# Patient Record
Sex: FEMALE | Race: WHITE | NOT HISPANIC OR LATINO | Employment: OTHER | URBAN - METROPOLITAN AREA
[De-identification: names, ages, dates, MRNs, and addresses within clinical notes are randomized per-mention and may not be internally consistent; named-entity substitution may affect disease eponyms.]

---

## 2017-01-05 ENCOUNTER — OFFICE VISIT (OUTPATIENT)
Dept: VASCULAR SURGERY | Facility: CLINIC | Age: 67
End: 2017-01-05
Payer: MEDICARE

## 2017-01-05 VITALS — BODY MASS INDEX: 30.47 KG/M2 | HEIGHT: 62 IN | WEIGHT: 165.56 LBS

## 2017-01-05 DIAGNOSIS — Z95.1 S/P CABG (CORONARY ARTERY BYPASS GRAFT): Primary | ICD-10-CM

## 2017-01-05 DIAGNOSIS — Z95.2 S/P AVR: ICD-10-CM

## 2017-01-05 PROCEDURE — 99024 POSTOP FOLLOW-UP VISIT: CPT | Mod: ,,, | Performed by: THORACIC SURGERY (CARDIOTHORACIC VASCULAR SURGERY)

## 2017-01-05 PROCEDURE — 99999 PR PBB SHADOW E&M-EST. PATIENT-LVL II: CPT | Mod: PBBFAC,,, | Performed by: THORACIC SURGERY (CARDIOTHORACIC VASCULAR SURGERY)

## 2017-01-05 PROCEDURE — 99212 OFFICE O/P EST SF 10 MIN: CPT | Mod: PBBFAC,PO | Performed by: THORACIC SURGERY (CARDIOTHORACIC VASCULAR SURGERY)

## 2017-01-05 RX ORDER — WARFARIN SODIUM 5 MG/1
TABLET ORAL
Refills: 0 | COMMUNITY
Start: 2016-12-15 | End: 2017-03-23

## 2017-01-05 RX ORDER — HYDROCODONE BITARTRATE AND ACETAMINOPHEN 5; 325 MG/1; MG/1
TABLET ORAL
Refills: 0 | COMMUNITY
Start: 2016-12-15 | End: 2017-02-22 | Stop reason: ALTCHOICE

## 2017-01-06 NOTE — PROGRESS NOTES
OFFICE NOTE    HISTORY OF PRESENT ILLNESS:  This is a 66-year-old lady status post aortic valve   replacement and coronary artery bypass grafting.  She reports to the office   today in followup.  She has done reasonably well, should make good progress.    She is ambulatory without assistance.  She does use a cane, but this has been a   chronic issue.  Her medicines are noted, they are part of the recent EPIC   record.  Her problem list is reviewed.    PHYSICAL EXAMINATION:  SKIN:  Her surgical wounds are clean and dry.  There is no evidence of   infection.  Her chest tube stitches were removed as well.  LUNGS:  Clear.  HEART:  Regular rate and rhythm.  VITAL SIGNS:  Satisfactory.    At this point, she should continue cardiac rehabilitation.  She has home health   currently and they are helping her with her anticoagulation, and I would suggest   that she would need Coumadin for another few weeks and then this can be   discontinued, and in its replacement daily aspirin.  She can see me on an   as-needed basis, but hopefully she will continue to do well.      ISHAAN/WICHO  dd: 01/05/2017 10:14:37 (CST)  td: 01/06/2017 00:23:25 (CST)  Doc ID   #[id number]  Job ID #[id number]    CC: Keven Fair MD

## 2017-02-21 DIAGNOSIS — M17.10 ARTHRITIS OF KNEE: ICD-10-CM

## 2017-02-22 DIAGNOSIS — D22.9 ATYPICAL MOLE: Primary | ICD-10-CM

## 2017-02-22 RX ORDER — HYDROCHLOROTHIAZIDE 25 MG/1
TABLET ORAL
Refills: 1 | COMMUNITY
Start: 2017-01-26 | End: 2017-02-22

## 2017-02-22 RX ORDER — TRAMADOL HYDROCHLORIDE 50 MG/1
TABLET ORAL
Qty: 120 TABLET | Refills: 0 | Status: SHIPPED | OUTPATIENT
Start: 2017-02-22 | End: 2017-03-23 | Stop reason: SDUPTHER

## 2017-02-22 RX ORDER — ZOLPIDEM TARTRATE 5 MG/1
TABLET ORAL
Qty: 20 TABLET | Refills: 0 | Status: SHIPPED | OUTPATIENT
Start: 2017-02-22 | End: 2017-05-04 | Stop reason: ALTCHOICE

## 2017-02-22 RX ORDER — NIFEDIPINE 60 MG/1
TABLET, EXTENDED RELEASE ORAL
Refills: 2 | COMMUNITY
Start: 2017-01-26 | End: 2017-08-01

## 2017-02-22 NOTE — TELEPHONE ENCOUNTER
Patient returned call/ returning call got answer machine. Please call patient back at [phone].

## 2017-02-22 NOTE — TELEPHONE ENCOUNTER
Patient had heart surgery. She can not sleep would like to have the ambian refilled.     Needs ref to derm for mole changes in vaginal area, irregular edges and dark.   ______________________________________________  (FYI)    Patient does not feel her generic prozac is not working for her any longer. Booked apt for 3-23-17 with Dr Princess Batres had also put her on hctz = felt weak now on  nifedipine er 60 per Dr Batres. Did not take welchol due to weakness. Allergy and med list updated.

## 2017-02-27 ENCOUNTER — DOCUMENTATION ONLY (OUTPATIENT)
Dept: FAMILY MEDICINE | Facility: CLINIC | Age: 67
End: 2017-02-27

## 2017-02-27 NOTE — PROGRESS NOTES
Pre-Visit Chart Review  For Appointment Scheduled on 3-23-17    Health Maintenance Due   Topic Date Due    Fecal Occult Blood Test (FOBT)  1950    Eye Exam  05/07/2015    Influenza Vaccine  08/01/2016    Pneumococcal (65+) (2 of 2 - PCV13) 09/30/2016    Mammogram  04/22/2017

## 2017-03-23 ENCOUNTER — OFFICE VISIT (OUTPATIENT)
Dept: FAMILY MEDICINE | Facility: CLINIC | Age: 67
End: 2017-03-23
Payer: MEDICARE

## 2017-03-23 VITALS
TEMPERATURE: 98 F | WEIGHT: 164.25 LBS | SYSTOLIC BLOOD PRESSURE: 134 MMHG | HEIGHT: 62 IN | DIASTOLIC BLOOD PRESSURE: 82 MMHG | OXYGEN SATURATION: 95 % | BODY MASS INDEX: 30.22 KG/M2 | HEART RATE: 75 BPM | RESPIRATION RATE: 20 BRPM

## 2017-03-23 DIAGNOSIS — Z12.31 OTHER SCREENING MAMMOGRAM: ICD-10-CM

## 2017-03-23 DIAGNOSIS — G47.00 INSOMNIA, UNSPECIFIED TYPE: ICD-10-CM

## 2017-03-23 DIAGNOSIS — Z12.11 COLON CANCER SCREENING: ICD-10-CM

## 2017-03-23 DIAGNOSIS — C43.72 MALIGNANT MELANOMA OF LEFT LOWER EXTREMITY INCLUDING HIP: Primary | ICD-10-CM

## 2017-03-23 DIAGNOSIS — M17.10 ARTHRITIS OF KNEE: ICD-10-CM

## 2017-03-23 DIAGNOSIS — F32.A DEPRESSION, UNSPECIFIED DEPRESSION TYPE: ICD-10-CM

## 2017-03-23 DIAGNOSIS — E11.9 TYPE 2 DIABETES MELLITUS WITHOUT COMPLICATION, UNSPECIFIED LONG TERM INSULIN USE STATUS: ICD-10-CM

## 2017-03-23 PROCEDURE — 99214 OFFICE O/P EST MOD 30 MIN: CPT | Mod: S$PBB,,, | Performed by: FAMILY MEDICINE

## 2017-03-23 PROCEDURE — 99999 PR PBB SHADOW E&M-EST. PATIENT-LVL IV: CPT | Mod: PBBFAC,,, | Performed by: FAMILY MEDICINE

## 2017-03-23 PROCEDURE — 99214 OFFICE O/P EST MOD 30 MIN: CPT | Mod: PBBFAC,PO | Performed by: FAMILY MEDICINE

## 2017-03-23 RX ORDER — ENOXAPARIN SODIUM 100 MG/ML
INJECTION SUBCUTANEOUS
Refills: 0 | COMMUNITY
Start: 2016-12-20 | End: 2017-03-23 | Stop reason: ALTCHOICE

## 2017-03-23 RX ORDER — TRAZODONE HYDROCHLORIDE 50 MG/1
50 TABLET ORAL NIGHTLY PRN
Qty: 30 TABLET | Refills: 5 | Status: SHIPPED | OUTPATIENT
Start: 2017-03-23 | End: 2017-05-04

## 2017-03-23 RX ORDER — VIT C/E/ZN/COPPR/LUTEIN/ZEAXAN 250MG-90MG
1000 CAPSULE ORAL DAILY
Status: ON HOLD | COMMUNITY

## 2017-03-23 RX ORDER — THEOPHYLLINE 400 MG/1
TABLET, EXTENDED RELEASE ORAL
Refills: 1 | COMMUNITY
Start: 2017-03-06 | End: 2019-03-08 | Stop reason: SDUPTHER

## 2017-03-23 RX ORDER — TRAMADOL HYDROCHLORIDE 50 MG/1
50 TABLET ORAL EVERY 6 HOURS PRN
Qty: 120 TABLET | Refills: 0 | Status: SHIPPED | OUTPATIENT
Start: 2017-03-23 | End: 2017-05-10 | Stop reason: SDUPTHER

## 2017-03-23 RX ORDER — CETIRIZINE HYDROCHLORIDE 10 MG/1
10 TABLET ORAL DAILY
COMMUNITY
End: 2017-06-16 | Stop reason: ALTCHOICE

## 2017-03-23 RX ORDER — FLUOXETINE HYDROCHLORIDE 20 MG/1
20 CAPSULE ORAL DAILY
Qty: 30 CAPSULE | Refills: 11 | Status: SHIPPED | OUTPATIENT
Start: 2017-03-23 | End: 2018-08-06 | Stop reason: SDUPTHER

## 2017-03-23 NOTE — PATIENT INSTRUCTIONS
Controlling High Blood Pressure  High blood pressure (hypertension) is often called the silent killer. This is because many people who have it dont know it. High blood pressure is defined as 140/90 mm Hg or higher. Know your blood pressure and remember to check it regularly. Doing so can save your life. Here are some things you can do to help control your blood pressure.    Choose heart-healthy foods  · Select low-salt, low-fat foods. Limit sodium intake to 2,400 mg per day or the amount suggested by your healthcare provider.  · Limit canned, dried, cured, packaged, and fast foods. These can contain a lot of salt.  · Eat 8 to 10 servings of fruits and vegetables every day.  · Choose lean meats, fish, or chicken.  · Eat whole-grain pasta, brown rice, and beans.  · Eat 2 to 3 servings of low-fat or fat-free dairy products.  · Ask your doctor about the DASH eating plan. This plan helps reduce blood pressure.  · When you go to a restaurant, ask that your meal be prepared with no added salt.  Maintain a healthy weight  · Ask your healthcare provider how many calories to eat a day. Then stick to that number.  · Ask your healthcare provider what weight range is healthiest for you. If you are overweight, a weight loss of only 3% to 5% of your body weight can help lower blood pressure. Generally, a good weight loss goal is to lose 10% of your body weight in a year.  · Limit snacks and sweets.  · Get regular exercise.  Get up and get active  · Choose activities you enjoy. Find ones you can do with friends or family. This includes bicycling, dancing, walking, and jogging.  · Park farther away from building entrances.  · Use stairs instead of the elevator.  · When you can, walk or bike instead of driving.  · [place] leaves, garden, or do household repairs.  · Be active at a moderate to vigorous level of physical activity for at least 40 minutes for a minimum of 3 to 4 days a week.   Manage stress  · Make time to relax and enjoy  life. Find time to laugh.  · Communicate your concerns with your loved ones and your healthcare provider.  · Visit with family and friends, and keep up with hobbies.  Limit alcohol and quit smoking  · Men should have no more than 2 drinks per day.  · Women should have no more than 1 drink per day.  · Talk with your healthcare provider about quitting smoking. Smoking significantly increases your risk for heart disease and stroke. Ask your healthcare provider about community smoking cessation programs and other options.  Medicines  If lifestyle changes arent enough, your healthcare provider may prescribe high blood pressure medicine. Take all medicines as prescribed. If you have any questions about your medicines, ask your healthcare provider before stopping or changing them.   Date Last Reviewed: 4/27/2016 © 2000-2016 [employer] [address] [place] [address], [place] [address]. All rights reserved. This information is not intended as a substitute for professional medical care. Always follow your healthcare professional's instructions.        Walking for Fitness  Fitness walking has something for everyone, even people who are already fit. Walking is one of the safest ways to condition your body aerobically. It can boost energy, help you lose weight, and reduce stress.    Physical benefits  · Walking strengthens your heart and lungs, and tones your muscles.  · When walking, your feet land with less impact than in other sports. This reduces chances of muscle, bone, and joint injury.  · Regular walking improves your cholesterol levels and lowers your risk of heart disease. And it helps you control your blood sugar if you have diabetes.  · Walking is a weight-bearing activity, which helps maintain bone density. This can help prevent osteoporosis.  Personal rewards  · Taking walks can help you relax and manage stress. And fitness walking may make you feel better about yourself.  · Walking can help you sleep  better at night and make you less likely to be depressed.  · Regular walking may help maintain your memory as you get older.  · Walking is a great way to spend extra time with friends and family members. Be sure to invite your dog along!  Q&A about fitness walking  Q: Will walking keep me fit?  A: Yes. Regular walking at the right pace gives you all the benefits of other aerobic activities, such as jogging and swimming.  Q: Will walking help me lose weight and keep it off?  A: Yes. Per mile, walking can burn as many calories as jogging. Your health care provider can help work walking into your weight-loss plan.  Q: Is walking safe for my health?  A: Yes. Walking is safe if you have high blood pressure, diabetes, heart disease, or other conditions. Talk to your health care provider before you start.  Date Last Reviewed: 5/9/2015  © 7902-5976 [employer] [address] [place] [address]. All rights reserved. This information is not intended as a substitute for professional medical care. Always follow your healthcare professional's instructions.        Weight Management: Getting Started  Healthy bodies come in all shapes and sizes. Not all bodies are made to be thin. For some people, a healthy weight is higher than the average weight listed on weight charts. Your healthcare provider can help you decide on a healthy weight for you.    Reasons to lose weight  Losing weight can help with some health problems, such as high blood pressure, heart disease, diabetes, sleep apnea, and arthritis. You may also feel more energy.  Set your long-term goal  Your goal doesn't even have to be a specific weight. You may decide on a fitness goal (such as being able to walk 10 miles a week), or a health goal (such as lowering your blood pressure). Choose a goal that is measurable and reasonable, so you know when you've reached it. A goal of reaching a BMI of less than 25 is not always reasonable (or  possible).   Make an action plan  Habits dont change overnight. Setting your goals too high can leave you feeling discouraged if you cant reach them. Be realistic. Choose one or two small changes you can make now. Set an action plan for how you are going to make these changes. When you can stick to this plan, keep making a few more small changes. Taking small steps will help you stay on the path to success.  Track your progress  Write down your goals. Then, keep a daily record of your progress. Write down what you eat and how active you are. This record lets you look back on how much youve done. It may also help when youre feeling frustrated. Reward yourself for success. Even if you dont reach every goal, give yourself credit for what you do get done.  Get support  Encouragement from others can help make losing weight easier. Ask your family members and friends for support. They may even want to join you. Also look to your healthcare provider, registered dietitian, and  for help. Your local hospital can give you more information about nutrition, exercise, and weight loss.  Date Last Reviewed: 1/31/2016  © 7206-0135 [employer] [address] [place] [address], [place] [address]. All rights reserved. This information is not intended as a substitute for professional medical care. Always follow your healthcare professional's instructions.

## 2017-03-23 NOTE — MR AVS SNAPSHOT
[facility]  [address]  [place] [address]  Phone: [phone]  Fax: [phone]                  Tereza Larose   3/23/2017 4:20 PM   Office Visit    Description:  Female : 1950   Provider:  Evan Sánchez MD   Department:  [facility]           Reason for Visit     Medication Refill           Diagnoses this Visit        Comments    Malignant melanoma of left lower extremity including hip    -  Primary     Depression, unspecified depression type         Insomnia, unspecified type         Arthritis of knee         Colon cancer screening         Type 2 diabetes mellitus without complication, unspecified long term insulin use status         Other screening mammogram         Immunization due                To Do List           Future Appointments        Provider Department Dept Phone    3/28/2017 9:45 AM Farnaz Vaz MD [facility] [phone]    3/28/2017 11:30 AM Joaquin Coates MD [place] [facility]Di[facility] [phone]    2017 9:00 AM [facility] [phone]    2017 9:15 AM LAB, [facility] [phone]      Goals (5 Years of Data)     None       These Medications        Disp Refills Start End    fluoxetine (PROZAC) 20 MG capsule 30 capsule 11 3/23/2017 3/23/2018    Take 1 capsule (20 mg total) by mouth once daily. - Oral    Pharmacy: [email or web address] [address]  AT [place] [address] & [facility] [address] Ph #: [phone]       trazodone (DESYREL) 50 MG tablet 30 tablet 5 3/23/2017 3/23/2018    Take 1 tablet (50 mg total) by mouth nightly as needed for Insomnia. - Oral    Pharmacy: [email or web address] [address] AT [place] [address] & [facility] [address] Ph #: [phone]       tramadol (ULTRAM) 50 mg tablet 120 tablet 0 3/23/2017     Take 1 tablet (50 mg total) by mouth every 6 (six) hours as needed. - Oral    Pharmacy: [email or web address]  [id number]  [place], [place] - [address] AT [facility] [address]  #: [phone]         [facility]     [facility] - 24/7 Assistance  Registered nurses in the [facility] provide clinical advisement, health education, appointment booking, and other advisory services.  Call for this free service at [phone].             Medications           Message regarding Medications     Verify the changes and/or additions to your medication regime listed below are the same as discussed with your clinician today.  If any of these changes or additions are incorrect, please notify your healthcare provider.        START taking these NEW medications        Refills    fluoxetine (PROZAC) 20 MG capsule 11    Sig: Take 1 capsule (20 mg total) by mouth once daily.    Class: Print    Route: Oral    trazodone (DESYREL) 50 MG tablet 5    Sig: Take 1 tablet (50 mg total) by mouth nightly as needed for Insomnia.    Class: Normal    Route: Oral      CHANGE how you are taking these medications     Start Taking Instead of    tramadol (ULTRAM) 50 mg tablet tramadol (ULTRAM) 50 mg tablet    Dosage:  Take 1 tablet (50 mg total) by mouth every 6 (six) hours as needed. Dosage:  TAKE 1 TABLET BY MOUTH EVERY 6 HOURS AS NEEDED FOR PAIN    Reason for Change:  Reorder       STOP taking these medications     cholecalciferol, vitamin D3, 50,000 unit Tab Take 1 tablet by mouth every 7 days.    enoxaparin (LOVENOX) 80 mg/0.8 mL Syrg ADM 1 SYRINGE SC BID    ibuprofen (ADVIL,MOTRIN) 200 MG tablet Take 400 mg by mouth every 8 (eight) hours as needed for Pain.    theophylline (THEODUR) 300 mg 24 hr capsule Take 300 mg by mouth once daily.    warfarin (COUMADIN) 5 MG tablet TK 1 T PO EVERY NIGHT.           Verify that the below list of medications is an accurate representation of the medications you are currently taking.  If none reported, the list may be blank. If incorrect, please contact your healthcare provider.  Carry this list with you in case of emergency.           Current Medications     albuterol (VENTOLIN HFA) 90 mcg/actuation inhaler Inhale 2 puffs into the lungs every 4 (four) hours as needed.    albuterol-ipratropium 2.5mg-0.5mg/3mL (DUO-NEB) 0.5 mg-3 mg(2.5 mg base)/3 mL nebulizer solution U 1 VIAL IN NEBULIZER Q 6 H PRF SOB    amlodipine (NORVASC) 10 MG tablet Take 1 tablet (10 mg total) by mouth once daily.    aspirin (ECOTRIN) 81 MG EC tablet Take 81 mg by mouth once daily.    blood sugar diagnostic Strp True track check blood glucose once daily    cetirizine (ZYRTEC) 10 MG tablet Take 10 mg by mouth once daily.    cholecalciferol, vitamin D3, (VITAMIN D3) 1,000 unit capsule Take 1,000 Units by mouth once daily.    coQ10, ubiquinol, 100 mg Cap Take 1 capsule by mouth 2 (two) times daily.     cyanocobalamin (VITAMIN B-12) 250 MCG tablet Take 250 mcg by mouth once daily.    GUAIFENESIN/PSEUDOEPHEDRNE HCL (MUCINEX D ORAL) Take 1 tablet by mouth 2 (two) times daily as needed.     krill-omega-3-dha-epa-lipids (KRILL OIL) 457-31-23-50 mg Cap Take 1 capsule by mouth 2 (two) times daily.    L. ACIDOPHILUS/BIFIDO LONGUM (PROBIOTIC PEARLS ORAL) Take 1 each by mouth 2 (two) times daily.     lancets [facility] True track Check glucose once daily    levothyroxine (SYNTHROID) 125 MCG tablet Take 1 tablet (125 mcg total) by mouth every morning.    lisinopril (PRINIVIL,ZESTRIL) 40 MG tablet Take 1 tablet (40 mg total) by mouth once daily.    loratadine (CLARITIN) 10 mg tablet Take 10 mg by mouth 2 (two) times daily as needed for Allergies.     magnesium 250 mg Tab Take 1 tablet by mouth once daily.     metformin (GLUCOPHAGE) 500 MG tablet Take 2 tablets (1,000 mg total) by mouth 2 (two) times daily with meals.    metoprolol tartrate (LOPRESSOR) 100 MG tablet Take 1 tablet (100 mg total) by mouth 3 (three) times daily.    nifedipine (ADALAT CC) 60 MG TbSR TK 1 T PO qhs    potassium chloride (MICRO-K) 8 mEq CpSR Take 1 capsule (8 mEq  "total) by mouth once daily.    theophylline 400 mg Tb24 TK 1 T PO BID    torsemide (DEMADEX) 20 MG Tab Take 1 tablet (20 mg total) by mouth once daily.    tramadol (ULTRAM) 50 mg tablet Take 1 tablet (50 mg total) by mouth every 6 (six) hours as needed.    trolamine salicylate 10 % SprA Apply topically 3 (three) times daily as needed.    TRUEPLUS LANCETS 33 gauge Misc USE TO TEST BLOOD SUGAR ONCE D    vitamin A 8000 UNIT capsule Take 8,000 Units by mouth once daily.    zolpidem (AMBIEN) 5 MG Tab 1/2 tablet twice a week as needed for sleep    fluoxetine (PROZAC) 20 MG capsule Take 1 capsule (20 mg total) by mouth once daily.    trazodone (DESYREL) 50 MG tablet Take 1 tablet (50 mg total) by mouth nightly as needed for Insomnia.           Clinical Reference Information           Your Vitals Were     BP Pulse Temp Resp Height Weight    180/84 (BP Location: Left arm, Patient Position: Sitting, BP Method: Manual) 75 98.2 °F (36.8 °C) (Oral) 20 5' 2" (1.575 m) 74.5 kg (164 lb 3.9 oz)    SpO2 BMI             95% 30.04 kg/m2         Blood Pressure          Most Recent Value    BP  (!)  180/84      Allergies as of 3/23/2017     Corn    Hydrochlorothiazide    Potato Starch    Pravastatin    Statins-hmg-coa Reductase Inhibitors    Welchol [Colesevelam]      Immunizations Administered on Date of Encounter - 3/23/2017     Name Date Dose VIS Date Route    Pneumococcal Conjugate - 13 Valent  Incomplete 0.5 mL 11/5/2015 Intramuscular      Orders Placed During Today's Visit      Normal Orders This Visit    Pneumococcal Conjugate Vaccine (13 Valent) (IM)     POCT Hemocult Stool X3     Future Labs/Procedures Expected by Expires    Basic metabolic panel  3/23/2017 3/24/2018    Hemoglobin A1c  3/23/2017 3/24/2018    Mammo Digital Screening Bilat with CAD  3/23/2017 5/22/2018      Instructions      Controlling High Blood Pressure  High blood pressure (hypertension) is often called the silent killer. This is because many people who have " it dont know it. High blood pressure is defined as 140/90 mm Hg or higher. Know your blood pressure and remember to check it regularly. Doing so can save your life. Here are some things you can do to help control your blood pressure.    Choose heart-healthy foods  · Select low-salt, low-fat foods. Limit sodium intake to 2,400 mg per day or the amount suggested by your healthcare provider.  · Limit canned, dried, cured, packaged, and fast foods. These can contain a lot of salt.  · Eat 8 to 10 servings of fruits and vegetables every day.  · Choose lean meats, fish, or chicken.  · Eat whole-grain pasta, brown rice, and beans.  · Eat 2 to 3 servings of low-fat or fat-free dairy products.  · Ask your doctor about the DASH eating plan. This plan helps reduce blood pressure.  · When you go to a restaurant, ask that your meal be prepared with no added salt.  Maintain a healthy weight  · Ask your healthcare provider how many calories to eat a day. Then stick to that number.  · Ask your healthcare provider what weight range is healthiest for you. If you are overweight, a weight loss of only 3% to 5% of your body weight can help lower blood pressure. Generally, a good weight loss goal is to lose 10% of your body weight in a year.  · Limit snacks and sweets.  · Get regular exercise.  Get up and get active  · Choose activities you enjoy. Find ones you can do with friends or family. This includes bicycling, dancing, walking, and jogging.  · Park farther away from building entrances.  · Use stairs instead of the elevator.  · When you can, walk or bike instead of driving.  · [place] leaves, garden, or do household repairs.  · Be active at a moderate to vigorous level of physical activity for at least 40 minutes for a minimum of 3 to 4 days a week.   Manage stress  · Make time to relax and enjoy life. Find time to laugh.  · Communicate your concerns with your loved ones and your healthcare provider.  · Visit with family and friends,  and keep up with hobbies.  Limit alcohol and quit smoking  · Men should have no more than 2 drinks per day.  · Women should have no more than 1 drink per day.  · Talk with your healthcare provider about quitting smoking. Smoking significantly increases your risk for heart disease and stroke. Ask your healthcare provider about community smoking cessation programs and other options.  Medicines  If lifestyle changes arent enough, your healthcare provider may prescribe high blood pressure medicine. Take all medicines as prescribed. If you have any questions about your medicines, ask your healthcare provider before stopping or changing them.   Date Last Reviewed: 4/27/2016  © 8200-7586 [employer]. [address] [place] [address], [place] [address]. All rights reserved. This information is not intended as a substitute for professional medical care. Always follow your healthcare professional's instructions.        Walking for Fitness  Fitness walking has something for everyone, even people who are already fit. Walking is one of the safest ways to condition your body aerobically. It can boost energy, help you lose weight, and reduce stress.    Physical benefits  · Walking strengthens your heart and lungs, and tones your muscles.  · When walking, your feet land with less impact than in other sports. This reduces chances of muscle, bone, and joint injury.  · Regular walking improves your cholesterol levels and lowers your risk of heart disease. And it helps you control your blood sugar if you have diabetes.  · Walking is a weight-bearing activity, which helps maintain bone density. This can help prevent osteoporosis.  Personal rewards  · Taking walks can help you relax and manage stress. And fitness walking may make you feel better about yourself.  · Walking can help you sleep better at night and make you less likely to be depressed.  · Regular walking may help maintain your memory as you get older.  · Walking is a  great way to spend extra time with friends and family members. Be sure to invite your dog along!  Q&A about fitness walking  Q: Will walking keep me fit?  A: Yes. Regular walking at the right pace gives you all the benefits of other aerobic activities, such as jogging and swimming.  Q: Will walking help me lose weight and keep it off?  A: Yes. Per mile, walking can burn as many calories as jogging. Your health care provider can help work walking into your weight-loss plan.  Q: Is walking safe for my health?  A: Yes. Walking is safe if you have high blood pressure, diabetes, heart disease, or other conditions. Talk to your health care provider before you start.  Date Last Reviewed: 5/9/2015 © 2000-2016 [employer]. [address] [place] [address], [place] [address]. All rights reserved. This information is not intended as a substitute for professional medical care. Always follow your healthcare professional's instructions.        Weight Management: Getting Started  Healthy bodies come in all shapes and sizes. Not all bodies are made to be thin. For some people, a healthy weight is higher than the average weight listed on weight charts. Your healthcare provider can help you decide on a healthy weight for you.    Reasons to lose weight  Losing weight can help with some health problems, such as high blood pressure, heart disease, diabetes, sleep apnea, and arthritis. You may also feel more energy.  Set your long-term goal  Your goal doesn't even have to be a specific weight. You may decide on a fitness goal (such as being able to walk 10 miles a week), or a health goal (such as lowering your blood pressure). Choose a goal that is measurable and reasonable, so you know when you've reached it. A goal of reaching a BMI of less than 25 is not always reasonable (or possible).   Make an action plan  Habits dont change overnight. Setting your goals too high can leave you feeling discouraged if you cant reach them. Be  realistic. Choose one or two small changes you can make now. Set an action plan for how you are going to make these changes. When you can stick to this plan, keep making a few more small changes. Taking small steps will help you stay on the path to success.  Track your progress  Write down your goals. Then, keep a daily record of your progress. Write down what you eat and how active you are. This record lets you look back on how much youve done. It may also help when youre feeling frustrated. Reward yourself for success. Even if you dont reach every goal, give yourself credit for what you do get done.  Get support  Encouragement from others can help make losing weight easier. Ask your family members and friends for support. They may even want to join you. Also look to your healthcare provider, registered dietitian, and  for help. Your local hospital can give you more information about nutrition, exercise, and weight loss.  Date Last Reviewed: 1/31/2016  © 5948-6452 [employer]. [address] [place] [address]. All rights reserved. This information is not intended as a substitute for professional medical care. Always follow your healthcare professional's instructions.             Language Assistance Services     ATTENTION: Language assistance services are available, free of charge. Please call [phone].      ATENCIÓN: Si devinla christina, tiene a guevara disposición servicios gratuitos de asistencia lingüística. Llame al [phone].     [facility] Ý: N?u b?n nói Ti?ng Vi?t, có các d?ch v? h? tr? ngôn ng? mi?n phí dành cho b?n. G?i s? [phone].         [facility] complies with applicable Federal civil rights laws and does not discriminate on the basis of race, color, national origin, age, disability, or sex.

## 2017-03-23 NOTE — PROGRESS NOTES
Subjective:       Patient ID: Tereza Larose is a 66 y.o. female.    Chief Complaint: Medication Refill    HPI Comments: 66 year old female comes in with multiple complaints and concerns.  She underwent aortic valve replacement and three vessel CABG by Dr. Benavides at [facility] on December 9, 2016.  During prep for the procedure a large dark mole was noted in the left groin and she was instructed to get it checked out.  She actually already has an appt with Dr. Vaz on March 28 for this but wanted to confirm it needs to be seen.  Post operatively she developed depression and was put on prozac 20mg daily.  She is doing well with that after about six weeks and wants to refill it going forward.  She has a history of arthritis of the knee and the rehab exercise is stressing it somewhat.  She has used up her supply of tramadol we had given her and she needs a refill.  She is interested in the prevnar vaccine but does not want to take it now due to the exercise-she does a lot of upper body workouts and does not want a sore arm so she will get the vaccine when she completes rehab.  She is having trouble sleeping and wants to renew zolpidem.  We discussed potential problems with zolpidem including sleep walking behavior and potential for falls and fractures and she is willing to try trazodone instead.  She is due for a mammogram and coming due for her diabetic lab and foot exam.    Past Medical History:  No date: Allergy  No date: Arthritis  No date: Asthma  No date: Brain bleed  No date: COPD (chronic obstructive pulmonary disease)  No date: Depression  No date: Diabetes mellitus type II  No date: Diverticulitis  No date: Fatty liver  No date: GERD (gastroesophageal reflux disease)  No date: Heart murmur  No date: Hyperlipidemia  No date: Hypertension  6/14/2012: Metabolic syndrome  No date: Myocardial infarction  No date: Stroke  No date: Thyroid disease    Past Surgical History:  12/09/2016: AORTIC VALVE REPLACEMENT  No  date: arthroscopy lt knee  No date: BLADDER REPAIR  2004: COLONOSCOPY      Comment: 10 year recheck  12/09/2016: CORONARY ARTERY BYPASS GRAFT      Comment: X3  No date: HYSTERECTOMY  No date: THYROIDECTOMY      Current Outpatient Prescriptions:     albuterol (VENTOLIN HFA) 90 mcg/actuation inhaler, Inhale 2 puffs into the lungs every 4 (four) hours as needed., Disp: 18 g, Rfl: 12    albuterol-ipratropium 2.5mg-0.5mg/3mL (DUO-NEB) 0.5 mg-3 mg(2.5 mg base)/3 mL nebulizer solution, U 1 VIAL IN NEBULIZER Q 6 H PRF SOB, Disp: 360 vial, Rfl: 3    amlodipine (NORVASC) 10 MG tablet, Take 1 tablet (10 mg total) by mouth once daily., Disp: 90 tablet, Rfl: 3    aspirin (ECOTRIN) 81 MG EC tablet, Take 81 mg by mouth once daily., Disp: , Rfl:     blood sugar diagnostic Strp, True track check blood glucose once daily, Disp: 100 each, Rfl: 3    cetirizine (ZYRTEC) 10 MG tablet, Take 10 mg by mouth once daily., Disp: , Rfl:     cholecalciferol, vitamin D3, (VITAMIN D3) 1,000 unit capsule, Take 1,000 Units by mouth once daily., Disp: , Rfl:     coQ10, ubiquinol, 100 mg Cap, Take 1 capsule by mouth 2 (two) times daily. , Disp: , Rfl:     cyanocobalamin (VITAMIN B-12) 250 MCG tablet, Take 250 mcg by mouth once daily., Disp: , Rfl:     GUAIFENESIN/PSEUDOEPHEDRNE HCL (MUCINEX D ORAL), Take 1 tablet by mouth 2 (two) times daily as needed. , Disp: , Rfl:     krill-omega-3-dha-epa-lipids (KRILL OIL) 776-29-20-50 mg Cap, Take 1 capsule by mouth 2 (two) times daily., Disp: , Rfl:     L. ACIDOPHILUS/BIFIDO LONGUM (PROBIOTIC PEARLS ORAL), Take 1 each by mouth 2 (two) times daily. , Disp: , Rfl:     lancets Misc, True track Check glucose once daily, Disp: 100 each, Rfl: 3    levothyroxine (SYNTHROID) 125 MCG tablet, Take 1 tablet (125 mcg total) by mouth every morning., Disp: 90 tablet, Rfl: 2    lisinopril (PRINIVIL,ZESTRIL) 40 MG tablet, Take 1 tablet (40 mg total) by mouth once daily., Disp: 90 tablet, Rfl: 2    loratadine  (CLARITIN) 10 mg tablet, Take 10 mg by mouth 2 (two) times daily as needed for Allergies. , Disp: , Rfl:     magnesium 250 mg Tab, Take 1 tablet by mouth once daily. , Disp: , Rfl:     metformin (GLUCOPHAGE) 500 MG tablet, Take 2 tablets (1,000 mg total) by mouth 2 (two) times daily with meals. (Patient taking differently: Take 500 mg by mouth 2 (two) times daily with meals. ), Disp: 360 tablet, Rfl: 0    metoprolol tartrate (LOPRESSOR) 100 MG tablet, Take 1 tablet (100 mg total) by mouth 3 (three) times daily., Disp: 270 tablet, Rfl: 3    nifedipine (ADALAT CC) 60 MG TbSR, TK 1 T PO qhs, Disp: , Rfl: 2    potassium chloride (MICRO-K) 8 mEq CpSR, Take 1 capsule (8 mEq total) by mouth once daily., Disp: 90 capsule, Rfl: 1    theophylline 400 mg Tb24, TK 1 T PO BID, Disp: , Rfl: 1    torsemide (DEMADEX) 20 MG Tab, Take 1 tablet (20 mg total) by mouth once daily., Disp: 90 tablet, Rfl: 1    tramadol (ULTRAM) 50 mg tablet, Take 1 tablet (50 mg total) by mouth every 6 (six) hours as needed., Disp: 120 tablet, Rfl: 0    trolamine salicylate 10 % SprA, Apply topically 3 (three) times daily as needed., Disp: , Rfl:     TRUEPLUS LANCETS 33 gauge Misc, USE TO TEST BLOOD SUGAR ONCE D, Disp: , Rfl: 3    vitamin A 8000 UNIT capsule, Take 8,000 Units by mouth once daily., Disp: , Rfl:     zolpidem (AMBIEN) 5 MG Tab, 1/2 tablet twice a week as needed for sleep, Disp: 20 tablet, Rfl: 0    fluoxetine (PROZAC) 20 MG capsule, Take 1 capsule (20 mg total) by mouth once daily., Disp: 30 capsule, Rfl: 11    Fecal Occult Blood Test (FOBT) due on 1950  Eye Exam due on 05/07/2015  Pneumococcal (65+)(2 of 2 - PCV13) due on 09/30/2016-TEMPORARILY DECLINES UNTIL COMPLETES REHAB  Mammogram due on 04/22/2017  Foot Exam due on 05/09/2017          Medication Refill   Associated symptoms include arthralgias. Pertinent negatives include no chest pain, chills, fatigue, fever, headaches or rash.     Review of Systems    Constitutional: Negative for chills, fatigue and fever.   Respiratory: Negative for chest tightness and shortness of breath.    Cardiovascular: Negative for chest pain, palpitations and leg swelling.   Musculoskeletal: Positive for arthralgias.   Skin: Positive for color change. Negative for pallor and rash.   Neurological: Negative for dizziness, light-headedness and headaches.   Psychiatric/Behavioral: Positive for dysphoric mood and sleep disturbance. The patient is not nervous/anxious.        Objective:      Physical Exam   Constitutional: She is oriented to person, place, and time. She appears well-developed and well-nourished. No distress.   Good blood pressure control     HENT:   Head: Normocephalic and atraumatic.   Eyes: EOM are normal. Pupils are equal, round, and reactive to light. No scleral icterus.   Cardiovascular: Normal rate, regular rhythm and normal heart sounds.  Exam reveals no gallop and no friction rub.    No murmur heard.  Pulses:       Dorsalis pedis pulses are 2+ on the right side, and 2+ on the left side.        Posterior tibial pulses are 2+ on the right side, and 2+ on the left side.   Pulmonary/Chest: Effort normal and breath sounds normal. No respiratory distress. She has no wheezes. She has no rales. She exhibits no tenderness.   Musculoskeletal:        Right foot: There is normal range of motion and no deformity.        Left foot: There is normal range of motion and no deformity.   Feet:   Right Foot:   Protective Sensation: 10 sites tested. 10 sites sensed.   Skin Integrity: Negative for ulcer, blister, skin breakdown, erythema, warmth, callus or dry skin.   Left Foot:   Protective Sensation: 10 sites tested. 10 sites sensed.   Skin Integrity: Negative for ulcer, blister, skin breakdown, erythema, warmth, callus or dry skin.   Neurological: She is alert and oriented to person, place, and time.   Skin: She is not diaphoretic.        Psychiatric: Her speech is normal and behavior is  normal. Judgment and thought content normal. Her mood appears not anxious. Her affect is not inappropriate. She is not actively hallucinating. Cognition and memory are normal. She does not exhibit a depressed mood. She is attentive.   Nursing note and vitals reviewed.      Assessment:       1. Malignant melanoma of left lower extremity including hip    2. Depression, unspecified depression type    3. Insomnia, unspecified type    4. Arthritis of knee    5. Colon cancer screening    6. Type 2 diabetes mellitus without complication, unspecified long term insulin use status    7. Other screening mammogram        Plan:       1. Malignant melanoma of left lower extremity including hip  Already has appt with Dr. Vaz March 28.    2. Depression, unspecified depression type  Continue prozac total of six months, if doing well may try to wean at that time  - fluoxetine (PROZAC) 20 MG capsule; Take 1 capsule (20 mg total) by mouth once daily.  Dispense: 30 capsule; Refill: 11    3. Insomnia, unspecified type  Trial trazodone  - trazodone (DESYREL) 50 MG tablet; Take 1 tablet (50 mg total) by mouth nightly as needed for Insomnia.  Dispense: 30 tablet; Refill: 5    4. Arthritis of knee  Refill tramadol  - tramadol (ULTRAM) 50 mg tablet; Take 1 tablet (50 mg total) by mouth every 6 (six) hours as needed.  Dispense: 120 tablet; Refill: 0    5. Colon cancer screening  Declines colonoscopy but agrees to FOBT  - POCT Hemocult Stool X3    6. Type 2 diabetes mellitus without complication, unspecified long term insulin use status  Schedule lab  - Basic metabolic panel; Future  - Hemoglobin A1c; Future    7. Other screening mammogram  Schedule mammogram  - Mammo Digital Screening Bilat with CAD; Future         Patient readiness: acceptance and barriers:none    During the course of the visit the patient was educated and counseled about the following:     Diabetes:  Discussed general issues about diabetes pathophysiology and  management.  Discussed foot care.  Reminded to get yearly retinal exam.  Hypertension:   Medication: no change.  Dietary sodium restriction.  Regular aerobic exercise.    Goals: Diabetes: Maintain Hemoglobin A1C below 7 and Hypertension: Reduce Blood Pressure    Did patient meet goals/outcomes: Yes    The following self management tools provided: blood pressure log  blood glucose log  excercise log    Patient Instructions (the written plan) was given to the patient/family.     Time spent with patient: 30 minutes

## 2017-03-28 ENCOUNTER — OFFICE VISIT (OUTPATIENT)
Dept: ENDOCRINOLOGY | Facility: CLINIC | Age: 67
End: 2017-03-28
Payer: MEDICARE

## 2017-03-28 ENCOUNTER — INITIAL CONSULT (OUTPATIENT)
Dept: DERMATOLOGY | Facility: CLINIC | Age: 67
End: 2017-03-28
Payer: MEDICARE

## 2017-03-28 ENCOUNTER — LAB VISIT (OUTPATIENT)
Dept: LAB | Facility: HOSPITAL | Age: 67
End: 2017-03-28
Attending: INTERNAL MEDICINE
Payer: MEDICARE

## 2017-03-28 VITALS
DIASTOLIC BLOOD PRESSURE: 59 MMHG | RESPIRATION RATE: 18 BRPM | WEIGHT: 161.63 LBS | TEMPERATURE: 98 F | HEART RATE: 65 BPM | SYSTOLIC BLOOD PRESSURE: 126 MMHG | HEIGHT: 62 IN | BODY MASS INDEX: 29.74 KG/M2

## 2017-03-28 VITALS — HEIGHT: 62 IN | BODY MASS INDEX: 30.18 KG/M2 | WEIGHT: 164 LBS

## 2017-03-28 DIAGNOSIS — E78.00 HYPERCHOLESTEROLEMIA: ICD-10-CM

## 2017-03-28 DIAGNOSIS — G47.33 OSA (OBSTRUCTIVE SLEEP APNEA): Chronic | ICD-10-CM

## 2017-03-28 DIAGNOSIS — E78.5 HYPERLIPIDEMIA, UNSPECIFIED HYPERLIPIDEMIA TYPE: ICD-10-CM

## 2017-03-28 DIAGNOSIS — E27.0 HYPERCORTISOLEMIA: ICD-10-CM

## 2017-03-28 DIAGNOSIS — E27.8 ADRENAL MASS, LEFT: Primary | ICD-10-CM

## 2017-03-28 DIAGNOSIS — E78.1 HYPERTRIGLYCERIDEMIA: ICD-10-CM

## 2017-03-28 DIAGNOSIS — K21.9 GASTROESOPHAGEAL REFLUX DISEASE WITHOUT ESOPHAGITIS: Chronic | ICD-10-CM

## 2017-03-28 DIAGNOSIS — I21.4 NSTEMI (NON-ST ELEVATED MYOCARDIAL INFARCTION): ICD-10-CM

## 2017-03-28 DIAGNOSIS — E11.65 TYPE 2 DIABETES MELLITUS WITH HYPERGLYCEMIA, UNSPECIFIED LONG TERM INSULIN USE STATUS: ICD-10-CM

## 2017-03-28 DIAGNOSIS — I35.0 AORTIC VALVE STENOSIS, UNSPECIFIED ETIOLOGY: Chronic | ICD-10-CM

## 2017-03-28 DIAGNOSIS — E79.0 HYPERURICEMIA: Chronic | ICD-10-CM

## 2017-03-28 DIAGNOSIS — E24.9 ADRENAL CUSHING'S SYNDROME: ICD-10-CM

## 2017-03-28 DIAGNOSIS — I10 ESSENTIAL HYPERTENSION: ICD-10-CM

## 2017-03-28 DIAGNOSIS — L82.1 SEBORRHEIC KERATOSES: Primary | ICD-10-CM

## 2017-03-28 DIAGNOSIS — E88.810 METABOLIC SYNDROME: Chronic | ICD-10-CM

## 2017-03-28 DIAGNOSIS — I67.9 CEREBROVASCULAR DISEASE: ICD-10-CM

## 2017-03-28 DIAGNOSIS — E07.9 THYROID DISORDER: Chronic | ICD-10-CM

## 2017-03-28 DIAGNOSIS — I25.10 CORONARY ARTERY DISEASE DUE TO LIPID RICH PLAQUE: ICD-10-CM

## 2017-03-28 DIAGNOSIS — K76.0 FATTY LIVER: Chronic | ICD-10-CM

## 2017-03-28 DIAGNOSIS — I25.83 CORONARY ARTERY DISEASE DUE TO LIPID RICH PLAQUE: ICD-10-CM

## 2017-03-28 DIAGNOSIS — E27.8 ADRENAL MASS, LEFT: ICD-10-CM

## 2017-03-28 DIAGNOSIS — L81.4 SOLAR LENTIGO: ICD-10-CM

## 2017-03-28 LAB
ALBUMIN SERPL BCP-MCNC: 3.6 G/DL
ALP SERPL-CCNC: 109 U/L
ALT SERPL W/O P-5'-P-CCNC: 17 U/L
ANION GAP SERPL CALC-SCNC: 10 MMOL/L
AST SERPL-CCNC: 23 U/L
BILIRUB SERPL-MCNC: 0.3 MG/DL
BUN SERPL-MCNC: 20 MG/DL
CALCIUM SERPL-MCNC: 10.2 MG/DL
CHLORIDE SERPL-SCNC: 101 MMOL/L
CHOLEST/HDLC SERPL: 7.6 {RATIO}
CO2 SERPL-SCNC: 31 MMOL/L
CORTIS SERPL-MCNC: 7.4 UG/DL
CREAT SERPL-MCNC: 1.1 MG/DL
DHEA-S SERPL-MCNC: 89.1 UG/DL
EST. GFR  (AFRICAN AMERICAN): >60 ML/MIN/1.73 M^2
EST. GFR  (NON AFRICAN AMERICAN): 52.4 ML/MIN/1.73 M^2
GLUCOSE SERPL-MCNC: 112 MG/DL
HDL/CHOLESTEROL RATIO: 13.1 %
HDLC SERPL-MCNC: 336 MG/DL
HDLC SERPL-MCNC: 44 MG/DL
LDLC SERPL CALC-MCNC: 218.8 MG/DL
NONHDLC SERPL-MCNC: 292 MG/DL
POTASSIUM SERPL-SCNC: 4.2 MMOL/L
PROT SERPL-MCNC: 8.2 G/DL
SODIUM SERPL-SCNC: 142 MMOL/L
T3 SERPL-MCNC: 140 NG/DL
T4 FREE SERPL-MCNC: 1.62 NG/DL
TRIGL SERPL-MCNC: 366 MG/DL
TSH SERPL DL<=0.005 MIU/L-ACNC: 0.25 UIU/ML
URATE SERPL-MCNC: 7.1 MG/DL

## 2017-03-28 PROCEDURE — 30000890 MISCELLANEOUS SENDOUT TEST

## 2017-03-28 PROCEDURE — 99214 OFFICE O/P EST MOD 30 MIN: CPT | Mod: S$PBB,,, | Performed by: INTERNAL MEDICINE

## 2017-03-28 PROCEDURE — 84550 ASSAY OF BLOOD/URIC ACID: CPT

## 2017-03-28 PROCEDURE — 82985 ASSAY OF GLYCATED PROTEIN: CPT

## 2017-03-28 PROCEDURE — 83835 ASSAY OF METANEPHRINES: CPT

## 2017-03-28 PROCEDURE — 99999 PR PBB SHADOW E&M-EST. PATIENT-LVL II: CPT | Mod: PBBFAC,,, | Performed by: DERMATOLOGY

## 2017-03-28 PROCEDURE — 30000890 MAYO MISCELLANEOUS TEST (REFLEX)

## 2017-03-28 PROCEDURE — 83036 HEMOGLOBIN GLYCOSYLATED A1C: CPT

## 2017-03-28 PROCEDURE — 84443 ASSAY THYROID STIM HORMONE: CPT

## 2017-03-28 PROCEDURE — 99202 OFFICE O/P NEW SF 15 MIN: CPT | Mod: S$PBB,,, | Performed by: DERMATOLOGY

## 2017-03-28 PROCEDURE — 84439 ASSAY OF FREE THYROXINE: CPT

## 2017-03-28 PROCEDURE — 82530 CORTISOL FREE: CPT

## 2017-03-28 PROCEDURE — 82627 DEHYDROEPIANDROSTERONE: CPT

## 2017-03-28 PROCEDURE — 86316 IMMUNOASSAY TUMOR OTHER: CPT

## 2017-03-28 PROCEDURE — 82384 ASSAY THREE CATECHOLAMINES: CPT

## 2017-03-28 PROCEDURE — 82533 TOTAL CORTISOL: CPT

## 2017-03-28 PROCEDURE — 82024 ASSAY OF ACTH: CPT

## 2017-03-28 PROCEDURE — 84378 SUGARS SINGLE QUANT: CPT

## 2017-03-28 PROCEDURE — 99999 PR PBB SHADOW E&M-EST. PATIENT-LVL III: CPT | Mod: PBBFAC,,, | Performed by: INTERNAL MEDICINE

## 2017-03-28 PROCEDURE — 36415 COLL VENOUS BLD VENIPUNCTURE: CPT | Mod: PO

## 2017-03-28 PROCEDURE — 80053 COMPREHEN METABOLIC PANEL: CPT

## 2017-03-28 PROCEDURE — 82088 ASSAY OF ALDOSTERONE: CPT

## 2017-03-28 PROCEDURE — 86800 THYROGLOBULIN ANTIBODY: CPT

## 2017-03-28 PROCEDURE — 84480 ASSAY TRIIODOTHYRONINE (T3): CPT

## 2017-03-28 PROCEDURE — 80061 LIPID PANEL: CPT

## 2017-03-28 PROCEDURE — 82626 DEHYDROEPIANDROSTERONE: CPT

## 2017-03-28 NOTE — PROGRESS NOTES
Subjective:       Patient ID:  Tereza Larose is a 66 y.o. female who presents for   Chief Complaint   Patient presents with    Mole     L vaginal area, R upper arm     HPI Comments: Initial visit  No h/o skin cancer or lesion biopsy  Here for evaluation of lesions as below      Mole  - Initial  Affected locations: groin and right arm (L vaginal area)  Duration: 7 weeks  Signs / symptoms: growing (dark colored, only noticed a few weeks ago)  Severity: mild to moderate  Timing: constant  Aggravated by: nothing  Relieving factors/Treatments tried: nothing        Review of Systems     Objective:    Physical Exam   Constitutional: She appears well-developed and well-nourished. No distress.   Neurological: She is alert and oriented to person, place, and time. She is not disoriented.   Psychiatric: She has a normal mood and affect.   Skin:   Areas Examined (abnormalities noted in diagram):   Head / Face Inspection Performed  Genitals / Buttocks / Groin Inspection Performed  RUE Inspected  LUE Inspection Performed                   Diagram Legend     Erythematous scaling macule/papule c/w actinic keratosis       Vascular papule c/w angioma      Pigmented verrucoid papule/plaque c/w seborrheic keratosis      Yellow umbilicated papule c/w sebaceous hyperplasia      Irregularly shaped tan macule c/w lentigo     1-2 mm smooth white papules consistent with Milia      Movable subcutaneous cyst with punctum c/w epidermal inclusion cyst      Subcutaneous movable cyst c/w pilar cyst      Firm pink to brown papule c/w dermatofibroma      Pedunculated fleshy papule(s) c/w skin tag(s)      Evenly pigmented macule c/w junctional nevus     Mildly variegated pigmented, slightly irregular-bordered macule c/w mildly atypical nevus      Flesh colored to evenly pigmented papule c/w intradermal nevus       Pink pearly papule/plaque c/w basal cell carcinoma      Erythematous hyperkeratotic cursted plaque c/w SCC      Surgical scar with no  sign of skin cancer recurrence      Open and closed comedones      Inflammatory papules and pustules      Verrucoid papule consistent consistent with wart     Erythematous eczematous patches and plaques     Dystrophic onycholytic nail with subungual debris c/w onychomycosis     Umbilicated papule    Erythematous-base heme-crusted tan verrucoid plaque consistent with inflamed seborrheic keratosis     Erythematous Silvery Scaling Plaque c/w Psoriasis     See annotation      Assessment / Plan:        Seborrheic / melanoacanthoma left inguinal fold  These are benign inherited growths without a malignant potential. Reassurance given to patient. No treatment is necessary.     Solar lentigo  This is a benign hyperpigmented sun induced lesion. Daily sun protection will reduce the number of new lesions. Treatment of these benign lesions are considered cosmetic.    Patient instructed in importance in daily sun protection of at least spf 30. Sun avoidance and topical protection discussed.   Recommend Elta MD (physician office) COTZ sensitive (available online) for daily use on face and neck.  Patient encouraged to wear hat for all outdoor exposure.   Also discussed sun protective clothing.             Return if symptoms worsen or fail to improve.

## 2017-03-28 NOTE — PROGRESS NOTES
Subjective:      Patient ID: Tereza Larose is a 66 y.o. female.    Chief Complaint:  adrenal mass    66 yr old lady with left adrenal mass and suggestion of associated hypercortisolemia seen in [facility] today.  History of Present Illness    Patient is a 66 yr old lady seen for in [facility]  today on account of a left adrenal mass.  She has an extensive background history if multiple comorbidities including essential hypertension, grade 1 obesity and dysmetabolic syndrome, post surgical hypothyroidism ( s/p thyroidectomy for goiter > 10 yrs ago), GERD, hyperuricemia and NAFLD with established CAD and CVD. Patient also has fairly well controlled type 2 diabetes (First identified 3yrs ago)managed exclusively with metformin 1 g TWICE DAILY and most recent HBA1c of 6.4.  Her most recent DEXA from 05/16 showed osteopenia. Her next DEXA thus should be for ~ 05/18.  Patients baseline [place] score is 1.   Her most recent abdomen CT was from [facility] in the media tab section from 04/16 and showed a 2 cm mass described as being suggestive of an adenoma though no HU count no contrast wash out information was provided.  Since her initial visit patient has had hormonal work up done that  Showed suppressed ACTH, abnormal over night dex suppression test, normal salivary cortisols and  Normal 24 hr urinary cortisol. In addition she had elevations in cathecols that was fairly significant.  She does not smoke and only take ETOH on social gatherings.  No history of adrenal masses in the family.       Patients paternal aunt had colon cancer but other than that there is no other familial history of cancer.  Patient had a recent traumatic right foot fracture and has not had any other fractures in the past. She also denies any history of noticeable height loss in the last few years.    Review of Systems   Constitutional: Negative for diaphoresis, fatigue and fever.   HENT: Negative for facial swelling and trouble swallowing.    Eyes: Negative for  "visual disturbance.   Respiratory: Negative for cough and shortness of breath.    Cardiovascular: Negative for chest pain, palpitations and leg swelling.   Gastrointestinal: Negative for diarrhea, nausea and vomiting.   Genitourinary: Negative for menstrual problem (Post menopausal).   Musculoskeletal: Negative for arthralgias, gait problem and myalgias.   Skin: Negative for color change, pallor and rash.   Neurological: Negative for dizziness and headaches.   Hematological: Does not bruise/bleed easily.   Psychiatric/Behavioral: Negative for dysphoric mood. The patient is not nervous/anxious.        Objective:  BP (!) 126/59 (BP Location: Left arm, Patient Position: Sitting, BP Method: Automatic)  Pulse 65  Temp 98.1 °F (36.7 °C) (Oral)   Resp 18  Ht 5' 2" (1.575 m)  Wt 73.3 kg (161 lb 9.6 oz)  BMI 29.56 kg/m2     Physical Exam   Constitutional: She is oriented to person, place, and time. She appears well-developed and well-nourished. No distress.   Pleasant elderly lady. Not pale, anicteric and afebrile. Well hydrated. Not in any acute distress.   HENT:   Head: Normocephalic and atraumatic.   Nose: Nose normal.   Eyes: Conjunctivae and EOM are normal. Pupils are equal, round, and reactive to light.   Neck: Normal range of motion. Neck supple. No JVD present.   Cardiovascular: Normal rate, regular rhythm and normal heart sounds.    Midline CABG scar noted.   Pulmonary/Chest: Effort normal and breath sounds normal. No respiratory distress. She has no wheezes.   Abdominal: Soft. She exhibits distension (Chronic). There is no tenderness.   Mild anterior abdominal wall obesity   Musculoskeletal: Normal range of motion. She exhibits no tenderness.   Neurological: She is alert and oriented to person, place, and time. No cranial nerve deficit.   Skin: Skin is warm and dry. No rash noted. She is not diaphoretic. No erythema. No pallor.   Psychiatric: She has a normal mood and affect. Her behavior is normal. Judgment " and thought content normal.   Vitals reviewed.      Lab Review:     Results for SHAYY VELA (MRN [id number]) as of 3/28/2017 11:54   Ref. Range 10/26/2016 08:29 10/26/2016 08:29 10/26/2016 08:40 10/26/2016 09:52 10/26/2016 11:00   Creatinine Latest Ref Range: 0.5 - 1.4 mg/dL 0.8       eGFR if non African American Latest Ref Range: >60 mL/min/1.73 m^2 >60       eGFR if African American Latest Ref Range: >60 mL/min/1.73 m^2 >60       Triglycerides Latest Ref Range: 30 - 150 mg/dL 214 (H)       Cholesterol Latest Ref Range: 120 - 199 mg/dL 336 (H)       HDL Latest Ref Range: 40 - 75 mg/dL 48       LDL Cholesterol Latest Ref Range: 63.0 - 159.0 mg/dL 245.2 (H)       Total Cholesterol/HDL Ratio Latest Ref Range: 2.0 - 5.0  7.0 (H)       Apolipoprotein B/A1 ratio Unknown 1.5 (H)       Lipoprotein (a) Latest Ref Range: 0 - 30 mg/dL 132 (H)       Apolipoprotein A1 Latest Ref Range: >=140 mg/dL 144       Apolipoprotein B Latest Units: mg/dL 218 (H)       CRP, High Sensitivity Latest Ref Range: 0.00 - 3.19 mg/L 10.41 (H)       Homocysteine Latest Ref Range: 4.0 - 15.5 umol/L   4.8     Aldosterone Latest Units: ng/dL 11.7       Cortisol -8 AM Latest Ref Range: 4.30 - 22.40 ug/dL 1.9 (L) 1.9 (L)      ACTH Latest Ref Range: 0 - 46 pg/mL   <10     Specimen Type Unknown Blood       Cortisol, 24H Ur Latest Ref Range: 3.5 - 45 mcg/24 h     5.9   Creatinine, Ur (mg/spec) Latest Units: mg/Spec     637.0   Miscellaneous Test Name Unknown Dexamethasone       CT ABDOMEN W WO CONTRAST Unknown    Rpt    Creatinine, Timed Urine Latest Ref Range: 40.0 - 75.0 mg/Hr     26.5 (L)   Cortisol, Free, Serum Latest Units: mcg/dL 0.04       CREATININE, URINE (SEND OUT) Latest Ref Range: 15.0 - 325.0 mg/dL     26.0   HDL/Chol Ratio Latest Ref Range: 20.0 - 50.0 % 14.3 (L)       LDL Cholesterol (Beta Quantification), Serum Latest Units: mg/dL 261 (H)       Non-HDL Cholesterol Latest Units: mg/dL 288       Reference Lab Unknown SEE COMMENT        Reference Ranges Unknown SEE COMMENT       Test Result Unknown See report under ...       Urine Collection Duration Latest Units: Hr     24       Assessment:     1. Adrenal mass, left  ACTH    Cortisol, 8AM    DHEA    DHEA-sulfate    Cortisol, free, serum    Cortisol, urine, free    Creatinine, urine, timed    Cortisol, Saliva    Cortisol, Saliva    Lab Miscellaneous Test, Non-Blood    Miscellaneous Sendout Test Blood    CT Abdomen Pelvis W Wo Contrast   2. Hyperlipidemia, unspecified hyperlipidemia type  Comprehensive metabolic panel    Lipid panel   3. Hypercholesterolemia  Comprehensive metabolic panel    Lipid panel   4. Hypertriglyceridemia  Comprehensive metabolic panel    Lipid panel   5. Hyperuricemia, 3/19/2011  Uric acid   6. Metabolic syndrome  Comprehensive metabolic panel    Uric acid    Microalbumin/creatinine urine ratio    Urinalysis   7. Gastroesophageal reflux disease without esophagitis     8. Fatty liver     9. Adrenal Cushing's syndrome  ACTH    Cortisol, 8AM    DHEA    DHEA-sulfate    Cortisol, free, serum    Cortisol, urine, free    Creatinine, urine, timed    Cortisol, Saliva    Cortisol, Saliva    Lab Miscellaneous Test, Non-Blood    Miscellaneous Sendout Test Blood    CT Abdomen Pelvis W Wo Contrast   10. Hypercortisolemia     11. Thyroid disorder, thyroidectomy 2003  T4, free    T3    TSH    Thyroglobulin   12. Type 2 diabetes mellitus with hyperglycemia, unspecified long term insulin use status  Hemoglobin A1c    Fructosamine    GlycoMark (TM)    Microalbumin/creatinine urine ratio    Urinalysis   13. Coronary artery disease due to lipid rich plaque     14. Essential hypertension     15. Aortic valve stenosis, unspecified etiology     16. NSTEMI (non-ST elevated myocardial infarction)     17. BRIJESH (obstructive sleep apnea)     18. Cerebrovascular disease          Regarding adrenal mass; Will repeat elements of hormonal work up except for overnight Dex suppression.  To obtain ffup Adrenal  CT scan and MIBG scan in view of elevated cathecoleamine levels.  If anomalies are confirmed will arrange for elective adrenalectomy.  Regarding hypothyroidism; to obtain TFTs but for now to continue LT4 125mcg DAILY.  Regarding type 2 diabetes; seems well controlled on low dose metformin. To continue this for now and will recheck HBA1c.  Regarding hypertension; BP presently well controlled. To continue present antihypertensive regimen and to continue serial BP trend tracking on ambulatory basis.  Regarding CAD and CVD'; currently stable; ongoing management with cardiologist.  Regarding OSAS; to continue CPAP therapy.  Regarding hyperuricemia; clinically stable with no gouty episodes recently. Will recheck urate levels at next visit.  Regarding NAFLD; quiscent; will continue to monitor trends of transaminase levels serially.  Regarding osteopenia; to continue vitamin d and calcium daily supplementation and for ffup DEXA for ~ 05/18.    Plan:     FFup in ~ 3mths

## 2017-03-28 NOTE — LETTER
March 28, 2017      Marquis Miranda MD  [address]  [address]  [place] [address]           [place] [facility]  [address]  [place] [address]  Phone: [phone]          Patient: Tereza Larose   MR Number: [id number]   YOB: 1950   Date of Visit: 3/28/2017       Dear Dr. Marquis Miranda:    Thank you for referring Tereza Larose to me for evaluation. Attached you will find relevant portions of my assessment and plan of care.    If you have questions, please do not hesitate to call me. I look forward to following Tereza Larose along with you.    Sincerely,    Farnaz Vaz MD    Enclosure  CC:  No Recipients    If you would like to receive this communication electronically, please contact [email or web address] or [phone] to request more information on [employer] access.    For providers and/or their staff who would like to refer a patient to [facility], please contact us through our one-stop-shop provider referral line, [facility] [place], at [phone].    If you feel you have received this communication in error or would no longer like to receive these types of communications, please e-mail [email or web address]

## 2017-03-29 DIAGNOSIS — E78.00 HYPERCHOLESTEROLEMIA: Primary | ICD-10-CM

## 2017-03-29 DIAGNOSIS — Z78.9 STATIN INTOLERANCE: ICD-10-CM

## 2017-03-29 DIAGNOSIS — E03.4 HYPOTHYROIDISM DUE TO ACQUIRED ATROPHY OF THYROID: ICD-10-CM

## 2017-03-29 LAB
ESTIMATED AVG GLUCOSE: 137 MG/DL
HBA1C MFR BLD HPLC: 6.4 %

## 2017-03-29 RX ORDER — POTASSIUM PERCHLORATE
CRYSTALS MISCELLANEOUS
Qty: 1 BOTTLE | Refills: 0 | Status: SHIPPED | OUTPATIENT
Start: 2017-03-29 | End: 2017-03-29 | Stop reason: SDUPTHER

## 2017-03-29 RX ORDER — LEVOTHYROXINE SODIUM 112 UG/1
112 TABLET ORAL EVERY MORNING
Qty: 90 TABLET | Refills: 3 | Status: SHIPPED | OUTPATIENT
Start: 2017-03-29 | End: 2017-07-27

## 2017-03-29 RX ORDER — EZETIMIBE 10 MG/1
10 TABLET ORAL DAILY
Qty: 90 TABLET | Refills: 3 | Status: SHIPPED | OUTPATIENT
Start: 2017-03-29 | End: 2018-08-28

## 2017-03-29 NOTE — TELEPHONE ENCOUNTER
Spoke with pharmacy they advised they could not get medication for procedure.. Can you send to [facility]

## 2017-03-29 NOTE — TELEPHONE ENCOUNTER
[place] in [place] advised:Patient is to take 400 mg of Potassium Perchlorate tablets by mouth 1 hr before scan.

## 2017-03-29 NOTE — TELEPHONE ENCOUNTER
----- Message from Tanisha Foster sent at 3/29/2017 11:09 AM CDT -----  Contact: Helen Cervantes calling in regards to a prescription that was sent over for Potassium Perclorate. She can't get that and wants to let the Doctor know he has to send that some where else. Please advise.  Call back  [facility] [id number]  [place], [place] - [address] [facility] [address] [facility] [address] [facility]  [address] [facility] [address]  [place] [address]  Phone: [phone] Fax: [phone]

## 2017-03-30 LAB
THRYOGLOBULIN INTERPRETATION: ABNORMAL
THYROGLOB AB SERPL-ACNC: <1.8 IU/ML
THYROGLOB SERPL-MCNC: 0.2 NG/ML

## 2017-03-30 RX ORDER — POTASSIUM PERCHLORATE
CRYSTALS MISCELLANEOUS
Qty: 1 BOTTLE | Refills: 0 | Status: SHIPPED | OUTPATIENT
Start: 2017-03-30 | End: 2017-05-04

## 2017-03-30 NOTE — TELEPHONE ENCOUNTER
Spoke with Salomon in [place] and advised Helen and [facility] were unable to get Potassium Perchlorate tablets. The next recommendation was Potassium Iodide 130 mg. Please send RX

## 2017-03-31 LAB
ACTH PLAS-MCNC: 22 PG/ML
DHEA SERPL-MCNC: 0.4 NG/ML (ref 0.63–4.7)
FRUCTOSAMINE SERPL-SCNC: 239 UMOL /L (ref 0–285)

## 2017-03-31 NOTE — TELEPHONE ENCOUNTER
I have contacted local pharmacy for the medication and non of the medication that have been recommended are available to dispense or order

## 2017-04-01 LAB
GLYCOMARK (TM): 4.5 UG/ML
MAYO MISCELLANEOUS RESULT (REF): NORMAL

## 2017-04-03 ENCOUNTER — TELEPHONE (OUTPATIENT)
Dept: ENDOCRINOLOGY | Facility: CLINIC | Age: 67
End: 2017-04-03

## 2017-04-03 LAB
ALDOST SERPL-MCNC: 39.7 NG/DL
CORTIS F SERPL-MCNC: 0.16 MCG/DL
RENIN PLAS-CCNC: 2.5 NG/ML/H

## 2017-04-03 NOTE — TELEPHONE ENCOUNTER
----- Message from Xu Weiss sent at 4/3/2017  9:02 AM CDT -----  Contact: Helen - [phone]  Pharmacy called regarding PA for rx praluent 75 mg. Thanks!

## 2017-04-04 ENCOUNTER — LAB VISIT (OUTPATIENT)
Dept: LAB | Facility: HOSPITAL | Age: 67
End: 2017-04-04
Attending: INTERNAL MEDICINE
Payer: MEDICARE

## 2017-04-04 ENCOUNTER — TELEPHONE (OUTPATIENT)
Dept: ENDOCRINOLOGY | Facility: CLINIC | Age: 67
End: 2017-04-04

## 2017-04-04 DIAGNOSIS — E24.9 ADRENAL CUSHING'S SYNDROME: ICD-10-CM

## 2017-04-04 DIAGNOSIS — E27.8 ADRENAL MASS, LEFT: ICD-10-CM

## 2017-04-04 PROCEDURE — 82570 ASSAY OF URINE CREATININE: CPT

## 2017-04-04 NOTE — TELEPHONE ENCOUNTER
----- Message from Kami Ochoa sent at 4/3/2017  4:50 PM CDT -----  Patient states that she has a question to ask in reference to her test.  Please call [phone].

## 2017-04-04 NOTE — TELEPHONE ENCOUNTER
----- Message from Cristiano Gray sent at 4/4/2017 10:20 AM CDT -----  Contact: [facility]   Asked if prior auth received for med alirocumab (PRALUENT PEN) 75 mg/mL PnIj - Please call pharmacy to advise -   [facility] [id number] - [place], [place] - [address] [facility] [address]  [address]  [place] [address]  Phone: [phone] Fax: [phone]

## 2017-04-04 NOTE — TELEPHONE ENCOUNTER
Advised to chema again.. Dr Coates is out of clinic will return Friday to sign PA. Verbalized understanding

## 2017-04-05 ENCOUNTER — TELEPHONE (OUTPATIENT)
Dept: ENDOCRINOLOGY | Facility: CLINIC | Age: 67
End: 2017-04-05

## 2017-04-05 LAB
CREAT 24H UR-MRATE: 26.9 MG/HR
CREAT UR-MCNC: 30 MG/DL
CREATININE, URINE (MG/SPEC): 645 MG/SPEC
URINE COLLECTION DURATION: 24 HR
URINE VOLUME: 2150 ML

## 2017-04-05 NOTE — TELEPHONE ENCOUNTER
----- Message from Jocelyne Coulter sent at 4/5/2017 11:37 AM CDT -----  Contact: [facility] with [facility]  Pharmacy called regarding verification of a fax they submitted for prior authorization. For patient's medication. Please contact Citl[facility] BATISTAENT PEN 75 mg/mL [facility] [id number] - [place], [place] - [address] [facility] [address] [facility] [address] [facility] [address]  [address] [facility] [address]  [place] [address]  Phone: [phone] Fax: [phone]

## 2017-04-07 LAB
COLLECT DURATION TIME UR: 24 H
CORTIS 24H UR-MRATE: 5.4 MCG/24 H (ref 3.5–45)
SPECIMEN VOL ?TM UR: 2150 ML

## 2017-04-08 LAB
CATECHOLS PLAS-MCNC: 3465 PG/ML
CGA SERPL-MCNC: 30 NG/ML
DOPAMINE SERPL-MCNC: 48 PG/ML
EPINEPH PLAS-MCNC: 39 PG/ML
METANEPH FREE SERPL-MCNC: ABNORMAL PG/ML
METANEPHS SERPL-MCNC: 820 PG/ML
NOREPINEPH PLAS-MCNC: 3378 PG/ML
NORMETANEPH FREE SERPL-MCNC: 820 PG/ML

## 2017-04-13 ENCOUNTER — TELEPHONE (OUTPATIENT)
Dept: ENDOCRINOLOGY | Facility: CLINIC | Age: 67
End: 2017-04-13

## 2017-04-13 NOTE — TELEPHONE ENCOUNTER
----- Message from Jocelyne Coulter sent at 4/13/2017  1:02 PM CDT -----  Contact: self  Patient called regarding a test scheduled at the hospital. Was advised if no contact from the hospital to let the nurse know so Dr. Coates would contact. Please contact [phone] (home)

## 2017-04-18 ENCOUNTER — HOSPITAL ENCOUNTER (OUTPATIENT)
Dept: RADIOLOGY | Facility: HOSPITAL | Age: 67
Discharge: HOME | End: 2017-04-18
Attending: INTERNAL MEDICINE
Payer: MEDICARE

## 2017-04-18 ENCOUNTER — TELEPHONE (OUTPATIENT)
Dept: ENDOCRINOLOGY | Facility: CLINIC | Age: 67
End: 2017-04-18

## 2017-04-18 DIAGNOSIS — D35.00 PHEOCHROMOCYTOMA, UNSPECIFIED LATERALITY: ICD-10-CM

## 2017-04-18 DIAGNOSIS — E27.8 ADRENAL MASS: ICD-10-CM

## 2017-04-18 PROCEDURE — 78804 RP LOCLZJ TUM WHBDY 2+D IMG: CPT | Mod: 26,,, | Performed by: NUCLEAR MEDICINE

## 2017-04-18 NOTE — TELEPHONE ENCOUNTER
----- Message from Sheron Silver sent at 4/18/2017  9:39 AM CDT -----  Contact: self  Patient - [phone] is calling to speak with Nurse/has been waiting for a phone call about arrangements for patient to go in the hospital/please advise

## 2017-04-18 NOTE — TELEPHONE ENCOUNTER
Spoke with patient advised her procedure is today advised that [place] at [facility] on [address] needs her to come in today as the medication is there and will  by tomorrow.. Verbalized understanding.. Advised she will leave now to head to [facility] to have test done..

## 2017-04-19 ENCOUNTER — HOSPITAL ENCOUNTER (OUTPATIENT)
Dept: RADIOLOGY | Facility: HOSPITAL | Age: 67
Discharge: HOME | End: 2017-04-19
Attending: INTERNAL MEDICINE
Payer: MEDICARE

## 2017-04-19 PROCEDURE — A4641 RADIOPHARM DX AGENT NOC: HCPCS

## 2017-04-19 PROCEDURE — 78804 RP LOCLZJ TUM WHBDY 2+D IMG: CPT | Mod: TC

## 2017-04-27 ENCOUNTER — TELEPHONE (OUTPATIENT)
Dept: ENDOCRINOLOGY | Facility: CLINIC | Age: 67
End: 2017-04-27

## 2017-04-27 DIAGNOSIS — E27.8 ADRENAL MASS, LEFT: Primary | ICD-10-CM

## 2017-04-27 NOTE — TELEPHONE ENCOUNTER
Raven with Dr Ambriz office advised they do not do adrenal gland removal will need to see Urology. Advised patient verbalized understanding

## 2017-05-02 ENCOUNTER — TELEPHONE (OUTPATIENT)
Dept: SURGERY | Facility: CLINIC | Age: 67
End: 2017-05-02

## 2017-05-02 ENCOUNTER — OFFICE VISIT (OUTPATIENT)
Dept: ENDOCRINOLOGY | Facility: CLINIC | Age: 67
End: 2017-05-02
Payer: MEDICARE

## 2017-05-02 ENCOUNTER — LAB VISIT (OUTPATIENT)
Dept: LAB | Facility: HOSPITAL | Age: 67
End: 2017-05-02
Attending: INTERNAL MEDICINE
Payer: MEDICARE

## 2017-05-02 VITALS
BODY MASS INDEX: 28.8 KG/M2 | HEIGHT: 62 IN | HEART RATE: 60 BPM | SYSTOLIC BLOOD PRESSURE: 108 MMHG | WEIGHT: 156.5 LBS | DIASTOLIC BLOOD PRESSURE: 57 MMHG

## 2017-05-02 DIAGNOSIS — E78.00 HYPERCHOLESTEROLEMIA: ICD-10-CM

## 2017-05-02 DIAGNOSIS — E27.8 ADRENAL MASS, LEFT: ICD-10-CM

## 2017-05-02 DIAGNOSIS — K21.9 GASTROESOPHAGEAL REFLUX DISEASE WITHOUT ESOPHAGITIS: Chronic | ICD-10-CM

## 2017-05-02 DIAGNOSIS — D35.02 PHEOCHROMOCYTOMA, LEFT: ICD-10-CM

## 2017-05-02 DIAGNOSIS — K76.0 FATTY LIVER: Chronic | ICD-10-CM

## 2017-05-02 DIAGNOSIS — D35.02 PHEOCHROMOCYTOMA, LEFT: Primary | ICD-10-CM

## 2017-05-02 DIAGNOSIS — E24.9 ADRENAL CUSHING'S SYNDROME: ICD-10-CM

## 2017-05-02 DIAGNOSIS — E07.9 THYROID DISORDER: Chronic | ICD-10-CM

## 2017-05-02 DIAGNOSIS — I25.83 CORONARY ARTERY DISEASE DUE TO LIPID RICH PLAQUE: ICD-10-CM

## 2017-05-02 DIAGNOSIS — E78.5 HYPERLIPIDEMIA, UNSPECIFIED HYPERLIPIDEMIA TYPE: ICD-10-CM

## 2017-05-02 DIAGNOSIS — E27.0 HYPERCORTISOLEMIA: ICD-10-CM

## 2017-05-02 DIAGNOSIS — I25.10 CORONARY ARTERY DISEASE DUE TO LIPID RICH PLAQUE: ICD-10-CM

## 2017-05-02 DIAGNOSIS — E11.65 TYPE 2 DIABETES MELLITUS WITH HYPERGLYCEMIA, UNSPECIFIED LONG TERM INSULIN USE STATUS: ICD-10-CM

## 2017-05-02 DIAGNOSIS — E79.0 HYPERURICEMIA: Chronic | ICD-10-CM

## 2017-05-02 DIAGNOSIS — I10 ESSENTIAL HYPERTENSION: ICD-10-CM

## 2017-05-02 DIAGNOSIS — E78.1 HYPERTRIGLYCERIDEMIA: ICD-10-CM

## 2017-05-02 DIAGNOSIS — Z78.9 STATIN INTOLERANCE: ICD-10-CM

## 2017-05-02 DIAGNOSIS — E88.810 METABOLIC SYNDROME: Chronic | ICD-10-CM

## 2017-05-02 PROCEDURE — 81403 MOPATH PROCEDURE LEVEL 4: CPT

## 2017-05-02 PROCEDURE — 30000890 MAYO MISCELLANEOUS TEST (REFLEX)

## 2017-05-02 PROCEDURE — 81406 MOPATH PROCEDURE LEVEL 7: CPT

## 2017-05-02 PROCEDURE — 99999 PR PBB SHADOW E&M-EST. PATIENT-LVL III: CPT | Mod: PBBFAC,,, | Performed by: INTERNAL MEDICINE

## 2017-05-02 PROCEDURE — 99215 OFFICE O/P EST HI 40 MIN: CPT | Mod: S$PBB,,, | Performed by: INTERNAL MEDICINE

## 2017-05-02 RX ORDER — PHENOXYBENZAMINE HYDROCHLORIDE 10 MG/1
10 CAPSULE ORAL 2 TIMES DAILY
Qty: 180 CAPSULE | Refills: 3 | Status: ON HOLD | OUTPATIENT
Start: 2017-05-02 | End: 2017-07-25

## 2017-05-02 NOTE — PROGRESS NOTES
Subjective:      Patient ID: Tereza Larose is a 66 y.o. female.    Chief Complaint:      66 yr old lady with left adrenal mass and suggestion of associated hypercortisolemia seen in [facility] today    History of Present Illness    Patient is a 66 yr old lady seen for in [facility]  today on account of a left adrenal mass which has recently been confirmed to be a pheochromocytoma.  She has an extensive background history if multiple comorbidities including essential hypertension, grade 1 obesity and dysmetabolic syndrome, post surgical hypothyroidism ( s/p thyroidectomy for goiter > 10 yrs ago), GERD, hyperuricemia and NAFLD with established CAD and CVD. Patient also has fairly well controlled type 2 diabetes (First identified 3yrs ago)managed exclusively with metformin 1 g TWICE DAILY and most recent HBA1c of 6.4.  Her most recent DEXA from 05/16 showed osteopenia. Her next DEXA thus should be for ~ 05/18.  Patients baseline [place] score is 1.   Her most recent abdomen CT was from [facility] in the media tab section from 04/16 and showed a 2 cm mass described as being suggestive of an adenoma though no HU count no contrast wash out information was provided.  No history of adrenal masses in the family. No history of brain tumors nor thyroid tumors either. There is no family history of kidney masses.         Patients paternal aunt had colon cancer but other than that there is no other familial history of cancer.  Patient had a recent traumatic right foot fracture and has not had any other fractures in the past. She also denies any history of noticeable height loss in the last few years.    Review of Systems   Constitutional: Negative for diaphoresis, fatigue and fever.   HENT: Negative for facial swelling and trouble swallowing.    Eyes: Negative for visual disturbance.   Respiratory: Negative for cough and shortness of breath.    Cardiovascular: Negative for chest pain, palpitations and leg swelling.   Gastrointestinal: Negative  "for diarrhea, nausea and vomiting.   Genitourinary: Negative for menstrual problem (Post menopausal).   Musculoskeletal: Negative for arthralgias, gait problem and myalgias.   Skin: Negative for color change, pallor and rash.   Neurological: Negative for dizziness, weakness, light-headedness and headaches.   Hematological: Does not bruise/bleed easily.   Psychiatric/Behavioral: Negative for dysphoric mood. The patient is not nervous/anxious.        Objective:  BP (!) 108/57  Pulse 60  Ht 5' 2" (1.575 m)  Wt 71 kg (156 lb 8.4 oz)  BMI 28.63 kg/m2     Physical Exam   Constitutional: She is oriented to person, place, and time. She appears well-developed and well-nourished. No distress.   Pleasant elderly lady. Not pale, anicteric and afebrile. Well hydrated. Not in any acute distress.   HENT:   Head: Normocephalic and atraumatic.   Nose: Nose normal.   Eyes: Conjunctivae and EOM are normal. Pupils are equal, round, and reactive to light.   Neck: Normal range of motion. Neck supple. No JVD present.   Cardiovascular: Normal rate, regular rhythm and normal heart sounds.    Midline CABG scar noted.   Pulmonary/Chest: Effort normal and breath sounds normal. No respiratory distress. She has no wheezes.   Abdominal: Soft. She exhibits distension (Chronic). There is no tenderness.   Mild anterior abdominal wall obesity   Musculoskeletal: Normal range of motion. She exhibits no tenderness.   Neurological: She is alert and oriented to person, place, and time. No cranial nerve deficit.   Skin: Skin is warm and dry. No rash noted. She is not diaphoretic. No erythema. No pallor.   Psychiatric: She has a normal mood and affect. Her behavior is normal. Judgment and thought content normal.   Vitals reviewed.      Lab Review:     Results for SHAYY VELA (MRN [id number]) as of 5/2/2017 08:55   Ref. Range 3/28/2017 13:04 4/3/2017 23:59 4/4/2017 08:00 4/4/2017 13:44 4/19/2017 12:15   Sodium Latest Ref Range: 136 - 145 mmol/L 142     "   Potassium Latest Ref Range: 3.5 - 5.1 mmol/L 4.2       Chloride Latest Ref Range: 95 - 110 mmol/L 101       CO2 Latest Ref Range: 23 - 29 mmol/L 31 (H)       Anion Gap Latest Ref Range: 8 - 16 mmol/L 10       BUN, Bld Latest Ref Range: 8 - 23 mg/dL 20       Creatinine Latest Ref Range: 0.5 - 1.4 mg/dL 1.1       eGFR if non African American Latest Ref Range: >60 mL/min/1.73 m^2 52.4 (A)       eGFR if African American Latest Ref Range: >60 mL/min/1.73 m^2 >60.0       Glucose Latest Ref Range: 70 - 110 mg/dL 112 (H)       Calcium Latest Ref Range: 8.7 - 10.5 mg/dL 10.2       Alkaline Phosphatase Latest Ref Range: 55 - 135 U/L 109       Total Protein Latest Ref Range: 6.0 - 8.4 g/dL 8.2       Albumin Latest Ref Range: 3.5 - 5.2 g/dL 3.6       Uric Acid Latest Ref Range: 2.4 - 5.7 mg/dL 7.1 (H)       Total Bilirubin Latest Ref Range: 0.1 - 1.0 mg/dL 0.3       AST Latest Ref Range: 10 - 40 U/L 23       ALT Latest Ref Range: 10 - 44 U/L 17       Triglycerides Latest Ref Range: 30 - 150 mg/dL 366 (H)       Cholesterol Latest Ref Range: 120 - 199 mg/dL 336 (H)       HDL Latest Ref Range: 40 - 75 mg/dL 44       LDL Cholesterol Latest Ref Range: 63.0 - 159.0 mg/dL 218.8 (H)       Total Cholesterol/HDL Ratio Latest Ref Range: 2.0 - 5.0  7.6 (H)       Aldosterone Latest Units: ng/dL 39.7       Catecholamine, Plasma Latest Units: pg/mL 3465 (H)       Norepinephrine Latest Units: pg/mL 3378 (H)       Epinephrine Latest Units: pg/mL 39       Dopamine Latest Units: pg/mL 48 (H)       Cortisol -8 AM Latest Ref Range: 4.30 - 22.40 ug/dL 7.4       Hemoglobin A1C Latest Ref Range: 4.5 - 6.2 % 6.4 (H)       Estimated Avg Glucose Latest Ref Range: 68 - 131 mg/dL 137 (H)       GlycoMark (TM) Latest Units: ug/mL 4.5 (L)       ACTH Latest Ref Range: 0 - 46 pg/mL 22       TSH Latest Ref Range: 0.400 - 4.000 uIU/mL 0.252 (L)       T3, Total Latest Ref Range: 60 - 180 ng/dL 140       Free T4 Latest Ref Range: 0.71 - 1.51 ng/dL 1.62 (H)        Thyroglobulin Interpretation Unknown SEE BELOW       Thyroglobulin Antibody Screen Latest Ref Range: <4.0 IU/mL <1.8       Thyroglobulin, Tumor Marker Latest Units: ng/mL 0.2 (H)       Chromogranin A Latest Ref Range: < OR = 15 ng/mL 30 (H)       Metanephrine, Free Latest Ref Range: < OR = 57 pg/mL SEE NOTE       Normetanephrine, Free Latest Ref Range: < OR = 148 pg/mL 820 (H)       Metanephrine, Total, Plasma Latest Ref Range: < OR = 205 pg/mL 820 (H)       DHEA Latest Ref Range: 0.630 - 4.700 ng/mL 0.395 (L)       DHEA-SO4 Latest Ref Range: 33.6 - 78.9 ug/dL 89.1 (H)       Cortisol, 24H Ur Latest Ref Range: 3.5 - 45 mcg/24 h    5.4    Creatinine, Ur (mg/spec) Latest Units: mg/Spec    645.0    Cortisol, Saliva Unknown  Test Not Performed Test Not Performed     AM Cortisol Latest Ref Range: 100 - 750 ng/dL  Test Not Performed 199     PM Cortisol Unknown  Test Not Performed Test Not Performed     Midnight Cortisol Unknown  <50 Test Not Performed     Fructosamine Latest Ref Range: 0 - 285 umol /L 239       NM TUMOR LOCALIZATION MULTI MIBG Unknown     Rpt   Creatinine, Timed Urine Latest Ref Range: 40.0 - 75.0 mg/Hr    26.9 (L)    Cortisol, Free, Serum Latest Units: mcg/dL 0.16       CREATININE, URINE (SEND OUT) Latest Ref Range: 15.0 - 325.0 mg/dL    30.0    HDL/Chol Ratio Latest Ref Range: 20.0 - 50.0 % 13.1 (L)       Non-HDL Cholesterol Latest Units: mg/dL 292       Renin Activity Latest Units: ng/mL/h 2.5       Urine Collection Duration Latest Units: Hr    24        Assessment:     1. Pheochromocytoma, left  phenoxybenzamine (DIBENZYLINE) 10 mg capsule    Miscellaneous Sendout Test Blood    Miscellaneous Sendout Test Blood    Miscellaneous Sendout Test Blood   2. Adrenal mass, left  Miscellaneous Sendout Test Blood    Miscellaneous Sendout Test Blood    Miscellaneous Sendout Test Blood   3. Adrenal Cushing's syndrome     4. Essential hypertension  phenoxybenzamine (DIBENZYLINE) 10 mg capsule   5. Coronary artery  disease due to lipid rich plaque     6. Hypercortisolemia     7. Thyroid disorder, thyroidectomy 2003     8. Gastroesophageal reflux disease without esophagitis     9. Fatty liver     10. Hyperuricemia, 3/19/2011     11. Type 2 diabetes mellitus with hyperglycemia, unspecified long term insulin use status     12. Hyperlipidemia, unspecified hyperlipidemia type     13. Hypertriglyceridemia     14. Hypercholesterolemia     15. Statin intolerance     16. Metabolic syndrome          Regarding adrenal mass; Biochemistry and MIBG scans are consistent with pheochromocytoma. To arrange for urgent adrenalectomy @ [facility]. Will need to coordinate this with her cardiologist Dr Batres who will need to obtain cardiac clearance first.  To add on low dose phenoxybenzamine 10mg BID to present antihypertesnive regimen.  Genetic screening labs for SDH, VHL and RET mutations ordered.  Regarding hypothyroidism; to obtain TFTs but for now to continue LT4 125mcg DAILY.  Regarding type 2 diabetes; seems well controlled on low dose metformin. To continue this for now .  Regarding hypertension; BP presently well controlled. To continue present antihypertensive regimen with addition of phenoxybenzamine to beta blockade and to continue serial BP trend tracking on ambulatory basis.  Regarding CAD and CVD'; currently stable; ongoing management with cardiologist.  Regarding OSAS; to continue CPAP therapy.  Regarding hyperuricemia; clinically stable with no gouty episodes recently. Will recheck urate levels at next visit.  Regarding NAFLD; quiscent; will continue to monitor trends of transaminase levels serially.  Regarding osteopenia; to continue vitamin d and calcium daily supplementation and for ffup DEXA for ~ 05/18.  I spent over 45 minutes discussing with the patient and her  regarding the suggested and planned management plan, the urgent need for adrenalectomy and the rationale for genetic testing as well as need for  excellent blood pressure control as well as copious oral fluid hydration.  To refer to main campus with Dr Griggs for adrenalectomy.    Plan:     FFup in ~ 3mths

## 2017-05-02 NOTE — MR AVS SNAPSHOT
[facility]  [address]  [place] [address]  Phone: [phone]                  Tereza Larose   2017 8:00 AM   Office Visit    Description:  Female : 1950   Provider:  Joaquin Coates MD   Department:  [facility]           Diagnoses this Visit        Comments    Pheochromocytoma, left    -  Primary     Adrenal mass, left         Adrenal Cushing's syndrome         Essential hypertension         Coronary artery disease due to lipid rich plaque         Hypercortisolemia         Thyroid disorder         Gastroesophageal reflux disease without esophagitis         Fatty liver         Hyperuricemia         Type 2 diabetes mellitus with hyperglycemia, unspecified long term insulin use status         Hyperlipidemia, unspecified hyperlipidemia type         Hypertriglyceridemia         Hypercholesterolemia         Statin intolerance         Metabolic syndrome                To Do List           Future Appointments        Provider Department Dept Phone    2017 11:40 AM [facility] [phone]    2017 10:00 AM [facility]rology [phone]      Goals (5 Years of Data)     None       These Medications        Disp Refills Start End    phenoxybenzamine (DIBENZYLINE) 10 mg capsule 180 capsule 3 2017    Take 1 capsule (10 mg total) by mouth 2 (two) times daily. - Oral    Pharmacy: [facility] [address] [place] [address] [place] - [address] [facility] [address] [facility] [address] Ph #: [phone]         [facility]     [facility] Care [facility] -  Assistance  Unless otherwise directed by your provider, please contact [facility], our nurse care line that is available for  assistance.     Registered nurses in the [facility] provide: appointment scheduling, clinical advisement, health education, and other advisory services.  Call: [phone] (toll free)                Medications           Message regarding Medications     Verify the changes and/or additions to your medication regime listed below are the same as discussed with your clinician today.  If any of these changes or additions are incorrect, please notify your healthcare provider.        START taking these NEW medications        Refills    phenoxybenzamine (DIBENZYLINE) 10 mg capsule 3    Sig: Take 1 capsule (10 mg total) by mouth 2 (two) times daily.    Class: Normal    Route: Oral           Verify that the below list of medications is an accurate representation of the medications you are currently taking.  If none reported, the list may be blank. If incorrect, please contact your healthcare provider. Carry this list with you in case of emergency.           Current Medications     albuterol (VENTOLIN HFA) 90 mcg/actuation inhaler Inhale 2 puffs into the lungs every 4 (four) hours as needed.    albuterol-ipratropium 2.5mg-0.5mg/3mL (DUO-NEB) 0.5 mg-3 mg(2.5 mg base)/3 mL nebulizer solution U 1 VIAL IN NEBULIZER Q 6 H PRF SOB    alirocumab (PRALUENT PEN) 75 mg/mL PnIj Inject 75 mg into the skin every 14 (fourteen) days.    amlodipine (NORVASC) 10 MG tablet Take 1 tablet (10 mg total) by mouth once daily.    aspirin (ECOTRIN) 81 MG EC tablet Take 81 mg by mouth once daily.    blood sugar diagnostic Strp True track check blood glucose once daily    cetirizine (ZYRTEC) 10 MG tablet Take 10 mg by mouth once daily.    cholecalciferol, vitamin D3, (VITAMIN D3) 1,000 unit capsule Take 1,000 Units by mouth once daily.    coQ10, ubiquinol, 100 mg Cap Take 1 capsule by mouth 2 (two) times daily.     cyanocobalamin (VITAMIN B-12) 250 MCG tablet Take 250 mcg by mouth once daily.    ezetimibe (ZETIA) 10 mg tablet Take 1 tablet (10 mg total) by mouth once daily.    fluoxetine (PROZAC) 20 MG capsule Take 1 capsule (20 mg total) by mouth once daily.    GUAIFENESIN/PSEUDOEPHEDRNE HCL (MUCINEX D ORAL) Take 1 tablet by mouth 2 (two) times  daily as needed.     krill-omega-3-dha-epa-lipids (KRILL OIL) 607-99-63-50 mg Cap Take 1 capsule by mouth 2 (two) times daily.    L. ACIDOPHILUS/BIFIDO LONGUM (PROBIOTIC PEARLS ORAL) Take 1 each by mouth 2 (two) times daily.     lancets [facility] True track Check glucose once daily    levothyroxine (SYNTHROID) 112 MCG tablet Take 1 tablet (112 mcg total) by mouth every morning.    lisinopril (PRINIVIL,ZESTRIL) 40 MG tablet Take 1 tablet (40 mg total) by mouth once daily.    loratadine (CLARITIN) 10 mg tablet Take 10 mg by mouth 2 (two) times daily as needed for Allergies.     magnesium 250 mg Tab Take 1 tablet by mouth once daily.     metformin (GLUCOPHAGE) 500 MG tablet Take 2 tablets (1,000 mg total) by mouth 2 (two) times daily with meals.    metoprolol tartrate (LOPRESSOR) 100 MG tablet Take 1 tablet (100 mg total) by mouth 3 (three) times daily.    nifedipine (ADALAT CC) 60 MG TbSR TK 1 T PO qhs    phenoxybenzamine (DIBENZYLINE) 10 mg capsule Take 1 capsule (10 mg total) by mouth 2 (two) times daily.    potassium chloride (MICRO-K) 8 mEq CpSR Take 1 capsule (8 mEq total) by mouth once daily.    potassium iodide (IOSAT) 130 mg Tab Patient is to take 130 mg of Potassium iodide tablet by mouth 1 hr before injection for scan    potassium perchlorate, bulk, Sandy Patient is to take 400 mg of Potassium Perchlorate tablets by mouth 1 hr before injection for scan.    theophylline 400 mg Tb24 TK 1 T PO BID    torsemide (DEMADEX) 20 MG Tab Take 1 tablet (20 mg total) by mouth once daily.    tramadol (ULTRAM) 50 mg tablet Take 1 tablet (50 mg total) by mouth every 6 (six) hours as needed.    trazodone (DESYREL) 50 MG tablet Take 1 tablet (50 mg total) by mouth nightly as needed for Insomnia.    trolamine salicylate 10 % SprA Apply topically 3 (three) times daily as needed.    TRUEPLUS LANCETS 33 gauge Misc USE TO TEST BLOOD SUGAR ONCE D    vitamin A 8000 UNIT capsule Take 8,000 Units by mouth once daily.    zolpidem (AMBIEN) 5  "MG Tab 1/2 tablet twice a week as needed for sleep           Clinical Reference Information           Your Vitals Were     BP Pulse Height Weight BMI    108/57 60 5' 2" (1.575 m) 71 kg (156 lb 8.4 oz) 28.63 kg/m2      Blood Pressure          Most Recent Value    BP  (!)  108/57      Allergies as of 5/2/2017     Corn    Hydrochlorothiazide    Potato Starch    Pravastatin    Statins-hmg-coa Reductase Inhibitors    Welchol [Colesevelam]      Immunizations Administered on Date of Encounter - 5/2/2017     None      Orders Placed During Today's Visit     Future Labs/Procedures Expected by Expires    Miscellaneous Sendout Test Blood  5/2/2017 7/1/2018    Miscellaneous Sendout Test Blood  5/2/2017 7/1/2018    Miscellaneous Sendout Test Blood  5/2/2017 7/1/2018      Language Assistance Services     ATTENTION: Language assistance services are available, free of charge. Please call [phone].      ATENCIÓN: Si habla christina, tiene a guevara disposición servicios gratuitos de asistencia lingüística. Llame al [phone].     CHÚ Ý: N?u b?n nói Ti?ng Vi?t, có các d?ch v? h? tr? ngôn ng? mi?n phí dành cho b?n. G?i s? [phone].         [facility] complies with applicable Federal civil rights laws and does not discriminate on the basis of race, color, national origin, age, disability, or sex.        "

## 2017-05-02 NOTE — TELEPHONE ENCOUNTER
Spoke to Dr. Coates's nurse, Ira who advised that md wanted to provide Dr. Tyson with the pt's cardiologist name, which is Dr. Fernández but does not know his first name.     Per o/s notes scanned into Media tab, pt sees Marko Batres MD (cards).  MD info has already been added to pts care team    Will notify Dr. ORONA of this.      Called pt to sched. First appt for the pt at [facility] but no answer. Left detailed v/m to call me back regarding referral. Will f/u.

## 2017-05-03 ENCOUNTER — TELEPHONE (OUTPATIENT)
Dept: FAMILY MEDICINE | Facility: CLINIC | Age: 67
End: 2017-05-03

## 2017-05-03 NOTE — TELEPHONE ENCOUNTER
----- Message from Xu Weiss sent at 5/3/2017  9:19 AM CDT -----  Contact: [facility]- [phone]  The nurse need to sent an oxygen recert, the nurse need patient's recent medical notes. Thanks!

## 2017-05-04 ENCOUNTER — INITIAL CONSULT (OUTPATIENT)
Dept: SURGERY | Facility: CLINIC | Age: 67
End: 2017-05-04
Attending: SURGERY
Payer: MEDICARE

## 2017-05-04 ENCOUNTER — TELEPHONE (OUTPATIENT)
Dept: SURGERY | Facility: CLINIC | Age: 67
End: 2017-05-04

## 2017-05-04 VITALS
HEART RATE: 66 BPM | SYSTOLIC BLOOD PRESSURE: 110 MMHG | HEIGHT: 62 IN | BODY MASS INDEX: 29.11 KG/M2 | DIASTOLIC BLOOD PRESSURE: 59 MMHG | TEMPERATURE: 99 F | WEIGHT: 158.19 LBS

## 2017-05-04 DIAGNOSIS — E27.8 ADRENAL MASS, LEFT: Primary | ICD-10-CM

## 2017-05-04 PROCEDURE — 99213 OFFICE O/P EST LOW 20 MIN: CPT | Mod: PBBFAC | Performed by: SURGERY

## 2017-05-04 PROCEDURE — 99999 PR PBB SHADOW E&M-EST. PATIENT-LVL III: CPT | Mod: PBBFAC,,, | Performed by: SURGERY

## 2017-05-04 PROCEDURE — 99204 OFFICE O/P NEW MOD 45 MIN: CPT | Mod: S$PBB,,, | Performed by: SURGERY

## 2017-05-04 NOTE — MR AVS SNAPSHOT
[facility]  [address], [address]  [facility] [address]  Phone: [phone]  Fax: [phone]                  Tereza Larose   2017 2:20 PM   Initial consult    Description:  Female : 1950   Provider:  Georgie Tyson MD   Department:  [facility]           Reason for Visit     Consult           Diagnoses this Visit        Comments    Adrenal mass, left    -  Primary            To Do List           Future Appointments        Provider Department Dept Phone    2017 10:00 AM Nae Carbajal MD [facility] - Urology [phone]      Goals (5 Years of Data)     None      [facility]     [facility] On Call Nurse Care Line -  Assistance  Unless otherwise directed by your provider, please contact [facility], our nurse care line that is available for  assistance.     Registered nurses in the [facility] On Call Center provide: appointment scheduling, clinical advisement, health education, and other advisory services.  Call: [phone] (toll free)               Medications           Message regarding Medications     Verify the changes and/or additions to your medication regime listed below are the same as discussed with your clinician today.  If any of these changes or additions are incorrect, please notify your healthcare provider.        STOP taking these medications     alirocumab (PRALUENT PEN) 75 mg/mL PnIj Inject 75 mg into the skin every 14 (fourteen) days.    krill-omega-3-dha-epa-lipids (KRILL OIL) 407-33-32-50 mg Cap Take 1 capsule by mouth 2 (two) times daily.    potassium iodide (IOSAT) 130 mg Tab Patient is to take 130 mg of Potassium iodide tablet by mouth 1 hr before injection for scan    potassium perchlorate, bulk, Sandy Patient is to take 400 mg of Potassium Perchlorate tablets by mouth 1 hr before injection for scan.    torsemide (DEMADEX) 20 MG Tab Take 1 tablet (20 mg total) by mouth once daily.    trazodone (DESYREL) 50 MG  tablet Take 1 tablet (50 mg total) by mouth nightly as needed for Insomnia.    trolamine salicylate 10 % SprA Apply topically 3 (three) times daily as needed.    zolpidem (AMBIEN) 5 MG Tab 1/2 tablet twice a week as needed for sleep           Verify that the below list of medications is an accurate representation of the medications you are currently taking.  If none reported, the list may be blank. If incorrect, please contact your healthcare provider. Carry this list with you in case of emergency.           Current Medications     albuterol (VENTOLIN HFA) 90 mcg/actuation inhaler Inhale 2 puffs into the lungs every 4 (four) hours as needed.    albuterol-ipratropium 2.5mg-0.5mg/3mL (DUO-NEB) 0.5 mg-3 mg(2.5 mg base)/3 mL nebulizer solution U 1 VIAL IN NEBULIZER Q 6 H PRF SOB    amlodipine (NORVASC) 10 MG tablet Take 1 tablet (10 mg total) by mouth once daily.    aspirin (ECOTRIN) 81 MG EC tablet Take 81 mg by mouth once daily.    blood sugar diagnostic Strp True track check blood glucose once daily    cetirizine (ZYRTEC) 10 MG tablet Take 10 mg by mouth once daily.    cholecalciferol, vitamin D3, (VITAMIN D3) 1,000 unit capsule Take 1,000 Units by mouth once daily.    coQ10, ubiquinol, 100 mg Cap Take 1 capsule by mouth 2 (two) times daily.     cyanocobalamin (VITAMIN B-12) 250 MCG tablet Take 250 mcg by mouth once daily.    ezetimibe (ZETIA) 10 mg tablet Take 1 tablet (10 mg total) by mouth once daily.    fluoxetine (PROZAC) 20 MG capsule Take 1 capsule (20 mg total) by mouth once daily.    GUAIFENESIN/PSEUDOEPHEDRNE HCL (MUCINEX D ORAL) Take 1 tablet by mouth 2 (two) times daily as needed.     L. ACIDOPHILUS/BIFIDO LONGUM (PROBIOTIC PEARLS ORAL) Take 1 each by mouth 2 (two) times daily.     lancets [facility] True track Check glucose once daily    levothyroxine (SYNTHROID) 112 MCG tablet Take 1 tablet (112 mcg total) by mouth every morning.    lisinopril (PRINIVIL,ZESTRIL) 40 MG tablet Take 1 tablet (40 mg total) by  "mouth once daily.    loratadine (CLARITIN) 10 mg tablet Take 10 mg by mouth 2 (two) times daily as needed for Allergies.     magnesium 250 mg Tab Take 1 tablet by mouth once daily.     metformin (GLUCOPHAGE) 500 MG tablet Take 2 tablets (1,000 mg total) by mouth 2 (two) times daily with meals.    metoprolol tartrate (LOPRESSOR) 100 MG tablet Take 1 tablet (100 mg total) by mouth 3 (three) times daily.    nifedipine (ADALAT CC) 60 MG TbSR TK 1 T PO qhs    potassium chloride (MICRO-K) 8 mEq CpSR Take 1 capsule (8 mEq total) by mouth once daily.    theophylline 400 mg Tb24 TK 1 T PO BID    tramadol (ULTRAM) 50 mg tablet Take 1 tablet (50 mg total) by mouth every 6 (six) hours as needed.    TRUEPLUS LANCETS 33 gauge Misc USE TO TEST BLOOD SUGAR ONCE D    vitamin A 8000 UNIT capsule Take 8,000 Units by mouth once daily.    phenoxybenzamine (DIBENZYLINE) 10 mg capsule Take 1 capsule (10 mg total) by mouth 2 (two) times daily.           Clinical Reference Information           Your Vitals Were     BP Pulse Temp Height Weight BMI    110/59 66 98.7 °F (37.1 °C) 5' 2" (1.575 m) 71.7 kg (158 lb 2.9 oz) 28.93 kg/m2      Blood Pressure          Most Recent Value    BP  (!)  110/59      Allergies as of 5/4/2017     Corn    Hydrochlorothiazide    Potato Starch    Pravastatin    Statins-hmg-coa Reductase Inhibitors    Welchol [Colesevelam]      Immunizations Administered on Date of Encounter - 5/4/2017     None      Instructions    The patient was directed to the [employer] patient education portal "www.endocrinediseases.org" as a resource.        Before and After Adrenal Surgery    Before Surgery    Before Your Clinic Visit:  Prior to your clinic visit, you may need to undergo a series of tests to complete the workup for your adrenal disease. This will include a CT scan as well as blood work. Additional studies may be ordered depending on your type of adrenal disease.    Before Surgery:  Prior to " having surgery, a series of general screening tests are done to make sure you are healthy for surgery. These are often conducted 1-2 weeks before the operation and may include blood work as well as a thorough history and physical exam. Depending on your history, you may also need an EKG, chest x-ray, and/or stress test. These tests evaluate your heart and lung function. They do not require a hospital stay. Depending on your age and health, you may see a high-risk anesthesiologist, your primary care doctor or the surgeon for these tests.    Prior to surgery, you may also meet with an endocrine nurse practitioner who will educate you about the operation and make sure all the appropriate testing has been completed.    MEDICATION GUIDELINES    PLEASE ASK your doctor or surgeon BEFORE stopping or changing any of your medications.    Medication Guidelines Prior to Surgery:    Certain medications may need to be stopped before surgery. Please consult your surgeon or primary care physician before stopping any of your prescribed medications.    Cardiac medications (blood pressure medications):   DO NOT take on the day of surgery:  · Benazepril (Lotensin)  · Bumetanide (Bumex)  · Candesartan (Atacand)  · Captopril (Capoten)  · Enalapril (Vasotec)  · Eprosartan (Teveten)  · Fosinopril (Monopril)  · Furosemide (Lasix)  · Hydrochlorothiazide (HCTZ)  · Iosartan (Cozaar, Hyzaar)  · Irbesartan (Avapro)  · Lisinopril (Prinivil, Zestril)  · Metolazone (Zaroxolyn)  · Olmesartan (Benicar)  · Perindopril (Aceon)  · Quinapril (Accupril)  · Ramipril (Altace)  · Spironolactone (Aldactone)  · Telmisartan (Micardis)  · Valsartan (Diovan)    Blood thinners:   Please make sure you have discussed this with your physician or surgeon before stopping these medications.   Aspirin - Stop taking 7 days before surgery   Coumadin - Stop taking 5 days before surgery   Heparin - Must be held for 12 hours prior to surgery   Antiplatelet agents (Plavix,  clopidogrel, ticlopidine) - Please follow your surgeon or cardiologists recommendations.    Diabetes medications:  Insulin - Patient with insulin pumps:   Continue your basal rate ONLY    Patients without insulin pumps:  On the morning of surgery:   Hold short acting insulin   Pre-mixed insulin (eg. 70/30): give 1/3 of usual dose   Lantus: give usual dose    Oral diabetic medications: do not take the morning of surgery   Metformin   Glyburide   Glipizide    Other prescription medications:   MAO inhibitors - do not take the day of surgery   Premarin - do not take on the day of surgery   Viagra or similar drugs - do not take 24 hours before surgery    Over-the-counter medications:   Aspirin - stop taking 7 days before surgery   NSAIDS (ibuprofen, Aleve, naproxen, Celebrex) - stop taking 7 days before surgery   Iron - do not take on the day of surgery   Herbal supplements and Vitamin E-containing multivitamins - stop taking 7 days prior to surgery    Day Before Surgery    The Day Surgery department will call you the night before your surgery to let you know what time to arrive. You will be asked to come to the hospital at least two hours before your scheduled surgery time. As a general rule, you will be asked not to eat or drink anything after midnight the night before surgery. Most often you can take your routine medications with a sip of water. Medication guidelines will be discussed at your pre-operative visit.    You may also be instructed to complete a gentle bowel prep to clean out your bowels prior to surgery. This should be done starting the day before surgery.  You should plan to stay overnight in the hospital at least one night. This surgery may require a multiple night stay. Please leave all valuables at home and wear comfortable clothing.    Medications on Discharge    Common medications you may be prescribed on discharge:  1. Pain medication  2. Steroids (prednisone, hydrocortisone): The  adrenal gland is responsible for secreting a steroid called cortisol. Cortisol is released by the body in response to stress. Steroids are sometimes needed after an adrenal gland is removed.  3. Blood Pressure Medications: Your blood pressure medications may be changed or stopped after your surgery. The removal of an adrenal gland may alter the hormones secreted by your adrenal gland that regulate blood pressure. You may not require the same amount of medication that you took before surgery.    After Surgery    Approximately 1-2 weeks following surgery, you will return to clinic to meet with your surgeon. At this time you will also meet with a nurse or nurse practitioner who will review instructions regarding caring for your wound and other post-operative issues. Your medications will be slowly adjusted; your surgeon, endocrinologist and primary care physician will manage this.    Questions you may have regarding your surgery:    What is the recovery like?  You should expect to return to work within 1-2 weeks. Typically recovery from surgery is short and pain is minimal. The most common complaint following surgery is fatigue, which usually resolves within a week or two. Some also experience generalized body aches and pains which resolve after a day or two.    When can I go back to work?  You may return to work when you feel you are able. Some people will go back after a few days and some take up to two weeks.    What activities can I do?  You may resume your normal activities after surgery. You should avoid heavy lifting over 10 pounds for 6 weeks after surgery. You may feel more tired than usual which should resolve in a reasonable time after surgery. Light activity, like walking, is encouraged to help speed up recovery.    How do I take care of the dressing over my incision?  Typically, Steri-Strips are placed over the wound. These are small strips of white tape which help to protect the incision. The Steri-Strips  should remain intact for about 7-10 days. The internal stitches are dissolvable.    When can I shower?  You may shower 48 hours after surgery. We recommend that you leave the Steri-Strips intact while you shower and lightly dry them off with a towel when you are finished. You should avoid swimming, baths and hot tubs for at least 2 weeks following surgery.    What can I take for pain?  When you are discharged home from the hospital, you will receive a prescription for a pain medication. Do not take pain medication and Tylenol together. The pain medication contains Tylenol. If you wish, you may take ibuprofen, but no sooner than 5 days after surgery.    What will the scar look like?  Most scars will become soft, flat white lines over time. Depending on the type of surgery you have (laparoscopic or open), will determine which kind of scar you will have. This will be discussed in great detail with your surgeon at your pre-operative consultation.         Language Assistance Services     ATTENTION: Language assistance services are available, free of charge. Please call [phone].      ATENCIÓN: Si devinla christina, tiene a guevara disposición servicios gratuitos de asistencia lingüística. Llame al [phone].     JENNIFER Ý: N?u b?n nói Ti?ng Vi?t, có các d?ch v? h? tr? ngôn ng? mi?n phí dành cho b?n. G?i s? [phone].         [facility] complies with applicable Federal civil rights laws and does not discriminate on the basis of race, color, national origin, age, disability, or sex.

## 2017-05-04 NOTE — LETTER
Endocrine/General Surgery  [address] [place] [address]  Phone: [phone]  Fax: [phone] May 8, 2017         Joaquin Coates MD  [address] [address]    Patient: Tereza Larose   YOB: 1950   Date of Visit: 5/4/2017     Dear Dr. Coates:    I saw Ms. Larose today in clinic. Attached you will find a copy of my note.    As you know, she is a 66 y.o. female who presented with a enlarging adrenal mass which was ultimately noted to be hyperfunctioning. I was able to discuss the indications for surgery and the expected marlena-operative course. The current plan includes laparoscopic adrenalectomy once the patient is medically optimized.    If you have any questions or concerns, please don't hesitate to contact my office. Again, thank you kindly for this referral.    Regards,    Georgie Tyson MD      CC  Evan Sánchez MD  [address] [address]  VIA In Basket

## 2017-05-04 NOTE — Clinical Note
May 8, 2017      Joaquin Coates MD  [address]  [facility] [address]           [facility]  [address], [address]  [facility] [address]  Phone: [phone]  Fax: [phone]          Patient: Tereza Larose   MR Number: [id number]   YOB: 1950   Date of Visit: 5/4/2017       Dear Dr. Joaquin Coates:    Thank you for referring Tereza Larose to me for evaluation. Attached you will find relevant portions of my assessment and plan of care.    If you have questions, please do not hesitate to call me. I look forward to following Tereza Larose along with you.    Sincerely,    Georgie Tyson MD    Enclosure  CC:  No Recipients    If you would like to receive this communication electronically, please contact [email or web address] or [phone] to request more information on [employer] access.    For providers and/or their staff who would like to refer a patient to [facility], please contact us through our one-stop-shop provider referral line, [facility], at [phone].    If you feel you have received this communication in error or would no longer like to receive these types of communications, please e-mail [email or web address]

## 2017-05-04 NOTE — PROGRESS NOTES
Consult Note  Endocrine Surgery    Visit Diagnosis:  Adrenal mass, left [E27.9]     SUBJECTIVE:     Patient is a 66 y.o. female who was referred by Dr. Joaquin Coates and is here with spouse  and presents with a left adrenal nodule/mass.  This was first noted in 2016. Per patient, there is not a known history of prior abdominal imaging. Patient has prior history or knowledge of this nodule. Patient denies episodes which include headaches, chest pain, vision changes and palpitations associated with an increase in blood pressure.  She is not on Lasix and admits any history of potassium problems (on potassium supplements) or worsening hypertension. Tereza Larose denies history of long-term steroid use. She further denies weight fluctuation, easy bruising, bleeding, skin thinning and increase in hair. She does not have a personal history of malignancy.  There is not a personal history of pancreatic abnormalities.   The patient had thyroidectomy for hyperthyroidism.  There is not a family history of endocrinopathies.      The patient is on ASA 81mg.    No prior abdominal surgery.    Review of patient's allergies indicates:   Allergen Reactions    Corn      Other reaction(s): Sneezing  Other reaction(s): Rhinorrhea    Hydrochlorothiazide Other (See Comments)     weakness    Potato starch      Other reaction(s): Sneezing  Other reaction(s): Rhinorrhea    Pravastatin Other (See Comments)     Muscle pain    Statins-hmg-coa reductase inhibitors      Muscle pain, ptiavastin     Welchol [colesevelam] Other (See Comments)     Weakness        Current Outpatient Prescriptions   Medication Sig Dispense Refill    albuterol (VENTOLIN HFA) 90 mcg/actuation inhaler Inhale 2 puffs into the lungs every 4 (four) hours as needed. 18 g 12    albuterol-ipratropium 2.5mg-0.5mg/3mL (DUO-NEB) 0.5 mg-3 mg(2.5 mg base)/3 mL nebulizer solution U 1 VIAL IN NEBULIZER Q 6 H PRF  vial 3    amlodipine (NORVASC) 10 MG tablet Take 1  tablet (10 mg total) by mouth once daily. 90 tablet 3    aspirin (ECOTRIN) 81 MG EC tablet Take 81 mg by mouth once daily.      blood sugar diagnostic Strp True track check blood glucose once daily 100 each 3    cetirizine (ZYRTEC) 10 MG tablet Take 10 mg by mouth once daily.      cholecalciferol, vitamin D3, (VITAMIN D3) 1,000 unit capsule Take 1,000 Units by mouth once daily.      coQ10, ubiquinol, 100 mg Cap Take 1 capsule by mouth 2 (two) times daily.       cyanocobalamin (VITAMIN B-12) 250 MCG tablet Take 250 mcg by mouth once daily.      ezetimibe (ZETIA) 10 mg tablet Take 1 tablet (10 mg total) by mouth once daily. 90 tablet 3    fluoxetine (PROZAC) 20 MG capsule Take 1 capsule (20 mg total) by mouth once daily. 30 capsule 11    GUAIFENESIN/PSEUDOEPHEDRNE HCL (MUCINEX D ORAL) Take 1 tablet by mouth 2 (two) times daily as needed.       L. ACIDOPHILUS/BIFIDO LONGUM (PROBIOTIC PEARLS ORAL) Take 1 each by mouth 2 (two) times daily.       lancets Misc True track Check glucose once daily 100 each 3    levothyroxine (SYNTHROID) 112 MCG tablet Take 1 tablet (112 mcg total) by mouth every morning. 90 tablet 3    lisinopril (PRINIVIL,ZESTRIL) 40 MG tablet Take 1 tablet (40 mg total) by mouth once daily. 90 tablet 2    loratadine (CLARITIN) 10 mg tablet Take 10 mg by mouth 2 (two) times daily as needed for Allergies.       magnesium 250 mg Tab Take 1 tablet by mouth once daily.       metformin (GLUCOPHAGE) 500 MG tablet Take 2 tablets (1,000 mg total) by mouth 2 (two) times daily with meals. (Patient taking differently: Take 500 mg by mouth 2 (two) times daily with meals. ) 360 tablet 0    metoprolol tartrate (LOPRESSOR) 100 MG tablet Take 1 tablet (100 mg total) by mouth 3 (three) times daily. 270 tablet 3    nifedipine (ADALAT CC) 60 MG TbSR TK 1 T PO qhs  2    potassium chloride (MICRO-K) 8 mEq CpSR Take 1 capsule (8 mEq total) by mouth once daily. 90 capsule 1    theophylline 400 mg Tb24 TK 1 T  PO BID  1    tramadol (ULTRAM) 50 mg tablet Take 1 tablet (50 mg total) by mouth every 6 (six) hours as needed. 120 tablet 0    TRUEPLUS LANCETS 33 gauge Misc USE TO TEST BLOOD SUGAR ONCE D  3    vitamin A 8000 UNIT capsule Take 8,000 Units by mouth once daily.      phenoxybenzamine (DIBENZYLINE) 10 mg capsule Take 1 capsule (10 mg total) by mouth 2 (two) times daily. 180 capsule 3    prazosin (MINIPRESS) 1 MG Cap Take 1 capsule (1 mg total) by mouth 2 (two) times daily. 180 capsule 3     No current facility-administered medications for this visit.        Past Medical History:   Diagnosis Date    Allergy     Arthritis     Asthma     Brain bleed     COPD (chronic obstructive pulmonary disease)     Depression     Diabetes mellitus type II     Diverticulitis     Fatty liver     GERD (gastroesophageal reflux disease)     Heart murmur     Hyperlipidemia     Hypertension     Metabolic syndrome 6/14/2012    Myocardial infarction     Stroke     Thyroid disease      Past Surgical History:   Procedure Laterality Date    AORTIC VALVE REPLACEMENT  12/09/2016    arthroscopy lt knee      BLADDER REPAIR      COLONOSCOPY  2004    10 year recheck    CORONARY ARTERY BYPASS GRAFT  12/09/2016    X3    HYSTERECTOMY      THYROIDECTOMY       Social History   Substance Use Topics    Smoking status: Never Smoker    Smokeless tobacco: Never Used    Alcohol use Yes      Comment: seldom          Review of Systems:    Review of Systems  Constitutional: negative for chills, fatigue, fevers, malaise and night sweats  Eyes: negative for icterus and irritation  Respiratory: negative for cough and dyspnea on exertion  Cardiovascular: negative for chest pain and palpitations  Gastrointestinal: negative for constipation, diarrhea and dysphagia  Genitourinary:negative for dysuria, hematuria and nocturia  Integument/breast: negative for rash and skin color change  Hematologic/lymphatic: negative for bleeding and easy  "bruising  Neurological: negative for gait problems, headaches and seizures  Behavioral/Psych: negative for anxiety and depression  ENT ROS: negative  Endocrine ROS:   negative for - hair pattern changes, malaise/lethargy, mood swings, palpitations or polydipsia/polyuria    OBJECTIVE:     Vital Signs:  BP (!) 110/59  Pulse 66  Temp 98.7 °F (37.1 °C)  Ht 5' 2" (1.575 m)  Wt 71.7 kg (158 lb 2.9 oz)  BMI 28.93 kg/m2   Body mass index is 28.93 kg/(m^2).      Physical Exam:    General:  no distress, see vitals for BMI    Eyes:  conjunctivae/corneas clear   Neck: trachea midline and symmetric, no adenopathy    Thyroid:  thyroid not enlarged   Lung: clear to auscultation bilaterally   Heart:  regular rate and rhythm   Abdomen: soft, non-tender; bowel sounds normal; no masses,  no organomegaly   Skin/Extremities: warm and well-perfused   Pulses: 2+ and symmetric   Neuro: normal without focal findings and mental status, speech normal, alert and oriented x3          Adrenal Labs:        Component Value Date   ACTH 22 03/28/2017   Aldosterone 39.7 03/28/2017   Renin Activity 2.5 03/28/2017   Cortisol -8 AM 7.4 03/28/2017   DHEA-SO4 89.1 (H) 03/28/2017   Cortisol, 24H Ur 5.4 04/04/2017   Sodium 142 03/28/2017   Potassium 4.2 03/28/2017   Chloride 101 03/28/2017   CO2 31 (H) 03/28/2017   Glucose 112 (H) 03/28/2017   BUN, Bld 20 03/28/2017   Creatinine 1.1 03/28/2017   Creatinine 0.6 10/03/2012   Calcium 10.2 03/28/2017   Calcium 8.9 10/03/2012   Anion Gap 10 03/28/2017   Anion Gap 10 10/03/2012   eGFR if African American >60.0 03/28/2017   eGFR if non  52.4 (A) 03/28/2017   Hours Collected 24 05/12/2016   Urine Total Volume 2125 05/12/2016   Creatinine, urine - per volume 39 05/12/2016   Creatinine, urine - per 24 hours 829 05/12/2016   Epinephrine, 24H Ur 4 05/12/2016   Epinephrine - ratio to CRT 5 05/12/2016   Epinephrine,  Ur 2 05/12/2016   Norepinephrine, 24H Ur 136 (H) 05/12/2016   Norepinephrine, urine " - ratio to  (H) 05/12/2016   Norepinephrine,  Ur 64 05/12/2016   Dopamine , 24H Ur 287 05/12/2016   Dopamine, urine - ratio to  (H) 05/12/2016   Dopamine,  Ur 135 05/12/2016     CT abdomen/pelvis (10/26/2016):    A 2.6 cm heterogeneously enhancing left adrenal mass is present.  The lesion exhibits nonenhanced density of 39 Hounsfield units.  Absolute washout of 65% and relative washout is 40%.  The lesion has significantly increased in size since 5/27/11, when a subtle 5 mm enhancing nodule was present.    The right adrenal gland is normal.  The liver, pancreas, gallbladder, and spleen are unremarkable.  There is multifocal cortical scarring of the upper pole of the left kidney.  Visualized bowel is unremarkable.  There is moderate calcification of the aorta without aneurysm.  Advanced degenerative change of the spine is noted.    ASSESSMENT/PLAN:      Tereza Larose is an 66 y.o. female with a left adrenal nodule.  I was able to review the imaging with the patient.  We had a lengthy discussion about the natural history of pheochromocytoma. 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  I discussed the implications of this with the patient. I have recommended that the patient undergo a  laparascopic adrenalectomy. The risks, benefits, and treatment alternatives were discussed with the patient. These include and are not limited to bleeding, infection, and injury to the surrounding organs including the spleen, the tail of the pancreas, the kidney, liver and duodenum.  Other risks of general anesthetic include MI, CVA, sudden death or even reaction to anesthetic medications in addition to the risk of pneumonia, atelectasis, or pneumothorax associated with diaphragmatic injury, and ileus. The patient understands the risks, benefits and treatment alternatives.  Any and all questions were answered to the patient's satisfaction.  Written consent was not obtained. Prior to surgery we will need to initiate alpha-blockade. The patient's baseline SBP goal is less than 120.  We will add beta-blockade as needed following alpha-blockade.  The patient recently was not able to obtain Phenoxybenzamine related to the cost.  I will discuss the options with Dr. Coates as he will be taking the lead on this.    We will also obtain cardiac clearance prior to surgery.  Lastly, we will obtain an updated CT scan of the abdomen prior to surgery as the patient has already experienced significant growth.     The patient and her spouse's questions were answered. She was directed to an endocrine patient education website (www.[email or web address]) for further information.

## 2017-05-04 NOTE — TELEPHONE ENCOUNTER
----- Message from Georgie Tyson MD sent at 5/4/2017  4:39 PM CDT -----  Evening,    This is the patient we just spoke about.  Her phenoxybenzamine is not covered and costs $2400.    Thanks,    aob

## 2017-05-05 DIAGNOSIS — D35.00 PHEOCHROMOCYTOMA, UNSPECIFIED LATERALITY: Primary | ICD-10-CM

## 2017-05-05 DIAGNOSIS — I15.9 SECONDARY HYPERTENSION: ICD-10-CM

## 2017-05-05 RX ORDER — PRAZOSIN HYDROCHLORIDE 1 MG/1
1 CAPSULE ORAL 2 TIMES DAILY
Qty: 180 CAPSULE | Refills: 3 | Status: ON HOLD | OUTPATIENT
Start: 2017-05-05 | End: 2017-07-25

## 2017-05-08 LAB
MISCELLANEOUS TEST NAME: NORMAL
REFERENCE LAB: NORMAL
SPECIMEN TYPE: NORMAL
TEST RESULT: NORMAL

## 2017-05-09 ENCOUNTER — TELEPHONE (OUTPATIENT)
Dept: ENDOCRINOLOGY | Facility: CLINIC | Age: 67
End: 2017-05-09

## 2017-05-10 DIAGNOSIS — M17.10 ARTHRITIS OF KNEE: ICD-10-CM

## 2017-05-10 RX ORDER — TRAMADOL HYDROCHLORIDE 50 MG/1
TABLET ORAL
Qty: 120 TABLET | Refills: 0 | Status: SHIPPED | OUTPATIENT
Start: 2017-05-10 | End: 2018-08-28 | Stop reason: SDUPTHER

## 2017-05-18 LAB — MAYO MISCELLANEOUS RESULT (REF): NORMAL

## 2017-05-19 ENCOUNTER — DOCUMENTATION ONLY (OUTPATIENT)
Dept: ENDOCRINOLOGY | Facility: CLINIC | Age: 67
End: 2017-05-19

## 2017-05-19 LAB — MAYO MISCELLANEOUS RESULT (REF): NORMAL

## 2017-05-19 NOTE — PROGRESS NOTES
The genetic testing results thus far back from [facility] gene profile mutations and RET have been -ve.   Only the [facility] gene mutation profile is pending at this point.

## 2017-05-30 ENCOUNTER — TELEPHONE (OUTPATIENT)
Dept: ENDOCRINOLOGY | Facility: CLINIC | Age: 67
End: 2017-05-30

## 2017-05-30 DIAGNOSIS — E11.9 DIABETES MELLITUS WITHOUT COMPLICATION: ICD-10-CM

## 2017-05-30 RX ORDER — METFORMIN HYDROCHLORIDE 500 MG/1
500 TABLET ORAL 2 TIMES DAILY WITH MEALS
Qty: 180 TABLET | Refills: 0 | Status: SHIPPED | OUTPATIENT
Start: 2017-05-30 | End: 2017-09-11 | Stop reason: SDUPTHER

## 2017-05-30 NOTE — TELEPHONE ENCOUNTER
----- Message from George Rush sent at 5/30/2017  4:09 PM CDT -----  Pt called asking for a call back from the nurse/pls call back at [phone]

## 2017-05-30 NOTE — TELEPHONE ENCOUNTER
----- Message from George Rush sent at 5/30/2017  4:08 PM CDT -----  Patient needs a refill on Metformin called into [facility] pharmacy [place] [address]  Please call patient at [phone] if you have any questions. Thanks!

## 2017-06-01 ENCOUNTER — TELEPHONE (OUTPATIENT)
Dept: ENDOCRINOLOGY | Facility: CLINIC | Age: 67
End: 2017-06-01

## 2017-06-01 NOTE — TELEPHONE ENCOUNTER
Patient is calling wanting to know when her Adrenal surgery can be scheduled... Advised I would forward message

## 2017-06-01 NOTE — TELEPHONE ENCOUNTER
----- Message from Patricia Brooks sent at 6/1/2017  2:19 PM CDT -----  Contact: self   Placed call to pod, patient missed call from your office please call back at [phone] (home)

## 2017-06-01 NOTE — TELEPHONE ENCOUNTER
----- Message from Summer Owens sent at 6/1/2017  1:13 PM CDT -----  Contact: pt [phone]  Patient called and asked for a call back about her medication call back for more details.

## 2017-06-13 ENCOUNTER — DOCUMENTATION ONLY (OUTPATIENT)
Dept: FAMILY MEDICINE | Facility: CLINIC | Age: 67
End: 2017-06-13

## 2017-06-13 NOTE — PROGRESS NOTES
Pre-Visit Chart Review  For Appointment Scheduled on 6-16-17    Health Maintenance Due   Topic Date Due    Fecal Occult Blood Test (FOBT)/[employer]  09/17/2000    Eye Exam  05/07/2015    Pneumococcal (65+) (2 of 2 - PCV13) 09/30/2016    Mammogram  04/23/2017

## 2017-06-14 ENCOUNTER — TELEPHONE (OUTPATIENT)
Dept: SURGERY | Facility: CLINIC | Age: 67
End: 2017-06-14

## 2017-06-14 NOTE — TELEPHONE ENCOUNTER
----- Message from Georgie Tyson MD sent at 6/13/2017  6:36 PM CDT -----  We are waiting on cards and PCP clearance.  Where do we stand on this?      ----- Message -----  From: Ana Cristina Harrison RN  Sent: 6/7/2017   4:32 PM  To: Georgie Tyson MD        ----- Message -----  From: Joaquin Coates MD  Sent: 6/7/2017   4:16 PM  To: Aminata Nuñez Staff    I had started Ms Larose on Prazocin instead of phenoxybenzamine (apparently exhorbitantly expensive with poor coverage by her insurance plan) for alpha blockade and from  my stand point she is medically optimized for the adrenalectomy to proceed. I am not certain if you had agreed upon a tentative surgical date with her but once her cardiologist and PCP give their go aheads form my stand point i have no objections/concerns regarding her proceeding with the surgery.  Thanks    Joaquin Coates MD  ----- Message -----  From: Georgie Tyson MD  Sent: 6/7/2017   3:58 PM  To: Joaquin Coates MD    Good Afternoon,    I'm checking on the status of this patient.  I know that you were working to her blockade in preparation for surgery.  Is there anything that you need from me?    aob

## 2017-06-14 NOTE — TELEPHONE ENCOUNTER
Pt reports that her Cards clearance appt is tomorrow and pcp clearance appt is this Friday. Provided pt w/ our fax # for Cardilogist office to fax her clearance over to us.      Pt says she will call me on Friday to let me know if pcp clears her and if so she will call me to schedule her sx.

## 2017-06-16 ENCOUNTER — OFFICE VISIT (OUTPATIENT)
Dept: FAMILY MEDICINE | Facility: CLINIC | Age: 67
End: 2017-06-16
Payer: MEDICARE

## 2017-06-16 ENCOUNTER — TELEPHONE (OUTPATIENT)
Dept: FAMILY MEDICINE | Facility: CLINIC | Age: 67
End: 2017-06-16

## 2017-06-16 VITALS
DIASTOLIC BLOOD PRESSURE: 76 MMHG | WEIGHT: 158.5 LBS | SYSTOLIC BLOOD PRESSURE: 142 MMHG | HEART RATE: 69 BPM | TEMPERATURE: 98 F | HEIGHT: 62 IN | BODY MASS INDEX: 29.17 KG/M2

## 2017-06-16 DIAGNOSIS — Z86.73 HISTORY OF CARDIOEMBOLIC CEREBROVASCULAR ACCIDENT (CVA): ICD-10-CM

## 2017-06-16 DIAGNOSIS — D35.02 PHEOCHROMOCYTOMA, LEFT: Primary | ICD-10-CM

## 2017-06-16 DIAGNOSIS — E11.8 TYPE 2 DIABETES MELLITUS WITH COMPLICATION, UNSPECIFIED LONG TERM INSULIN USE STATUS: ICD-10-CM

## 2017-06-16 DIAGNOSIS — I25.10 CORONARY ARTERY DISEASE INVOLVING NATIVE CORONARY ARTERY OF NATIVE HEART WITHOUT ANGINA PECTORIS: ICD-10-CM

## 2017-06-16 DIAGNOSIS — Z01.818 PREOP GENERAL PHYSICAL EXAM: ICD-10-CM

## 2017-06-16 DIAGNOSIS — E24.9 ADRENAL CUSHING'S SYNDROME: ICD-10-CM

## 2017-06-16 DIAGNOSIS — I25.2 HISTORY OF NON-ST ELEVATION MYOCARDIAL INFARCTION (NSTEMI): ICD-10-CM

## 2017-06-16 DIAGNOSIS — E07.9 THYROID DISORDER: Chronic | ICD-10-CM

## 2017-06-16 DIAGNOSIS — E78.5 DYSLIPIDEMIA: Chronic | ICD-10-CM

## 2017-06-16 DIAGNOSIS — I10 HYPERTENSION, POOR CONTROL: ICD-10-CM

## 2017-06-16 PROCEDURE — 3044F HG A1C LEVEL LT 7.0%: CPT | Mod: ,,, | Performed by: FAMILY MEDICINE

## 2017-06-16 PROCEDURE — 99214 OFFICE O/P EST MOD 30 MIN: CPT | Mod: S$PBB,,, | Performed by: FAMILY MEDICINE

## 2017-06-16 PROCEDURE — 99213 OFFICE O/P EST LOW 20 MIN: CPT | Mod: PBBFAC,PO | Performed by: FAMILY MEDICINE

## 2017-06-16 PROCEDURE — 1159F MED LIST DOCD IN RCRD: CPT | Mod: ,,, | Performed by: FAMILY MEDICINE

## 2017-06-16 PROCEDURE — 1126F AMNT PAIN NOTED NONE PRSNT: CPT | Mod: ,,, | Performed by: FAMILY MEDICINE

## 2017-06-16 PROCEDURE — 4010F ACE/ARB THERAPY RXD/TAKEN: CPT | Mod: ,,, | Performed by: FAMILY MEDICINE

## 2017-06-16 PROCEDURE — 99999 PR PBB SHADOW E&M-EST. PATIENT-LVL III: CPT | Mod: PBBFAC,,, | Performed by: FAMILY MEDICINE

## 2017-06-16 RX ORDER — LEVOCETIRIZINE DIHYDROCHLORIDE 5 MG/1
5 TABLET, FILM COATED ORAL NIGHTLY
COMMUNITY
End: 2019-03-08

## 2017-06-16 RX ORDER — TORSEMIDE 20 MG/1
20 TABLET ORAL DAILY
COMMUNITY
End: 2019-03-08 | Stop reason: SDUPTHER

## 2017-06-16 NOTE — TELEPHONE ENCOUNTER
Patient says Dr. Fair the cardiologist called the rehab today and patient is to take only Metoprolol twice daily and the Minipress for bp.

## 2017-06-16 NOTE — TELEPHONE ENCOUNTER
Looks like I have her on amlodipine and Dr. Batres has her on nifedipine.  She can't take both.  Confirm what she is taking please.

## 2017-06-16 NOTE — PROGRESS NOTES
Subjective:       Patient ID: Tereza Larose is a 66 y.o. female.    Chief Complaint: Pre-op Exam    66-year-old female with previous history of diabetes, hypertension, hyperlipidemia intolerant of statins, hypothyroidism, coronary artery disease with previous NSTEMI, previous stroke, COPD.  She is followed by  for hypothyroidism and a left adrenal adenoma which has been found to be a hormonally active pheochromocytoma.  She is here for preoperative clearance for adrenalectomy.  She has already seen her cardiologist yesterday and has been cleared by them for surgery.  She has no active complaints at this moment but does have labile blood pressure and intermittent dizziness.  Surgery has not yet been scheduled.    Past Medical History:  No date: Allergy  No date: Arthritis  No date: Asthma  No date: Brain bleed  No date: COPD (chronic obstructive pulmonary disease)  No date: Depression  No date: Diabetes mellitus type II  No date: Diverticulitis  No date: Fatty liver  No date: GERD (gastroesophageal reflux disease)  No date: Heart murmur  No date: Hyperlipidemia  No date: Hypertension  6/14/2012: Metabolic syndrome  No date: Myocardial infarction  No date: Stroke  No date: Thyroid disease    Past Surgical History:  12/09/2016: AORTIC VALVE REPLACEMENT  No date: arthroscopy lt knee  No date: BLADDER REPAIR  2004: COLONOSCOPY      Comment: 10 year recheck  12/09/2016: CORONARY ARTERY BYPASS GRAFT      Comment: X3  No date: HYSTERECTOMY  No date: THYROIDECTOMY    Review of patient's family history indicates:    Arthritis                      Mother                    Diabetes                       Mother                    Heart disease                  Mother                    Hypertension                   Mother                    Hypertension                   Father                    Heart disease                  Father                    Mental illness                 Father                    Asthma                          Sister                    Diabetes                       Sister                    Hypertension                   Sister                    Asthma                         Son                       COPD                           Son                       Depression                     Son                       Diabetes                       Son                       Hypertension                   Son                       Stroke                         Son                       Diabetes                       Maternal Uncle            Heart disease                  Maternal Uncle            Mental illness                 Paternal Aunt             Cancer                         Paternal Aunt             Heart disease                  Paternal Aunt             Hypertension                   Paternal Aunt             Heart disease                  Paternal Uncle            Hypertension                   Maternal Grandmother      Mental illness                 Maternal Grandmother      Aneurysm                       Maternal Grandfather      Mental illness                 Paternal Grandmother      Heart disease                  Paternal Grandfather      Melanoma                       Neg Hx                    Psoriasis                      Neg Hx                    Lupus                          Neg Hx                    Eczema                         Neg Hx                    Social History    Marital status:              Spouse name:                       Years of education:                 Number of children:               Social History Main Topics    Smoking status: Never Smoker                                                                Smokeless tobacco: Never Used                        Alcohol use: Yes                Comment: seldom    Drug use: No              Sexual activity: No                       Current Outpatient Prescriptions:     aspirin (ECOTRIN) 81 MG EC tablet, Take  81 mg by mouth once daily., Disp: , Rfl:     cholecalciferol, vitamin D3, (VITAMIN D3) 1,000 unit capsule, Take 1,000 Units by mouth once daily., Disp: , Rfl:     coQ10, ubiquinol, 100 mg Cap, Take 1 capsule by mouth 2 (two) times daily. , Disp: , Rfl:     cyanocobalamin (VITAMIN B-12) 250 MCG tablet, Take 250 mcg by mouth once daily., Disp: , Rfl:     ezetimibe (ZETIA) 10 mg tablet, Take 1 tablet (10 mg total) by mouth once daily., Disp: 90 tablet, Rfl: 3    fluoxetine (PROZAC) 20 MG capsule, Take 1 capsule (20 mg total) by mouth once daily., Disp: 30 capsule, Rfl: 11    GUAIFENESIN/PSEUDOEPHEDRNE HCL (MUCINEX D ORAL), Take 1 tablet by mouth 2 (two) times daily as needed. , Disp: , Rfl:     L. ACIDOPHILUS/BIFIDO LONGUM (PROBIOTIC PEARLS ORAL), Take 1 each by mouth 2 (two) times daily. , Disp: , Rfl:     lancets Misc, True track Check glucose once daily, Disp: 100 each, Rfl: 3    levocetirizine (XYZAL) 5 MG tablet, Take 5 mg by mouth every evening., Disp: , Rfl:     levothyroxine (SYNTHROID) 112 MCG tablet, Take 1 tablet (112 mcg total) by mouth every morning., Disp: 90 tablet, Rfl: 3    lisinopril (PRINIVIL,ZESTRIL) 40 MG tablet, Take 1 tablet (40 mg total) by mouth once daily., Disp: 90 tablet, Rfl: 2    magnesium 250 mg Tab, Take 1 tablet by mouth once daily. , Disp: , Rfl:     metformin (GLUCOPHAGE) 500 MG tablet, Take 1 tablet (500 mg total) by mouth 2 (two) times daily with meals., Disp: 180 tablet, Rfl: 0    metoprolol tartrate (LOPRESSOR) 100 MG tablet, Take 1 tablet (100 mg total) by mouth 3 (three) times daily., Disp: 270 tablet, Rfl: 3    nifedipine (ADALAT CC) 60 MG TbSR, TK 1 T PO qhs, Disp: , Rfl: 2    phenoxybenzamine (DIBENZYLINE) 10 mg capsule, Take 1 capsule (10 mg total) by mouth 2 (two) times daily., Disp: 180 capsule, Rfl: 3    potassium chloride (MICRO-K) 8 mEq CpSR, Take 1 capsule (8 mEq total) by mouth once daily., Disp: 90 capsule, Rfl: 1    prazosin (MINIPRESS) 1 MG Cap,  Take 1 capsule (1 mg total) by mouth 2 (two) times daily., Disp: 180 capsule, Rfl: 3    theophylline 400 mg Tb24, TK 1 T PO BID, Disp: , Rfl: 1    torsemide (DEMADEX) 20 MG Tab, Take 20 mg by mouth once daily., Disp: , Rfl:     tramadol (ULTRAM) 50 mg tablet, TAKE 1 TABLET BY MOUTH EVERY 6 HOURS AS NEEDED FOR PAIN, Disp: 120 tablet, Rfl: 0    TRUEPLUS LANCETS 33 gauge Misc, USE TO TEST BLOOD SUGAR ONCE D, Disp: , Rfl: 3    vitamin A 8000 UNIT capsule, Take 8,000 Units by mouth once daily., Disp: , Rfl:     albuterol (VENTOLIN HFA) 90 mcg/actuation inhaler, Inhale 2 puffs into the lungs every 4 (four) hours as needed., Disp: 18 g, Rfl: 12    albuterol-ipratropium 2.5mg-0.5mg/3mL (DUO-NEB) 0.5 mg-3 mg(2.5 mg base)/3 mL nebulizer solution, U 1 VIAL IN NEBULIZER Q 6 H PRF SOB, Disp: 360 vial, Rfl: 3    amlodipine (NORVASC) 10 MG tablet, Take 1 tablet (10 mg total) by mouth once daily., Disp: 90 tablet, Rfl: 3    blood sugar diagnostic Strp, True track check blood glucose once daily, Disp: 100 each, Rfl: 3    Fecal Occult Blood Test (FOBT)/[employer] due on 09/17/2000-she has a kit at home but has not yet completed it  Eye Exam due on 05/07/2015  Pneumococcal (65+)(2 of 2 - PCV13) due on 09/30/2016  Mammogram due on 04/23/2017        Review of Systems   Constitutional: Negative for chills, diaphoresis, fatigue and fever.   HENT: Negative for congestion, postnasal drip, rhinorrhea and sinus pressure.    Eyes: Negative for visual disturbance.   Respiratory: Negative for chest tightness and shortness of breath.    Cardiovascular: Negative for chest pain and palpitations.   Gastrointestinal: Negative for abdominal distention, abdominal pain, constipation, diarrhea, nausea and vomiting.   Endocrine: Negative for polydipsia and polyuria.   Genitourinary: Negative for dysuria, frequency and urgency.   Musculoskeletal: Negative for arthralgias and myalgias.   Skin: Negative for color change, pallor and rash.   Neurological:  Positive for dizziness. Negative for headaches.   Psychiatric/Behavioral: Negative for dysphoric mood. The patient is not nervous/anxious.        Objective:      Physical Exam   Constitutional: She is oriented to person, place, and time. She appears well-developed and well-nourished. No distress.   Systolic blood pressure elevation  Patient is normal weight with bmi of 28.9.   Weight is decreased by 5.7lb since last visit of 3/23/17.     HENT:   Head: Normocephalic and atraumatic.   Right Ear: External ear normal.   Left Ear: External ear normal.   Nose: Nose normal.   Mouth/Throat: Oropharynx is clear and moist. No oropharyngeal exudate.   Eyes: EOM are normal. Pupils are equal, round, and reactive to light. No scleral icterus.   Neck: Normal range of motion. Neck supple. No JVD present. No tracheal deviation present. No thyromegaly present.   Cardiovascular: Normal rate, regular rhythm and normal heart sounds.  Exam reveals no gallop and no friction rub.    No murmur heard.  Pulmonary/Chest: Effort normal and breath sounds normal. No stridor. No respiratory distress. She has no wheezes. She has no rales. She exhibits no tenderness.   Abdominal: Soft. Bowel sounds are normal. She exhibits no distension and no mass. There is no tenderness. There is no rebound and no guarding. No hernia.   Musculoskeletal: Normal range of motion. She exhibits no tenderness.   Lymphadenopathy:     She has no cervical adenopathy.   Neurological: She is alert and oriented to person, place, and time. She has normal reflexes. She displays normal reflexes. She exhibits normal muscle tone.   Skin: Skin is warm and dry. She is not diaphoretic.   Psychiatric: She has a normal mood and affect. Her behavior is normal. Judgment and thought content normal.   Nursing note and vitals reviewed.      Assessment:       1. Pheochromocytoma, left    2. Preop general physical exam    3. Hypertension, poor control    4. Coronary artery disease involving  native coronary artery of native heart without angina pectoris    5. Adrenal Cushing's syndrome    6. Type 2 diabetes mellitus with complication, unspecified long term insulin use status    7. Thyroid disorder, thyroidectomy 2003    8. Dyslipidemia, 1/2012, statin intolerance    9. History of non-ST elevation myocardial infarction (NSTEMI)    10. History of cardioembolic cerebrovascular accident (CVA)    11. BMI 28.0-28.9,adult        Plan:       1. Pheochromocytoma, left    2. Preop general physical exam  SHE IS CLEAR FOR SURGERY    3. Hypertension, poor control  Likely labile due to pheochromocytoma    4. Coronary artery disease involving native coronary artery of native heart without angina pectoris  Followed by Dr. Batres, already cleared for surgery by his office    5. Adrenal Cushing's syndrome    6. Type 2 diabetes mellitus with complication, unspecified long term insulin use status    7. Thyroid disorder, thyroidectomy 2003    8. Dyslipidemia, 1/2012, statin intolerance    9. History of non-ST elevation myocardial infarction (NSTEMI)    10. History of cardioembolic cerebrovascular accident (CVA)    11. BMI 28.0-28.9,adult

## 2017-06-16 NOTE — TELEPHONE ENCOUNTER
See paperwork on your desk from [facility] cardio [facility]- patient became dizzy upon arising from sitting- bp seated 78/50- taken to orientation room places in supine position-bp recheck electronically 110/60 manually rt arm 128/60- after 5 min 87/51 while seated- recheck 92/60- patient did take bp meds today

## 2017-06-16 NOTE — PATIENT INSTRUCTIONS
Controlling High Blood Pressure  High blood pressure (hypertension) is often called the silent killer. This is because many people who have it dont know it. High blood pressure is defined as 140/90 mm Hg or higher. Know your blood pressure and remember to check it regularly. Doing so can save your life. Here are some things you can do to help control your blood pressure.    Choose heart-healthy foods  · Select low-salt, low-fat foods. Limit sodium intake to 2,400 mg per day or the amount suggested by your healthcare provider.  · Limit canned, dried, cured, packaged, and fast foods. These can contain a lot of salt.  · Eat 8 to 10 servings of fruits and vegetables every day.  · Choose lean meats, fish, or chicken.  · Eat whole-grain pasta, brown rice, and beans.  · Eat 2 to 3 servings of low-fat or fat-free dairy products.  · Ask your doctor about the DASH eating plan. This plan helps reduce blood pressure.  · When you go to a restaurant, ask that your meal be prepared with no added salt.  Maintain a healthy weight  · Ask your healthcare provider how many calories to eat a day. Then stick to that number.  · Ask your healthcare provider what weight range is healthiest for you. If you are overweight, a weight loss of only 3% to 5% of your body weight can help lower blood pressure. Generally, a good weight loss goal is to lose 10% of your body weight in a year.  · Limit snacks and sweets.  · Get regular exercise.  Get up and get active  · Choose activities you enjoy. Find ones you can do with friends or family. This includes bicycling, dancing, walking, and jogging.  · Park farther away from building entrances.  · Use stairs instead of the elevator.  · When you can, walk or bike instead of driving.  · [place] leaves, garden, or do household repairs.  · Be active at a moderate to vigorous level of physical activity for at least 40 minutes for a minimum of 3 to 4 days a week.   Manage stress  · Make time to relax and enjoy  life. Find time to laugh.  · Communicate your concerns with your loved ones and your healthcare provider.  · Visit with family and friends, and keep up with hobbies.  Limit alcohol and quit smoking  · Men should have no more than 2 drinks per day.  · Women should have no more than 1 drink per day.  · Talk with your healthcare provider about quitting smoking. Smoking significantly increases your risk for heart disease and stroke. Ask your healthcare provider about community smoking cessation programs and other options.  Medicines  If lifestyle changes arent enough, your healthcare provider may prescribe high blood pressure medicine. Take all medicines as prescribed. If you have any questions about your medicines, ask your healthcare provider before stopping or changing them.   Date Last Reviewed: 4/27/2016  © 6346-6715 [employer] [address] [place] [address], [place] [address]. All rights reserved. This information is not intended as a substitute for professional medical care. Always follow your healthcare professional's instructions.

## 2017-06-21 NOTE — TELEPHONE ENCOUNTER
----- Message from Georgie Tyson MD sent at 6/16/2017 11:28 AM CDT -----      ----- Message -----  From: Evan Sánchez MD  Sent: 6/16/2017  11:08 AM  To: Georgie Tyson MD    She is clear for surgery.

## 2017-06-23 DIAGNOSIS — E27.8 ADRENAL MASS: Primary | ICD-10-CM

## 2017-06-23 DIAGNOSIS — E27.8 ADRENAL MASS, LEFT: ICD-10-CM

## 2017-06-23 RX ORDER — SODIUM CHLORIDE 9 MG/ML
INJECTION, SOLUTION INTRAVENOUS CONTINUOUS
Status: CANCELLED | OUTPATIENT
Start: 2017-06-23

## 2017-06-29 ENCOUNTER — TELEPHONE (OUTPATIENT)
Dept: SURGERY | Facility: CLINIC | Age: 67
End: 2017-06-29

## 2017-06-29 NOTE — TELEPHONE ENCOUNTER
----- Message from Georgie Tyson MD sent at 6/28/2017  3:32 PM CDT -----  Ok.  We should see her a couple weeks prior to surgery and make sure she has an anesthesia appointment as well.  We want all the ducks in a row proceeding into surgery.    aob  ----- Message -----  From: Jenn Tanner RN  Sent: 6/23/2017  11:29 AM  To: Georgie Tyson MD    Pt chose 7/19 for sx. I will sched

## 2017-07-06 NOTE — TELEPHONE ENCOUNTER
Called pt multiple times but no answer and unable to lvm due to mailbox full.  Pt is late for her appt.  Will have to try again later

## 2017-07-07 DIAGNOSIS — E27.8 ADRENAL MASS: Primary | ICD-10-CM

## 2017-07-07 DIAGNOSIS — Z01.818 PREOPERATIVE TESTING: ICD-10-CM

## 2017-07-07 NOTE — TELEPHONE ENCOUNTER
Finally spoke to the pt who says her phone has been broken and has not been recieveing any calls, messages.  Says she just picked up her mail therefore does not have any previous appt.     Dr. ORONA confirmed that pt can come in on 7/13 for preop.       Rescheduled all pts appts and mailed them to her home. She is aware she will need preop testing.

## 2017-07-10 ENCOUNTER — TELEPHONE (OUTPATIENT)
Dept: SURGERY | Facility: CLINIC | Age: 67
End: 2017-07-10

## 2017-07-10 NOTE — TELEPHONE ENCOUNTER
Called pt on both phones but no answer.  LVM on the # that pt usually answer. Request call back about the below info.

## 2017-07-10 NOTE — TELEPHONE ENCOUNTER
----- Message from Georgie Tyson MD sent at 7/7/2017  5:29 PM CDT -----  Afternoon,    Do she need another EKG if she was cleared by cards?  Also, when were the last labs drawn?  If they were done on the outside within the last 2 months we can simply get the records and ensure that they are uploaded in the media tab.    Thanks,    aob

## 2017-07-11 NOTE — TELEPHONE ENCOUNTER
Pt called me. She reports that she does not know if she has had an ekg w/in the last year or labs w/in last 2-3 months.     Will call pts cardiac provider.

## 2017-07-11 NOTE — TELEPHONE ENCOUNTER
----- Message from Feliciano Ruiz sent at 7/11/2017  8:07 AM CDT -----  Pt would like to speak with nurse regarding surgery. Please call pt at [phone]

## 2017-07-11 NOTE — TELEPHONE ENCOUNTER
Spoke to Maxine in Dr. Leigh office. She reports pt has not had recent labs but had ekg on 6/15/17. She says she will have someone fax over to us shortly.

## 2017-07-11 NOTE — TELEPHONE ENCOUNTER
Informed pt that we have received ekg but she will have to have labs as she has not had them done w/in 3 mths. Pt v/u    Went over all appts w/ the pt. She v/u.

## 2017-07-13 ENCOUNTER — HOSPITAL ENCOUNTER (OUTPATIENT)
Dept: PREADMISSION TESTING | Facility: HOSPITAL | Age: 67
Discharge: HOME | End: 2017-07-13
Attending: ANESTHESIOLOGY
Payer: MEDICARE

## 2017-07-13 ENCOUNTER — ANESTHESIA EVENT (OUTPATIENT)
Dept: SURGERY | Facility: OTHER | Age: 67
DRG: 615 | End: 2017-07-13
Payer: MEDICARE

## 2017-07-13 ENCOUNTER — OFFICE VISIT (OUTPATIENT)
Dept: SURGERY | Facility: CLINIC | Age: 67
End: 2017-07-13
Payer: MEDICARE

## 2017-07-13 VITALS
SYSTOLIC BLOOD PRESSURE: 135 MMHG | TEMPERATURE: 98 F | OXYGEN SATURATION: 98 % | BODY MASS INDEX: 29.9 KG/M2 | HEART RATE: 110 BPM | RESPIRATION RATE: 18 BRPM | WEIGHT: 162.5 LBS | DIASTOLIC BLOOD PRESSURE: 59 MMHG | HEIGHT: 62 IN

## 2017-07-13 VITALS
DIASTOLIC BLOOD PRESSURE: 72 MMHG | HEIGHT: 62 IN | BODY MASS INDEX: 29.68 KG/M2 | WEIGHT: 161.31 LBS | HEART RATE: 101 BPM | TEMPERATURE: 98 F | SYSTOLIC BLOOD PRESSURE: 159 MMHG

## 2017-07-13 DIAGNOSIS — E27.8 ADRENAL MASS, LEFT: Primary | ICD-10-CM

## 2017-07-13 PROCEDURE — 99999 PR PBB SHADOW E&M-EST. PATIENT-LVL III: CPT | Mod: PBBFAC,,, | Performed by: SURGERY

## 2017-07-13 PROCEDURE — 99499 UNLISTED E&M SERVICE: CPT | Mod: S$PBB,,, | Performed by: SURGERY

## 2017-07-13 NOTE — ANESTHESIA PREPROCEDURE EVALUATION
07/13/2017  Tereza Larose is a 66 y.o., female.    Anesthesia Evaluation    I have reviewed the Patient Summary Reports.    I have reviewed the Nursing Notes.   I have reviewed the Medications.     Review of Systems  Anesthesia Hx:  No problems with previous Anesthesia  History of prior surgery of interest to airway management or planning: Previous anesthesia: General AVR 12/2016 with general anesthesia.    Social:  Non-Smoker  Denies Tobacco Use.   Hematology/Oncology:         -- Denies Anemia: Denies Current/Recent Cancer   EENT/Dental:   Denies Eye Symptoms  Denies ear symptoms  Denies Nasal Symptoms.  Denies Throat Symptoms  Denies Active Dental Problems  Denies Jaw Problems   Cardiovascular:   Exercise tolerance: good Hypertension Past MI CAD  CABG/stent  AVR and CABGx3 in December Functional Capacity low / < 4 METS, dyspnea  Cardiovascular Symptoms:  Coronary Artery Disease:  evidence of MI on ECG. S/P Aorto-Coronary Bypass Graft Surgery (CABG): Hx of Myocardial Infarction, NSTEMI  Valvular Heart Disease: Aortic Stenosis (AS), s/p repair    Hypertension, Essential Hypertension (135/59)    Pulmonary:   Asthma Sleep Apnea  Asthma: Emergency visits this year is none. Chronic Obstructive Pulmonary Disease (COPD):  is secondary to smoking. Inhaler use is maintenance inhaler daily and inhaled steroid use currently.  Denies Obstructive Sleep Apnea (BRIJESH)   Renal/:  Denies Kidney Function/Disease    Hepatic/GI:   GERD  Denies Hepatic/GI Symptoms  Esophageal / Stomach Disorders Controlled by PRN antireflux medication.  Denies Liver Disease    Musculoskeletal:   Arthritis   Denies Musculoskeletal General/Symptoms  Joint Disease:  Arthritis, Osteoarthritis    Neurological:   CVA 2012 CVA Denies Pain  Arthritis, Osteoarthritis  Denies Seizure Disorder  CVA - Cerebrovasular Accident, Hemorrhagic Stroke , residual  deficits are no residual deficit.    Endocrine:   Diabetes, well controlled, type 2 thyroidectomy Diabetes, Type 2 Diabetes for 20 years , controlled by oral hypoglycemics.  Denies Thyroid Disease        Physical Exam  General:  Well nourished    Airway/Jaw/Neck:  Airway Findings: Mouth Opening: Normal Tongue: Normal  General Airway Assessment: Adult, Good  Mallampati: II  Improves to I with phonation.  TM Distance: Normal, at least 6 cm  Jaw/Neck Findings:     Neck ROM: Normal ROM  Neck Findings: Normal    Eyes/Ears/Nose:  EYES/EARS/NOSE FINDINGS: Normal   Dental:  Dental Findings: lower partial dentures    Chest/Lungs:  Chest/Lungs Clear    Heart/Vascular:  Heart Findings: Normal Heart murmur: negative Vascular Findings: Normal    Abdomen:  Abdomen Findings: Normal    Musculoskeletal:  Musculoskeletal Findings: Normal   Skin:  Skin Findings: Normal    Mental Status:  Mental Status Findings: Normal        Anesthesia Plan  Type of Anesthesia, risks & benefits discussed:  Anesthesia Type:  general  Patient's Preference:   Intra-op Monitoring Plan: arterial line  Intra-op Monitoring Plan Comments:   Post Op Pain Control Plan:   Post Op Pain Control Plan Comments:   Induction:   IV  Beta Blocker:         Informed Consent: Patient understands risks and agrees with Anesthesia plan.  Questions answered. Anesthesia consent signed with patient.  ASA Score: 3     Day of Surgery Review of History & Physical:    H&P update referred to the surgeon.         Ready For Surgery From Anesthesia Perspective.   Awaiting lab reports      Mariola Chapman NP  7/13/2017

## 2017-07-13 NOTE — Clinical Note
I'm attempting to confirm her medications prior to surgery and determine whether or not she is adequately blocked.  If not, we will postpone her surgery.  I'll keep you updated.

## 2017-07-13 NOTE — DISCHARGE INSTRUCTIONS
Your surgery has been scheduled for:__________________________________________    You should report to:  ____[facility], located on the [place] side of the first floor of the           [facility] ([phone])  ____[facility], located on the [facility] side of the            Second floor of the [facility] ([phone])  ____3rd Floor [facility] located on the [facility] side of the [facility] [phone]  Please Note   - Tell your doctor if you take Aspirin, products containing Aspirin, herbal medications  or blood thinners, such as Coumadin, Ticlid, or Plavix.  (Consult your provider regarding holding or stopping before surgery).  - Arrange for someone to drive you home following surgery.  You will not be allowed to leave the surgical facility alone or drive yourself home following sedation and anesthesia.  Before Surgery  - Stop taking all herbal medications 14days prior to surgery  - No Motrin/Advil (Ibuprofen) 7 days before surgery  - No Aleve (Naproxen) 7 days before surgery  - Stop Taking Asprin, products containing Asprin _____days before surgery  - Stop taking blood thinners_______days before surgery  - Refrain from drinking alcoholic beverages for 24hours before and after surgery  - Stop or limit smoking _________days before surgery  Night before Surgery  - DO NOT EAT OR DRINK ANYTHING AFTER MIDNIGHT, INCLUDING GUM, HARD CANDY, MINTS, OR CHEWING TOBACCO.  - Take a shower or bath (shower is recommended).  Bathe with Hibiclens soap or an antibacterial soap from the neck down.  If not supplied by your surgeon, hibiclens soap will need to be purchased over the counter in pharmacy.  Rinse soap off thoroughly.  - Shampoo your hair with your regular shampoo  The Day of Surgery  - Take another bath or shower with hibiclens or any antibacterial soap, to reduce the chance of infection.  - Take heart and blood  pressure medications with a small sip of water, as advised by the perioperative team.  - Do not take fluid pills  - You may brush your teeth and rinse your mouth, but do not swall any additional water.   - Do not apply perfumes, powder, body lotions or deodorant on the day of surgery.  - Nail polish should be removed.  - Do not wear makeup or moisturizer  - Wear comfortable clothes, such as a button front shirt and loose fitting pants.  - Leave all jewelry, including body piercings, and valuables at home.    - Bring any devices you will neeed after surgery such as crutches or canes.  - If you have sleep apnea, please bring your CPAP machine  In the event that your physical condition changes including the onset of a cold or respiratory illness, or if you have to delay or cancel your surgery, please notify your surgeon.  {Desc; anesthesia types:62044:::1}  Anesthesia: General Anesthesia  Youre due to have surgery. During surgery, youll be given medication called anesthesia. (It is also called anesthetic.) This will keep you comfortable and pain-free. Your anesthesia provider will use general anesthesia. This sheet tells you more about it.  What is general anesthesia?    You are watched continuously during your procedure by the anesthesia provider  General anesthesia puts you into a state like deep sleep. It goes into the bloodstream (IV anesthetics), into the lungs (gas anesthetics), or both. You feel nothing during the procedure. You will not remember it. During the procedure, the anesthesia provider monitors you continuously. He or she checks your heart rate and rhythm, blood pressure, breathing, and blood oxygen.  IV Anesthetics. IV anesthetics are given through an IV line in your arm. Theyre often given first. This is so you are asleep before a gas anesthetic is started. Some kinds of IV anesthetics relieve pain. Others relax you. Your doctor will decide which kind is best in your case.  Gas Anesthetics. Gas  anesthetics are breathed into the lungs. They are often used to keep you asleep. They can be given through a facemask or a tube placed in your larynx or trachea (breathing tube).  If you have a facemask, your anesthesia provider will most likely place it over your nose and mouth while youre still awake. Youll breathe oxygen through the mask as your IV anesthetic is started. Gas anesthetic may be added through the mask.  If you have a tube in the larynx or trachea, it will be inserted into your throat after youre asleep.  Anesthesia tools and medications  You will likely have:  IV anesthetics. These are put into an IV line into your bloodstream.  Gas anesthetics. You breathe these anesthetics into your lungs, where they pass into your bloodstream.  Pulse oximeter. This is a small clip that is attached to the end of your finger. This measures your blood oxygen level.  Electrocardiography leads (electrodes). These are small sticky pads that are placed on your chest. They record your heart rate and rhythm.  Blood pressure cuff. This reads your blood pressure.  Risks and possible complications  General anesthesia has some risks. These include:  Breathing problems  Nausea and vomiting  Sore throat or hoarseness (usually temporary)  Allergic reaction to the anesthetic  Irregular heartbeat (rare)  Cardiac arrest (rare)  Anesthesia safety  Follow all instructions you are given for how long not to eat or drink before your procedure.  Be sure your doctor knows what medications and drugs you take. This includes over-the-counter medications, herbs, supplements, alcohol or other drugs. You will be asked when those were last taken.  Have an adult family member or friend drive you home after the procedure.  For the first 24 hours after your surgery:  Do not drive or use heavy equipment.  Have a trusted family member or spouse make important decisions or sign documents.  Avoid alcohol.  Have a responsible adult stay with you. He  "or she can watch for problems and help keep you safe.  Date Last Reviewed: 10/16/2014  © 3254-2387 [employer]. [address] [place] [address], [place] [address]. All rights reserved. This information is not intended as a substitute for professional medical care. Always follow your healthcare professional's instructions.      {Desc; anesthesia types:51566}GENERAL ENDOTRACHEAL ANESTHESIA:    Pre-oxygenation.  Induction: {Intubation:20986::"postinduction"}.  Mask ventilation: {Mask Ventilation:20947::"easy"}.    Intubation: {Intubation:20986::"postinduction"}, {Laryngoscopy:20991::"direct laryngoscopy"}, {Intubation Blade:20993::"East #2"}.    Endotracheal tube: {Endotracheal Tube:20994::"oral"}, {Endotracheal Tube Size:20995::"7.0"} mm ID, {Endotracheal Tube Cuffed:20996::"cuffed (inflated to minimal occlusive pressure)"}, {Endotracheal Tube Stylet:20997::"without stylet"}.  Laryngoscopy x {Number:35559::"1"} = {Grade:51700::"Grade I - full view of cords"}.  Complicating factors: {Complicating Factors:70225::"none"}.  Findings post-intubation: {Findings:63375::"positive ETCO2","bilateral breath sounds","atraumatic / condition of teeth unchanged"}.  Tube secured at *** cm at {Secure Location:21002::"the lips"}.  Complications: {Complications:93069::"none"}.  Eye care: {Eye Care:60789::"taped closed"}.    "

## 2017-07-13 NOTE — PROGRESS NOTES
The patient presents for follow-up prior to scheduled adrenalectomy.    She is hypertensive and tachycardic in the clinic today (see below).  Vitals:    07/13/17 1014   BP: (!) 159/72   Pulse: 101   Temp: 97.5 °F (36.4 °C)       The patient has been on medication and she is unclear how her BP has been running as her home machine is broken.  In addition, in reviewing her medications is appears that she has stopped some of her BP medications but she is unclear which ones.  In addition, she has run out of her Metoprolol.     Case was again discussed with the patient and consent was obtained.  Will follow-up which medications the patient is taking when she gets home (she did not bring them with her).  She and  understand that surgery will be postponed if she is not adequately blocked.    See the original consult note below:  Consult Note  Endocrine Surgery    Visit Diagnosis:  Adrenal mass, left [E27.9]     SUBJECTIVE:     Patient is a 66 y.o. female who was referred by Dr. Joaquin Coates and is here with spouse  and presents with a left adrenal nodule/mass.  This was first noted in 2016. Per patient, there is not a known history of prior abdominal imaging. Patient has prior history or knowledge of this nodule. Patient denies episodes which include headaches, chest pain, vision changes and palpitations associated with an increase in blood pressure.  She is not on Lasix and admits any history of potassium problems (on potassium supplements) or worsening hypertension. Tereza Larose denies history of long-term steroid use. She further denies weight fluctuation, easy bruising, bleeding, skin thinning and increase in hair. She does not have a personal history of malignancy.  There is not a personal history of pancreatic abnormalities.   The patient had thyroidectomy for hyperthyroidism.  There is not a family history of endocrinopathies.      The patient is on ASA 81mg.    No prior abdominal surgery.    Review of  patient's allergies indicates:   Allergen Reactions    Corn      Other reaction(s): Sneezing  Other reaction(s): Rhinorrhea    Hydrochlorothiazide Other (See Comments)     weakness    Potato starch      Other reaction(s): Sneezing  Other reaction(s): Rhinorrhea    Pravastatin Other (See Comments)     Muscle pain    Statins-hmg-coa reductase inhibitors      Muscle pain, ptiavastin     Welchol [colesevelam] Other (See Comments)     Weakness        Current Outpatient Prescriptions   Medication Sig Dispense Refill    albuterol (VENTOLIN HFA) 90 mcg/actuation inhaler Inhale 2 puffs into the lungs every 4 (four) hours as needed. 18 g 12    albuterol-ipratropium 2.5mg-0.5mg/3mL (DUO-NEB) 0.5 mg-3 mg(2.5 mg base)/3 mL nebulizer solution U 1 VIAL IN NEBULIZER Q 6 H PRF  vial 3    amlodipine (NORVASC) 10 MG tablet Take 1 tablet (10 mg total) by mouth once daily. 90 tablet 3    aspirin (ECOTRIN) 81 MG EC tablet Take 81 mg by mouth once daily.      blood sugar diagnostic Strp True track check blood glucose once daily 100 each 3    cetirizine (ZYRTEC) 10 MG tablet Take 10 mg by mouth once daily.      cholecalciferol, vitamin D3, (VITAMIN D3) 1,000 unit capsule Take 1,000 Units by mouth once daily.      coQ10, ubiquinol, 100 mg Cap Take 1 capsule by mouth 2 (two) times daily.       cyanocobalamin (VITAMIN B-12) 250 MCG tablet Take 250 mcg by mouth once daily.      ezetimibe (ZETIA) 10 mg tablet Take 1 tablet (10 mg total) by mouth once daily. 90 tablet 3    fluoxetine (PROZAC) 20 MG capsule Take 1 capsule (20 mg total) by mouth once daily. 30 capsule 11    GUAIFENESIN/PSEUDOEPHEDRNE HCL (MUCINEX D ORAL) Take 1 tablet by mouth 2 (two) times daily as needed.       L. ACIDOPHILUS/BIFIDO LONGUM (PROBIOTIC PEARLS ORAL) Take 1 each by mouth 2 (two) times daily.       lancets Misc True track Check glucose once daily 100 each 3    levothyroxine (SYNTHROID) 112 MCG tablet Take 1 tablet (112 mcg total) by mouth  every morning. 90 tablet 3    lisinopril (PRINIVIL,ZESTRIL) 40 MG tablet Take 1 tablet (40 mg total) by mouth once daily. 90 tablet 2    loratadine (CLARITIN) 10 mg tablet Take 10 mg by mouth 2 (two) times daily as needed for Allergies.       magnesium 250 mg Tab Take 1 tablet by mouth once daily.       metformin (GLUCOPHAGE) 500 MG tablet Take 2 tablets (1,000 mg total) by mouth 2 (two) times daily with meals. (Patient taking differently: Take 500 mg by mouth 2 (two) times daily with meals. ) 360 tablet 0    metoprolol tartrate (LOPRESSOR) 100 MG tablet Take 1 tablet (100 mg total) by mouth 3 (three) times daily. 270 tablet 3    nifedipine (ADALAT CC) 60 MG TbSR TK 1 T PO qhs  2    potassium chloride (MICRO-K) 8 mEq CpSR Take 1 capsule (8 mEq total) by mouth once daily. 90 capsule 1    theophylline 400 mg Tb24 TK 1 T PO BID  1    tramadol (ULTRAM) 50 mg tablet Take 1 tablet (50 mg total) by mouth every 6 (six) hours as needed. 120 tablet 0    TRUEPLUS LANCETS 33 gauge Misc USE TO TEST BLOOD SUGAR ONCE D  3    vitamin A 8000 UNIT capsule Take 8,000 Units by mouth once daily.      phenoxybenzamine (DIBENZYLINE) 10 mg capsule Take 1 capsule (10 mg total) by mouth 2 (two) times daily. 180 capsule 3    prazosin (MINIPRESS) 1 MG Cap Take 1 capsule (1 mg total) by mouth 2 (two) times daily. 180 capsule 3     No current facility-administered medications for this visit.        Past Medical History:   Diagnosis Date    Allergy     Arthritis     Asthma     Brain bleed     COPD (chronic obstructive pulmonary disease)     Depression     Diabetes mellitus type II     Diverticulitis     Fatty liver     GERD (gastroesophageal reflux disease)     Heart murmur     Hyperlipidemia     Hypertension     Metabolic syndrome 6/14/2012    Myocardial infarction     Stroke     Thyroid disease      Past Surgical History:   Procedure Laterality Date    AORTIC VALVE REPLACEMENT  12/09/2016    arthroscopy lt knee    "   BLADDER REPAIR      COLONOSCOPY  2004    10 year recheck    CORONARY ARTERY BYPASS GRAFT  12/09/2016    X3    HYSTERECTOMY      THYROIDECTOMY       Social History   Substance Use Topics    Smoking status: Never Smoker    Smokeless tobacco: Never Used    Alcohol use Yes      Comment: seldom          Review of Systems:    Review of Systems  Constitutional: negative for chills, fatigue, fevers, malaise and night sweats  Eyes: negative for icterus and irritation  Respiratory: negative for cough and dyspnea on exertion  Cardiovascular: negative for chest pain and palpitations  Gastrointestinal: negative for constipation, diarrhea and dysphagia  Genitourinary:negative for dysuria, hematuria and nocturia  Integument/breast: negative for rash and skin color change  Hematologic/lymphatic: negative for bleeding and easy bruising  Neurological: negative for gait problems, headaches and seizures  Behavioral/Psych: negative for anxiety and depression  ENT ROS: negative  Endocrine ROS:   negative for - hair pattern changes, malaise/lethargy, mood swings, palpitations or polydipsia/polyuria    OBJECTIVE:     Vital Signs:  BP (!) 110/59  Pulse 66  Temp 98.7 °F (37.1 °C)  Ht 5' 2" (1.575 m)  Wt 71.7 kg (158 lb 2.9 oz)  BMI 28.93 kg/m2   Body mass index is 28.93 kg/(m^2).      Physical Exam:    General:  no distress, see vitals for BMI    Eyes:  conjunctivae/corneas clear   Neck: trachea midline and symmetric, no adenopathy    Thyroid:  thyroid not enlarged   Lung: clear to auscultation bilaterally   Heart:  regular rate and rhythm   Abdomen: soft, non-tender; bowel sounds normal; no masses,  no organomegaly   Skin/Extremities: warm and well-perfused   Pulses: 2+ and symmetric   Neuro: normal without focal findings and mental status, speech normal, alert and oriented x3          Adrenal Labs:        Component Value Date   ACTH 22 03/28/2017   Aldosterone 39.7 03/28/2017   Renin Activity 2.5 03/28/2017   Cortisol -8 AM " 7.4 03/28/2017   DHEA-SO4 89.1 (H) 03/28/2017   Cortisol, 24H Ur 5.4 04/04/2017   Sodium 142 03/28/2017   Potassium 4.2 03/28/2017   Chloride 101 03/28/2017   CO2 31 (H) 03/28/2017   Glucose 112 (H) 03/28/2017   BUN, Bld 20 03/28/2017   Creatinine 1.1 03/28/2017   Creatinine 0.6 10/03/2012   Calcium 10.2 03/28/2017   Calcium 8.9 10/03/2012   Anion Gap 10 03/28/2017   Anion Gap 10 10/03/2012   eGFR if African American >60.0 03/28/2017   eGFR if non  52.4 (A) 03/28/2017   Hours Collected 24 05/12/2016   Urine Total Volume 2125 05/12/2016   Creatinine, urine - per volume 39 05/12/2016   Creatinine, urine - per 24 hours 829 05/12/2016   Epinephrine, 24H Ur 4 05/12/2016   Epinephrine - ratio to CRT 5 05/12/2016   Epinephrine,  Ur 2 05/12/2016   Norepinephrine, 24H Ur 136 (H) 05/12/2016   Norepinephrine, urine - ratio to  (H) 05/12/2016   Norepinephrine,  Ur 64 05/12/2016   Dopamine , 24H Ur 287 05/12/2016   Dopamine, urine - ratio to  (H) 05/12/2016   Dopamine,  Ur 135 05/12/2016     CT abdomen/pelvis (10/26/2016):    A 2.6 cm heterogeneously enhancing left adrenal mass is present.  The lesion exhibits nonenhanced density of 39 Hounsfield units.  Absolute washout of 65% and relative washout is 40%.  The lesion has significantly increased in size since 5/27/11, when a subtle 5 mm enhancing nodule was present.    The right adrenal gland is normal.  The liver, pancreas, gallbladder, and spleen are unremarkable.  There is multifocal cortical scarring of the upper pole of the left kidney.  Visualized bowel is unremarkable.  There is moderate calcification of the aorta without aneurysm.  Advanced degenerative change of the spine is noted.    ASSESSMENT/PLAN:      Tereza Larose is an 66 y.o. female with a left adrenal nodule.  I was able to review the imaging with the patient.  We had a lengthy discussion about the natural history of pheochromocytoma. After reviewing the imaging, findings are  consistent with a functioning pheochromocytoma in addition to hypercortisolism.  I discussed the implications of this with the patient. I have recommended that the patient undergo a  laparascopic adrenalectomy. The risks, benefits, and treatment alternatives were discussed with the patient. These include and are not limited to bleeding, infection, and injury to the surrounding organs including the spleen, the tail of the pancreas, the kidney, liver and duodenum.  Other risks of general anesthetic include MI, CVA, sudden death or even reaction to anesthetic medications in addition to the risk of pneumonia, atelectasis, or pneumothorax associated with diaphragmatic injury, and ileus. The patient understands the risks, benefits and treatment alternatives.  Any and all questions were answered to the patient's satisfaction. Written consent was not obtained. Prior to surgery we will need to initiate alpha-blockade. The patient's baseline SBP goal is less than 120.  We will add beta-blockade as needed following alpha-blockade.  The patient recently was not able to obtain Phenoxybenzamine related to the cost.  I will discuss the options with Dr. Coates as he will be taking the lead on this.    We will also obtain cardiac clearance prior to surgery.  Lastly, we will obtain an updated CT scan of the abdomen prior to surgery as the patient has already experienced significant growth.     The patient and her spouse's questions were answered. She was directed to an endocrine patient education website ([email or web address]) for further information.

## 2017-07-13 NOTE — PATIENT INSTRUCTIONS
SAGES  [employer]  [email or web address]      Laparoscopic Adrenal Gland Removal (Adrenalectomy) Patient Information from SAGES    What are the Adrenal Glands?  The adrenal glands are two small organs, one located above each kidney. They are triangular in shape and about the size of a thumb. The adrenal glands are known as endocrine glands because they produce hormones. These hormones are involved in control of blood pressure, chemical levels in the blood, water use in the body, glucose usage, and the fight or flight reaction during times of stress. These adrenal-produced hormones include cortisol, aldosterone, the adrenaline hormones - epinephrine and norepinephrine - and a small fraction of the bodys sex hormones (estrogen and androgens).    What Causes Adrenal Gland Problems?  Diseases of the adrenal gland are relatively rare. The most common reason that a patient may need to have the adrenal gland removed is excess hormone production by a tumor located within the adrenal. Most of these tumors are small and not cancers. They are known as benign growths that can usually be removed with laparoscopic techniques. Removal of the adrenal gland may also be required for certain tumors even if they arent producing excess hormones, such as very large tumors or if there is a suspicion that the tumor could be a cancer, or sometimes referred to as malignant. Fortunately, malignant adrenal tumors are rare. An adrenal mass or tumor is sometimes found by chance when a patient gets an X-ray study to evaluate another problem.    What are the Symptoms of Adrenal Gland Problems?  Patients with adrenal gland problems may have a variety of symptoms related to excess hormone production by the abnormal gland. Adrenal tumors associated with excess hormone production include pheochromocytomas, aldosterone-producing tumors, and cortisol-producing tumors. Some of these tumors and their  typical features are given below.    · Pheochromocytomas produce excess hormones that can cause very high blood pressure and periodic spells characterized by severe headaches, excessive sweating, anxiety, palpitations, and rapid heart rate that may last from a few seconds to several minutes.    · Aldosterone producing tumors cause high blood pressure and low serum (blood) potassium levels. In some patients this may result in symptoms of weakness, fatigue, and frequent urination.    · Cortisol producing tumors cause a syndrome termed Cushings syndrome that can be characterized by obesity (especially of the face and trunk), high blood sugar, high blood pressure, menstrual irregularities, fragile skin, and prominent stretch marks. Most cases of Cushings syndrome, however, are caused by small pituitary tumors and are not treated by adrenal gland removal. Overall, adrenal tumors account for about 20% of cases of Cushings syndrome.     · An incidentally found mass in the adrenal may be any of the above types of tumors, or may produce no hormones at all. Most incidentally found adrenal masses do not make excess hormones, cause no symptoms, are benign, and do not need to be removed.     · Surgical removal of incidentally discovered adrenal tumors is indicated only if:  · The tumor is found to make excess hormones  · Is large in size (more than 4-5 centimeters or 2 inches in diameter) If there is a suspicion that the tumor could be malignant.    · Adrenal gland cancers (adrenal cortical cancer) are rare tumors that are usually very large at the time of diagnosis. Removal of these tumors is usually done by open adrenal surgery.    If an adrenal tumor is suspected based on symptoms or has been identified by X-ray, the patient should undergo blood and urine tests to determine if the tumor is over-producing hormones.    Special X-ray tests, such as a CT scan, nuclear medicine scan, an MRI or selective venous sampling are  commonly used to locate the suspected adrenal tumor.    Surgical removal of the adrenal gland is the preferred treatment for patients with adrenal tumors that secrete excess hormones and for primary adrenal tumors that appear malignant.    What are the Advantages of Laparoscopic Adrenal Gland  Removal?  In the past, making a large 6 to 12 inch incision in the abdomen, flank, or back was necessary for removal of an adrenal gland tumor. Today, with the technique known as minimally invasive surgery, removal of the adrenal gland (also known as laparoscopic adrenalectomy) can be performed through three or four 1/4-1/2 inch incisions. Patients may leave the hospital in one or two days and return to work more quickly than patients recovering from open surgery. Results of surgery may vary depending on the type of procedure and the patients overall condition.    Common advantages are:    · Less postoperative pain  · [place] hospital stay  · Quicker return to normal activity  · Improved cosmetic result  · Reduced risk of herniation or wound separation    Are You a Candidate for Laparoscopic Adrenal Gland Removal?  Although laparoscopic Adrenal gland removal has many benefits, it may not be appropriate for some patients. Obtain a thorough medical evaluation by a surgeon qualified in laparoscopic adrenal gland removal in consultation with your primary care physician or endocrinologist to find out if the technique is appropriate for you.    What Preparation is Required?  · Prior to the operation, some patients may need medications to control the symptoms of the tumor, such as high blood pressure.  · Patients with a pheochromocytoma (see previous page) will need to be started on special medications several days prior to surgery to control their blood pressure and heart rate.  · Patients with an aldosterone-producing tumor (see previous page) may need to have their serum potassium checked and take extra potassium if the level is  low.  · Patients with Cushings syndrome (see previous page) will need to receive extra doses of cortisone medication on the day of surgery and for a few months afterwards until the remaining adrenal gland has resumed normal function.  · Preoperative preparation includes blood work, medical evaluation, chest x-ray and an  · EKG depending on your age and medical condition.  · After your surgeon reviews with you the potential risks and benefits of the operation, you will need to provide written consent for surgery.  · Blood transfusion and/or blood products may be needed depending on your condition. It is recommended that you shower the night before or morning of the operation.  · After midnight the night before the operation, you should not eat or drink anything except medications that your surgeon has told you are permissible to take with a sip of water the morning of surgery.  · Drugs such as blood thinner, anti-inflammatory medications and large doses of Vitamin  · E may need to be stopped temporarily for a short time period before surgery. Diet medication or Lucretias Wort should not be used for the two weeks prior to surgery.  · Quit smoking and arrange for any help you may need at home.    How is Laparoscopic Adrenal Gland Removal Performed?    · The surgery is performed under a complete general anesthesia, so that the patient is asleep during the procedure.  · A cannula (a narrow tube-like instrument) is placed into the abdominal cavity in the upper abdomen or flank just below the ribs.  · A laparoscope (a tiny telescope) connected to a special camera is inserted through the cannula. This gives the surgeon a magnified view of the patients internal organs on a television screen.  · Other cannulas are inserted which allow your surgeon to delicately separate the adrenal gland from its attachments. Once the adrenal gland has been dissected free, it is placed in a small bag and is then removed through one of the  "incisions. It is almost always necessary to remove the entire adrenal gland in order to safely remove the tumor.  · After the surgeon removes the adrenal gland, the small incisions are closed.    What Happens if the Procedure Cannot Be Performed  Laparoscopically?    In a small number of patients the laparoscopic method cannot be performed. In that situation, the operation is converted to an open procedure. Factors that may increase the possibility of choosing or converting to the "open" procedure may include:    · Obesity  · A history of prior abdominal surgery causing dense scar tissue  · Inability to visualize the adrenal gland clearly  · Bleeding problems during the operation  · Certain tumor characteristics such as large size or invasion into adjacent structures.   · The decision to perform the open procedure is a judgment decision made by your surgeon either before or during the actual operation. When the surgeon feels that it is safest to convert the laparoscopic procedure to an open one, this is not a complication, but rather sound surgical judgment. The decision to convert to an open procedure is strictly based on patient safety.      What Should I Expect after Surgery?  After the operation, it is important to follow your doctor's instructions. Although many people feel better in just a few days, remember that your body needs time to heal.    · After laparoscopic adrenal gland removal, most patients can be cared for on a regular surgical nursing unit. Occasionally, a patient with a pheochromocytoma may require admission to an intensive care unit after surgery to monitor their blood pressure. Most patients can be discharged from the hospital within one or two days after surgery. Patients with an aldosterone-producing tumor will need to have their serum potassium level checked after surgery and may need to continue to take medications to control their blood pressure.  · Patients with cortisol-producing tumors " and Cushings syndrome will need to take prednisone or cortisol pills after surgery. The dose is then tapered over time as the remaining normal adrenal gland resumes adequate production of cortisol hormone. Patients are encouraged to engage in light activity while at home after surgery. Patients can remove any dressings and shower the day after the operation.  · Post-operative pain is generally mild and patients may require a pain pill or pain medication.  · Most patients can resume normal activities within one week, including driving, walking up stairs, light lifting, and work.  · You should call and schedule a follow-up appointment within 2 to 3 weeks after your operation.      What Complications Can Occur?  As with any operation, there is a risk of a complication. Complications during the operation may include:    · Adverse reaction to general anesthesia  · High blood pressure  · Bleeding  · Injury to other organs  · Wound problems, blood clots, heart attacks, and other serious complications are uncommon after laparoscopic adrenalectomy      When to Call Your Doctor:  Be sure to call your physician or surgeon if you develop any of the following:    · Persistent fever over 101 degrees F (39 C) Bleeding  · Increasing abdominal swelling  · Pain that is not relieved by your medications  · Persistent nausea or vomiting  · Chills  · Persistent cough or shortness of breath  · Purulent drainage (pus) from any incision  · Redness surrounding any of your incisions that is worsening or getting bigger  · You are unable to eat or drink liquids            This brochure is not intended to take the place of your discussion with your surgeon about the need for adrenal gland surgery. If you have questions about your need for adrenal gland surgery, your alternatives billing or insurance coverage, or your surgeons training and experience, do not hesitate to ask your surgeon or his/her office staff about it. If you have questions  about the operation or subsequent follow-up, please discuss them with your surgeon before or after the operation.

## 2017-07-14 ENCOUNTER — TELEPHONE (OUTPATIENT)
Dept: SURGERY | Facility: CLINIC | Age: 67
End: 2017-07-14

## 2017-07-14 DIAGNOSIS — J44.9 COPD (CHRONIC OBSTRUCTIVE PULMONARY DISEASE): ICD-10-CM

## 2017-07-14 RX ORDER — ALBUTEROL SULFATE 90 UG/1
AEROSOL, METERED RESPIRATORY (INHALATION)
Qty: 18 G | Refills: 0 | Status: SHIPPED | OUTPATIENT
Start: 2017-07-14 | End: 2017-09-11 | Stop reason: SDUPTHER

## 2017-07-14 NOTE — TELEPHONE ENCOUNTER
----- Message from Cheryl York sent at 7/14/2017 10:05 AM CDT -----  Contact: self  Pt states she needs to speak with nurse in ref to surgery.Please call Tereza calderon @ [phone].Thak you.

## 2017-07-14 NOTE — TELEPHONE ENCOUNTER
----- Message from Georgie Tyson MD sent at 7/14/2017 12:32 PM CDT -----  Can we confirm her meds?    aob

## 2017-07-14 NOTE — TELEPHONE ENCOUNTER
Finally got in touch w/ the pt who informed that she just spoke to Dr. Parra regarding meds and got everything straight.    Will f/u w/ Dr. SMITH

## 2017-07-14 NOTE — TELEPHONE ENCOUNTER
Called pt x5 but no answer.  Phone keeps giving a message that says the mailbox is full and cannot take anymore messages.    Will have to keep trying.

## 2017-07-18 ENCOUNTER — TELEPHONE (OUTPATIENT)
Dept: SURGERY | Facility: CLINIC | Age: 67
End: 2017-07-18

## 2017-07-18 NOTE — TELEPHONE ENCOUNTER
Discussed case w/ Dr. Parra.  She confirmed that she has not spoken to the pt at all.    Dr. Tyson advised that pt will have to be rescheduled to another day.    Called pt at both numbers.  Pt home number does not accept messages.  LVM on the mobile # for pt to call me right back.

## 2017-07-18 NOTE — TELEPHONE ENCOUNTER
Spoke to pt and informed that she never did speak to Dr. ORONA regarding meds and bp. Pt and  reports that it was Dr. ORONA that they spoke to. Advised that they may have spoken to Anesthesia instead of Dr. ORONA.     Pt reports bp on ............    7/16/17 Sunday AM:  90/56, 107  Sunday PM: 106/65, 108    7/17/17 Monday AM: 98/53, 92  Monday PM: 108/57, 94    7/18/17 Tuesday AM: Rt arm 73/54, 106, Lt arm 78/54, 114  Tuesday afternoon : 120/72, 100    Provided figures to Dr. Parra who confirmed pt will have to push back her sx to Tuesday 7/25/17. Called pt and informed. After explaining why pt has to push back sx, pt would still like to discuss w/ Dr. Parra. Dr. ORONA notified.

## 2017-07-18 NOTE — TELEPHONE ENCOUNTER
Called to discuss BP's with the patient.  Need to document consistent appropriate BP's.    Over the last 3 days BP's have been appropriate.  Encouraged PO fluid intake.    Will monitor and plan for OR next Tues (7/25).

## 2017-07-19 NOTE — PLAN OF CARE
07/19/17 1508   ED Admissions Case Approval   ED Admissions Case Approval (!) CM Approved  (IP )

## 2017-07-21 NOTE — TELEPHONE ENCOUNTER
Called home # but no answer and unable to lvm.     Called mobile # and lvm advising that I am calling to obtain the bp #'s from the last 3 days.  Requested call back.

## 2017-07-21 NOTE — TELEPHONE ENCOUNTER
Spoke to pt who reports her bp's.    Weds:   AM: 104/57, 53  PM: 121/56, 67    Thurs:   2 AM: 82/53, 76   1051 PM : 134/65, 67    Friday:   5:11 127/68, 64

## 2017-07-24 ENCOUNTER — TELEPHONE (OUTPATIENT)
Dept: SURGERY | Facility: CLINIC | Age: 67
End: 2017-07-24

## 2017-07-24 NOTE — PRE ADMISSION SCREENING
Spoke with patient via phone this am to give preop instructions.  Patient had previous medication instructions given by anesthesia at [facility].  Patient was instructed to take all bp meds except lisinopril.  Reviewed these instructions with , anesthesia at [facility], [place].  He agreed patient can hold the lisiopril.

## 2017-07-25 ENCOUNTER — ANESTHESIA (OUTPATIENT)
Dept: SURGERY | Facility: OTHER | Age: 67
DRG: 615 | End: 2017-07-25
Payer: MEDICARE

## 2017-07-25 ENCOUNTER — HOSPITAL ENCOUNTER (INPATIENT)
Facility: OTHER | Age: 67
LOS: 1 days | Discharge: HOME | DRG: 615 | End: 2017-07-26
Attending: SURGERY | Admitting: SURGERY
Payer: MEDICARE

## 2017-07-25 DIAGNOSIS — E27.8 ADRENAL MASS: ICD-10-CM

## 2017-07-25 DIAGNOSIS — E27.8 ADRENAL MASS, LEFT: Primary | ICD-10-CM

## 2017-07-25 LAB
ABO + RH BLD: NORMAL
BLD GP AB SCN CELLS X3 SERPL QL: NORMAL
HCT VFR BLD AUTO: 35.2 %
HGB BLD-MCNC: 11.6 G/DL
POCT GLUCOSE: 139 MG/DL (ref 70–110)
POCT GLUCOSE: 171 MG/DL (ref 70–110)
POCT GLUCOSE: 176 MG/DL (ref 70–110)

## 2017-07-25 PROCEDURE — 36415 COLL VENOUS BLD VENIPUNCTURE: CPT

## 2017-07-25 PROCEDURE — 63600175 PHARM REV CODE 636 W HCPCS: Performed by: STUDENT IN AN ORGANIZED HEALTH CARE EDUCATION/TRAINING PROGRAM

## 2017-07-25 PROCEDURE — C1729 CATH, DRAINAGE: HCPCS | Performed by: SURGERY

## 2017-07-25 PROCEDURE — 63600175 PHARM REV CODE 636 W HCPCS: Performed by: NURSE ANESTHETIST, CERTIFIED REGISTERED

## 2017-07-25 PROCEDURE — 99900035 HC TECH TIME PER 15 MIN (STAT)

## 2017-07-25 PROCEDURE — 25000003 PHARM REV CODE 250: Performed by: NURSE ANESTHETIST, CERTIFIED REGISTERED

## 2017-07-25 PROCEDURE — 88307 TISSUE EXAM BY PATHOLOGIST: CPT | Performed by: PATHOLOGY

## 2017-07-25 PROCEDURE — 88342 IMHCHEM/IMCYTCHM 1ST ANTB: CPT | Mod: 26,,, | Performed by: PATHOLOGY

## 2017-07-25 PROCEDURE — 85014 HEMATOCRIT: CPT

## 2017-07-25 PROCEDURE — 27201423 OPTIME MED/SURG SUP & DEVICES STERILE SUPPLY: Performed by: SURGERY

## 2017-07-25 PROCEDURE — 25000242 PHARM REV CODE 250 ALT 637 W/ HCPCS: Performed by: NURSE ANESTHETIST, CERTIFIED REGISTERED

## 2017-07-25 PROCEDURE — 36000711: Performed by: SURGERY

## 2017-07-25 PROCEDURE — 25000003 PHARM REV CODE 250: Performed by: ANESTHESIOLOGY

## 2017-07-25 PROCEDURE — 27000221 HC OXYGEN, UP TO 24 HOURS

## 2017-07-25 PROCEDURE — 25000003 PHARM REV CODE 250: Performed by: STUDENT IN AN ORGANIZED HEALTH CARE EDUCATION/TRAINING PROGRAM

## 2017-07-25 PROCEDURE — 82962 GLUCOSE BLOOD TEST: CPT | Performed by: SURGERY

## 2017-07-25 PROCEDURE — 20000000 HC ICU ROOM

## 2017-07-25 PROCEDURE — 37000008 HC ANESTHESIA 1ST 15 MINUTES: Performed by: SURGERY

## 2017-07-25 PROCEDURE — 63600175 PHARM REV CODE 636 W HCPCS: Performed by: ANESTHESIOLOGY

## 2017-07-25 PROCEDURE — 86901 BLOOD TYPING SEROLOGIC RH(D): CPT

## 2017-07-25 PROCEDURE — 88307 TISSUE EXAM BY PATHOLOGIST: CPT | Mod: 26,,, | Performed by: PATHOLOGY

## 2017-07-25 PROCEDURE — 85018 HEMOGLOBIN: CPT

## 2017-07-25 PROCEDURE — A4216 STERILE WATER/SALINE, 10 ML: HCPCS | Performed by: STUDENT IN AN ORGANIZED HEALTH CARE EDUCATION/TRAINING PROGRAM

## 2017-07-25 PROCEDURE — S0020 INJECTION, BUPIVICAINE HYDRO: HCPCS | Performed by: SURGERY

## 2017-07-25 PROCEDURE — 88341 IMHCHEM/IMCYTCHM EA ADD ANTB: CPT | Mod: 26,,, | Performed by: PATHOLOGY

## 2017-07-25 PROCEDURE — 0GT24ZZ RESECTION OF LEFT ADRENAL GLAND, PERCUTANEOUS ENDOSCOPIC APPROACH: ICD-10-PCS | Performed by: SURGERY

## 2017-07-25 PROCEDURE — 36000710: Performed by: SURGERY

## 2017-07-25 PROCEDURE — 86900 BLOOD TYPING SEROLOGIC ABO: CPT

## 2017-07-25 PROCEDURE — 37000009 HC ANESTHESIA EA ADD 15 MINS: Performed by: SURGERY

## 2017-07-25 PROCEDURE — 25000003 PHARM REV CODE 250: Performed by: SURGERY

## 2017-07-25 PROCEDURE — 60650 LAPAROSCOPY ADRENALECTOMY: CPT | Mod: LT,GC,, | Performed by: SURGERY

## 2017-07-25 RX ORDER — METOPROLOL TARTRATE 1 MG/ML
5 INJECTION, SOLUTION INTRAVENOUS EVERY 6 HOURS
Status: DISCONTINUED | OUTPATIENT
Start: 2017-07-25 | End: 2017-07-26

## 2017-07-25 RX ORDER — LISINOPRIL 20 MG/1
40 TABLET ORAL DAILY
Status: DISCONTINUED | OUTPATIENT
Start: 2017-07-26 | End: 2017-07-26 | Stop reason: HOSPADM

## 2017-07-25 RX ORDER — HYDROMORPHONE HYDROCHLORIDE 2 MG/ML
0.4 INJECTION, SOLUTION INTRAMUSCULAR; INTRAVENOUS; SUBCUTANEOUS EVERY 5 MIN PRN
Status: DISCONTINUED | OUTPATIENT
Start: 2017-07-25 | End: 2017-07-26 | Stop reason: HOSPADM

## 2017-07-25 RX ORDER — INSULIN ASPART 100 [IU]/ML
1-10 INJECTION, SOLUTION INTRAVENOUS; SUBCUTANEOUS EVERY 6 HOURS PRN
Status: DISCONTINUED | OUTPATIENT
Start: 2017-07-25 | End: 2017-07-26 | Stop reason: HOSPADM

## 2017-07-25 RX ORDER — POTASSIUM CHLORIDE 7.45 MG/ML
10 INJECTION INTRAVENOUS
Status: DISCONTINUED | OUTPATIENT
Start: 2017-07-25 | End: 2017-07-26 | Stop reason: HOSPADM

## 2017-07-25 RX ORDER — LEVOTHYROXINE SODIUM 112 UG/1
112 TABLET ORAL EVERY MORNING
Status: DISCONTINUED | OUTPATIENT
Start: 2017-07-26 | End: 2017-07-26 | Stop reason: HOSPADM

## 2017-07-25 RX ORDER — VECURONIUM BROMIDE FOR INJECTION 1 MG/ML
INJECTION, POWDER, LYOPHILIZED, FOR SOLUTION INTRAVENOUS
Status: DISCONTINUED | OUTPATIENT
Start: 2017-07-25 | End: 2017-07-25

## 2017-07-25 RX ORDER — ONDANSETRON 2 MG/ML
4 INJECTION INTRAMUSCULAR; INTRAVENOUS EVERY 12 HOURS PRN
Status: DISCONTINUED | OUTPATIENT
Start: 2017-07-25 | End: 2017-07-26 | Stop reason: HOSPADM

## 2017-07-25 RX ORDER — SODIUM CHLORIDE, SODIUM LACTATE, POTASSIUM CHLORIDE, CALCIUM CHLORIDE 600; 310; 30; 20 MG/100ML; MG/100ML; MG/100ML; MG/100ML
INJECTION, SOLUTION INTRAVENOUS CONTINUOUS PRN
Status: DISCONTINUED | OUTPATIENT
Start: 2017-07-25 | End: 2017-07-25

## 2017-07-25 RX ORDER — MIDAZOLAM HYDROCHLORIDE 1 MG/ML
INJECTION INTRAMUSCULAR; INTRAVENOUS
Status: DISCONTINUED | OUTPATIENT
Start: 2017-07-25 | End: 2017-07-25

## 2017-07-25 RX ORDER — HYDROXYZINE HYDROCHLORIDE 25 MG/1
25 TABLET, FILM COATED ORAL EVERY 4 HOURS PRN
Status: DISCONTINUED | OUTPATIENT
Start: 2017-07-25 | End: 2017-07-26 | Stop reason: HOSPADM

## 2017-07-25 RX ORDER — PROPOFOL 10 MG/ML
VIAL (ML) INTRAVENOUS
Status: DISCONTINUED | OUTPATIENT
Start: 2017-07-25 | End: 2017-07-25

## 2017-07-25 RX ORDER — GLUCAGON 1 MG
1 KIT INJECTION
Status: DISCONTINUED | OUTPATIENT
Start: 2017-07-25 | End: 2017-07-26 | Stop reason: HOSPADM

## 2017-07-25 RX ORDER — BUPIVACAINE HYDROCHLORIDE 5 MG/ML
INJECTION, SOLUTION EPIDURAL; INTRACAUDAL
Status: DISCONTINUED | OUTPATIENT
Start: 2017-07-25 | End: 2017-07-25 | Stop reason: HOSPADM

## 2017-07-25 RX ORDER — ALBUTEROL SULFATE 90 UG/1
2 AEROSOL, METERED RESPIRATORY (INHALATION) EVERY 6 HOURS PRN
Status: DISCONTINUED | OUTPATIENT
Start: 2017-07-25 | End: 2017-07-26 | Stop reason: HOSPADM

## 2017-07-25 RX ORDER — MAGNESIUM SULFATE HEPTAHYDRATE 40 MG/ML
4 INJECTION, SOLUTION INTRAVENOUS
Status: DISCONTINUED | OUTPATIENT
Start: 2017-07-25 | End: 2017-07-26 | Stop reason: HOSPADM

## 2017-07-25 RX ORDER — SODIUM CHLORIDE 9 MG/ML
INJECTION, SOLUTION INTRAVENOUS CONTINUOUS
Status: DISCONTINUED | OUTPATIENT
Start: 2017-07-25 | End: 2017-07-26

## 2017-07-25 RX ORDER — DEXAMETHASONE SODIUM PHOSPHATE 4 MG/ML
INJECTION, SOLUTION INTRA-ARTICULAR; INTRALESIONAL; INTRAMUSCULAR; INTRAVENOUS; SOFT TISSUE
Status: DISCONTINUED | OUTPATIENT
Start: 2017-07-25 | End: 2017-07-25

## 2017-07-25 RX ORDER — MAGNESIUM SULFATE HEPTAHYDRATE 40 MG/ML
2 INJECTION, SOLUTION INTRAVENOUS
Status: DISCONTINUED | OUTPATIENT
Start: 2017-07-25 | End: 2017-07-26 | Stop reason: HOSPADM

## 2017-07-25 RX ORDER — FLUOXETINE HYDROCHLORIDE 20 MG/1
20 CAPSULE ORAL DAILY
Status: DISCONTINUED | OUTPATIENT
Start: 2017-07-26 | End: 2017-07-26 | Stop reason: HOSPADM

## 2017-07-25 RX ORDER — MORPHINE SULFATE 4 MG/ML
4 INJECTION, SOLUTION INTRAMUSCULAR; INTRAVENOUS EVERY 4 HOURS PRN
Status: DISCONTINUED | OUTPATIENT
Start: 2017-07-25 | End: 2017-07-26

## 2017-07-25 RX ORDER — SODIUM CHLORIDE 9 MG/ML
INJECTION, SOLUTION INTRAVENOUS CONTINUOUS
Status: DISCONTINUED | OUTPATIENT
Start: 2017-07-25 | End: 2017-07-25

## 2017-07-25 RX ORDER — ALBUTEROL SULFATE 90 UG/1
AEROSOL, METERED RESPIRATORY (INHALATION)
Status: DISCONTINUED | OUTPATIENT
Start: 2017-07-25 | End: 2017-07-25

## 2017-07-25 RX ORDER — FENTANYL CITRATE 50 UG/ML
INJECTION, SOLUTION INTRAMUSCULAR; INTRAVENOUS
Status: DISCONTINUED | OUTPATIENT
Start: 2017-07-25 | End: 2017-07-25

## 2017-07-25 RX ORDER — MEPERIDINE HYDROCHLORIDE 50 MG/ML
12.5 INJECTION INTRAMUSCULAR; INTRAVENOUS; SUBCUTANEOUS ONCE AS NEEDED
Status: ACTIVE | OUTPATIENT
Start: 2017-07-25 | End: 2017-07-25

## 2017-07-25 RX ORDER — NEOSTIGMINE METHYLSULFATE 1 MG/ML
INJECTION, SOLUTION INTRAVENOUS
Status: DISCONTINUED | OUTPATIENT
Start: 2017-07-25 | End: 2017-07-25

## 2017-07-25 RX ORDER — OXYCODONE HYDROCHLORIDE 5 MG/1
5 TABLET ORAL
Status: DISCONTINUED | OUTPATIENT
Start: 2017-07-25 | End: 2017-07-26

## 2017-07-25 RX ORDER — NIFEDIPINE 30 MG/1
60 TABLET, EXTENDED RELEASE ORAL NIGHTLY
Status: DISCONTINUED | OUTPATIENT
Start: 2017-07-25 | End: 2017-07-26 | Stop reason: HOSPADM

## 2017-07-25 RX ORDER — SODIUM CHLORIDE 0.9 % (FLUSH) 0.9 %
3 SYRINGE (ML) INJECTION EVERY 8 HOURS
Status: DISCONTINUED | OUTPATIENT
Start: 2017-07-25 | End: 2017-07-26 | Stop reason: HOSPADM

## 2017-07-25 RX ORDER — ENOXAPARIN SODIUM 100 MG/ML
40 INJECTION SUBCUTANEOUS EVERY 24 HOURS
Status: DISCONTINUED | OUTPATIENT
Start: 2017-07-25 | End: 2017-07-26 | Stop reason: HOSPADM

## 2017-07-25 RX ORDER — LIDOCAINE HCL/PF 100 MG/5ML
SYRINGE (ML) INTRAVENOUS
Status: DISCONTINUED | OUTPATIENT
Start: 2017-07-25 | End: 2017-07-25

## 2017-07-25 RX ORDER — ACETAMINOPHEN 10 MG/ML
1000 INJECTION, SOLUTION INTRAVENOUS EVERY 8 HOURS
Status: COMPLETED | OUTPATIENT
Start: 2017-07-25 | End: 2017-07-26

## 2017-07-25 RX ORDER — GLYCOPYRROLATE 0.2 MG/ML
INJECTION INTRAMUSCULAR; INTRAVENOUS
Status: DISCONTINUED | OUTPATIENT
Start: 2017-07-25 | End: 2017-07-25

## 2017-07-25 RX ORDER — NITROGLYCERIN 20 MG/100ML
INJECTION INTRAVENOUS CONTINUOUS PRN
Status: DISCONTINUED | OUTPATIENT
Start: 2017-07-25 | End: 2017-07-25

## 2017-07-25 RX ORDER — HYDRALAZINE HYDROCHLORIDE 20 MG/ML
10 INJECTION INTRAMUSCULAR; INTRAVENOUS EVERY 6 HOURS PRN
Status: DISCONTINUED | OUTPATIENT
Start: 2017-07-25 | End: 2017-07-26 | Stop reason: HOSPADM

## 2017-07-25 RX ORDER — ACETAMINOPHEN 10 MG/ML
INJECTION, SOLUTION INTRAVENOUS
Status: DISCONTINUED | OUTPATIENT
Start: 2017-07-25 | End: 2017-07-25

## 2017-07-25 RX ORDER — SODIUM CHLORIDE 0.9 % (FLUSH) 0.9 %
3 SYRINGE (ML) INJECTION
Status: DISCONTINUED | OUTPATIENT
Start: 2017-07-25 | End: 2017-07-25

## 2017-07-25 RX ORDER — MORPHINE SULFATE 2 MG/ML
2 INJECTION, SOLUTION INTRAMUSCULAR; INTRAVENOUS EVERY 4 HOURS PRN
Status: DISCONTINUED | OUTPATIENT
Start: 2017-07-25 | End: 2017-07-26

## 2017-07-25 RX ORDER — ROCURONIUM BROMIDE 10 MG/ML
INJECTION, SOLUTION INTRAVENOUS
Status: DISCONTINUED | OUTPATIENT
Start: 2017-07-25 | End: 2017-07-25

## 2017-07-25 RX ORDER — ONDANSETRON HYDROCHLORIDE 2 MG/ML
INJECTION, SOLUTION INTRAMUSCULAR; INTRAVENOUS
Status: DISCONTINUED | OUTPATIENT
Start: 2017-07-25 | End: 2017-07-25

## 2017-07-25 RX ORDER — FENTANYL CITRATE 50 UG/ML
25 INJECTION, SOLUTION INTRAMUSCULAR; INTRAVENOUS EVERY 5 MIN PRN
Status: DISCONTINUED | OUTPATIENT
Start: 2017-07-25 | End: 2017-07-26 | Stop reason: HOSPADM

## 2017-07-25 RX ADMIN — Medication 10 MG: at 02:07

## 2017-07-25 RX ADMIN — NIFEDIPINE 60 MG: 30 TABLET, FILM COATED, EXTENDED RELEASE ORAL at 09:07

## 2017-07-25 RX ADMIN — FENTANYL CITRATE 50 MCG: 50 INJECTION, SOLUTION INTRAMUSCULAR; INTRAVENOUS at 01:07

## 2017-07-25 RX ADMIN — SODIUM CHLORIDE, SODIUM LACTATE, POTASSIUM CHLORIDE, AND CALCIUM CHLORIDE: 600; 310; 30; 20 INJECTION, SOLUTION INTRAVENOUS at 08:07

## 2017-07-25 RX ADMIN — ONDANSETRON 4 MG: 2 INJECTION, SOLUTION INTRAMUSCULAR; INTRAVENOUS at 03:07

## 2017-07-25 RX ADMIN — EPHEDRINE SULFATE 5 MG: 50 INJECTION INTRAMUSCULAR; INTRAVENOUS; SUBCUTANEOUS at 11:07

## 2017-07-25 RX ADMIN — SODIUM CHLORIDE, SODIUM LACTATE, POTASSIUM CHLORIDE, AND CALCIUM CHLORIDE: 600; 310; 30; 20 INJECTION, SOLUTION INTRAVENOUS at 10:07

## 2017-07-25 RX ADMIN — FENTANYL CITRATE 25 MCG: 50 INJECTION, SOLUTION INTRAMUSCULAR; INTRAVENOUS at 11:07

## 2017-07-25 RX ADMIN — ALBUTEROL SULFATE 4 PUFF: 90 AEROSOL, METERED RESPIRATORY (INHALATION) at 03:07

## 2017-07-25 RX ADMIN — SODIUM CHLORIDE, SODIUM LACTATE, POTASSIUM CHLORIDE, AND CALCIUM CHLORIDE: 600; 310; 30; 20 INJECTION, SOLUTION INTRAVENOUS at 11:07

## 2017-07-25 RX ADMIN — ACETAMINOPHEN 1000 MG: 10 INJECTION, SOLUTION INTRAVENOUS at 09:07

## 2017-07-25 RX ADMIN — HYDRALAZINE HYDROCHLORIDE 10 MG: 20 INJECTION INTRAMUSCULAR; INTRAVENOUS at 08:07

## 2017-07-25 RX ADMIN — MIDAZOLAM HYDROCHLORIDE 2 MG: 1 INJECTION, SOLUTION INTRAMUSCULAR; INTRAVENOUS at 09:07

## 2017-07-25 RX ADMIN — SODIUM NITROPRUSSIDE 0.3 MCG/KG/MIN: 50 INJECTION, SOLUTION, CONCENTRATE INTRAVENOUS at 02:07

## 2017-07-25 RX ADMIN — CARBOXYMETHYLCELLULOSE SODIUM 2 DROP: 2.5 SOLUTION/ DROPS OPHTHALMIC at 10:07

## 2017-07-25 RX ADMIN — ROCURONIUM BROMIDE 50 MG: 10 INJECTION, SOLUTION INTRAVENOUS at 10:07

## 2017-07-25 RX ADMIN — DEXAMETHASONE SODIUM PHOSPHATE 8 MG: 4 INJECTION, SOLUTION INTRAMUSCULAR; INTRAVENOUS at 11:07

## 2017-07-25 RX ADMIN — METOPROLOL TARTRATE 5 MG: 5 INJECTION INTRAVENOUS at 05:07

## 2017-07-25 RX ADMIN — SODIUM CHLORIDE, SODIUM LACTATE, POTASSIUM CHLORIDE, AND CALCIUM CHLORIDE: 600; 310; 30; 20 INJECTION, SOLUTION INTRAVENOUS at 02:07

## 2017-07-25 RX ADMIN — FENTANYL CITRATE 25 MCG: 50 INJECTION, SOLUTION INTRAMUSCULAR; INTRAVENOUS at 12:07

## 2017-07-25 RX ADMIN — PROPOFOL 170 MG: 10 INJECTION, EMULSION INTRAVENOUS at 10:07

## 2017-07-25 RX ADMIN — ACETAMINOPHEN 1000 MG: 10 INJECTION, SOLUTION INTRAVENOUS at 11:07

## 2017-07-25 RX ADMIN — SODIUM CHLORIDE, PRESERVATIVE FREE 3 ML: 5 INJECTION INTRAVENOUS at 10:07

## 2017-07-25 RX ADMIN — SODIUM CHLORIDE: 0.9 INJECTION, SOLUTION INTRAVENOUS at 05:07

## 2017-07-25 RX ADMIN — VECURONIUM BROMIDE FOR INJECTION 2 MG: 1 INJECTION, POWDER, LYOPHILIZED, FOR SOLUTION INTRAVENOUS at 01:07

## 2017-07-25 RX ADMIN — ENOXAPARIN SODIUM 40 MG: 100 INJECTION SUBCUTANEOUS at 09:07

## 2017-07-25 RX ADMIN — Medication 10 MG: at 11:07

## 2017-07-25 RX ADMIN — NEOSTIGMINE METHYLSULFATE 4 MG: 1 INJECTION INTRAVENOUS at 03:07

## 2017-07-25 RX ADMIN — HYDROXYZINE HYDROCHLORIDE 25 MG: 25 TABLET, FILM COATED ORAL at 10:07

## 2017-07-25 RX ADMIN — NITROGLYCERIN 5 MCG/MIN: 20 INJECTION INTRAVENOUS at 02:07

## 2017-07-25 RX ADMIN — OXYCODONE HYDROCHLORIDE 5 MG: 5 TABLET ORAL at 05:07

## 2017-07-25 RX ADMIN — MORPHINE SULFATE 2 MG: 2 INJECTION, SOLUTION INTRAMUSCULAR; INTRAVENOUS at 06:07

## 2017-07-25 RX ADMIN — GLYCOPYRROLATE 0.2 MG: 0.2 INJECTION, SOLUTION INTRAMUSCULAR; INTRAVENOUS at 11:07

## 2017-07-25 RX ADMIN — FENTANYL CITRATE 100 MCG: 50 INJECTION, SOLUTION INTRAMUSCULAR; INTRAVENOUS at 10:07

## 2017-07-25 RX ADMIN — GLYCOPYRROLATE 0.8 MG: 0.2 INJECTION, SOLUTION INTRAMUSCULAR; INTRAVENOUS at 03:07

## 2017-07-25 RX ADMIN — VECURONIUM BROMIDE FOR INJECTION 2 MG: 1 INJECTION, POWDER, LYOPHILIZED, FOR SOLUTION INTRAVENOUS at 11:07

## 2017-07-25 RX ADMIN — LIDOCAINE HYDROCHLORIDE 100 MG: 20 INJECTION, SOLUTION INTRAVENOUS at 10:07

## 2017-07-25 NOTE — OP NOTE
[facility]  Surgery Department  Operative Note    SUMMARY     Date of Procedure: 7/25/2017     Procedure: Procedure(s) (LRB):  ADRENALECTOMY-LAPAROSCOPIC 27113 (Left)     Surgeon(s) and Role:     * Georgie Tyson MD - Primary    Assisting Surgeon: SUE Roldan MD - Resident    Pre-Operative Diagnosis: Left Adrenal mass [E27.9] - Pheochromocytoma    Post-Operative Diagnosis: Post-Op Diagnosis Codes:     * Left Adrenal mass [E27.9] - Pheochromocytoma    Anesthesia: General    Procedures In Detail:  Patient was brought to the operating room and timeout was performed. After placement of the endotracheal tube and OG tube, the patient was placed in a complete right lateral decubitus. The anterior and posterior axillary lines were marked before the patient was prepped and draped in a standard sterile fashion. A 11 millimeter blunt port was introduced under direct vision and the abdominal cavity was entered. Insufflation was obtained with CO2 with pressure set 13 mmHg.  A 5 millimeters port was introduced in the posterior axillary line under direct vision.  Another 5 millimeter port was introduced at the midaxillary line under direct vision. We then began the spleen mobilization from the left parietal wall using the Bovie hook and scissors with monopolar cautery. With a very gentle retraction of the spleen, we can completely mobilize this organ medially. The splenic flexure of the colon was reflected inferiorly in order to have a better access to the spleen and adrenal gland. To facilitate exposure a 12 millimeter trocar was placed in abdomen allowing for a fan retractor.  The avascular plane along the Gerotas fascia below the pancreas was found and maintained. This dissection allowed the identification of the main adrenal vein as well as the phrenic vein.  During this dissection process, the patient was noted to have an increase in her blood pressure which was managed by anesthesia allowing us to  continue the procedure.  The main adrenal vein with double heme-lock clips. At this point the gland was dissected from caudad to cephalad until the diaphragm. At the upper edge of the gland, the superior and adrenal pedicle vessels were then identified, dissected, ligated with the harmonic scalpel. We then proceeded with dissection of the lateral and inferior borders of the adrenal gland. Further dissection was conducted between the adrenal gland and the kidney using the harmonic scalpel. The inferior pedicle was identified and ligated. A combination of Bovie hook and harmonic until the kidney was clearly identified. After these vessels were ligated, and harmonic scalpel was used to divide the attachments in the posterior portion of the adrenal gland from the muscle. Hemostasis was assured at this point and after irrigation there were no signs of major bleeding. Evicell and Surgiflo were placed on the adrenal bed as well as the posterior surface of the spleen and the adrenal gland was removed from the abdominal cavity using an Endo Catch. This 10 millimeters port was reinserted after the specimen was removed and sent to the back table. Hemostasis was once assured and we proceeded with the removal of the ports assuring there no bleeding was present. The 11 and 12 millimeter port was closed using 0 Vicryl  in a separate fashion using figure-of-eight stitches. The skin was then closed with Monocryl in subcutaneous stitches. Dermabond was placed. The patient was then awakened sent to ICU in stable condition.      Intra-Operative Findings of the Procedure:     1) Left adrenal gland and associated mass.    Significant Surgical Tasks Conducted by the Assistant(s), if Applicable: none    Complications: No    Estimated Blood Loss (EBL): 60 mL           Implants: None    Specimens:   Specimen (12h ago through future)    Start     Ordered    07/25/17 4267  Specimen to Pathology - Surgery  Once     Comments:  1. LEFT ADRENAL  GLAND      07/25/17 1512                  Condition: Stable    Disposition: ICU - extubated and stable.    Attestation: I was present and scrubbed for the entire procedure.

## 2017-07-25 NOTE — PROGRESS NOTES
Pt arrived to ICU from OR at 1615. Pt hooked up to monitor. VSS. Family brought back. Will continue to monitor closely. MD at bedside.

## 2017-07-25 NOTE — PLAN OF CARE
Problem: Patient Care Overview  Goal: Plan of Care Review  Outcome: Ongoing (interventions implemented as appropriate)  Pts VSS. Family at bedside at updated on plan of care. Nipride gtt started to maintain SBP<150. Godoy to gravity. Will continue to monitor closely.

## 2017-07-25 NOTE — H&P (VIEW-ONLY)
The patient presents for follow-up prior to scheduled adrenalectomy.    She is hypertensive and tachycardic in the clinic today (see below).  Vitals:    07/13/17 1014   BP: (!) 159/72   Pulse: 101   Temp: 97.5 °F (36.4 °C)       The patient has been on medication and she is unclear how her BP has been running as her home machine is broken.  In addition, in reviewing her medications is appears that she has stopped some of her BP medications but she is unclear which ones.  In addition, she has run out of her Metoprolol.     Case was again discussed with the patient and consent was obtained.  Will follow-up which medications the patient is taking when she gets home (she did not bring them with her).  She and  understand that surgery will be postponed if she is not adequately blocked.    See the original consult note below:  Consult Note  Endocrine Surgery    Visit Diagnosis:  Adrenal mass, left [E27.9]     SUBJECTIVE:     Patient is a 66 y.o. female who was referred by Dr. Joaquin Coates and is here with spouse  and presents with a left adrenal nodule/mass.  This was first noted in 2016. Per patient, there is not a known history of prior abdominal imaging. Patient has prior history or knowledge of this nodule. Patient denies episodes which include headaches, chest pain, vision changes and palpitations associated with an increase in blood pressure.  She is not on Lasix and admits any history of potassium problems (on potassium supplements) or worsening hypertension. Tereza aLrose denies history of long-term steroid use. She further denies weight fluctuation, easy bruising, bleeding, skin thinning and increase in hair. She does not have a personal history of malignancy.  There is not a personal history of pancreatic abnormalities.   The patient had thyroidectomy for hyperthyroidism.  There is not a family history of endocrinopathies.      The patient is on ASA 81mg.    No prior abdominal surgery.    Review of  patient's allergies indicates:   Allergen Reactions    Corn      Other reaction(s): Sneezing  Other reaction(s): Rhinorrhea    Hydrochlorothiazide Other (See Comments)     weakness    Potato starch      Other reaction(s): Sneezing  Other reaction(s): Rhinorrhea    Pravastatin Other (See Comments)     Muscle pain    Statins-hmg-coa reductase inhibitors      Muscle pain, ptiavastin     Welchol [colesevelam] Other (See Comments)     Weakness        Current Outpatient Prescriptions   Medication Sig Dispense Refill    albuterol (VENTOLIN HFA) 90 mcg/actuation inhaler Inhale 2 puffs into the lungs every 4 (four) hours as needed. 18 g 12    albuterol-ipratropium 2.5mg-0.5mg/3mL (DUO-NEB) 0.5 mg-3 mg(2.5 mg base)/3 mL nebulizer solution U 1 VIAL IN NEBULIZER Q 6 H PRF  vial 3    amlodipine (NORVASC) 10 MG tablet Take 1 tablet (10 mg total) by mouth once daily. 90 tablet 3    aspirin (ECOTRIN) 81 MG EC tablet Take 81 mg by mouth once daily.      blood sugar diagnostic Strp True track check blood glucose once daily 100 each 3    cetirizine (ZYRTEC) 10 MG tablet Take 10 mg by mouth once daily.      cholecalciferol, vitamin D3, (VITAMIN D3) 1,000 unit capsule Take 1,000 Units by mouth once daily.      coQ10, ubiquinol, 100 mg Cap Take 1 capsule by mouth 2 (two) times daily.       cyanocobalamin (VITAMIN B-12) 250 MCG tablet Take 250 mcg by mouth once daily.      ezetimibe (ZETIA) 10 mg tablet Take 1 tablet (10 mg total) by mouth once daily. 90 tablet 3    fluoxetine (PROZAC) 20 MG capsule Take 1 capsule (20 mg total) by mouth once daily. 30 capsule 11    GUAIFENESIN/PSEUDOEPHEDRNE HCL (MUCINEX D ORAL) Take 1 tablet by mouth 2 (two) times daily as needed.       L. ACIDOPHILUS/BIFIDO LONGUM (PROBIOTIC PEARLS ORAL) Take 1 each by mouth 2 (two) times daily.       lancets Misc True track Check glucose once daily 100 each 3    levothyroxine (SYNTHROID) 112 MCG tablet Take 1 tablet (112 mcg total) by mouth  every morning. 90 tablet 3    lisinopril (PRINIVIL,ZESTRIL) 40 MG tablet Take 1 tablet (40 mg total) by mouth once daily. 90 tablet 2    loratadine (CLARITIN) 10 mg tablet Take 10 mg by mouth 2 (two) times daily as needed for Allergies.       magnesium 250 mg Tab Take 1 tablet by mouth once daily.       metformin (GLUCOPHAGE) 500 MG tablet Take 2 tablets (1,000 mg total) by mouth 2 (two) times daily with meals. (Patient taking differently: Take 500 mg by mouth 2 (two) times daily with meals. ) 360 tablet 0    metoprolol tartrate (LOPRESSOR) 100 MG tablet Take 1 tablet (100 mg total) by mouth 3 (three) times daily. 270 tablet 3    nifedipine (ADALAT CC) 60 MG TbSR TK 1 T PO qhs  2    potassium chloride (MICRO-K) 8 mEq CpSR Take 1 capsule (8 mEq total) by mouth once daily. 90 capsule 1    theophylline 400 mg Tb24 TK 1 T PO BID  1    tramadol (ULTRAM) 50 mg tablet Take 1 tablet (50 mg total) by mouth every 6 (six) hours as needed. 120 tablet 0    TRUEPLUS LANCETS 33 gauge Misc USE TO TEST BLOOD SUGAR ONCE D  3    vitamin A 8000 UNIT capsule Take 8,000 Units by mouth once daily.      phenoxybenzamine (DIBENZYLINE) 10 mg capsule Take 1 capsule (10 mg total) by mouth 2 (two) times daily. 180 capsule 3    prazosin (MINIPRESS) 1 MG Cap Take 1 capsule (1 mg total) by mouth 2 (two) times daily. 180 capsule 3     No current facility-administered medications for this visit.        Past Medical History:   Diagnosis Date    Allergy     Arthritis     Asthma     Brain bleed     COPD (chronic obstructive pulmonary disease)     Depression     Diabetes mellitus type II     Diverticulitis     Fatty liver     GERD (gastroesophageal reflux disease)     Heart murmur     Hyperlipidemia     Hypertension     Metabolic syndrome 6/14/2012    Myocardial infarction     Stroke     Thyroid disease      Past Surgical History:   Procedure Laterality Date    AORTIC VALVE REPLACEMENT  12/09/2016    arthroscopy lt knee    "   BLADDER REPAIR      COLONOSCOPY  2004    10 year recheck    CORONARY ARTERY BYPASS GRAFT  12/09/2016    X3    HYSTERECTOMY      THYROIDECTOMY       Social History   Substance Use Topics    Smoking status: Never Smoker    Smokeless tobacco: Never Used    Alcohol use Yes      Comment: seldom          Review of Systems:    Review of Systems  Constitutional: negative for chills, fatigue, fevers, malaise and night sweats  Eyes: negative for icterus and irritation  Respiratory: negative for cough and dyspnea on exertion  Cardiovascular: negative for chest pain and palpitations  Gastrointestinal: negative for constipation, diarrhea and dysphagia  Genitourinary:negative for dysuria, hematuria and nocturia  Integument/breast: negative for rash and skin color change  Hematologic/lymphatic: negative for bleeding and easy bruising  Neurological: negative for gait problems, headaches and seizures  Behavioral/Psych: negative for anxiety and depression  ENT ROS: negative  Endocrine ROS:   negative for - hair pattern changes, malaise/lethargy, mood swings, palpitations or polydipsia/polyuria    OBJECTIVE:     Vital Signs:  BP (!) 110/59  Pulse 66  Temp 98.7 °F (37.1 °C)  Ht 5' 2" (1.575 m)  Wt 71.7 kg (158 lb 2.9 oz)  BMI 28.93 kg/m2   Body mass index is 28.93 kg/(m^2).      Physical Exam:    General:  no distress, see vitals for BMI    Eyes:  conjunctivae/corneas clear   Neck: trachea midline and symmetric, no adenopathy    Thyroid:  thyroid not enlarged   Lung: clear to auscultation bilaterally   Heart:  regular rate and rhythm   Abdomen: soft, non-tender; bowel sounds normal; no masses,  no organomegaly   Skin/Extremities: warm and well-perfused   Pulses: 2+ and symmetric   Neuro: normal without focal findings and mental status, speech normal, alert and oriented x3          Adrenal Labs:        Component Value Date   ACTH 22 03/28/2017   Aldosterone 39.7 03/28/2017   Renin Activity 2.5 03/28/2017   Cortisol -8 AM " 7.4 03/28/2017   DHEA-SO4 89.1 (H) 03/28/2017   Cortisol, 24H Ur 5.4 04/04/2017   Sodium 142 03/28/2017   Potassium 4.2 03/28/2017   Chloride 101 03/28/2017   CO2 31 (H) 03/28/2017   Glucose 112 (H) 03/28/2017   BUN, Bld 20 03/28/2017   Creatinine 1.1 03/28/2017   Creatinine 0.6 10/03/2012   Calcium 10.2 03/28/2017   Calcium 8.9 10/03/2012   Anion Gap 10 03/28/2017   Anion Gap 10 10/03/2012   eGFR if African American >60.0 03/28/2017   eGFR if non  52.4 (A) 03/28/2017   Hours Collected 24 05/12/2016   Urine Total Volume 2125 05/12/2016   Creatinine, urine - per volume 39 05/12/2016   Creatinine, urine - per 24 hours 829 05/12/2016   Epinephrine, 24H Ur 4 05/12/2016   Epinephrine - ratio to CRT 5 05/12/2016   Epinephrine,  Ur 2 05/12/2016   Norepinephrine, 24H Ur 136 (H) 05/12/2016   Norepinephrine, urine - ratio to  (H) 05/12/2016   Norepinephrine,  Ur 64 05/12/2016   Dopamine , 24H Ur 287 05/12/2016   Dopamine, urine - ratio to  (H) 05/12/2016   Dopamine,  Ur 135 05/12/2016     CT abdomen/pelvis (10/26/2016):    A 2.6 cm heterogeneously enhancing left adrenal mass is present.  The lesion exhibits nonenhanced density of 39 Hounsfield units.  Absolute washout of 65% and relative washout is 40%.  The lesion has significantly increased in size since 5/27/11, when a subtle 5 mm enhancing nodule was present.    The right adrenal gland is normal.  The liver, pancreas, gallbladder, and spleen are unremarkable.  There is multifocal cortical scarring of the upper pole of the left kidney.  Visualized bowel is unremarkable.  There is moderate calcification of the aorta without aneurysm.  Advanced degenerative change of the spine is noted.    ASSESSMENT/PLAN:      Tereza Larose is an 66 y.o. female with a left adrenal nodule.  I was able to review the imaging with the patient.  We had a lengthy discussion about the natural history of pheochromocytoma. After reviewing the imaging, findings are  consistent with a functioning pheochromocytoma in addition to hypercortisolism.  I discussed the implications of this with the patient. I have recommended that the patient undergo a  laparascopic adrenalectomy. The risks, benefits, and treatment alternatives were discussed with the patient. These include and are not limited to bleeding, infection, and injury to the surrounding organs including the spleen, the tail of the pancreas, the kidney, liver and duodenum.  Other risks of general anesthetic include MI, CVA, sudden death or even reaction to anesthetic medications in addition to the risk of pneumonia, atelectasis, or pneumothorax associated with diaphragmatic injury, and ileus. The patient understands the risks, benefits and treatment alternatives.  Any and all questions were answered to the patient's satisfaction. Written consent was not obtained. Prior to surgery we will need to initiate alpha-blockade. The patient's baseline SBP goal is less than 120.  We will add beta-blockade as needed following alpha-blockade.  The patient recently was not able to obtain Phenoxybenzamine related to the cost.  I will discuss the options with Dr. Coates as he will be taking the lead on this.    We will also obtain cardiac clearance prior to surgery.  Lastly, we will obtain an updated CT scan of the abdomen prior to surgery as the patient has already experienced significant growth.     The patient and her spouse's questions were answered. She was directed to an endocrine patient education website (www.[email or web address].org) for further information.

## 2017-07-25 NOTE — ANESTHESIA PROCEDURE NOTES
Arterial    Diagnosis: Adrenal tumor  Doctor requesting consult: Dr ORONA    Patient location during procedure: holding area  Procedure start time: 7/25/2017 10:30 AM  Timeout: 7/25/2017 10:30 AM  Procedure end time: 7/25/2017 10:35 AM  Staffing  Anesthesiologist: KATTY HITCHCOCK  Performed: anesthesiologist   Anesthesiologist was present at the time of the procedure.  Preanesthetic Checklist  Completed: patient identified, site marked, surgical consent, pre-op evaluation, timeout performed, IV checked, risks and benefits discussed, monitors and equipment checked and anesthesia consent givenArterial  Skin Prep: chlorhexidine gluconate  Local Infiltration: lidocaine  Orientation: right  Location: radial  Catheter Size: 20 G  Catheter placement by Anatomical landmarks. Heme positive aspiration all ports.Insertion Attempts: 2  Assessment  Dressing: secured with tape and tegaderm  Patient: Tolerated well

## 2017-07-25 NOTE — TRANSFER OF CARE
"Anesthesia Transfer of Care Note    Patient: Tereza Larose    Procedure(s) Performed: Procedure(s) (LRB):  ADRENALECTOMY-LAPAROSCOPIC 82080 (Left)    Patient location: ICU    Anesthesia Type: general    Transport from OR: Transported from OR on 2-3 L/min O2 by NC with adequate spontaneous ventilation. Continuous SpO2 monitoring in transport. Continuos invasive BP monitoring in transport. Continuous ECG monitoring in transport    Post pain: adequate analgesia    Post assessment: no apparent anesthetic complications and tolerated procedure well    Post vital signs: stable    Level of consciousness: awake, alert and oriented    Nausea/Vomiting: no nausea/vomiting    Complications: none    Transfer of care protocol was followed      Last vitals:   Visit Vitals  /65 (BP Location: Left arm, Patient Position: Lying, BP Method: Automatic)   Pulse (!) 50   Temp 36.7 °C (98 °F) (Oral)   Resp 16   Ht 5' 2" (1.575 m)   Wt 73.5 kg (162 lb)   SpO2 95%   Breastfeeding? No   BMI 29.63 kg/m²     "

## 2017-07-25 NOTE — BRIEF OP NOTE
[facility]  Brief Operative Note    SUMMARY     Surgery Date: 7/25/2017     Surgeon(s) and Role:     * Georgie Tyson MD - Primary    Assisting Surgeon: None    Pre-op Diagnosis:  Adrenal mass [E27.9]    Post-op Diagnosis:  Post-Op Diagnosis Codes:     * Adrenal mass [E27.9]    Procedure(s) (LRB):  ADRENALECTOMY-LAPAROSCOPIC 06879 (Left)    Anesthesia: General    Description of Procedure: Laparoscopic left adrenalectomy. Tumor visualized in specimen.     Description of the findings of the procedure: See Op note    Estimated Blood Loss: 60 mL         Specimens:   Specimen (12h ago through future)    Start     Ordered    07/25/17 1513  Specimen to Pathology - Surgery  Once     Comments:  1. LEFT ADRENAL GLAND      07/25/17 7095

## 2017-07-25 NOTE — PLAN OF CARE
07/25/17 1056   ED Admissions Case Approval   ED Admissions Case Approval (!) CM Approved  (IP )

## 2017-07-25 NOTE — INTERVAL H&P NOTE
The patient has been examined and the H&P has been reviewed:    I concur with the findings and no changes have occurred since H&P was written.    Anesthesia/Surgery risks, benefits and alternative options discussed and understood by patient/family.          Active Hospital Problems    Diagnosis  POA    Adrenal mass, left [E27.9]  Yes      Resolved Hospital Problems    Diagnosis Date Resolved POA   No resolved problems to display.

## 2017-07-25 NOTE — ANESTHESIA PROCEDURE NOTES
Arterial    Diagnosis: Pheochromocytoma  Doctor requesting consult: Aida    Patient location during procedure: holding area  Procedure start time: 7/25/2017 8:52 AM  Timeout: 7/25/2017 8:52 AM  Procedure end time: 7/25/2017 8:56 AM  Staffing  Anesthesiologist: JACOB COPE  Performed: anesthesiologist   Anesthesiologist was present at the time of the procedure.  Preanesthetic Checklist  Completed: patient identified, site marked, surgical consent, pre-op evaluation, timeout performed, IV checked, risks and benefits discussed, monitors and equipment checked and anesthesia consent givenArterial  Skin Prep: alcohol swabs  Local Infiltration: lidocaine  Orientation: left  Location: radial  Catheter Size: 20 G  Catheter placement by Anatomical landmarks. Heme positive aspiration all ports.Insertion Attempts: 2  Assessment  Dressing: secured with tape and tegaderm  Patient: Tolerated well

## 2017-07-26 VITALS
SYSTOLIC BLOOD PRESSURE: 110 MMHG | HEIGHT: 62 IN | DIASTOLIC BLOOD PRESSURE: 48 MMHG | WEIGHT: 174.63 LBS | BODY MASS INDEX: 32.14 KG/M2 | TEMPERATURE: 98 F | OXYGEN SATURATION: 90 % | HEART RATE: 78 BPM | RESPIRATION RATE: 19 BRPM

## 2017-07-26 LAB
ANION GAP SERPL CALC-SCNC: 12 MMOL/L
BASOPHILS # BLD AUTO: 0 K/UL
BASOPHILS NFR BLD: 0 %
BUN SERPL-MCNC: 18 MG/DL
CALCIUM SERPL-MCNC: 8.3 MG/DL
CHLORIDE SERPL-SCNC: 104 MMOL/L
CO2 SERPL-SCNC: 25 MMOL/L
CREAT SERPL-MCNC: 0.8 MG/DL
DIFFERENTIAL METHOD: ABNORMAL
EOSINOPHIL # BLD AUTO: 0 K/UL
EOSINOPHIL NFR BLD: 0 %
ERYTHROCYTE [DISTWIDTH] IN BLOOD BY AUTOMATED COUNT: 13.8 %
EST. GFR  (AFRICAN AMERICAN): >60 ML/MIN/1.73 M^2
EST. GFR  (NON AFRICAN AMERICAN): >60 ML/MIN/1.73 M^2
ESTIMATED AVG GLUCOSE: 123 MG/DL
GLUCOSE SERPL-MCNC: 96 MG/DL
HBA1C MFR BLD HPLC: 5.9 %
HCT VFR BLD AUTO: 34.9 %
HGB BLD-MCNC: 11.3 G/DL
LYMPHOCYTES # BLD AUTO: 2 K/UL
LYMPHOCYTES NFR BLD: 18 %
MAGNESIUM SERPL-MCNC: 1.5 MG/DL
MCH RBC QN AUTO: 29.7 PG
MCHC RBC AUTO-ENTMCNC: 32.4 G/DL
MCV RBC AUTO: 92 FL
MONOCYTES # BLD AUTO: 1 K/UL
MONOCYTES NFR BLD: 8.6 %
NEUTROPHILS # BLD AUTO: 8.1 K/UL
NEUTROPHILS NFR BLD: 73.1 %
PHOSPHATE SERPL-MCNC: 3.9 MG/DL
PLATELET # BLD AUTO: 216 K/UL
PMV BLD AUTO: 9.4 FL
POCT GLUCOSE: 101 MG/DL (ref 70–110)
POCT GLUCOSE: 117 MG/DL (ref 70–110)
POCT GLUCOSE: 157 MG/DL (ref 70–110)
POTASSIUM SERPL-SCNC: 3.9 MMOL/L
RBC # BLD AUTO: 3.8 M/UL
SODIUM SERPL-SCNC: 141 MMOL/L
WBC # BLD AUTO: 11.03 K/UL

## 2017-07-26 PROCEDURE — 63600175 PHARM REV CODE 636 W HCPCS: Performed by: STUDENT IN AN ORGANIZED HEALTH CARE EDUCATION/TRAINING PROGRAM

## 2017-07-26 PROCEDURE — 84100 ASSAY OF PHOSPHORUS: CPT

## 2017-07-26 PROCEDURE — 27000221 HC OXYGEN, UP TO 24 HOURS

## 2017-07-26 PROCEDURE — 83036 HEMOGLOBIN GLYCOSYLATED A1C: CPT

## 2017-07-26 PROCEDURE — A4216 STERILE WATER/SALINE, 10 ML: HCPCS | Performed by: STUDENT IN AN ORGANIZED HEALTH CARE EDUCATION/TRAINING PROGRAM

## 2017-07-26 PROCEDURE — 94640 AIRWAY INHALATION TREATMENT: CPT

## 2017-07-26 PROCEDURE — 85025 COMPLETE CBC W/AUTO DIFF WBC: CPT

## 2017-07-26 PROCEDURE — 99900035 HC TECH TIME PER 15 MIN (STAT)

## 2017-07-26 PROCEDURE — 63600175 PHARM REV CODE 636 W HCPCS: Performed by: ANESTHESIOLOGY

## 2017-07-26 PROCEDURE — 25000242 PHARM REV CODE 250 ALT 637 W/ HCPCS: Performed by: SURGERY

## 2017-07-26 PROCEDURE — 25000003 PHARM REV CODE 250: Performed by: STUDENT IN AN ORGANIZED HEALTH CARE EDUCATION/TRAINING PROGRAM

## 2017-07-26 PROCEDURE — 83735 ASSAY OF MAGNESIUM: CPT

## 2017-07-26 PROCEDURE — 25000003 PHARM REV CODE 250: Performed by: ANESTHESIOLOGY

## 2017-07-26 PROCEDURE — 25000003 PHARM REV CODE 250: Performed by: SURGERY

## 2017-07-26 PROCEDURE — 80048 BASIC METABOLIC PNL TOTAL CA: CPT

## 2017-07-26 RX ORDER — METOPROLOL TARTRATE 50 MG/1
50 TABLET ORAL 3 TIMES DAILY
Status: DISCONTINUED | OUTPATIENT
Start: 2017-07-26 | End: 2017-07-26 | Stop reason: HOSPADM

## 2017-07-26 RX ORDER — METOPROLOL TARTRATE 50 MG/1
100 TABLET ORAL 3 TIMES DAILY
Status: DISCONTINUED | OUTPATIENT
Start: 2017-07-26 | End: 2017-07-26

## 2017-07-26 RX ORDER — OXYCODONE HYDROCHLORIDE 5 MG/1
5 TABLET ORAL EVERY 4 HOURS PRN
Status: DISCONTINUED | OUTPATIENT
Start: 2017-07-26 | End: 2017-07-26 | Stop reason: HOSPADM

## 2017-07-26 RX ORDER — TORSEMIDE 20 MG/1
20 TABLET ORAL DAILY
Status: DISCONTINUED | OUTPATIENT
Start: 2017-07-26 | End: 2017-07-26 | Stop reason: HOSPADM

## 2017-07-26 RX ORDER — OXYCODONE AND ACETAMINOPHEN 5; 325 MG/1; MG/1
1 TABLET ORAL EVERY 6 HOURS PRN
Qty: 31 TABLET | Refills: 0 | Status: SHIPPED | OUTPATIENT
Start: 2017-07-26 | End: 2017-08-01 | Stop reason: ALTCHOICE

## 2017-07-26 RX ORDER — AMLODIPINE BESYLATE 5 MG/1
10 TABLET ORAL DAILY
Status: DISCONTINUED | OUTPATIENT
Start: 2017-07-26 | End: 2017-07-26 | Stop reason: HOSPADM

## 2017-07-26 RX ORDER — DOCUSATE SODIUM 100 MG/1
100 CAPSULE, LIQUID FILLED ORAL 2 TIMES DAILY
Status: DISCONTINUED | OUTPATIENT
Start: 2017-07-26 | End: 2017-07-26 | Stop reason: HOSPADM

## 2017-07-26 RX ORDER — OXYCODONE HYDROCHLORIDE 5 MG/1
10 TABLET ORAL EVERY 4 HOURS PRN
Status: DISCONTINUED | OUTPATIENT
Start: 2017-07-26 | End: 2017-07-26 | Stop reason: HOSPADM

## 2017-07-26 RX ORDER — METOPROLOL TARTRATE 100 MG/1
50 TABLET ORAL 2 TIMES DAILY
Qty: 270 TABLET | Refills: 3 | Status: SHIPPED | OUTPATIENT
Start: 2017-07-26 | End: 2019-11-07 | Stop reason: SDUPTHER

## 2017-07-26 RX ORDER — IPRATROPIUM BROMIDE AND ALBUTEROL SULFATE 2.5; .5 MG/3ML; MG/3ML
3 SOLUTION RESPIRATORY (INHALATION) EVERY 6 HOURS
Status: DISCONTINUED | OUTPATIENT
Start: 2017-07-26 | End: 2017-07-26 | Stop reason: HOSPADM

## 2017-07-26 RX ORDER — DOCUSATE SODIUM 100 MG/1
100 CAPSULE, LIQUID FILLED ORAL 2 TIMES DAILY
Refills: 0 | COMMUNITY
Start: 2017-07-26 | End: 2019-03-08

## 2017-07-26 RX ORDER — AMLODIPINE BESYLATE 10 MG/1
10 TABLET ORAL DAILY
Qty: 90 TABLET | Refills: 3 | Status: SHIPPED | OUTPATIENT
Start: 2017-07-26 | End: 2018-08-28 | Stop reason: SDUPTHER

## 2017-07-26 RX ADMIN — OXYCODONE HYDROCHLORIDE 5 MG: 5 TABLET ORAL at 09:07

## 2017-07-26 RX ADMIN — METOPROLOL TARTRATE 5 MG: 5 INJECTION INTRAVENOUS at 05:07

## 2017-07-26 RX ADMIN — OXYCODONE HYDROCHLORIDE 5 MG: 5 TABLET ORAL at 07:07

## 2017-07-26 RX ADMIN — DOCUSATE SODIUM 100 MG: 100 CAPSULE, LIQUID FILLED ORAL at 07:07

## 2017-07-26 RX ADMIN — HYDROXYZINE HYDROCHLORIDE 25 MG: 25 TABLET, FILM COATED ORAL at 06:07

## 2017-07-26 RX ADMIN — POTASSIUM CHLORIDE 10 MEQ: 10 INJECTION, SOLUTION INTRAVENOUS at 08:07

## 2017-07-26 RX ADMIN — SODIUM CHLORIDE: 0.9 INJECTION, SOLUTION INTRAVENOUS at 12:07

## 2017-07-26 RX ADMIN — LEVOTHYROXINE SODIUM 112 MCG: 0.11 TABLET ORAL at 07:07

## 2017-07-26 RX ADMIN — LISINOPRIL 40 MG: 20 TABLET ORAL at 08:07

## 2017-07-26 RX ADMIN — IPRATROPIUM BROMIDE AND ALBUTEROL SULFATE 3 ML: .5; 3 SOLUTION RESPIRATORY (INHALATION) at 08:07

## 2017-07-26 RX ADMIN — HYDRALAZINE HYDROCHLORIDE 10 MG: 20 INJECTION INTRAMUSCULAR; INTRAVENOUS at 04:07

## 2017-07-26 RX ADMIN — AMLODIPINE BESYLATE 10 MG: 5 TABLET ORAL at 08:07

## 2017-07-26 RX ADMIN — HYDROMORPHONE HYDROCHLORIDE 0.4 MG: 2 INJECTION INTRAMUSCULAR; INTRAVENOUS; SUBCUTANEOUS at 10:07

## 2017-07-26 RX ADMIN — MAGNESIUM SULFATE IN WATER 2 G: 40 INJECTION, SOLUTION INTRAVENOUS at 10:07

## 2017-07-26 RX ADMIN — IPRATROPIUM BROMIDE AND ALBUTEROL SULFATE 3 ML: .5; 3 SOLUTION RESPIRATORY (INHALATION) at 12:07

## 2017-07-26 RX ADMIN — ACETAMINOPHEN 1000 MG: 10 INJECTION, SOLUTION INTRAVENOUS at 01:07

## 2017-07-26 RX ADMIN — TORSEMIDE 20 MG: 20 TABLET ORAL at 08:07

## 2017-07-26 RX ADMIN — SODIUM CHLORIDE, PRESERVATIVE FREE 3 ML: 5 INJECTION INTRAVENOUS at 05:07

## 2017-07-26 RX ADMIN — METOPROLOL TARTRATE 5 MG: 5 INJECTION INTRAVENOUS at 12:07

## 2017-07-26 RX ADMIN — FLUOXETINE 20 MG: 20 CAPSULE ORAL at 08:07

## 2017-07-26 RX ADMIN — OXYCODONE HYDROCHLORIDE 10 MG: 5 TABLET ORAL at 01:07

## 2017-07-26 RX ADMIN — ACETAMINOPHEN 1000 MG: 10 INJECTION, SOLUTION INTRAVENOUS at 05:07

## 2017-07-26 NOTE — DISCHARGE SUMMARY
[facility]  General Surgery  Discharge Summary      Patient Name: Tereza Larose  MRN: [id number]  Admission Date: 7/25/2017  Hospital Length of Stay: 1 days  Discharge Date and Time: 7/26/2017  4:11 PM  Attending Physician: No att. providers found   Discharging Provider: Og Man MD  Primary Care Provider: Evan Sánchez MD     HPI: Patient is a 66 y.o. female who was referred by Dr. Joaquin Coates and is here with spouse  and presents with a left adrenal nodule/mass.  This was first noted in 2016. Per patient, there is not a known history of prior abdominal imaging. Patient has prior history or knowledge of this nodule. Patient denies episodes which include headaches, chest pain, vision changes and palpitations associated with an increase in blood pressure.  She is not on Lasix and admits any history of potassium problems (on potassium supplements) or worsening hypertension. Tereza Larose denies history of long-term steroid use. She further denies weight fluctuation, easy bruising, bleeding, skin thinning and increase in hair. She does not have a personal history of malignancy.  There is not a personal history of pancreatic abnormalities.   The patient had thyroidectomy for hyperthyroidism.  There is not a family history of endocrinopathies.     Procedure(s) (LRB):  ADRENALECTOMY-LAPAROSCOPIC 12044 (Left)     Hospital Course: Patient tolerated her laparoscopic left adrenalectomy well. Her pain was well controlled with PO pain meds. Her blood pressure was controlled post-op with a nipride drip which was weaned off POD1. She was restarted on her home amlodipine and torsemide, her metoprolol dose was decrease to 50 mg BID, and her prazosin was discontinued. She was tolerating her diet, her pain was well controlled, she was ambulating. Was discharged to home POD1, will follow up with Dr. Parra in 2 weeks.    Consults:     Significant Diagnostic Studies:    Pending Diagnostic Studies:  "    None        Final Active Diagnoses:    Diagnosis Date Noted POA    PRINCIPAL PROBLEM:  Adrenal mass, left [E27.9] 03/28/2017 Yes      Problems Resolved During this Admission:    Diagnosis Date Noted Date Resolved POA      Discharged Condition: good    Disposition: Home or Self Care    Follow Up:  Follow-up Information     Georgie Tyson MD. Schedule an appointment as soon as possible for a visit in 1 week.    Specialty:  General Surgery  Contact information:  [address]  [address] [place] [id number]  [phone]                 Patient Instructions:     3 IN 1 COMMODE FOR HOME USE   Order Specific Question Answer Comments   Type: Standard    Height: 5' 2" (1.575 m)    Weight: 79.2 kg (174 lb 9.7 oz)    Length of need (1-99 months): 99      COMMODE FOR HOME USE   Order Specific Question Answer Comments   Type: Standard    Height: 5' 2" (1.575 m)    Weight: 79.2 kg (174 lb 9.7 oz)    Does patient have medical equipment at home? cane, quad    Does patient have medical equipment at home? nebulizer    Does patient have medical equipment at home? concentrator    Does patient have medical equipment at home? portable oxygen    Length of need (1-99 months): 99      Diet general     Shower on day dressing removed (No bath)     Other restrictions (specify):   Order Comments: No driving while on narcotic medications.  No lifting greater than 10lbs.     Call MD for:  persistent nausea and vomiting or diarrhea     Call MD for:  severe uncontrolled pain     Call MD for:  redness, tenderness, or signs of infection (pain, swelling, redness, odor or green/yellow discharge around incision site)     Call MD for:  difficulty breathing or increased cough     Call MD for:  severe persistent headache     Call MD for:  worsening rash     Call MD for:  persistent dizziness, light-headedness, or visual disturbances     Call MD for:     Remove dressing in 24 hours   Order Comments: No dressing to be removed "       Medications:  Reconciled Home Medications:   Discharge Medication List as of 7/26/2017  2:22 PM      START taking these medications    Details   docusate sodium (COLACE) 100 MG capsule Take 1 capsule (100 mg total) by mouth 2 (two) times daily., Starting Wed 7/26/2017, OTC      oxycodone-acetaminophen (PERCOCET) 5-325 mg per tablet Take 1 tablet by mouth every 6 (six) hours as needed for Pain. Maximum dose of acetaminophen is 3000 mg from all sources in 24 hours., Starting Wed 7/26/2017, Until Sat 8/5/2017, Normal         CONTINUE these medications which have CHANGED    Details   amlodipine (NORVASC) 10 MG tablet Take 1 tablet (10 mg total) by mouth once daily. Hold for SBP <120., Starting Wed 7/26/2017, Normal      metoprolol tartrate (LOPRESSOR) 100 MG tablet Take 0.5 tablets (50 mg total) by mouth 2 (two) times daily. Hold for SBP<110, Starting Wed 7/26/2017, Normal         CONTINUE these medications which have NOT CHANGED    Details   albuterol-ipratropium 2.5mg-0.5mg/3mL (DUO-NEB) 0.5 mg-3 mg(2.5 mg base)/3 mL nebulizer solution U 1 VIAL IN NEBULIZER Q 6 H PRF SOB, Normal      aspirin (ECOTRIN) 81 MG EC tablet Take 81 mg by mouth once daily., Until Discontinued, Historical Med      cholecalciferol, vitamin D3, (VITAMIN D3) 1,000 unit capsule Take 1,000 Units by mouth once daily., Until Discontinued, Historical Med      coQ10, ubiquinol, 100 mg Cap Take 1 capsule by mouth 2 (two) times daily. , Until Discontinued, Historical Med      cyanocobalamin (VITAMIN B-12) 250 MCG tablet Take 250 mcg by mouth once daily., Until Discontinued, Historical Med      ezetimibe (ZETIA) 10 mg tablet Take 1 tablet (10 mg total) by mouth once daily., Starting 3/29/2017, Until Thu 3/29/18, Normal      fluoxetine (PROZAC) 20 MG capsule Take 1 capsule (20 mg total) by mouth once daily., Starting 3/23/2017, Until Fri 3/23/18, Print      GUAIFENESIN/PSEUDOEPHEDRNE HCL (MUCINEX D ORAL) Take 1 tablet by mouth 2 (two) times daily as  needed. , Until Discontinued, Historical Med      L. ACIDOPHILUS/BIFIDO LONGUM (PROBIOTIC PEARLS ORAL) Take 1 each by mouth 2 (two) times daily. , Until Discontinued, Historical Med      levocetirizine (XYZAL) 5 MG tablet Take 5 mg by mouth every evening., Historical Med      levothyroxine (SYNTHROID) 112 MCG tablet Take 1 tablet (112 mcg total) by mouth every morning., Starting Wed 3/29/2017, Until Mon 7/24/2017, Normal      lisinopril (PRINIVIL,ZESTRIL) 40 MG tablet Take 1 tablet (40 mg total) by mouth once daily., Starting 12/6/2016, Until Discontinued, Normal      magnesium 250 mg Tab Take 1 tablet by mouth once daily. , Until Discontinued, Historical Med      metformin (GLUCOPHAGE) 500 MG tablet Take 1 tablet (500 mg total) by mouth 2 (two) times daily with meals., Starting Tue 5/30/2017, Normal      nifedipine (ADALAT CC) 60 MG TbSR TK 1 T PO qhs, Historical Med      potassium chloride (MICRO-K) 8 mEq CpSR Take 1 capsule (8 mEq total) by mouth once daily., Starting 10/13/2016, Until Discontinued, Normal      theophylline 400 mg Tb24 TK 1 T PO BID, Historical Med      torsemide (DEMADEX) 20 MG Tab Take 20 mg by mouth once daily., Historical Med      tramadol (ULTRAM) 50 mg tablet TAKE 1 TABLET BY MOUTH EVERY 6 HOURS AS NEEDED FOR PAIN, Normal      VENTOLIN HFA 90 mcg/actuation inhaler INHALE 2 PUFFS BY MOUTH EVERY 4 TO 6 HOURS AS NEEDED FOR SHORTNESS OF BREATH, Normal      vitamin A 8000 UNIT capsule Take 8,000 Units by mouth once daily., Until Discontinued, Historical Med      blood sugar diagnostic Strp True track check blood glucose once daily, Normal      !! lancets Misc True track Check glucose once daily, Normal      !! TRUEPLUS LANCETS 33 gauge Misc USE TO TEST BLOOD SUGAR ONCE D, Historical Med       !! - Potential duplicate medications found. Please discuss with provider.      STOP taking these medications       prazosin (MINIPRESS) 1 MG Cap Comments:   Reason for Stopping:         phenoxybenzamine  (DIBENZYLINE) 10 mg capsule Comments:   Reason for Stopping:               Og Man MD  General Surgery  [facility]

## 2017-07-26 NOTE — PLAN OF CARE
Patient discharging home to [facility] with [facility] from [facility] closet.     07/26/17 1513   Discharge Assessment   Assessment Type Discharge Planning Assessment   Confirmed/corrected address and phone number on facesheet? Yes   Assessment information obtained from? Patient;Caregiver;Medical Record   Expected Length of Stay (days) 1   Communicated expected length of stay with patient/caregiver yes   Prior to hospitilization cognitive status: Alert/Oriented   Prior to hospitalization functional status: Assistive Equipment   Current cognitive status: Alert/Oriented   Current Functional Status: Assistive Equipment   Arrived From home or self-care   Lives With spouse   Able to Return to Prior Arrangements yes   Is patient able to care for self after discharge? Yes   How many people do you have in your home that can help with your care after discharge? 1   Who are your caregiver(s) and their phone number(s)?  Pollo Larose  [phone]   Patient's perception of discharge disposition home or selfcare   Readmission Within The Last 30 Days no previous admission in last 30 days   Patient currently being followed by outpatient case management? No   Patient currently receives home health services? No   Does the patient currently use HME? Yes   Patient currently receives private duty nursing? N/A   Equipment Currently Used at Home oxygen;cane, quad;other (see comments)  (nebulizer)   Do you have any problems affording any of your prescribed medications? No   Is the patient taking medications as prescribed? yes   Do you have any financial concerns preventing you from receiving the healthcare you need? No   Does the patient have transportation to healthcare appointments? Yes   Transportation Available family or friend will provide   On Dialysis? No   Are there any open cases? No   Discharge Plan A Home with family   Discharge Plan B Home with family   Patient/Family In Agreement With Plan yes

## 2017-07-26 NOTE — PROGRESS NOTES
Surgical ICU Progress Note    Staff: Dr. Georgie Tyson    Chief Complaint/Reason for admission: No chief complaint on file.       Hospital Day: 1  Post Op Day: 1 Day Post-Op     Interval History: No acute events overnight. She was weaned on her nipride drip to 0.05 mcg/min. Her pain is well controlled. Urine output is adequate magallanes in place She has tolerated ice chips with no nausea or vomiting. Has not ambulated.    Continuous Infusion Nipride @ 0.05 mcg/min     Scheduled Meds   albuterol-ipratropium 2.5mg-0.5mg/3mL  3 mL Nebulization Q6H    amlodipine  10 mg Oral Daily    docusate sodium  100 mg Oral BID    enoxaparin  40 mg Subcutaneous Daily    fluoxetine  20 mg Oral Daily    levothyroxine  112 mcg Oral QAM    lisinopril  40 mg Oral Daily    metoprolol tartrate  50 mg Oral TID    nifedipine  60 mg Oral QHS    sodium chloride 0.9%  3 mL Intravenous Q8H    torsemide  20 mg Oral Daily       Lines/Drains:  A-line  Magallanes catheter      Systems Based Exam:   Vital Signs Range (Last 24H):  Temp:  [98 °F (36.7 °C)-98.9 °F (37.2 °C)]   Pulse:  [62-90]   Resp:  [10-27]   BP: (104-165)/(40-73)   SpO2:  [88 %-100 %]   Arterial Line BP: (116-164)/(34-52)     Neuro:  GCS: 15  Sedation:  no    Pain control adequate:  yes  Restraints:  no  Neurologic: Alert and oriented. Thought content appropriate  No focal numbness or weakness    Cardiac:      HR: Pulse  Min: 50  Max: 110    BP: BP  Min: 104/52  Max: 165/73  Heart: regular rate and rhythm  Pulses: 2+ and symmetric    Pulmonary:  Oxygen Concentration (%):  [32] 32     No results for input(s): PH, PO2, PCO2, HCO3, BE in the last 168 hours.  Resp  Min: 10  Max: 27      SpO2  Min: 88 %  Max: 100 %    Lungs:  normal respiratory effort  Chest X-ray: n/a    Renal:   I & O (Last 24H):  Intake/Output Summary (Last 24 hours) at 07/26/17 1713  Last data filed at 07/26/17 1349   Gross per 24 hour   Intake          2908.11 ml   Output              993 ml   Net           1915.11 ml     Lab Results   Component Value Date    BUN 18 07/26/2017    CREATININE 0.8 07/26/2017       FEN/GI  Lab Results   Component Value Date     07/26/2017    K 3.9 07/26/2017     07/26/2017    CO2 25 07/26/2017    CALCIUM 8.3 (L) 07/26/2017    PHOS 3.9 07/26/2017    MG 1.5 (L) 07/26/2017     Nutrition: PO: ice chips  Abdomen: soft, non-tender non-distented; bowel sounds normal; no masses,  no organomegaly incisions CDI, with glue  GI ulcer Prophylaxis: no    HEME:   Lab Results   Component Value Date    WBC 11.03 07/26/2017    HGB 11.3 (L) 07/26/2017    HCT 34.9 (L) 07/26/2017    MCV 92 07/26/2017     07/26/2017     No results for input(s): INR, APTT in the last 24 hours.    Invalid input(s): PT  DVT Prophylaxis: Pharmacologic yes  Mechanical: yes     Infectious Disease:  Temp  Min: 97.4 °F (36.3 °C)  Max: 98.9 °F (37.2 °C)  Lab Results   Component Value Date    WBC 11.03 07/26/2017     Bands: n/a   Antibiotics: none      Problem List:   Active Hospital Problems    Diagnosis  POA    *Adrenal mass, left [E27.9]  Yes      Resolved Hospital Problems    Diagnosis Date Resolved POA   No resolved problems to display.       Assessment and Plan:  Tereza Larose is a 66 y.o. female s/p left adrenalectomy for pheochromocytoma.    Neuro: AAOx4, restart home prozac     Pulmonary: Incentive spirometry, nasal cannula at 2L  History of COPD, restart albuterol    Cardiac: Hemodynamically stable  Titratiting nipride to maintain SBP below 150, currently at minimal setting of 0.05 mcg/min  Restarting metoprolol 50 BID, torsemide 20 mg daily, amlodipine 10 mg daily, will try to wean nipride drip if tolerated  Stop prazosin  Can restart aspirin tomorrow    Renal: UOP adequate, magallanes removed this morning    FEN/GI: Tolerating ice chips  Advancing to regular this morning  Replete electrolytes prn  Stop iv fluids      Infectious Disease/Hematology/Oncology: WBC 11, afebrile, intraop antibiotics  given  Pathology report back in 1 week    Endocrine:   POD1 of lap left adrenalectomy  Holding metformin, ISS    Pain Management: Pain well controlled with PO pain meds    Discharge and Palliative Care: If patient tolerating regular diet can be discharged today  Restarting home torsemide and amlodipine, decreasing metoprolol to 50 BID, and discontinuing her prazosin    Og Man MD

## 2017-07-26 NOTE — PLAN OF CARE
Problem: Patient Care Overview  Goal: Plan of Care Review  Outcome: Ongoing (interventions implemented as appropriate)  Patient in no apparent distress. Sat's 94  % on 3 lpm . PRN MDI ordered and not needed at this time Will continue to monitor.

## 2017-07-26 NOTE — PLAN OF CARE
07/26/17 1559   Final Note   Assessment Type Final Discharge Note   Discharge Disposition Home   Discharge planning education complete? Yes   What phone number can be called within the next 1-3 days to see how you are doing after discharge? ([phone])   Hospital Follow Up  Appt(s) scheduled? No   Discharge plans and expectations educations in teach back method with documentation complete? Yes   Offered [facility] -- Bedside Delivery? n/a   Discharge/Hospital Encounter Summary to (non-[facility]) PCP n/a   Referral to Outpatient Case Management complete? n/a   Referral to / orders for Home Health Complete? n/a   30 day supply of medicines given at discharge, if documented non-compliance / non-adherence? n/a   Any social issues identified prior to discharge? No   Did you assess the readiness or willingness of the family or caregiver to support self management of care? Yes   Right Care Referral Info   Post Acute Recommendation No Care

## 2017-07-26 NOTE — DISCHARGE INSTRUCTIONS
Before and After Adrenal Surgery    Before Surgery    Before Your Clinic Visit:  Prior to your clinic visit, you may need to undergo a series of tests to complete the workup for your adrenal disease. This will include a CT scan as well as blood work. Additional studies may be ordered depending on your type of adrenal disease.    Before Surgery:  Prior to having surgery, a series of general screening tests are done to make sure you are healthy for surgery. These are often conducted 1-2 weeks before the operation and may include blood work as well as a thorough history and physical exam. Depending on your history, you may also need an EKG, chest x-ray, and/or stress test. These tests evaluate your heart and lung function. They do not require a hospital stay. Depending on your age and health, you may see a high-risk anesthesiologist, your primary care doctor or the surgeon for these tests.    Prior to surgery, you may also meet with an endocrine nurse practitioner who will educate you about the operation and make sure all the appropriate testing has been completed.    MEDICATION GUIDELINES    PLEASE ASK your doctor or surgeon BEFORE stopping or changing any of your medications.    Medication Guidelines Prior to Surgery:    Certain medications may need to be stopped before surgery. Please consult your surgeon or primary care physician before stopping any of your prescribed medications.    Cardiac medications (blood pressure medications):   DO NOT take on the day of surgery:  · Benazepril (Lotensin)  · Bumetanide (Bumex)  · Candesartan (Atacand)  · Captopril (Capoten)  · Enalapril (Vasotec)  · Eprosartan (Teveten)  · Fosinopril (Monopril)  · Furosemide (Lasix)  · Hydrochlorothiazide (HCTZ)  · Iosartan (Cozaar, Hyzaar)  · Irbesartan (Avapro)  · Lisinopril (Prinivil, Zestril)  · Metolazone (Zaroxolyn)  · Olmesartan (Benicar)  · Perindopril (Aceon)  · Quinapril (Accupril)  · Ramipril (Altace)  · Spironolactone  (Aldactone)  · Telmisartan (Micardis)  · Valsartan (Diovan)    Blood thinners:   Please make sure you have discussed this with your physician or surgeon before stopping these medications.   Aspirin - Stop taking 7 days before surgery   Coumadin - Stop taking 5 days before surgery   Heparin - Must be held for 12 hours prior to surgery   Antiplatelet agents (Plavix, clopidogrel, ticlopidine) - Please follow your surgeon or cardiologists recommendations.    Diabetes medications:  Insulin - Patient with insulin pumps:   Continue your basal rate ONLY    Patients without insulin pumps:  On the morning of surgery:   Hold short acting insulin   Pre-mixed insulin (eg. 70/30): give 1/3 of usual dose   Lantus: give usual dose    Oral diabetic medications: do not take the morning of surgery   Metformin   Glyburide   Glipizide    Other prescription medications:   MAO inhibitors - do not take the day of surgery   Premarin - do not take on the day of surgery   Viagra or similar drugs - do not take 24 hours before surgery    Over-the-counter medications:   Aspirin - stop taking 7 days before surgery   NSAIDS (ibuprofen, Aleve, naproxen, Celebrex) - stop taking 7 days before surgery   Iron - do not take on the day of surgery   Herbal supplements and Vitamin E-containing multivitamins - stop taking 7 days prior to surgery    Day Before Surgery    The Day Surgery department will call you the night before your surgery to let you know what time to arrive. You will be asked to come to the hospital at least two hours before your scheduled surgery time. As a general rule, you will be asked not to eat or drink anything after midnight the night before surgery. Most often you can take your routine medications with a sip of water. Medication guidelines will be discussed at your pre-operative visit.    You may also be instructed to complete a gentle bowel prep to clean out your bowels prior to surgery. This should be done  starting the day before surgery.  You should plan to stay overnight in the hospital at least one night. This surgery may require a multiple night stay. Please leave all valuables at home and wear comfortable clothing.    Medications on Discharge    Common medications you may be prescribed on discharge:  1. Pain medication  2. Steroids (prednisone, hydrocortisone): The adrenal gland is responsible for secreting a steroid called cortisol. Cortisol is released by the body in response to stress. Steroids are sometimes needed after an adrenal gland is removed.  3. Blood Pressure Medications: Your blood pressure medications may be changed or stopped after your surgery. The removal of an adrenal gland may alter the hormones secreted by your adrenal gland that regulate blood pressure. You may not require the same amount of medication that you took before surgery.    After Surgery    Approximately 1-2 weeks following surgery, you will return to clinic to meet with your surgeon. At this time you will also meet with a nurse or nurse practitioner who will review instructions regarding caring for your wound and other post-operative issues. Your medications will be slowly adjusted; your surgeon, endocrinologist and primary care physician will manage this.    Questions you may have regarding your surgery:    What is the recovery like?  You should expect to return to work within 1-2 weeks. Typically recovery from surgery is short and pain is minimal. The most common complaint following surgery is fatigue, which usually resolves within a week or two. Some also experience generalized body aches and pains which resolve after a day or two.    When can I go back to work?  You may return to work when you feel you are able. Some people will go back after a few days and some take up to two weeks.    What activities can I do?  You may resume your normal activities after surgery. You should avoid heavy lifting over 10 pounds for 6 weeks  after surgery. You may feel more tired than usual which should resolve in a reasonable time after surgery. Light activity, like walking, is encouraged to help speed up recovery.    How do I take care of the dressing over my incision?  Typically, Steri-Strips are placed over the wound. These are small strips of white tape which help to protect the incision. The Steri-Strips should remain intact for about 7-10 days. The internal stitches are dissolvable.    When can I shower?  You may shower 48 hours after surgery. We recommend that you leave the Steri-Strips intact while you shower and lightly dry them off with a towel when you are finished. You should avoid swimming, baths and hot tubs for at least 2 weeks following surgery.    What can I take for pain?  When you are discharged home from the hospital, you will receive a prescription for a pain medication. Do not take pain medication and Tylenol together. The pain medication contains Tylenol. If you wish, you may take ibuprofen, but no sooner than 5 days after surgery.    What will the scar look like?  Most scars will become soft, flat white lines over time. Depending on the type of surgery you have (laparoscopic or open), will determine which kind of scar you will have. This will be discussed in great detail with your surgeon at your pre-operative consultation.

## 2017-07-26 NOTE — PLAN OF CARE
Problem: Patient Care Overview  Goal: Plan of Care Review  Outcome: Outcome(s) achieved Date Met: 07/26/17  Patient without complaints through the night. Educated on pain control and pain med regimen. Denied pain through the night. Abd incisions well approximated with minimal tenderness. Hypoactive BS noted in all 4 quads. No BM through night. Urine output marginal. Clear liquid diet to start today. Aggressively worked to wean nipride gtt through shift. Now sitting at 0.05mcg/kg/min. Giving hydralazine q6 hours. Resting comfortably in no distress and no needs.

## 2017-07-26 NOTE — NURSING
Spoke with Dr Aida carpenter patient blood pressure. Plans to taper Nipride gtt and transition to pushes and home BP meds. Sips of clears ordered as well.

## 2017-07-26 NOTE — PROGRESS NOTES
Pt discharged home on home O2 with .  brought oxygen tank to hospital. All PIVs removed. Discharge teaching and medication teaching performed. Pharmacy verified. Contact number obtained. Pt and  notified of all medication changes. Pt and  notified of next appt with Dr. Parra. Pt and  notified of all warning signs, S/S of infection, and when to call hospital. Pt transported downstairs via wheelchair on home oxygen at 3L per [place].  picked up pt in truck. Pt assisted into truck with minimal assist.

## 2017-07-27 ENCOUNTER — PATIENT OUTREACH (OUTPATIENT)
Dept: ADMINISTRATIVE | Facility: CLINIC | Age: 67
End: 2017-07-27

## 2017-07-27 NOTE — PATIENT INSTRUCTIONS
Wound Check After Surgery, No Complication  Surgery involves cutting through layers of skin, fatty tissue, muscle, and sometimes bone and cartilage. Sutures (stitches) or staples are used to close all layers of the wound. The sutures on the inside will dissolve in about 2 to 3 weeks. Any sutures or staples used on the outside need to be removed in about 7 to 14 days, depending on the location.  It is normal to have some clear or bloody discharge on the wound covering or bandage (dressing) for the first few days after surgery. If your wound was sutured (sewn) closed, you should not have to change the dressing more than twice a day in the first few days. Bleeding or discharge requiring more frequent dressing changes can be a sign of a problem.  It is normal to feel pain at the incision site. The pain decreases as the wound heals. Most of the pain and soreness from the skin incision should go away by the time the sutures or staples are removed. Soreness and pain from deeper tissues may last another week or two.  Pain that continues more than a few weeks after surgery or pain that worsens anytime after surgery can be a sign of a problem, such as:  · Infection  · Separation of wound edges  · Collection of blood or other below the skin  Home care  Different types of surgery require different types of care and dressing changes. It is important to follow all instructions and advice from your surgeon, as well as other members of your healthcare team.  Wound care  · Keep the wound clean, as directed by your healthcare provider.  · Change the dressing as directed. Change the dressing sooner if it becomes wet or stained with blood or fluid from the wound.  · Bathe with a sponge (no shower or tub baths) for the first few days after surgery, or until there is no more drainage from the wound. Unless you received different instructions from your surgeon, you can then shower. Do not soak the area in water (no baths or swimming)  until the tape, sutures, or staples are removed and any wound opening has dried out and healed.  Changing the dressing  · Wash your hands before changing the dressings.  · Carefully remove the dressing and tape; dont just yank it off. If it sticks to the wound, you may need to wet it a little to remove it, unless your healthcare provider told you not to wet it.  · Wash your hands again before putting on a new, clean dressing.  · Gently clean the wound with clean water (or saline) using gauze or a clean washcloth. Do not rub it or pick at it.  · Do not use soap, alcohol, hydrogen peroxide, or any other cleanser.  · If you were told to dry the wound before putting on a new dressing, gently pat it dry. Do not rub.  · Throw out the old dressing. Do not reuse it!  · Wash your hands again when you are done.  Types of dressings  Your healthcare team will tell you what type of dressing to put on your wound. Follow your healthcare teams instructions carefully, and contact them if you have any questions. Two common types of dressings are described below. You may have one of these or another type.  · Dry dressing. Use dry gauze. If the wound is still draining, use a nonadherent dressing, which shouldnt stick to the wound.  · Wet-to-dry dressing. Wet the gauze, and squeeze out the excess water (or saline), before putting it on. Then, cover this with a dry pad.  Medicines  · If you were given antibiotics, take them until they are used up or your healthcare provider tells you to stop. It is important to finish the antibiotics even though you feel better, to make sure the infection has cleared.  · You can take acetaminophen or ibuprofen for pain, unless you were given a different pain medicine to use. (Note: If you have chronic liver or kidney disease, or have ever had a stomach ulcer or gastrointestinal bleeding, or are taking blood thinner medicines, talk with your healthcare provider before using these  medicines.)  · Aspirin should never be used in anyone under 18 years of age who is ill with a fever. It may cause severe liver damage.  Follow-up care  Follow up with your healthcare provider, or as advised, for your next wound check or removal of your sutures, staples, or tape.  · If a culture was done, you will be notified if the results will affect your treatment. You can call as directed for the results.  · If imaging tests, such as X-rays, an ultrasound, or CT scan were done, they will be reviewed by a specialist. You will be notified of the results, especially if they affect treatment.  Call 911  Call emergency services right away if any of these occur:  · Trouble breathing or swallowing, wheezing  · Hoarse voice or trouble speaking  · Extreme confusion  · Extreme drowsiness or trouble awakening  · Fainting or loss of consciousness  · Rapid heart rate or very slow heart rate  · Vomiting blood, or large amounts of blood in stool  · Discomfort in the center of the chest that feels like pressure, squeezing, a sense of fullness, or pain.  · Discomfort or pain in other upper body areas, such as the back, one or both arms, neck, jaw, or stomach  · Stroke symptoms (spot a stroke FAST)  ¨ F: Face drooping. One side of the face is numb or droops.  ¨ A: Arm weakness. One arm feels weak or numb.  ¨ S: Speech difficulty: Speech is slurred, or the person is unable to speak.  ¨ T: Time to call 911. Even if symptoms go away, call 911.  When to seek medical advice  Call your healthcare provider right away if any of the following occur:  · Increasing pain at the site of surgery  · Fever over 100.4º F (38º C)  · Redness around the wound  · Fluid, pus, or blood draining from the wound  · Vomiting, constipation, or diarrhea  Date Last Reviewed: 9/27/2015 © 2000-2016 [employer]. [address] [place] [address], [place] [address]. All rights reserved. This information is not intended as a substitute for professional  medical care. Always follow your healthcare professional's instructions.

## 2017-07-28 ENCOUNTER — TELEPHONE (OUTPATIENT)
Dept: SURGERY | Facility: CLINIC | Age: 67
End: 2017-07-28

## 2017-07-28 NOTE — TELEPHONE ENCOUNTER
Called pt to see how she was doing in which the pt reports she is doing very well.     Asked the pt to chk her bp. She reports it is currently 123/60, 69.    Advised the pt to chk her bp 3 times a day macy bc she takes multiple bp meds bid. Went over each bp med w/ the pt and made sure she read the medication bottles to me w/ the parameters.  Made sure she understood what she had to do w/ the parameters before getting off the phone w/ the pt. Had the pt verbalized back to me what the parameters mean.

## 2017-07-29 ENCOUNTER — TELEPHONE (OUTPATIENT)
Dept: FAMILY MEDICINE | Facility: CLINIC | Age: 67
End: 2017-07-29

## 2017-07-29 NOTE — TELEPHONE ENCOUNTER
----- Message from Rosmery Martinez sent at 7/28/2017  2:25 PM CDT -----  Contact: nena vences in endocrine surgery ext [phone]  nena vences in endocrine surgery ext [phone], patient had major surgery, on a lot of bp meds, meds need to be checked, need an appt within 1 wk

## 2017-08-01 ENCOUNTER — OFFICE VISIT (OUTPATIENT)
Dept: FAMILY MEDICINE | Facility: CLINIC | Age: 67
End: 2017-08-01
Payer: MEDICARE

## 2017-08-01 ENCOUNTER — DOCUMENTATION ONLY (OUTPATIENT)
Dept: FAMILY MEDICINE | Facility: CLINIC | Age: 67
End: 2017-08-01

## 2017-08-01 VITALS
WEIGHT: 159.81 LBS | TEMPERATURE: 98 F | DIASTOLIC BLOOD PRESSURE: 62 MMHG | OXYGEN SATURATION: 92 % | BODY MASS INDEX: 29.41 KG/M2 | HEIGHT: 62 IN | HEART RATE: 62 BPM | SYSTOLIC BLOOD PRESSURE: 126 MMHG | RESPIRATION RATE: 16 BRPM

## 2017-08-01 DIAGNOSIS — I10 GOOD HYPERTENSION CONTROL: ICD-10-CM

## 2017-08-01 DIAGNOSIS — E11.9 CONTROLLED TYPE 2 DIABETES MELLITUS WITHOUT COMPLICATION, WITHOUT LONG-TERM CURRENT USE OF INSULIN: ICD-10-CM

## 2017-08-01 DIAGNOSIS — Z86.018 H/O PHEOCHROMOCYTOMA: Primary | ICD-10-CM

## 2017-08-01 DIAGNOSIS — Z78.9 STATIN INTOLERANCE: ICD-10-CM

## 2017-08-01 DIAGNOSIS — E78.5 HYPERLIPIDEMIA, UNSPECIFIED HYPERLIPIDEMIA TYPE: ICD-10-CM

## 2017-08-01 PROCEDURE — 4010F ACE/ARB THERAPY RXD/TAKEN: CPT | Mod: ,,, | Performed by: FAMILY MEDICINE

## 2017-08-01 PROCEDURE — 99999 PR PBB SHADOW E&M-EST. PATIENT-LVL III: CPT | Mod: PBBFAC,,, | Performed by: FAMILY MEDICINE

## 2017-08-01 PROCEDURE — 3044F HG A1C LEVEL LT 7.0%: CPT | Mod: ,,, | Performed by: FAMILY MEDICINE

## 2017-08-01 PROCEDURE — 99213 OFFICE O/P EST LOW 20 MIN: CPT | Mod: PBBFAC,PO | Performed by: FAMILY MEDICINE

## 2017-08-01 PROCEDURE — 1159F MED LIST DOCD IN RCRD: CPT | Mod: ,,, | Performed by: FAMILY MEDICINE

## 2017-08-01 PROCEDURE — 1126F AMNT PAIN NOTED NONE PRSNT: CPT | Mod: ,,, | Performed by: FAMILY MEDICINE

## 2017-08-01 PROCEDURE — 99214 OFFICE O/P EST MOD 30 MIN: CPT | Mod: S$PBB,,, | Performed by: FAMILY MEDICINE

## 2017-08-01 RX ORDER — PRAZOSIN HYDROCHLORIDE 1 MG/1
1 CAPSULE ORAL 2 TIMES DAILY
COMMUNITY
Start: 2017-05-05 | End: 2017-08-03 | Stop reason: ALTCHOICE

## 2017-08-01 RX ORDER — LEVOTHYROXINE SODIUM 112 UG/1
112 TABLET ORAL DAILY
COMMUNITY
End: 2018-08-06 | Stop reason: SDUPTHER

## 2017-08-01 NOTE — PATIENT INSTRUCTIONS
Controlling High Blood Pressure  High blood pressure (hypertension) is often called the silent killer. This is because many people who have it dont know it. High blood pressure is defined as 140/90 mm Hg or higher. Know your blood pressure and remember to check it regularly. Doing so can save your life. Here are some things you can do to help control your blood pressure.    Choose heart-healthy foods  · Select low-salt, low-fat foods. Limit sodium intake to 2,400 mg per day or the amount suggested by your healthcare provider.  · Limit canned, dried, cured, packaged, and fast foods. These can contain a lot of salt.  · Eat 8 to 10 servings of fruits and vegetables every day.  · Choose lean meats, fish, or chicken.  · Eat whole-grain pasta, brown rice, and beans.  · Eat 2 to 3 servings of low-fat or fat-free dairy products.  · Ask your doctor about the DASH eating plan. This plan helps reduce blood pressure.  · When you go to a restaurant, ask that your meal be prepared with no added salt.  Maintain a healthy weight  · Ask your healthcare provider how many calories to eat a day. Then stick to that number.  · Ask your healthcare provider what weight range is healthiest for you. If you are overweight, a weight loss of only 3% to 5% of your body weight can help lower blood pressure. Generally, a good weight loss goal is to lose 10% of your body weight in a year.  · Limit snacks and sweets.  · Get regular exercise.  Get up and get active  · Choose activities you enjoy. Find ones you can do with friends or family. This includes bicycling, dancing, walking, and jogging.  · Park farther away from building entrances.  · Use stairs instead of the elevator.  · When you can, walk or bike instead of driving.  · [place] leaves, garden, or do household repairs.  · Be active at a moderate to vigorous level of physical activity for at least 40 minutes for a minimum of 3 to 4 days a week.   Manage stress  · Make time to relax and enjoy  life. Find time to laugh.  · Communicate your concerns with your loved ones and your healthcare provider.  · Visit with family and friends, and keep up with hobbies.  Limit alcohol and quit smoking  · Men should have no more than 2 drinks per day.  · Women should have no more than 1 drink per day.  · Talk with your healthcare provider about quitting smoking. Smoking significantly increases your risk for heart disease and stroke. Ask your healthcare provider about community smoking cessation programs and other options.  Medicines  If lifestyle changes arent enough, your healthcare provider may prescribe high blood pressure medicine. Take all medicines as prescribed. If you have any questions about your medicines, ask your healthcare provider before stopping or changing them.   Date Last Reviewed: 4/27/2016  © 8105-7942 [employer]. [address] [place] [address], [place] [address]. All rights reserved. This information is not intended as a substitute for professional medical care. Always follow your healthcare professional's instructions.

## 2017-08-01 NOTE — PROGRESS NOTES
Subjective:       Patient ID: Tereza Larose is a 66 y.o. female.    Chief Complaint: Hospital Follow Up    66-year-old female coming in following up from recent removal of a left pheochromocytoma from the left adrenal gland.  She has a history of hypertension, hyperlipidemia, and diabetes as well as coronary artery disease and MI.  She was discharged from the hospital on both amlodipine 10 mg daily and nifedipine 60 mg daily but is not taking the nifedipine.  Her blood pressure control looks good and she denies any palpitations.  She feels well and her surgical wounds are clean and dry.    Past Medical History:  No date: Allergy  No date: Arthritis  No date: Asthma  No date: Brain bleed  No date: COPD (chronic obstructive pulmonary disease)  No date: Depression  No date: Diabetes mellitus type II  No date: Diverticulitis  No date: Fatty liver  No date: GERD (gastroesophageal reflux disease)  No date: Heart murmur  No date: Hyperlipidemia  No date: Hypertension  6/14/2012: Metabolic syndrome  No date: Myocardial infarction  No date: On home oxygen therapy      Comment: states uses 2L at night or when sat < 90  2012: Stroke      Comment: left hand shaky, loss of balance  No date: Thyroid disease    Past Surgical History:  07/25/2017: adranel tumor removal       Comment: Dr Griggs  12/09/2016: AORTIC VALVE REPLACEMENT  No date: arthroscopy lt knee Right  No date: BLADDER REPAIR      Comment: sling  2004: COLONOSCOPY      Comment: 10 year recheck  12/09/2016: CORONARY ARTERY BYPASS GRAFT      Comment: X3  No date: HYSTERECTOMY  No date: THYROIDECTOMY      Current Outpatient Prescriptions:     albuterol-ipratropium 2.5mg-0.5mg/3mL (DUO-NEB) 0.5 mg-3 mg(2.5 mg base)/3 mL nebulizer solution, U 1 VIAL IN NEBULIZER Q 6 H PRF SOB, Disp: 360 vial, Rfl: 3    amlodipine (NORVASC) 10 MG tablet, Take 1 tablet (10 mg total) by mouth once daily. Hold for SBP <120., Disp: 90 tablet, Rfl: 3    aspirin (ECOTRIN) 81 MG EC tablet,  Take 81 mg by mouth once daily., Disp: , Rfl:     blood sugar diagnostic Strp, True track check blood glucose once daily, Disp: 100 each, Rfl: 3    cholecalciferol, vitamin D3, (VITAMIN D3) 1,000 unit capsule, Take 1,000 Units by mouth once daily., Disp: , Rfl:     coQ10, ubiquinol, 100 mg Cap, Take 1 capsule by mouth 2 (two) times daily. , Disp: , Rfl:     cyanocobalamin (VITAMIN B-12) 250 MCG tablet, Take 250 mcg by mouth once daily., Disp: , Rfl:     docusate sodium (COLACE) 100 MG capsule, Take 1 capsule (100 mg total) by mouth 2 (two) times daily., Disp: , Rfl: 0    ezetimibe (ZETIA) 10 mg tablet, Take 1 tablet (10 mg total) by mouth once daily., Disp: 90 tablet, Rfl: 3    fluoxetine (PROZAC) 20 MG capsule, Take 1 capsule (20 mg total) by mouth once daily., Disp: 30 capsule, Rfl: 11    GUAIFENESIN/PSEUDOEPHEDRNE HCL (MUCINEX D ORAL), Take 1 tablet by mouth 2 (two) times daily as needed. , Disp: , Rfl:     L. ACIDOPHILUS/BIFIDO LONGUM (PROBIOTIC PEARLS ORAL), Take 1 each by mouth 2 (two) times daily. , Disp: , Rfl:     lancets Misc, True track Check glucose once daily, Disp: 100 each, Rfl: 3    levocetirizine (XYZAL) 5 MG tablet, Take 5 mg by mouth every evening., Disp: , Rfl:     levothyroxine (SYNTHROID) 112 MCG tablet, Take 112 mcg by mouth once daily., Disp: , Rfl:     lisinopril (PRINIVIL,ZESTRIL) 40 MG tablet, Take 1 tablet (40 mg total) by mouth once daily., Disp: 90 tablet, Rfl: 2    magnesium 250 mg Tab, Take 1 tablet by mouth once daily. , Disp: , Rfl:     metformin (GLUCOPHAGE) 500 MG tablet, Take 1 tablet (500 mg total) by mouth 2 (two) times daily with meals., Disp: 180 tablet, Rfl: 0    metoprolol tartrate (LOPRESSOR) 100 MG tablet, Take 0.5 tablets (50 mg total) by mouth 2 (two) times daily. Hold for SBP<110, Disp: 270 tablet, Rfl: 3    potassium chloride (MICRO-K) 8 mEq CpSR, Take 1 capsule (8 mEq total) by mouth once daily., Disp: 90 capsule, Rfl: 1    prazosin (MINIPRESS) 1 MG  Cap, Take 1 mg by mouth 2 (two) times daily., Disp: , Rfl:     theophylline 400 mg Tb24, TK 1 T PO BID, Disp: , Rfl: 1    torsemide (DEMADEX) 20 MG Tab, Take 20 mg by mouth once daily., Disp: , Rfl:     tramadol (ULTRAM) 50 mg tablet, TAKE 1 TABLET BY MOUTH EVERY 6 HOURS AS NEEDED FOR PAIN, Disp: 120 tablet, Rfl: 0    TRUEPLUS LANCETS 33 gauge Misc, USE TO TEST BLOOD SUGAR ONCE D, Disp: , Rfl: 3    VENTOLIN HFA 90 mcg/actuation inhaler, INHALE 2 PUFFS BY MOUTH EVERY 4 TO 6 HOURS AS NEEDED FOR SHORTNESS OF BREATH, Disp: 18 g, Rfl: 0    vitamin A 8000 UNIT capsule, Take 8,000 Units by mouth once daily., Disp: , Rfl:     Fecal Occult Blood Test (FOBT)/FitKit due on 09/17/2000-has a kit at home  Eye Exam due on 05/07/2015  Pneumococcal (65+)(2 of 2 - PCV13) due on 09/30/2016  Mammogram due on 04/23/2017        Review of Systems   Constitutional: Negative for chills, diaphoresis, fatigue and fever.   Respiratory: Negative for chest tightness and shortness of breath.    Cardiovascular: Negative for chest pain and palpitations.   Gastrointestinal: Negative for abdominal pain, nausea and vomiting.   Endocrine: Positive for polyuria. Negative for polydipsia.       Objective:      Physical Exam   Constitutional: She appears well-developed and well-nourished. No distress.   Normal blood pressure  Normal weight with BMI 29.2.  She is up 1.3 pounds from June 16, 2017  She is in no distress   HENT:   Head: Normocephalic and atraumatic.   Cardiovascular: Normal rate, regular rhythm and normal heart sounds.  Exam reveals no gallop and no friction rub.    No murmur heard.  Pulmonary/Chest: Effort normal and breath sounds normal. No respiratory distress. She has no wheezes. She has no rales. She exhibits no tenderness.   Skin: She is not diaphoretic.   Nursing note and vitals reviewed.      Assessment:       1. H/O pheochromocytoma    2. Good hypertension control    3. Controlled type 2 diabetes mellitus without complication,  without long-term current use of insulin    4. Hyperlipidemia, unspecified hyperlipidemia type    5. Statin intolerance        Plan:       1. H/O pheochromocytoma  Doing well postop    2. Good hypertension control  No changes needed.  Nifedipine removed from medication list    3. Controlled type 2 diabetes mellitus without complication, without long-term current use of insulin  A1c of 5.9 July 27    4. Hyperlipidemia, unspecified hyperlipidemia type  Due for recheck in 2-3 months    5. Statin intolerance  On Zetia         Patient readiness: acceptance and barriers:none    During the course of the visit the patient was educated and counseled about the following:     Diabetes:  Discussed general issues about diabetes pathophysiology and management.  Hypertension:   Medication: no change.  Dietary sodium restriction.  Regular aerobic exercise.    Goals: Diabetes: Maintain Hemoglobin A1C below 7 and Hypertension: Reduce Blood Pressure    Did patient meet goals/outcomes: Yes    The following self management tools provided: blood pressure log  blood glucose log    Patient Instructions (the written plan) was given to the patient/family.     Time spent with patient: 30 minutes

## 2017-08-01 NOTE — PROGRESS NOTES
Pre-Visit Chart Review  For Appointment Scheduled on 8-1-17    Health Maintenance Due   Topic Date Due    Fecal Occult Blood Test (FOBT)/FitKit  09/17/2000    Eye Exam  05/07/2015    Pneumococcal (65+) (2 of 2 - PCV13) 09/30/2016    Mammogram  04/23/2017    Influenza Vaccine  08/01/2017

## 2017-08-03 ENCOUNTER — OFFICE VISIT (OUTPATIENT)
Dept: SURGERY | Facility: CLINIC | Age: 67
End: 2017-08-03
Attending: SURGERY
Payer: MEDICARE

## 2017-08-03 VITALS
BODY MASS INDEX: 29.32 KG/M2 | WEIGHT: 159.31 LBS | SYSTOLIC BLOOD PRESSURE: 117 MMHG | TEMPERATURE: 99 F | HEART RATE: 63 BPM | HEIGHT: 62 IN | DIASTOLIC BLOOD PRESSURE: 57 MMHG

## 2017-08-03 DIAGNOSIS — Z09 POSTOP CHECK: Primary | ICD-10-CM

## 2017-08-03 DIAGNOSIS — D35.02 PHEOCHROMOCYTOMA, LEFT: ICD-10-CM

## 2017-08-03 PROCEDURE — 99024 POSTOP FOLLOW-UP VISIT: CPT | Mod: ,,, | Performed by: SURGERY

## 2017-08-03 PROCEDURE — 99999 PR PBB SHADOW E&M-EST. PATIENT-LVL III: CPT | Mod: PBBFAC,,, | Performed by: SURGERY

## 2017-08-03 PROCEDURE — 99213 OFFICE O/P EST LOW 20 MIN: CPT | Mod: PBBFAC | Performed by: SURGERY

## 2017-08-03 NOTE — LETTER
Endocrine/General Surgery  [address] [place] [address]  Phone: [phone]  Fax: [phone] August 6, 2017     Joaquin Coates MD  [address] [place] [id number]    Patient: Tereza Larose   YOB: 1950   Date of Visit: 8/3/2017     Dear Dr. Coates:    I wanted to let you know that I operated on Tereza Larose. She underwent a left laparoscopic adrenalectomy.  You will find a copy of my operative report and the final pathology is currently pending. She did well after the surgery and I suspect she will have an uneventful recovery. I hope that her blood pressure will improve in the long run.    If you have any questions or concerns, please contact me. Again, thank you kindly for this referral.    Regards,         Georgie Tyson MD        Evan Sánchez MD  [address] [place] [address]  VIA In Basket

## 2017-08-06 NOTE — PROGRESS NOTES
"Subjective:       Tereza Larose presents to the clinic 1 weeks following left laparoscopic adrenalectomy. Eating a regular diet without difficulty. Bowel movements are Normal.  The patient is not having any pain..    She has seen her PCP this week and had her BP meds adjusted. She is checking her blood pressure daily.     Objective:      BP (!) 117/57 (BP Location: Left arm, Patient Position: Sitting, BP Method: Automatic)   Pulse 63   Temp 98.5 °F (36.9 °C) (Oral)   Ht 5' 2" (1.575 m)   Wt 72.3 kg (159 lb 4.5 oz)   BMI 29.13 kg/m²     General:  alert, appears stated age and cooperative   Abdomen: soft, bowel sounds active, non-tender   Incision:   healing well, no drainage, no erythema, no hernia, no seroma, no swelling, no dehiscence, incisions well approximated        FINAL PATHOLOGIC DIAGNOSIS  ADRENAL (LEFT ADRENALECTOMY SPECIMEN, CLINICAL): PHEOCHROMCYTOMA.    Assessment:      Doing well postoperatively s/p left adrenalectomy.      Plan:      1. Continue any current medications.  2. Wound care discussed.  3. Pt is to increase activities as tolerated.  4. Follow up: prn.   "

## 2017-08-16 LAB
GENETICIST REVIEW: NORMAL
Lab: NEGATIVE
Lab: NORMAL
REASON FOR REFERRAL (NARRATIVE): NORMAL
REF LAB TEST METHOD: NORMAL

## 2017-09-11 DIAGNOSIS — J44.9 COPD (CHRONIC OBSTRUCTIVE PULMONARY DISEASE): ICD-10-CM

## 2017-09-11 DIAGNOSIS — E11.9 DIABETES MELLITUS WITHOUT COMPLICATION: ICD-10-CM

## 2017-09-11 RX ORDER — ALBUTEROL SULFATE 90 UG/1
AEROSOL, METERED RESPIRATORY (INHALATION)
Qty: 18 G | Refills: 0 | Status: SHIPPED | OUTPATIENT
Start: 2017-09-11 | End: 2017-11-01 | Stop reason: SDUPTHER

## 2017-09-11 RX ORDER — METFORMIN HYDROCHLORIDE 500 MG/1
TABLET ORAL
Qty: 180 TABLET | Refills: 1 | Status: SHIPPED | OUTPATIENT
Start: 2017-09-11 | End: 2019-11-07 | Stop reason: SDUPTHER

## 2017-11-01 DIAGNOSIS — J44.9 COPD (CHRONIC OBSTRUCTIVE PULMONARY DISEASE): ICD-10-CM

## 2017-11-01 DIAGNOSIS — Z12.11 COLON CANCER SCREENING: ICD-10-CM

## 2017-11-01 RX ORDER — ALBUTEROL SULFATE 90 UG/1
AEROSOL, METERED RESPIRATORY (INHALATION)
Qty: 18 G | Refills: 0 | Status: SHIPPED | OUTPATIENT
Start: 2017-11-01 | End: 2018-03-27 | Stop reason: SDUPTHER

## 2018-01-21 DIAGNOSIS — I10 ESSENTIAL HYPERTENSION: ICD-10-CM

## 2018-01-22 RX ORDER — LISINOPRIL 40 MG/1
TABLET ORAL
Qty: 90 TABLET | Refills: 1 | Status: SHIPPED | OUTPATIENT
Start: 2018-01-22 | End: 2018-08-28 | Stop reason: SDUPTHER

## 2018-03-27 DIAGNOSIS — J44.9 COPD (CHRONIC OBSTRUCTIVE PULMONARY DISEASE): ICD-10-CM

## 2018-03-27 RX ORDER — ALBUTEROL SULFATE 90 UG/1
AEROSOL, METERED RESPIRATORY (INHALATION)
Qty: 18 G | Refills: 0 | Status: SHIPPED | OUTPATIENT
Start: 2018-03-27 | End: 2018-08-05 | Stop reason: SDUPTHER

## 2018-03-29 DIAGNOSIS — E11.9 TYPE 2 DIABETES MELLITUS WITHOUT COMPLICATION: ICD-10-CM

## 2018-04-12 ENCOUNTER — TELEPHONE (OUTPATIENT)
Dept: FAMILY MEDICINE | Facility: CLINIC | Age: 68
End: 2018-04-12

## 2018-04-12 DIAGNOSIS — E11.9 DIABETES MELLITUS WITHOUT COMPLICATION: Primary | ICD-10-CM

## 2018-04-12 NOTE — TELEPHONE ENCOUNTER
----- Message from Gloria Archibald sent at 4/12/2018  9:45 AM CDT -----  Type:  Patient Returning Call    Who Called:  Patient  Who Left Message for Patient:  Not documented  Does the patient know what this is regarding?:  No  Best Call Back Number:  [phone]  Additional Information:  Said she just missed the call.

## 2018-04-16 ENCOUNTER — LAB VISIT (OUTPATIENT)
Dept: LAB | Facility: HOSPITAL | Age: 68
End: 2018-04-16
Attending: FAMILY MEDICINE
Payer: MEDICARE

## 2018-04-16 DIAGNOSIS — E11.9 DIABETES MELLITUS WITHOUT COMPLICATION: ICD-10-CM

## 2018-04-16 DIAGNOSIS — E11.9 TYPE 2 DIABETES MELLITUS WITHOUT COMPLICATION: ICD-10-CM

## 2018-04-16 LAB
ANION GAP SERPL CALC-SCNC: 14 MMOL/L
BUN SERPL-MCNC: 15 MG/DL
CALCIUM SERPL-MCNC: 10.2 MG/DL
CHLORIDE SERPL-SCNC: 100 MMOL/L
CHOLEST SERPL-MCNC: 370 MG/DL
CHOLEST/HDLC SERPL: 8.4 {RATIO}
CO2 SERPL-SCNC: 29 MMOL/L
CREAT SERPL-MCNC: 0.9 MG/DL
EST. GFR  (AFRICAN AMERICAN): >60 ML/MIN/1.73 M^2
EST. GFR  (NON AFRICAN AMERICAN): >60 ML/MIN/1.73 M^2
ESTIMATED AVG GLUCOSE: 114 MG/DL
GLUCOSE SERPL-MCNC: 103 MG/DL
HBA1C MFR BLD HPLC: 5.6 %
HDLC SERPL-MCNC: 44 MG/DL
HDLC SERPL: 11.9 %
LDLC SERPL CALC-MCNC: 248.2 MG/DL
NONHDLC SERPL-MCNC: 326 MG/DL
POTASSIUM SERPL-SCNC: 4.1 MMOL/L
SODIUM SERPL-SCNC: 143 MMOL/L
TRIGL SERPL-MCNC: 389 MG/DL

## 2018-04-16 PROCEDURE — 80048 BASIC METABOLIC PNL TOTAL CA: CPT

## 2018-04-16 PROCEDURE — 36415 COLL VENOUS BLD VENIPUNCTURE: CPT | Mod: PO

## 2018-04-16 PROCEDURE — 83036 HEMOGLOBIN GLYCOSYLATED A1C: CPT

## 2018-04-16 PROCEDURE — 80061 LIPID PANEL: CPT

## 2018-04-17 ENCOUNTER — TELEPHONE (OUTPATIENT)
Dept: FAMILY MEDICINE | Facility: CLINIC | Age: 68
End: 2018-04-17

## 2018-04-17 NOTE — TELEPHONE ENCOUNTER
----- Message from Rosmery Martinez sent at 4/17/2018  1:54 PM CDT -----  Contact: [phone]  Patient is returning nurse's phone call.  Please call patient back at [phone].

## 2018-04-19 ENCOUNTER — TELEPHONE (OUTPATIENT)
Dept: FAMILY MEDICINE | Facility: CLINIC | Age: 68
End: 2018-04-19

## 2018-04-19 DIAGNOSIS — E78.5 HYPERLIPIDEMIA, UNSPECIFIED HYPERLIPIDEMIA TYPE: Primary | ICD-10-CM

## 2018-04-19 RX ORDER — PITAVASTATIN CALCIUM 1.04 MG/1
TABLET, FILM COATED ORAL
Qty: 45 TABLET | Refills: 3 | Status: SHIPPED | OUTPATIENT
Start: 2018-04-19 | End: 2018-08-28 | Stop reason: SINTOL

## 2018-04-19 NOTE — TELEPHONE ENCOUNTER
----- Message from Evan Sánchez MD sent at 4/16/2018  7:41 PM CDT -----  Her chemistry panel and A1c indicate excellent blood sugar control but her cholesterol levels are not compatible with a long life.  Her LDL/bad cholesterol is 248 in the Heart Association considers critical to be over 190.  I know she has had problems with statins but I would strongly recommend trying again.  Livalo has now gone generic and is one of the easiest to tolerate.  we can start low every other day.  any help would be useful.

## 2018-04-19 NOTE — TELEPHONE ENCOUNTER
Lab results discussed with patient-send Livalo to [facility]- patient advised she will start at every other day.

## 2018-04-21 DIAGNOSIS — I15.9 SECONDARY HYPERTENSION: ICD-10-CM

## 2018-04-21 DIAGNOSIS — D35.00 PHEOCHROMOCYTOMA, UNSPECIFIED LATERALITY: ICD-10-CM

## 2018-04-21 RX ORDER — PRAZOSIN HYDROCHLORIDE 1 MG/1
CAPSULE ORAL
Qty: 180 CAPSULE | Refills: 3 | Status: SHIPPED | OUTPATIENT
Start: 2018-04-21 | End: 2018-08-28 | Stop reason: ALTCHOICE

## 2018-04-25 ENCOUNTER — TELEPHONE (OUTPATIENT)
Dept: FAMILY MEDICINE | Facility: CLINIC | Age: 68
End: 2018-04-25

## 2018-05-24 DIAGNOSIS — Z12.39 BREAST CANCER SCREENING: ICD-10-CM

## 2018-08-05 ENCOUNTER — NURSE TRIAGE (OUTPATIENT)
Dept: ADMINISTRATIVE | Facility: CLINIC | Age: 68
End: 2018-08-05

## 2018-08-05 DIAGNOSIS — J44.9 COPD (CHRONIC OBSTRUCTIVE PULMONARY DISEASE): ICD-10-CM

## 2018-08-05 NOTE — TELEPHONE ENCOUNTER
Reason for Disposition   Caller requesting a NON-URGENT new prescription or refill and triager unable to refill per unit policy    Protocols used: ST MEDICATION QUESTION CALL-A-    Patient called for refills of theophylline, prednisone, torsemide, a pain pill, and ipatropium. Patient states she is not having symptoms. It has been more than 1 year since she has seen Dr. Sánchez. Explained to patient that the provider who had been ordering these medications should reorder them because some of these requires that she be seen for evaluation. She verbalized understanding.

## 2018-08-06 DIAGNOSIS — F32.A DEPRESSION, UNSPECIFIED DEPRESSION TYPE: ICD-10-CM

## 2018-08-06 RX ORDER — LEVOTHYROXINE SODIUM 112 UG/1
112 TABLET ORAL DAILY
Qty: 30 TABLET | Refills: 0 | Status: SHIPPED | OUTPATIENT
Start: 2018-08-06 | End: 2018-08-28 | Stop reason: SDUPTHER

## 2018-08-06 RX ORDER — ALBUTEROL SULFATE 90 UG/1
AEROSOL, METERED RESPIRATORY (INHALATION)
Qty: 18 G | Refills: 0 | Status: SHIPPED | OUTPATIENT
Start: 2018-08-06 | End: 2018-08-28 | Stop reason: SDUPTHER

## 2018-08-06 RX ORDER — FLUOXETINE HYDROCHLORIDE 20 MG/1
20 CAPSULE ORAL DAILY
Qty: 30 CAPSULE | Refills: 0 | Status: SHIPPED | OUTPATIENT
Start: 2018-08-06 | End: 2019-01-11 | Stop reason: SDUPTHER

## 2018-08-06 NOTE — TELEPHONE ENCOUNTER
Spoke to patient informed physical was needed. While I was booking she hung up. Apt booked and mailed.

## 2018-08-24 ENCOUNTER — DOCUMENTATION ONLY (OUTPATIENT)
Dept: FAMILY MEDICINE | Facility: CLINIC | Age: 68
End: 2018-08-24

## 2018-08-24 NOTE — PROGRESS NOTES
Pre-Visit Chart Review  For Appointment Scheduled on 8-28-18    Health Maintenance Due   Topic Date Due    Fecal Occult Blood Test (FOBT)/FitKit  1950    Zoster Vaccine  09/17/2010    Eye Exam  05/07/2015    Pneumococcal (65+) (2 of 2 - PCV13) 09/30/2016    Mammogram  04/23/2017    Foot Exam  03/23/2018    Influenza Vaccine  08/01/2018

## 2018-08-28 ENCOUNTER — HOSPITAL ENCOUNTER (OUTPATIENT)
Dept: RADIOLOGY | Facility: CLINIC | Age: 68
Discharge: HOME | End: 2018-08-28
Attending: FAMILY MEDICINE
Payer: MEDICARE

## 2018-08-28 ENCOUNTER — DOCUMENTATION ONLY (OUTPATIENT)
Dept: FAMILY MEDICINE | Facility: CLINIC | Age: 68
End: 2018-08-28

## 2018-08-28 ENCOUNTER — OFFICE VISIT (OUTPATIENT)
Dept: FAMILY MEDICINE | Facility: CLINIC | Age: 68
End: 2018-08-28
Attending: FAMILY MEDICINE
Payer: MEDICARE

## 2018-08-28 VITALS
HEIGHT: 62 IN | SYSTOLIC BLOOD PRESSURE: 180 MMHG | TEMPERATURE: 98 F | WEIGHT: 176.13 LBS | OXYGEN SATURATION: 92 % | BODY MASS INDEX: 32.41 KG/M2 | RESPIRATION RATE: 12 BRPM | DIASTOLIC BLOOD PRESSURE: 66 MMHG | HEART RATE: 65 BPM

## 2018-08-28 DIAGNOSIS — Z12.11 COLON CANCER SCREENING: ICD-10-CM

## 2018-08-28 DIAGNOSIS — J44.89 ASTHMA WITH CHRONIC OBSTRUCTIVE PULMONARY DISEASE (COPD): Chronic | ICD-10-CM

## 2018-08-28 DIAGNOSIS — G47.00 INSOMNIA, UNSPECIFIED TYPE: ICD-10-CM

## 2018-08-28 DIAGNOSIS — I10 ESSENTIAL HYPERTENSION: ICD-10-CM

## 2018-08-28 DIAGNOSIS — M19.90 OSTEOARTHRITIS, UNSPECIFIED OSTEOARTHRITIS TYPE, UNSPECIFIED SITE: Chronic | ICD-10-CM

## 2018-08-28 DIAGNOSIS — E11.65 TYPE 2 DIABETES MELLITUS WITH HYPERGLYCEMIA, UNSPECIFIED WHETHER LONG TERM INSULIN USE: ICD-10-CM

## 2018-08-28 DIAGNOSIS — I25.10 CORONARY ARTERY DISEASE INVOLVING NATIVE CORONARY ARTERY OF NATIVE HEART WITHOUT ANGINA PECTORIS: ICD-10-CM

## 2018-08-28 DIAGNOSIS — E24.9 ADRENAL CUSHING'S SYNDROME: ICD-10-CM

## 2018-08-28 DIAGNOSIS — M70.61 TROCHANTERIC BURSITIS OF RIGHT HIP: Primary | ICD-10-CM

## 2018-08-28 DIAGNOSIS — E78.5 HYPERLIPIDEMIA, UNSPECIFIED HYPERLIPIDEMIA TYPE: ICD-10-CM

## 2018-08-28 DIAGNOSIS — J44.9 COPD (CHRONIC OBSTRUCTIVE PULMONARY DISEASE): ICD-10-CM

## 2018-08-28 DIAGNOSIS — E04.9 GOITER: ICD-10-CM

## 2018-08-28 DIAGNOSIS — M17.10 ARTHRITIS OF KNEE: ICD-10-CM

## 2018-08-28 DIAGNOSIS — E07.9 THYROID DISORDER: Chronic | ICD-10-CM

## 2018-08-28 DIAGNOSIS — Z78.9 STATIN INTOLERANCE: ICD-10-CM

## 2018-08-28 DIAGNOSIS — M70.61 TROCHANTERIC BURSITIS OF RIGHT HIP: ICD-10-CM

## 2018-08-28 DIAGNOSIS — D72.829 LEUKOCYTOSIS, UNSPECIFIED TYPE: ICD-10-CM

## 2018-08-28 PROCEDURE — 99999 PR PBB SHADOW E&M-EST. PATIENT-LVL V: CPT | Mod: PBBFAC,,, | Performed by: FAMILY MEDICINE

## 2018-08-28 PROCEDURE — 73502 X-RAY EXAM HIP UNI 2-3 VIEWS: CPT | Mod: TC,FY,PO,RT

## 2018-08-28 PROCEDURE — 99215 OFFICE O/P EST HI 40 MIN: CPT | Mod: S$PBB,,, | Performed by: FAMILY MEDICINE

## 2018-08-28 PROCEDURE — 99215 OFFICE O/P EST HI 40 MIN: CPT | Mod: PBBFAC,25,PO | Performed by: FAMILY MEDICINE

## 2018-08-28 PROCEDURE — 73502 X-RAY EXAM HIP UNI 2-3 VIEWS: CPT | Mod: 26,RT,S$GLB, | Performed by: RADIOLOGY

## 2018-08-28 RX ORDER — CEFDINIR 300 MG/1
CAPSULE ORAL
Refills: 0 | COMMUNITY
Start: 2018-07-10 | End: 2018-08-28 | Stop reason: ALTCHOICE

## 2018-08-28 RX ORDER — TRAMADOL HYDROCHLORIDE 50 MG/1
50 TABLET ORAL EVERY 6 HOURS PRN
Qty: 120 TABLET | Refills: 0 | Status: SHIPPED | OUTPATIENT
Start: 2018-08-28 | End: 2018-12-12 | Stop reason: SDUPTHER

## 2018-08-28 RX ORDER — LISINOPRIL 40 MG/1
TABLET ORAL
Qty: 90 TABLET | Refills: 3 | Status: SHIPPED | OUTPATIENT
Start: 2018-08-28 | End: 2019-11-07

## 2018-08-28 RX ORDER — ZOLPIDEM TARTRATE 5 MG/1
TABLET ORAL
Refills: 0 | COMMUNITY
Start: 2018-06-11 | End: 2019-03-08

## 2018-08-28 RX ORDER — PREDNISONE 20 MG/1
20 TABLET ORAL DAILY PRN
COMMUNITY
Start: 2016-04-21 | End: 2019-03-08 | Stop reason: ALTCHOICE

## 2018-08-28 RX ORDER — AMLODIPINE BESYLATE 10 MG/1
10 TABLET ORAL DAILY
Qty: 90 TABLET | Refills: 3 | Status: SHIPPED | OUTPATIENT
Start: 2018-08-28 | End: 2019-09-14 | Stop reason: SDUPTHER

## 2018-08-28 RX ORDER — TRAZODONE HYDROCHLORIDE 50 MG/1
50 TABLET ORAL NIGHTLY PRN
Qty: 30 TABLET | Refills: 5 | Status: SHIPPED | OUTPATIENT
Start: 2018-08-28 | End: 2019-03-08 | Stop reason: SDUPTHER

## 2018-08-28 RX ORDER — IPRATROPIUM BROMIDE AND ALBUTEROL SULFATE 2.5; .5 MG/3ML; MG/3ML
3 SOLUTION RESPIRATORY (INHALATION) EVERY 6 HOURS PRN
Qty: 180 ML | Refills: 2 | Status: SHIPPED | OUTPATIENT
Start: 2018-08-28 | End: 2019-11-07 | Stop reason: SDUPTHER

## 2018-08-28 RX ORDER — IPRATROPIUM BROMIDE AND ALBUTEROL SULFATE 2.5; .5 MG/3ML; MG/3ML
3 SOLUTION RESPIRATORY (INHALATION) EVERY 6 HOURS PRN
COMMUNITY
End: 2018-08-28 | Stop reason: SDUPTHER

## 2018-08-28 RX ORDER — LEVOTHYROXINE SODIUM 112 UG/1
112 TABLET ORAL DAILY
Qty: 30 TABLET | Refills: 0 | Status: SHIPPED | OUTPATIENT
Start: 2018-08-28 | End: 2018-09-27 | Stop reason: SDUPTHER

## 2018-08-28 RX ORDER — AZELASTINE 1 MG/ML
SPRAY, METERED NASAL
Refills: 0 | COMMUNITY
Start: 2018-07-10 | End: 2019-03-08

## 2018-08-28 RX ORDER — ALBUTEROL SULFATE 90 UG/1
AEROSOL, METERED RESPIRATORY (INHALATION)
Qty: 18 G | Refills: 5 | Status: SHIPPED | OUTPATIENT
Start: 2018-08-28 | End: 2019-11-07 | Stop reason: SDUPTHER

## 2018-08-28 NOTE — PROGRESS NOTES
Subjective:       Patient ID: Tereza Larose is a 67 y.o. female.    Chief Complaint: Annual Exam    67-year-old female coming in to follow-up on multiple medical problems. She has a history of malignant hypertension and poorly controlled hypertension and has been off of her amlodipine for a week.  She attempted to compensate by doubling up her maximum strength lisinopril for the last day.  She is now out of lisinopril and needs a refill on that as well.  She is status post intracranial hemorrhage from hypertension.  She has a history of coronary artery disease with non ST elevation MI, hyperlipidemia statin intolerant, aortic stenosis with aortic valve repair, COPD and asthma, is status post left adrenalectomy for pheochromocytoma and has additional history of diabetes and leukocytosis.  She has a history of chronic back pain with an injection given by someone in the remote past.  I was able to find in the record where she had an injection in the greater trochanter given by Dr. Govea which apparently is what she is referring to.  After direct questioning she did confirm that the pain is over the lateral hip.  She has numbness and tingling in the right hand which on further evaluation was found to fit a carpal tunnel syndrome picture with no involvement of the 5th finger and partial involvement of the 4th finger.  She does note that the discomfort and tingling occurs sometimes at night while sleeping and often while using the hand with a chronic .  She has unsteadiness of gait since her intracranial hemorrhage.    Past Medical History:  No date: Allergy  No date: Arthritis  No date: Asthma  No date: Brain bleed  No date: COPD (chronic obstructive pulmonary disease)  No date: Depression  No date: Diabetes mellitus type II  No date: Diverticulitis  No date: Fatty liver  No date: GERD (gastroesophageal reflux disease)  No date: Goiter      Comment:  s/p thyroidectomy  No date: Heart murmur  No date: Hernia of  abdominal wall  6/15/2013: History of intracranial hemorrhage  6/15/2013: History of non-ST elevation myocardial infarction (NSTEMI)  No date: Hyperlipidemia  No date: Hypertension  6/14/2012: Metabolic syndrome  No date: Myocardial infarction  No date: On home oxygen therapy      Comment:  states uses 2L at night or when sat < 90  2012: Stroke      Comment:  left hand shaky, loss of balance    Past Surgical History:  07/25/2017: adranel tumor removal       Comment:  Dr Griggs  No date: ADRENALECTOMY  12/09/2016: AORTIC VALVE REPLACEMENT  No date: arthroscopy lt knee; Right  No date: BLADDER REPAIR      Comment:  sling  2004: COLONOSCOPY      Comment:  10 year recheck  12/09/2016: CORONARY ARTERY BYPASS GRAFT      Comment:  X3  No date: HYSTERECTOMY  No date: THYROIDECTOMY    Review of patient's family history indicates:  Problem: Arthritis      Relation: Mother          Age of Onset: (Not Specified)  Problem: Diabetes      Relation: Mother          Age of Onset: (Not Specified)  Problem: Heart disease      Relation: Mother          Age of Onset: (Not Specified)  Problem: Hypertension      Relation: Mother          Age of Onset: (Not Specified)  Problem: Hypertension      Relation: Father          Age of Onset: (Not Specified)  Problem: Heart disease      Relation: Father          Age of Onset: (Not Specified)  Problem: Mental illness      Relation: Father          Age of Onset: (Not Specified)  Problem: Asthma      Relation: Sister          Age of Onset: (Not Specified)  Problem: Diabetes      Relation: Sister          Age of Onset: (Not Specified)  Problem: Hypertension      Relation: Sister          Age of Onset: (Not Specified)  Problem: Asthma      Relation: Son          Age of Onset: (Not Specified)  Problem: COPD      Relation: Son          Age of Onset: (Not Specified)  Problem: Depression      Relation: Son          Age of Onset: (Not Specified)  Problem: Diabetes      Relation: Son          Age of Onset:  (Not Specified)  Problem: Hypertension      Relation: Son          Age of Onset: (Not Specified)  Problem: Stroke      Relation: Son          Age of Onset: (Not Specified)  Problem: Diabetes      Relation: Maternal Uncle          Age of Onset: (Not Specified)  Problem: Heart disease      Relation: Maternal Uncle          Age of Onset: (Not Specified)  Problem: Mental illness      Relation: Paternal Aunt          Age of Onset: (Not Specified)  Problem: Cancer      Relation: Paternal Aunt          Age of Onset: (Not Specified)  Problem: Heart disease      Relation: Paternal Aunt          Age of Onset: (Not Specified)  Problem: Hypertension      Relation: Paternal Aunt          Age of Onset: (Not Specified)  Problem: Heart disease      Relation: Paternal Uncle          Age of Onset: (Not Specified)  Problem: Hypertension      Relation: Maternal Grandmother          Age of Onset: (Not Specified)  Problem: Mental illness      Relation: Maternal Grandmother          Age of Onset: (Not Specified)  Problem: Aneurysm      Relation: Maternal Grandfather          Age of Onset: (Not Specified)  Problem: Mental illness      Relation: Paternal Grandmother          Age of Onset: (Not Specified)  Problem: Heart disease      Relation: Paternal Grandfather          Age of Onset: (Not Specified)  Problem: Melanoma      Relation: Neg Hx          Age of Onset: (Not Specified)  Problem: Psoriasis      Relation: Neg Hx          Age of Onset: (Not Specified)  Problem: Lupus      Relation: Neg Hx          Age of Onset: (Not Specified)  Problem: Eczema      Relation: Neg Hx          Age of Onset: (Not Specified)    Social History    Tobacco Use      Smoking status: Never Smoker      Smokeless tobacco: Never Used    Alcohol use: Yes      Comment: seldom    Drug use: No    Current Outpatient Medications on File Prior to Visit:  aspirin (ECOTRIN) 81 MG EC tablet, Take 81 mg by mouth once daily., Disp: , Rfl:   azelastine (ASTELIN) 137 mcg (0.1  %) nasal spray, INSTILL 1 SPRAY IEN BID, Disp: , Rfl: 0  cholecalciferol, vitamin D3, (VITAMIN D3) 1,000 unit capsule, Take 1,000 Units by mouth once daily., Disp: , Rfl:   coQ10, ubiquinol, 200 mg Cap, Take 1 capsule by mouth once daily., Disp: , Rfl:   cyanocobalamin (VITAMIN B-12) 250 MCG tablet, Take 250 mcg by mouth once daily., Disp: , Rfl:   docusate sodium (COLACE) 100 MG capsule, Take 1 capsule (100 mg total) by mouth 2 (two) times daily., Disp: , Rfl: 0  FLUoxetine (PROZAC) 20 MG capsule, Take 1 capsule (20 mg total) by mouth once daily., Disp: 30 capsule, Rfl: 0  GUAIFENESIN/PSEUDOEPHEDRNE HCL (MUCINEX D ORAL), Take 1 tablet by mouth 2 (two) times daily as needed. , Disp: , Rfl:   L. ACIDOPHILUS/BIFIDO LONGUM (PROBIOTIC PEARLS ORAL), Take 1 each by mouth 2 (two) times daily as needed. , Disp: , Rfl:   lancets Misc, True track Check glucose once daily, Disp: 100 each, Rfl: 3  levocetirizine (XYZAL) 5 MG tablet, Take 5 mg by mouth every evening., Disp: , Rfl:   magnesium 250 mg Tab, Take 1 tablet by mouth once daily. , Disp: , Rfl:   metformin (GLUCOPHAGE) 500 MG tablet, TAKE 1 TABLET(500 MG) BY MOUTH TWICE DAILY WITH MEALS, Disp: 180 tablet, Rfl: 1  metoprolol tartrate (LOPRESSOR) 100 MG tablet, Take 0.5 tablets (50 mg total) by mouth 2 (two) times daily. Hold for SBP<110 (Patient taking differently: Take 100 mg by mouth 2 (two) times daily. Hold for SBP<110), Disp: 270 tablet, Rfl: 3  potassium chloride (MICRO-K) 8 mEq CpSR, Take 1 capsule (8 mEq total) by mouth once daily., Disp: 90 capsule, Rfl: 1  predniSONE (DELTASONE) 20 MG tablet, Take 20 mg by mouth daily as needed (as directed)., Disp: , Rfl:   theophylline 400 mg Tb24, TK 1 T PO BID, Disp: , Rfl: 1  torsemide (DEMADEX) 20 MG Tab, Take 20 mg by mouth once daily., Disp: , Rfl:   TRUEPLUS LANCETS 33 gauge Misc, USE TO TEST BLOOD SUGAR ONCE D, Disp: , Rfl: 3  vitamin A 8000 UNIT capsule, Take 8,000 Units by mouth once daily., Disp: , Rfl:   zolpidem  (AMBIEN) 5 MG Tab, TK 1 T PO QD HS, Disp: , Rfl: 0  (DISCONTINUED) albuterol-ipratropium (DUO-NEB) 2.5 mg-0.5 mg/3 mL nebulizer solution, Take 3 mLs by nebulization every 6 (six) hours as needed for Wheezing. Rescue, Disp: , Rfl:   (DISCONTINUED) amlodipine (NORVASC) 10 MG tablet, Take 1 tablet (10 mg total) by mouth once daily. Hold for SBP <120., Disp: 90 tablet, Rfl: 3  (DISCONTINUED) levothyroxine (SYNTHROID) 112 MCG tablet, Take 1 tablet (112 mcg total) by mouth once daily., Disp: 30 tablet, Rfl: 0  (DISCONTINUED) lisinopril (PRINIVIL,ZESTRIL) 40 MG tablet, TAKE 1 TABLET(40 MG) BY MOUTH ONCE DAILY (Patient taking differently: TAKE 1-2 TABLET(40 MG) BY MOUTH ONCE DAILY), Disp: 90 tablet, Rfl: 1  (DISCONTINUED) tramadol (ULTRAM) 50 mg tablet, TAKE 1 TABLET BY MOUTH EVERY 6 HOURS AS NEEDED FOR PAIN, Disp: 120 tablet, Rfl: 0  (DISCONTINUED) VENTOLIN HFA 90 mcg/actuation inhaler, INHALE 2 PUFFS BY MOUTH EVERY 4 TO 6 HOURS AS NEEDED FOR SHORTNESS OF BREATH, Disp: 18 g, Rfl: 0  (DISCONTINUED) blood sugar diagnostic Strp, True track check blood glucose once daily, Disp: 100 each, Rfl: 3  (DISCONTINUED) cefdinir (OMNICEF) 300 MG capsule, TK ONE C PO  BID X 10 DAYS, Disp: , Rfl: 0  (DISCONTINUED) coQ10, ubiquinol, 100 mg Cap, Take 1 capsule by mouth 2 (two) times daily. , Disp: , Rfl:   (DISCONTINUED) ezetimibe (ZETIA) 10 mg tablet, Take 1 tablet (10 mg total) by mouth once daily., Disp: 90 tablet, Rfl: 3  (DISCONTINUED) pitavastatin calcium 1 mg Tab tablet, Take one every other evening, Disp: 45 tablet, Rfl: 3  (DISCONTINUED) prazosin (MINIPRESS) 1 MG Cap, TAKE 1 CAPSULE BY MOUTH TWICE DAILY, Disp: 180 capsule, Rfl: 3    No current facility-administered medications on file prior to visit.     Fecal Occult Blood Test (FOBT)/FitKit due on 1950  High Dose Statin due on 09/17/1971  Zoster Vaccine due on 09/17/2010  Eye Exam due on 05/07/2015  Pneumococcal (65+)(2 of 2 - PCV13) due on 09/30/2016  Mammogram due on  04/23/2017  Foot Exam due on 03/23/2018  Influenza Vaccine due on 08/01/2018        Review of Systems   Constitutional: Negative for activity change, appetite change, diaphoresis, fatigue and fever.   HENT: Negative for congestion, postnasal drip, rhinorrhea, sinus pressure and sinus pain.    Eyes: Negative for photophobia and visual disturbance.   Respiratory: Negative for cough, chest tightness and shortness of breath.    Cardiovascular: Negative for chest pain and palpitations.   Gastrointestinal: Negative for abdominal pain, constipation, diarrhea, nausea and vomiting.   Endocrine: Negative for polydipsia and polyuria.   Genitourinary: Negative for dysuria, frequency and hematuria.   Musculoskeletal: Positive for arthralgias and gait problem. Negative for joint swelling.   Skin: Negative for color change and rash.   Neurological: Positive for numbness. Negative for dizziness, light-headedness and headaches.   Psychiatric/Behavioral: Positive for sleep disturbance (She request refill of Ambien). Negative for dysphoric mood. The patient is not nervous/anxious.        Objective:      Physical Exam   Constitutional: She is oriented to person, place, and time. She appears well-developed. No distress.   Elevated blood pressure off medications  Obese with a BMI of 32.2 she is up 16.3 lb from her last visit with me August 1, 2017   HENT:   Head: Normocephalic and atraumatic.   Right Ear: External ear normal.   Left Ear: External ear normal.   Nose: Nose normal.   Mouth/Throat: Oropharynx is clear and moist. No oropharyngeal exudate.   Eyes: Conjunctivae are normal. Pupils are equal, round, and reactive to light. Right eye exhibits no discharge. Left eye exhibits no discharge. No scleral icterus.   Slight dysconjugate gaze   Neck: Normal range of motion. Neck supple. No JVD present. No thyromegaly (Status post thyroidectomy) present.   Cardiovascular: Normal rate, regular rhythm and normal heart sounds. Exam reveals no  friction rub.   No murmur heard.  Carotid pulses 2+ no bruit   Pulmonary/Chest: Effort normal and breath sounds normal. No stridor. No respiratory distress. She has no wheezes. She has no rales. She exhibits no tenderness.   Abdominal: Soft. Bowel sounds are normal. She exhibits no distension and no mass. There is no tenderness. There is no rebound and no guarding.   Musculoskeletal: Normal range of motion. She exhibits tenderness (Tender over right greater trochanter). She exhibits no edema or deformity.   Neurological: She is alert and oriented to person, place, and time. A cranial nerve deficit is present. Coordination abnormal.   Skin: Skin is warm and dry. No rash noted. She is not diaphoretic. No erythema.   Psychiatric: She has a normal mood and affect. Her behavior is normal. Thought content normal.   Nursing note and vitals reviewed.      Assessment:       1. Trochanteric bursitis of right hip    2. Asthma with chronic obstructive pulmonary disease (COPD)    3. Coronary artery disease involving native coronary artery of native heart without angina pectoris    4. Essential hypertension    5. Hyperlipidemia, unspecified hyperlipidemia type    6. Leukocytosis, unspecified type    7. Thyroid disorder, thyroidectomy 2003    8. Type 2 diabetes mellitus with hyperglycemia, unspecified whether long term insulin use    9. Adrenal Cushing's syndrome    10. Osteoarthritis, unspecified osteoarthritis type, unspecified site    11. Statin intolerance    12. Colon cancer screening    13. COPD (chronic obstructive pulmonary disease)    14. Arthritis of knee    15. Insomnia, unspecified type    16. Goiter        Plan:       1. Trochanteric bursitis of right hip  - Ambulatory referral to Physical Medicine Rehab  - X-Ray Hip 2 View Right; Future    2. Asthma with chronic obstructive pulmonary disease (COPD)  Relatively asymptomatic at this time    3. Coronary artery disease involving native coronary artery of native heart  without angina pectoris  Asymptomatic    4. Essential hypertension  Resume amlodipine and lisinopril as ordered  - Comprehensive metabolic panel; Future  - CBC auto differential; Future  - amLODIPine (NORVASC) 10 MG tablet; Take 1 tablet (10 mg total) by mouth once daily. Hold for SBP <120.  Dispense: 90 tablet; Refill: 3  - lisinopril (PRINIVIL,ZESTRIL) 40 MG tablet; TAKE 1 TABLET(40 MG) BY MOUTH ONCE DAILY  Dispense: 90 tablet; Refill: 3    5. Hyperlipidemia, unspecified hyperlipidemia type  Await lab  - Comprehensive metabolic panel; Future  - Lipid panel; Future    6. Leukocytosis, unspecified type  Await lab  - CBC auto differential; Future    7. Thyroid disorder, thyroidectomy 2003  Await lab  - TSH; Future  - levothyroxine (SYNTHROID) 112 MCG tablet; Take 1 tablet (112 mcg total) by mouth once daily.  Dispense: 30 tablet; Refill: 0    8. Type 2 diabetes mellitus with hyperglycemia, unspecified whether long term insulin use  Await lab  - Hemoglobin A1c; Future  - Comprehensive metabolic panel; Future  - Lipid panel; Future    9. Adrenal Cushing's syndrome  Await lab  - Comprehensive metabolic panel; Future    10. Osteoarthritis, unspecified osteoarthritis type, unspecified site    11. Statin intolerance    12. Colon cancer screening  Fit kit previously ordered, given to patient today    13. COPD (chronic obstructive pulmonary disease)  - albuterol (VENTOLIN HFA) 90 mcg/actuation inhaler; INHALE 2 PUFFS BY MOUTH EVERY 4 TO 6 HOURS AS NEEDED FOR SHORTNESS OF BREATH  Dispense: 18 g; Refill: 5  - albuterol-ipratropium (DUO-NEB) 2.5 mg-0.5 mg/3 mL nebulizer solution; Take 3 mLs by nebulization every 6 (six) hours as needed for Wheezing. Rescue  Dispense: 180 mL; Refill: 2    14. Arthritis of knee  - traMADol (ULTRAM) 50 mg tablet; Take 1 tablet (50 mg total) by mouth every 6 (six) hours as needed.  Dispense: 120 tablet; Refill: 0    15. Insomnia, unspecified type  Declined zolpidem secondary to Beers list agent.   She was willing to try trazodone  - traZODone (DESYREL) 50 MG tablet; Take 1 tablet (50 mg total) by mouth nightly as needed for Insomnia.  Dispense: 30 tablet; Refill: 5    16. Goiter  Status post thyroidectomy         Patient readiness: acceptance and barriers:social stressors    During the course of the visit the patient was educated and counseled about the following:     Diabetes:  Discussed general issues about diabetes pathophysiology and management.  Addressed ADA diet.  Encouraged aerobic exercise.  Discussed foot care.  Reminded to get yearly retinal exam.  Hypertension:   Medication: Resume amlodipine 10 mg and lisinopril 40 mg daily as ordered.  Dietary sodium restriction.  Regular aerobic exercise.  Obesity:   General weight loss/lifestyle modification strategies discussed (elicit support from others; identify saboteurs; non-food rewards, etc).  Informal exercise measures discussed, e.g. taking stairs instead of elevator.  Regular aerobic exercise program discussed.    Goals: Diabetes: Maintain Hemoglobin A1C below 7, Hypertension: Reduce Blood Pressure and Obesity: Reduce calorie intake and BMI    Did patient meet goals/outcomes: No    The following self management tools provided: blood pressure log  blood glucose log  excercise log    Patient Instructions (the written plan) was given to the patient/family.     Time spent with patient: 45 minutes

## 2018-08-28 NOTE — PATIENT INSTRUCTIONS
Weight Management: Getting Started  Healthy bodies come in all shapes and sizes. Not all bodies are made to be thin. For some people, a healthy weight is higher than the average weight listed on weight charts. Your healthcare provider can help you decide on a healthy weight for you.    Reasons to lose weight  Losing weight can help with some health problems, such as high blood pressure, heart disease, diabetes, sleep apnea, and arthritis. You may also feel more energy.  Set your long-term goal  Your goal doesn't even have to be a specific weight. You may decide on a fitness goal (such as being able to walk 10 miles a week), or a health goal (such as lowering your blood pressure). Choose a goal that is measurable and reasonable, so you know when you've reached it. A goal of reaching a BMI of less than 25 is not always reasonable (or possible).   Make an action plan  Habits dont change overnight. Setting your goals too high can leave you feeling discouraged if you cant reach them. Be realistic. Choose one or two small changes you can make now. Set an action plan for how you are going to make these changes. When you can stick to this plan, keep making a few more small changes. Taking small steps will help you stay on the path to success.  Track your progress  Write down your goals. Then, keep a daily record of your progress. Write down what you eat and how active you are. This record lets you look back on how much youve done. It may also help when youre feeling frustrated. Reward yourself for success. Even if you dont reach every goal, give yourself credit for what you do get done.  Get support  Encouragement from others can help make losing weight easier. Ask your family members and friends for support. They may even want to join you. Also look to your healthcare provider, registered dietitian, and  for help. Your local hospital can give you more information about nutrition, exercise, and  weight loss.  Date Last Reviewed: 1/31/2016  © 3811-4549 [employer]. [address] [place] [address], [place] [address]. All rights reserved. This information is not intended as a substitute for professional medical care. Always follow your healthcare professional's instructions.        Controlling High Blood Pressure  High blood pressure (hypertension) is often called the silent killer. This is because many people who have it dont know it. High blood pressure is defined as 140/90 mm Hg or higher. Know your blood pressure and remember to check it regularly. Doing so can save your life. Here are some things you can do to help control your blood pressure.    Choose heart-healthy foods  · Select low-salt, low-fat foods. Limit sodium intake to 2,400 mg per day or the amount suggested by your healthcare provider.  · Limit canned, dried, cured, packaged, and fast foods. These can contain a lot of salt.  · Eat 8 to 10 servings of fruits and vegetables every day.  · Choose lean meats, fish, or chicken.  · Eat whole-grain pasta, brown rice, and beans.  · Eat 2 to 3 servings of low-fat or fat-free dairy products.  · Ask your doctor about the DASH eating plan. This plan helps reduce blood pressure.  · When you go to a restaurant, ask that your meal be prepared with no added salt.  Maintain a healthy weight  · Ask your healthcare provider how many calories to eat a day. Then stick to that number.  · Ask your healthcare provider what weight range is healthiest for you. If you are overweight, a weight loss of only 3% to 5% of your body weight can help lower blood pressure. Generally, a good weight loss goal is to lose 10% of your body weight in a year.  · Limit snacks and sweets.  · Get regular exercise.  Get up and get active  · Choose activities you enjoy. Find ones you can do with friends or family. This includes bicycling, dancing, walking, and jogging.  · Park farther away from building entrances.  · Use stairs  instead of the elevator.  · When you can, walk or bike instead of driving.  · [place] leaves, garden, or do household repairs.  · Be active at a moderate to vigorous level of physical activity for at least 40 minutes for a minimum of 3 to 4 days a week.   Manage stress  · Make time to relax and enjoy life. Find time to laugh.  · Communicate your concerns with your loved ones and your healthcare provider.  · Visit with family and friends, and keep up with hobbies.  Limit alcohol and quit smoking  · Men should have no more than 2 drinks per day.  · Women should have no more than 1 drink per day.  · Talk with your healthcare provider about quitting smoking. Smoking significantly increases your risk for heart disease and stroke. Ask your healthcare provider about community smoking cessation programs and other options.  Medicines  If lifestyle changes arent enough, your healthcare provider may prescribe high blood pressure medicine. Take all medicines as prescribed. If you have any questions about your medicines, ask your healthcare provider before stopping or changing them.   Date Last Reviewed: 4/27/2016 © 2000-2017 [employer] [address] [place] [address], [place] [address]. All rights reserved. This information is not intended as a substitute for professional medical care. Always follow your healthcare professional's instructions.

## 2018-08-30 ENCOUNTER — TELEPHONE (OUTPATIENT)
Dept: FAMILY MEDICINE | Facility: CLINIC | Age: 68
End: 2018-08-30

## 2018-08-30 DIAGNOSIS — E03.9 HYPOTHYROIDISM, UNSPECIFIED TYPE: Primary | ICD-10-CM

## 2018-08-30 NOTE — TELEPHONE ENCOUNTER
Lab results discussed with patient- she was out of thyroid med for a couple months- says she misses about 2 doses a week usually.

## 2018-08-30 NOTE — TELEPHONE ENCOUNTER
----- Message from Evan Sánchez MD sent at 8/29/2018  8:52 PM CDT -----  As usual, her cholesterol levels are quite high.  The triglycerides are high enough that we could not measure the LDL/bad cholesterol but her HDL is fairly good.  She is intolerant of statins so she is going to have to rely on diet and exercise.    Her thyroid level is quite low.  She was not taking her blood pressure medications, has she been taking her thyroid?    The chemistry panel looks pretty good with a normal blood sugar, normal kidney function, and normal liver functions.The A1c indicates overall blood sugar control is excellent.

## 2018-08-30 NOTE — TELEPHONE ENCOUNTER
----- Message from Kris Concepcion sent at 8/30/2018  9:58 AM CDT -----  Type:  Patient Returning Call    Who Called:  Patient  Who Left Message for Patient:  Not documented  Does the patient know what this is regarding?:  Test results  Best Call Back Number: [phone]

## 2018-09-06 ENCOUNTER — LAB VISIT (OUTPATIENT)
Dept: LAB | Facility: HOSPITAL | Age: 68
End: 2018-09-06
Attending: FAMILY MEDICINE
Payer: MEDICARE

## 2018-09-06 DIAGNOSIS — Z12.11 COLON CANCER SCREENING: ICD-10-CM

## 2018-09-06 PROCEDURE — 82274 ASSAY TEST FOR BLOOD FECAL: CPT

## 2018-09-07 LAB — HEMOCCULT STL QL IA: NEGATIVE

## 2018-09-27 DIAGNOSIS — E07.9 THYROID DISORDER: Chronic | ICD-10-CM

## 2018-09-27 RX ORDER — LEVOTHYROXINE SODIUM 112 UG/1
TABLET ORAL
Qty: 30 TABLET | Refills: 2 | Status: SHIPPED | OUTPATIENT
Start: 2018-09-27 | End: 2018-10-31

## 2018-10-30 ENCOUNTER — LAB VISIT (OUTPATIENT)
Dept: LAB | Facility: HOSPITAL | Age: 68
End: 2018-10-30
Attending: FAMILY MEDICINE
Payer: MEDICARE

## 2018-10-30 DIAGNOSIS — E03.9 HYPOTHYROIDISM, UNSPECIFIED TYPE: ICD-10-CM

## 2018-10-30 LAB
T4 FREE SERPL-MCNC: 1.47 NG/DL
TSH SERPL DL<=0.005 MIU/L-ACNC: 14.04 UIU/ML

## 2018-10-30 PROCEDURE — 84443 ASSAY THYROID STIM HORMONE: CPT

## 2018-10-30 PROCEDURE — 36415 COLL VENOUS BLD VENIPUNCTURE: CPT | Mod: PO

## 2018-10-30 PROCEDURE — 84439 ASSAY OF FREE THYROXINE: CPT

## 2018-10-31 ENCOUNTER — TELEPHONE (OUTPATIENT)
Dept: FAMILY MEDICINE | Facility: CLINIC | Age: 68
End: 2018-10-31

## 2018-10-31 DIAGNOSIS — E07.9 THYROID DISORDER: Chronic | ICD-10-CM

## 2018-10-31 DIAGNOSIS — E03.9 ACQUIRED HYPOTHYROIDISM: Primary | ICD-10-CM

## 2018-10-31 RX ORDER — LEVOTHYROXINE SODIUM 125 UG/1
125 TABLET ORAL
Qty: 30 TABLET | Refills: 5 | Status: SHIPPED | OUTPATIENT
Start: 2018-10-31 | End: 2019-01-10

## 2018-10-31 NOTE — TELEPHONE ENCOUNTER
----- Message from Evan Sánchez MD sent at 10/30/2018  8:14 PM CDT -----  Thyroid level is better but still significantly hypothyroid.  I would recommend we increase the Synthroid to 125 mcg and recheck again in 2 months.

## 2018-11-05 ENCOUNTER — INITIAL CONSULT (OUTPATIENT)
Dept: PHYSICAL MEDICINE AND REHAB | Facility: CLINIC | Age: 68
End: 2018-11-05
Attending: FAMILY MEDICINE
Payer: MEDICARE

## 2018-11-05 VITALS
WEIGHT: 176 LBS | DIASTOLIC BLOOD PRESSURE: 61 MMHG | HEIGHT: 62 IN | BODY MASS INDEX: 32.39 KG/M2 | SYSTOLIC BLOOD PRESSURE: 131 MMHG | HEART RATE: 66 BPM

## 2018-11-05 DIAGNOSIS — M25.559 ARTHRALGIA OF HIP, UNSPECIFIED LATERALITY: ICD-10-CM

## 2018-11-05 DIAGNOSIS — M67.951 TENDINOPATHY OF RIGHT GLUTEAL REGION: Primary | ICD-10-CM

## 2018-11-05 DIAGNOSIS — M70.61 GREATER TROCHANTERIC BURSITIS OF RIGHT HIP: ICD-10-CM

## 2018-11-05 PROCEDURE — 99213 OFFICE O/P EST LOW 20 MIN: CPT | Mod: PBBFAC,PN | Performed by: PHYSICAL MEDICINE & REHABILITATION

## 2018-11-05 PROCEDURE — 99203 OFFICE O/P NEW LOW 30 MIN: CPT | Mod: 25,S$PBB,, | Performed by: PHYSICAL MEDICINE & REHABILITATION

## 2018-11-05 PROCEDURE — 20611 DRAIN/INJ JOINT/BURSA W/US: CPT | Mod: PBBFAC,PN | Performed by: PHYSICAL MEDICINE & REHABILITATION

## 2018-11-05 PROCEDURE — 99999 PR PBB SHADOW E&M-EST. PATIENT-LVL III: CPT | Mod: PBBFAC,,, | Performed by: PHYSICAL MEDICINE & REHABILITATION

## 2018-11-05 RX ORDER — BETAMETHASONE SODIUM PHOSPHATE AND BETAMETHASONE ACETATE 3; 3 MG/ML; MG/ML
6 INJECTION, SUSPENSION INTRA-ARTICULAR; INTRALESIONAL; INTRAMUSCULAR; SOFT TISSUE
Status: DISCONTINUED | OUTPATIENT
Start: 2018-11-05 | End: 2018-11-05 | Stop reason: HOSPADM

## 2018-11-05 RX ADMIN — Medication 6 MG: at 03:11

## 2018-11-05 NOTE — LETTER
November 5, 2018      Evan Sánchez MD  [address]  [place] [address]           [facility]  [address] [place] [address]  [address]  [place] [address]  Phone: [phone]  Fax: [phone]          Patient: Tereza Larose   MR Number: [id number]   YOB: 1950   Date of Visit: 11/5/2018       Dear Dr. Evan Sánchez:    Thank you for referring Tereza Larose to me for evaluation. Attached you will find relevant portions of my assessment and plan of care.    If you have questions, please do not hesitate to call me. I look forward to following Tereza Larose along with you.    Sincerely,    Ray Schulte MD    Enclosure  CC:  No Recipients    If you would like to receive this communication electronically, please contact [email or web address] or [phone] to request more information on [employer] access.    For providers and/or their staff who would like to refer a patient to [facility], please contact us through our one-stop-shop provider referral line, [facility], at [phone].    If you feel you have received this communication in error or would no longer like to receive these types of communications, please e-mail [email or web address]

## 2018-11-05 NOTE — PROGRESS NOTES
[facility]    Consulting Provider: Dr. Evan Sánchez    CHIEF COMPLAINT:   Chief Complaint   Patient presents with    Hip Pain     right hip pain     HISTORY OF PRESENT ILLNESS: Tereza Larose is a 68 y.o. female who presents to me via consultation by Dr Sánchez for evaluation right hip pain and possible right GTB injection.    Today Ms Larose is presenting with right hip pain.  Pain is located on the lateral portion of the right hip. Pain has been present for > 5 years. She describes the pain as sharp pain; pain radiates down to anterior knee. She has history of lumbar spinal stenosis.  Pain 6-7/10 on pain scale.  The pain is constant.  She states that pain is worse with walking and standing.  She  Reports pain at night. Alleviating factors include tramadol 5 mg and warm showers. She has history of a a GTB injection in the past which provided significant relief. She denies any temperature changes, swelling, or color change to her hip area.     Review of Systems   Constitutional: Negative for fever.   HENT: Negative for drooling.    Eyes: Negative for discharge.   Respiratory: Negative for choking.    Cardiovascular: Negative for chest pain.   Genitourinary: Negative for flank pain.   Skin: Negative for wound.   Allergic/Immunologic: Negative for immunocompromised state.   Neurological: Negative for tremors and syncope.   Psychiatric/Behavioral: Negative for behavioral problems.     Past Medical History:   Past Medical History:   Diagnosis Date    Allergy     Arthritis     Asthma     Brain bleed     COPD (chronic obstructive pulmonary disease)     Depression     Diabetes mellitus type II     Diverticulitis     Fatty liver     GERD (gastroesophageal reflux disease)     Goiter     s/p thyroidectomy    Heart murmur     Hernia of abdominal wall     History of intracranial hemorrhage 6/15/2013    History of non-ST elevation myocardial infarction (NSTEMI) 6/15/2013     Hyperlipidemia     Hypertension     Metabolic syndrome 6/14/2012    Myocardial infarction     On home oxygen therapy     states uses 2L at night or when sat < 90    Stroke 2012    left hand shaky, loss of balance       Past Surgical History:   Past Surgical History:   Procedure Laterality Date    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/25/2017    Dr Griggs    ADRENALECTOMY      ADRENALECTOMY-LAPAROSCOPIC 46041 Left 7/25/2017    Performed by [facility]ie Tyson [facility] at [facility] by [facility]    AORTIC VALVE REPLACEMENT  12/09/2016    arthroscopy lt knee Right     BLADDER REPAIR      sling    COLONOSCOPY  2004    10 year recheck    CORONARY ARTERY BYPASS GRAFT  12/09/2016    X3    HYSTERECTOMY      THYROIDECTOMY         Family History:   Family History   Problem Relation Age of Onset    Arthritis Mother     Diabetes Mother     Heart disease Mother     Hypertension Mother     Hypertension Father     Heart disease Father     Mental illness Father     Asthma Sister     Diabetes Sister     Hypertension Sister     Asthma Son     COPD Son     Depression Son     Diabetes Son     Hypertension Son     Stroke Son     Diabetes Maternal Uncle     Heart disease Maternal Uncle     Mental illness Paternal Aunt     Cancer Paternal Aunt     Heart disease Paternal Aunt     Hypertension Paternal Aunt     Heart disease Paternal Uncle     Hypertension Maternal Grandmother     Mental illness Maternal Grandmother     Aneurysm Maternal Grandfather     Mental illness Paternal Grandmother     Heart disease Paternal Grandfather     Melanoma Neg Hx     Psoriasis Neg Hx     Lupus Neg Hx     Eczema Neg Hx        Medications:   Current Outpatient Medications on File Prior to Visit   Medication Sig Dispense Refill    albuterol (VENTOLIN HFA) 90 mcg/actuation inhaler INHALE 2 PUFFS BY MOUTH EVERY 4 TO 6 HOURS AS NEEDED FOR SHORTNESS OF BREATH 18 g 5    albuterol-ipratropium (DUO-NEB) 2.5 mg-0.5 mg/3 mL nebulizer solution Take 3  mLs by nebulization every 6 (six) hours as needed for Wheezing. Rescue 180 mL 2    amLODIPine (NORVASC) 10 MG tablet Take 1 tablet (10 mg total) by mouth once daily. Hold for SBP <120. 90 tablet 3    aspirin (ECOTRIN) 81 MG EC tablet Take 81 mg by mouth once daily.      azelastine (ASTELIN) 137 mcg (0.1 %) nasal spray INSTILL 1 SPRAY IEN BID  0    cholecalciferol, vitamin D3, (VITAMIN D3) 1,000 unit capsule Take 1,000 Units by mouth once daily.      coQ10, ubiquinol, 200 mg Cap Take 1 capsule by mouth once daily.      cyanocobalamin (VITAMIN B-12) 250 MCG tablet Take 250 mcg by mouth once daily.      docusate sodium (COLACE) 100 MG capsule Take 1 capsule (100 mg total) by mouth 2 (two) times daily.  0    FLUoxetine (PROZAC) 20 MG capsule Take 1 capsule (20 mg total) by mouth once daily. 30 capsule 0    GUAIFENESIN/PSEUDOEPHEDRNE HCL (MUCINEX D ORAL) Take 1 tablet by mouth 2 (two) times daily as needed.       L. ACIDOPHILUS/BIFIDO LONGUM (PROBIOTIC PEARLS ORAL) Take 1 each by mouth 2 (two) times daily as needed.       lancets Misc True track Check glucose once daily 100 each 3    levocetirizine (XYZAL) 5 MG tablet Take 5 mg by mouth every evening.      levothyroxine (SYNTHROID) 125 MCG tablet Take 1 tablet (125 mcg total) by mouth before breakfast. 30 tablet 5    lisinopril (PRINIVIL,ZESTRIL) 40 MG tablet TAKE 1 TABLET(40 MG) BY MOUTH ONCE DAILY 90 tablet 3    magnesium 250 mg Tab Take 1 tablet by mouth once daily.       metformin (GLUCOPHAGE) 500 MG tablet TAKE 1 TABLET(500 MG) BY MOUTH TWICE DAILY WITH MEALS 180 tablet 1    metoprolol tartrate (LOPRESSOR) 100 MG tablet Take 0.5 tablets (50 mg total) by mouth 2 (two) times daily. Hold for SBP<110 (Patient taking differently: Take 100 mg by mouth 2 (two) times daily. Hold for SBP<110) 270 tablet 3    potassium chloride (MICRO-K) 8 mEq CpSR Take 1 capsule (8 mEq total) by mouth once daily. 90 capsule 1    predniSONE (DELTASONE) 20 MG tablet Take 20  mg by mouth daily as needed (as directed).      theophylline 400 mg Tb24 TK 1 T PO BID  1    torsemide (DEMADEX) 20 MG Tab Take 20 mg by mouth once daily.      traMADol (ULTRAM) 50 mg tablet Take 1 tablet (50 mg total) by mouth every 6 (six) hours as needed. 120 tablet 0    traZODone (DESYREL) 50 MG tablet Take 1 tablet (50 mg total) by mouth nightly as needed for Insomnia. 30 tablet 5    TRUEPLUS LANCETS 33 gauge Misc USE TO TEST BLOOD SUGAR ONCE D  3    vitamin A 8000 UNIT capsule Take 8,000 Units by mouth once daily.      zolpidem (AMBIEN) 5 MG Tab TK 1 T PO QD HS  0     No current facility-administered medications on file prior to visit.        Allergies:   Review of patient's allergies indicates:   Allergen Reactions    Corn Itching     Other reaction(s): Sneezing  Other reaction(s): Rhinorrhea    Potato starch Hives     Other reaction(s): Sneezing  Other reaction(s): Rhinorrhea    Pravastatin Other (See Comments)     Muscle pain    Statins-hmg-coa reductase inhibitors Other (See Comments)     Muscle pain, ptiavastin     Hydrochlorothiazide Other (See Comments)     weakness    Welchol [colesevelam] Other (See Comments)     Weakness      Social History:   Social History     Socioeconomic History    Marital status:      Spouse name: None    Number of children: None    Years of education: None    Highest education level: None   Social Needs    Financial resource strain: None    Food insecurity - worry: None    Food insecurity - inability: None    Transportation needs - medical: None    Transportation needs - non-medical: None   Occupational History    None   Tobacco Use    Smoking status: Never Smoker    Smokeless tobacco: Never Used   Substance and Sexual Activity    Alcohol use: Yes     Comment: seldom    Drug use: No    Sexual activity: No   Other Topics Concern    Are you pregnant or think you may be? Not Asked    Breast-feeding Not Asked   Social History Narrative    None  "    Tereza lives in [place], [place]. She is a retired .    PHYSICAL EXAMINATION:   General    Vitals:    11/05/18 1349   BP: 131/61   Pulse: 66   Weight: 79.8 kg (176 lb)   Height: 5' 2" (1.575 m)     Constitutional: Oriented to person, place, and time. No apparent distress. Appears well-developed and well-nourished. Pleasant.  HENT:   Head: Normocephalic and atraumatic.   Eyes: Right eye exhibits no discharge. Left eye exhibits no discharge. No scleral icterus.   Pulmonary/Chest: Effort normal. No respiratory distress.   Abdominal: There is no guarding.   Neurological: Alert and oriented to person, place, and time.   Psychiatric: Behavior is normal.   Right Hip Exam     Tenderness   The patient is experiencing tenderness in the greater trochanter.    Range of Motion   Abduction: 40   Adduction: 25   Flexion: 130   External rotation: 60   Internal rotation: 20     Muscle Strength   Abduction: 5/5   Adduction: 5/5   Flexion: 5/5     Tests   ANUEL: negative    Other   Erythema: absent  Scars: absent  Sensation: normal  Pulse: present      Left Hip Exam   Left hip exam is normal.        INSPECTION: There is no swelling, ecchymoses, erythema or gross deformity of the right hip.  LIGAMENTOUS LAXITY AND STABILITY: Negative ANUEL test. Negative Gaenslen's. No pain with SI joint compression. Negative log roll.  GAIT/DYNAMIC:wnl    Imaging  Hip X-ray 09/28/18  FINDINGS:  The bones are osteopenic.  There is prominent degenerative facet arthropathy observed in the lower lumbar spine at L4-L5 and L5-S1.  No radiographically evident acute fracture or osseous destructive process.  There is degenerative change of the symphysis pubis.  The SI joints are unremarkable.  No hip joint space narrowing.  No osteonecrosis of the femoral heads appreciated radiographically.  There is bilateral iliac wing enthesophyte formation.    Data Reviewed: X-ray    Supportive Actions: Independent visualization of images or test " "specimens    ASSESSMENT:   1. Tendinopathy of right gluteal region    2. Greater trochanteric bursitis of right hip    3. Arthralgia of hip, unspecified laterality      PLAN:     1. Time was spent reviewing the above diagnosis in depth with Tereza calderon, including acute management and rehabilitation.     2. Ms Larose physical exam findings and history correlate with right gluteal tendinopathy.  After discussing risks and benefits of corticosteroid injections, we proceeded with ultrasound-guided injection of the right gluteal bursae, see separate procedure.    3.  I recommended combining today's injection with formal physical therapy working on hip flexibility, strengthening, and transitioning to a home exercise program for long-term maintenance.  We will help facilitate her getting set up with physical therapy.      4. RTC in 6 weeks prn.    This is a consult from Dr. Evan Sánchez. Please see the "Communications" section of Epic to see how the consulting physician received the report of today's findings and recommendations. If it's an [facility] physician, it will be forwarded to his/her "in basket".    The above note was completed, in part, with the aid of Dragon dictation software/hardware. Translation errors may be present.    "

## 2018-11-05 NOTE — PROCEDURES
Large Joint Aspiration/Injection: R greater trochanteric bursa  Date/Time: 11/5/2018 3:34 PM  Performed by: Ray Schulte MD  Authorized by: Ray Schulte MD     Consent Done?:  Yes (Verbal)  Indications:  Pain  Procedure site marked: Yes    Timeout: Prior to procedure the correct patient, procedure, and site was verified      Location:  Hip  Site:  R greater trochanteric bursa  Prep: Patient was prepped and draped in usual sterile fashion    Ultrasonic Guidance for needle placement: Yes  Images are saved and documented.  Needle size:  22 G  Approach: Needle in plane, lateral to medial, posterior to anterior.  Medications:  6 mg betamethasone acetate-betamethasone sodium phosphate 6 mg/mL  Patient tolerance:  Patient tolerated the procedure well with no immediate complications    Additional Comments: Ultrasound guidance was used for correct needle placement, the images were saved will be uploaded to EMR.

## 2018-12-12 DIAGNOSIS — M17.10 ARTHRITIS OF KNEE: ICD-10-CM

## 2018-12-13 RX ORDER — TRAMADOL HYDROCHLORIDE 50 MG/1
TABLET ORAL
Qty: 120 TABLET | Refills: 0 | Status: SHIPPED | OUTPATIENT
Start: 2018-12-13 | End: 2019-02-17 | Stop reason: SDUPTHER

## 2018-12-14 NOTE — TELEPHONE ENCOUNTER
The  (Prescription Monitoring Program) website was checked with no inappropriate activity found.  She did get tramadol and zolpidem prior to her last visit and completion of the pain contract from Dr. Batres but nothing since.

## 2019-01-04 ENCOUNTER — LAB VISIT (OUTPATIENT)
Dept: LAB | Facility: HOSPITAL | Age: 69
End: 2019-01-04
Attending: FAMILY MEDICINE
Payer: MEDICARE

## 2019-01-04 DIAGNOSIS — E03.9 ACQUIRED HYPOTHYROIDISM: ICD-10-CM

## 2019-01-04 LAB
T4 FREE SERPL-MCNC: 1.16 NG/DL
TSH SERPL DL<=0.005 MIU/L-ACNC: 15.99 UIU/ML

## 2019-01-04 PROCEDURE — 84439 ASSAY OF FREE THYROXINE: CPT

## 2019-01-04 PROCEDURE — 84443 ASSAY THYROID STIM HORMONE: CPT

## 2019-01-04 PROCEDURE — 36415 COLL VENOUS BLD VENIPUNCTURE: CPT | Mod: PO

## 2019-01-09 ENCOUNTER — TELEPHONE (OUTPATIENT)
Dept: FAMILY MEDICINE | Facility: CLINIC | Age: 69
End: 2019-01-09

## 2019-01-09 DIAGNOSIS — E03.9 ACQUIRED HYPOTHYROIDISM: ICD-10-CM

## 2019-01-09 DIAGNOSIS — E11.65 TYPE 2 DIABETES MELLITUS WITH HYPERGLYCEMIA, WITHOUT LONG-TERM CURRENT USE OF INSULIN: Primary | ICD-10-CM

## 2019-01-09 RX ORDER — LEVOTHYROXINE SODIUM 125 UG/1
TABLET ORAL
Qty: 90 TABLET | Refills: 5 | OUTPATIENT
Start: 2019-01-09

## 2019-01-09 NOTE — TELEPHONE ENCOUNTER
Can she give me a rough estimate of how many days a week she did not take it.  If only missed a few we need to increase to 137mcg but if she missed it frequently we will stay at same dose, improve compliance and recheck TSH in 8 weeks.

## 2019-01-09 NOTE — TELEPHONE ENCOUNTER
----- Message from Janae Muse sent at 1/9/2019  9:28 AM CST -----  Contact: pt  Calling in regards to a missed call and test results and please advise [phone] (home)

## 2019-01-09 NOTE — TELEPHONE ENCOUNTER
Patient confirms she is taking Levothyroxine 125mcg but has missed a few doses over the holiday and travelling.

## 2019-01-10 DIAGNOSIS — E03.9 ACQUIRED HYPOTHYROIDISM: ICD-10-CM

## 2019-01-10 RX ORDER — LEVOTHYROXINE SODIUM 137 UG/1
TABLET ORAL
Qty: 90 TABLET | Refills: 0 | Status: SHIPPED | OUTPATIENT
Start: 2019-01-10 | End: 2019-03-20 | Stop reason: DRUGHIGH

## 2019-01-10 RX ORDER — LEVOTHYROXINE SODIUM 137 UG/1
137 TABLET ORAL
Qty: 30 TABLET | Refills: 2 | Status: SHIPPED | OUTPATIENT
Start: 2019-01-10 | End: 2019-01-10 | Stop reason: SDUPTHER

## 2019-01-10 NOTE — TELEPHONE ENCOUNTER
Synthroid 137 mcg daily sent to [facility] on [address].  She will need a TSH in 8 weeks.  She is also due for a BMP in A1c in late February.  Orders in for all 3.  Can be done altogether with the TSH.

## 2019-01-11 DIAGNOSIS — F32.A DEPRESSION, UNSPECIFIED DEPRESSION TYPE: ICD-10-CM

## 2019-01-11 RX ORDER — FLUOXETINE HYDROCHLORIDE 20 MG/1
CAPSULE ORAL
Qty: 30 CAPSULE | Refills: 5 | Status: SHIPPED | OUTPATIENT
Start: 2019-01-11 | End: 2019-11-11 | Stop reason: SDUPTHER

## 2019-02-17 DIAGNOSIS — M17.10 ARTHRITIS OF KNEE: ICD-10-CM

## 2019-02-19 ENCOUNTER — DOCUMENTATION ONLY (OUTPATIENT)
Dept: FAMILY MEDICINE | Facility: CLINIC | Age: 69
End: 2019-02-19

## 2019-02-19 RX ORDER — TRAMADOL HYDROCHLORIDE 50 MG/1
TABLET ORAL
Qty: 120 TABLET | Refills: 0 | Status: SHIPPED | OUTPATIENT
Start: 2019-02-19 | End: 2019-06-02 | Stop reason: SDUPTHER

## 2019-02-19 NOTE — PROGRESS NOTES
Pre-Visit Chart Review  For Appointment Scheduled on 3-11-19    Health Maintenance Due   Topic Date Due    High Dose Statin  09/17/1971    Zoster Vaccine  09/17/2010    Eye Exam  05/07/2015    Pneumococcal Vaccine (65+ Low/Medium Risk) (2 of 2 - PCV13) 09/30/2016    Mammogram  04/23/2017    Foot Exam  03/23/2018    Influenza Vaccine  08/01/2018

## 2019-02-19 NOTE — TELEPHONE ENCOUNTER
If she is going to remain on the tramadol she is due for a recheck appointment with me    The  (Prescription Monitoring Program) website was checked with no inappropriate activity found.

## 2019-02-19 NOTE — TELEPHONE ENCOUNTER
Patient informed. Apt for physical booked with Ms Arroyo and apt made with Dr Sánchez for Tramadol

## 2019-02-21 DIAGNOSIS — E11.9 TYPE 2 DIABETES MELLITUS WITHOUT COMPLICATION, UNSPECIFIED WHETHER LONG TERM INSULIN USE: ICD-10-CM

## 2019-02-22 ENCOUNTER — PATIENT OUTREACH (OUTPATIENT)
Dept: ADMINISTRATIVE | Facility: HOSPITAL | Age: 69
End: 2019-02-22

## 2019-02-22 NOTE — LETTER
February 22, 2019    Tereza G Thuan   [place] [address]             [facility] [address] [facility] [address]  Phone: [phone] Dear Tereza    [facility] is committed to your overall health and would like to ensure that you are up to date on your recommended test and/or procedures.   Evan Sánchez MD  has found that your chart shows you may be due for the following:    YEARLY DIABETIC EYE EXAM & FOOT EXAM  MAMMOGRAM  HEMOGLOBIN  A1C\    If you have had any of the above done at another facility, please let us know so that we may obtain copies from that facility.  If you have a copy of these records, please provide a copy for us to scan into your chart.  You are welcome to request that the report be faxed to us at  ([phone]).     Otherwise, please schedule these appointments at your earliest convenience by calling [phone] or going to [email or web address].    If you have an upcoming scheduled appointment for the item above, please disregard this letter.    Sincerely,  Your [facility]  MD Gretchen Kc LPN Clinical Care Coordinator  [facility] [address] [place] [address]  Phone [phone]  Fax [phone]

## 2019-02-27 LAB
ALT SERPL-CCNC: ABNORMAL U/L
AST SERPL-CCNC: ABNORMAL U/L
BUN BLD-MCNC: ABNORMAL MG/DL (ref 4–21)
CHOLEST SERPL-MSCNC: 266 MG/DL (ref 0–200)
CREAT SERPL-MCNC: ABNORMAL MG/DL
GLUCOSE 1H P 100 G GLC PO SERPL-MCNC: ABNORMAL MG/DL (ref ?–200)
GLUCOSE 2H P 100 G GLC PO SERPL-MCNC: ABNORMAL MG/DL (ref ?–140)
HBA1C MFR BLD: 6.1 %
HDLC SERPL-MCNC: 42 MG/DL
LDLC SERPL CALC-MCNC: 153 MG/DL (ref 0–160)
MICROALBUMIN URINE: ABNORMAL
MICROALBUMIN/CREATININE RATIO: ABNORMAL
PROTEIN/CREATININE RATIO: ABNORMAL
TRIGL SERPL-MCNC: 354 MG/DL

## 2019-03-08 ENCOUNTER — OFFICE VISIT (OUTPATIENT)
Dept: FAMILY MEDICINE | Facility: CLINIC | Age: 69
End: 2019-03-08
Payer: MEDICARE

## 2019-03-08 VITALS
HEIGHT: 62 IN | DIASTOLIC BLOOD PRESSURE: 62 MMHG | SYSTOLIC BLOOD PRESSURE: 138 MMHG | TEMPERATURE: 98 F | HEART RATE: 57 BPM | BODY MASS INDEX: 31.2 KG/M2 | WEIGHT: 169.56 LBS

## 2019-03-08 DIAGNOSIS — E11.59 HYPERTENSION ASSOCIATED WITH DIABETES: ICD-10-CM

## 2019-03-08 DIAGNOSIS — E89.0 POSTOPERATIVE HYPOTHYROIDISM: ICD-10-CM

## 2019-03-08 DIAGNOSIS — E78.5 HYPERLIPIDEMIA ASSOCIATED WITH TYPE 2 DIABETES MELLITUS: ICD-10-CM

## 2019-03-08 DIAGNOSIS — I15.2 HYPERTENSION ASSOCIATED WITH DIABETES: ICD-10-CM

## 2019-03-08 DIAGNOSIS — Z78.9 STATIN INTOLERANCE: ICD-10-CM

## 2019-03-08 DIAGNOSIS — I25.10 CORONARY ARTERY DISEASE INVOLVING NATIVE CORONARY ARTERY OF NATIVE HEART WITHOUT ANGINA PECTORIS: ICD-10-CM

## 2019-03-08 DIAGNOSIS — F51.01 PRIMARY INSOMNIA: ICD-10-CM

## 2019-03-08 DIAGNOSIS — I25.2 HISTORY OF NON-ST ELEVATION MYOCARDIAL INFARCTION (NSTEMI): ICD-10-CM

## 2019-03-08 DIAGNOSIS — F32.5 MAJOR DEPRESSIVE DISORDER WITH SINGLE EPISODE, IN FULL REMISSION: ICD-10-CM

## 2019-03-08 DIAGNOSIS — E11.59 TYPE 2 DIABETES MELLITUS WITH OTHER CIRCULATORY COMPLICATION, WITHOUT LONG-TERM CURRENT USE OF INSULIN: Primary | ICD-10-CM

## 2019-03-08 DIAGNOSIS — Z12.39 BREAST CANCER SCREENING: ICD-10-CM

## 2019-03-08 DIAGNOSIS — Z23 NEED FOR IMMUNIZATION AGAINST INFLUENZA: ICD-10-CM

## 2019-03-08 DIAGNOSIS — Z79.01 ANTICOAGULANT LONG-TERM USE: ICD-10-CM

## 2019-03-08 DIAGNOSIS — J44.89 ASTHMA WITH CHRONIC OBSTRUCTIVE PULMONARY DISEASE (COPD): Chronic | ICD-10-CM

## 2019-03-08 DIAGNOSIS — E11.69 HYPERLIPIDEMIA ASSOCIATED WITH TYPE 2 DIABETES MELLITUS: ICD-10-CM

## 2019-03-08 DIAGNOSIS — Z95.2 H/O PROSTHETIC AORTIC VALVE REPLACEMENT: ICD-10-CM

## 2019-03-08 DIAGNOSIS — E24.9 ADRENAL CUSHING'S SYNDROME: ICD-10-CM

## 2019-03-08 DIAGNOSIS — Z23 NEED FOR VACCINATION AGAINST STREPTOCOCCUS PNEUMONIAE USING PNEUMOCOCCAL CONJUGATE VACCINE 13: ICD-10-CM

## 2019-03-08 DIAGNOSIS — J96.11 HYPOXEMIC RESPIRATORY FAILURE, CHRONIC: ICD-10-CM

## 2019-03-08 PROBLEM — M70.61 GREATER TROCHANTERIC BURSITIS OF RIGHT HIP: Status: RESOLVED | Noted: 2018-11-05 | Resolved: 2019-03-08

## 2019-03-08 PROBLEM — E27.8 ADRENAL MASS, LEFT: Status: RESOLVED | Noted: 2017-03-28 | Resolved: 2019-03-08

## 2019-03-08 PROCEDURE — 90662 IIV NO PRSV INCREASED AG IM: CPT | Mod: PBBFAC,PO

## 2019-03-08 PROCEDURE — 99999 PR PBB SHADOW E&M-EST. PATIENT-LVL V: CPT | Mod: PBBFAC,,, | Performed by: NURSE PRACTITIONER

## 2019-03-08 PROCEDURE — 99999 PR PBB SHADOW E&M-EST. PATIENT-LVL V: ICD-10-PCS | Mod: PBBFAC,,, | Performed by: NURSE PRACTITIONER

## 2019-03-08 PROCEDURE — G0009 ADMIN PNEUMOCOCCAL VACCINE: HCPCS | Mod: PBBFAC,PO

## 2019-03-08 PROCEDURE — 99215 OFFICE O/P EST HI 40 MIN: CPT | Mod: PBBFAC,PO,25 | Performed by: NURSE PRACTITIONER

## 2019-03-08 PROCEDURE — 99214 PR OFFICE/OUTPT VISIT, EST, LEVL IV, 30-39 MIN: ICD-10-PCS | Mod: S$PBB,,, | Performed by: NURSE PRACTITIONER

## 2019-03-08 PROCEDURE — 99214 OFFICE O/P EST MOD 30 MIN: CPT | Mod: S$PBB,,, | Performed by: NURSE PRACTITIONER

## 2019-03-08 RX ORDER — TORSEMIDE 20 MG/1
20 TABLET ORAL DAILY
Qty: 30 TABLET | Refills: 5 | Status: SHIPPED | OUTPATIENT
Start: 2019-03-08 | End: 2022-09-29

## 2019-03-08 RX ORDER — THEOPHYLLINE 400 MG/1
400 TABLET, EXTENDED RELEASE ORAL 2 TIMES DAILY
Qty: 60 TABLET | Refills: 5 | Status: SHIPPED | OUTPATIENT
Start: 2019-03-08 | End: 2022-09-07 | Stop reason: SDUPTHER

## 2019-03-08 RX ORDER — MAGNESIUM 250 MG
1 TABLET ORAL DAILY
Status: CANCELLED | COMMUNITY
Start: 2019-03-08

## 2019-03-08 RX ORDER — DIMENHYDRINATE 50 MG
50 TABLET ORAL NIGHTLY PRN
Status: ON HOLD | COMMUNITY
End: 2022-09-15 | Stop reason: HOSPADM

## 2019-03-08 RX ORDER — TRAZODONE HYDROCHLORIDE 50 MG/1
50 TABLET ORAL NIGHTLY PRN
Qty: 30 TABLET | Refills: 5 | Status: SHIPPED | OUTPATIENT
Start: 2019-03-08 | End: 2020-01-10

## 2019-03-08 NOTE — PROGRESS NOTES
Subjective:       Patient ID: Tereza Larose is a 68 y.o. female.    Chief Complaint: Diabetes and Hypertension    Chief Complaint  Chief Complaint   Patient presents with    Diabetes    Hypertension       HPI  Tereza Larose is a 68 y.o. female with medical diagnoses as listed in the medical history and problem list that presents for a regularly scheduled follow-up.  The patient is regularly treated for type 2 diabetes that is well controlled with last A1c 5.9% on 8/28/2018.  She is also treated for hypertension, hyperlipidemia statin intolerance/allergy, postoperative hypothyroidism, depression, hypokalemia, insomnia, and asthma with COPD.  The patient has a history of an aortic valve replacement and CABG x 3.  She also has fatty liver disease and Cushing syndrome s/p adrenalectomy.  Blood pressure today is 138/62.  BMI is 31.01 and the patient has lost 7 lb since her last visit on 8/28/2018.  The patient is due for a foot exam, eye exam, mammogram, flu immunization and pneumonia immunization today.      PAST MEDICAL HISTORY:  Past Medical History:   Diagnosis Date    Allergy     Arthritis     Asthma     Brain bleed     COPD (chronic obstructive pulmonary disease)     Depression     Diabetes mellitus type II     Diverticulitis     Fatty liver     GERD (gastroesophageal reflux disease)     Goiter     s/p thyroidectomy    Heart murmur     Hernia of abdominal wall     History of intracranial hemorrhage 6/15/2013    History of non-ST elevation myocardial infarction (NSTEMI) 6/15/2013    Hyperlipidemia     Hypertension     Metabolic syndrome 6/14/2012    Myocardial infarction     On home oxygen therapy     states uses 2L at night or when sat < 90    Stroke 2012    left hand shaky, loss of balance       PAST SURGICAL HISTORY:  Past Surgical History:   Procedure Laterality Date    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/25/2017    Dr Griggs    ADRENALECTOMY      ADRENALECTOMY-LAPAROSCOPIC 88366 Left  7/25/2017    [facility]    AORTIC VALVE REPLACEMENT  12/09/2016    arthroscopy lt knee Right     BLADDER REPAIR      sling    COLONOSCOPY  2004    10 year recheck    CORONARY ARTERY BYPASS GRAFT  12/09/2016    X3    HYSTERECTOMY      THYROIDECTOMY         SOCIAL HISTORY:  Social History     Socioeconomic History    Marital status:      Spouse name: Not on file    Number of children: Not on file    Years of education: Not on file    Highest education level: Not on file   Social Needs    Financial resource strain: Not on file    Food insecurity - worry: Not on file    Food insecurity - inability: Not on file    Transportation needs - medical: Not on file    Transportation needs - non-medical: Not on file   Occupational History    Not on file   Tobacco Use    Smoking status: Never Smoker    Smokeless tobacco: Never Used   Substance and Sexual Activity    Alcohol use: Yes     Comment: seldom    Drug use: No    Sexual activity: No   Other Topics Concern    Are you pregnant or think you may be? Not Asked    Breast-feeding Not Asked   Social History Narrative    Not on file       FAMILY HISTORY:  Family History   Problem Relation Age of Onset    Arthritis Mother     Diabetes Mother     Heart disease Mother     Hypertension Mother     Hypertension Father     Heart disease Father     Mental illness Father     Asthma Sister     Diabetes Sister     Hypertension Sister     Asthma Son     COPD Son     Depression Son     Diabetes Son     Hypertension Son     Stroke Son     Diabetes Maternal Uncle     Heart disease Maternal Uncle     Mental illness Paternal Aunt     Cancer Paternal Aunt     Heart disease Paternal Aunt     Hypertension Paternal Aunt     Heart disease Paternal Uncle     Hypertension Maternal Grandmother     Mental illness Maternal Grandmother     Aneurysm Maternal Grandfather     Mental illness Paternal Grandmother      Heart disease Paternal Grandfather     Melanoma Neg Hx     Psoriasis Neg Hx     Lupus Neg Hx     Eczema Neg Hx        ALLERGIES AND MEDICATIONS: updated and reviewed.  Review of patient's allergies indicates:   Allergen Reactions    Corn Itching     Other reaction(s): Sneezing  Other reaction(s): Rhinorrhea    Potato starch Hives     Other reaction(s): Sneezing  Other reaction(s): Rhinorrhea    Pravastatin Other (See Comments)     Muscle pain    Statins-hmg-coa reductase inhibitors Other (See Comments)     Muscle pain, ptiavastin     Hydrochlorothiazide Other (See Comments)     weakness    Welchol [colesevelam] Other (See Comments)     Weakness      Current Outpatient Medications   Medication Sig Dispense Refill    dimenhyDRINATE (DRAMAMINE) 50 MG tablet Take 50 mg by mouth nightly as needed.      albuterol (VENTOLIN HFA) 90 mcg/actuation inhaler INHALE 2 PUFFS BY MOUTH EVERY 4 TO 6 HOURS AS NEEDED FOR SHORTNESS OF BREATH 18 g 5    albuterol-ipratropium (DUO-NEB) 2.5 mg-0.5 mg/3 mL nebulizer solution Take 3 mLs by nebulization every 6 (six) hours as needed for Wheezing. Rescue 180 mL 2    amLODIPine (NORVASC) 10 MG tablet Take 1 tablet (10 mg total) by mouth once daily. Hold for SBP <120. 90 tablet 3    aspirin (ECOTRIN) 81 MG EC tablet Take 81 mg by mouth once daily.      cholecalciferol, vitamin D3, (VITAMIN D3) 1,000 unit capsule Take 1,000 Units by mouth once daily.      coQ10, ubiquinol, 200 mg Cap Take 1 capsule by mouth once daily.      cyanocobalamin (VITAMIN B-12) 250 MCG tablet Take 250 mcg by mouth once daily.      FLUoxetine 20 MG capsule TAKE 1 CAPSULE BY MOUTH ONCE DAILY 30 capsule 5    L. ACIDOPHILUS/BIFIDO LONGUM (PROBIOTIC PEARLS ORAL) Take 1 each by mouth 2 (two) times daily as needed.       lancets Misc True track Check glucose once daily 100 each 3    levothyroxine (SYNTHROID) 137 MCG Tab tablet TAKE 1 TABLET(137 MCG) BY MOUTH BEFORE BREAKFAST 90 tablet 0    lisinopril  (PRINIVIL,ZESTRIL) 40 MG tablet TAKE 1 TABLET(40 MG) BY MOUTH ONCE DAILY 90 tablet 3    magnesium 250 mg Tab Take 1 tablet by mouth once daily.       metformin (GLUCOPHAGE) 500 MG tablet TAKE 1 TABLET(500 MG) BY MOUTH TWICE DAILY WITH MEALS 180 tablet 1    metoprolol tartrate (LOPRESSOR) 100 MG tablet Take 0.5 tablets (50 mg total) by mouth 2 (two) times daily. Hold for SBP<110 (Patient taking differently: Take 100 mg by mouth 2 (two) times daily. Hold for SBP<110) 270 tablet 3    potassium chloride (MICRO-K) 8 mEq CpSR Take 1 capsule (8 mEq total) by mouth once daily. 90 capsule 1    theophylline 400 mg Tb24 Take 400 mg by mouth 2 (two) times daily. 60 tablet 5    torsemide (DEMADEX) 20 MG Tab Take 1 tablet (20 mg total) by mouth once daily. 30 tablet 5    traMADol (ULTRAM) 50 mg tablet TAKE 1 TABLET(50 MG) BY MOUTH EVERY 6 HOURS AS NEEDED 120 tablet 0    traZODone (DESYREL) 50 MG tablet Take 1 tablet (50 mg total) by mouth nightly as needed for Insomnia. 30 tablet 5    TRUEPLUS LANCETS 33 gauge Misc USE TO TEST BLOOD SUGAR ONCE D  3    vitamin A 8000 UNIT capsule Take 8,000 Units by mouth once daily.       No current facility-administered medications for this visit.        I have reviewed the patient's medical history in detail and updated the computerized patient record.    Review of Systems   Constitutional: Negative for activity change, appetite change, chills, fatigue and fever.        Very little physical activity.    HENT: Positive for rhinorrhea. Negative for congestion, ear pain, postnasal drip, sinus pressure, sinus pain and sore throat.         Sinus allergies with nose that runs like a faucet, takes claritin as needed with relief   Eyes: Negative for visual disturbance.        Vision corrected with glasses, due for eye exam, [facility] [place]   Respiratory: Positive for cough, shortness of breath and wheezing.         Dyspnea with activity, has home oxygen that she wears at bedtime and as  "needed during the day, some cough and wheezing.    Cardiovascular: Negative for chest pain, palpitations and leg swelling.   Gastrointestinal: Positive for abdominal pain and constipation. Negative for diarrhea, nausea and vomiting.        Has a ventral hernia that is occasionally painful. Treats constipation as needed with milk of magnesia.   Genitourinary: Positive for urgency. Negative for difficulty urinating and dysuria.        Some mixed stress and urge incontinence, wears a pad.   Musculoskeletal: Positive for back pain.        Chronic low back pain, gets tramadol for chronic pain from Dr. Sánchez which does help, also takes tylenol or advil.    Skin: Negative for rash and wound.   Neurological: Positive for light-headedness and numbness. Negative for dizziness, weakness and headaches.        Ambulates with cane for balance. Some lightheadedness. Does get some numbness and tingling to right hand off and on, feels related to carpal tunnel syndrome.   Hematological: Does not bruise/bleed easily.   Psychiatric/Behavioral: Positive for sleep disturbance. Negative for dysphoric mood. The patient is not nervous/anxious.         Difficulty falling asleep, has not been taking trazodone.          Objective:      Vitals:    03/08/19 1037   BP: 138/62   BP Location: Right arm   Patient Position: Sitting   BP Method: Small (Automatic)   Pulse: (!) 57   Temp: 98.2 °F (36.8 °C)   TempSrc: Oral   Weight: 76.9 kg (169 lb 8.5 oz)   Height: 5' 2" (1.575 m)     Physical Exam   Constitutional: She is oriented to person, place, and time. Vital signs are normal. She appears well-developed. No distress.   Blood pressure 138/62   HENT:   Head: Normocephalic and atraumatic.   Right Ear: Tympanic membrane, external ear and ear canal normal.   Left Ear: Tympanic membrane, external ear and ear canal normal. Decreased hearing is noted.   Nose: Nose normal. No mucosal edema.   Mouth/Throat: Oropharynx is clear and moist and mucous " membranes are normal. No oropharyngeal exudate.   Eyes: Conjunctivae, EOM and lids are normal. Pupils are equal, round, and reactive to light. Right eye exhibits no discharge. Left eye exhibits no discharge. Right conjunctiva is not injected. Left conjunctiva is not injected.   Neck: Normal range of motion. Neck supple. Normal carotid pulses present. Carotid bruit is not present.   Thyroid absent   Cardiovascular: Normal rate, regular rhythm, S1 normal, S2 normal, normal heart sounds, intact distal pulses and normal pulses.   No murmur heard.  Pulses:       Carotid pulses are 2+ on the right side, and 2+ on the left side.       Radial pulses are 2+ on the right side, and 2+ on the left side.        Dorsalis pedis pulses are 2+ on the right side, and 2+ on the left side.        Posterior tibial pulses are 2+ on the right side, and 2+ on the left side.   Pulmonary/Chest: Effort normal and breath sounds normal. No respiratory distress. She has no decreased breath sounds. She has no wheezes. She has no rhonchi. She has no rales.   Abdominal: Soft. Normal appearance, normal aorta and bowel sounds are normal. She exhibits no distension, no abdominal bruit, no pulsatile midline mass and no mass. There is no hepatosplenomegaly. There is no tenderness. A hernia is present.   Midline ventral hernia   Musculoskeletal: Normal range of motion. She exhibits no edema, tenderness or deformity.        Right foot: There is normal range of motion and no deformity.        Left foot: There is normal range of motion and no deformity.   Feet:   Right Foot:   Protective Sensation: 10 sites tested. 10 sites sensed.   Skin Integrity: Positive for dry skin. Negative for ulcer, blister, skin breakdown, erythema, warmth or callus.   Left Foot:   Protective Sensation: 10 sites tested. 10 sites sensed.   Skin Integrity: Negative for ulcer, blister, skin breakdown, erythema, warmth or callus.   Lymphadenopathy:        Head (right side): No  submental, no submandibular, no tonsillar, no preauricular, no posterior auricular and no occipital adenopathy present.        Head (left side): No submental, no submandibular, no tonsillar, no preauricular, no posterior auricular and no occipital adenopathy present.     She has no cervical adenopathy.        Right: No supraclavicular adenopathy present.        Left: No supraclavicular adenopathy present.   Neurological: She is alert and oriented to person, place, and time. She has normal strength. No sensory deficit.   Proprioception intact to all toes bilateral feet.    Skin: Skin is warm, dry and intact. No rash noted. She is not diaphoretic. No erythema. No pallor.   Dry skin to bilateral heels   Psychiatric: She has a normal mood and affect. Her speech is normal and behavior is normal. Judgment and thought content normal. Her mood appears not anxious. Cognition and memory are normal. She does not exhibit a depressed mood.   Nursing note and vitals reviewed.        Assessment:       1. Type 2 diabetes mellitus with other circulatory complication, without long-term current use of insulin    2. Hyperlipidemia associated with type 2 diabetes mellitus    3. Statin intolerance    4. Hypertension associated with diabetes    5. Asthma with chronic obstructive pulmonary disease (COPD)    6. Hypoxemic respiratory failure, chronic    7. History of non-ST elevation myocardial infarction (NSTEMI)    8. Coronary artery disease involving native coronary artery of native heart without angina pectoris    9. Anticoagulant long-term use    10. Postoperative hypothyroidism    11. H/O prosthetic aortic valve replacement    12. Adrenal Cushing's syndrome    13. Major depressive disorder with single episode, in full remission    14. Primary insomnia    15. BMI 31.0-31.9,adult    16. Breast cancer screening    17. Need for immunization against influenza    18. Need for vaccination against Streptococcus pneumoniae using pneumococcal  conjugate vaccine 13          Plan:       Tereza was seen today for diabetes and hypertension.  Discussed healthy diet, regular exercise, necessary labs, age appropriate cancer screening, and routine vaccinations.    Diagnoses and all orders for this visit:    Type 2 diabetes mellitus with other circulatory complication, without long-term current use of insulin     Lab Results   Component Value Date    HGBA1C 5.9 (H) 08/28/2018    diabetes is well controlled on current medications and diabetes management plan, continue as is   Diabetic foot exam was completed today with normal sensation to monofilament, normal proprioception   Patient was reminded to schedule routine eye exam   Most recent eye exam was done at [facility], copy requested to be placed on chart  Hyperlipidemia associated with type 2 diabetes mellitus     Lab Results   Component Value Date    CHOL 352 (H) 08/28/2018    CHOL 370 (H) 04/16/2018    CHOL 336 (H) 03/28/2017     Lab Results   Component Value Date    HDL 41 08/28/2018    HDL 44 04/16/2018    HDL 44 03/28/2017     Lab Results   Component Value Date    LDLCALC Invalid, Trig>400.0 08/28/2018    LDLCALC 248.2 (H) 04/16/2018    LDLCALC 218.8 (H) 03/28/2017     Lab Results   Component Value Date    TRIG 466 (H) 08/28/2018    TRIG 389 (H) 04/16/2018    TRIG 366 (H) 03/28/2017     Lab Results   Component Value Date    CHOLHDL 11.6 (L) 08/28/2018    CHOLHDL 11.9 (L) 04/16/2018    CHOLHDL 13.1 (L) 03/28/2017      The ASCVD Risk score (Linwoodzoe MONTERO Jr., et al., 2013) failed to calculate for the following reasons:    The valid total cholesterol range is 130 to 320 mg/dL    Patient is not taking a statin due to statin intolerance  Statin intolerance   Refuses statin medication due to reported side effects  Hypertension associated with diabetes   Blood pressure controlled 138/62, continue current medication without change  Asthma with chronic obstructive pulmonary disease (COPD)  -     theophylline 400  mg Tb24; Take 400 mg by mouth 2 (two) times daily.   - continue albuterol inhaler or DuoNeb treatments as needed    Hypoxemic respiratory failure, chronic   Continue supplemental oxygen use at bedtime and during the daytime as needed  History of non-ST elevation myocardial infarction (NSTEMI)   Stable, asymptomatic chronic condition.  Will continue to maximize risk factor reduction and adjust medication as needed.   Continue follow-up with cardiologist, Dr. Batres, as instructed  Coronary artery disease involving native coronary artery of native heart without angina pectoris  -     torsemide (DEMADEX) 20 MG Tab; Take 1 tablet (20 mg total) by mouth once daily.  - Continue follow-up with cardiologist, Dr. Batres, as instructed    Anticoagulant long-term use   Stable on aspirin 81 mg daily, continue as is  Postoperative hypothyroidism     Lab Results   Component Value Date    TSH 15.992 (H) 01/04/2019    levothyroxine dose was increased following previous TSH, patient is scheduled to repeat lab work and recheck the TSH  H/O prosthetic aortic valve replacement   Stable, continue follow-up with cardiologist, Dr. Batres, as instructed  Adrenal Cushing's syndrome   Stable, asymptomatic chronic condition.  Will continue to maximize risk factor reduction and monitor/prescribe medication as needed.  Major depressive disorder with single episode, in full remission   Symptoms are effectively controlled with Prozac 20 mg daily, to continue as is  Primary insomnia  -     traZODone (DESYREL) 50 MG tablet; Take 1 tablet (50 mg total) by mouth nightly as needed for Insomnia.  - patient is instructed to restart trazodone for insomnia, agrees to retry medication    BMI 31.0-31.9,adult   BMI today is 31.01 and the patient has lost 7 lb since her last visit on 8/28/2018   Congratulated on weight loss   Weight loss recommended with well balanced diet and portion controlled meals and increased activity.   Breast cancer  screening  -     Mammo Digital Screening Bilateral With CAD; Future    Need for immunization against influenza  -     Influenza - High Dose (65+) (PF) (IM)    Need for vaccination against Streptococcus pneumoniae using pneumococcal conjugate vaccine 13  -     (In Office Administered) Pneumococcal Conjugate Vaccine (13 Valent) (IM)      Follow-up for Keep follow up scheduled with Dr. Sánchez 3/11/19 for pain management .      Patient readiness: acceptance and barriers:none    During the course of the visit the patient was educated and counseled about the following:     Diabetes:  Discussed general issues about diabetes pathophysiology and management.  Addressed ADA diet.  Discussed foot care.  Reminded to get yearly retinal exam.  Reminded to bring in blood sugar diary at next visit.  Hypertension:   Medication: no change.  Dietary sodium restriction.  Regular aerobic exercise.  Obesity:   General weight loss/lifestyle modification strategies discussed (elicit support from others; identify saboteurs; non-food rewards, etc).  Informal exercise measures discussed, e.g. taking stairs instead of elevator.  Regular aerobic exercise program discussed.    Goals: Diabetes: Maintain Hemoglobin A1C below 7, Hypertension: Reduce Blood Pressure and Obesity: Reduce calorie intake and BMI    Did patient meet goals/outcomes: Yes    The following self management tools provided: declined    Patient Instructions (the written plan) was given to the patient/family.     Time spent with patient: 45 minutes    Barriers to medications present (no )    Adverse reactions to current medications (no)    Over the counter medications reviewed (Yes)

## 2019-03-10 PROBLEM — F32.5 MAJOR DEPRESSIVE DISORDER WITH SINGLE EPISODE, IN FULL REMISSION: Status: ACTIVE | Noted: 2019-03-10

## 2019-03-10 PROBLEM — Z79.01 ANTICOAGULANT LONG-TERM USE: Status: ACTIVE | Noted: 2019-03-10

## 2019-03-11 ENCOUNTER — OFFICE VISIT (OUTPATIENT)
Dept: FAMILY MEDICINE | Facility: CLINIC | Age: 69
End: 2019-03-11
Attending: FAMILY MEDICINE
Payer: MEDICARE

## 2019-03-11 VITALS
HEART RATE: 64 BPM | SYSTOLIC BLOOD PRESSURE: 132 MMHG | OXYGEN SATURATION: 92 % | TEMPERATURE: 98 F | RESPIRATION RATE: 20 BRPM | WEIGHT: 172.81 LBS | HEIGHT: 62 IN | BODY MASS INDEX: 31.8 KG/M2 | DIASTOLIC BLOOD PRESSURE: 54 MMHG

## 2019-03-11 DIAGNOSIS — E11.69 HYPERLIPIDEMIA ASSOCIATED WITH TYPE 2 DIABETES MELLITUS: Chronic | ICD-10-CM

## 2019-03-11 DIAGNOSIS — E11.59 TYPE 2 DIABETES MELLITUS WITH OTHER CIRCULATORY COMPLICATION, WITHOUT LONG-TERM CURRENT USE OF INSULIN: ICD-10-CM

## 2019-03-11 DIAGNOSIS — M17.10 ARTHRITIS OF KNEE: Primary | ICD-10-CM

## 2019-03-11 DIAGNOSIS — E89.0 POSTOPERATIVE HYPOTHYROIDISM: ICD-10-CM

## 2019-03-11 DIAGNOSIS — E11.59 HYPERTENSION ASSOCIATED WITH DIABETES: ICD-10-CM

## 2019-03-11 DIAGNOSIS — I15.2 HYPERTENSION ASSOCIATED WITH DIABETES: ICD-10-CM

## 2019-03-11 DIAGNOSIS — E78.5 HYPERLIPIDEMIA ASSOCIATED WITH TYPE 2 DIABETES MELLITUS: Chronic | ICD-10-CM

## 2019-03-11 PROCEDURE — 99999 PR PBB SHADOW E&M-EST. PATIENT-LVL III: CPT | Mod: PBBFAC,,, | Performed by: FAMILY MEDICINE

## 2019-03-11 PROCEDURE — 99213 OFFICE O/P EST LOW 20 MIN: CPT | Mod: S$PBB,,, | Performed by: FAMILY MEDICINE

## 2019-03-11 PROCEDURE — 99213 OFFICE O/P EST LOW 20 MIN: CPT | Mod: PBBFAC,PO | Performed by: FAMILY MEDICINE

## 2019-03-11 PROCEDURE — 99999 PR PBB SHADOW E&M-EST. PATIENT-LVL III: ICD-10-PCS | Mod: PBBFAC,,, | Performed by: FAMILY MEDICINE

## 2019-03-11 PROCEDURE — 99213 PR OFFICE/OUTPT VISIT, EST, LEVL III, 20-29 MIN: ICD-10-PCS | Mod: S$PBB,,, | Performed by: FAMILY MEDICINE

## 2019-03-11 RX ORDER — LANOLIN ALCOHOL/MO/W.PET/CERES
100 CREAM (GRAM) TOPICAL DAILY
Status: ON HOLD | COMMUNITY

## 2019-03-11 RX ORDER — UBIQUINOL 100 MG
1 CAPSULE ORAL DAILY
COMMUNITY
End: 2020-01-10

## 2019-03-11 RX ORDER — VITAMIN E 268 MG
400 CAPSULE ORAL DAILY
Status: ON HOLD | COMMUNITY

## 2019-03-11 RX ORDER — MAGNESIUM HYDROXIDE 400 MG/5ML
1 SUSPENSION, ORAL (FINAL DOSE FORM) ORAL ONCE
COMMUNITY
End: 2020-01-10

## 2019-03-11 NOTE — PATIENT INSTRUCTIONS
Weight Management: Getting Started  Healthy bodies come in all shapes and sizes. Not all bodies are made to be thin. For some people, a healthy weight is higher than the average weight listed on weight charts. Your healthcare provider can help you decide on a healthy weight for you.    Reasons to lose weight  Losing weight can help with some health problems, such as high blood pressure, heart disease, diabetes, sleep apnea, and arthritis. You may also feel more energy.  Set your long-term goal  Your goal doesn't even have to be a specific weight. You may decide on a fitness goal (such as being able to walk 10 miles a week), or a health goal (such as lowering your blood pressure). Choose a goal that is measurable and reasonable, so you know when you've reached it. A goal of reaching a BMI of less than 25 is not always reasonable (or possible).   Make an action plan  Habits dont change overnight. Setting your goals too high can leave you feeling discouraged if you cant reach them. Be realistic. Choose one or two small changes you can make now. Set an action plan for how you are going to make these changes. When you can stick to this plan, keep making a few more small changes. Taking small steps will help you stay on the path to success.  Track your progress  Write down your goals. Then, keep a daily record of your progress. Write down what you eat and how active you are. This record lets you look back on how much youve done. It may also help when youre feeling frustrated. Reward yourself for success. Even if you dont reach every goal, give yourself credit for what you do get done.  Get support  Encouragement from others can help make losing weight easier. Ask your family members and friends for support. They may even want to join you. Also look to your healthcare provider, registered dietitian, and  for help. Your local hospital can give you more information about nutrition, exercise, and  weight loss.  Date Last Reviewed: 1/31/2016  © 4981-0925 [employer]. [address] [place] [address], [place] [address]. All rights reserved. This information is not intended as a substitute for professional medical care. Always follow your healthcare professional's instructions.        Controlling High Blood Pressure  High blood pressure (hypertension) is often called the silent killer. This is because many people who have it dont know it. High blood pressure is defined as 140/90 mm Hg or higher. Know your blood pressure and remember to check it regularly. Doing so can save your life. Here are some things you can do to help control your blood pressure.    Choose heart-healthy foods  · Select low-salt, low-fat foods. Limit sodium intake to 2,400 mg per day or the amount suggested by your healthcare provider.  · Limit canned, dried, cured, packaged, and fast foods. These can contain a lot of salt.  · Eat 8 to 10 servings of fruits and vegetables every day.  · Choose lean meats, fish, or chicken.  · Eat whole-grain pasta, brown rice, and beans.  · Eat 2 to 3 servings of low-fat or fat-free dairy products.  · Ask your doctor about the DASH eating plan. This plan helps reduce blood pressure.  · When you go to a restaurant, ask that your meal be prepared with no added salt.  Maintain a healthy weight  · Ask your healthcare provider how many calories to eat a day. Then stick to that number.  · Ask your healthcare provider what weight range is healthiest for you. If you are overweight, a weight loss of only 3% to 5% of your body weight can help lower blood pressure. Generally, a good weight loss goal is to lose 10% of your body weight in a year.  · Limit snacks and sweets.  · Get regular exercise.  Get up and get active  · Choose activities you enjoy. Find ones you can do with friends or family. This includes bicycling, dancing, walking, and jogging.  · Park farther away from building entrances.  · Use stairs  instead of the elevator.  · When you can, walk or bike instead of driving.  · [place] leaves, garden, or do household repairs.  · Be active at a moderate to vigorous level of physical activity for at least 40 minutes for a minimum of 3 to 4 days a week.   Manage stress  · Make time to relax and enjoy life. Find time to laugh.  · Communicate your concerns with your loved ones and your healthcare provider.  · Visit with family and friends, and keep up with hobbies.  Limit alcohol and quit smoking  · Men should have no more than 2 drinks per day.  · Women should have no more than 1 drink per day.  · Talk with your healthcare provider about quitting smoking. Smoking significantly increases your risk for heart disease and stroke. Ask your healthcare provider about community smoking cessation programs and other options.  Medicines  If lifestyle changes arent enough, your healthcare provider may prescribe high blood pressure medicine. Take all medicines as prescribed. If you have any questions about your medicines, ask your healthcare provider before stopping or changing them.   Date Last Reviewed: 4/27/2016 © 2000-2017 [employer]. [address] [place] [address], [place] [address]. All rights reserved. This information is not intended as a substitute for professional medical care. Always follow your healthcare professional's instructions.

## 2019-03-11 NOTE — PROGRESS NOTES
Subjective:       Patient ID: Tereza Larose is a 68 y.o. female.    Chief Complaint: Medication Refill    68-year-old female comes in for renewal of tramadol for chronic arthritis of the knees although she does not need a refill at this time having gotten one at the end of February.  She saw Dr. Batres a week ago and he did lab which may have included her A1c in thyroid check.  We will request copies.  She is otherwise scheduled for lab in mid April along with her mammogram.  Her only new complaint is some ringing in the right ear.  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  She does not recall any excessive noise trauma and has no pain in the ear.  History includes coronary artery disease with MI and previous stroke secondary to intracranial hemorrhage.  She has diabetes, hypertension, hyper lipidemia and hypothyroidism as well as COPD.  She has non alcoholic fatty liver disease allergic rhinitis and asthma.    Past Medical History:  No date: Allergy  No date: Arthritis  No date: Asthma  No date: Brain bleed  No date: COPD (chronic obstructive pulmonary disease)  No date: Depression  No date: Diabetes mellitus type II  No date: Diverticulitis  No date: Fatty liver  No date: GERD (gastroesophageal reflux disease)  No date: Goiter      Comment:  s/p thyroidectomy  No date: Heart murmur  No date: Hernia of abdominal wall  6/15/2013: History of intracranial hemorrhage  6/15/2013: History of non-ST elevation myocardial infarction (NSTEMI)  No date: Hyperlipidemia  No date: Hypertension  6/14/2012: Metabolic syndrome  No date: Myocardial infarction  No date: On home oxygen therapy      Comment:  states uses 2L at night or when sat < 90  2012: Stroke      Comment:  left hand shaky, loss of balance    Past Surgical History:  07/25/2017: adranel tumor removal       Comment:  Dr Griggs  No date: ADRENALECTOMY  7/25/2017: ADRENALECTOMY-LAPAROSCOPIC 96684; Left      Comment:  Performed by [facility] at [facility]  OR  12/09/2016: AORTIC VALVE REPLACEMENT  No date: arthroscopy lt knee; Right  No date: BLADDER REPAIR      Comment:  sling  2004: COLONOSCOPY      Comment:  10 year recheck  12/09/2016: CORONARY ARTERY BYPASS GRAFT      Comment:  X3  No date: HYSTERECTOMY  No date: THYROIDECTOMY    Current Outpatient Medications on File Prior to Visit:  albuterol (VENTOLIN HFA) 90 mcg/actuation inhaler, INHALE 2 PUFFS BY MOUTH EVERY 4 TO 6 HOURS AS NEEDED FOR SHORTNESS OF BREATH, Disp: 18 g, Rfl: 5  albuterol-ipratropium (DUO-NEB) 2.5 mg-0.5 mg/3 mL nebulizer solution, Take 3 mLs by nebulization every 6 (six) hours as needed for Wheezing. Rescue, Disp: 180 mL, Rfl: 2  amLODIPine (NORVASC) 10 MG tablet, Take 1 tablet (10 mg total) by mouth once daily. Hold for SBP <120., Disp: 90 tablet, Rfl: 3  aspirin (ECOTRIN) 81 MG EC tablet, Take 81 mg by mouth once daily., Disp: , Rfl:   cholecalciferol, vitamin D3, (VITAMIN D3) 1,000 unit capsule, Take 1,000 Units by mouth once daily., Disp: , Rfl:   coQ10, ubiquinol, 100 mg Cap, Take 1 capsule by mouth once daily., Disp: , Rfl:   cyanocobalamin (VITAMIN B-12) 1000 MCG tablet, Take 100 mcg by mouth once daily., Disp: , Rfl:   dimenhyDRINATE (DRAMAMINE) 50 MG tablet, Take 50 mg by mouth nightly as needed., Disp: , Rfl:   FLUoxetine 20 MG capsule, TAKE 1 CAPSULE BY MOUTH ONCE DAILY, Disp: 30 capsule, Rfl: 5  glucosamine/chondr guevara A sod (OSTEO BI-FLEX ORAL), Take 1 tablet by mouth once daily., Disp: , Rfl:   L. ACIDOPHILUS/BIFIDO LONGUM (PROBIOTIC PEARLS ORAL), Take 1 each by mouth 2 (two) times daily as needed. , Disp: , Rfl:   lancets Misc, True track Check glucose once daily, Disp: 100 each, Rfl: 3  levothyroxine (SYNTHROID) 137 MCG Tab tablet, TAKE 1 TABLET(137 MCG) BY MOUTH BEFORE BREAKFAST, Disp: 90 tablet, Rfl: 0  lisinopril (PRINIVIL,ZESTRIL) 40 MG tablet, TAKE 1 TABLET(40 MG) BY MOUTH ONCE DAILY, Disp: 90 tablet, Rfl: 3  metformin (GLUCOPHAGE) 500 MG tablet, TAKE 1 TABLET(500 MG) BY  MOUTH TWICE DAILY WITH MEALS, Disp: 180 tablet, Rfl: 1  metoprolol tartrate (LOPRESSOR) 100 MG tablet, Take 0.5 tablets (50 mg total) by mouth 2 (two) times daily. Hold for SBP<110 (Patient taking differently: Take 100 mg by mouth once daily. ), Disp: 270 tablet, Rfl: 3  potassium chloride (MICRO-K) 8 mEq CpSR, Take 1 capsule (8 mEq total) by mouth once daily., Disp: 90 capsule, Rfl: 1  potassium gluconate 595 mg (99 mg) Tab, Take 1 tablet by mouth once., Disp: , Rfl:   theophylline 400 mg Tb24, Take 400 mg by mouth 2 (two) times daily. (Patient taking differently: Take 400 mg by mouth 2 (two) times daily as needed. ), Disp: 60 tablet, Rfl: 5  torsemide (DEMADEX) 20 MG Tab, Take 1 tablet (20 mg total) by mouth once daily., Disp: 30 tablet, Rfl: 5  traMADol (ULTRAM) 50 mg tablet, TAKE 1 TABLET(50 MG) BY MOUTH EVERY 6 HOURS AS NEEDED, Disp: 120 tablet, Rfl: 0  traZODone (DESYREL) 50 MG tablet, Take 1 tablet (50 mg total) by mouth nightly as needed for Insomnia., Disp: 30 tablet, Rfl: 5  TRUEPLUS LANCETS 33 gauge Misc, USE TO TEST BLOOD SUGAR ONCE D, Disp: , Rfl: 3  VITAMIN A ORAL, Take 2,400 mcg by mouth once daily., Disp: , Rfl:   vitamin E 400 UNIT capsule, Take 400 Units by mouth once daily., Disp: , Rfl:   (DISCONTINUED) coQ10, ubiquinol, 200 mg Cap, Take 1 capsule by mouth once daily., Disp: , Rfl:   (DISCONTINUED) cyanocobalamin (VITAMIN B-12) 250 MCG tablet, Take 250 mcg by mouth once daily., Disp: , Rfl:   (DISCONTINUED) magnesium 250 mg Tab, Take 1 tablet by mouth once daily. , Disp: , Rfl:   (DISCONTINUED) vitamin A 8000 UNIT capsule, Take 8,000 Units by mouth once daily., Disp: , Rfl:     No current facility-administered medications on file prior to visit.           Review of Systems   Respiratory: Negative for chest tightness and shortness of breath.    Cardiovascular: Negative for chest pain and palpitations.   Musculoskeletal: Positive for arthralgias and myalgias. Negative for joint swelling.        Objective:      Physical Exam   Constitutional: She is oriented to person, place, and time. She appears well-developed. No distress.   Good blood pressure control  Obese with a BMI of 31.6 she is down 3.3 lb from her August 28, 2018 visit with me   HENT:   Right Ear: External ear normal.   Left Ear: External ear normal.   No cerumen obstructing the left external auditory canal, no tympanic membrane injury or foreign body present   Eyes: EOM are normal. Pupils are equal, round, and reactive to light. No scleral icterus.   Neck: Normal range of motion. Neck supple. No JVD present. No tracheal deviation present. No thyromegaly present.   Cardiovascular: Normal rate, regular rhythm and normal heart sounds.   No carotid bruit audible   Pulmonary/Chest: Effort normal and breath sounds normal.   Lymphadenopathy:     She has no cervical adenopathy.   Neurological: She is alert and oriented to person, place, and time.   Skin: She is not diaphoretic.   Psychiatric: She has a normal mood and affect. Her behavior is normal. Judgment and thought content normal.   Nursing note and vitals reviewed.      Assessment:       1. Arthritis of knee    2. Type 2 diabetes mellitus with other circulatory complication, without long-term current use of insulin    3. Postoperative hypothyroidism    4. Hyperlipidemia associated with type 2 diabetes mellitus    5. Hypertension associated with diabetes        Plan:       1. Arthritis of knee  The  (Prescription Monitoring Program) website was checked with no inappropriate activity found.  Does not need refill of tramadol at this time    2. Type 2 diabetes mellitus with other circulatory complication, without long-term current use of insulin  Will try to obtain Dr. Batres's lab results.  If he did not get the A1c and TSH will proceed with the lab in April as scheduled    3. Postoperative hypothyroidism  See above    4. Hyperlipidemia associated with type 2 diabetes mellitus  See  above    5. Hypertension associated with diabetes  Good control with no changes needed

## 2019-03-18 NOTE — TELEPHONE ENCOUNTER
Cholesterol levels are high but she has statin intolerance.  A1c is 6.1 so no changes are needed in diabetic medication, metformin.  She should repeat an A1c in late August.  TSH is low at 0.12 of 0.  It looks like the 137 mcg Synthroid is too strong for her.  Recommend we drop back to 125 and repeat a TSH in two months.

## 2019-03-19 ENCOUNTER — PATIENT OUTREACH (OUTPATIENT)
Dept: ADMINISTRATIVE | Facility: HOSPITAL | Age: 69
End: 2019-03-19

## 2019-03-19 ENCOUNTER — TELEPHONE (OUTPATIENT)
Dept: ADMINISTRATIVE | Facility: HOSPITAL | Age: 69
End: 2019-03-19

## 2019-03-19 NOTE — TELEPHONE ENCOUNTER
----- Message from Freda Jones sent at 3/19/2019  4:07 PM CDT -----  Contact: self   Type:  Patient Returning Call    Who Called: patient   Who Left Message for Patient: Jeane   Does the patient know what this is regarding?:  Yes, test results   Best Call Back Number: [phone] (home)

## 2019-03-20 RX ORDER — LEVOTHYROXINE SODIUM 125 UG/1
125 TABLET ORAL DAILY
Qty: 90 TABLET | Refills: 0 | Status: SHIPPED | OUTPATIENT
Start: 2019-03-20 | End: 2019-07-05

## 2019-03-20 NOTE — TELEPHONE ENCOUNTER
Patient notified thyroid dose is changing to 125mcg- send to [facility]. Lab appts mailed to patient.

## 2019-04-30 ENCOUNTER — HOSPITAL ENCOUNTER (OUTPATIENT)
Dept: RADIOLOGY | Facility: CLINIC | Age: 69
Discharge: HOME | End: 2019-04-30
Attending: NURSE PRACTITIONER
Payer: MEDICARE

## 2019-04-30 DIAGNOSIS — Z12.39 BREAST CANCER SCREENING: ICD-10-CM

## 2019-04-30 LAB
LEFT EYE DM RETINOPATHY: NEGATIVE
RIGHT EYE DM RETINOPATHY: NEGATIVE

## 2019-04-30 PROCEDURE — 77067 SCR MAMMO BI INCL CAD: CPT | Mod: TC,PO

## 2019-04-30 PROCEDURE — 77067 SCR MAMMO BI INCL CAD: CPT | Mod: 26,,, | Performed by: RADIOLOGY

## 2019-04-30 PROCEDURE — 77063 MAMMO DIGITAL SCREENING BILAT WITH TOMOSYNTHESIS_CAD: ICD-10-PCS | Mod: 26,,, | Performed by: RADIOLOGY

## 2019-04-30 PROCEDURE — 77063 BREAST TOMOSYNTHESIS BI: CPT | Mod: 26,,, | Performed by: RADIOLOGY

## 2019-04-30 PROCEDURE — 77067 MAMMO DIGITAL SCREENING BILAT WITH TOMOSYNTHESIS_CAD: ICD-10-PCS | Mod: 26,,, | Performed by: RADIOLOGY

## 2019-05-14 ENCOUNTER — PATIENT OUTREACH (OUTPATIENT)
Dept: ADMINISTRATIVE | Facility: HOSPITAL | Age: 69
End: 2019-05-14

## 2019-06-02 DIAGNOSIS — M17.10 ARTHRITIS OF KNEE: ICD-10-CM

## 2019-06-03 RX ORDER — TRAMADOL HYDROCHLORIDE 50 MG/1
TABLET ORAL
Qty: 120 TABLET | Refills: 0 | Status: SHIPPED | OUTPATIENT
Start: 2019-06-03 | End: 2019-11-29 | Stop reason: SDUPTHER

## 2019-06-17 RX ORDER — LEVOTHYROXINE SODIUM 125 UG/1
TABLET ORAL
Qty: 90 TABLET | Refills: 0 | OUTPATIENT
Start: 2019-06-17

## 2019-06-20 NOTE — TELEPHONE ENCOUNTER
Left voice mail #3 for patient to call back and book lab.   Called  he will try to get her to call back.

## 2019-07-01 ENCOUNTER — LAB VISIT (OUTPATIENT)
Dept: LAB | Facility: HOSPITAL | Age: 69
End: 2019-07-01
Attending: FAMILY MEDICINE
Payer: MEDICARE

## 2019-07-01 DIAGNOSIS — E03.9 ACQUIRED HYPOTHYROIDISM: ICD-10-CM

## 2019-07-01 LAB
T4 FREE SERPL-MCNC: 1.48 NG/DL (ref 0.71–1.51)
TSH SERPL DL<=0.005 MIU/L-ACNC: 0.23 UIU/ML (ref 0.4–4)

## 2019-07-01 PROCEDURE — 84439 ASSAY OF FREE THYROXINE: CPT

## 2019-07-01 PROCEDURE — 84443 ASSAY THYROID STIM HORMONE: CPT

## 2019-07-01 PROCEDURE — 36415 COLL VENOUS BLD VENIPUNCTURE: CPT | Mod: PO

## 2019-07-05 ENCOUNTER — TELEPHONE (OUTPATIENT)
Dept: FAMILY MEDICINE | Facility: CLINIC | Age: 69
End: 2019-07-05

## 2019-07-05 DIAGNOSIS — E03.9 ACQUIRED HYPOTHYROIDISM: Primary | ICD-10-CM

## 2019-07-05 RX ORDER — LEVOTHYROXINE SODIUM 112 UG/1
112 TABLET ORAL DAILY
Qty: 90 TABLET | Refills: 0 | Status: SHIPPED | OUTPATIENT
Start: 2019-07-05 | End: 2019-11-07 | Stop reason: SDUPTHER

## 2019-07-05 NOTE — TELEPHONE ENCOUNTER
Levothyroxine changed to 112 mcg and prescriptions sent to [facility].    Order in for a TSH in eight weeks

## 2019-07-05 NOTE — TELEPHONE ENCOUNTER
Patient states she is using 125mcg.             ----- Message from Evan Sánchez MD sent at 7/2/2019  7:13 PM CDT -----  The thyroid is still too strong at 125 mcg.  Please confirm and have her check the bottle that she is getting 125 mcg.  We're going to have to cut it back to 112 unless Helen has made another error

## 2019-09-14 DIAGNOSIS — I10 ESSENTIAL HYPERTENSION: ICD-10-CM

## 2019-09-16 RX ORDER — AMLODIPINE BESYLATE 10 MG/1
TABLET ORAL
Qty: 90 TABLET | Refills: 1 | Status: SHIPPED | OUTPATIENT
Start: 2019-09-16 | End: 2019-11-07 | Stop reason: SDUPTHER

## 2019-09-17 ENCOUNTER — TELEPHONE (OUTPATIENT)
Dept: FAMILY MEDICINE | Facility: CLINIC | Age: 69
End: 2019-09-17

## 2019-09-17 NOTE — LETTER
September 24, 2019    Tereza Larose   [facility]  [place] [address]             [place]  [place] [address]  Phone: [phone]  Fax: [phone] Dear Mrs. Larose:            I have been unable to reach you by phone. Dr. Evan Sánchez would like to change one of your blood pressure medications. Please call the office at [phone].       If you have any questions or concerns, please don't hesitate to call.    Sincerely,        Sharon Grewal LPN

## 2019-09-17 NOTE — TELEPHONE ENCOUNTER
see refill message from September 14, 2019.  Attempting to switch to an ARB but have not gotten a response

## 2019-09-23 RX ORDER — LISINOPRIL 40 MG/1
TABLET ORAL
Qty: 90 TABLET | Refills: 0 | OUTPATIENT
Start: 2019-09-23

## 2019-09-27 DIAGNOSIS — Z12.11 COLON CANCER SCREENING: ICD-10-CM

## 2019-10-09 ENCOUNTER — TELEPHONE (OUTPATIENT)
Dept: FAMILY MEDICINE | Facility: CLINIC | Age: 69
End: 2019-10-09

## 2019-10-09 DIAGNOSIS — J44.9 CHRONIC OBSTRUCTIVE PULMONARY DISEASE, UNSPECIFIED COPD TYPE: Primary | ICD-10-CM

## 2019-10-09 NOTE — TELEPHONE ENCOUNTER
Patient responded to letter to call office- new phone number updated in chart- she was advised to discontinue Lisinopril and change to Amlodipine. She did  the Amlodipine in September when it was sent in. She had been taking both. Advised again that she is to stop the Lisinopril.     Referred to Dr. Vaca for copd.

## 2019-11-07 DIAGNOSIS — M17.10 ARTHRITIS OF KNEE: ICD-10-CM

## 2019-11-07 DIAGNOSIS — I10 ESSENTIAL HYPERTENSION: ICD-10-CM

## 2019-11-07 DIAGNOSIS — E11.9 DIABETES MELLITUS WITHOUT COMPLICATION: ICD-10-CM

## 2019-11-07 DIAGNOSIS — E03.9 ACQUIRED HYPOTHYROIDISM: ICD-10-CM

## 2019-11-07 DIAGNOSIS — J44.9 COPD (CHRONIC OBSTRUCTIVE PULMONARY DISEASE): ICD-10-CM

## 2019-11-07 NOTE — TELEPHONE ENCOUNTER
----- Message from Yasmine East sent at 11/7/2019 12:11 PM CST -----  Contact: patient  Type: Needs Medical Advice    Who Called:  patient  Best Call Back Number: [phone]  Additional Information: patient need refills on medications. Patient is requesting a call back from the nurse.

## 2019-11-07 NOTE — TELEPHONE ENCOUNTER
See refills requested. Patient is out of meds. She missed lab for tsh, bmp and a1c- scheduled for 11/11/19- 6 mon f/u appt made 11/26/19.     Cardiologist put her on Repatha injections evry 2 wks. Chart updated.

## 2019-11-09 DIAGNOSIS — M17.10 ARTHRITIS OF KNEE: ICD-10-CM

## 2019-11-09 DIAGNOSIS — E03.9 ACQUIRED HYPOTHYROIDISM: ICD-10-CM

## 2019-11-11 ENCOUNTER — DOCUMENTATION ONLY (OUTPATIENT)
Dept: FAMILY MEDICINE | Facility: CLINIC | Age: 69
End: 2019-11-11

## 2019-11-11 ENCOUNTER — LAB VISIT (OUTPATIENT)
Dept: LAB | Facility: HOSPITAL | Age: 69
End: 2019-11-11
Attending: FAMILY MEDICINE
Payer: MEDICARE

## 2019-11-11 DIAGNOSIS — E11.65 TYPE 2 DIABETES MELLITUS WITH HYPERGLYCEMIA, WITHOUT LONG-TERM CURRENT USE OF INSULIN: ICD-10-CM

## 2019-11-11 DIAGNOSIS — E03.9 ACQUIRED HYPOTHYROIDISM: ICD-10-CM

## 2019-11-11 DIAGNOSIS — F32.A DEPRESSION, UNSPECIFIED DEPRESSION TYPE: ICD-10-CM

## 2019-11-11 LAB
ANION GAP SERPL CALC-SCNC: 12 MMOL/L (ref 8–16)
BUN SERPL-MCNC: 24 MG/DL (ref 8–23)
CALCIUM SERPL-MCNC: 9.3 MG/DL (ref 8.7–10.5)
CHLORIDE SERPL-SCNC: 99 MMOL/L (ref 95–110)
CO2 SERPL-SCNC: 31 MMOL/L (ref 23–29)
CREAT SERPL-MCNC: 1 MG/DL (ref 0.5–1.4)
EST. GFR  (AFRICAN AMERICAN): >60 ML/MIN/1.73 M^2
EST. GFR  (NON AFRICAN AMERICAN): 57.6 ML/MIN/1.73 M^2
ESTIMATED AVG GLUCOSE: 126 MG/DL (ref 68–131)
GLUCOSE SERPL-MCNC: 114 MG/DL (ref 70–110)
HBA1C MFR BLD HPLC: 6 % (ref 4–5.6)
POTASSIUM SERPL-SCNC: 4.2 MMOL/L (ref 3.5–5.1)
SODIUM SERPL-SCNC: 142 MMOL/L (ref 136–145)
T4 FREE SERPL-MCNC: 0.51 NG/DL (ref 0.71–1.51)
TSH SERPL DL<=0.005 MIU/L-ACNC: 46.61 UIU/ML (ref 0.4–4)

## 2019-11-11 PROCEDURE — 36415 COLL VENOUS BLD VENIPUNCTURE: CPT | Mod: PO

## 2019-11-11 PROCEDURE — 84439 ASSAY OF FREE THYROXINE: CPT

## 2019-11-11 PROCEDURE — 80048 BASIC METABOLIC PNL TOTAL CA: CPT

## 2019-11-11 PROCEDURE — 83036 HEMOGLOBIN GLYCOSYLATED A1C: CPT

## 2019-11-11 PROCEDURE — 84443 ASSAY THYROID STIM HORMONE: CPT

## 2019-11-11 RX ORDER — ALBUTEROL SULFATE 90 UG/1
AEROSOL, METERED RESPIRATORY (INHALATION)
Qty: 18 G | Refills: 5 | Status: SHIPPED | OUTPATIENT
Start: 2019-11-11 | End: 2022-08-18

## 2019-11-11 RX ORDER — METOPROLOL TARTRATE 100 MG/1
50 TABLET ORAL 2 TIMES DAILY
Qty: 90 TABLET | Refills: 1 | Status: SHIPPED | OUTPATIENT
Start: 2019-11-11 | End: 2020-01-10

## 2019-11-11 RX ORDER — FLUOXETINE HYDROCHLORIDE 20 MG/1
20 CAPSULE ORAL DAILY
Qty: 90 CAPSULE | Refills: 0 | Status: SHIPPED | OUTPATIENT
Start: 2019-11-11 | End: 2020-04-27

## 2019-11-11 RX ORDER — TRAMADOL HYDROCHLORIDE 50 MG/1
TABLET ORAL
Qty: 120 TABLET | Refills: 0 | OUTPATIENT
Start: 2019-11-11

## 2019-11-11 RX ORDER — IPRATROPIUM BROMIDE AND ALBUTEROL SULFATE 2.5; .5 MG/3ML; MG/3ML
3 SOLUTION RESPIRATORY (INHALATION) EVERY 6 HOURS PRN
Qty: 180 ML | Refills: 2 | Status: SHIPPED | OUTPATIENT
Start: 2019-11-11 | End: 2019-11-15 | Stop reason: SDUPTHER

## 2019-11-11 RX ORDER — METFORMIN HYDROCHLORIDE 500 MG/1
TABLET ORAL
Qty: 180 TABLET | Refills: 1 | Status: SHIPPED | OUTPATIENT
Start: 2019-11-11 | End: 2019-11-29

## 2019-11-11 RX ORDER — LEVOTHYROXINE SODIUM 112 UG/1
TABLET ORAL
Qty: 90 TABLET | Refills: 0 | OUTPATIENT
Start: 2019-11-11

## 2019-11-11 RX ORDER — LEVOTHYROXINE SODIUM 112 UG/1
112 TABLET ORAL DAILY
Qty: 90 TABLET | Refills: 0 | Status: SHIPPED | OUTPATIENT
Start: 2019-11-11 | End: 2020-01-03

## 2019-11-11 RX ORDER — AMLODIPINE BESYLATE 10 MG/1
TABLET ORAL
Qty: 90 TABLET | Refills: 1 | Status: SHIPPED | OUTPATIENT
Start: 2019-11-11 | End: 2020-10-13 | Stop reason: SDUPTHER

## 2019-11-11 NOTE — PROGRESS NOTES
Pre-Visit Chart Review  For Appointment Scheduled on 11-29-19    Health Maintenance Due   Topic Date Due    Urine Microalbumin  03/28/2018    DEXA SCAN  05/09/2019    Fecal Occult Blood Test (FOBT)/FitKit  09/06/2019

## 2019-11-12 ENCOUNTER — TELEPHONE (OUTPATIENT)
Dept: FAMILY MEDICINE | Facility: CLINIC | Age: 69
End: 2019-11-12

## 2019-11-12 DIAGNOSIS — E03.9 ACQUIRED HYPOTHYROIDISM: Primary | ICD-10-CM

## 2019-11-12 NOTE — TELEPHONE ENCOUNTER
Cannot refill control drugs as I have not seen her since March    Blood sugar control is very good with an A1c of 6.0.  No changes in the metformin are needed.    Her TSH indicates her thyroid level is extremely low.  How long has it been since she has taken her thyroid?

## 2019-11-12 NOTE — TELEPHONE ENCOUNTER
----- Message from Evan Sánchez MD sent at 11/11/2019  7:53 PM CST -----  The chemistry panel and blood sugar control look good but her thyroid is extremely low.  How long has she been out of her thyroid?

## 2019-11-13 ENCOUNTER — TELEPHONE (OUTPATIENT)
Dept: PULMONOLOGY | Facility: CLINIC | Age: 69
End: 2019-11-13

## 2019-11-13 NOTE — TELEPHONE ENCOUNTER
Patient was called stated pharmacy is out of her thyroid medication. Pharmacy was called. Stated it is ready for . 112 levothyroxine. Pharmacy stated they are going to call patient to let her know it is ready ([facility])   She has not had it for 4 days

## 2019-11-13 NOTE — TELEPHONE ENCOUNTER
Patient informed. She has been out of her thyroid medication. 4 days, stated pharmacy does not have any.   She has an apt on 11-29-19 with Dr Sánchez.   ([facility])

## 2019-11-13 NOTE — TELEPHONE ENCOUNTER
Gave patient appt for 11/21/2019 at 9:30 am        ----- Message from Maria L Lopez sent at 11/13/2019  3:48 PM CST -----  Contact: Patient  Patient missed her appt today because she mixed them up.   Please call to reschedule, thank you! First available in Epic is in January    Phone number: [phone] or [phone](

## 2019-11-15 ENCOUNTER — PATIENT OUTREACH (OUTPATIENT)
Dept: ADMINISTRATIVE | Facility: HOSPITAL | Age: 69
End: 2019-11-15

## 2019-11-15 DIAGNOSIS — E11.9 DIABETES MELLITUS WITHOUT COMPLICATION: Primary | ICD-10-CM

## 2019-11-15 DIAGNOSIS — J44.9 COPD (CHRONIC OBSTRUCTIVE PULMONARY DISEASE): ICD-10-CM

## 2019-11-15 DIAGNOSIS — Z78.0 ASYMPTOMATIC MENOPAUSAL STATE: ICD-10-CM

## 2019-11-15 NOTE — PROGRESS NOTES
Health Maintenance Due   Topic Date Due    Shingles Vaccine (1 of 2) 09/17/2000    Colonoscopy  09/17/2000    Urine Microalbumin  03/28/2018    DEXA SCAN  05/09/2019    Influenza Vaccine (1) 09/01/2019

## 2019-11-15 NOTE — LETTER
"November 15, 2019    Tereza Larose  [address] [place] [address]             [facility]  [address]  [facility] [address]  Phone: [phone] [facility] is committed to your overall health.  To help you get the most out of each of your visits, we will review your information to make sure you are up to date on all of your recommended tests and/or procedures.      Dr. Evan Sánchez MD has found that your chart shows you may be due for a:    COLORECTAL CANCER SCREENING  BONE MINERAL DENSITY SCAN (DEXA)  URINE MICROALBUMIN (LAB)     Our records show you are due for colon cancer screening.  If you have already had your screening, or you have made an appointment for your screening, congratulations!  You're on the road to good health. If you haven't signed up for a colorectal screening please accept this invitation to be screened.      According to the American Cancer Society, colon cancer is the third most common cancer for people in the [place].  A simple screening test "Fit Kit" - done in your own home - can help find colon cancer at an early stage when it can be treated, even before any signs or symptoms develop. THIS IS A YEARLY TEST.    Testing for blood in your stool (feces or bowel movement) is the first step. If you have an upcoming visit with your Primary Care Physician you can  a Fit Kit during your visit or you can  a Fit Kit at your Primary Care Clinic prior to your visit.    The Fit Kit includes:    · Instructions on how to perform the test  · (1) Sheet of tissue paper  · (1) Small Absorption pad  · (1) Bottle to hold the sample and a small probe to help you take the sample  · (1) Small plastic bio-hazard bag  · (1) Postage-paid return envelope    Please do your test (the instructions will tell you how) and then return your sample in the postage-paid return envelope within 24 hours of collection.     If your test results are negative, you won't need testing " "again for another year.  If results show you need more testing, we will call you with next steps.    Regular colorectal cancer screening is one of the most powerful weapons for preventing colon cancer.  The website https://www.cancer.org/cancer/colon-rectal-cancer.html can answer many of your questions about this cancer and its prevention.  Just search for "colorectal cancer".        If you have had any of the above done at another facility, please bring the records or information with you so that your record at [facility] will be complete.  If you would like to schedule any of these, please contact the clinic at [phone].    If you are currently taking medication, please bring it with you to your appointment for review.    Also, if you have any type of Advanced Directives, please bring them with you to your office visit so we may scan them into your chart.    Thank You,    Your [facility] Team,  MD Jacque Roblero LPN Clinical Care Coordinator  [facility]  [address] [place] [address] [place] [address]  Phone [phone]  Fax [phone]       "

## 2019-11-16 RX ORDER — IPRATROPIUM BROMIDE AND ALBUTEROL SULFATE 2.5; .5 MG/3ML; MG/3ML
SOLUTION RESPIRATORY (INHALATION)
Qty: 1620 ML | Refills: 2 | Status: SHIPPED | OUTPATIENT
Start: 2019-11-16 | End: 2021-04-30

## 2019-11-21 ENCOUNTER — OFFICE VISIT (OUTPATIENT)
Dept: PULMONOLOGY | Facility: CLINIC | Age: 69
End: 2019-11-21
Payer: MEDICARE

## 2019-11-21 VITALS
DIASTOLIC BLOOD PRESSURE: 65 MMHG | OXYGEN SATURATION: 93 % | HEART RATE: 50 BPM | WEIGHT: 178 LBS | BODY MASS INDEX: 32.76 KG/M2 | SYSTOLIC BLOOD PRESSURE: 137 MMHG | HEIGHT: 62 IN

## 2019-11-21 DIAGNOSIS — J96.11 HYPOXEMIC RESPIRATORY FAILURE, CHRONIC: ICD-10-CM

## 2019-11-21 DIAGNOSIS — R09.89 CHRONIC SINUS COMPLAINTS: ICD-10-CM

## 2019-11-21 DIAGNOSIS — J44.89 ASTHMA WITH CHRONIC OBSTRUCTIVE PULMONARY DISEASE (COPD): Primary | Chronic | ICD-10-CM

## 2019-11-21 DIAGNOSIS — J45.50 SEVERE PERSISTENT ASTHMA WITHOUT COMPLICATION: ICD-10-CM

## 2019-11-21 DIAGNOSIS — J82.83 EOSINOPHILIC ASTHMA: ICD-10-CM

## 2019-11-21 DIAGNOSIS — J44.9 CHRONIC OBSTRUCTIVE PULMONARY DISEASE, UNSPECIFIED COPD TYPE: ICD-10-CM

## 2019-11-21 PROCEDURE — 99205 PR OFFICE/OUTPT VISIT, NEW, LEVL V, 60-74 MIN: ICD-10-PCS | Mod: S$PBB,,, | Performed by: INTERNAL MEDICINE

## 2019-11-21 PROCEDURE — 99215 OFFICE O/P EST HI 40 MIN: CPT | Mod: PBBFAC,PO | Performed by: INTERNAL MEDICINE

## 2019-11-21 PROCEDURE — 1125F PR PAIN SEVERITY QUANTIFIED, PAIN PRESENT: ICD-10-PCS | Mod: ,,, | Performed by: INTERNAL MEDICINE

## 2019-11-21 PROCEDURE — 1125F AMNT PAIN NOTED PAIN PRSNT: CPT | Mod: ,,, | Performed by: INTERNAL MEDICINE

## 2019-11-21 PROCEDURE — 1159F PR MEDICATION LIST DOCUMENTED IN MEDICAL RECORD: ICD-10-PCS | Mod: ,,, | Performed by: INTERNAL MEDICINE

## 2019-11-21 PROCEDURE — 99999 PR PBB SHADOW E&M-EST. PATIENT-LVL V: CPT | Mod: PBBFAC,,, | Performed by: INTERNAL MEDICINE

## 2019-11-21 PROCEDURE — 99999 PR PBB SHADOW E&M-EST. PATIENT-LVL V: ICD-10-PCS | Mod: PBBFAC,,, | Performed by: INTERNAL MEDICINE

## 2019-11-21 PROCEDURE — 99205 OFFICE O/P NEW HI 60 MIN: CPT | Mod: S$PBB,,, | Performed by: INTERNAL MEDICINE

## 2019-11-21 PROCEDURE — 1159F MED LIST DOCD IN RCRD: CPT | Mod: ,,, | Performed by: INTERNAL MEDICINE

## 2019-11-21 RX ORDER — ARFORMOTEROL TARTRATE 15 UG/2ML
15 SOLUTION RESPIRATORY (INHALATION) 2 TIMES DAILY
Qty: 60 VIAL | Refills: 11 | Status: SHIPPED | OUTPATIENT
Start: 2019-11-21 | End: 2022-07-26 | Stop reason: ALTCHOICE

## 2019-11-21 RX ORDER — PREDNISONE 20 MG/1
TABLET ORAL
Qty: 21 TABLET | Refills: 0 | Status: SHIPPED | OUTPATIENT
Start: 2019-11-21 | End: 2020-04-30 | Stop reason: SDUPTHER

## 2019-11-21 RX ORDER — FLUTICASONE FUROATE AND VILANTEROL 200; 25 UG/1; UG/1
1 POWDER RESPIRATORY (INHALATION) DAILY
Qty: 1 EACH | Refills: 11 | Status: SHIPPED | OUTPATIENT
Start: 2019-11-21 | End: 2021-06-28 | Stop reason: SDUPTHER

## 2019-11-21 RX ORDER — MONTELUKAST SODIUM 10 MG/1
10 TABLET ORAL NIGHTLY
Qty: 30 TABLET | Refills: 5 | Status: SHIPPED | OUTPATIENT
Start: 2019-11-21 | End: 2020-04-27 | Stop reason: SDUPTHER

## 2019-11-21 RX ORDER — BUDESONIDE 0.5 MG/2ML
0.5 INHALANT ORAL 2 TIMES DAILY
Qty: 120 ML | Refills: 11 | Status: SHIPPED | OUTPATIENT
Start: 2019-11-21 | End: 2022-07-26

## 2019-11-21 NOTE — PROGRESS NOTES
"11/21/2019    Tereza Larose  New Patient Consult    Chief Complaint   Patient presents with    COPD    Asthma    Shortness of Breath    Sputum Production     white       HPI: asthma since age 19 with cough/wheezes/sob, weather change and noct worsening.  Uses 02 to sleep and sat walking 89 or so sat.  Uses ox daytime 1/2 day.      Sinuses problem- flonase prn, claritin.  No infection sinus.    Mucous from lungs white to clear.      Uses theophylline.  Takes albuterol 1-2/wk.  Awakens with asthma 4/month.  No emergency treatments.      Uses steroid shots once a yr.    No apneas suggested- on home ox 3 yrs.  Use cane to ambulate as had cva 3 yrs ago.  Falls occasionally- fell last month.          The chief compliant  problem is new to me",   PFSH:  Past Medical History:   Diagnosis Date    Allergy     Arthritis     Asthma     Brain bleed     COPD (chronic obstructive pulmonary disease)     Depression     Diabetes mellitus type II     Diverticulitis     Fatty liver     GERD (gastroesophageal reflux disease)     Goiter     s/p thyroidectomy    Heart murmur     Hernia of abdominal wall     History of intracranial hemorrhage 6/15/2013    History of non-ST elevation myocardial infarction (NSTEMI) 6/15/2013    Hyperlipidemia     Hypertension     Metabolic syndrome 6/14/2012    Myocardial infarction     On home oxygen therapy     states uses 2L at night or when sat < 90    Statin intolerance     Stroke 2012    left hand shaky, loss of balance         Past Surgical History:   Procedure Laterality Date    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/25/2017    Dr Griggs    ADRENALECTOMY      AORTIC VALVE REPLACEMENT  12/09/2016    arthroscopy lt knee Right     BLADDER REPAIR      sling    BREAST BIOPSY Bilateral     benign    COLONOSCOPY  2004    10 year recheck    CORONARY ARTERY BYPASS GRAFT  12/09/2016    X3    HYSTERECTOMY      OOPHORECTOMY      THYROIDECTOMY       Social History     Tobacco Use    " Smoking status: Never Smoker    Smokeless tobacco: Never Used   Substance Use Topics    Alcohol use: Yes     Comment: seldom    Drug use: No     Family History   Problem Relation Age of Onset    Arthritis Mother     Diabetes Mother     Heart disease Mother     Hypertension Mother     Hypertension Father     Heart disease Father     Mental illness Father     Asthma Sister     Diabetes Sister     Hypertension Sister     Asthma Son     COPD Son     Depression Son     Diabetes Son     Hypertension Son     Stroke Son     Diabetes Maternal Uncle     Heart disease Maternal Uncle     Mental illness Paternal Aunt     Cancer Paternal Aunt     Heart disease Paternal Aunt     Hypertension Paternal Aunt     Heart disease Paternal Uncle     Hypertension Maternal Grandmother     Mental illness Maternal Grandmother     Aneurysm Maternal Grandfather     Mental illness Paternal Grandmother     Heart disease Paternal Grandfather     Melanoma Neg Hx     Psoriasis Neg Hx     Lupus Neg Hx     Eczema Neg Hx      Review of patient's allergies indicates:   Allergen Reactions    Corn Itching     Other reaction(s): Sneezing  Other reaction(s): Rhinorrhea    Potato starch Hives     Other reaction(s): Sneezing  Other reaction(s): Rhinorrhea    Pravastatin Other (See Comments)     Muscle pain    Statins-hmg-coa reductase inhibitors Other (See Comments)    Hydrochlorothiazide Other (See Comments)     weakness    Welchol [colesevelam] Other (See Comments)     Weakness        Performance Status:The patient's activity level is housebound activities.      Review of Systems:  a review of eleven systems covering constitutional, Eye, HEENT, Psych, Respiratory, Cardiac, GI, , Musculoskeletal, Endocrine, Dermatologic was negative except for pertinent findings as listed ABOVE and below:  pertinent positive as above, rest is good       Exam:Comprehensive exam done. /65 (BP Location: Left arm, Patient  "Position: Sitting)   Pulse (!) 50   Ht 5' 2" (1.575 m)   Wt 80.7 kg (178 lb 0.3 oz)   SpO2 (!) 93% Comment: on room air  BMI 32.56 kg/m²   Exam included Vitals as listed, and patient's appearance and affect and alertness and mood, oral exam for yeast and hygiene and pharynx lesions and Mallapatti (M) score, neck with inspection for jvd and masses and thyroid abnormalities and lymph nodes (supraclavicular and infraclavicular nodes and axillary also examined and noted if abn), chest exam included symmetry and effort and fremitus and percussion and auscultation, cardiac exam included rhythm and gallops and murmur and rubs and jvd and edema, abdominal exam for mass and hepatosplenomegaly and tenderness and hernias and bowel sounds, Musculoskeletal exam with muscle tone and posture and mobility/gait and  strength, and skin for rashes and cyanosis and pallor and turgor, extremity for clubbing.  Findings were normal except for pertinent findings listed below:  M3,chest is symmetric, no distress, normal percussion, normal fremitus and good normal decreased breath sounds  No clubbing nor edema     Radiographs (ct chest and cxr) reviewed: was not done      Labs reviewed         Results for TEREZA VELA (MRN [id number]) as of 11/21/2019 10:37   Ref. Range 8/28/2018 16:38   Eosinophil% Latest Ref Range: 0.0 - 8.0 % 5.2   Eos # Latest Ref Range: 0.0 - 0.5 K/uL 0.4     PFT will be done and results to be reviewed       Plan:  Clinical impression is apparently straight forward and impression with management as below.     Tereza was seen today for copd, asthma, shortness of breath and sputum production.    Diagnoses and all orders for this visit:    Asthma with chronic obstructive pulmonary disease (COPD)  -     montelukast (SINGULAIR) 10 mg tablet; Take 1 tablet (10 mg total) by mouth every evening.  -     Spirometry with/without bronchodilator; Future  -     Six Minute Walk Test to qualify for Home Oxygen; Future  -     " budesonide (PULMICORT) 0.5 mg/2 mL nebulizer solution; Take 2 mLs (0.5 mg total) by nebulization 2 (two) times daily. Controller  -     arformoterol (BROVANA) 15 mcg/2 mL Nebu; Take 2 mLs (15 mcg total) by nebulization 2 (two) times daily. Controller  -     X-Ray Chest PA And Lateral; Future  -     fluticasone furoate-vilanterol (BREO ELLIPTA) 200-25 mcg/dose DsDv diskus inhaler; Inhale 1 puff into the lungs once daily. Controller  -     predniSONE (DELTASONE) 20 MG tablet; Take one daily for 3 days and may repeat for shortness of breath.    Hypoxemic respiratory failure, chronic  -     Six Minute Walk Test to qualify for Home Oxygen; Future    Severe persistent asthma without complication  -     X-Ray Chest PA And Lateral; Future  -     fluticasone furoate-vilanterol (BREO ELLIPTA) 200-25 mcg/dose DsDv diskus inhaler; Inhale 1 puff into the lungs once daily. Controller  -     predniSONE (DELTASONE) 20 MG tablet; Take one daily for 3 days and may repeat for shortness of breath.    Chronic obstructive pulmonary disease, unspecified COPD type  -     Spirometry with/without bronchodilator; Future  -     Six Minute Walk Test to qualify for Home Oxygen; Future  -     X-Ray Chest PA And Lateral; Future  -     fluticasone furoate-vilanterol (BREO ELLIPTA) 200-25 mcg/dose DsDv diskus inhaler; Inhale 1 puff into the lungs once daily. Controller  -     predniSONE (DELTASONE) 20 MG tablet; Take one daily for 3 days and may repeat for shortness of breath.    Eosinophilic asthma  -     montelukast (SINGULAIR) 10 mg tablet; Take 1 tablet (10 mg total) by mouth every evening.  -     Spirometry with/without bronchodilator; Future  -     budesonide (PULMICORT) 0.5 mg/2 mL nebulizer solution; Take 2 mLs (0.5 mg total) by nebulization 2 (two) times daily. Controller  -     arformoterol (BROVANA) 15 mcg/2 mL Nebu; Take 2 mLs (15 mcg total) by nebulization 2 (two) times daily. Controller  -     X-Ray Chest PA And Lateral; Future  -      fluticasone furoate-vilanterol (BREO ELLIPTA) 200-25 mcg/dose DsDv diskus inhaler; Inhale 1 puff into the lungs once daily. Controller    Chronic sinus complaints  -     montelukast (SINGULAIR) 10 mg tablet; Take 1 tablet (10 mg total) by mouth every evening.        Follow up in about 8 weeks (around 1/16/2020).    Discussed with patient above for education the following:      Patient Instructions   Asthma likely caused some copd.      Take prednisone 20mg  Daily for 3 days and note sugars, and breathing.  Would check lung capacity next wk, Check chest xray and breathing test and oxygen level walking.  .   May repeat prednisone if needed.    Prevention medications -Use Singulair daily&  Use breo once daily, or  consider continuing or use budesonide and brovana (similar medications but should be covered with old medicare benefit).    Use nebulizer as needed.    Control sinuses- use Claritin and Flonase.  singulair may help.      Could skip theophylline and stay off

## 2019-11-21 NOTE — PATIENT INSTRUCTIONS
Asthma likely caused some copd.      Take prednisone 20mg  Daily for 3 days and note sugars, and breathing.  Would check lung capacity next wk, Check chest xray and breathing test and oxygen level walking.  .   May repeat prednisone if needed.    Prevention medications -Use Singulair daily&  Use breo once daily, or  consider continuing or use budesonide and brovana (similar medications but should be covered with old medicare benefit).    Use nebulizer as needed.    Control sinuses- use Claritin and Flonase.  singulair may help.      Could skip theophylline and stay off

## 2019-11-21 NOTE — LETTER
November 21, 2019      Evan Sánchez MD  [address]  [place] [address]           [facility]  [address]  [place] [address]  Phone: [phone]  Fax: [phone]          Patient: Tereza Larose   MR Number: [id number]   YOB: 1950   Date of Visit: 11/21/2019       Dear Dr. Evan Sánchez:    Thank you for referring Tereza Larose to me for evaluation. Attached you will find relevant portions of my assessment and plan of care.    If you have questions, please do not hesitate to call me. I look forward to following Tereza Larose along with you.    Sincerely,    Chuck Vaca MD    Enclosure  CC:  No Recipients    If you would like to receive this communication electronically, please contact [email or web address] or [phone] to request more information on [facility] access.    For providers and/or their staff who would like to refer a patient to [facility], please contact us through our one-stop-shop provider referral line, [facility], at [phone].    If you feel you have received this communication in error or would no longer like to receive these types of communications, please e-mail [email or web address]

## 2019-11-29 ENCOUNTER — OFFICE VISIT (OUTPATIENT)
Dept: FAMILY MEDICINE | Facility: CLINIC | Age: 69
End: 2019-11-29
Attending: FAMILY MEDICINE
Payer: MEDICARE

## 2019-11-29 VITALS
DIASTOLIC BLOOD PRESSURE: 64 MMHG | HEIGHT: 62 IN | WEIGHT: 177.94 LBS | BODY MASS INDEX: 32.74 KG/M2 | TEMPERATURE: 98 F | HEART RATE: 60 BPM | SYSTOLIC BLOOD PRESSURE: 138 MMHG | OXYGEN SATURATION: 95 %

## 2019-11-29 DIAGNOSIS — I15.2 HYPERTENSION ASSOCIATED WITH DIABETES: ICD-10-CM

## 2019-11-29 DIAGNOSIS — E11.59 TYPE 2 DIABETES MELLITUS WITH OTHER CIRCULATORY COMPLICATION, WITHOUT LONG-TERM CURRENT USE OF INSULIN: Primary | ICD-10-CM

## 2019-11-29 DIAGNOSIS — Z78.9 STATIN INTOLERANCE: ICD-10-CM

## 2019-11-29 DIAGNOSIS — M17.10 ARTHRITIS OF KNEE: ICD-10-CM

## 2019-11-29 DIAGNOSIS — E78.5 HYPERLIPIDEMIA ASSOCIATED WITH TYPE 2 DIABETES MELLITUS: Chronic | ICD-10-CM

## 2019-11-29 DIAGNOSIS — Z78.0 MENOPAUSE: ICD-10-CM

## 2019-11-29 DIAGNOSIS — E11.69 HYPERLIPIDEMIA ASSOCIATED WITH TYPE 2 DIABETES MELLITUS: Chronic | ICD-10-CM

## 2019-11-29 DIAGNOSIS — E11.59 HYPERTENSION ASSOCIATED WITH DIABETES: ICD-10-CM

## 2019-11-29 DIAGNOSIS — E11.9 DIABETES MELLITUS WITHOUT COMPLICATION: ICD-10-CM

## 2019-11-29 PROCEDURE — 99999 PR PBB SHADOW E&M-EST. PATIENT-LVL III: ICD-10-PCS | Mod: PBBFAC,,, | Performed by: FAMILY MEDICINE

## 2019-11-29 PROCEDURE — 99213 OFFICE O/P EST LOW 20 MIN: CPT | Mod: PBBFAC,PO,25 | Performed by: FAMILY MEDICINE

## 2019-11-29 PROCEDURE — 1159F MED LIST DOCD IN RCRD: CPT | Mod: ,,, | Performed by: FAMILY MEDICINE

## 2019-11-29 PROCEDURE — 99999 PR PBB SHADOW E&M-EST. PATIENT-LVL III: CPT | Mod: PBBFAC,,, | Performed by: FAMILY MEDICINE

## 2019-11-29 PROCEDURE — 99214 OFFICE O/P EST MOD 30 MIN: CPT | Mod: S$PBB,,, | Performed by: FAMILY MEDICINE

## 2019-11-29 PROCEDURE — 1125F PR PAIN SEVERITY QUANTIFIED, PAIN PRESENT: ICD-10-PCS | Mod: ,,, | Performed by: FAMILY MEDICINE

## 2019-11-29 PROCEDURE — 90662 IIV NO PRSV INCREASED AG IM: CPT | Mod: PBBFAC,PO

## 2019-11-29 PROCEDURE — 99214 PR OFFICE/OUTPT VISIT, EST, LEVL IV, 30-39 MIN: ICD-10-PCS | Mod: S$PBB,,, | Performed by: FAMILY MEDICINE

## 2019-11-29 PROCEDURE — 1125F AMNT PAIN NOTED PAIN PRSNT: CPT | Mod: ,,, | Performed by: FAMILY MEDICINE

## 2019-11-29 PROCEDURE — 1159F PR MEDICATION LIST DOCUMENTED IN MEDICAL RECORD: ICD-10-PCS | Mod: ,,, | Performed by: FAMILY MEDICINE

## 2019-11-29 RX ORDER — TRAMADOL HYDROCHLORIDE 50 MG/1
TABLET ORAL
Qty: 120 TABLET | Refills: 0 | Status: SHIPPED | OUTPATIENT
Start: 2019-11-29 | End: 2020-04-30

## 2019-11-29 RX ORDER — METFORMIN HYDROCHLORIDE 500 MG/1
1000 TABLET ORAL 2 TIMES DAILY WITH MEALS
Qty: 360 TABLET | Refills: 1 | Status: SHIPPED | OUTPATIENT
Start: 2019-11-29 | End: 2021-04-30

## 2019-11-29 NOTE — PROGRESS NOTES
Subjective:       Patient ID: Tereza Larose is a 69 y.o. female.    Chief Complaint: Follow-up    69-year-old female coming in for follow-up on diabetes, hypertension, hyperlipidemia, coronary disease and multiple other issues.  Her A1c was done on November 11th with an excellent result of 6.0.  She tells me that she had increased her metformin from 500 mg b.i.d. to 1000 mg b.i.d. because her home sugars were running high and that she has been doing very well since she changed at about 2-3 months ago.  This would indicate that the A1c cover the affected interval.  Dr. Batres has recently started her on injections twice monthly for her cholesterol is she has been statin intolerant.  She will be due for a lipid panel in February but we will defer to him on follow-up there.  She is still due for a colonoscopy the prefers to do the fit kit, she is due for a microalbumin in DEXA scan and she is due for a flu vaccine which she is willing to do today.  She has no complaints at this moment.    Past Medical History:  No date: Allergy  No date: Arthritis  No date: Asthma  No date: Brain bleed  No date: COPD (chronic obstructive pulmonary disease)  No date: Depression  No date: Diabetes mellitus type II  No date: Diverticulitis  No date: Fatty liver  No date: GERD (gastroesophageal reflux disease)  No date: Goiter      Comment:  s/p thyroidectomy  No date: Heart murmur  No date: Hernia of abdominal wall  6/15/2013: History of intracranial hemorrhage  6/15/2013: History of non-ST elevation myocardial infarction (NSTEMI)  No date: Hyperlipidemia  No date: Hypertension  6/14/2012: Metabolic syndrome  No date: Myocardial infarction  No date: On home oxygen therapy      Comment:  states uses 2L at night or when sat < 90  No date: Statin intolerance  2012: Stroke      Comment:  left hand shaky, loss of balance    Past Surgical History:  07/25/2017: adranel tumor removal       Comment:  Dr Griggs  No date:  ADRENALECTOMY  12/09/2016: AORTIC VALVE REPLACEMENT  No date: arthroscopy lt knee; Right  No date: BLADDER REPAIR      Comment:  sling  No date: BREAST BIOPSY; Bilateral      Comment:  benign  2004: COLONOSCOPY      Comment:  10 year recheck  12/09/2016: CORONARY ARTERY BYPASS GRAFT      Comment:  X3  No date: HYSTERECTOMY  No date: OOPHORECTOMY  No date: THYROIDECTOMY    Current Outpatient Medications on File Prior to Visit:  albuterol (VENTOLIN HFA) 90 mcg/actuation inhaler, INHALE 2 PUFFS BY MOUTH EVERY 4 TO 6 HOURS AS NEEDED FOR SHORTNESS OF BREATH, Disp: 18 g, Rfl: 5  albuterol-ipratropium (DUO-NEB) 2.5 mg-0.5 mg/3 mL nebulizer solution, USE ONE VIAL IN NEBULIZER EVERY 6 HOURS AS NEEDED FOR WHEEZING, Disp: 1620 mL, Rfl: 2  amLODIPine (NORVASC) 10 MG tablet, TAKE ONE TABLET BY MOUTH ONCE DAILY- HOLD FOR SYSTOLIC BLOOD PRESSURE<120, Disp: 90 tablet, Rfl: 1  arformoterol (BROVANA) 15 mcg/2 mL Nebu, Take 2 mLs (15 mcg total) by nebulization 2 (two) times daily. Controller, Disp: 60 vial, Rfl: 11  aspirin (ECOTRIN) 81 MG EC tablet, Take 81 mg by mouth once daily., Disp: , Rfl:   budesonide (PULMICORT) 0.5 mg/2 mL nebulizer solution, Take 2 mLs (0.5 mg total) by nebulization 2 (two) times daily. Controller, Disp: 120 mL, Rfl: 11  cholecalciferol, vitamin D3, (VITAMIN D3) 1,000 unit capsule, Take 1,000 Units by mouth once daily., Disp: , Rfl:   coQ10, ubiquinol, 100 mg Cap, Take 1 capsule by mouth once daily., Disp: , Rfl:   cyanocobalamin (VITAMIN B-12) 1000 MCG tablet, Take 100 mcg by mouth once daily., Disp: , Rfl:   dimenhyDRINATE (DRAMAMINE) 50 MG tablet, Take 50 mg by mouth nightly as needed., Disp: , Rfl:   evolocumab (REPATHA SYRINGE) 140 mg/mL Syrg, Inject into the skin every 14 (fourteen) days., Disp: , Rfl:   FLUoxetine 20 MG capsule, Take 1 capsule (20 mg total) by mouth once daily., Disp: 90 capsule, Rfl: 0  fluticasone furoate-vilanterol (BREO ELLIPTA) 200-25 mcg/dose DsDv diskus inhaler, Inhale 1 puff  into the lungs once daily. Controller, Disp: 1 each, Rfl: 11  glucosamine/chondr guevara A sod (OSTEO BI-FLEX ORAL), Take 1 tablet by mouth once daily., Disp: , Rfl:   L. ACIDOPHILUS/BIFIDO LONGUM (PROBIOTIC PEARLS ORAL), Take 1 each by mouth 2 (two) times daily as needed. , Disp: , Rfl:   lancets Misc, True track Check glucose once daily, Disp: 100 each, Rfl: 3  levothyroxine (SYNTHROID) 112 MCG tablet, Take 1 tablet (112 mcg total) by mouth once daily., Disp: 90 tablet, Rfl: 0  metoprolol tartrate (LOPRESSOR) 100 MG tablet, Take 0.5 tablets (50 mg total) by mouth 2 (two) times daily. Hold for SBP<110, Disp: 90 tablet, Rfl: 1  montelukast (SINGULAIR) 10 mg tablet, Take 1 tablet (10 mg total) by mouth every evening., Disp: 30 tablet, Rfl: 5  potassium chloride (MICRO-K) 8 mEq CpSR, Take 1 capsule (8 mEq total) by mouth once daily., Disp: 90 capsule, Rfl: 1  potassium gluconate 595 mg (99 mg) Tab, Take 1 tablet by mouth once., Disp: , Rfl:   predniSONE (DELTASONE) 20 MG tablet, Take one daily for 3 days and may repeat for shortness of breath., Disp: 21 tablet, Rfl: 0  theophylline 400 mg Tb24, Take 400 mg by mouth 2 (two) times daily. (Patient taking differently: Take 400 mg by mouth 2 (two) times daily as needed. ), Disp: 60 tablet, Rfl: 5  torsemide (DEMADEX) 20 MG Tab, Take 1 tablet (20 mg total) by mouth once daily., Disp: 30 tablet, Rfl: 5  traZODone (DESYREL) 50 MG tablet, Take 1 tablet (50 mg total) by mouth nightly as needed for Insomnia., Disp: 30 tablet, Rfl: 5  TRUEPLUS LANCETS 33 gauge Misc, USE TO TEST BLOOD SUGAR ONCE D, Disp: , Rfl: 3  VITAMIN A ORAL, Take 2,400 mcg by mouth once daily., Disp: , Rfl:   vitamin E 400 UNIT capsule, Take 400 Units by mouth once daily., Disp: , Rfl:   (DISCONTINUED) metFORMIN (GLUCOPHAGE) 500 MG tablet, TAKE 1 TABLET(500 MG) BY MOUTH TWICE DAILY WITH MEALS (Patient taking differently: 1,000 mg 2 (two) times daily with meals. TAKE 1 TABLET(500 MG) BY MOUTH TWICE DAILY WITH  MEALS), Disp: 180 tablet, Rfl: 1  (DISCONTINUED) traMADol (ULTRAM) 50 mg tablet, TAKE 1 TABLET(50 MG) BY MOUTH EVERY 6 HOURS AS NEEDED, Disp: 120 tablet, Rfl: 0    No current facility-administered medications on file prior to visit.     She did not bring in her medications so reconciliation was done with her after visit summary from a previous visit with Dr. Vaca and is highly questionable    Review of Systems   Constitutional: Negative for activity change, appetite change and unexpected weight change.   Respiratory: Negative for cough, chest tightness and shortness of breath.    Cardiovascular: Negative for chest pain, palpitations and leg swelling.   Gastrointestinal: Negative for constipation, diarrhea, nausea and vomiting.   Endocrine: Negative for cold intolerance, heat intolerance, polydipsia and polyuria.   Genitourinary: Negative for dysuria, frequency and hematuria.   Musculoskeletal: Positive for arthralgias. Negative for joint swelling.   Skin: Negative for color change and rash.   Psychiatric/Behavioral: Negative for dysphoric mood and sleep disturbance (Rarely needs the trazodone now). The patient is not nervous/anxious.        Objective:      Physical Exam   Constitutional: She is oriented to person, place, and time. She appears well-developed. No distress.   Good blood pressure control  Obese with a BMI of 32.5 she is up 5.1 lb from her last visit with me March 11, 2019   HENT:   Head: Normocephalic and atraumatic.   Right Ear: External ear normal.   Left Ear: External ear normal.   Nose: Nose normal.   Mouth/Throat: Oropharynx is clear and moist. No oropharyngeal exudate.   Eyes: Pupils are equal, round, and reactive to light. EOM are normal. No scleral icterus.   Neck: Normal range of motion. Neck supple. No JVD present. No tracheal deviation present. No thyromegaly present.   Cardiovascular: Normal rate, regular rhythm and normal heart sounds. Exam reveals no gallop and no friction rub.   No murmur  heard.  Pulses:       Dorsalis pedis pulses are 2+ on the right side, and 2+ on the left side.        Posterior tibial pulses are 2+ on the right side, and 2+ on the left side.   Pulmonary/Chest: Effort normal and breath sounds normal. No stridor. No respiratory distress. She has no wheezes. She has no rales. She exhibits no tenderness.   Abdominal: Soft. Bowel sounds are normal. She exhibits no distension and no mass. There is no tenderness. There is no rebound and no guarding. No hernia.   Musculoskeletal:        Right foot: There is normal range of motion and no deformity.        Left foot: There is normal range of motion and no deformity.   Feet:   Right Foot:   Protective Sensation: 8 sites tested. 8 sites sensed.   Skin Integrity: Negative for ulcer, blister, skin breakdown, erythema, warmth, callus or dry skin.   Left Foot:   Protective Sensation: 8 sites tested. 8 sites sensed.   Skin Integrity: Negative for ulcer, blister, skin breakdown, erythema, warmth, callus or dry skin.   Lymphadenopathy:     She has no cervical adenopathy.   Neurological: She is alert and oriented to person, place, and time. She displays normal reflexes. No cranial nerve deficit or sensory deficit. She exhibits normal muscle tone.   Proprioception intact all 10 toes   Skin: Capillary refill takes less than 2 seconds. She is not diaphoretic.   Good hair growth on all 10 toes   Psychiatric: She has a normal mood and affect. Her behavior is normal. Judgment and thought content normal.   Nursing note and vitals reviewed.      Assessment:       1. Type 2 diabetes mellitus with other circulatory complication, without long-term current use of insulin    2. Hypertension associated with diabetes    3. Hyperlipidemia associated with type 2 diabetes mellitus    4. Statin intolerance    5. Menopause    6. Diabetes mellitus without complication    7. Arthritis of knee        Plan:       1. Type 2 diabetes mellitus with other circulatory  complication, without long-term current use of insulin  Lab Results   Component Value Date    HGBA1C 6.0 (H) 11/11/2019     No changes needed, repeat A1c in six months  - Microalbumin/creatinine urine ratio; Future    2. Hypertension associated with diabetes  Good control with no changes needed    3. Hyperlipidemia associated with type 2 diabetes mellitus  Statin intolerant, started on injectable medications by Cardiology    4. Statin intolerance    5. Menopause  - DXA Bone Density Spine And Hip; Future    6. Diabetes mellitus without complication  - metFORMIN (GLUCOPHAGE) 500 MG tablet; Take 2 tablets (1,000 mg total) by mouth 2 (two) times daily with meals.  Dispense: 360 tablet; Refill: 1    7. Arthritis of knee  - traMADol (ULTRAM) 50 mg tablet; TAKE 1 TABLET(50 MG) BY MOUTH EVERY 6 HOURS AS NEEDED  Dispense: 120 tablet; Refill: 0  The  (Prescription Monitoring Program) website was checked with no inappropriate activity found.

## 2020-01-03 ENCOUNTER — TELEPHONE (OUTPATIENT)
Dept: FAMILY MEDICINE | Facility: CLINIC | Age: 70
End: 2020-01-03

## 2020-01-03 DIAGNOSIS — E03.9 HYPOTHYROIDISM, UNSPECIFIED TYPE: Primary | ICD-10-CM

## 2020-01-03 DIAGNOSIS — E03.9 ACQUIRED HYPOTHYROIDISM: ICD-10-CM

## 2020-01-03 RX ORDER — LEVOTHYROXINE SODIUM 137 UG/1
137 TABLET ORAL DAILY
Qty: 90 TABLET | Refills: 0 | Status: SHIPPED | OUTPATIENT
Start: 2020-01-03 | End: 2020-03-09

## 2020-01-03 NOTE — TELEPHONE ENCOUNTER
Patient called- she confirms Levothyroxine at 112 mcg and not missing any doses. See lab on your desk.

## 2020-01-03 NOTE — TELEPHONE ENCOUNTER
Left message for patient to call office. Received lab from [facility] that Dr. Marko Batres ordered. TSH results are 20.000. Need to confirm she is taking Levothyroxine 112 mcg or missing doses.

## 2020-01-03 NOTE — TELEPHONE ENCOUNTER
Much better, still pretty low though.  I skipped one step and am increasing her to 137 mcg.  Prescriptions sent to [facility] [place] [address].  She will need a follow-up TSH in eight weeks.  She actually has one scheduled January 9th that was a follow-up to the last change.  She does not need to do that lab, it can be rescheduled eight weeks out.

## 2020-01-10 ENCOUNTER — HOSPITAL ENCOUNTER (INPATIENT)
Facility: HOSPITAL | Age: 70
LOS: 4 days | Discharge: HOME | DRG: 242 | End: 2020-01-14
Attending: EMERGENCY MEDICINE | Admitting: INTERNAL MEDICINE
Payer: MEDICARE

## 2020-01-10 ENCOUNTER — TELEPHONE (OUTPATIENT)
Dept: FAMILY MEDICINE | Facility: CLINIC | Age: 70
End: 2020-01-10

## 2020-01-10 DIAGNOSIS — I44.2 COMPLETE HEART BLOCK: ICD-10-CM

## 2020-01-10 DIAGNOSIS — I50.9 CONGESTIVE HEART FAILURE, UNSPECIFIED HF CHRONICITY, UNSPECIFIED HEART FAILURE TYPE: ICD-10-CM

## 2020-01-10 DIAGNOSIS — R07.9 CHEST PAIN: ICD-10-CM

## 2020-01-10 DIAGNOSIS — J44.1 COPD EXACERBATION: ICD-10-CM

## 2020-01-10 DIAGNOSIS — R06.02 SOB (SHORTNESS OF BREATH): Primary | ICD-10-CM

## 2020-01-10 DIAGNOSIS — R00.1 SYMPTOMATIC BRADYCARDIA: ICD-10-CM

## 2020-01-10 DIAGNOSIS — I49.9 ARRHYTHMIA: ICD-10-CM

## 2020-01-10 PROBLEM — J18.9 PNEUMONIA: Status: ACTIVE | Noted: 2020-01-10

## 2020-01-10 PROBLEM — J96.01 ACUTE RESPIRATORY FAILURE WITH HYPOXIA: Status: ACTIVE | Noted: 2020-01-10

## 2020-01-10 LAB
ALBUMIN SERPL BCP-MCNC: 3.5 G/DL (ref 3.5–5.2)
ALLENS TEST: ABNORMAL
ALP SERPL-CCNC: 88 U/L (ref 55–135)
ALT SERPL W/O P-5'-P-CCNC: 95 U/L (ref 10–44)
ANION GAP SERPL CALC-SCNC: 16 MMOL/L (ref 8–16)
ANION GAP SERPL CALC-SCNC: 20 MMOL/L (ref 8–16)
AST SERPL-CCNC: 123 U/L (ref 10–40)
BASOPHILS # BLD AUTO: 0.04 K/UL (ref 0–0.2)
BASOPHILS NFR BLD: 0.2 % (ref 0–1.9)
BILIRUB SERPL-MCNC: 1.3 MG/DL (ref 0.1–1)
BILIRUB UR QL STRIP: NEGATIVE
BNP SERPL-MCNC: 809 PG/ML (ref 0–99)
BNP SERPL-MCNC: 809 PG/ML (ref 0–99)
BUN SERPL-MCNC: 49 MG/DL (ref 6–30)
BUN SERPL-MCNC: 53 MG/DL (ref 8–23)
CALCIUM SERPL-MCNC: 8.9 MG/DL (ref 8.7–10.5)
CHLORIDE SERPL-SCNC: 96 MMOL/L (ref 95–110)
CHLORIDE SERPL-SCNC: 98 MMOL/L (ref 95–110)
CLARITY UR: ABNORMAL
CO2 SERPL-SCNC: 22 MMOL/L (ref 23–29)
COLOR UR: YELLOW
CREAT SERPL-MCNC: 1.8 MG/DL (ref 0.5–1.4)
CREAT SERPL-MCNC: 2 MG/DL (ref 0.5–1.4)
DELSYS: ABNORMAL
DIFFERENTIAL METHOD: ABNORMAL
EOSINOPHIL # BLD AUTO: 0 K/UL (ref 0–0.5)
EOSINOPHIL NFR BLD: 0 % (ref 0–8)
EP: 8
ERYTHROCYTE [DISTWIDTH] IN BLOOD BY AUTOMATED COUNT: 13.8 % (ref 11.5–14.5)
ERYTHROCYTE [SEDIMENTATION RATE] IN BLOOD BY WESTERGREN METHOD: 20 MM/H
EST. GFR  (AFRICAN AMERICAN): 28.7 ML/MIN/1.73 M^2
EST. GFR  (NON AFRICAN AMERICAN): 24.9 ML/MIN/1.73 M^2
FIO2: 100
GLUCOSE SERPL-MCNC: 276 MG/DL (ref 70–110)
GLUCOSE SERPL-MCNC: 292 MG/DL (ref 70–110)
GLUCOSE SERPL-MCNC: 334 MG/DL (ref 70–110)
GLUCOSE UR QL STRIP: NEGATIVE
HCO3 UR-SCNC: 25 MMOL/L (ref 24–28)
HCT VFR BLD AUTO: 39.3 % (ref 37–48.5)
HCT VFR BLD CALC: 36 %PCV (ref 36–54)
HGB BLD-MCNC: 12.6 G/DL (ref 12–16)
HGB UR QL STRIP: NEGATIVE
IMM GRANULOCYTES # BLD AUTO: 0.35 K/UL (ref 0–0.04)
IMM GRANULOCYTES NFR BLD AUTO: 1.5 % (ref 0–0.5)
INR PPP: 1.2
IP: 20
KETONES UR QL STRIP: ABNORMAL
LACTATE SERPL-SCNC: 4.5 MMOL/L (ref 0.5–1.9)
LACTATE SERPL-SCNC: 4.8 MMOL/L (ref 0.5–1.9)
LEUKOCYTE ESTERASE UR QL STRIP: NEGATIVE
LYMPHOCYTES # BLD AUTO: 0.9 K/UL (ref 1–4.8)
LYMPHOCYTES NFR BLD: 3.9 % (ref 18–48)
MCH RBC QN AUTO: 31 PG (ref 27–31)
MCHC RBC AUTO-ENTMCNC: 32.1 G/DL (ref 32–36)
MCV RBC AUTO: 97 FL (ref 82–98)
MODE: ABNORMAL
MONOCYTES # BLD AUTO: 1.9 K/UL (ref 0.3–1)
MONOCYTES NFR BLD: 7.9 % (ref 4–15)
NEUTROPHILS # BLD AUTO: 20.8 K/UL (ref 1.8–7.7)
NEUTROPHILS NFR BLD: 86.5 % (ref 38–73)
NITRITE UR QL STRIP: NEGATIVE
NRBC BLD-RTO: 0 /100 WBC
PCO2 BLDA: 44.3 MMHG (ref 35–45)
PH SMN: 7.36 [PH] (ref 7.35–7.45)
PH UR STRIP: 5 [PH] (ref 5–8)
PLATELET # BLD AUTO: 395 K/UL (ref 150–350)
PMV BLD AUTO: 9.9 FL (ref 9.2–12.9)
PO2 BLDA: 112 MMHG (ref 80–100)
POC BE: 0 MMOL/L
POC IONIZED CALCIUM: 1.15 MMOL/L (ref 1.06–1.42)
POC SATURATED O2: 98 % (ref 95–100)
POC TCO2 (MEASURED): 29 MMOL/L (ref 23–29)
POC TCO2: 26 MMOL/L (ref 23–27)
POTASSIUM BLD-SCNC: 4.3 MMOL/L (ref 3.5–5.1)
POTASSIUM SERPL-SCNC: 4.6 MMOL/L (ref 3.5–5.1)
PROT SERPL-MCNC: 7.5 G/DL (ref 6–8.4)
PROT UR QL STRIP: ABNORMAL
PROTHROMBIN TIME: 14.5 SEC (ref 10.6–14.8)
RBC # BLD AUTO: 4.06 M/UL (ref 4–5.4)
SAMPLE: ABNORMAL
SAMPLE: ABNORMAL
SITE: ABNORMAL
SODIUM BLD-SCNC: 137 MMOL/L (ref 136–145)
SODIUM SERPL-SCNC: 138 MMOL/L (ref 136–145)
SP GR UR STRIP: 1.02 (ref 1–1.03)
TROPONIN I SERPL DL<=0.01 NG/ML-MCNC: 0.05 NG/ML
TROPONIN I SERPL DL<=0.01 NG/ML-MCNC: 0.21 NG/ML
URN SPEC COLLECT METH UR: ABNORMAL
UROBILINOGEN UR STRIP-ACNC: ABNORMAL EU/DL
WBC # BLD AUTO: 24.03 K/UL (ref 3.9–12.7)

## 2020-01-10 PROCEDURE — 25000003 PHARM REV CODE 250: Performed by: NURSE PRACTITIONER

## 2020-01-10 PROCEDURE — 83605 ASSAY OF LACTIC ACID: CPT | Mod: 91

## 2020-01-10 PROCEDURE — 27201423 OPTIME MED/SURG SUP & DEVICES STERILE SUPPLY: Performed by: INTERNAL MEDICINE

## 2020-01-10 PROCEDURE — 94640 AIRWAY INHALATION TREATMENT: CPT

## 2020-01-10 PROCEDURE — 81003 URINALYSIS AUTO W/O SCOPE: CPT

## 2020-01-10 PROCEDURE — 25000003 PHARM REV CODE 250: Performed by: INTERNAL MEDICINE

## 2020-01-10 PROCEDURE — 63600175 PHARM REV CODE 636 W HCPCS: Performed by: INTERNAL MEDICINE

## 2020-01-10 PROCEDURE — 96374 THER/PROPH/DIAG INJ IV PUSH: CPT

## 2020-01-10 PROCEDURE — 99153 MOD SED SAME PHYS/QHP EA: CPT | Performed by: INTERNAL MEDICINE

## 2020-01-10 PROCEDURE — 80053 COMPREHEN METABOLIC PANEL: CPT

## 2020-01-10 PROCEDURE — C1751 CATH, INF, PER/CENT/MIDLINE: HCPCS | Performed by: INTERNAL MEDICINE

## 2020-01-10 PROCEDURE — 93005 ELECTROCARDIOGRAM TRACING: CPT

## 2020-01-10 PROCEDURE — 27000221 HC OXYGEN, UP TO 24 HOURS

## 2020-01-10 PROCEDURE — 83605 ASSAY OF LACTIC ACID: CPT

## 2020-01-10 PROCEDURE — 94660 CPAP INITIATION&MGMT: CPT

## 2020-01-10 PROCEDURE — 87040 BLOOD CULTURE FOR BACTERIA: CPT | Mod: 59

## 2020-01-10 PROCEDURE — 63600175 PHARM REV CODE 636 W HCPCS: Performed by: EMERGENCY MEDICINE

## 2020-01-10 PROCEDURE — 99152 MOD SED SAME PHYS/QHP 5/>YRS: CPT | Performed by: INTERNAL MEDICINE

## 2020-01-10 PROCEDURE — 96375 TX/PRO/DX INJ NEW DRUG ADDON: CPT

## 2020-01-10 PROCEDURE — 99291 CRITICAL CARE FIRST HOUR: CPT

## 2020-01-10 PROCEDURE — C1894 INTRO/SHEATH, NON-LASER: HCPCS | Performed by: INTERNAL MEDICINE

## 2020-01-10 PROCEDURE — 33210 INSERT ELECTRD/PM CATH SNGL: CPT

## 2020-01-10 PROCEDURE — 99900035 HC TECH TIME PER 15 MIN (STAT)

## 2020-01-10 PROCEDURE — 85025 COMPLETE CBC W/AUTO DIFF WBC: CPT

## 2020-01-10 PROCEDURE — 85610 PROTHROMBIN TIME: CPT

## 2020-01-10 PROCEDURE — 20000000 HC ICU ROOM

## 2020-01-10 PROCEDURE — 83880 ASSAY OF NATRIURETIC PEPTIDE: CPT

## 2020-01-10 PROCEDURE — 82803 BLOOD GASES ANY COMBINATION: CPT

## 2020-01-10 PROCEDURE — 36600 WITHDRAWAL OF ARTERIAL BLOOD: CPT

## 2020-01-10 PROCEDURE — 84484 ASSAY OF TROPONIN QUANT: CPT

## 2020-01-10 PROCEDURE — 84484 ASSAY OF TROPONIN QUANT: CPT | Mod: 91

## 2020-01-10 PROCEDURE — 94761 N-INVAS EAR/PLS OXIMETRY MLT: CPT

## 2020-01-10 PROCEDURE — 36415 COLL VENOUS BLD VENIPUNCTURE: CPT

## 2020-01-10 PROCEDURE — 25000242 PHARM REV CODE 250 ALT 637 W/ HCPCS: Performed by: EMERGENCY MEDICINE

## 2020-01-10 RX ORDER — ENOXAPARIN SODIUM 100 MG/ML
30 INJECTION SUBCUTANEOUS EVERY 24 HOURS
Status: DISCONTINUED | OUTPATIENT
Start: 2020-01-10 | End: 2020-01-13 | Stop reason: DRUGHIGH

## 2020-01-10 RX ORDER — FENTANYL CITRATE 50 UG/ML
INJECTION, SOLUTION INTRAMUSCULAR; INTRAVENOUS
Status: DISCONTINUED | OUTPATIENT
Start: 2020-01-10 | End: 2020-01-10 | Stop reason: HOSPADM

## 2020-01-10 RX ORDER — AMLODIPINE BESYLATE 5 MG/1
10 TABLET ORAL DAILY
Status: DISCONTINUED | OUTPATIENT
Start: 2020-01-11 | End: 2020-01-14 | Stop reason: HOSPADM

## 2020-01-10 RX ORDER — ASPIRIN 325 MG
325 TABLET ORAL
Status: COMPLETED | OUTPATIENT
Start: 2020-01-10 | End: 2020-01-10

## 2020-01-10 RX ORDER — LEVOFLOXACIN 5 MG/ML
750 INJECTION, SOLUTION INTRAVENOUS
Status: DISCONTINUED | OUTPATIENT
Start: 2020-01-10 | End: 2020-01-10

## 2020-01-10 RX ORDER — MIDAZOLAM HYDROCHLORIDE 1 MG/ML
INJECTION INTRAMUSCULAR; INTRAVENOUS
Status: DISCONTINUED | OUTPATIENT
Start: 2020-01-10 | End: 2020-01-10 | Stop reason: HOSPADM

## 2020-01-10 RX ORDER — IBUPROFEN 200 MG
16 TABLET ORAL
Status: DISCONTINUED | OUTPATIENT
Start: 2020-01-10 | End: 2020-01-13

## 2020-01-10 RX ORDER — IPRATROPIUM BROMIDE AND ALBUTEROL SULFATE 2.5; .5 MG/3ML; MG/3ML
3 SOLUTION RESPIRATORY (INHALATION)
Status: COMPLETED | OUTPATIENT
Start: 2020-01-10 | End: 2020-01-10

## 2020-01-10 RX ORDER — FUROSEMIDE 10 MG/ML
40 INJECTION INTRAMUSCULAR; INTRAVENOUS
Status: COMPLETED | OUTPATIENT
Start: 2020-01-10 | End: 2020-01-10

## 2020-01-10 RX ORDER — LIDOCAINE HYDROCHLORIDE 10 MG/ML
INJECTION, SOLUTION EPIDURAL; INFILTRATION; INTRACAUDAL; PERINEURAL
Status: DISCONTINUED | OUTPATIENT
Start: 2020-01-10 | End: 2020-01-10 | Stop reason: HOSPADM

## 2020-01-10 RX ORDER — FUROSEMIDE 10 MG/ML
40 INJECTION INTRAMUSCULAR; INTRAVENOUS DAILY
Status: DISCONTINUED | OUTPATIENT
Start: 2020-01-11 | End: 2020-01-13

## 2020-01-10 RX ORDER — GLUCAGON 1 MG
1 KIT INJECTION
Status: DISCONTINUED | OUTPATIENT
Start: 2020-01-10 | End: 2020-01-13

## 2020-01-10 RX ORDER — IBUPROFEN 200 MG
24 TABLET ORAL
Status: DISCONTINUED | OUTPATIENT
Start: 2020-01-10 | End: 2020-01-13

## 2020-01-10 RX ORDER — EVOLOCUMAB 140 MG/ML
INJECTION, SOLUTION SUBCUTANEOUS
COMMUNITY
Start: 2020-01-03 | End: 2020-01-10 | Stop reason: SDUPTHER

## 2020-01-10 RX ORDER — INSULIN ASPART 100 [IU]/ML
0-5 INJECTION, SOLUTION INTRAVENOUS; SUBCUTANEOUS
Status: DISCONTINUED | OUTPATIENT
Start: 2020-01-10 | End: 2020-01-14 | Stop reason: HOSPADM

## 2020-01-10 RX ORDER — ATROPINE SULFATE 0.1 MG/ML
0.1 INJECTION INTRAVENOUS
Status: COMPLETED | OUTPATIENT
Start: 2020-01-10 | End: 2020-01-10

## 2020-01-10 RX ORDER — METHYLPREDNISOLONE SOD SUCC 125 MG
125 VIAL (EA) INJECTION
Status: COMPLETED | OUTPATIENT
Start: 2020-01-10 | End: 2020-01-10

## 2020-01-10 RX ORDER — ONDANSETRON 2 MG/ML
4 INJECTION INTRAMUSCULAR; INTRAVENOUS EVERY 6 HOURS PRN
Status: DISCONTINUED | OUTPATIENT
Start: 2020-01-10 | End: 2020-01-14 | Stop reason: HOSPADM

## 2020-01-10 RX ORDER — ASPIRIN 325 MG
325 TABLET ORAL
Status: DISPENSED | OUTPATIENT
Start: 2020-01-10 | End: 2020-01-11

## 2020-01-10 RX ORDER — SODIUM CHLORIDE 0.9 % (FLUSH) 0.9 %
10 SYRINGE (ML) INJECTION
Status: DISCONTINUED | OUTPATIENT
Start: 2020-01-10 | End: 2020-01-13

## 2020-01-10 RX ORDER — LEVOFLOXACIN 5 MG/ML
750 INJECTION, SOLUTION INTRAVENOUS
Status: DISCONTINUED | OUTPATIENT
Start: 2020-01-10 | End: 2020-01-13

## 2020-01-10 RX ORDER — ENOXAPARIN SODIUM 100 MG/ML
40 INJECTION SUBCUTANEOUS EVERY 24 HOURS
Status: DISCONTINUED | OUTPATIENT
Start: 2020-01-10 | End: 2020-01-10

## 2020-01-10 RX ADMIN — CEFTRIAXONE SODIUM 2 G: 2 INJECTION, SOLUTION INTRAVENOUS at 05:01

## 2020-01-10 RX ADMIN — IPRATROPIUM BROMIDE AND ALBUTEROL SULFATE 3 ML: .5; 3 SOLUTION RESPIRATORY (INHALATION) at 04:01

## 2020-01-10 RX ADMIN — METHYLPREDNISOLONE SODIUM SUCCINATE 125 MG: 125 INJECTION, POWDER, FOR SOLUTION INTRAMUSCULAR; INTRAVENOUS at 04:01

## 2020-01-10 RX ADMIN — ATROPINE SULFATE 0.1 MG: 0.1 INJECTION PARENTERAL at 04:01

## 2020-01-10 RX ADMIN — INSULIN ASPART 1 UNITS: 100 INJECTION, SOLUTION INTRAVENOUS; SUBCUTANEOUS at 10:01

## 2020-01-10 RX ADMIN — DOXYCYCLINE 100 MG: 100 INJECTION, POWDER, LYOPHILIZED, FOR SOLUTION INTRAVENOUS at 05:01

## 2020-01-10 RX ADMIN — LEVOFLOXACIN 750 MG: 750 INJECTION, SOLUTION INTRAVENOUS at 07:01

## 2020-01-10 RX ADMIN — FUROSEMIDE 40 MG: 10 INJECTION, SOLUTION INTRAMUSCULAR; INTRAVENOUS at 04:01

## 2020-01-10 RX ADMIN — ENOXAPARIN SODIUM 30 MG: 100 INJECTION SUBCUTANEOUS at 07:01

## 2020-01-10 RX ADMIN — ASPIRIN 325 MG ORAL TABLET 325 MG: 325 PILL ORAL at 03:01

## 2020-01-10 NOTE — HPI
9-year-old female with past medical history of hypertension HLD, and IDDM, COPD/asthma who presents with shortness of breath for 2 days.  She states it was associated with palpitations, diaphoresis.  She denies chest pain.  Never to the emergency room she was tachypneic and hypoxic.  She was placed on BiPAP with improvement of respiratory status.  EKG done showed complete heart block.  Cardiology consulted for emergent temporary pacemaker.

## 2020-01-10 NOTE — PROGRESS NOTES
[facility]   Cardiology Note    Consult Requested By:  Hospital medicine  Reason for Consult:  3rd degree AV block    SUBJECTIVE:     History of Present Illness:  69-year-old white female status post aorta coronary bypass who states over the past 3 days she has been feeling bad.  She has been having increasing shortness of breath weakness with some palpitations.  Upon arrival to the emergency room patient is in complete heart block with idioventricular rhythm at a rate of 38.  Blood pressure has been stable.  She denies any chest pain    Review of patient's allergies indicates:   Allergen Reactions    Corn Itching     Other reaction(s): Sneezing  Other reaction(s): Rhinorrhea    Potato starch Hives     Other reaction(s): Sneezing  Other reaction(s): Rhinorrhea    Pravastatin Other (See Comments)     Muscle pain    Statins-hmg-coa reductase inhibitors Other (See Comments)    Hydrochlorothiazide Other (See Comments)     weakness    Welchol [colesevelam] Other (See Comments)     Weakness        Past Medical History:   Diagnosis Date    Allergy     Arthritis     Asthma     Brain bleed     COPD (chronic obstructive pulmonary disease)     Depression     Diabetes mellitus type II     Diverticulitis     Fatty liver     GERD (gastroesophageal reflux disease)     Goiter     s/p thyroidectomy    Heart murmur     Hernia of abdominal wall     History of intracranial hemorrhage 6/15/2013    History of non-ST elevation myocardial infarction (NSTEMI) 6/15/2013    Hyperlipidemia     Hypertension     Metabolic syndrome 6/14/2012    Myocardial infarction     On home oxygen therapy     states uses 2L at night or when sat < 90    Statin intolerance     Stroke 2012    left hand shaky, loss of balance     Past Surgical History:   Procedure Laterality Date    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/25/2017    Dr Griggs    ADRENALECTOMY      AORTIC VALVE REPLACEMENT  12/09/2016    arthroscopy lt knee Right      BLADDER REPAIR      sling    BREAST BIOPSY Bilateral     benign    COLONOSCOPY  2004    10 year recheck    CORONARY ARTERY BYPASS GRAFT  12/09/2016    X3    HYSTERECTOMY      OOPHORECTOMY      THYROIDECTOMY       Family History   Problem Relation Age of Onset    Arthritis Mother     Diabetes Mother     Heart disease Mother     Hypertension Mother     Hypertension Father     Heart disease Father     Mental illness Father     Asthma Sister     Diabetes Sister     Hypertension Sister     Asthma Son     COPD Son     Depression Son     Diabetes Son     Hypertension Son     Stroke Son     Diabetes Maternal Uncle     Heart disease Maternal Uncle     Mental illness Paternal Aunt     Cancer Paternal Aunt     Heart disease Paternal Aunt     Hypertension Paternal Aunt     Heart disease Paternal Uncle     Hypertension Maternal Grandmother     Mental illness Maternal Grandmother     Aneurysm Maternal Grandfather     Mental illness Paternal Grandmother     Heart disease Paternal Grandfather     Melanoma Neg Hx     Psoriasis Neg Hx     Lupus Neg Hx     Eczema Neg Hx      Social History     Tobacco Use    Smoking status: Never Smoker    Smokeless tobacco: Never Used   Substance Use Topics    Alcohol use: Yes     Comment: seldom    Drug use: No       Review of Systems:  Review of Systems   All other systems reviewed and are negative.      OBJECTIVE:     Vital Signs (Most Recent)  Temp: 98.1 °F (36.7 °C) (01/10/20 1523)  Pulse: (!) 43 (01/10/20 1643)  Resp: 20 (01/10/20 1643)  BP: (!) 113/55 (01/10/20 1523)  SpO2: 97 % (01/10/20 1643)    Vital Signs Range (Last 24H):  Temp:  [98.1 °F (36.7 °C)]   Pulse:  [37-43]   Resp:  [20-28]   BP: (113)/(55)   SpO2:  [82 %-97 %]     I & O (Last 24H):  No intake or output data in the 24 hours ending 01/10/20 1644    Current Diet:     Current Diet Order   Procedures    Diet NPO        Allergies:  Review of patient's allergies indicates:   Allergen  Reactions    Corn Itching     Other reaction(s): Sneezing  Other reaction(s): Rhinorrhea    Potato starch Hives     Other reaction(s): Sneezing  Other reaction(s): Rhinorrhea    Pravastatin Other (See Comments)     Muscle pain    Statins-hmg-coa reductase inhibitors Other (See Comments)    Hydrochlorothiazide Other (See Comments)     weakness    Welchol [colesevelam] Other (See Comments)     Weakness        Meds:  Scheduled Meds:   aspirin  325 mg Oral ED 1 Time    cefTRIAXone (ROCEPHIN) IVPB  2 g Intravenous Q24H    doxycycline (VIBRAMYCIN) IVPB  100 mg Intravenous ED 1 Time    furosemide  40 mg Intravenous ED 1 Time     Continuous Infusions:  PRN Meds:    Oxygen/Ventilator Data (Last 24H):  (if applicable)   Oxygen Concentration (%):  [45-75] 45    Hemodynamic Parameters (Last 24H):   (if applicable)        Laboratory and Radiology Data:  Recent Results (from the past 24 hour(s))   CBC auto differential    Collection Time: 01/10/20  4:27 PM   Result Value Ref Range    WBC 24.03 (H) 3.90 - 12.70 K/uL    RBC 4.06 4.00 - 5.40 M/uL    Hemoglobin 12.6 12.0 - 16.0 g/dL    Hematocrit 39.3 37.0 - 48.5 %    Mean Corpuscular Volume 97 82 - 98 fL    Mean Corpuscular Hemoglobin 31.0 27.0 - 31.0 pg    Mean Corpuscular Hemoglobin Conc 32.1 32.0 - 36.0 g/dL    RDW 13.8 11.5 - 14.5 %    Platelets 395 (H) 150 - 350 K/uL    MPV 9.9 9.2 - 12.9 fL     Imaging Results          X-Ray Chest AP Portable (Final result)  Result time 01/10/20 16:33:45    Final result by Jazzy García MD (01/10/20 16:33:45)                 Impression:      Mild cardiomegaly with prominence of the kiersten and bilateral ground-glass alveolar opacities in the mid and lower lobes.  Findings may be secondary to multifocal pneumonia.  The hilar prominence may be secondary to adenopathy versus enlarged vessels.  Follow-up two view chest x-ray to document clearing is recommended.      Electronically signed by: Jazzy García  MD  Date:    01/10/2020  Time:    16:33             Narrative:    EXAMINATION:  XR CHEST AP PORTABLE    CLINICAL HISTORY:  Chest Pain;    FINDINGS:  Portable chest at 16:19 hours is is compared to 09/09/2018 shows patient has had a prior median sternotomy.  The heart is mildly enlarged.    There is prominence of the hilum which may be secondary to adenopathy versus enlarged vessels.  There is patchy ground-glass alveolar opacities in the left perihilar region and both lower lobes suspicious for pneumonia.  The upper lobes are clear.  There are no pleural effusions.    There are no acute osseous abnormalities.                                12-lead EKG interpretation:  (if applicable)    EKG sinus bradycardia with complete AV block and left bundle branch block underlying rhythm with a heart rate of 38  Current Cardiac Rhythm:   (if applicable)    Physical Exam:   Physical Exam   Constitutional: She is well-developed, well-nourished, and in no distress.   Neck: No JVD present. No thyromegaly present.   Cardiovascular:   Regular rate and rhythm soft murmur bradycardia noted   Pulmonary/Chest: Effort normal. She has wheezes. She has rales.   Abdominal: Soft. Bowel sounds are normal.   Musculoskeletal: She exhibits no edema.       ASSESSMENT/PLAN:   Assessment:   Complete AV block  History of aorta coronary bypass- Porcine aortic valve rep;acement  Increased WBC count possible pneumonia  COPD  Plan:   Will place temp PM

## 2020-01-10 NOTE — SUBJECTIVE & OBJECTIVE
Past Medical History:   Diagnosis Date    Allergy     Arthritis     Asthma     Brain bleed     COPD (chronic obstructive pulmonary disease)     Depression     Diabetes mellitus type II     Diverticulitis     Fatty liver     GERD (gastroesophageal reflux disease)     Goiter     s/p thyroidectomy    Heart murmur     Hernia of abdominal wall     History of intracranial hemorrhage 6/15/2013    History of non-ST elevation myocardial infarction (NSTEMI) 6/15/2013    Hyperlipidemia     Hypertension     Metabolic syndrome 6/14/2012    Myocardial infarction     On home oxygen therapy     states uses 2L at night or when sat < 90    Statin intolerance     Stroke 2012    left hand shaky, loss of balance       Past Surgical History:   Procedure Laterality Date    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/25/2017    Dr Griggs    ADRENALECTOMY      AORTIC VALVE REPLACEMENT  12/09/2016    arthroscopy lt knee Right     BLADDER REPAIR      sling    BREAST BIOPSY Bilateral     benign    COLONOSCOPY  2004    10 year recheck    CORONARY ARTERY BYPASS GRAFT  12/09/2016    X3    HYSTERECTOMY      OOPHORECTOMY      THYROIDECTOMY         Review of patient's allergies indicates:   Allergen Reactions    Corn Itching     Other reaction(s): Sneezing  Other reaction(s): Rhinorrhea    Potato starch Hives     Other reaction(s): Sneezing  Other reaction(s): Rhinorrhea    Pravastatin Other (See Comments)     Muscle pain    Statins-hmg-coa reductase inhibitors Other (See Comments)    Hydrochlorothiazide Other (See Comments)     weakness    Welchol [colesevelam] Other (See Comments)     Weakness        No current facility-administered medications on file prior to encounter.      Current Outpatient Medications on File Prior to Encounter   Medication Sig    albuterol (VENTOLIN HFA) 90 mcg/actuation inhaler INHALE 2 PUFFS BY MOUTH EVERY 4 TO 6 HOURS AS NEEDED FOR SHORTNESS OF BREATH (Patient taking differently: Inhale 2 puffs  into the lungs every 4 to 6 hours as needed. INHALE 2 PUFFS BY MOUTH EVERY 4 TO 6 HOURS AS NEEDED FOR SHORTNESS OF BREATH)    albuterol-ipratropium (DUO-NEB) 2.5 mg-0.5 mg/3 mL nebulizer solution USE ONE VIAL IN NEBULIZER EVERY 6 HOURS AS NEEDED FOR WHEEZING (Patient taking differently: Take 3 mLs by nebulization every 6 (six) hours as needed for Wheezing. )    amLODIPine (NORVASC) 10 MG tablet TAKE ONE TABLET BY MOUTH ONCE DAILY- HOLD FOR SYSTOLIC BLOOD PRESSURE<120 (Patient taking differently: Take 10 mg by mouth once daily. TAKE ONE TABLET BY MOUTH ONCE DAILY- HOLD FOR SYSTOLIC BLOOD PRESSURE<120)    arformoterol (BROVANA) 15 mcg/2 mL Nebu Take 2 mLs (15 mcg total) by nebulization 2 (two) times daily. Controller    aspirin (ECOTRIN) 81 MG EC tablet Take 81 mg by mouth once daily.    budesonide (PULMICORT) 0.5 mg/2 mL nebulizer solution Take 2 mLs (0.5 mg total) by nebulization 2 (two) times daily. Controller    cholecalciferol, vitamin D3, (VITAMIN D3) 1,000 unit capsule Take 1,000 Units by mouth once daily.    cyanocobalamin (VITAMIN B-12) 1000 MCG tablet Take 100 mcg by mouth once daily.    fluticasone furoate-vilanterol (BREO ELLIPTA) 200-25 mcg/dose DsDv diskus inhaler Inhale 1 puff into the lungs once daily. Controller    L. ACIDOPHILUS/BIFIDO LONGUM (PROBIOTIC PEARLS ORAL) Take 1 each by mouth 3 (three) times a week.     levothyroxine (SYNTHROID) 137 MCG Tab tablet Take 1 tablet (137 mcg total) by mouth once daily.    metFORMIN (GLUCOPHAGE) 500 MG tablet Take 2 tablets (1,000 mg total) by mouth 2 (two) times daily with meals.    metoprolol tartrate (LOPRESSOR) 100 MG tablet Take 0.5 tablets (50 mg total) by mouth 2 (two) times daily. Hold for SBP<110    montelukast (SINGULAIR) 10 mg tablet Take 1 tablet (10 mg total) by mouth every evening.    potassium chloride (MICRO-K) 8 mEq CpSR Take 1 capsule (8 mEq total) by mouth once daily.    predniSONE (DELTASONE) 20 MG tablet Take one daily for 3  days and may repeat for shortness of breath. (Patient taking differently: Take 20 mg by mouth daily as needed. Take one daily for 3 days and may repeat for shortness of breath.)    theophylline 400 mg Tb24 Take 400 mg by mouth 2 (two) times daily.    traMADol (ULTRAM) 50 mg tablet TAKE 1 TABLET(50 MG) BY MOUTH EVERY 6 HOURS AS NEEDED (Patient taking differently: Take 50 mg by mouth every 6 (six) hours as needed. TAKE 1 TABLET(50 MG) BY MOUTH EVERY 6 HOURS AS NEEDED)    vitamin E 400 UNIT capsule Take 400 Units by mouth once daily.    dimenhyDRINATE (DRAMAMINE) 50 MG tablet Take 50 mg by mouth nightly as needed.    evolocumab (REPATHA SYRINGE) 140 mg/mL Syrg Inject 140 mg into the skin every 14 (fourteen) days.     FLUoxetine 20 MG capsule Take 1 capsule (20 mg total) by mouth once daily.    lancets Misc True track Check glucose once daily    torsemide (DEMADEX) 20 MG Tab Take 1 tablet (20 mg total) by mouth once daily.    [DISCONTINUED] coQ10, ubiquinol, 100 mg Cap Take 1 capsule by mouth once daily.    [DISCONTINUED] glucosamine/chondr guevara A sod (OSTEO BI-FLEX ORAL) Take 1 tablet by mouth once daily.    [DISCONTINUED] potassium gluconate 595 mg (99 mg) Tab Take 1 tablet by mouth once.    [DISCONTINUED] REPATHA SURECLICK 140 mg/mL PnIj INJECT THE CONTENTS OF ONE PEN UNDER SKIN Q TWO WEEKS    [DISCONTINUED] traZODone (DESYREL) 50 MG tablet Take 1 tablet (50 mg total) by mouth nightly as needed for Insomnia.    [DISCONTINUED] TRUEPLUS LANCETS 33 gauge Misc USE TO TEST BLOOD SUGAR ONCE D    [DISCONTINUED] VITAMIN A ORAL Take 2,400 mcg by mouth once daily.     Family History     Problem Relation (Age of Onset)    Aneurysm Maternal Grandfather    Arthritis Mother    Asthma Sister, Son    COPD Son    Cancer Paternal Aunt    Depression Son    Diabetes Mother, Sister, Son, Maternal Uncle    Heart disease Mother, Father, Maternal Uncle, Paternal Aunt, Paternal Uncle, Paternal Grandfather    Hypertension Mother,  Father, Sister, Son, Paternal Aunt, Maternal Grandmother    Mental illness Father, Paternal Aunt, Maternal Grandmother, Paternal Grandmother    Stroke Son        Tobacco Use    Smoking status: Never Smoker    Smokeless tobacco: Never Used   Substance and Sexual Activity    Alcohol use: Yes     Comment: seldom    Drug use: No    Sexual activity: Never     Review of Systems   Constitutional: Negative for chills, diaphoresis, fatigue and fever.   HENT: Negative for congestion, ear discharge, ear pain, hearing loss, rhinorrhea, sore throat, tinnitus and voice change.    Eyes: Negative for photophobia, discharge, redness and visual disturbance.   Respiratory: Positive for cough, shortness of breath and wheezing. Negative for stridor.    Cardiovascular: Positive for palpitations. Negative for chest pain and leg swelling.   Gastrointestinal: Negative for abdominal pain, constipation, diarrhea, nausea and vomiting.   Endocrine: Negative for polydipsia, polyphagia and polyuria.   Genitourinary: Negative for difficulty urinating, dysuria, flank pain, frequency and vaginal discharge.   Musculoskeletal: Negative for arthralgias, back pain and gait problem.   Skin: Negative.  Negative for pallor and rash.   Neurological: Negative for dizziness, speech difficulty, weakness, light-headedness, numbness and headaches.   Psychiatric/Behavioral: Negative for agitation and sleep disturbance.     Objective:     Vital Signs (Most Recent):  Temp: 98.1 °F (36.7 °C) (01/10/20 1523)  Pulse: (!) 43 (01/10/20 1643)  Resp: 20 (01/10/20 1643)  BP: (!) 113/55 (01/10/20 1523)  SpO2: 97 % (01/10/20 1643) Vital Signs (24h Range):  Temp:  [98.1 °F (36.7 °C)] 98.1 °F (36.7 °C)  Pulse:  [37-43] 43  Resp:  [20-28] 20  SpO2:  [82 %-97 %] 97 %  BP: (113)/(55) 113/55     Weight: 83.9 kg (185 lb)  Body mass index is 33.84 kg/m².    Physical Exam   Constitutional: She is oriented to person, place, and time. She appears well-developed and well-nourished.  No distress. Face mask in place.   HENT:   Head: Normocephalic and atraumatic.   Eyes: Pupils are equal, round, and reactive to light. No scleral icterus.   Neck: Neck supple. No thyromegaly present.   Cardiovascular: Regular rhythm. Bradycardia present.   No murmur heard.  Pulmonary/Chest: Effort normal. Tachypnea noted. She has no wheezes. She has rales in the right middle field and the left lower field.   Abdominal: Soft. Bowel sounds are normal. She exhibits no distension. There is no tenderness.   Musculoskeletal: Normal range of motion. She exhibits no edema.   Neurological: She is alert and oriented to person, place, and time. She displays normal reflexes. No cranial nerve deficit.   Skin: Skin is warm. Capillary refill takes less than 2 seconds. No rash noted.   Psychiatric: She has a normal mood and affect.   Nursing note and vitals reviewed.        CRANIAL NERVES     CN III, IV, VI   Pupils are equal, round, and reactive to light.       Significant Labs:   BMP:   Recent Labs   Lab 01/10/20  1627   *      K 4.6   CL 96   CO2 22*   CALCIUM 8.9     CBC:   Recent Labs   Lab 01/10/20  1627 01/10/20  1641   WBC 24.03*  --    HGB 12.6  --    HCT 39.3 36   *  --      Lactic Acid: No results for input(s): LACTATE in the last 48 hours.  Magnesium: No results for input(s): MG in the last 48 hours.  Troponin: No results for input(s): TROPONINI in the last 48 hours.    Significant Imaging: CXR: I have reviewed all pertinent results/findings within the past 24 hours and my personal findings are:  increased bilateral opacitires  EKG: I have reviewed all pertinent results/findings within the past 24 hours and my personal findings are: bradycardia, AV dissociation     Imaging Results          X-Ray Chest AP Portable (Final result)  Result time 01/10/20 16:33:45    Final result by Jazzy García MD (01/10/20 16:33:45)                 Impression:      Mild cardiomegaly with prominence of the kiersten and  bilateral ground-glass alveolar opacities in the mid and lower lobes.  Findings may be secondary to multifocal pneumonia.  The hilar prominence may be secondary to adenopathy versus enlarged vessels.  Follow-up two view chest x-ray to document clearing is recommended.      Electronically signed by: Jazzy García MD  Date:    01/10/2020  Time:    16:33             Narrative:    EXAMINATION:  XR CHEST AP PORTABLE    CLINICAL HISTORY:  Chest Pain;    FINDINGS:  Portable chest at 16:19 hours is is compared to 09/09/2018 shows patient has had a prior median sternotomy.  The heart is mildly enlarged.    There is prominence of the hilum which may be secondary to adenopathy versus enlarged vessels.  There is patchy ground-glass alveolar opacities in the left perihilar region and both lower lobes suspicious for pneumonia.  The upper lobes are clear.  There are no pleural effusions.    There are no acute osseous abnormalities.

## 2020-01-10 NOTE — H&P
formerly [facility] Medicine  History & Physical    Patient Name: Tereza Larose  MRN: [id number]  Admission Date: 1/10/2020  Attending Physician: Almaz Velez MD  Primary Care Provider: Evan Sánchez MD         Patient information was obtained from patient, spouse/SO, past medical records and ER records.     Subjective:     Principal Problem:Complete heart block    Chief Complaint:   Chief Complaint   Patient presents with    Shortness of Breath        HPI: 9-year-old female with past medical history of hypertension HLD, and IDDM, COPD/asthma who presents with shortness of breath for 2 days.  She states it was associated with palpitations, diaphoresis.  She denies chest pain.  Never to the emergency room she was tachypneic and hypoxic.  She was placed on BiPAP with improvement of respiratory status.  EKG done showed complete heart block.  Cardiology consulted for emergent temporary pacemaker.    Past Medical History:   Diagnosis Date    Allergy     Arthritis     Asthma     Brain bleed     COPD (chronic obstructive pulmonary disease)     Depression     Diabetes mellitus type II     Diverticulitis     Fatty liver     GERD (gastroesophageal reflux disease)     Goiter     s/p thyroidectomy    Heart murmur     Hernia of abdominal wall     History of intracranial hemorrhage 6/15/2013    History of non-ST elevation myocardial infarction (NSTEMI) 6/15/2013    Hyperlipidemia     Hypertension     Metabolic syndrome 6/14/2012    Myocardial infarction     On home oxygen therapy     states uses 2L at night or when sat < 90    Statin intolerance     Stroke 2012    left hand shaky, loss of balance       Past Surgical History:   Procedure Laterality Date    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/25/2017    Dr Griggs    ADRENALECTOMY      AORTIC VALVE REPLACEMENT  12/09/2016    arthroscopy lt knee Right     BLADDER REPAIR      sling    BREAST BIOPSY Bilateral     benign    COLONOSCOPY   2004    10 year recheck    CORONARY ARTERY BYPASS GRAFT  12/09/2016    X3    HYSTERECTOMY      OOPHORECTOMY      THYROIDECTOMY         Review of patient's allergies indicates:   Allergen Reactions    Corn Itching     Other reaction(s): Sneezing  Other reaction(s): Rhinorrhea    Potato starch Hives     Other reaction(s): Sneezing  Other reaction(s): Rhinorrhea    Pravastatin Other (See Comments)     Muscle pain    Statins-hmg-coa reductase inhibitors Other (See Comments)    Hydrochlorothiazide Other (See Comments)     weakness    Welchol [colesevelam] Other (See Comments)     Weakness        No current facility-administered medications on file prior to encounter.      Current Outpatient Medications on File Prior to Encounter   Medication Sig    albuterol (VENTOLIN HFA) 90 mcg/actuation inhaler INHALE 2 PUFFS BY MOUTH EVERY 4 TO 6 HOURS AS NEEDED FOR SHORTNESS OF BREATH (Patient taking differently: Inhale 2 puffs into the lungs every 4 to 6 hours as needed. INHALE 2 PUFFS BY MOUTH EVERY 4 TO 6 HOURS AS NEEDED FOR SHORTNESS OF BREATH)    albuterol-ipratropium (DUO-NEB) 2.5 mg-0.5 mg/3 mL nebulizer solution USE ONE VIAL IN NEBULIZER EVERY 6 HOURS AS NEEDED FOR WHEEZING (Patient taking differently: Take 3 mLs by nebulization every 6 (six) hours as needed for Wheezing. )    amLODIPine (NORVASC) 10 MG tablet TAKE ONE TABLET BY MOUTH ONCE DAILY- HOLD FOR SYSTOLIC BLOOD PRESSURE<120 (Patient taking differently: Take 10 mg by mouth once daily. TAKE ONE TABLET BY MOUTH ONCE DAILY- HOLD FOR SYSTOLIC BLOOD PRESSURE<120)    arformoterol (BROVANA) 15 mcg/2 mL Nebu Take 2 mLs (15 mcg total) by nebulization 2 (two) times daily. Controller    aspirin (ECOTRIN) 81 MG EC tablet Take 81 mg by mouth once daily.    budesonide (PULMICORT) 0.5 mg/2 mL nebulizer solution Take 2 mLs (0.5 mg total) by nebulization 2 (two) times daily. Controller    cholecalciferol, vitamin D3, (VITAMIN D3) 1,000 unit capsule Take 1,000 Units  by mouth once daily.    cyanocobalamin (VITAMIN B-12) 1000 MCG tablet Take 100 mcg by mouth once daily.    fluticasone furoate-vilanterol (BREO ELLIPTA) 200-25 mcg/dose DsDv diskus inhaler Inhale 1 puff into the lungs once daily. Controller    L. ACIDOPHILUS/BIFIDO LONGUM (PROBIOTIC PEARLS ORAL) Take 1 each by mouth 3 (three) times a week.     levothyroxine (SYNTHROID) 137 MCG Tab tablet Take 1 tablet (137 mcg total) by mouth once daily.    metFORMIN (GLUCOPHAGE) 500 MG tablet Take 2 tablets (1,000 mg total) by mouth 2 (two) times daily with meals.    metoprolol tartrate (LOPRESSOR) 100 MG tablet Take 0.5 tablets (50 mg total) by mouth 2 (two) times daily. Hold for SBP<110    montelukast (SINGULAIR) 10 mg tablet Take 1 tablet (10 mg total) by mouth every evening.    potassium chloride (MICRO-K) 8 mEq CpSR Take 1 capsule (8 mEq total) by mouth once daily.    predniSONE (DELTASONE) 20 MG tablet Take one daily for 3 days and may repeat for shortness of breath. (Patient taking differently: Take 20 mg by mouth daily as needed. Take one daily for 3 days and may repeat for shortness of breath.)    theophylline 400 mg Tb24 Take 400 mg by mouth 2 (two) times daily.    traMADol (ULTRAM) 50 mg tablet TAKE 1 TABLET(50 MG) BY MOUTH EVERY 6 HOURS AS NEEDED (Patient taking differently: Take 50 mg by mouth every 6 (six) hours as needed. TAKE 1 TABLET(50 MG) BY MOUTH EVERY 6 HOURS AS NEEDED)    vitamin E 400 UNIT capsule Take 400 Units by mouth once daily.    dimenhyDRINATE (DRAMAMINE) 50 MG tablet Take 50 mg by mouth nightly as needed.    evolocumab (REPATHA SYRINGE) 140 mg/mL Syrg Inject 140 mg into the skin every 14 (fourteen) days.     FLUoxetine 20 MG capsule Take 1 capsule (20 mg total) by mouth once daily.    lancets Misc True track Check glucose once daily    torsemide (DEMADEX) 20 MG Tab Take 1 tablet (20 mg total) by mouth once daily.    [DISCONTINUED] coQ10, ubiquinol, 100 mg Cap Take 1 capsule by mouth  once daily.    [DISCONTINUED] glucosamine/chondr guevara A sod (OSTEO BI-FLEX ORAL) Take 1 tablet by mouth once daily.    [DISCONTINUED] potassium gluconate 595 mg (99 mg) Tab Take 1 tablet by mouth once.    [DISCONTINUED] REPATHA SURECLICK 140 mg/mL PnIj INJECT THE CONTENTS OF ONE PEN UNDER SKIN Q TWO WEEKS    [DISCONTINUED] traZODone (DESYREL) 50 MG tablet Take 1 tablet (50 mg total) by mouth nightly as needed for Insomnia.    [DISCONTINUED] TRUEPLUS LANCETS 33 gauge Misc USE TO TEST BLOOD SUGAR ONCE D    [DISCONTINUED] VITAMIN A ORAL Take 2,400 mcg by mouth once daily.     Family History     Problem Relation (Age of Onset)    Aneurysm Maternal Grandfather    Arthritis Mother    Asthma Sister, Son    COPD Son    Cancer Paternal Aunt    Depression Son    Diabetes Mother, Sister, Son, Maternal Uncle    Heart disease Mother, Father, Maternal Uncle, Paternal Aunt, Paternal Uncle, Paternal Grandfather    Hypertension Mother, Father, Sister, Son, Paternal Aunt, Maternal Grandmother    Mental illness Father, Paternal Aunt, Maternal Grandmother, Paternal Grandmother    Stroke Son        Tobacco Use    Smoking status: Never Smoker    Smokeless tobacco: Never Used   Substance and Sexual Activity    Alcohol use: Yes     Comment: seldom    Drug use: No    Sexual activity: Never     Review of Systems   Constitutional: Negative for chills, diaphoresis, fatigue and fever.   HENT: Negative for congestion, ear discharge, ear pain, hearing loss, rhinorrhea, sore throat, tinnitus and voice change.    Eyes: Negative for photophobia, discharge, redness and visual disturbance.   Respiratory: Positive for cough, shortness of breath and wheezing. Negative for stridor.    Cardiovascular: Positive for palpitations. Negative for chest pain and leg swelling.   Gastrointestinal: Negative for abdominal pain, constipation, diarrhea, nausea and vomiting.   Endocrine: Negative for polydipsia, polyphagia and polyuria.   Genitourinary:  Negative for difficulty urinating, dysuria, flank pain, frequency and vaginal discharge.   Musculoskeletal: Negative for arthralgias, back pain and gait problem.   Skin: Negative.  Negative for pallor and rash.   Neurological: Negative for dizziness, speech difficulty, weakness, light-headedness, numbness and headaches.   Psychiatric/Behavioral: Negative for agitation and sleep disturbance.     Objective:     Vital Signs (Most Recent):  Temp: 98.1 °F (36.7 °C) (01/10/20 1523)  Pulse: (!) 43 (01/10/20 1643)  Resp: 20 (01/10/20 1643)  BP: (!) 113/55 (01/10/20 1523)  SpO2: 97 % (01/10/20 1643) Vital Signs (24h Range):  Temp:  [98.1 °F (36.7 °C)] 98.1 °F (36.7 °C)  Pulse:  [37-43] 43  Resp:  [20-28] 20  SpO2:  [82 %-97 %] 97 %  BP: (113)/(55) 113/55     Weight: 83.9 kg (185 lb)  Body mass index is 33.84 kg/m².    Physical Exam   Constitutional: She is oriented to person, place, and time. She appears well-developed and well-nourished. No distress. Face mask in place.   HENT:   Head: Normocephalic and atraumatic.   Eyes: Pupils are equal, round, and reactive to light. No scleral icterus.   Neck: Neck supple. No thyromegaly present.   Cardiovascular: Regular rhythm. Bradycardia present.   No murmur heard.  Pulmonary/Chest: Effort normal. Tachypnea noted. She has no wheezes. She has rales in the right middle field and the left lower field.   Abdominal: Soft. Bowel sounds are normal. She exhibits no distension. There is no tenderness.   Musculoskeletal: Normal range of motion. She exhibits no edema.   Neurological: She is alert and oriented to person, place, and time. She displays normal reflexes. No cranial nerve deficit.   Skin: Skin is warm. Capillary refill takes less than 2 seconds. No rash noted.   Psychiatric: She has a normal mood and affect.   Nursing note and vitals reviewed.        CRANIAL NERVES     CN III, IV, VI   Pupils are equal, round, and reactive to light.       Significant Labs:   BMP:   Recent Labs   Lab  01/10/20  1627   *      K 4.6   CL 96   CO2 22*   CALCIUM 8.9     CBC:   Recent Labs   Lab 01/10/20  1627 01/10/20  1641   WBC 24.03*  --    HGB 12.6  --    HCT 39.3 36   *  --      Lactic Acid: No results for input(s): LACTATE in the last 48 hours.  Magnesium: No results for input(s): MG in the last 48 hours.  Troponin: No results for input(s): TROPONINI in the last 48 hours.    Significant Imaging: CXR: I have reviewed all pertinent results/findings within the past 24 hours and my personal findings are:  increased bilateral opacitires  EKG: I have reviewed all pertinent results/findings within the past 24 hours and my personal findings are: bradycardia, AV dissociation     Imaging Results          X-Ray Chest AP Portable (Final result)  Result time 01/10/20 16:33:45    Final result by Jazzy García MD (01/10/20 16:33:45)                 Impression:      Mild cardiomegaly with prominence of the kiersten and bilateral ground-glass alveolar opacities in the mid and lower lobes.  Findings may be secondary to multifocal pneumonia.  The hilar prominence may be secondary to adenopathy versus enlarged vessels.  Follow-up two view chest x-ray to document clearing is recommended.      Electronically signed by: Jazzy García MD  Date:    01/10/2020  Time:    16:33             Narrative:    EXAMINATION:  XR CHEST AP PORTABLE    CLINICAL HISTORY:  Chest Pain;    FINDINGS:  Portable chest at 16:19 hours is is compared to 09/09/2018 shows patient has had a prior median sternotomy.  The heart is mildly enlarged.    There is prominence of the hilum which may be secondary to adenopathy versus enlarged vessels.  There is patchy ground-glass alveolar opacities in the left perihilar region and both lower lobes suspicious for pneumonia.  The upper lobes are clear.  There are no pleural effusions.    There are no acute osseous abnormalities.                                  Assessment/Plan:     * Complete heart  block  Emergent temporary pacemaker placement      Pneumonia  CXR cannot rule out pneumonia, with clinical picture and leukocytosis on labs, will treat for acute pneumonia with IV levofloxacin.       Acute respiratory failure with hypoxia  Continue BiPAP as needed and then de-escalate down to NC  o2 sat goal >92%        VTE Risk Mitigation (From admission, onward)         Ordered     enoxaparin injection 40 mg  Daily      01/10/20 1718     IP VTE HIGH RISK PATIENT  Once      01/10/20 1718                   Almaz Velez MD  Department of Hospital Medicine   [facility]

## 2020-01-10 NOTE — HPI
[facility]   Cardiology Note    Consult Requested By: ***  Reason for Consult: ***    SUBJECTIVE:     History of Present Illness: ***    Review of patient's allergies indicates:   Allergen Reactions    Corn Itching     Other reaction(s): Sneezing  Other reaction(s): Rhinorrhea    Potato starch Hives     Other reaction(s): Sneezing  Other reaction(s): Rhinorrhea    Pravastatin Other (See Comments)     Muscle pain    Statins-hmg-coa reductase inhibitors Other (See Comments)    Hydrochlorothiazide Other (See Comments)     weakness    Welchol [colesevelam] Other (See Comments)     Weakness        Past Medical History:   Diagnosis Date    Allergy     Arthritis     Asthma     Brain bleed     COPD (chronic obstructive pulmonary disease)     Depression     Diabetes mellitus type II     Diverticulitis     Fatty liver     GERD (gastroesophageal reflux disease)     Goiter     s/p thyroidectomy    Heart murmur     Hernia of abdominal wall     History of intracranial hemorrhage 6/15/2013    History of non-ST elevation myocardial infarction (NSTEMI) 6/15/2013    Hyperlipidemia     Hypertension     Metabolic syndrome 6/14/2012    Myocardial infarction     On home oxygen therapy     states uses 2L at night or when sat < 90    Statin intolerance     Stroke 2012    left hand shaky, loss of balance     Past Surgical History:   Procedure Laterality Date    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/25/2017    Dr Griggs    ADRENALECTOMY      AORTIC VALVE REPLACEMENT  12/09/2016    arthroscopy lt knee Right     BLADDER REPAIR      sling    BREAST BIOPSY Bilateral     benign    COLONOSCOPY  2004    10 year recheck    CORONARY ARTERY BYPASS GRAFT  12/09/2016    X3    HYSTERECTOMY      OOPHORECTOMY      THYROIDECTOMY       Family History   Problem Relation Age of Onset    Arthritis Mother     Diabetes Mother     Heart disease Mother     Hypertension Mother     Hypertension Father     Heart disease  Father     Mental illness Father     Asthma Sister     Diabetes Sister     Hypertension Sister     Asthma Son     COPD Son     Depression Son     Diabetes Son     Hypertension Son     Stroke Son     Diabetes Maternal Uncle     Heart disease Maternal Uncle     Mental illness Paternal Aunt     Cancer Paternal Aunt     Heart disease Paternal Aunt     Hypertension Paternal Aunt     Heart disease Paternal Uncle     Hypertension Maternal Grandmother     Mental illness Maternal Grandmother     Aneurysm Maternal Grandfather     Mental illness Paternal Grandmother     Heart disease Paternal Grandfather     Melanoma Neg Hx     Psoriasis Neg Hx     Lupus Neg Hx     Eczema Neg Hx      Social History     Tobacco Use    Smoking status: Never Smoker    Smokeless tobacco: Never Used   Substance Use Topics    Alcohol use: Yes     Comment: seldom    Drug use: No       Review of Systems:  ROS    OBJECTIVE:     Vital Signs (Most Recent)  Temp: 98.1 °F (36.7 °C) (01/10/20 1523)  Pulse: (!) 37 (01/10/20 1638)  Resp: 20 (01/10/20 1638)  BP: (!) 113/55 (01/10/20 1523)  SpO2: 95 % (01/10/20 1638)    Vital Signs Range (Last 24H):  Temp:  [98.1 °F (36.7 °C)]   Pulse:  [37-42]   Resp:  [20-28]   BP: (113)/(55)   SpO2:  [82 %-95 %]     I & O (Last 24H):  No intake or output data in the 24 hours ending 01/10/20 1643    Current Diet:     Current Diet Order   Procedures    Diet NPO        Allergies:  Review of patient's allergies indicates:   Allergen Reactions    Corn Itching     Other reaction(s): Sneezing  Other reaction(s): Rhinorrhea    Potato starch Hives     Other reaction(s): Sneezing  Other reaction(s): Rhinorrhea    Pravastatin Other (See Comments)     Muscle pain    Statins-hmg-coa reductase inhibitors Other (See Comments)    Hydrochlorothiazide Other (See Comments)     weakness    Welchol [colesevelam] Other (See Comments)     Weakness        Meds:  Scheduled Meds:   albuterol-ipratropium  3 mL  Nebulization Q5 Min    aspirin  325 mg Oral ED 1 Time    cefTRIAXone (ROCEPHIN) IVPB  2 g Intravenous Q24H    doxycycline (VIBRAMYCIN) IVPB  100 mg Intravenous ED 1 Time    furosemide  40 mg Intravenous ED 1 Time     Continuous Infusions:  PRN Meds:    Oxygen/Ventilator Data (Last 24H):  (if applicable)   Oxygen Concentration (%):  [45-75] 45    Hemodynamic Parameters (Last 24H):   (if applicable)        Laboratory and Radiology Data:  Recent Results (from the past 24 hour(s))   CBC auto differential    Collection Time: 01/10/20  4:27 PM   Result Value Ref Range    WBC 24.03 (H) 3.90 - 12.70 K/uL    RBC 4.06 4.00 - 5.40 M/uL    Hemoglobin 12.6 12.0 - 16.0 g/dL    Hematocrit 39.3 37.0 - 48.5 %    Mean Corpuscular Volume 97 82 - 98 fL    Mean Corpuscular Hemoglobin 31.0 27.0 - 31.0 pg    Mean Corpuscular Hemoglobin Conc 32.1 32.0 - 36.0 g/dL    RDW 13.8 11.5 - 14.5 %    Platelets 395 (H) 150 - 350 K/uL    MPV 9.9 9.2 - 12.9 fL     Imaging Results          X-Ray Chest AP Portable (Final result)  Result time 01/10/20 16:33:45    Final result by Jazzy García MD (01/10/20 16:33:45)                 Impression:      Mild cardiomegaly with prominence of the kiersten and bilateral ground-glass alveolar opacities in the mid and lower lobes.  Findings may be secondary to multifocal pneumonia.  The hilar prominence may be secondary to adenopathy versus enlarged vessels.  Follow-up two view chest x-ray to document clearing is recommended.      Electronically signed by: Jazzy García MD  Date:    01/10/2020  Time:    16:33             Narrative:    EXAMINATION:  XR CHEST AP PORTABLE    CLINICAL HISTORY:  Chest Pain;    FINDINGS:  Portable chest at 16:19 hours is is compared to 09/09/2018 shows patient has had a prior median sternotomy.  The heart is mildly enlarged.    There is prominence of the hilum which may be secondary to adenopathy versus enlarged vessels.  There is patchy ground-glass alveolar opacities in the left  perihilar region and both lower lobes suspicious for pneumonia.  The upper lobes are clear.  There are no pleural effusions.    There are no acute osseous abnormalities.                                12-lead EKG interpretation:  (if applicable)      Current Cardiac Rhythm:   (if applicable)    Physical Exam:   Physical Exam    ASSESSMENT/PLAN:   Assessment:     Plan:

## 2020-01-10 NOTE — TELEPHONE ENCOUNTER
----- Message from Xu Weiss sent at 1/10/2020 12:33 PM CST -----  Type:  Patient Returning Call    Who Called: self   Who Left Message for Patient:  Jeane   Does the patient know what this is regarding?:  ANA  Best Call Back Number:  [phone]   Additional Information:  NA

## 2020-01-10 NOTE — Clinical Note
TEMP PACING CATHETER INSERTED AND PLACED IN RIGHT VENTRICLE; CONNECTED TO TEMP PACER UNIT AT RATE OF 40BPM 20MA

## 2020-01-10 NOTE — ASSESSMENT & PLAN NOTE
CXR cannot rule out pneumonia, with clinical picture and leukocytosis on labs, will treat for acute pneumonia with IV levofloxacin.

## 2020-01-10 NOTE — Clinical Note
A venogram was performed in the left subclavian vein. The vessel was injected with hand injection with 10 mL of contrast. visipaque

## 2020-01-10 NOTE — TELEPHONE ENCOUNTER
----- Message from Suresh Acuña sent at 1/10/2020 11:23 AM CST -----  Contact: Patient  Type: Needs Medical Advice    Who Called:  Patient  Best Call Back Number: [phone]  Additional Information: Patient states they have still yet to receive antibotics. Please call to advise. Thanks!

## 2020-01-10 NOTE — ED PROVIDER NOTES
Encounter Date: 1/10/2020       History     Chief Complaint   Patient presents with    Shortness of Breath     69-year-old female presented emergency department with generalized malaise and weakness and shortness of breath and cough for the past 3 days.  Patient denies fever or chills. Denies chest pain or abdominal pain or any focal weakness.  Denies fever or chills.  Denies dysuria or hematuria.        Review of patient's allergies indicates:   Allergen Reactions    Corn Itching     Other reaction(s): Sneezing  Other reaction(s): Rhinorrhea    Potato starch Hives     Other reaction(s): Sneezing  Other reaction(s): Rhinorrhea    Pravastatin Other (See Comments)     Muscle pain    Statins-hmg-coa reductase inhibitors Other (See Comments)    Hydrochlorothiazide Other (See Comments)     weakness    Welchol [colesevelam] Other (See Comments)     Weakness      Past Medical History:   Diagnosis Date    Allergy     Arthritis     Asthma     Brain bleed     COPD (chronic obstructive pulmonary disease)     Depression     Diabetes mellitus type II     Diverticulitis     Fatty liver     GERD (gastroesophageal reflux disease)     Goiter     s/p thyroidectomy    Heart murmur     Hernia of abdominal wall     History of intracranial hemorrhage 6/15/2013    History of non-ST elevation myocardial infarction (NSTEMI) 6/15/2013    Hyperlipidemia     Hypertension     Metabolic syndrome 6/14/2012    Myocardial infarction     On home oxygen therapy     states uses 2L at night or when sat < 90    Statin intolerance     Stroke 2012    left hand shaky, loss of balance     Past Surgical History:   Procedure Laterality Date    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/25/2017    Dr Griggs    ADRENALECTOMY      AORTIC VALVE REPLACEMENT  12/09/2016    arthroscopy lt knee Right     BLADDER REPAIR      sling    BREAST BIOPSY Bilateral     benign    COLONOSCOPY  2004    10 year recheck    CORONARY ARTERY BYPASS GRAFT   12/09/2016    X3    HYSTERECTOMY      OOPHORECTOMY      THYROIDECTOMY       Family History   Problem Relation Age of Onset    Arthritis Mother     Diabetes Mother     Heart disease Mother     Hypertension Mother     Hypertension Father     Heart disease Father     Mental illness Father     Asthma Sister     Diabetes Sister     Hypertension Sister     Asthma Son     COPD Son     Depression Son     Diabetes Son     Hypertension Son     Stroke Son     Diabetes Maternal Uncle     Heart disease Maternal Uncle     Mental illness Paternal Aunt     Cancer Paternal Aunt     Heart disease Paternal Aunt     Hypertension Paternal Aunt     Heart disease Paternal Uncle     Hypertension Maternal Grandmother     Mental illness Maternal Grandmother     Aneurysm Maternal Grandfather     Mental illness Paternal Grandmother     Heart disease Paternal Grandfather     Melanoma Neg Hx     Psoriasis Neg Hx     Lupus Neg Hx     Eczema Neg Hx      Social History     Tobacco Use    Smoking status: Never Smoker    Smokeless tobacco: Never Used   Substance Use Topics    Alcohol use: Yes     Comment: seldom    Drug use: No     Review of Systems   Constitutional: Positive for fatigue.   HENT: Negative.    Eyes: Negative.    Respiratory: Positive for cough, shortness of breath and wheezing.    Cardiovascular: Negative for chest pain.   Gastrointestinal: Negative.    Endocrine: Negative.    Genitourinary: Negative.    Musculoskeletal: Negative.    Skin: Negative.    Allergic/Immunologic: Negative.    Neurological: Positive for weakness.        No focal weakness however has generalized weakness   Hematological: Negative.    Psychiatric/Behavioral: Negative.    All other systems reviewed and are negative.      Physical Exam     Initial Vitals [01/10/20 1523]   BP Pulse Resp Temp SpO2   (!) 113/55 (!) 42 (!) 28 98.1 °F (36.7 °C) (!) 82 %      MAP       --         Physical Exam    Nursing note and vitals  reviewed.  Constitutional: She appears well-developed and well-nourished.   HENT:   Head: Normocephalic and atraumatic.   Nose: Nose normal.   Mouth/Throat: Oropharynx is clear and moist.   Eyes: Conjunctivae and EOM are normal. Pupils are equal, round, and reactive to light. Right eye exhibits no discharge. Left eye exhibits no discharge.   Neck: Normal range of motion. Neck supple. No thyromegaly present. No tracheal deviation present. No JVD present.   Cardiovascular: Normal rate, regular rhythm, normal heart sounds and intact distal pulses.   No murmur heard.  Pulmonary/Chest: No stridor. She is in respiratory distress. She has wheezes. She has rhonchi. She has rales.   Abdominal: Soft. Bowel sounds are normal.   Musculoskeletal: Normal range of motion. She exhibits edema. She exhibits no tenderness.   Neurological: She is alert and oriented to person, place, and time. She has normal strength. She displays normal reflexes. No cranial nerve deficit or sensory deficit. GCS score is 15. GCS eye subscore is 4. GCS verbal subscore is 5. GCS motor subscore is 6.   Skin: Skin is warm. Capillary refill takes less than 2 seconds.   Psychiatric: She has a normal mood and affect. Thought content normal.         ED Course   Critical Care  Date/Time: 1/10/2020 4:49 PM  Performed by: Jean Pierre Linn MD  Authorized by: Jean Pierre Linn MD   Direct patient critical care time: 25 minutes  Documentation critical care time: 10 minutes  Consulting other physicians critical care time: 10 minutes  Total critical care time (exclusive of procedural time) : 45 minutes        Labs Reviewed   CULTURE, BLOOD   CULTURE, BLOOD   CBC W/ AUTO DIFFERENTIAL   COMPREHENSIVE METABOLIC PANEL   TROPONIN I   B-TYPE NATRIURETIC PEPTIDE   TROPONIN I   B-TYPE NATRIURETIC PEPTIDE   PROTIME-INR   LACTIC ACID, PLASMA   LACTIC ACID, PLASMA   ISTAT CHEM8     EKG Readings: (Independently Interpreted)   Initial Reading: No STEMI. Rhythm: Sinus Bradycardia. Conduction:  3rd Degree AV Block.   With idioventricular rhythm and sinus bradycardia     ECG Results          EKG 12-lead (Final result)  Result time 01/10/20 16:11:58    Final result by Wes, Lab In [facility] (01/10/20 16:11:58)                 Narrative:    Test Reason : R07.9,    Vent. Rate : 038 BPM     Atrial Rate : 044 BPM     P-R Int : 000 ms          QRS Dur : 138 ms      QT Int : 616 ms       P-R-T Axes : 067 034 103 degrees     QTc Int : 489 ms    Marked sinus bradycardia with A-V dissociation and Idioventricular rhythm  Left bundle branch block  Abnormal ECG  When compared with ECG of 15-MITCH-2013 16:07,  Idioventricular rhythm has replaced Sinus rhythm  Vent. rate has decreased BY  43 BPM  Confirmed by Marko Batres MD ([id number]) on 1/10/2020 4:11:47 PM    Referred By: VANESSA   SELF           Confirmed By:Marko Batres MD                            Imaging Results          X-Ray Chest AP Portable (Final result)  Result time 01/10/20 16:33:45    Final result by Jazzy García MD (01/10/20 16:33:45)                 Impression:      Mild cardiomegaly with prominence of the kiersten and bilateral ground-glass alveolar opacities in the mid and lower lobes.  Findings may be secondary to multifocal pneumonia.  The hilar prominence may be secondary to adenopathy versus enlarged vessels.  Follow-up two view chest x-ray to document clearing is recommended.      Electronically signed by: Jazzy García MD  Date:    01/10/2020  Time:    16:33             Narrative:    EXAMINATION:  XR CHEST AP PORTABLE    CLINICAL HISTORY:  Chest Pain;    FINDINGS:  Portable chest at 16:19 hours is is compared to 09/09/2018 shows patient has had a prior median sternotomy.  The heart is mildly enlarged.    There is prominence of the hilum which may be secondary to adenopathy versus enlarged vessels.  There is patchy ground-glass alveolar opacities in the left perihilar region and both lower lobes suspicious for pneumonia.  The upper lobes  are clear.  There are no pleural effusions.    There are no acute osseous abnormalities.                              X-Rays:   Independently Interpreted Readings:   Other Readings:  Chest x-ray shows both cardiomegaly and bilateral infiltrates and consistent with pulmonary edema    Medical Decision Making:   Differential Diagnosis:   69-year-old female with productive cough and wheezing and shortness of breath and generalized malaise and weakness.  Patient noted to be bradycardic.  Patient on beta-blocker.  Patient also has acute kidney injury. Patient has leukocytosis and chest x-ray shows pulmonary edema and likely has underlying pneumonia as well.  Patient started on broad-spectrum antibiotic.  Blood cultures done.  Hospital Medicine and Cardiology consulted for evaluation for admission.  Patient to go to cath lab for pacemaker placement however pneumonia treated.  Patient responded to BiPAP and did have improvement of symptoms.  Hospital Medicine consulted for evaluation and further management.  Clinical Tests:   Lab Tests: Reviewed  Radiological Study: Reviewed  Medical Tests: Reviewed                                 Clinical Impression:       ICD-10-CM ICD-9-CM   1. SOB (shortness of breath) R06.02 786.05   2. Chest pain R07.9 786.50   3. Symptomatic bradycardia R00.1 427.89   4. Congestive heart failure, unspecified HF chronicity, unspecified heart failure type I50.9 428.0   5. COPD exacerbation J44.1 491.21     Pneumonia  Acute kidney injury                        Jean Pierre Linn MD  01/10/20 1775

## 2020-01-11 PROBLEM — J96.21 ACUTE ON CHRONIC RESPIRATORY FAILURE WITH HYPOXIA: Status: ACTIVE | Noted: 2020-01-10

## 2020-01-11 PROBLEM — E87.20 LACTIC ACIDOSIS: Status: ACTIVE | Noted: 2020-01-11

## 2020-01-11 LAB
ANION GAP SERPL CALC-SCNC: 17 MMOL/L (ref 8–16)
BASOPHILS # BLD AUTO: 0.01 K/UL (ref 0–0.2)
BASOPHILS NFR BLD: 0.1 % (ref 0–1.9)
BUN SERPL-MCNC: 51 MG/DL (ref 8–23)
CALCIUM SERPL-MCNC: 8.6 MG/DL (ref 8.7–10.5)
CHLORIDE SERPL-SCNC: 98 MMOL/L (ref 95–110)
CO2 SERPL-SCNC: 25 MMOL/L (ref 23–29)
CREAT SERPL-MCNC: 1.4 MG/DL (ref 0.5–1.4)
DIFFERENTIAL METHOD: ABNORMAL
EOSINOPHIL # BLD AUTO: 0 K/UL (ref 0–0.5)
EOSINOPHIL NFR BLD: 0 % (ref 0–8)
ERYTHROCYTE [DISTWIDTH] IN BLOOD BY AUTOMATED COUNT: 13.3 % (ref 11.5–14.5)
EST. GFR  (AFRICAN AMERICAN): 44.2 ML/MIN/1.73 M^2
EST. GFR  (NON AFRICAN AMERICAN): 38.4 ML/MIN/1.73 M^2
GLUCOSE SERPL-MCNC: 193 MG/DL (ref 70–110)
GLUCOSE SERPL-MCNC: 196 MG/DL (ref 70–110)
GLUCOSE SERPL-MCNC: 197 MG/DL (ref 70–110)
GLUCOSE SERPL-MCNC: 259 MG/DL (ref 70–110)
GLUCOSE SERPL-MCNC: 279 MG/DL (ref 70–110)
HCT VFR BLD AUTO: 33.9 % (ref 37–48.5)
HGB BLD-MCNC: 11.7 G/DL (ref 12–16)
IMM GRANULOCYTES # BLD AUTO: 0.09 K/UL (ref 0–0.04)
IMM GRANULOCYTES NFR BLD AUTO: 0.6 % (ref 0–0.5)
LACTATE SERPL-SCNC: 3.4 MMOL/L (ref 0.5–1.9)
LYMPHOCYTES # BLD AUTO: 0.9 K/UL (ref 1–4.8)
LYMPHOCYTES NFR BLD: 6 % (ref 18–48)
MAGNESIUM SERPL-MCNC: 1.5 MG/DL (ref 1.6–2.6)
MCH RBC QN AUTO: 32.1 PG (ref 27–31)
MCHC RBC AUTO-ENTMCNC: 34.5 G/DL (ref 32–36)
MCV RBC AUTO: 93 FL (ref 82–98)
MONOCYTES # BLD AUTO: 0.4 K/UL (ref 0.3–1)
MONOCYTES NFR BLD: 3 % (ref 4–15)
NEUTROPHILS # BLD AUTO: 13.2 K/UL (ref 1.8–7.7)
NEUTROPHILS NFR BLD: 90.3 % (ref 38–73)
NRBC BLD-RTO: 0 /100 WBC
PLATELET # BLD AUTO: 297 K/UL (ref 150–350)
PMV BLD AUTO: 10 FL (ref 9.2–12.9)
POTASSIUM SERPL-SCNC: 3.8 MMOL/L (ref 3.5–5.1)
RBC # BLD AUTO: 3.65 M/UL (ref 4–5.4)
SODIUM SERPL-SCNC: 140 MMOL/L (ref 136–145)
WBC # BLD AUTO: 14.63 K/UL (ref 3.9–12.7)

## 2020-01-11 PROCEDURE — 83735 ASSAY OF MAGNESIUM: CPT

## 2020-01-11 PROCEDURE — 94640 AIRWAY INHALATION TREATMENT: CPT

## 2020-01-11 PROCEDURE — 99900035 HC TECH TIME PER 15 MIN (STAT)

## 2020-01-11 PROCEDURE — 25000003 PHARM REV CODE 250: Performed by: INTERNAL MEDICINE

## 2020-01-11 PROCEDURE — 25000242 PHARM REV CODE 250 ALT 637 W/ HCPCS: Performed by: INTERNAL MEDICINE

## 2020-01-11 PROCEDURE — 83605 ASSAY OF LACTIC ACID: CPT

## 2020-01-11 PROCEDURE — 20000000 HC ICU ROOM

## 2020-01-11 PROCEDURE — 63600175 PHARM REV CODE 636 W HCPCS: Performed by: EMERGENCY MEDICINE

## 2020-01-11 PROCEDURE — 27000221 HC OXYGEN, UP TO 24 HOURS

## 2020-01-11 PROCEDURE — 63600175 PHARM REV CODE 636 W HCPCS: Performed by: INTERNAL MEDICINE

## 2020-01-11 PROCEDURE — 82962 GLUCOSE BLOOD TEST: CPT

## 2020-01-11 PROCEDURE — 36415 COLL VENOUS BLD VENIPUNCTURE: CPT

## 2020-01-11 PROCEDURE — 25000003 PHARM REV CODE 250

## 2020-01-11 PROCEDURE — 85025 COMPLETE CBC W/AUTO DIFF WBC: CPT

## 2020-01-11 PROCEDURE — 80048 BASIC METABOLIC PNL TOTAL CA: CPT

## 2020-01-11 PROCEDURE — 94761 N-INVAS EAR/PLS OXIMETRY MLT: CPT

## 2020-01-11 PROCEDURE — 94660 CPAP INITIATION&MGMT: CPT

## 2020-01-11 RX ORDER — MAGNESIUM SULFATE 1 G/100ML
1 INJECTION INTRAVENOUS
Status: DISCONTINUED | OUTPATIENT
Start: 2020-01-11 | End: 2020-01-13

## 2020-01-11 RX ORDER — POTASSIUM CHLORIDE 20 MEQ/1
40 TABLET, EXTENDED RELEASE ORAL
Status: DISCONTINUED | OUTPATIENT
Start: 2020-01-11 | End: 2020-01-13

## 2020-01-11 RX ORDER — CALCIUM CHLORIDE IN 0.9 % NACL 1 G/100 ML
1 INTRAVENOUS SOLUTION, PIGGYBACK (ML) INTRAVENOUS
Status: DISCONTINUED | OUTPATIENT
Start: 2020-01-11 | End: 2020-01-14 | Stop reason: HOSPADM

## 2020-01-11 RX ORDER — MAGNESIUM SULFATE HEPTAHYDRATE 40 MG/ML
4 INJECTION, SOLUTION INTRAVENOUS
Status: DISCONTINUED | OUTPATIENT
Start: 2020-01-11 | End: 2020-01-13

## 2020-01-11 RX ORDER — MAGNESIUM SULFATE HEPTAHYDRATE 40 MG/ML
2 INJECTION, SOLUTION INTRAVENOUS
Status: DISCONTINUED | OUTPATIENT
Start: 2020-01-11 | End: 2020-01-13

## 2020-01-11 RX ORDER — POTASSIUM CHLORIDE 7.45 MG/ML
20 INJECTION INTRAVENOUS
Status: DISCONTINUED | OUTPATIENT
Start: 2020-01-11 | End: 2020-01-13

## 2020-01-11 RX ORDER — POTASSIUM CHLORIDE 7.45 MG/ML
40 INJECTION INTRAVENOUS
Status: DISCONTINUED | OUTPATIENT
Start: 2020-01-11 | End: 2020-01-13

## 2020-01-11 RX ORDER — POTASSIUM CHLORIDE 20 MEQ/1
20 TABLET, EXTENDED RELEASE ORAL
Status: DISCONTINUED | OUTPATIENT
Start: 2020-01-11 | End: 2020-01-13

## 2020-01-11 RX ORDER — POTASSIUM CHLORIDE 20 MEQ/1
20 TABLET, EXTENDED RELEASE ORAL
Status: DISCONTINUED | OUTPATIENT
Start: 2020-01-11 | End: 2020-01-14 | Stop reason: HOSPADM

## 2020-01-11 RX ORDER — IPRATROPIUM BROMIDE AND ALBUTEROL SULFATE 2.5; .5 MG/3ML; MG/3ML
3 SOLUTION RESPIRATORY (INHALATION) EVERY 6 HOURS PRN
Status: DISCONTINUED | OUTPATIENT
Start: 2020-01-11 | End: 2020-01-14 | Stop reason: HOSPADM

## 2020-01-11 RX ORDER — CHLORHEXIDINE GLUCONATE ORAL RINSE 1.2 MG/ML
15 SOLUTION DENTAL 2 TIMES DAILY
Status: DISCONTINUED | OUTPATIENT
Start: 2020-01-11 | End: 2020-01-13

## 2020-01-11 RX ORDER — MUPIROCIN 20 MG/G
OINTMENT TOPICAL 2 TIMES DAILY
Status: DISCONTINUED | OUTPATIENT
Start: 2020-01-11 | End: 2020-01-13

## 2020-01-11 RX ORDER — LANOLIN ALCOHOL/MO/W.PET/CERES
800 CREAM (GRAM) TOPICAL
Status: DISCONTINUED | OUTPATIENT
Start: 2020-01-11 | End: 2020-01-14 | Stop reason: HOSPADM

## 2020-01-11 RX ADMIN — FUROSEMIDE 40 MG: 10 INJECTION, SOLUTION INTRAMUSCULAR; INTRAVENOUS at 09:01

## 2020-01-11 RX ADMIN — MUPIROCIN: 20 OINTMENT TOPICAL at 11:01

## 2020-01-11 RX ADMIN — MAGNESIUM OXIDE 800 MG: 400 TABLET ORAL at 11:01

## 2020-01-11 RX ADMIN — CHLORHEXIDINE GLUCONATE 15 ML: 1.2 RINSE ORAL at 09:01

## 2020-01-11 RX ADMIN — INSULIN ASPART 3 UNITS: 100 INJECTION, SOLUTION INTRAVENOUS; SUBCUTANEOUS at 07:01

## 2020-01-11 RX ADMIN — CEFTRIAXONE SODIUM 2 G: 2 INJECTION, SOLUTION INTRAVENOUS at 05:01

## 2020-01-11 RX ADMIN — AMLODIPINE BESYLATE 10 MG: 5 TABLET ORAL at 02:01

## 2020-01-11 RX ADMIN — ENOXAPARIN SODIUM 30 MG: 100 INJECTION SUBCUTANEOUS at 05:01

## 2020-01-11 RX ADMIN — IPRATROPIUM BROMIDE AND ALBUTEROL SULFATE 3 ML: .5; 3 SOLUTION RESPIRATORY (INHALATION) at 01:01

## 2020-01-11 RX ADMIN — POTASSIUM CHLORIDE 20 MEQ: 20 TABLET, EXTENDED RELEASE ORAL at 05:01

## 2020-01-11 RX ADMIN — IPRATROPIUM BROMIDE AND ALBUTEROL SULFATE 3 ML: .5; 3 SOLUTION RESPIRATORY (INHALATION) at 08:01

## 2020-01-11 RX ADMIN — MUPIROCIN: 20 OINTMENT TOPICAL at 09:01

## 2020-01-11 RX ADMIN — CHLORHEXIDINE GLUCONATE 15 ML: 1.2 RINSE ORAL at 11:01

## 2020-01-11 RX ADMIN — MAGNESIUM OXIDE 800 MG: 400 TABLET ORAL at 05:01

## 2020-01-11 RX ADMIN — LEVOTHYROXINE SODIUM 137 MCG: 0.11 TABLET ORAL at 05:01

## 2020-01-11 NOTE — PROGRESS NOTES
"[facility]  Adult Nutrition   Progress Note (Initial Assessment)     SUMMARY     Recommendations/Interventions:  1. Added Cardiac Diet restrictions due to Hx of HLD and HTN.   2. Reiterated the importance of following a Heart Healthy/ Diabetic Diet and the importance of compliance. Pt knows what she needs to do, however remains non-compliant. Will need further education upon follow up.   Goals:   1. Pt to meet estimated needs via PO intake of meals.   2. Pt to understand the importance of following a heart healthy diet and the need for compliance. - NOT MET  Nutrition Goal Status: new    Reason for Assessment    Reason For Assessment: identified at risk by screening criteria(ICU status )  Diagnosis: (Heart Block )  Relevant Medical History: COPD, T2DM, Diverticulitis, Fatty liver, GERD, NSTEMI, HLD, HTN, metabolic syndrome, MI, stroke   Interdisciplinary Rounds: did not attend    Nutrition Risk Screen    Nutrition Risk Screen: no indicators present     MST Score: 0  Have you recently lost weight without trying?: No  Weight loss score: 0  Have you been eating poorly because of a decreased appetite?: No  Appetite score: 0       Nutrition/Diet History    Patient Reported Diet/Restrictions/Preferences: general  Food Allergies: (Corn, potato starch)  Factors Affecting Nutritional Intake: None identified at this time    Anthropometrics    Temp: 97.8 °F (36.6 °C)  Height Method: Stated  Height: 5' 2" (157.5 cm)  Height (inches): 62 in  Weight Method: Bed Scale  Weight: 86.6 kg (190 lb 14.7 oz)  Weight (lb): 190.92 lb  Ideal Body Weight (IBW), Female: 110 lb  % Ideal Body Weight, Female (lb): 173.56 %  BMI (Calculated): 34.9  BMI Grade: 30 - 34.9- obesity - grade I     Weight History:  Wt Readings from Last 10 Encounters:   01/10/20 86.6 kg (190 lb 14.7 oz)   11/29/19 80.7 kg (177 lb 14.6 oz)   11/21/19 80.7 kg (178 lb 0.3 oz)   03/11/19 78.4 kg (172 lb 13.5 oz)   03/08/19 76.9 kg (169 lb 8.5 oz)   11/05/18 " 79.8 kg (176 lb)   08/28/18 79.9 kg (176 lb 2.4 oz)   08/03/17 72.3 kg (159 lb 4.5 oz)   08/01/17 72.5 kg (159 lb 13.3 oz)   07/25/17 79.2 kg (174 lb 9.7 oz)     Lab/Procedures/Meds: Pertinent Labs Reviewed  Clinical Chemistry:  Recent Labs   Lab 01/10/20  1627 01/11/20  0329    140   K 4.6 3.8   CL 96 98   CO2 22* 25   * 279*   BUN 53* 51*   CREATININE 2.0* 1.4   CALCIUM 8.9 8.6*   PROT 7.5  --    ALBUMIN 3.5  --    BILITOT 1.3*  --    ALKPHOS 88  --    *  --    ALT 95*  --    ANIONGAP 20* 17*   ESTGFRAFRICA 28.7* 44.2*   EGFRNONAA 24.9* 38.4*   MG  --  1.5*     CBC:   Recent Labs   Lab 01/11/20  0329   WBC 14.63*   RBC 3.65*   HGB 11.7*   HCT 33.9*      MCV 93   MCH 32.1*   MCHC 34.5     Cardiac Profile:  Recent Labs   Lab 01/10/20  1627 01/10/20  2028   *  809*  --    TROPONINI 0.047* 0.208*     Medications: Pertinent Medications reviewed  Scheduled Meds:   amLODIPine  10 mg Oral Daily    cefTRIAXone (ROCEPHIN) IVPB  2 g Intravenous Q24H    chlorhexidine  15 mL Mouth/Throat BID    enoxaparin  30 mg Subcutaneous Daily    furosemide (LASIX) IV  40 mg Intravenous Daily    levoFLOXacin  750 mg Intravenous Q48H    levothyroxine  137 mcg Oral Daily    mupirocin   Nasal BID     Continuous Infusions:  PRN Meds:.albuterol-ipratropium, calcium chloride IVPB, calcium chloride IVPB, calcium chloride IVPB, dextrose 50%, dextrose 50%, glucagon (human recombinant), glucose, glucose, insulin aspart U-100, magnesium oxide, magnesium sulfate IVPB, magnesium sulfate IVPB, magnesium sulfate IVPB, magnesium sulfate IVPB, ondansetron, potassium chloride in water, potassium chloride in water, potassium chloride in water, potassium chloride in water, potassium chloride, potassium chloride, potassium chloride, potassium chloride, sodium chloride 0.9%, sodium phosphate IVPB, sodium phosphate IVPB, sodium phosphate IVPB, sodium phosphate IVPB, sodium phosphate IVPB    Estimated/Assessed  Needs    Weight Used For Calorie Calculations: 86.6 kg (190 lb 14.7 oz)  Energy Calorie Requirements (kcal): 0863-9067 kcals/day (20-25 kcals/kg)  Energy Need Method: Kcal/kg  Protein Requirements:  g/day (1.5-2.0 g/kg IBW)  Weight Used For Protein Calculations: 49.9 kg (110 lb)     Estimated Fluid Requirement Method: RDA Method    Nutrition Prescription Ordered    Current Diet Order: 2000 kcal Diabetic Diet     Evaluation of Received Nutrient/Fluid Intake    Energy Calories Required: meeting needs  Protein Required: meeting needs  Fluid Required: meeting needs  Tolerance: tolerating  % Intake of Estimated Energy Needs: 75 - 100 %  % Meal Intake: 75 - 100 %    Intake/Output Summary (Last 24 hours) at 1/11/2020 1221  Last data filed at 1/11/2020 1200  Gross per 24 hour   Intake 600 ml   Output 2250 ml   Net -1650 ml      Nutrition Risk    Level of Risk/Frequency of Follow-up: moderate     Dietitian Rounds Brief:  · Seen 2' ICU status. Temporary pacemaker in place. SOB improving. In and out of heart block per cardiologist. Being treated for pneumonia. Non-compliant diabetic and with heart healthy diet recommendations. Reiterated the importance of compliance. Will need further education upon follow-up.    Monitor and Evaluation    Food and Nutrient Intake: energy intake, food and beverage intake  Food and Nutrient Adminstration: diet order  Knowledge/Beliefs/Attitudes: beliefs and attitudes, food and nutrition knowledge/skill  Physical Activity and Function: nutrition-related ADLs and IADLs  Anthropometric Measurements: weight, weight change  Biochemical Data, Medical Tests and Procedures: electrolyte and renal panel, lipid profile, gastrointestinal profile, inflammatory profile, glucose/endocrine profile  Nutrition-Focused Physical Findings: overall appearance     Nutrition Follow-Up    RD Follow-up?: Yes  Marcia Desai RD 01/11/2020 12:24 PM

## 2020-01-11 NOTE — CARE UPDATE
01/10/20 2052   Patient Assessment/Suction   Level of Consciousness (AVPU) alert   All Lung Fields Breath Sounds coarse   PRE-TX-O2   O2 Device (Oxygen Therapy) BiPAP   $ Is the patient on Low Flow Oxygen? Yes   Oxygen Concentration (%) 50   SpO2 (!) 91 %   Pulse Oximetry Type Continuous   $ Pulse Oximetry - Multiple Charge Pulse Oximetry - Multiple   Pulse 85   Resp (!) 33   Ready to Wean/Extubation Screen   FIO2<=50 (chart decimal) 0.5   Preset CPAP/BiPAP Settings   Mode Of Delivery BiPAP   $ Is patient using? Yes   Size of Mask Medium/Large   Equipment Type V60   Ipap 20   EPAP (cm H2O) 8   Pressure Support (cm H2O) 12   Set Rate (Breaths/Min) 20

## 2020-01-11 NOTE — PROGRESS NOTES
[facility]  Progress Note    Patient Name: Tereza Larose  MRN: [id number]  Patient Class: IP- Inpatient   Admission Date: 1/10/2020  Length of Stay: 1 days  Attending Physician: Almaz Velez MD  Primary Care Provider: Evan Sánchez MD        Subjective:     Principal Problem:Complete heart block        HPI:  9-year-old female with past medical history of hypertension HLD, and IDDM, COPD/asthma who presents with shortness of breath for 2 days.  She states it was associated with palpitations, diaphoresis.  She denies chest pain.  Never to the emergency room she was tachypneic and hypoxic.  She was placed on BiPAP with improvement of respiratory status.  EKG done showed complete heart block.  Cardiology consulted for emergent temporary pacemaker.    Overview/Hospital Course:  No notes on file    Interval History:  She had a temporary pacemaker placed yesterday.  Hydrate is now paced 50s to 60s.  She is mildly short of breath, however her breathing is much improved from yesterday.    Review of Systems   Constitutional: Negative for chills and fever.   HENT: Negative for congestion.    Respiratory: Positive for shortness of breath and wheezing.    Cardiovascular: Negative for chest pain and palpitations.   Gastrointestinal: Negative for constipation, diarrhea, nausea and vomiting.   Genitourinary: Negative for dysuria, flank pain, urgency and vaginal discharge.   Musculoskeletal: Negative for back pain and myalgias.     Objective:     Vital Signs (Most Recent):  Temp: 97.9 °F (36.6 °C) (01/11/20 0800)  Pulse: 60 (01/11/20 0800)  Resp: (!) 50 (01/11/20 0800)  BP: (!) 114/52 (01/11/20 0800)  SpO2: (!) 93 % (01/11/20 0800) Vital Signs (24h Range):  Temp:  [97.4 °F (36.3 °C)-98.1 °F (36.7 °C)] 97.9 °F (36.6 °C)  Pulse:  [37-86] 60  Resp:  [16-50] 50  SpO2:  [82 %-99 %] 93 %  BP: (113-164)/(52-72) 114/52     Weight: 86.6 kg (190 lb 14.7 oz)  Body mass index is 34.92  kg/m².    Intake/Output Summary (Last 24 hours) at 1/11/2020 1043  Last data filed at 1/11/2020 0700  Gross per 24 hour   Intake 300 ml   Output 1840 ml   Net -1540 ml      Physical Exam   Constitutional: She is oriented to person, place, and time. No distress.   Eyes: Pupils are equal, round, and reactive to light. No scleral icterus.   Cardiovascular: Normal rate and regular rhythm.   No murmur heard.  Pulmonary/Chest: No respiratory distress. She has wheezes in the left middle field and the left lower field. She has no rales.   Abdominal: Soft. She exhibits no distension. There is no tenderness.   Neurological: She is alert and oriented to person, place, and time.   Skin: Skin is warm. Capillary refill takes less than 2 seconds. No rash noted.   Psychiatric: She has a normal mood and affect.   Nursing note and vitals reviewed.      Significant Labs:   BMP:   Recent Labs   Lab 01/11/20  0329   *      K 3.8   CL 98   CO2 25   BUN 51*   CREATININE 1.4   CALCIUM 8.6*   MG 1.5*     CBC:   Recent Labs   Lab 01/10/20  1627 01/10/20  1641 01/11/20  0329   WBC 24.03*  --  14.63*   HGB 12.6  --  11.7*   HCT 39.3 36 33.9*   *  --  297     Magnesium:   Recent Labs   Lab 01/11/20  0329   MG 1.5*       Significant Imaging: CXR: I have reviewed all pertinent results/findings within the past 24 hours and my personal findings are:  left midlobe opacities     CXR shows left mid lung ground glass opacity      Assessment/Plan:      * Complete heart block   temporary pacemaker in place.  Permanent pacemaker deferred until acute infectious process is resolved.      Acute on chronic respiratory failure with hypoxia  Off BiPAP now 5 L oxygen via high-flow nasal cannula.  Titrate down oxygen for o2 sat goal >92%      Pneumonia  Ground glass opacities now evident the left middle lobe.  Continue IV Levaquin.      Asthma with chronic obstructive pulmonary disease (COPD)  Duo nebs q.6 hours p.r.n.      Lactic  acidosis  Secondary to tissue hypoperfusion versus sepsis  Trend lactic acid levels        VTE Risk Mitigation (From admission, onward)         Ordered     enoxaparin injection 30 mg  Daily      01/10/20 1801     IP VTE HIGH RISK PATIENT  Once      01/10/20 1713                      Almaz Velez MD  Department of Hospital Medicine   [facility]

## 2020-01-11 NOTE — PLAN OF CARE
01/11/20 1314   Patient Assessment/Suction   Level of Consciousness (AVPU) alert   Respiratory Effort Normal;Unlabored   Expansion/Accessory Muscles/Retractions no use of accessory muscles   All Lung Fields Breath Sounds clear;diminished   FABRICIO Breath Sounds clear   Rhythm/Pattern, Respiratory unlabored;pattern regular;depth regular   Cough Frequency infrequent   Cough Type nonproductive   PRE-TX-O2   O2 Device (Oxygen Therapy) High Flow nasal Cannula   $ Is the patient on Low Flow Oxygen? Yes   Flow (L/min) 8   SpO2 (!) 91 %   Pulse Oximetry Type Continuous   $ Pulse Oximetry - Multiple Charge Pulse Oximetry - Multiple   Pulse 60   Resp 14   Breath Sounds Post-Respiratory Treatment   Throughout All Fields Post-Treatment All Fields   Throughout All Fields Post-Treatment aeration increased   Post-treatment Heart Rate (beats/min) 60   Post-treatment Resp Rate (breaths/min) 26

## 2020-01-11 NOTE — SUBJECTIVE & OBJECTIVE
Interval History:  She had a temporary pacemaker placed yesterday.  Hydrate is now paced 50s to 60s.  She is mildly short of breath, however her breathing is much improved from yesterday.    Review of Systems   Constitutional: Negative for chills and fever.   HENT: Negative for congestion.    Respiratory: Positive for shortness of breath and wheezing.    Cardiovascular: Negative for chest pain and palpitations.   Gastrointestinal: Negative for constipation, diarrhea, nausea and vomiting.   Genitourinary: Negative for dysuria, flank pain, urgency and vaginal discharge.   Musculoskeletal: Negative for back pain and myalgias.     Objective:     Vital Signs (Most Recent):  Temp: 97.9 °F (36.6 °C) (01/11/20 0800)  Pulse: 60 (01/11/20 0800)  Resp: (!) 50 (01/11/20 0800)  BP: (!) 114/52 (01/11/20 0800)  SpO2: (!) 93 % (01/11/20 0800) Vital Signs (24h Range):  Temp:  [97.4 °F (36.3 °C)-98.1 °F (36.7 °C)] 97.9 °F (36.6 °C)  Pulse:  [37-86] 60  Resp:  [16-50] 50  SpO2:  [82 %-99 %] 93 %  BP: (113-164)/(52-72) 114/52     Weight: 86.6 kg (190 lb 14.7 oz)  Body mass index is 34.92 kg/m².    Intake/Output Summary (Last 24 hours) at 1/11/2020 1043  Last data filed at 1/11/2020 0700  Gross per 24 hour   Intake 300 ml   Output 1840 ml   Net -1540 ml      Physical Exam   Constitutional: She is oriented to person, place, and time. No distress.   Eyes: Pupils are equal, round, and reactive to light. No scleral icterus.   Cardiovascular: Normal rate and regular rhythm.   No murmur heard.  Pulmonary/Chest: No respiratory distress. She has wheezes in the left middle field and the left lower field. She has no rales.   Abdominal: Soft. She exhibits no distension. There is no tenderness.   Neurological: She is alert and oriented to person, place, and time.   Skin: Skin is warm. Capillary refill takes less than 2 seconds. No rash noted.   Psychiatric: She has a normal mood and affect.   Nursing note and vitals reviewed.      Significant Labs:    BMP:   Recent Labs   Lab 01/11/20  0329   *      K 3.8   CL 98   CO2 25   BUN 51*   CREATININE 1.4   CALCIUM 8.6*   MG 1.5*     CBC:   Recent Labs   Lab 01/10/20  1627 01/10/20  1641 01/11/20  0329   WBC 24.03*  --  14.63*   HGB 12.6  --  11.7*   HCT 39.3 36 33.9*   *  --  297     Magnesium:   Recent Labs   Lab 01/11/20  0329   MG 1.5*       Significant Imaging: CXR: I have reviewed all pertinent results/findings within the past 24 hours and my personal findings are:  left midlobe opacities     CXR shows left mid lung ground glass opacity

## 2020-01-11 NOTE — PLAN OF CARE
Problem: Oral Intake Inadequate  Goal: Improved Oral Intake  Outcome: Ongoing, Progressing   Added Cardiac Diet restrictions due to Hx of HLD and HTN.

## 2020-01-11 NOTE — PROGRESS NOTES
[facility]    Critical Care Cardiology Progress Note    Subjective:  Patient remains in and out of complete heart block.  She is currently being treated for pneumonia.  White count has come down thus far blood cultures are negative  **    Objective:  Vital Signs (Most Recent)  Temp: 97.9 °F (36.6 °C) (01/11/20 0800)  Pulse: 60 (01/11/20 0800)  Resp: (!) 50 (01/11/20 0800)  BP: 135/62 (01/11/20 0700)  SpO2: (!) 93 % (01/11/20 0800)    Vital Signs Range (Last 24H):  Temp:  [97.4 °F (36.3 °C)-98.1 °F (36.7 °C)]   Pulse:  [37-86]   Resp:  [16-50]   BP: (113-164)/(55-72)   SpO2:  [82 %-99 %]     I & O (Last 24H):    Intake/Output Summary (Last 24 hours) at 1/11/2020 0837  Last data filed at 1/11/2020 0700  Gross per 24 hour   Intake 300 ml   Output 1840 ml   Net -1540 ml       Current Diet:     Current Diet Order   Procedures    Diet diabetic [facility]; 2000 Calorie     Order Specific Question:   Indicate patient location for additional diet options:     Answer:   [facility]     Order Specific Question:   Total calories:     Answer:   2000 Calorie        Allergies:  Review of patient's allergies indicates:   Allergen Reactions    Corn Itching     Other reaction(s): Sneezing  Other reaction(s): Rhinorrhea    Potato starch Hives     Other reaction(s): Sneezing  Other reaction(s): Rhinorrhea    Pravastatin Other (See Comments)     Muscle pain    Statins-hmg-coa reductase inhibitors Other (See Comments)    Hydrochlorothiazide Other (See Comments)     weakness    Welchol [colesevelam] Other (See Comments)     Weakness        Meds:  Scheduled Meds:   amLODIPine  10 mg Oral Daily    cefTRIAXone (ROCEPHIN) IVPB  2 g Intravenous Q24H    enoxaparin  30 mg Subcutaneous Daily    furosemide (LASIX) IV  40 mg Intravenous Daily    levoFLOXacin  750 mg Intravenous Q48H    levothyroxine  137 mcg Oral Daily     Continuous Infusions:  PRN Meds:calcium chloride IVPB, calcium chloride IVPB, calcium chloride IVPB, dextrose 50%,  "dextrose 50%, glucagon (human recombinant), glucose, glucose, insulin aspart U-100, magnesium oxide, magnesium sulfate IVPB, magnesium sulfate IVPB, magnesium sulfate IVPB, magnesium sulfate IVPB, ondansetron, potassium chloride in water, potassium chloride in water, potassium chloride in water, potassium chloride in water, potassium chloride, potassium chloride, potassium chloride, potassium chloride, sodium chloride 0.9%, sodium phosphate IVPB, sodium phosphate IVPB, sodium phosphate IVPB, sodium phosphate IVPB, sodium phosphate IVPB    Oxygen/Ventilator Data (Last 24H):  (if applicable)   Oxygen Concentration (%):  [] 35    Hemodynamic Parameters (Last 24H):   (if applicable)        Lines/Drains:  (if applicable)       Peripheral IV - Single Lumen 01/10/20 1700 18 G Left Antecubital (Active)   Site Assessment Clean;Dry;Intact;No redness;No swelling 1/11/2020  7:30 AM   Line Status Infusing 1/11/2020  7:30 AM   Dressing Status Clean;Dry 1/11/2020  7:30 AM   Number of days: 0            Peripheral IV - Single Lumen 01/10/20 1710 20 G Left Hand (Active)   Site Assessment Clean;Dry;Intact;No redness;No swelling 1/11/2020  7:30 AM   Line Status Saline locked;Flushed 1/11/2020  7:30 AM   Dressing Status Clean;Dry;Intact 1/11/2020  7:30 AM   Number of days: 0            Urethral Catheter 01/10/20 1700 Straight-tip (Active)   Site Assessment Clean;Intact 1/11/2020  7:30 AM   Collection Container Urimeter 1/11/2020  7:30 AM   Securement Method secured to top of thigh w/ adhesive device 1/11/2020  7:30 AM   Catheter Care Performed yes 1/11/2020  7:30 AM   Reason for Continuing Urinary Catheterization Critically ill in ICU requiring intensive monitoring 1/11/2020  7:30 AM   CAUTI Prevention Bundle StatLock in place w 1" slack;Intact seal between catheter & drainage tubing;Drainage bag/urimeter off the floor;Green sheeting clip in use;No dependent loops or kinks;Drainage bag/urimeter below bladder;Drainage bag/urimeter " not overfilled (<2/3 full) 1/11/2020  7:30 AM   Output (mL) 100 mL 1/11/2020  6:00 AM   Number of days: 0            Sheath 01/10/20 1735 Right (Active)   Insertion Site occlusive dressing intact;dressing intact;clean and dry;suture(s) intact;no hematoma 1/11/2020  7:30 AM   Drainage Amount None 1/10/2020 11:01 PM   Number of days: 0       Lab Results :  Recent Results (from the past 24 hour(s))   CBC auto differential    Collection Time: 01/10/20  4:27 PM   Result Value Ref Range    WBC 24.03 (H) 3.90 - 12.70 K/uL    RBC 4.06 4.00 - 5.40 M/uL    Hemoglobin 12.6 12.0 - 16.0 g/dL    Hematocrit 39.3 37.0 - 48.5 %    Mean Corpuscular Volume 97 82 - 98 fL    Mean Corpuscular Hemoglobin 31.0 27.0 - 31.0 pg    Mean Corpuscular Hemoglobin Conc 32.1 32.0 - 36.0 g/dL    RDW 13.8 11.5 - 14.5 %    Platelets 395 (H) 150 - 350 K/uL    MPV 9.9 9.2 - 12.9 fL    Immature Granulocytes 1.5 (H) 0.0 - 0.5 %    Gran # (ANC) 20.8 (H) 1.8 - 7.7 K/uL    Immature Grans (Abs) 0.35 (H) 0.00 - 0.04 K/uL    Lymph # 0.9 (L) 1.0 - 4.8 K/uL    Mono # 1.9 (H) 0.3 - 1.0 K/uL    Eos # 0.0 0.0 - 0.5 K/uL    Baso # 0.04 0.00 - 0.20 K/uL    nRBC 0 0 /100 WBC    Gran% 86.5 (H) 38.0 - 73.0 %    Lymph% 3.9 (L) 18.0 - 48.0 %    Mono% 7.9 4.0 - 15.0 %    Eosinophil% 0.0 0.0 - 8.0 %    Basophil% 0.2 0.0 - 1.9 %    Differential Method Automated    Comprehensive metabolic panel    Collection Time: 01/10/20  4:27 PM   Result Value Ref Range    Sodium 138 136 - 145 mmol/L    Potassium 4.6 3.5 - 5.1 mmol/L    Chloride 96 95 - 110 mmol/L    CO2 22 (L) 23 - 29 mmol/L    Glucose 334 (H) 70 - 110 mg/dL    BUN, Bld 53 (H) 8 - 23 mg/dL    Creatinine 2.0 (H) 0.5 - 1.4 mg/dL    Calcium 8.9 8.7 - 10.5 mg/dL    Total Protein 7.5 6.0 - 8.4 g/dL    Albumin 3.5 3.5 - 5.2 g/dL    Total Bilirubin 1.3 (H) 0.1 - 1.0 mg/dL    Alkaline Phosphatase 88 55 - 135 U/L     (H) 10 - 40 U/L    ALT 95 (H) 10 - 44 U/L    Anion Gap 20 (H) 8 - 16 mmol/L    eGFR if African American 28.7  (A) >60 mL/min/1.73 m^2    eGFR if non African American 24.9 (A) >60 mL/min/1.73 m^2   Troponin I #1    Collection Time: 01/10/20  4:27 PM   Result Value Ref Range    Troponin I 0.047 (H) <=0.040 ng/mL   B-Type natriuretic peptide (BNP)    Collection Time: 01/10/20  4:27 PM   Result Value Ref Range     (H) 0 - 99 pg/mL   Brain natriuretic peptide    Collection Time: 01/10/20  4:27 PM   Result Value Ref Range     (H) 0 - 99 pg/mL   Protime-INR    Collection Time: 01/10/20  4:27 PM   Result Value Ref Range    PT 14.5 10.6 - 14.8 sec    INR 1.2    ISTAT PROCEDURE    Collection Time: 01/10/20  4:41 PM   Result Value Ref Range    POC Glucose 292 (H) 70 - 110 mg/dL    POC BUN 49 (H) 6 - 30 mg/dL    POC Creatinine 1.8 (H) 0.5 - 1.4 mg/dL    POC Sodium 137 136 - 145 mmol/L    POC Potassium 4.3 3.5 - 5.1 mmol/L    POC Chloride 98 95 - 110 mmol/L    POC TCO2 (MEASURED) 29 23 - 29 mmol/L    POC Anion Gap 16 8 - 16 mmol/L    POC Ionized Calcium 1.15 1.06 - 1.42 mmol/L    POC Hematocrit 36 36 - 54 %PCV    Sample VENOUS    Blood culture    Collection Time: 01/10/20  5:00 PM   Result Value Ref Range    Blood Culture, Routine No Growth to date    Lactic acid, plasma    Collection Time: 01/10/20  5:04 PM   Result Value Ref Range    Lactate (Lactic Acid) 4.5 (HH) 0.5 - 1.9 mmol/L   Blood culture    Collection Time: 01/10/20  5:06 PM   Result Value Ref Range    Blood Culture, Routine No Growth to date    ISTAT PROCEDURE    Collection Time: 01/10/20  5:10 PM   Result Value Ref Range    POC PH 7.360 7.35 - 7.45    POC PCO2 44.3 35 - 45 mmHg    POC PO2 112 (H) 80 - 100 mmHg    POC HCO3 25.0 24 - 28 mmol/L    POC BE 0 -2 to 2 mmol/L    POC SATURATED O2 98 95 - 100 %    POC TCO2 26 23 - 27 mmol/L    Rate 20     Sample ARTERIAL     Site LR     Allens Test Pass     DelSys CPAP/BiPAP     Mode BiPAP     FiO2 100     IP 20     EP 8    Urinalysis, Reflex to Urine Culture Urine, Clean Catch    Collection Time: 01/10/20  5:35 PM    Result Value Ref Range    Specimen UA Urine, Clean Catch     Color, UA Yellow Yellow, Straw, Debbie    Appearance, UA Hazy (A) Clear    pH, UA 5.0 5.0 - 8.0    Specific Gravity, UA 1.020 1.005 - 1.030    Protein, UA Trace (A) Negative    Glucose, UA Negative Negative    Ketones, UA Trace (A) Negative    Bilirubin (UA) Negative Negative    Occult Blood UA Negative Negative    Nitrite, UA Negative Negative    Urobilinogen, UA 2.0-3.0 (A) Negative EU/dL    Leukocytes, UA Negative Negative   Troponin I #2    Collection Time: 01/10/20  8:28 PM   Result Value Ref Range    Troponin I 0.208 (HH) <=0.040 ng/mL   Lactic acid, plasma    Collection Time: 01/10/20  8:28 PM   Result Value Ref Range    Lactate (Lactic Acid) 4.8 (HH) 0.5 - 1.9 mmol/L   POCT glucose    Collection Time: 01/10/20 10:15 PM   Result Value Ref Range    POC Glucose 276 (H) 70 - 110   Basic metabolic panel    Collection Time: 01/11/20  3:29 AM   Result Value Ref Range    Sodium 140 136 - 145 mmol/L    Potassium 3.8 3.5 - 5.1 mmol/L    Chloride 98 95 - 110 mmol/L    CO2 25 23 - 29 mmol/L    Glucose 279 (H) 70 - 110 mg/dL    BUN, Bld 51 (H) 8 - 23 mg/dL    Creatinine 1.4 0.5 - 1.4 mg/dL    Calcium 8.6 (L) 8.7 - 10.5 mg/dL    Anion Gap 17 (H) 8 - 16 mmol/L    eGFR if African American 44.2 (A) >60 mL/min/1.73 m^2    eGFR if non  38.4 (A) >60 mL/min/1.73 m^2   Magnesium    Collection Time: 01/11/20  3:29 AM   Result Value Ref Range    Magnesium 1.5 (L) 1.6 - 2.6 mg/dL   CBC auto differential    Collection Time: 01/11/20  3:29 AM   Result Value Ref Range    WBC 14.63 (H) 3.90 - 12.70 K/uL    RBC 3.65 (L) 4.00 - 5.40 M/uL    Hemoglobin 11.7 (L) 12.0 - 16.0 g/dL    Hematocrit 33.9 (L) 37.0 - 48.5 %    Mean Corpuscular Volume 93 82 - 98 fL    Mean Corpuscular Hemoglobin 32.1 (H) 27.0 - 31.0 pg    Mean Corpuscular Hemoglobin Conc 34.5 32.0 - 36.0 g/dL    RDW 13.3 11.5 - 14.5 %    Platelets 297 150 - 350 K/uL    MPV 10.0 9.2 - 12.9 fL    Immature  Granulocytes 0.6 (H) 0.0 - 0.5 %    Gran # (ANC) 13.2 (H) 1.8 - 7.7 K/uL    Immature Grans (Abs) 0.09 (H) 0.00 - 0.04 K/uL    Lymph # 0.9 (L) 1.0 - 4.8 K/uL    Mono # 0.4 0.3 - 1.0 K/uL    Eos # 0.0 0.0 - 0.5 K/uL    Baso # 0.01 0.00 - 0.20 K/uL    nRBC 0 0 /100 WBC    Gran% 90.3 (H) 38.0 - 73.0 %    Lymph% 6.0 (L) 18.0 - 48.0 %    Mono% 3.0 (L) 4.0 - 15.0 %    Eosinophil% 0.0 0.0 - 8.0 %    Basophil% 0.1 0.0 - 1.9 %    Differential Method Automated    POCT glucose    Collection Time: 01/11/20  7:40 AM   Result Value Ref Range    POC Glucose 259 (H) 70 - 110       Diagnostic Results:  Imaging Results          X-Ray Chest AP Portable (Final result)  Result time 01/10/20 16:33:45    Final result by Jazzy García MD (01/10/20 16:33:45)                 Impression:      Mild cardiomegaly with prominence of the kiersten and bilateral ground-glass alveolar opacities in the mid and lower lobes.  Findings may be secondary to multifocal pneumonia.  The hilar prominence may be secondary to adenopathy versus enlarged vessels.  Follow-up two view chest x-ray to document clearing is recommended.      Electronically signed by: Jazzy García MD  Date:    01/10/2020  Time:    16:33             Narrative:    EXAMINATION:  XR CHEST AP PORTABLE    CLINICAL HISTORY:  Chest Pain;    FINDINGS:  Portable chest at 16:19 hours is is compared to 09/09/2018 shows patient has had a prior median sternotomy.  The heart is mildly enlarged.    There is prominence of the hilum which may be secondary to adenopathy versus enlarged vessels.  There is patchy ground-glass alveolar opacities in the left perihilar region and both lower lobes suspicious for pneumonia.  The upper lobes are clear.  There are no pleural effusions.    There are no acute osseous abnormalities.                                12-lead EKG interpretation:   (if applicable)    Recent Cardiac Rhythm:  (if applicable)      Physical Exam:  Objective:  General Appearance:   "Comfortable.    Vital signs: (most recent): Blood pressure 135/62, pulse 60, temperature 97.9 °F (36.6 °C), resp. rate (!) 50, height 5' 2" (1.575 m), weight 86.6 kg (190 lb 14.7 oz), SpO2 (!) 93 %, not currently breastfeeding.    Lungs:  Breath sounds clear to auscultation.    Heart: Normal rate.  S1 normal and S2 normal.    Abdomen: Abdomen is soft.  Bowel sounds are normal.   There is no abdominal tenderness.     Extremities: There is no local swelling.        Current Consults:  IP CONSULT TO CARDIOLOGY  IP CONSULT TO HOSPITAL MEDICINE    Assessment/Plan:  Assessment:   Complete heart block  Status post temporary pacemaker  Probable pneumonia  Status post ACB    Plan:  Patient will need permanent pacemaker, however due to patient's infection will hold off until this infection is cleared        "

## 2020-01-11 NOTE — ASSESSMENT & PLAN NOTE
temporary pacemaker in place.  Permanent pacemaker deferred until acute infectious process is resolved.

## 2020-01-12 LAB
ANION GAP SERPL CALC-SCNC: 15 MMOL/L (ref 8–16)
BASOPHILS # BLD AUTO: 0.02 K/UL (ref 0–0.2)
BASOPHILS NFR BLD: 0.1 % (ref 0–1.9)
BUN SERPL-MCNC: 56 MG/DL (ref 8–23)
CALCIUM SERPL-MCNC: 8.4 MG/DL (ref 8.7–10.5)
CHLORIDE SERPL-SCNC: 103 MMOL/L (ref 95–110)
CO2 SERPL-SCNC: 26 MMOL/L (ref 23–29)
CREAT SERPL-MCNC: 1.2 MG/DL (ref 0.5–1.4)
DIFFERENTIAL METHOD: ABNORMAL
EOSINOPHIL # BLD AUTO: 0 K/UL (ref 0–0.5)
EOSINOPHIL NFR BLD: 0 % (ref 0–8)
ERYTHROCYTE [DISTWIDTH] IN BLOOD BY AUTOMATED COUNT: 14.2 % (ref 11.5–14.5)
EST. GFR  (AFRICAN AMERICAN): 53.3 ML/MIN/1.73 M^2
EST. GFR  (NON AFRICAN AMERICAN): 46.2 ML/MIN/1.73 M^2
GLUCOSE SERPL-MCNC: 164 MG/DL (ref 70–110)
GLUCOSE SERPL-MCNC: 171 MG/DL (ref 70–110)
GLUCOSE SERPL-MCNC: 197 MG/DL (ref 70–110)
GLUCOSE SERPL-MCNC: 207 MG/DL (ref 70–110)
HCT VFR BLD AUTO: 32.8 % (ref 37–48.5)
HGB BLD-MCNC: 10.8 G/DL (ref 12–16)
IMM GRANULOCYTES # BLD AUTO: 0.07 K/UL (ref 0–0.04)
IMM GRANULOCYTES NFR BLD AUTO: 0.5 % (ref 0–0.5)
LACTATE SERPL-SCNC: 2 MMOL/L (ref 0.5–1.9)
LYMPHOCYTES # BLD AUTO: 1.5 K/UL (ref 1–4.8)
LYMPHOCYTES NFR BLD: 11 % (ref 18–48)
MAGNESIUM SERPL-MCNC: 1.9 MG/DL (ref 1.6–2.6)
MCH RBC QN AUTO: 31.1 PG (ref 27–31)
MCHC RBC AUTO-ENTMCNC: 32.9 G/DL (ref 32–36)
MCV RBC AUTO: 95 FL (ref 82–98)
MONOCYTES # BLD AUTO: 1.5 K/UL (ref 0.3–1)
MONOCYTES NFR BLD: 10.7 % (ref 4–15)
NEUTROPHILS # BLD AUTO: 10.8 K/UL (ref 1.8–7.7)
NEUTROPHILS NFR BLD: 77.7 % (ref 38–73)
NRBC BLD-RTO: 0 /100 WBC
PLATELET # BLD AUTO: 292 K/UL (ref 150–350)
PMV BLD AUTO: 10.2 FL (ref 9.2–12.9)
POTASSIUM SERPL-SCNC: 4.1 MMOL/L (ref 3.5–5.1)
PROCALCITONIN SERPL IA-MCNC: 0.57 NG/ML (ref 0–0.5)
RBC # BLD AUTO: 3.47 M/UL (ref 4–5.4)
SODIUM SERPL-SCNC: 144 MMOL/L (ref 136–145)
WBC # BLD AUTO: 13.88 K/UL (ref 3.9–12.7)

## 2020-01-12 PROCEDURE — 83735 ASSAY OF MAGNESIUM: CPT

## 2020-01-12 PROCEDURE — 94660 CPAP INITIATION&MGMT: CPT

## 2020-01-12 PROCEDURE — 63600175 PHARM REV CODE 636 W HCPCS: Performed by: EMERGENCY MEDICINE

## 2020-01-12 PROCEDURE — 25000003 PHARM REV CODE 250

## 2020-01-12 PROCEDURE — 25000003 PHARM REV CODE 250: Performed by: INTERNAL MEDICINE

## 2020-01-12 PROCEDURE — 25000242 PHARM REV CODE 250 ALT 637 W/ HCPCS: Performed by: INTERNAL MEDICINE

## 2020-01-12 PROCEDURE — 63600175 PHARM REV CODE 636 W HCPCS: Performed by: INTERNAL MEDICINE

## 2020-01-12 PROCEDURE — 36415 COLL VENOUS BLD VENIPUNCTURE: CPT

## 2020-01-12 PROCEDURE — 80048 BASIC METABOLIC PNL TOTAL CA: CPT

## 2020-01-12 PROCEDURE — 84145 PROCALCITONIN (PCT): CPT

## 2020-01-12 PROCEDURE — 99900035 HC TECH TIME PER 15 MIN (STAT)

## 2020-01-12 PROCEDURE — 94640 AIRWAY INHALATION TREATMENT: CPT

## 2020-01-12 PROCEDURE — 20000000 HC ICU ROOM

## 2020-01-12 PROCEDURE — 83605 ASSAY OF LACTIC ACID: CPT

## 2020-01-12 PROCEDURE — 82962 GLUCOSE BLOOD TEST: CPT

## 2020-01-12 PROCEDURE — 94761 N-INVAS EAR/PLS OXIMETRY MLT: CPT

## 2020-01-12 PROCEDURE — 27000221 HC OXYGEN, UP TO 24 HOURS

## 2020-01-12 PROCEDURE — 85025 COMPLETE CBC W/AUTO DIFF WBC: CPT

## 2020-01-12 RX ADMIN — MUPIROCIN: 20 OINTMENT TOPICAL at 08:01

## 2020-01-12 RX ADMIN — CHLORHEXIDINE GLUCONATE 15 ML: 1.2 RINSE ORAL at 08:01

## 2020-01-12 RX ADMIN — FUROSEMIDE 40 MG: 10 INJECTION, SOLUTION INTRAMUSCULAR; INTRAVENOUS at 08:01

## 2020-01-12 RX ADMIN — AMLODIPINE BESYLATE 10 MG: 5 TABLET ORAL at 08:01

## 2020-01-12 RX ADMIN — LEVOFLOXACIN 750 MG: 750 INJECTION, SOLUTION INTRAVENOUS at 08:01

## 2020-01-12 RX ADMIN — IPRATROPIUM BROMIDE AND ALBUTEROL SULFATE 3 ML: .5; 3 SOLUTION RESPIRATORY (INHALATION) at 08:01

## 2020-01-12 RX ADMIN — CEFTRIAXONE SODIUM 2 G: 2 INJECTION, SOLUTION INTRAVENOUS at 06:01

## 2020-01-12 RX ADMIN — LEVOTHYROXINE SODIUM 137 MCG: 0.11 TABLET ORAL at 08:01

## 2020-01-12 RX ADMIN — IPRATROPIUM BROMIDE AND ALBUTEROL SULFATE 3 ML: .5; 3 SOLUTION RESPIRATORY (INHALATION) at 01:01

## 2020-01-12 NOTE — SUBJECTIVE & OBJECTIVE
Interval History: no acute events overnight. Patient has paced rhythm on tele.     Review of Systems   Constitutional: Negative for chills and fever.   HENT: Negative for congestion.    Respiratory: Negative for shortness of breath and wheezing.    Cardiovascular: Negative for chest pain and palpitations.   Gastrointestinal: Negative for constipation, diarrhea, nausea and vomiting.   Genitourinary: Negative for dysuria, flank pain, urgency and vaginal discharge.   Musculoskeletal: Negative for back pain and myalgias.     Objective:     Vital Signs (Most Recent):  Temp: 97.9 °F (36.6 °C) (01/12/20 0300)  Pulse: 84 (01/12/20 0600)  Resp: (!) 26 (01/12/20 0600)  BP: (!) 163/71 (01/12/20 0600)  SpO2: 100 % (01/12/20 0600) Vital Signs (24h Range):  Temp:  [97.8 °F (36.6 °C)-98.1 °F (36.7 °C)] 97.9 °F (36.6 °C)  Pulse:  [60-84] 84  Resp:  [14-34] 26  SpO2:  [90 %-100 %] 100 %  BP: (102-174)/(56-76) 163/71     Weight: 86.6 kg (190 lb 14.7 oz)  Body mass index is 34.92 kg/m².    Intake/Output Summary (Last 24 hours) at 1/12/2020 1049  Last data filed at 1/12/2020 0600  Gross per 24 hour   Intake 1390 ml   Output 950 ml   Net 440 ml      Physical Exam   Constitutional: She is oriented to person, place, and time. No distress.   Eyes: Pupils are equal, round, and reactive to light. No scleral icterus.   Cardiovascular: Normal rate and regular rhythm.   Murmur heard.   Systolic murmur is present.  Pulmonary/Chest: Breath sounds normal. She has no wheezes. She has no rales.   Abdominal: Soft. She exhibits no distension. There is no tenderness.   Neurological: She is alert and oriented to person, place, and time.   Skin: Skin is warm. Capillary refill takes less than 2 seconds. No rash noted.   Psychiatric: She has a normal mood and affect.   Nursing note and vitals reviewed.      Significant Labs:   BMP:   Recent Labs   Lab 01/12/20  0318   *      K 4.1      CO2 26   BUN 56*   CREATININE 1.2   CALCIUM 8.4*    MG 1.9     CBC:   Recent Labs   Lab 01/10/20  1627 01/10/20  1641 01/11/20  0329 01/12/20  0318   WBC 24.03*  --  14.63* 13.88*   HGB 12.6  --  11.7* 10.8*   HCT 39.3 36 33.9* 32.8*   *  --  297 292     Magnesium:   Recent Labs   Lab 01/11/20  0329 01/12/20  0318   MG 1.5* 1.9       Significant Imaging: none

## 2020-01-12 NOTE — ASSESSMENT & PLAN NOTE
Ground glass opacities in the left middle lobe representing probable pneumonia.  Continue IV Levaquin.

## 2020-01-12 NOTE — PROGRESS NOTES
[facility]  Progress Note    Patient Name: Tereza Larose  MRN: [id number]  Patient Class: IP- Inpatient   Admission Date: 1/10/2020  Length of Stay: 2 days  Attending Physician: Almaz Velez MD  Primary Care Provider: Evan Sánchez MD        Subjective:     Principal Problem:Complete heart block        HPI:  9-year-old female with past medical history of hypertension HLD, and IDDM, COPD/asthma who presents with shortness of breath for 2 days.  She states it was associated with palpitations, diaphoresis.  She denies chest pain.  Never to the emergency room she was tachypneic and hypoxic.  She was placed on BiPAP with improvement of respiratory status.  EKG done showed complete heart block.  Cardiology consulted for emergent temporary pacemaker.    Overview/Hospital Course:  No notes on file    Interval History: no acute events overnight. Patient has paced rhythm on tele.     Review of Systems   Constitutional: Negative for chills and fever.   HENT: Negative for congestion.    Respiratory: Negative for shortness of breath and wheezing.    Cardiovascular: Negative for chest pain and palpitations.   Gastrointestinal: Negative for constipation, diarrhea, nausea and vomiting.   Genitourinary: Negative for dysuria, flank pain, urgency and vaginal discharge.   Musculoskeletal: Negative for back pain and myalgias.     Objective:     Vital Signs (Most Recent):  Temp: 97.9 °F (36.6 °C) (01/12/20 0300)  Pulse: 84 (01/12/20 0600)  Resp: (!) 26 (01/12/20 0600)  BP: (!) 163/71 (01/12/20 0600)  SpO2: 100 % (01/12/20 0600) Vital Signs (24h Range):  Temp:  [97.8 °F (36.6 °C)-98.1 °F (36.7 °C)] 97.9 °F (36.6 °C)  Pulse:  [60-84] 84  Resp:  [14-34] 26  SpO2:  [90 %-100 %] 100 %  BP: (102-174)/(56-76) 163/71     Weight: 86.6 kg (190 lb 14.7 oz)  Body mass index is 34.92 kg/m².    Intake/Output Summary (Last 24 hours) at 1/12/2020 1049  Last data filed at 1/12/2020 0600  Gross per 24 hour    Intake 1390 ml   Output 950 ml   Net 440 ml      Physical Exam   Constitutional: She is oriented to person, place, and time. No distress.   Eyes: Pupils are equal, round, and reactive to light. No scleral icterus.   Cardiovascular: Normal rate and regular rhythm.   Murmur heard.   Systolic murmur is present.  Pulmonary/Chest: Breath sounds normal. She has no wheezes. She has no rales.   Abdominal: Soft. She exhibits no distension. There is no tenderness.   Neurological: She is alert and oriented to person, place, and time.   Skin: Skin is warm. Capillary refill takes less than 2 seconds. No rash noted.   Psychiatric: She has a normal mood and affect.   Nursing note and vitals reviewed.      Significant Labs:   BMP:   Recent Labs   Lab 01/12/20  0318   *      K 4.1      CO2 26   BUN 56*   CREATININE 1.2   CALCIUM 8.4*   MG 1.9     CBC:   Recent Labs   Lab 01/10/20  1627 01/10/20  1641 01/11/20  0329 01/12/20  0318   WBC 24.03*  --  14.63* 13.88*   HGB 12.6  --  11.7* 10.8*   HCT 39.3 36 33.9* 32.8*   *  --  297 292     Magnesium:   Recent Labs   Lab 01/11/20  0329 01/12/20  0318   MG 1.5* 1.9       Significant Imaging: none      Assessment/Plan:      * Complete heart block   temporary pacemaker in place. For permanent pacemaker placement in the morning      Acute on chronic respiratory failure with hypoxia  Continue supplemental oxygen via NC for o2 sat goal >92%      Pneumonia  Ground glass opacities in the left middle lobe representing probable pneumonia.  Continue IV Levaquin.      Asthma with chronic obstructive pulmonary disease (COPD)  Duo nebs q.6 hours p.r.n.      Lactic acidosis  Secondary to tissue hypoperfusion versus sepsis  Trending downwards        VTE Risk Mitigation (From admission, onward)         Ordered     enoxaparin injection 30 mg  Daily      01/10/20 1801     IP VTE HIGH RISK PATIENT  Once      01/10/20 1718                Discussed with Dr. Batres, she will have  completed 4 days of IV antibiotics for probable left middle lobe pneumonia. She is not septic. Can have permanent pacemaker placed tomorrow.       Almaz Velez MD  Department of Hospital Medicine   [facility]

## 2020-01-12 NOTE — PROGRESS NOTES
[facility]    Critical Care Cardiology Progress Note    Subjective:  Patient is comfortable.  She remains in complete heart block with ventricular paced complexes from temporary pacemaker.  She is agreeable for permanent pacemaker    Objective:  Vital Signs (Most Recent)  Temp: 97.9 °F (36.6 °C) (01/12/20 0300)  Pulse: 84 (01/12/20 0600)  Resp: (!) 26 (01/12/20 0600)  BP: (!) 163/71 (01/12/20 0600)  SpO2: 100 % (01/12/20 0600)    Vital Signs Range (Last 24H):  Temp:  [97.8 °F (36.6 °C)-98.1 °F (36.7 °C)]   Pulse:  [60-84]   Resp:  [14-34]   BP: (102-174)/(56-88)   SpO2:  [90 %-100 %]     I & O (Last 24H):    Intake/Output Summary (Last 24 hours) at 1/12/2020 0930  Last data filed at 1/12/2020 0600  Gross per 24 hour   Intake 1440 ml   Output 950 ml   Net 490 ml       Current Diet:     Current Diet Order   Procedures    Diet diabetic [facility]; 2000 Calorie     Order Specific Question:   Indicate patient location for additional diet options:     Answer:   [facility]     Order Specific Question:   Total calories:     Answer:   2000 Calorie        Allergies:  Review of patient's allergies indicates:   Allergen Reactions    Corn Itching     Other reaction(s): Sneezing  Other reaction(s): Rhinorrhea    Potato starch Hives     Other reaction(s): Sneezing  Other reaction(s): Rhinorrhea    Pravastatin Other (See Comments)     Muscle pain    Statins-hmg-coa reductase inhibitors Other (See Comments)    Hydrochlorothiazide Other (See Comments)     weakness    Welchol [colesevelam] Other (See Comments)     Weakness        Meds:  Scheduled Meds:   amLODIPine  10 mg Oral Daily    cefTRIAXone (ROCEPHIN) IVPB  2 g Intravenous Q24H    chlorhexidine  15 mL Mouth/Throat BID    enoxaparin  30 mg Subcutaneous Daily    furosemide (LASIX) IV  40 mg Intravenous Daily    levoFLOXacin  750 mg Intravenous Q48H    levothyroxine  137 mcg Oral Daily    mupirocin   Nasal BID     Continuous Infusions:  PRN Meds:albuterol-ipratropium,  "calcium chloride IVPB, calcium chloride IVPB, calcium chloride IVPB, dextrose 50%, dextrose 50%, glucagon (human recombinant), glucose, glucose, insulin aspart U-100, magnesium oxide, magnesium sulfate IVPB, magnesium sulfate IVPB, magnesium sulfate IVPB, magnesium sulfate IVPB, ondansetron, potassium chloride in water, potassium chloride in water, potassium chloride in water, potassium chloride in water, potassium chloride, potassium chloride, potassium chloride, potassium chloride, sodium chloride 0.9%, sodium phosphate IVPB, sodium phosphate IVPB, sodium phosphate IVPB, sodium phosphate IVPB, sodium phosphate IVPB    Oxygen/Ventilator Data (Last 24H):  (if applicable)   Oxygen Concentration (%):  [45-50] 45    Hemodynamic Parameters (Last 24H):   (if applicable)        Lines/Drains:  (if applicable)       Peripheral IV - Single Lumen 01/10/20 1700 18 G Left Antecubital (Active)   Site Assessment Clean;Intact;Dry;No redness;No swelling 1/12/2020  3:01 AM   Line Status Infusing 1/12/2020  3:01 AM   Dressing Status Clean;Dry;Intact 1/12/2020  3:01 AM   Number of days: 1            Peripheral IV - Single Lumen 01/10/20 1710 20 G Left Hand (Active)   Site Assessment Clean;Dry;Intact;No redness;No swelling 1/12/2020  3:01 AM   Line Status Capped;Flushed;Saline locked 1/12/2020  3:01 AM   Dressing Status Clean;Dry;Intact 1/12/2020  3:01 AM   Number of days: 1            Urethral Catheter 01/10/20 1700 Straight-tip (Active)   Site Assessment Clean;Intact 1/12/2020  3:01 AM   Collection Container Urimeter 1/12/2020  3:01 AM   Securement Method secured to top of thigh w/ adhesive device 1/12/2020  3:01 AM   Catheter Care Performed yes 1/12/2020  3:01 AM   Reason for Continuing Urinary Catheterization Critically ill in ICU requiring intensive monitoring 1/12/2020  3:01 AM   CAUTI Prevention Bundle StatLock in place w 1" slack;Intact seal between catheter & drainage tubing;Drainage bag/urimeter off the floor;Drainage " bag/urimeter not overfilled (<2/3 full);Green sheeting clip in use;No dependent loops or kinks;Drainage bag/urimeter below bladder 1/12/2020  3:01 AM   Output (mL) 40 mL 1/12/2020  6:00 AM   Number of days: 1            Sheath 01/10/20 1735 Right (Active)   Insertion Site occlusive dressing intact 1/12/2020  3:01 AM   Drainage Amount None 1/12/2020  3:01 AM   General Intake (mL) 50 1/12/2020  6:00 AM   Number of days: 1       Lab Results :  Recent Results (from the past 24 hour(s))   Lactic acid, plasma    Collection Time: 01/11/20 11:08 AM   Result Value Ref Range    Lactate (Lactic Acid) 3.4 (HH) 0.5 - 1.9 mmol/L   POCT glucose    Collection Time: 01/11/20 11:30 AM   Result Value Ref Range    POC Glucose 193 (H) 70 - 110   POCT glucose    Collection Time: 01/11/20  5:02 PM   Result Value Ref Range    POC Glucose 197 (H) 70 - 110   POCT glucose    Collection Time: 01/11/20  9:18 PM   Result Value Ref Range    POC Glucose 196 (H) 70 - 110   Basic metabolic panel    Collection Time: 01/12/20  3:18 AM   Result Value Ref Range    Sodium 144 136 - 145 mmol/L    Potassium 4.1 3.5 - 5.1 mmol/L    Chloride 103 95 - 110 mmol/L    CO2 26 23 - 29 mmol/L    Glucose 207 (H) 70 - 110 mg/dL    BUN, Bld 56 (H) 8 - 23 mg/dL    Creatinine 1.2 0.5 - 1.4 mg/dL    Calcium 8.4 (L) 8.7 - 10.5 mg/dL    Anion Gap 15 8 - 16 mmol/L    eGFR if African American 53.3 (A) >60 mL/min/1.73 m^2    eGFR if non  46.2 (A) >60 mL/min/1.73 m^2   Magnesium    Collection Time: 01/12/20  3:18 AM   Result Value Ref Range    Magnesium 1.9 1.6 - 2.6 mg/dL   CBC auto differential    Collection Time: 01/12/20  3:18 AM   Result Value Ref Range    WBC 13.88 (H) 3.90 - 12.70 K/uL    RBC 3.47 (L) 4.00 - 5.40 M/uL    Hemoglobin 10.8 (L) 12.0 - 16.0 g/dL    Hematocrit 32.8 (L) 37.0 - 48.5 %    Mean Corpuscular Volume 95 82 - 98 fL    Mean Corpuscular Hemoglobin 31.1 (H) 27.0 - 31.0 pg    Mean Corpuscular Hemoglobin Conc 32.9 32.0 - 36.0 g/dL    RDW  14.2 11.5 - 14.5 %    Platelets 292 150 - 350 K/uL    MPV 10.2 9.2 - 12.9 fL    Immature Granulocytes 0.5 0.0 - 0.5 %    Gran # (ANC) 10.8 (H) 1.8 - 7.7 K/uL    Immature Grans (Abs) 0.07 (H) 0.00 - 0.04 K/uL    Lymph # 1.5 1.0 - 4.8 K/uL    Mono # 1.5 (H) 0.3 - 1.0 K/uL    Eos # 0.0 0.0 - 0.5 K/uL    Baso # 0.02 0.00 - 0.20 K/uL    nRBC 0 0 /100 WBC    Gran% 77.7 (H) 38.0 - 73.0 %    Lymph% 11.0 (L) 18.0 - 48.0 %    Mono% 10.7 4.0 - 15.0 %    Eosinophil% 0.0 0.0 - 8.0 %    Basophil% 0.1 0.0 - 1.9 %    Differential Method Automated    POCT glucose    Collection Time: 01/12/20  8:08 AM   Result Value Ref Range    POC Glucose 171 (H) 70 - 110       Diagnostic Results:  Imaging Results          X-Ray Chest AP Portable (Final result)  Result time 01/10/20 16:33:45    Final result by Jazzy García MD (01/10/20 16:33:45)                 Impression:      Mild cardiomegaly with prominence of the kiersten and bilateral ground-glass alveolar opacities in the mid and lower lobes.  Findings may be secondary to multifocal pneumonia.  The hilar prominence may be secondary to adenopathy versus enlarged vessels.  Follow-up two view chest x-ray to document clearing is recommended.      Electronically signed by: Jazzy García MD  Date:    01/10/2020  Time:    16:33             Narrative:    EXAMINATION:  XR CHEST AP PORTABLE    CLINICAL HISTORY:  Chest Pain;    FINDINGS:  Portable chest at 16:19 hours is is compared to 09/09/2018 shows patient has had a prior median sternotomy.  The heart is mildly enlarged.    There is prominence of the hilum which may be secondary to adenopathy versus enlarged vessels.  There is patchy ground-glass alveolar opacities in the left perihilar region and both lower lobes suspicious for pneumonia.  The upper lobes are clear.  There are no pleural effusions.    There are no acute osseous abnormalities.                                12-lead EKG interpretation:   (if applicable)    Recent Cardiac  "Rhythm:  (if applicable)      Physical Exam:  Objective:  General Appearance:  Comfortable.    Vital signs: (most recent): Blood pressure (!) 163/71, pulse 84, temperature 97.9 °F (36.6 °C), resp. rate (!) 26, height 5' 2" (1.575 m), weight 86.6 kg (190 lb 14.7 oz), SpO2 100 %, not currently breastfeeding.    Lungs:  Normal effort and normal respiratory rate.  Breath sounds clear to auscultation.    Heart: Normal rate.  Regular rhythm.  S1 normal.  Positive for murmur.    Abdomen: Abdomen is soft.  Bowel sounds are normal.   There is no abdominal tenderness.     Extremities: There is no local swelling.        Current Consults:  IP CONSULT TO CARDIOLOGY  IP CONSULT TO HOSPITAL MEDICINE    Assessment/Plan:  Assessment:   Complete heart block  Status post temporary pacemaker  Probable pneumonia  Status post ACB  Plan:   DDD pacemaker in a.m.  "

## 2020-01-13 PROBLEM — J96.21 ACUTE ON CHRONIC RESPIRATORY FAILURE WITH HYPOXIA: Status: RESOLVED | Noted: 2020-01-10 | Resolved: 2020-01-13

## 2020-01-13 PROBLEM — R06.02 SOB (SHORTNESS OF BREATH): Status: ACTIVE | Noted: 2020-01-13

## 2020-01-13 LAB
ANION GAP SERPL CALC-SCNC: 11 MMOL/L (ref 8–16)
ANION GAP SERPL CALC-SCNC: 12 MMOL/L (ref 8–16)
BASOPHILS # BLD AUTO: 0.02 K/UL (ref 0–0.2)
BASOPHILS NFR BLD: 0.2 % (ref 0–1.9)
BUN SERPL-MCNC: 32 MG/DL (ref 8–23)
BUN SERPL-MCNC: 37 MG/DL (ref 8–23)
CALCIUM SERPL-MCNC: 8.7 MG/DL (ref 8.7–10.5)
CALCIUM SERPL-MCNC: 8.7 MG/DL (ref 8.7–10.5)
CHLORIDE SERPL-SCNC: 103 MMOL/L (ref 95–110)
CHLORIDE SERPL-SCNC: 99 MMOL/L (ref 95–110)
CO2 SERPL-SCNC: 27 MMOL/L (ref 23–29)
CO2 SERPL-SCNC: 30 MMOL/L (ref 23–29)
CREAT SERPL-MCNC: 0.8 MG/DL (ref 0.5–1.4)
CREAT SERPL-MCNC: 0.8 MG/DL (ref 0.5–1.4)
DIFFERENTIAL METHOD: ABNORMAL
EOSINOPHIL # BLD AUTO: 0 K/UL (ref 0–0.5)
EOSINOPHIL NFR BLD: 0.3 % (ref 0–8)
ERYTHROCYTE [DISTWIDTH] IN BLOOD BY AUTOMATED COUNT: 14.1 % (ref 11.5–14.5)
EST. GFR  (AFRICAN AMERICAN): >60 ML/MIN/1.73 M^2
EST. GFR  (AFRICAN AMERICAN): >60 ML/MIN/1.73 M^2
EST. GFR  (NON AFRICAN AMERICAN): >60 ML/MIN/1.73 M^2
EST. GFR  (NON AFRICAN AMERICAN): >60 ML/MIN/1.73 M^2
GLUCOSE SERPL-MCNC: 174 MG/DL (ref 70–110)
GLUCOSE SERPL-MCNC: 176 MG/DL (ref 70–110)
GLUCOSE SERPL-MCNC: 182 MG/DL (ref 70–110)
HCT VFR BLD AUTO: 34 % (ref 37–48.5)
HGB BLD-MCNC: 11.1 G/DL (ref 12–16)
IMM GRANULOCYTES # BLD AUTO: 0.1 K/UL (ref 0–0.04)
IMM GRANULOCYTES NFR BLD AUTO: 0.8 % (ref 0–0.5)
LYMPHOCYTES # BLD AUTO: 1.8 K/UL (ref 1–4.8)
LYMPHOCYTES NFR BLD: 15.5 % (ref 18–48)
MAGNESIUM SERPL-MCNC: 1.8 MG/DL (ref 1.6–2.6)
MAGNESIUM SERPL-MCNC: 1.9 MG/DL (ref 1.6–2.6)
MCH RBC QN AUTO: 31.3 PG (ref 27–31)
MCHC RBC AUTO-ENTMCNC: 32.6 G/DL (ref 32–36)
MCV RBC AUTO: 96 FL (ref 82–98)
MONOCYTES # BLD AUTO: 1.2 K/UL (ref 0.3–1)
MONOCYTES NFR BLD: 9.8 % (ref 4–15)
NEUTROPHILS # BLD AUTO: 8.7 K/UL (ref 1.8–7.7)
NEUTROPHILS NFR BLD: 73.4 % (ref 38–73)
NRBC BLD-RTO: 0 /100 WBC
PLATELET # BLD AUTO: 307 K/UL (ref 150–350)
PMV BLD AUTO: 9.9 FL (ref 9.2–12.9)
POTASSIUM SERPL-SCNC: 3.9 MMOL/L (ref 3.5–5.1)
POTASSIUM SERPL-SCNC: 3.9 MMOL/L (ref 3.5–5.1)
RBC # BLD AUTO: 3.55 M/UL (ref 4–5.4)
SODIUM SERPL-SCNC: 141 MMOL/L (ref 136–145)
SODIUM SERPL-SCNC: 141 MMOL/L (ref 136–145)
WBC # BLD AUTO: 11.84 K/UL (ref 3.9–12.7)

## 2020-01-13 PROCEDURE — 25000003 PHARM REV CODE 250: Performed by: INTERNAL MEDICINE

## 2020-01-13 PROCEDURE — 85025 COMPLETE CBC W/AUTO DIFF WBC: CPT

## 2020-01-13 PROCEDURE — 94761 N-INVAS EAR/PLS OXIMETRY MLT: CPT

## 2020-01-13 PROCEDURE — 33208 INSRT HEART PM ATRIAL & VENT: CPT

## 2020-01-13 PROCEDURE — 99900035 HC TECH TIME PER 15 MIN (STAT)

## 2020-01-13 PROCEDURE — 83735 ASSAY OF MAGNESIUM: CPT | Mod: 91

## 2020-01-13 PROCEDURE — 25000003 PHARM REV CODE 250

## 2020-01-13 PROCEDURE — 21400001 HC TELEMETRY ROOM

## 2020-01-13 PROCEDURE — C9399 UNCLASSIFIED DRUGS OR BIOLOG: HCPCS | Performed by: INTERNAL MEDICINE

## 2020-01-13 PROCEDURE — 27000221 HC OXYGEN, UP TO 24 HOURS

## 2020-01-13 PROCEDURE — 36415 COLL VENOUS BLD VENIPUNCTURE: CPT

## 2020-01-13 PROCEDURE — C1894 INTRO/SHEATH, NON-LASER: HCPCS | Performed by: INTERNAL MEDICINE

## 2020-01-13 PROCEDURE — 63600175 PHARM REV CODE 636 W HCPCS: Performed by: INTERNAL MEDICINE

## 2020-01-13 PROCEDURE — 82962 GLUCOSE BLOOD TEST: CPT

## 2020-01-13 PROCEDURE — 93005 ELECTROCARDIOGRAM TRACING: CPT

## 2020-01-13 PROCEDURE — 80048 BASIC METABOLIC PNL TOTAL CA: CPT

## 2020-01-13 PROCEDURE — C1898 LEAD, PMKR, OTHER THAN TRANS: HCPCS | Performed by: INTERNAL MEDICINE

## 2020-01-13 PROCEDURE — 80048 BASIC METABOLIC PNL TOTAL CA: CPT | Mod: 91

## 2020-01-13 PROCEDURE — 27201423 OPTIME MED/SURG SUP & DEVICES STERILE SUPPLY: Performed by: INTERNAL MEDICINE

## 2020-01-13 PROCEDURE — 83735 ASSAY OF MAGNESIUM: CPT

## 2020-01-13 PROCEDURE — 25500020 PHARM REV CODE 255: Performed by: INTERNAL MEDICINE

## 2020-01-13 PROCEDURE — C1785 PMKR, DUAL, RATE-RESP: HCPCS | Performed by: INTERNAL MEDICINE

## 2020-01-13 PROCEDURE — 94660 CPAP INITIATION&MGMT: CPT

## 2020-01-13 DEVICE — IMPLANTABLE DEVICE
Type: IMPLANTABLE DEVICE | Site: CHEST | Status: FUNCTIONAL
Brand: SOLIA

## 2020-01-13 DEVICE — IMPLANTABLE DEVICE
Type: IMPLANTABLE DEVICE | Site: CHEST | Status: FUNCTIONAL
Brand: EDORA 8 DR-T

## 2020-01-13 RX ORDER — ENOXAPARIN SODIUM 100 MG/ML
40 INJECTION SUBCUTANEOUS EVERY 24 HOURS
Status: DISCONTINUED | OUTPATIENT
Start: 2020-01-13 | End: 2020-01-14 | Stop reason: HOSPADM

## 2020-01-13 RX ORDER — LEVOFLOXACIN 5 MG/ML
750 INJECTION, SOLUTION INTRAVENOUS
Status: DISCONTINUED | OUTPATIENT
Start: 2020-01-13 | End: 2020-01-14 | Stop reason: HOSPADM

## 2020-01-13 RX ORDER — FUROSEMIDE 10 MG/ML
40 INJECTION INTRAMUSCULAR; INTRAVENOUS
Status: DISCONTINUED | OUTPATIENT
Start: 2020-01-13 | End: 2020-01-14 | Stop reason: HOSPADM

## 2020-01-13 RX ORDER — FUROSEMIDE 10 MG/ML
INJECTION INTRAMUSCULAR; INTRAVENOUS
Status: DISCONTINUED | OUTPATIENT
Start: 2020-01-13 | End: 2020-01-13 | Stop reason: HOSPADM

## 2020-01-13 RX ORDER — FENTANYL CITRATE 50 UG/ML
INJECTION, SOLUTION INTRAMUSCULAR; INTRAVENOUS
Status: DISCONTINUED | OUTPATIENT
Start: 2020-01-13 | End: 2020-01-13 | Stop reason: HOSPADM

## 2020-01-13 RX ORDER — MIDAZOLAM HYDROCHLORIDE 1 MG/ML
INJECTION INTRAMUSCULAR; INTRAVENOUS
Status: DISCONTINUED | OUTPATIENT
Start: 2020-01-13 | End: 2020-01-13 | Stop reason: HOSPADM

## 2020-01-13 RX ORDER — MUPIROCIN 20 MG/G
OINTMENT TOPICAL
Status: DISCONTINUED | OUTPATIENT
Start: 2020-01-13 | End: 2020-01-13 | Stop reason: HOSPADM

## 2020-01-13 RX ORDER — LIDOCAINE HYDROCHLORIDE 10 MG/ML
INJECTION, SOLUTION EPIDURAL; INFILTRATION; INTRACAUDAL; PERINEURAL
Status: DISCONTINUED | OUTPATIENT
Start: 2020-01-13 | End: 2020-01-13 | Stop reason: HOSPADM

## 2020-01-13 RX ORDER — VANCOMYCIN HYDROCHLORIDE 500 MG/10ML
INJECTION, POWDER, LYOPHILIZED, FOR SOLUTION INTRAVENOUS
Status: DISCONTINUED | OUTPATIENT
Start: 2020-01-13 | End: 2020-01-13 | Stop reason: HOSPADM

## 2020-01-13 RX ADMIN — ENOXAPARIN SODIUM 40 MG: 100 INJECTION SUBCUTANEOUS at 07:01

## 2020-01-13 RX ADMIN — CEFTRIAXONE SODIUM 2 G: 2 INJECTION, SOLUTION INTRAVENOUS at 07:01

## 2020-01-13 RX ADMIN — CHLORHEXIDINE GLUCONATE 15 ML: 1.2 RINSE ORAL at 10:01

## 2020-01-13 RX ADMIN — INSULIN DETEMIR 10 UNITS: 100 INJECTION, SOLUTION SUBCUTANEOUS at 09:01

## 2020-01-13 RX ADMIN — LEVOTHYROXINE SODIUM 137 MCG: 0.11 TABLET ORAL at 05:01

## 2020-01-13 RX ADMIN — POTASSIUM CHLORIDE 20 MEQ: 20 TABLET, EXTENDED RELEASE ORAL at 06:01

## 2020-01-13 RX ADMIN — MUPIROCIN: 20 OINTMENT TOPICAL at 10:01

## 2020-01-13 RX ADMIN — CHLORHEXIDINE GLUCONATE 15 ML: 1.2 RINSE ORAL at 09:01

## 2020-01-13 RX ADMIN — AMLODIPINE BESYLATE 10 MG: 5 TABLET ORAL at 10:01

## 2020-01-13 RX ADMIN — FUROSEMIDE 40 MG: 10 INJECTION, SOLUTION INTRAMUSCULAR; INTRAVENOUS at 07:01

## 2020-01-13 RX ADMIN — LEVOFLOXACIN 750 MG: 750 INJECTION, SOLUTION INTRAVENOUS at 09:01

## 2020-01-13 RX ADMIN — MUPIROCIN: 20 OINTMENT TOPICAL at 09:01

## 2020-01-13 NOTE — PLAN OF CARE
Assessment done. Pt has been in bed with a temporary pacemaker and did pacemaker this morning. Unsure if pt will need home health so pt signed Patient Choice form but was unable to give me the name of home health that she wants to use. Pt will get name of home health that she wants to use and give to whichever cm she has before discharge.      01/13/20 1147   Discharge Assessment   Assessment Type Discharge Planning Assessment   Confirmed/corrected address and phone number on facesheet? Yes   Assessment information obtained from? Patient   Communicated expected length of stay with patient/caregiver no   Prior to hospitilization cognitive status: Alert/Oriented   Prior to hospitalization functional status: Assistive Equipment  (Uses Walker)   Current cognitive status: Alert/Oriented   Current Functional Status: Needs Assistance   Lives With spouse   Able to Return to Prior Arrangements yes   Is patient able to care for self after discharge? No   Who are your caregiver(s) and their phone number(s)? Spouse Pollo Larose   [phone]   Patient's perception of discharge disposition other (comments)  (Unknown)   Equipment Currently Used at Home walker, rolling;cane, straight;3-in-1 commode;oxygen;nebulizer;glucometer   Part D Coverage [employer]   Do you have any problems affording any of your prescribed medications? No   Is the patient taking medications as prescribed? yes   Does the patient have transportation home? Yes   Transportation Anticipated family or friend will provide   Does the patient receive services at the [facility]? No   Discharge Plan A Home   Discharge Plan B Home Health   DME Needed Upon Discharge  none   Patient/Family in Agreement with Plan yes

## 2020-01-13 NOTE — PROGRESS NOTES
[facility] Medicine  Progress Note    Patient Name: Tereza Larose  MRN: [id number]  Patient Class: IP- Inpatient   Admission Date: 1/10/2020  Length of Stay: 3 days  Attending Physician: Almaz Velez MD  Primary Care Provider: Evan Sánchez MD        Subjective:     Principal Problem:Complete heart block        HPI:  9-year-old female with past medical history of hypertension HLD, and IDDM, COPD/asthma who presents with shortness of breath for 2 days.  She states it was associated with palpitations, diaphoresis.  She denies chest pain.  Never to the emergency room she was tachypneic and hypoxic.  She was placed on BiPAP with improvement of respiratory status.  EKG done showed complete heart block.  Cardiology consulted for emergent temporary pacemaker.    Overview/Hospital Course:  No notes on file    Interval History: she is s/p permanent pacemaker this morning. No complaints.     Review of Systems   Constitutional: Negative for chills and fever.   HENT: Negative for congestion.    Respiratory: Negative for shortness of breath and wheezing.    Cardiovascular: Negative for chest pain and palpitations.   Gastrointestinal: Negative for constipation, diarrhea, nausea and vomiting.   Genitourinary: Negative for dysuria, flank pain, urgency and vaginal discharge.   Musculoskeletal: Negative for back pain and myalgias.     Objective:     Vital Signs (Most Recent):  Temp: 98.7 °F (37.1 °C) (01/13/20 0901)  Pulse: 100 (01/13/20 1047)  Resp: 18 (01/13/20 1047)  BP: 136/80 (01/13/20 1021)  SpO2: 95 % (01/13/20 1047) Vital Signs (24h Range):  Temp:  [97.8 °F (36.6 °C)-98.7 °F (37.1 °C)] 98.7 °F (37.1 °C)  Pulse:  [] 100  Resp:  [18-35] 18  SpO2:  [89 %-96 %] 95 %  BP: (113-179)/(56-91) 136/80     Weight: 86.6 kg (190 lb 14.7 oz)  Body mass index is 34.92 kg/m².    Intake/Output Summary (Last 24 hours) at 1/13/2020 1340  Last data filed at 1/13/2020 1101  Gross per 24 hour   Intake 50  ml   Output 2505 ml   Net -2455 ml      Physical Exam   Constitutional: She is oriented to person, place, and time. No distress.   Eyes: Pupils are equal, round, and reactive to light. No scleral icterus.   Cardiovascular: Normal rate and regular rhythm.   Murmur heard.   Systolic murmur is present.  Left anterior wall dressing at site of new pacemaker   Pulmonary/Chest: Breath sounds normal. She has no wheezes. She has no rales.   Abdominal: Soft. She exhibits no distension. There is no tenderness.   Neurological: She is alert and oriented to person, place, and time.   Skin: Skin is warm. Capillary refill takes less than 2 seconds. No rash noted.   Psychiatric: She has a normal mood and affect.   Nursing note and vitals reviewed.      Significant Labs:   BMP:   Recent Labs   Lab 01/13/20 0527   *      K 3.9      CO2 27   BUN 37*   CREATININE 0.8   CALCIUM 8.7   MG 1.9     CBC:   Recent Labs   Lab 01/12/20 0318 01/13/20  0527   WBC 13.88* 11.84   HGB 10.8* 11.1*   HCT 32.8* 34.0*    307     Magnesium:   Recent Labs   Lab 01/12/20  0318 01/13/20  0527   MG 1.9 1.9       Significant Imaging: none      Assessment/Plan:      * Complete heart block  S/p permanent pacemaker placement       Pneumonia  Ground glass opacities in the left middle lobe representing probable pneumonia.  Leukocytosis resolved. Continue IV Levaquin.      Asthma with chronic obstructive pulmonary disease (COPD)  Duo nebs q.6 hours p.r.n.      Lactic acidosis  Secondary to tissue hypoperfusion versus sepsis  Trending downwards      SOB (shortness of breath)          VTE Risk Mitigation (From admission, onward)         Ordered     enoxaparin injection 40 mg  Daily      01/13/20 1323     IP VTE HIGH RISK PATIENT  Once      01/10/20 1718                Get EKG  Transfer out of ICU      Almaz Velez MD  Department of Hospital Medicine   [facility]

## 2020-01-13 NOTE — SUBJECTIVE & OBJECTIVE
Interval History: she is s/p permanent pacemaker this morning. No complaints.     Review of Systems   Constitutional: Negative for chills and fever.   HENT: Negative for congestion.    Respiratory: Negative for shortness of breath and wheezing.    Cardiovascular: Negative for chest pain and palpitations.   Gastrointestinal: Negative for constipation, diarrhea, nausea and vomiting.   Genitourinary: Negative for dysuria, flank pain, urgency and vaginal discharge.   Musculoskeletal: Negative for back pain and myalgias.     Objective:     Vital Signs (Most Recent):  Temp: 98.7 °F (37.1 °C) (01/13/20 0901)  Pulse: 100 (01/13/20 1047)  Resp: 18 (01/13/20 1047)  BP: 136/80 (01/13/20 1021)  SpO2: 95 % (01/13/20 1047) Vital Signs (24h Range):  Temp:  [97.8 °F (36.6 °C)-98.7 °F (37.1 °C)] 98.7 °F (37.1 °C)  Pulse:  [] 100  Resp:  [18-35] 18  SpO2:  [89 %-96 %] 95 %  BP: (113-179)/(56-91) 136/80     Weight: 86.6 kg (190 lb 14.7 oz)  Body mass index is 34.92 kg/m².    Intake/Output Summary (Last 24 hours) at 1/13/2020 1340  Last data filed at 1/13/2020 1101  Gross per 24 hour   Intake 50 ml   Output 2505 ml   Net -2455 ml      Physical Exam   Constitutional: She is oriented to person, place, and time. No distress.   Eyes: Pupils are equal, round, and reactive to light. No scleral icterus.   Cardiovascular: Normal rate and regular rhythm.   Murmur heard.   Systolic murmur is present.  Left anterior wall dressing at site of new pacemaker   Pulmonary/Chest: Breath sounds normal. She has no wheezes. She has no rales.   Abdominal: Soft. She exhibits no distension. There is no tenderness.   Neurological: She is alert and oriented to person, place, and time.   Skin: Skin is warm. Capillary refill takes less than 2 seconds. No rash noted.   Psychiatric: She has a normal mood and affect.   Nursing note and vitals reviewed.      Significant Labs:   BMP:   Recent Labs   Lab 01/13/20  0527   *      K 3.9      CO2  27   BUN 37*   CREATININE 0.8   CALCIUM 8.7   MG 1.9     CBC:   Recent Labs   Lab 01/12/20 0318 01/13/20  0527   WBC 13.88* 11.84   HGB 10.8* 11.1*   HCT 32.8* 34.0*    307     Magnesium:   Recent Labs   Lab 01/12/20 0318 01/13/20 0527   MG 1.9 1.9       Significant Imaging: none

## 2020-01-13 NOTE — ASSESSMENT & PLAN NOTE
Ground glass opacities in the left middle lobe representing probable pneumonia.  Leukocytosis resolved. Continue IV Levaquin.

## 2020-01-13 NOTE — PLAN OF CARE
01/13/20 1200   Discharge Reassessment   Assessment Type Discharge Planning Reassessment   Anticipated Discharge Disposition Home-Health   Patient choice form signed by patient/caregiver List with quality metrics by geographic area provided  (If pt needs home health will give name as soon as she gets  to look in files of who she used in past..)   Pt signed Patient Choice form but was unable to give me the name of home health that she wants to use. Pt will get name of home health that she wants to use and give to whichever cm she has before discharge.   Pt lives in [place], [place] and thinks she used [facility] in the past but unsure.

## 2020-01-13 NOTE — PROGRESS NOTES
"Pharmacist Renal Dose Adjustment Note    Tereza Larose is a 69 y.o. female being treated with the medication Enoxaparin and Levofloxacin    Patient Data:    Vital Signs (Most Recent):  Temp: 98.7 °F (37.1 °C) (01/13/20 0901)  Pulse: 100 (01/13/20 1047)  Resp: 18 (01/13/20 1047)  BP: 136/80 (01/13/20 1021)  SpO2: 95 % (01/13/20 1047) Vital Signs (72h Range):  Temp:  [97.4 °F (36.3 °C)-98.8 °F (37.1 °C)]   Pulse:  []   Resp:  [14-45]   BP: (102-179)/(52-91)   SpO2:  [82 %-100 %]      Recent Labs   Lab 01/11/20  0329 01/12/20  0318 01/13/20  0527   CREATININE 1.4 1.2 0.8     Ht: 5' 2" (1.575 m)  Wt: 86.6 kg (190 lb 14.7 oz)  Estimated Creatinine Clearance: 67.8 mL/min (based on SCr of 0.8 mg/dL).  Body mass index is 34.92 kg/m².    Enoxaparin 30 mg SQ Daily will be changed to Enoxaparin 40 mg SQ Daily  Levofloxacin 750 mg IV Q 48 hrs will be changed to Levofloxacin 750 mg IV Q 24 hrs    Pharmacist's Name: Jaswinder Powell  Pharmacist's Extension: [phone]    "

## 2020-01-13 NOTE — CARE UPDATE
01/12/20 2000   Patient Assessment/Suction   Level of Consciousness (AVPU) alert   All Lung Fields Breath Sounds clear   PRE-TX-O2   O2 Device (Oxygen Therapy) High Flow nasal Cannula   $ Is the patient on Low Flow Oxygen? Yes   Flow (L/min) 6   SpO2 (!) 90 %   Pulse Oximetry Type Continuous   $ Pulse Oximetry - Multiple Charge Pulse Oximetry - Multiple   Pulse 60   Resp 20   Aerosol Therapy   $ Aerosol Therapy Charges Aerosol Treatment   Daily Review of Necessity (SVN) completed   Respiratory Treatment Status (SVN) given   Treatment Route (SVN) in-line   Patient Position (SVN) semi-Valdez's   Post Treatment Assessment (SVN) breath sounds unchanged   Signs of Intolerance (SVN) none   Breath Sounds Post-Respiratory Treatment   Throughout All Fields Post-Treatment All Fields   Throughout All Fields Post-Treatment aeration increased   Post-treatment Heart Rate (beats/min) 59   Post-treatment Resp Rate (breaths/min) 20

## 2020-01-14 VITALS
SYSTOLIC BLOOD PRESSURE: 155 MMHG | WEIGHT: 190.94 LBS | OXYGEN SATURATION: 89 % | DIASTOLIC BLOOD PRESSURE: 67 MMHG | RESPIRATION RATE: 19 BRPM | TEMPERATURE: 97 F | HEART RATE: 88 BPM | HEIGHT: 62 IN | BODY MASS INDEX: 35.14 KG/M2

## 2020-01-14 LAB
ANION GAP SERPL CALC-SCNC: 13 MMOL/L (ref 8–16)
APTT PPP: 41.7 SEC (ref 23.6–33.3)
BASOPHILS # BLD AUTO: 0.02 K/UL (ref 0–0.2)
BASOPHILS NFR BLD: 0.2 % (ref 0–1.9)
BUN SERPL-MCNC: 29 MG/DL (ref 8–23)
CALCIUM SERPL-MCNC: 8.9 MG/DL (ref 8.7–10.5)
CHLORIDE SERPL-SCNC: 100 MMOL/L (ref 95–110)
CO2 SERPL-SCNC: 29 MMOL/L (ref 23–29)
CREAT SERPL-MCNC: 0.8 MG/DL (ref 0.5–1.4)
DIFFERENTIAL METHOD: ABNORMAL
EOSINOPHIL # BLD AUTO: 0.3 K/UL (ref 0–0.5)
EOSINOPHIL NFR BLD: 2.7 % (ref 0–8)
ERYTHROCYTE [DISTWIDTH] IN BLOOD BY AUTOMATED COUNT: 13.6 % (ref 11.5–14.5)
EST. GFR  (AFRICAN AMERICAN): >60 ML/MIN/1.73 M^2
EST. GFR  (NON AFRICAN AMERICAN): >60 ML/MIN/1.73 M^2
GLUCOSE SERPL-MCNC: 165 MG/DL (ref 70–110)
GLUCOSE SERPL-MCNC: 185 MG/DL (ref 70–110)
HCT VFR BLD AUTO: 36.9 % (ref 37–48.5)
HGB BLD-MCNC: 11.9 G/DL (ref 12–16)
IMM GRANULOCYTES # BLD AUTO: 0.06 K/UL (ref 0–0.04)
IMM GRANULOCYTES NFR BLD AUTO: 0.6 % (ref 0–0.5)
INR PPP: 1.1
LYMPHOCYTES # BLD AUTO: 2.3 K/UL (ref 1–4.8)
LYMPHOCYTES NFR BLD: 22.2 % (ref 18–48)
MAGNESIUM SERPL-MCNC: 1.7 MG/DL (ref 1.6–2.6)
MCH RBC QN AUTO: 30.6 PG (ref 27–31)
MCHC RBC AUTO-ENTMCNC: 32.2 G/DL (ref 32–36)
MCV RBC AUTO: 95 FL (ref 82–98)
MONOCYTES # BLD AUTO: 1.1 K/UL (ref 0.3–1)
MONOCYTES NFR BLD: 10.7 % (ref 4–15)
NEUTROPHILS # BLD AUTO: 6.6 K/UL (ref 1.8–7.7)
NEUTROPHILS NFR BLD: 63.6 % (ref 38–73)
NRBC BLD-RTO: 0 /100 WBC
PLATELET # BLD AUTO: 337 K/UL (ref 150–350)
PMV BLD AUTO: 9.7 FL (ref 9.2–12.9)
POTASSIUM SERPL-SCNC: 3.6 MMOL/L (ref 3.5–5.1)
PROTHROMBIN TIME: 14.2 SEC (ref 10.6–14.8)
RBC # BLD AUTO: 3.89 M/UL (ref 4–5.4)
SODIUM SERPL-SCNC: 142 MMOL/L (ref 136–145)
WBC # BLD AUTO: 10.42 K/UL (ref 3.9–12.7)

## 2020-01-14 PROCEDURE — 25000003 PHARM REV CODE 250: Performed by: INTERNAL MEDICINE

## 2020-01-14 PROCEDURE — 36415 COLL VENOUS BLD VENIPUNCTURE: CPT

## 2020-01-14 PROCEDURE — 94761 N-INVAS EAR/PLS OXIMETRY MLT: CPT

## 2020-01-14 PROCEDURE — 25000003 PHARM REV CODE 250

## 2020-01-14 PROCEDURE — 85610 PROTHROMBIN TIME: CPT

## 2020-01-14 PROCEDURE — 63600175 PHARM REV CODE 636 W HCPCS: Performed by: INTERNAL MEDICINE

## 2020-01-14 PROCEDURE — 99900035 HC TECH TIME PER 15 MIN (STAT)

## 2020-01-14 PROCEDURE — 82962 GLUCOSE BLOOD TEST: CPT

## 2020-01-14 PROCEDURE — 27000221 HC OXYGEN, UP TO 24 HOURS

## 2020-01-14 PROCEDURE — 83735 ASSAY OF MAGNESIUM: CPT

## 2020-01-14 PROCEDURE — 85730 THROMBOPLASTIN TIME PARTIAL: CPT

## 2020-01-14 PROCEDURE — 25000003 PHARM REV CODE 250: Performed by: PHYSICIAN ASSISTANT

## 2020-01-14 PROCEDURE — 80048 BASIC METABOLIC PNL TOTAL CA: CPT

## 2020-01-14 PROCEDURE — 94660 CPAP INITIATION&MGMT: CPT

## 2020-01-14 PROCEDURE — 85025 COMPLETE CBC W/AUTO DIFF WBC: CPT

## 2020-01-14 RX ORDER — METOPROLOL SUCCINATE 50 MG/1
50 TABLET, EXTENDED RELEASE ORAL DAILY
Qty: 30 TABLET | Refills: 11 | Status: ON HOLD
Start: 2020-01-15 | End: 2022-10-04

## 2020-01-14 RX ORDER — POTASSIUM CHLORIDE 20 MEQ/15ML
40 SOLUTION ORAL ONCE
Status: COMPLETED | OUTPATIENT
Start: 2020-01-14 | End: 2020-01-14

## 2020-01-14 RX ORDER — METOPROLOL SUCCINATE 50 MG/1
50 TABLET, EXTENDED RELEASE ORAL DAILY
Status: DISCONTINUED | OUTPATIENT
Start: 2020-01-14 | End: 2020-01-14 | Stop reason: HOSPADM

## 2020-01-14 RX ORDER — MUPIROCIN 20 MG/G
OINTMENT TOPICAL 2 TIMES DAILY
Status: DISCONTINUED | OUTPATIENT
Start: 2020-01-14 | End: 2020-01-14 | Stop reason: HOSPADM

## 2020-01-14 RX ORDER — CHLORHEXIDINE GLUCONATE ORAL RINSE 1.2 MG/ML
15 SOLUTION DENTAL 2 TIMES DAILY
Status: DISCONTINUED | OUTPATIENT
Start: 2020-01-14 | End: 2020-01-14 | Stop reason: HOSPADM

## 2020-01-14 RX ADMIN — ENOXAPARIN SODIUM 40 MG: 100 INJECTION SUBCUTANEOUS at 04:01

## 2020-01-14 RX ADMIN — METOPROLOL SUCCINATE 50 MG: 50 TABLET, FILM COATED, EXTENDED RELEASE ORAL at 10:01

## 2020-01-14 RX ADMIN — LEVOTHYROXINE SODIUM 137 MCG: 0.11 TABLET ORAL at 05:01

## 2020-01-14 RX ADMIN — CHLORHEXIDINE GLUCONATE 15 ML: 1.2 RINSE ORAL at 10:01

## 2020-01-14 RX ADMIN — FUROSEMIDE 40 MG: 10 INJECTION, SOLUTION INTRAMUSCULAR; INTRAVENOUS at 03:01

## 2020-01-14 RX ADMIN — MUPIROCIN: 20 OINTMENT TOPICAL at 10:01

## 2020-01-14 RX ADMIN — CEFTRIAXONE SODIUM 2 G: 2 INJECTION, SOLUTION INTRAVENOUS at 04:01

## 2020-01-14 RX ADMIN — POTASSIUM CHLORIDE 40 MEQ: 20 SOLUTION ORAL at 05:01

## 2020-01-14 RX ADMIN — MAGNESIUM OXIDE 800 MG: 400 TABLET ORAL at 05:01

## 2020-01-14 RX ADMIN — AMLODIPINE BESYLATE 10 MG: 5 TABLET ORAL at 10:01

## 2020-01-14 NOTE — PROGRESS NOTES
[facility]   Cardiology Note    SUBJECTIVE:     History of Present Illness: Seen and examined this AM following successful Biotronik dual PPM implantation for complete heart block. NAEO. This AM, BP and HR mildly elevated with significant ectopy on tele. Normally on Toprol which was held upon her admission. Otherwise feels well and denies any active cardiac complaints. Hemoglobin 11.9. CXR demonstrates improving bilateral infiltrates.     Review of patient's allergies indicates:   Allergen Reactions    Corn Itching     Other reaction(s): Sneezing  Other reaction(s): Rhinorrhea    Potato starch Hives     Other reaction(s): Sneezing  Other reaction(s): Rhinorrhea    Pravastatin Other (See Comments)     Muscle pain    Statins-hmg-coa reductase inhibitors Other (See Comments)    Hydrochlorothiazide Other (See Comments)     weakness    Welchol [colesevelam] Other (See Comments)     Weakness        Past Medical History:   Diagnosis Date    Allergy     Arthritis     Asthma     Brain bleed     COPD (chronic obstructive pulmonary disease)     Depression     Diabetes mellitus type II     Diverticulitis     Fatty liver     GERD (gastroesophageal reflux disease)     Goiter     s/p thyroidectomy    Heart murmur     Hernia of abdominal wall     History of intracranial hemorrhage 6/15/2013    History of non-ST elevation myocardial infarction (NSTEMI) 6/15/2013    Hyperlipidemia     Hypertension     Metabolic syndrome 6/14/2012    Myocardial infarction     On home oxygen therapy     states uses 2L at night or when sat < 90    Statin intolerance     Stroke 2012    left hand shaky, loss of balance     Past Surgical History:   Procedure Laterality Date    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/25/2017    Dr Griggs    ADRENALECTOMY      AORTIC VALVE REPLACEMENT  12/09/2016    arthroscopy lt knee Right     BLADDER REPAIR      sling    BREAST BIOPSY Bilateral     benign    COLONOSCOPY  2004    10 year  recheck    CORONARY ARTERY BYPASS GRAFT  12/09/2016    X3    HYSTERECTOMY      INSERTION OF PACEMAKER Left 1/13/2020    Procedure: INSERTION, PACEMAKER;  Surgeon: Marko Batres MD;  Location: [facility] CATH/EP LAB;  Service: Cardiology;  Laterality: Left;    OOPHORECTOMY      THYROIDECTOMY       Family History   Problem Relation Age of Onset    Arthritis Mother     Diabetes Mother     Heart disease Mother     Hypertension Mother     Hypertension Father     Heart disease Father     Mental illness Father     Asthma Sister     Diabetes Sister     Hypertension Sister     Asthma Son     COPD Son     Depression Son     Diabetes Son     Hypertension Son     Stroke Son     Diabetes Maternal Uncle     Heart disease Maternal Uncle     Mental illness Paternal Aunt     Cancer Paternal Aunt     Heart disease Paternal Aunt     Hypertension Paternal Aunt     Heart disease Paternal Uncle     Hypertension Maternal Grandmother     Mental illness Maternal Grandmother     Aneurysm Maternal Grandfather     Mental illness Paternal Grandmother     Heart disease Paternal Grandfather     Melanoma Neg Hx     Psoriasis Neg Hx     Lupus Neg Hx     Eczema Neg Hx      Social History     Tobacco Use    Smoking status: Never Smoker    Smokeless tobacco: Never Used   Substance Use Topics    Alcohol use: Yes     Comment: seldom    Drug use: No       OBJECTIVE:     Vital Signs (Most Recent)  Temp: 98.6 °F (37 °C) (01/14/20 0300)  Pulse: 92 (01/14/20 0600)  Resp: 18 (01/14/20 0600)  BP: (!) 176/70 (01/14/20 0600)  SpO2: 97 % (01/14/20 0600)    Vital Signs Range (Last 24H):  Temp:  [98.5 °F (36.9 °C)-98.8 °F (37.1 °C)]   Pulse:  []   Resp:  [15-37]   BP: (105-176)/(61-86)   SpO2:  [84 %-98 %]     I & O (Last 24H):    Intake/Output Summary (Last 24 hours) at 1/14/2020 0943  Last data filed at 1/14/2020 0600  Gross per 24 hour   Intake 680 ml   Output 3420 ml   Net -2740 ml       Current Diet:     Current Diet  Order   Procedures    Diet Adult Regular (IDDSI Level 7) [facility]; Low Sodium,2gm     Order Specific Question:   Indicate patient location for additional diet options:     Answer:   [facility]     Order Specific Question:   Additional Diet Options:     Answer:   Low Sodium,2gm        Allergies:  Review of patient's allergies indicates:   Allergen Reactions    Corn Itching     Other reaction(s): Sneezing  Other reaction(s): Rhinorrhea    Potato starch Hives     Other reaction(s): Sneezing  Other reaction(s): Rhinorrhea    Pravastatin Other (See Comments)     Muscle pain    Statins-hmg-coa reductase inhibitors Other (See Comments)    Hydrochlorothiazide Other (See Comments)     weakness    Welchol [colesevelam] Other (See Comments)     Weakness        Meds:  Scheduled Meds:   amLODIPine  10 mg Oral Daily    cefTRIAXone (ROCEPHIN) IVPB  2 g Intravenous Q24H    chlorhexidine  15 mL Mouth/Throat BID    enoxaparin  40 mg Subcutaneous Daily    furosemide (LASIX) IV  40 mg Intravenous Q12H    insulin detemir U-100  10 Units Subcutaneous QHS    levoFLOXacin  750 mg Intravenous Q24H    levothyroxine  137 mcg Oral Daily    metoprolol succinate  50 mg Oral Daily    mupirocin   Nasal BID     Continuous Infusions:  PRN Meds:albuterol-ipratropium, calcium chloride IVPB, calcium chloride IVPB, calcium chloride IVPB, insulin aspart U-100, magnesium oxide, ondansetron, potassium chloride         Laboratory and Radiology Data:  Recent Results (from the past 24 hour(s))   Basic metabolic panel    Collection Time: 01/13/20  3:19 PM   Result Value Ref Range    Sodium 141 136 - 145 mmol/L    Potassium 3.9 3.5 - 5.1 mmol/L    Chloride 99 95 - 110 mmol/L    CO2 30 (H) 23 - 29 mmol/L    Glucose 176 (H) 70 - 110 mg/dL    BUN, Bld 32 (H) 8 - 23 mg/dL    Creatinine 0.8 0.5 - 1.4 mg/dL    Calcium 8.7 8.7 - 10.5 mg/dL    Anion Gap 12 8 - 16 mmol/L    eGFR if African American >60.0 >60 mL/min/1.73 m^2    eGFR if non African American >60.0 >60  mL/min/1.73 m^2   Magnesium    Collection Time: 01/13/20  3:19 PM   Result Value Ref Range    Magnesium 1.8 1.6 - 2.6 mg/dL   POCT glucose    Collection Time: 01/13/20  9:13 PM   Result Value Ref Range    POC Glucose 174 (H) 70 - 110   APTT    Collection Time: 01/14/20  4:11 AM   Result Value Ref Range    aPTT 41.7 (H) 23.6 - 33.3 sec   Protime-INR    Collection Time: 01/14/20  4:11 AM   Result Value Ref Range    PT 14.2 10.6 - 14.8 sec    INR 1.1    Basic metabolic panel    Collection Time: 01/14/20  4:11 AM   Result Value Ref Range    Sodium 142 136 - 145 mmol/L    Potassium 3.6 3.5 - 5.1 mmol/L    Chloride 100 95 - 110 mmol/L    CO2 29 23 - 29 mmol/L    Glucose 165 (H) 70 - 110 mg/dL    BUN, Bld 29 (H) 8 - 23 mg/dL    Creatinine 0.8 0.5 - 1.4 mg/dL    Calcium 8.9 8.7 - 10.5 mg/dL    Anion Gap 13 8 - 16 mmol/L    eGFR if African American >60.0 >60 mL/min/1.73 m^2    eGFR if non African American >60.0 >60 mL/min/1.73 m^2   Magnesium    Collection Time: 01/14/20  4:11 AM   Result Value Ref Range    Magnesium 1.7 1.6 - 2.6 mg/dL   CBC auto differential    Collection Time: 01/14/20  4:11 AM   Result Value Ref Range    WBC 10.42 3.90 - 12.70 K/uL    RBC 3.89 (L) 4.00 - 5.40 M/uL    Hemoglobin 11.9 (L) 12.0 - 16.0 g/dL    Hematocrit 36.9 (L) 37.0 - 48.5 %    Mean Corpuscular Volume 95 82 - 98 fL    Mean Corpuscular Hemoglobin 30.6 27.0 - 31.0 pg    Mean Corpuscular Hemoglobin Conc 32.2 32.0 - 36.0 g/dL    RDW 13.6 11.5 - 14.5 %    Platelets 337 150 - 350 K/uL    MPV 9.7 9.2 - 12.9 fL    Immature Granulocytes 0.6 (H) 0.0 - 0.5 %    Gran # (ANC) 6.6 1.8 - 7.7 K/uL    Immature Grans (Abs) 0.06 (H) 0.00 - 0.04 K/uL    Lymph # 2.3 1.0 - 4.8 K/uL    Mono # 1.1 (H) 0.3 - 1.0 K/uL    Eos # 0.3 0.0 - 0.5 K/uL    Baso # 0.02 0.00 - 0.20 K/uL    nRBC 0 0 /100 WBC    Gran% 63.6 38.0 - 73.0 %    Lymph% 22.2 18.0 - 48.0 %    Mono% 10.7 4.0 - 15.0 %    Eosinophil% 2.7 0.0 - 8.0 %    Basophil% 0.2 0.0 - 1.9 %    Differential Method  Automated    POCT glucose    Collection Time: 01/14/20  7:51 AM   Result Value Ref Range    POC Glucose 185 (H) 70 - 110     Imaging Results          X-Ray Chest AP Portable (Final result)  Result time 01/10/20 16:33:45    Final result by Jazzy García MD (01/10/20 16:33:45)                 Impression:      Mild cardiomegaly with prominence of the kiersten and bilateral ground-glass alveolar opacities in the mid and lower lobes.  Findings may be secondary to multifocal pneumonia.  The hilar prominence may be secondary to adenopathy versus enlarged vessels.  Follow-up two view chest x-ray to document clearing is recommended.      Electronically signed by: Jazzy García MD  Date:    01/10/2020  Time:    16:33             Narrative:    EXAMINATION:  XR CHEST AP PORTABLE    CLINICAL HISTORY:  Chest Pain;    FINDINGS:  Portable chest at 16:19 hours is is compared to 09/09/2018 shows patient has had a prior median sternotomy.  The heart is mildly enlarged.    There is prominence of the hilum which may be secondary to adenopathy versus enlarged vessels.  There is patchy ground-glass alveolar opacities in the left perihilar region and both lower lobes suspicious for pneumonia.  The upper lobes are clear.  There are no pleural effusions.    There are no acute osseous abnormalities.                                Physical Exam:   Physical Exam   Constitutional: She is oriented to person, place, and time and well-developed, well-nourished, and in no distress. No distress.   HENT:   Head: Normocephalic and atraumatic.   Neck: No JVD present.   Cardiovascular: Normal rate, regular rhythm and normal heart sounds.   No murmur heard.  Pacer site looks well    Pulmonary/Chest: Effort normal and breath sounds normal. No respiratory distress. She has no wheezes. She has no rales.   Abdominal: Soft. Bowel sounds are normal. She exhibits no distension. There is no tenderness. There is no rebound.   Musculoskeletal: Normal range of  motion. She exhibits no edema.   Neurological: She is alert and oriented to person, place, and time.   Skin: She is not diaphoretic.       ASSESSMENT/PLAN:   Assessment:   CHB s/p Biotronik PPM implantation  PNA  COPD  Lactic acidosis - improving   HTN    Plan:   Restart Toprol XL 50 mg daily now. Observe tele until approx [id number]. If BP and ectopy improve by this time, patient is clear for discharge from our cardiac standpoint. Plan otherwise per hospital medicine.  She is instructed to return to clinic in one week for suture removal.   Continue other cardiac medications.       Ray Moore PA-C  01/14/2020

## 2020-01-14 NOTE — CARE UPDATE
01/13/20 2205   Patient Assessment/Suction   Level of Consciousness (AVPU) alert   Respiratory Effort Unlabored   Expansion/Accessory Muscles/Retractions no retractions   All Lung Fields Breath Sounds clear   Rhythm/Pattern, Respiratory no shortness of breath reported   Cough Frequency no cough   PRE-TX-O2   O2 Device (Oxygen Therapy) High Flow nasal Cannula   $ Is the patient on Low Flow Oxygen? Yes   Flow (L/min) 4   SpO2 98 %   Pulse Oximetry Type Continuous   $ Pulse Oximetry - Multiple Charge Pulse Oximetry - Multiple   Pulse 105   Resp 17   Positioning   Head of Bed (HOB) HOB elevated   Aerosol Therapy   $ Aerosol Therapy Charges PRN treatment not required

## 2020-01-15 LAB
BACTERIA BLD CULT: NORMAL
BACTERIA BLD CULT: NORMAL

## 2020-01-15 NOTE — HOSPITAL COURSE
Patient admitted with hypoxic respiratory on bipap for pneumonia.  She was also found to be in complete heart block. She was admitted to the ICU, started on IV abx and cardiology consulted.  She underwent pacemaker and has tolerated well.  Her Metoprolol restarted per cardiology and she has tolerated well.  Patient has been cleared by cardiology for discharge to follow up in office in one week.  Her home medication have not been changed.  She has completed 5 days of IV abx for pneumonia while hospitalized.  CXR has shown improvements in infiltrates.  She was diuresed with lasix IV here and will continue her home regimen.  Lungs are clear on exam. She has established supplemental O2 at home and will wear as prescribed.  Return precautions given. Counseled not to raise her left arm above shoulder height until cleared by cardiology.

## 2020-01-15 NOTE — DISCHARGE SUMMARY
[facility]  Discharge Summary      Patient Name: Tereza Larose  MRN: [id number]  Admission Date: 1/10/2020  Hospital Length of Stay: 4 days  Discharge Date and Time: 1/14/2020  6:37 PM  Attending Physician: No att. providers found   Discharging Provider: Js Burgess MD  Primary Care Provider: Evan Sánchez MD      HPI:   9-year-old female with past medical history of hypertension HLD, and IDDM, COPD/asthma who presents with shortness of breath for 2 days.  She states it was associated with palpitations, diaphoresis.  She denies chest pain.  Never to the emergency room she was tachypneic and hypoxic.  She was placed on BiPAP with improvement of respiratory status.  EKG done showed complete heart block.  Cardiology consulted for emergent temporary pacemaker.    Procedure(s) (LRB):  INSERTION, PACEMAKER (Left)      Hospital Course:   Patient admitted with hypoxic respiratory on bipap for pneumonia.  She was also found to be in complete heart block. She was admitted to the ICU, started on IV abx and cardiology consulted.  She underwent pacemaker and has tolerated well.  Her Metoprolol restarted per cardiology and she has tolerated well.  Patient has been cleared by cardiology for discharge to follow up in office in one week.  Her home medication have not been changed.  She has completed 5 days of IV abx for pneumonia while hospitalized.  CXR has shown improvements in infiltrates.  She was diuresed with lasix IV here and will continue her home regimen.  Lungs are clear on exam. She has established supplemental O2 at home and will wear as prescribed.  Return precautions given. Counseled not to raise her left arm above shoulder height until cleared by cardiology.    Patient examined on day of discharge and sitting up eating, O2 per NC, clear on lung exam, RRR.  Left arm in sling.     Consults:   Consults (From admission, onward)        Status Ordering Provider     Inpatient consult  to Cardiology  Once     Provider:  Marko Batres MD    Acknowledged MARKO BATRES     Inpatient consult to Hospitalist  Once     Provider:  (Not yet assigned)    Acknowledged MARKO BATRES     Inpatient consult to Social Work/Case Management  Once     Provider:  (Not yet assigned)    Ordered DULCE MARIA RIDLEY          No new Assessment & Plan notes have been filed under this hospital service since the last note was generated.  Service: Hospital Medicine    Final Active Diagnoses:    Diagnosis Date Noted POA    PRINCIPAL PROBLEM:  Complete heart block [I44.2] 01/10/2020 Yes    SOB (shortness of breath) [R06.02] 01/13/2020 Yes    Lactic acidosis [E87.2] 01/11/2020 Yes    Pneumonia [J18.9] 01/10/2020 No    Asthma with chronic obstructive pulmonary disease (COPD) [J44.9] 06/14/2012 Yes     Chronic      Problems Resolved During this Admission:    Diagnosis Date Noted Date Resolved POA    Acute on chronic respiratory failure with hypoxia [J96.21] 01/10/2020 01/13/2020 Yes       Discharged Condition: good    Disposition: Home or Self Care    Follow Up:  Follow-up Information     Sim Moore MD In 1 week.    Specialty:  Cardiology  Why:  recheck pacemaker site and remove sutures  Contact information:  [address] [place] [address]  [address]  [facility] [address]  [phone]                 Patient Instructions:      Diet Cardiac     Notify your health care provider if you experience any of the following:  temperature >100.4     Notify your health care provider if you experience any of the following:  persistent nausea and vomiting or diarrhea     Notify your health care provider if you experience any of the following:  severe uncontrolled pain     Notify your health care provider if you experience any of the following:  difficulty breathing or increased cough     Activity as tolerated   Order Comments: Do note lift left arm above shoulder height       Significant Diagnostic Studies: Labs:   CMP    Recent Labs   Lab 01/13/20  0527 01/13/20  1519 01/14/20  0411    141 142   K 3.9 3.9 3.6    99 100   CO2 27 30* 29   * 176* 165*   BUN 37* 32* 29*   CREATININE 0.8 0.8 0.8   CALCIUM 8.7 8.7 8.9   ANIONGAP 11 12 13   ESTGFRAFRICA >60.0 >60.0 >60.0   EGFRNONAA >60.0 >60.0 >60.0    and CBC   Recent Labs   Lab 01/13/20 0527 01/14/20 0411   WBC 11.84 10.42   HGB 11.1* 11.9*   HCT 34.0* 36.9*    337       Pending Diagnostic Studies:     None         Medications:  Reconciled Home Medications:      Medication List      START taking these medications    metoprolol succinate 50 MG 24 hr tablet  Commonly known as:  TOPROL-XL  Take 1 tablet (50 mg total) by mouth once daily.  Start taking on:  January 15, 2020        CHANGE how you take these medications    albuterol 90 mcg/actuation inhaler  Commonly known as:  Ventolin HFA  INHALE 2 PUFFS BY MOUTH EVERY 4 TO 6 HOURS AS NEEDED FOR SHORTNESS OF BREATH  What changed:    · how much to take  · how to take this  · when to take this  · reasons to take this     albuterol-ipratropium 2.5 mg-0.5 mg/3 mL nebulizer solution  Commonly known as:  DUO-NEB  USE ONE VIAL IN NEBULIZER EVERY 6 HOURS AS NEEDED FOR WHEEZING  What changed:  See the new instructions.     amLODIPine 10 MG tablet  Commonly known as:  NORVASC  TAKE ONE TABLET BY MOUTH ONCE DAILY- HOLD FOR SYSTOLIC BLOOD PRESSURE<120  What changed:    · how much to take  · how to take this  · when to take this     predniSONE 20 MG tablet  Commonly known as:  DELTASONE  Take one daily for 3 days and may repeat for shortness of breath.  What changed:    · how much to take  · how to take this  · when to take this  · reasons to take this     traMADol 50 mg tablet  Commonly known as:  ULTRAM  TAKE 1 TABLET(50 MG) BY MOUTH EVERY 6 HOURS AS NEEDED  What changed:    · how much to take  · how to take this  · when to take this  · reasons to take this        CONTINUE taking these medications    arformoterol 15 mcg/2  mL Nebu  Commonly known as:  Brovana  Take 2 mLs (15 mcg total) by nebulization 2 (two) times daily. Controller     aspirin 81 MG EC tablet  Commonly known as:  ECOTRIN  Take 81 mg by mouth once daily.     budesonide 0.5 mg/2 mL nebulizer solution  Commonly known as:  PULMICORT  Take 2 mLs (0.5 mg total) by nebulization 2 (two) times daily. Controller     dimenhyDRINATE 50 MG tablet  Commonly known as:  DRAMAMINE  Take 50 mg by mouth nightly as needed.     evolocumab 140 mg/mL Syrg  Commonly known as:  REPATHA SYRINGE  Inject 140 mg into the skin every 14 (fourteen) days.     FLUoxetine 20 MG capsule  Take 1 capsule (20 mg total) by mouth once daily.     fluticasone furoate-vilanterol 200-25 mcg/dose Dsdv diskus inhaler  Commonly known as:  Breo Ellipta  Inhale 1 puff into the lungs once daily. Controller     lancets Misc  True track Check glucose once daily     levothyroxine 137 MCG Tab tablet  Commonly known as:  SYNTHROID  Take 1 tablet (137 mcg total) by mouth once daily.     metFORMIN 500 MG tablet  Commonly known as:  GLUCOPHAGE  Take 2 tablets (1,000 mg total) by mouth 2 (two) times daily with meals.     montelukast 10 mg tablet  Commonly known as:  SINGULAIR  Take 1 tablet (10 mg total) by mouth every evening.     potassium chloride 8 mEq Cpsr  Commonly known as:  MICRO-K  Take 1 capsule (8 mEq total) by mouth once daily.     PROBIOTIC PEARLS ORAL  Take 1 each by mouth 3 (three) times a week.     theophylline 400 mg Tb24  Take 400 mg by mouth 2 (two) times daily.     torsemide 20 MG Tab  Commonly known as:  DEMADEX  Take 1 tablet (20 mg total) by mouth once daily.     Vitamin B-12 1000 MCG tablet  Generic drug:  cyanocobalamin  Take 100 mcg by mouth once daily.     Vitamin D3 25 mcg (1,000 unit) capsule  Generic drug:  cholecalciferol (vitamin D3)  Take 1,000 Units by mouth once daily.     vitamin E 400 UNIT capsule  Take 400 Units by mouth once daily.            Indwelling Lines/Drains at time of discharge:    Lines/Drains/Airways     None                 Time spent on the discharge of patient: 31 minutes  Patient was seen and examined on the date of discharge and determined to be suitable for discharge.         Js Burgess MD  Department of Hospital Medicine  [facility]

## 2020-01-15 NOTE — PLAN OF CARE
01/15/20 0854   Discharge Reassessment   Assessment Type Final Discharge Note   Anticipated Discharge Disposition Home   DME Needed Upon Discharge  wheelchair   Pt was discharged after Cm left for the day. WC was ordered if insurance will pay. Cm contacted [facility] and faxed order along with FS and H&P. They will check and call pt if she has any deductibles.

## 2020-01-16 NOTE — CARE UPDATE
Readmission Prevention    DX: COPD-PNA current-[facility] dx. Newly implanted dual PPM.  Hx: MI, CABG.   Pt has no recent history of an inpatient admission, last encounter 9/9/2018 indicated.  Pc did not see pt during this encounter.  Pt has PCP (Dr. Sánchez); pt received in mail NC recommendations,  2019 cheat sheet, [facility] physician's list, articles for understanding disease process (CHF, COPD, warnings for heart attacks for AHA).  Pt was given PC contact information in the event of needing additional resources.    Shauna SILVERIO LPN    Transitions of Care Program  1/16/2020, 3:29 pm      PC contact called pt. No answer x1.  Shauna SILVERIO LPN    Transitions of Care Program  1/28/2020, 11:51 am    Contact call x2 , no answer  Shauna SILVERIO LPN    Transitions of Care Program  2/3/2020, 3:41 pm

## 2020-01-27 ENCOUNTER — PATIENT OUTREACH (OUTPATIENT)
Dept: ADMINISTRATIVE | Facility: OTHER | Age: 70
End: 2020-01-27

## 2020-01-29 ENCOUNTER — OFFICE VISIT (OUTPATIENT)
Dept: PULMONOLOGY | Facility: CLINIC | Age: 70
End: 2020-01-29
Payer: MEDICARE

## 2020-01-29 ENCOUNTER — HOSPITAL ENCOUNTER (OUTPATIENT)
Dept: RADIOLOGY | Facility: CLINIC | Age: 70
Discharge: HOME | End: 2020-01-29
Attending: INTERNAL MEDICINE
Payer: MEDICARE

## 2020-01-29 VITALS
SYSTOLIC BLOOD PRESSURE: 152 MMHG | WEIGHT: 181.31 LBS | BODY MASS INDEX: 33.36 KG/M2 | DIASTOLIC BLOOD PRESSURE: 67 MMHG | HEART RATE: 65 BPM | OXYGEN SATURATION: 94 % | HEIGHT: 62 IN

## 2020-01-29 DIAGNOSIS — J44.89 ASTHMA WITH CHRONIC OBSTRUCTIVE PULMONARY DISEASE (COPD): Chronic | ICD-10-CM

## 2020-01-29 DIAGNOSIS — J18.9 PNEUMONIA OF LEFT LUNG DUE TO INFECTIOUS ORGANISM, UNSPECIFIED PART OF LUNG: ICD-10-CM

## 2020-01-29 DIAGNOSIS — J96.11 HYPOXEMIC RESPIRATORY FAILURE, CHRONIC: ICD-10-CM

## 2020-01-29 DIAGNOSIS — J18.9 PNEUMONIA OF LEFT LUNG DUE TO INFECTIOUS ORGANISM, UNSPECIFIED PART OF LUNG: Primary | ICD-10-CM

## 2020-01-29 PROCEDURE — 99215 OFFICE O/P EST HI 40 MIN: CPT | Mod: PBBFAC,PO,25 | Performed by: INTERNAL MEDICINE

## 2020-01-29 PROCEDURE — 1125F PR PAIN SEVERITY QUANTIFIED, PAIN PRESENT: ICD-10-PCS | Mod: ,,, | Performed by: INTERNAL MEDICINE

## 2020-01-29 PROCEDURE — 1159F PR MEDICATION LIST DOCUMENTED IN MEDICAL RECORD: ICD-10-PCS | Mod: ,,, | Performed by: INTERNAL MEDICINE

## 2020-01-29 PROCEDURE — 1125F AMNT PAIN NOTED PAIN PRSNT: CPT | Mod: ,,, | Performed by: INTERNAL MEDICINE

## 2020-01-29 PROCEDURE — 99214 PR OFFICE/OUTPT VISIT, EST, LEVL IV, 30-39 MIN: ICD-10-PCS | Mod: S$PBB,,, | Performed by: INTERNAL MEDICINE

## 2020-01-29 PROCEDURE — 99214 OFFICE O/P EST MOD 30 MIN: CPT | Mod: S$PBB,,, | Performed by: INTERNAL MEDICINE

## 2020-01-29 PROCEDURE — 1159F MED LIST DOCD IN RCRD: CPT | Mod: ,,, | Performed by: INTERNAL MEDICINE

## 2020-01-29 PROCEDURE — 99999 PR PBB SHADOW E&M-EST. PATIENT-LVL V: ICD-10-PCS | Mod: PBBFAC,,, | Performed by: INTERNAL MEDICINE

## 2020-01-29 PROCEDURE — 71046 XR CHEST PA AND LATERAL: ICD-10-PCS | Mod: 26,,, | Performed by: RADIOLOGY

## 2020-01-29 PROCEDURE — 71046 X-RAY EXAM CHEST 2 VIEWS: CPT | Mod: 26,,, | Performed by: RADIOLOGY

## 2020-01-29 PROCEDURE — 99999 PR PBB SHADOW E&M-EST. PATIENT-LVL V: CPT | Mod: PBBFAC,,, | Performed by: INTERNAL MEDICINE

## 2020-01-29 PROCEDURE — 71046 X-RAY EXAM CHEST 2 VIEWS: CPT | Mod: TC,FY,PO

## 2020-01-29 NOTE — PROGRESS NOTES
"1/29/2020    Tereza Larose  New Patient Consult    Chief Complaint   Patient presents with    Follow-up     6 week f/u    Asthma    COPD       HPI:  1/29/2020- had cold front pass with problems coughing up blood streaks.  Admitted to [facility] wbc 1/10 of 24, creat 2.  Got iv abx, none at dc.  Uses ox at home.      Uses brovana and budesonide , also breo, and singulair.  No asthma arousal.      11/21/2019 asthma since age 19 with cough/wheezes/sob, weather change and noct worsening.  Uses 02 to sleep and sat walking 89 or so sat.  Uses ox daytime 1/2 day.    Sinuses problem- flonase prn, claritin.  No infection sinus.  Mucous from lungs white to clear.    Uses theophylline.  Takes albuterol 1-2/wk.  Awakens with asthma 4/month.  No emergency treatments.    Uses steroid shots once a yr.  No apneas suggested- on home ox 3 yrs.  Use cane to ambulate as had cva 3 yrs ago.  Falls occasionally- fell last month.    Patient Instructions   Asthma likely caused some copd.  Take prednisone 20mg  Daily for 3 days and note sugars, and breathing.  Would check lung capacity next wk, Check chest xray and breathing test and oxygen level walking.  .   May repeat prednisone if needed.  Prevention medications -Use Singulair daily&  Use breo once daily, or  consider continuing or use budesonide and brovana (similar medications but should be covered with old medicare benefit).  Use nebulizer as needed.  Control sinuses- use Claritin and Flonase.  singulair may help.    Could skip theophylline and stay off    The chief compliant  problem is new to me",   Formerly [facility]:  Past Medical History:   Diagnosis Date    Allergy     Arthritis     Asthma     Brain bleed     COPD (chronic obstructive pulmonary disease)     Depression     Diabetes mellitus type II     Diverticulitis     Fatty liver     GERD (gastroesophageal reflux disease)     Goiter     s/p thyroidectomy    Heart murmur     Hernia of abdominal wall     History of intracranial " hemorrhage 6/15/2013    History of non-ST elevation myocardial infarction (NSTEMI) 6/15/2013    Hyperlipidemia     Hypertension     Metabolic syndrome 6/14/2012    Myocardial infarction     On home oxygen therapy     states uses 2L at night or when sat < 90    Statin intolerance     Stroke 2012    left hand shaky, loss of balance         Past Surgical History:   Procedure Laterality Date    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/25/2017    Dr Griggs    ADRENALECTOMY      AORTIC VALVE REPLACEMENT  12/09/2016    arthroscopy lt knee Right     BLADDER REPAIR      sling    BREAST BIOPSY Bilateral     benign    COLONOSCOPY  2004    10 year recheck    CORONARY ARTERY BYPASS GRAFT  12/09/2016    X3    HYSTERECTOMY      INSERTION OF PACEMAKER Left 1/13/2020    Procedure: INSERTION, PACEMAKER;  Surgeon: Marko Batres MD;  Location: [facility] CATH/EP LAB;  Service: Cardiology;  Laterality: Left;    OOPHORECTOMY      THYROIDECTOMY       Social History     Tobacco Use    Smoking status: Never Smoker    Smokeless tobacco: Never Used   Substance Use Topics    Alcohol use: Yes     Comment: seldom    Drug use: No     Family History   Problem Relation Age of Onset    Arthritis Mother     Diabetes Mother     Heart disease Mother     Hypertension Mother     Hypertension Father     Heart disease Father     Mental illness Father     Asthma Sister     Diabetes Sister     Hypertension Sister     Asthma Son     COPD Son     Depression Son     Diabetes Son     Hypertension Son     Stroke Son     Diabetes Maternal Uncle     Heart disease Maternal Uncle     Mental illness Paternal Aunt     Cancer Paternal Aunt     Heart disease Paternal Aunt     Hypertension Paternal Aunt     Heart disease Paternal Uncle     Hypertension Maternal Grandmother     Mental illness Maternal Grandmother     Aneurysm Maternal Grandfather     Mental illness Paternal Grandmother     Heart disease Paternal Grandfather     Melanoma  "Neg Hx     Psoriasis Neg Hx     Lupus Neg Hx     Eczema Neg Hx      Review of patient's allergies indicates:   Allergen Reactions    Corn Itching     Other reaction(s): Sneezing  Other reaction(s): Rhinorrhea    Potato starch Hives     Other reaction(s): Sneezing  Other reaction(s): Rhinorrhea    Pravastatin Other (See Comments)     Muscle pain    Statins-hmg-coa reductase inhibitors Other (See Comments)    Hydrochlorothiazide Other (See Comments)     weakness    Welchol [colesevelam] Other (See Comments)     Weakness        Performance Status:The patient's activity level is housebound activities.      Review of Systems:  a review of eleven systems covering constitutional, Eye, HEENT, Psych, Respiratory, Cardiac, GI, , Musculoskeletal, Endocrine, Dermatologic was negative except for pertinent findings as listed ABOVE and below:  pertinent positive as above, rest is good       Exam:Comprehensive exam done. BP (!) 152/67 (BP Location: Right arm, Patient Position: Sitting)   Pulse 65   Ht 5' 2" (1.575 m)   Wt 82.2 kg (181 lb 5.3 oz)   SpO2 (!) 94% Comment: on room air  BMI 33.17 kg/m²   Exam included Vitals as listed, and patient's appearance and affect and alertness and mood, oral exam for yeast and hygiene and pharynx lesions and Mallapatti (M) score, neck with inspection for jvd and masses and thyroid abnormalities and lymph nodes (supraclavicular and infraclavicular nodes and axillary also examined and noted if abn), chest exam included symmetry and effort and fremitus and percussion and auscultation, cardiac exam included rhythm and gallops and murmur and rubs and jvd and edema, abdominal exam for mass and hepatosplenomegaly and tenderness and hernias and bowel sounds, Musculoskeletal exam with muscle tone and posture and mobility/gait and  strength, and skin for rashes and cyanosis and pallor and turgor, extremity for clubbing.  Findings were normal except for pertinent findings listed " below:  M3,chest is symmetric, no distress, normal percussion, normal fremitus and good normal decreased breath sounds  No clubbing nor edema     Radiographs (ct chest and cxr) reviewed: cxr 1/10/2020- patchy left > right pneumonia.       Labs reviewed         Results for TEREZA VELA (MRN [id number]) as of 11/21/2019 10:37   Ref. Range 8/28/2018 16:38   Eosinophil% Latest Ref Range: 0.0 - 8.0 % 5.2   Eos # Latest Ref Range: 0.0 - 0.5 K/uL 0.4     PFT will be done and results to be reviewed       Plan:  Clinical impression is apparently straight forward and impression with management as below.     Tereza was seen today for follow-up, asthma and copd.    Diagnoses and all orders for this visit:    Pneumonia of left lung due to infectious organism, unspecified part of lung    Asthma with chronic obstructive pulmonary disease (COPD)    Hypoxemic respiratory failure, chronic        No follow-ups on file.    Discussed with patient above for education the following:      Patient Instructions   Need to follow up chest xray as was not clear by 1/14.    Pneumonia not typical  For heart block      Use breo or brovana and budesonide - not both.  Use breo til out.    You have used and benefited from your home oxygen.

## 2020-01-29 NOTE — PATIENT INSTRUCTIONS
Need to follow up chest xray as was not clear by 1/14.    Pneumonia not typical  For heart block      Use breo or brovana and budesonide - not both.  Use breo til out.    You have used and benefited from your home oxygen.

## 2020-01-30 ENCOUNTER — TELEPHONE (OUTPATIENT)
Dept: PULMONOLOGY | Facility: CLINIC | Age: 70
End: 2020-01-30

## 2020-01-30 NOTE — TELEPHONE ENCOUNTER
Contacted pt concerning cxr results.         ----- Message from Chuck Vaca MD sent at 1/29/2020  5:56 PM CST -----  Notify cxr nearly clear, f/u not felt to be needed.  Good result, no change in plan

## 2020-03-03 ENCOUNTER — LAB VISIT (OUTPATIENT)
Dept: LAB | Facility: HOSPITAL | Age: 70
End: 2020-03-03
Attending: FAMILY MEDICINE
Payer: MEDICARE

## 2020-03-03 DIAGNOSIS — E03.9 ACQUIRED HYPOTHYROIDISM: ICD-10-CM

## 2020-03-03 LAB
T4 FREE SERPL-MCNC: 1.78 NG/DL (ref 0.71–1.51)
TSH SERPL DL<=0.005 MIU/L-ACNC: 0.01 UIU/ML (ref 0.4–4)

## 2020-03-03 PROCEDURE — 36415 COLL VENOUS BLD VENIPUNCTURE: CPT | Mod: PO

## 2020-03-03 PROCEDURE — 84439 ASSAY OF FREE THYROXINE: CPT

## 2020-03-03 PROCEDURE — 84443 ASSAY THYROID STIM HORMONE: CPT

## 2020-03-06 DIAGNOSIS — E11.9 TYPE 2 DIABETES MELLITUS WITHOUT COMPLICATION: ICD-10-CM

## 2020-03-09 ENCOUNTER — TELEPHONE (OUTPATIENT)
Dept: FAMILY MEDICINE | Facility: CLINIC | Age: 70
End: 2020-03-09

## 2020-03-09 DIAGNOSIS — E03.9 ACQUIRED HYPOTHYROIDISM: ICD-10-CM

## 2020-03-09 RX ORDER — LEVOTHYROXINE SODIUM 125 UG/1
125 TABLET ORAL DAILY
Qty: 90 TABLET | Refills: 0 | Status: SHIPPED | OUTPATIENT
Start: 2020-03-09 | End: 2020-06-19

## 2020-03-09 NOTE — TELEPHONE ENCOUNTER
Patient informed. She thinks she still has some of the 125 mcg on hand. She does want a new Rx sent to her [facility].

## 2020-03-09 NOTE — TELEPHONE ENCOUNTER
----- Message from Evan Sánchez MD sent at 3/9/2020  1:40 PM CDT -----  The 137 micro Gram levothyroxine is too strong now.  Recommend we go back to the 125 mcg

## 2020-03-10 NOTE — TELEPHONE ENCOUNTER
tsh booked 3 months.   There is an order from 3-6-20 for a Lipid. Do you want to do that at the same time?

## 2020-04-26 DIAGNOSIS — F32.A DEPRESSION, UNSPECIFIED DEPRESSION TYPE: ICD-10-CM

## 2020-04-27 DIAGNOSIS — R09.89 CHRONIC SINUS COMPLAINTS: ICD-10-CM

## 2020-04-27 DIAGNOSIS — J82.83 EOSINOPHILIC ASTHMA: ICD-10-CM

## 2020-04-27 DIAGNOSIS — J44.89 ASTHMA WITH CHRONIC OBSTRUCTIVE PULMONARY DISEASE (COPD): Chronic | ICD-10-CM

## 2020-04-27 RX ORDER — MONTELUKAST SODIUM 10 MG/1
10 TABLET ORAL NIGHTLY
Qty: 30 TABLET | Refills: 5 | Status: SHIPPED | OUTPATIENT
Start: 2020-04-27 | End: 2020-05-01 | Stop reason: SDUPTHER

## 2020-04-27 RX ORDER — FLUOXETINE HYDROCHLORIDE 20 MG/1
CAPSULE ORAL
Qty: 90 CAPSULE | Refills: 1 | Status: SHIPPED | OUTPATIENT
Start: 2020-04-27 | End: 2022-07-26

## 2020-04-28 ENCOUNTER — PATIENT OUTREACH (OUTPATIENT)
Dept: ADMINISTRATIVE | Facility: OTHER | Age: 70
End: 2020-04-28

## 2020-04-30 ENCOUNTER — OFFICE VISIT (OUTPATIENT)
Dept: PULMONOLOGY | Facility: CLINIC | Age: 70
End: 2020-04-30
Payer: MEDICARE

## 2020-04-30 VITALS
WEIGHT: 179.81 LBS | OXYGEN SATURATION: 91 % | DIASTOLIC BLOOD PRESSURE: 88 MMHG | BODY MASS INDEX: 32.88 KG/M2 | HEART RATE: 76 BPM | SYSTOLIC BLOOD PRESSURE: 162 MMHG

## 2020-04-30 DIAGNOSIS — J45.50 SEVERE PERSISTENT ASTHMA WITHOUT COMPLICATION: ICD-10-CM

## 2020-04-30 DIAGNOSIS — R26.9 ABNORMALITY OF GAIT: ICD-10-CM

## 2020-04-30 DIAGNOSIS — J18.9 PNEUMONIA OF LEFT LUNG DUE TO INFECTIOUS ORGANISM, UNSPECIFIED PART OF LUNG: ICD-10-CM

## 2020-04-30 DIAGNOSIS — J44.9 CHRONIC OBSTRUCTIVE PULMONARY DISEASE, UNSPECIFIED COPD TYPE: ICD-10-CM

## 2020-04-30 DIAGNOSIS — M54.50 RIGHT-SIDED LOW BACK PAIN WITHOUT SCIATICA, UNSPECIFIED CHRONICITY: ICD-10-CM

## 2020-04-30 DIAGNOSIS — J44.89 ASTHMA WITH CHRONIC OBSTRUCTIVE PULMONARY DISEASE (COPD): Primary | Chronic | ICD-10-CM

## 2020-04-30 DIAGNOSIS — J96.11 HYPOXEMIC RESPIRATORY FAILURE, CHRONIC: ICD-10-CM

## 2020-04-30 PROCEDURE — 99999 PR PBB SHADOW E&M-EST. PATIENT-LVL IV: CPT | Mod: PBBFAC,,, | Performed by: INTERNAL MEDICINE

## 2020-04-30 PROCEDURE — 99999 PR PBB SHADOW E&M-EST. PATIENT-LVL IV: ICD-10-PCS | Mod: PBBFAC,,, | Performed by: INTERNAL MEDICINE

## 2020-04-30 PROCEDURE — 99214 OFFICE O/P EST MOD 30 MIN: CPT | Mod: S$PBB,,, | Performed by: INTERNAL MEDICINE

## 2020-04-30 PROCEDURE — 99214 OFFICE O/P EST MOD 30 MIN: CPT | Mod: PBBFAC,PO | Performed by: INTERNAL MEDICINE

## 2020-04-30 PROCEDURE — 99214 PR OFFICE/OUTPT VISIT, EST, LEVL IV, 30-39 MIN: ICD-10-PCS | Mod: S$PBB,,, | Performed by: INTERNAL MEDICINE

## 2020-04-30 RX ORDER — TRAMADOL HYDROCHLORIDE 50 MG/1
50 TABLET ORAL EVERY 6 HOURS PRN
Qty: 40 TABLET | Refills: 0 | Status: SHIPPED | OUTPATIENT
Start: 2020-04-30 | End: 2021-04-30

## 2020-04-30 RX ORDER — PREDNISONE 20 MG/1
TABLET ORAL
Qty: 21 TABLET | Refills: 0 | Status: SHIPPED | OUTPATIENT
Start: 2020-04-30 | End: 2020-07-07 | Stop reason: SDUPTHER

## 2020-04-30 RX ORDER — CELECOXIB 100 MG/1
100 CAPSULE ORAL 2 TIMES DAILY PRN
Qty: 30 CAPSULE | Refills: 2 | Status: SHIPPED | OUTPATIENT
Start: 2020-04-30 | End: 2020-06-03

## 2020-04-30 NOTE — PROGRESS NOTES
"4/30/2020    Tereza Larose  New Patient Consult    Chief Complaint   Patient presents with    Follow-up       HPI:  4/30/2020 - no more blood streaks, min cough am mucous white.  Uses resp rx with breo only - prn ventolin also. Uses ox at home.    1/29/2020- had cold front pass with problems coughing up blood streaks.  Admitted to [facility] wbc 1/10 of 24, creat 2.  Got iv abx, none at dc.  Uses ox at home.      Uses brovana and budesonide , also breo, and singulair.  No asthma arousal.    Patient Instructions   Need to follow up chest xray as was not clear by 1/14.  Pneumonia not typical  For heart block  Use breo or brovana and budesonide - not both.  Use breo til out.  You have used and benefited from your home oxygen.  11/21/2019 asthma since age 19 with cough/wheezes/sob, weather change and noct worsening.  Uses 02 to sleep and sat walking 89 or so sat.  Uses ox daytime 1/2 day.    Sinuses problem- flonase prn, claritin.  No infection sinus.  Mucous from lungs white to clear.    Uses theophylline.  Takes albuterol 1-2/wk.  Awakens with asthma 4/month.  No emergency treatments.    Uses steroid shots once a yr.  No apneas suggested- on home ox 3 yrs.  Use cane to ambulate as had cva 3 yrs ago.  Falls occasionally- fell last month.    Patient Instructions   Asthma likely caused some copd.  Take prednisone 20mg  Daily for 3 days and note sugars, and breathing.  Would check lung capacity next wk, Check chest xray and breathing test and oxygen level walking.  .   May repeat prednisone if needed.  Prevention medications -Use Singulair daily&  Use breo once daily, or  consider continuing or use budesonide and brovana (similar medications but should be covered with old medicare benefit).  Use nebulizer as needed.  Control sinuses- use Claritin and Flonase.  singulair may help.    Could skip theophylline and stay off    The chief compliant  problem is new to me",   [facility]:  Past Medical History:   Diagnosis Date    Allergy  "    Arthritis     Asthma     Brain bleed     COPD (chronic obstructive pulmonary disease)     Depression     Diabetes mellitus type II     Diverticulitis     Fatty liver     GERD (gastroesophageal reflux disease)     Goiter     s/p thyroidectomy    Heart murmur     Hernia of abdominal wall     History of intracranial hemorrhage 6/15/2013    History of non-ST elevation myocardial infarction (NSTEMI) 6/15/2013    Hyperlipidemia     Hypertension     Metabolic syndrome 6/14/2012    Myocardial infarction     On home oxygen therapy     states uses 2L at night or when sat < 90    Statin intolerance     Stroke 2012    left hand shaky, loss of balance         Past Surgical History:   Procedure Laterality Date    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/25/2017    Dr Griggs    ADRENALECTOMY      AORTIC VALVE REPLACEMENT  12/09/2016    arthroscopy lt knee Right     BLADDER REPAIR      sling    BREAST BIOPSY Bilateral     benign    COLONOSCOPY  2004    10 year recheck    CORONARY ARTERY BYPASS GRAFT  12/09/2016    X3    HYSTERECTOMY      INSERTION OF PACEMAKER Left 1/13/2020    Procedure: INSERTION, PACEMAKER;  Surgeon: Marko Batres MD;  Location: [facility] CATH/EP LAB;  Service: Cardiology;  Laterality: Left;    OOPHORECTOMY      THYROIDECTOMY       Social History     Tobacco Use    Smoking status: Never Smoker    Smokeless tobacco: Never Used   Substance Use Topics    Alcohol use: Yes     Comment: seldom    Drug use: No     Family History   Problem Relation Age of Onset    Arthritis Mother     Diabetes Mother     Heart disease Mother     Hypertension Mother     Hypertension Father     Heart disease Father     Mental illness Father     Asthma Sister     Diabetes Sister     Hypertension Sister     Asthma Son     COPD Son     Depression Son     Diabetes Son     Hypertension Son     Stroke Son     Diabetes Maternal Uncle     Heart disease Maternal Uncle     Mental illness Paternal Aunt      Cancer Paternal Aunt     Heart disease Paternal Aunt     Hypertension Paternal Aunt     Heart disease Paternal Uncle     Hypertension Maternal Grandmother     Mental illness Maternal Grandmother     Aneurysm Maternal Grandfather     Mental illness Paternal Grandmother     Heart disease Paternal Grandfather     Melanoma Neg Hx     Psoriasis Neg Hx     Lupus Neg Hx     Eczema Neg Hx      Review of patient's allergies indicates:   Allergen Reactions    Corn Itching     Other reaction(s): Sneezing  Other reaction(s): Rhinorrhea    Potato starch Hives     Other reaction(s): Sneezing  Other reaction(s): Rhinorrhea    Pravastatin Other (See Comments)     Muscle pain    Statins-hmg-coa reductase inhibitors Other (See Comments)    Hydrochlorothiazide Other (See Comments)     weakness    Welchol [colesevelam] Other (See Comments)     Weakness        Performance Status:The patient's activity level is housebound activities.      Review of Systems:  a review of eleven systems covering constitutional, Eye, HEENT, Psych, Respiratory, Cardiac, GI, , Musculoskeletal, Endocrine, Dermatologic was negative except for pertinent findings as listed ABOVE and below:  pertinent positive as above, rest is good       Exam:Comprehensive exam done. BP (!) 162/88 (BP Location: Right arm, Patient Position: Sitting)   Pulse 76   Wt 81.5 kg (179 lb 12.6 oz)   SpO2 (!) 91% Comment: on room air at rest  BMI 32.88 kg/m²   Exam included Vitals as listed, and patient's appearance and affect and alertness and mood, oral exam for yeast and hygiene and pharynx lesions and Mallapatti (M) score, neck with inspection for jvd and masses and thyroid abnormalities and lymph nodes (supraclavicular and infraclavicular nodes and axillary also examined and noted if abn), chest exam included symmetry and effort and fremitus and percussion and auscultation, cardiac exam included rhythm and gallops and murmur and rubs and jvd and edema,  abdominal exam for mass and hepatosplenomegaly and tenderness and hernias and bowel sounds, Musculoskeletal exam with muscle tone and posture and mobility/gait and  strength, and skin for rashes and cyanosis and pallor and turgor, extremity for clubbing.  Findings were normal except for pertinent findings listed below:  M3,chest is symmetric, no distress, normal percussion, normal fremitus and good normal decreased breath sounds  No clubbing nor edema     Radiographs (ct chest and cxr) reviewed: cxr 1/10/2020- patchy left > right pneumonia.     cxr 1/29/20 nearly clear.      Labs reviewed         Results for TEREZA VELA (MRN [id number]) as of 11/21/2019 10:37   Ref. Range 8/28/2018 16:38   Eosinophil% Latest Ref Range: 0.0 - 8.0 % 5.2   Eos # Latest Ref Range: 0.0 - 0.5 K/uL 0.4     PFT will be done and results to be reviewed       Plan:  Clinical impression is apparently straight forward and impression with management as below.     Tereza was seen today for follow-up.    Diagnoses and all orders for this visit:    Asthma with chronic obstructive pulmonary disease (COPD)  -     predniSONE (DELTASONE) 20 MG tablet; Take one daily for 3 days and may repeat for shortness of breath.    Hypoxemic respiratory failure, chronic    Pneumonia of left lung due to infectious organism, unspecified part of lung    Abnormality of gait  -     celecoxib (CELEBREX) 100 MG capsule; Take 1 capsule (100 mg total) by mouth 2 (two) times daily as needed for Pain.  -     traMADoL (ULTRAM) 50 mg tablet; Take 1 tablet (50 mg total) by mouth every 6 (six) hours as needed for Pain.    Right-sided low back pain without sciatica, unspecified chronicity  -     celecoxib (CELEBREX) 100 MG capsule; Take 1 capsule (100 mg total) by mouth 2 (two) times daily as needed for Pain.  -     traMADoL (ULTRAM) 50 mg tablet; Take 1 tablet (50 mg total) by mouth every 6 (six) hours as needed for Pain.    Severe persistent asthma without complication  -      predniSONE (DELTASONE) 20 MG tablet; Take one daily for 3 days and may repeat for shortness of breath.    Chronic obstructive pulmonary disease, unspecified COPD type  -     predniSONE (DELTASONE) 20 MG tablet; Take one daily for 3 days and may repeat for shortness of breath.        Follow up in about 6 months (around 10/30/2020), or if symptoms worsen or fail to improve.    Discussed with patient above for education the following:      Patient Instructions   Chest xray nearly cleared completely by 1/29/2020    Would use celebrex 100 mg (stop for stomach upset) once or twice daily for severe right low back pain causing walking problems.  Also sent in tramadol 50 mg for as needed use.    Continue breo and as needed albuterol      Call if problem , may  Use prednisone if cough/wheezes

## 2020-04-30 NOTE — PATIENT INSTRUCTIONS
Chest xray nearly cleared completely by 1/29/2020    Would use celebrex 100 mg (stop for stomach upset) once or twice daily for severe right low back pain causing walking problems.  Also sent in tramadol 50 mg for as needed use.    Continue breo and as needed albuterol      Call if problem , may  Use prednisone if cough/wheezes

## 2020-05-01 DIAGNOSIS — J44.89 ASTHMA WITH CHRONIC OBSTRUCTIVE PULMONARY DISEASE (COPD): Chronic | ICD-10-CM

## 2020-05-01 DIAGNOSIS — R09.89 CHRONIC SINUS COMPLAINTS: ICD-10-CM

## 2020-05-01 DIAGNOSIS — J82.83 EOSINOPHILIC ASTHMA: ICD-10-CM

## 2020-05-04 RX ORDER — MONTELUKAST SODIUM 10 MG/1
10 TABLET ORAL NIGHTLY
Qty: 30 TABLET | Refills: 5 | Status: SHIPPED | OUTPATIENT
Start: 2020-05-04 | End: 2020-10-13 | Stop reason: SDUPTHER

## 2020-06-09 ENCOUNTER — TELEPHONE (OUTPATIENT)
Dept: PAIN MEDICINE | Facility: CLINIC | Age: 70
End: 2020-06-09

## 2020-06-09 NOTE — TELEPHONE ENCOUNTER
Patient states that she didn't want a Black Doctor  nor a foreigner .Patient states that she prefer a white Doctor

## 2020-06-10 ENCOUNTER — LAB VISIT (OUTPATIENT)
Dept: LAB | Facility: HOSPITAL | Age: 70
End: 2020-06-10
Attending: FAMILY MEDICINE
Payer: MEDICARE

## 2020-06-10 DIAGNOSIS — E11.9 TYPE 2 DIABETES MELLITUS WITHOUT COMPLICATION: ICD-10-CM

## 2020-06-10 DIAGNOSIS — E03.9 ACQUIRED HYPOTHYROIDISM: ICD-10-CM

## 2020-06-10 LAB
CHOLEST SERPL-MCNC: 273 MG/DL (ref 120–199)
CHOLEST/HDLC SERPL: 5.7 {RATIO} (ref 2–5)
HDLC SERPL-MCNC: 48 MG/DL (ref 40–75)
HDLC SERPL: 17.6 % (ref 20–50)
LDLC SERPL CALC-MCNC: ABNORMAL MG/DL (ref 63–159)
NONHDLC SERPL-MCNC: 225 MG/DL
T4 FREE SERPL-MCNC: 1.49 NG/DL (ref 0.71–1.51)
TRIGL SERPL-MCNC: 515 MG/DL (ref 30–150)
TSH SERPL DL<=0.005 MIU/L-ACNC: 7.64 UIU/ML (ref 0.4–4)

## 2020-06-10 PROCEDURE — 84443 ASSAY THYROID STIM HORMONE: CPT

## 2020-06-10 PROCEDURE — 80061 LIPID PANEL: CPT

## 2020-06-10 PROCEDURE — 84439 ASSAY OF FREE THYROXINE: CPT

## 2020-06-10 PROCEDURE — 36415 COLL VENOUS BLD VENIPUNCTURE: CPT | Mod: PO

## 2020-06-15 ENCOUNTER — TELEPHONE (OUTPATIENT)
Dept: FAMILY MEDICINE | Facility: CLINIC | Age: 70
End: 2020-06-15

## 2020-06-15 DIAGNOSIS — E11.59 TYPE 2 DIABETES MELLITUS WITH OTHER CIRCULATORY COMPLICATION, WITHOUT LONG-TERM CURRENT USE OF INSULIN: Primary | ICD-10-CM

## 2020-06-15 DIAGNOSIS — I25.10 CORONARY ARTERY DISEASE INVOLVING NATIVE CORONARY ARTERY OF NATIVE HEART WITHOUT ANGINA PECTORIS: ICD-10-CM

## 2020-06-15 DIAGNOSIS — E11.69 HYPERLIPIDEMIA ASSOCIATED WITH TYPE 2 DIABETES MELLITUS: Chronic | ICD-10-CM

## 2020-06-15 DIAGNOSIS — E78.5 HYPERLIPIDEMIA ASSOCIATED WITH TYPE 2 DIABETES MELLITUS: Chronic | ICD-10-CM

## 2020-06-15 DIAGNOSIS — E11.59 HYPERTENSION ASSOCIATED WITH DIABETES: ICD-10-CM

## 2020-06-15 DIAGNOSIS — I15.2 HYPERTENSION ASSOCIATED WITH DIABETES: ICD-10-CM

## 2020-06-15 RX ORDER — ROSUVASTATIN CALCIUM 10 MG/1
10 TABLET, COATED ORAL EVERY OTHER DAY
Qty: 45 TABLET | Refills: 3 | Status: SHIPPED | OUTPATIENT
Start: 2020-06-15 | End: 2022-07-26 | Stop reason: ALTCHOICE

## 2020-06-15 NOTE — TELEPHONE ENCOUNTER
----- Message from Evan Sánchez MD sent at 6/12/2020  7:08 PM CDT -----  She now looks like she is borderline hypothyroid, under active.  Has she been taking the levothyroxine 125 mcg every day?  The T4 level is normal and in the upper end of normal so this is not consistent with the TSH result.  It looks like she may not have been taking it for a while and then started taking it just recently before the lab work.    The cholesterol levels are high and getting higher.  We could not even measure the bad cholesterol because the triglycerides were so high.  It really looks like she needs to get back on a statin.

## 2020-06-15 NOTE — TELEPHONE ENCOUNTER
Patient was given report. She stated she forgets to take thyroid medications some days and some days takes to close to am meal.   She stated she it taking repatha twice a month for her cholesterol.

## 2020-06-15 NOTE — TELEPHONE ENCOUNTER
Patient informed. Please send statin to her [facility]. Future labs can be mailed to patient/ any day is ok.   She will do better with her thyroid medication

## 2020-06-15 NOTE — TELEPHONE ENCOUNTER
Repatha is intended to be an added to a statin when statins alone will not control the cholesterol.  It is not intended to be a stand-alone cholesterol treatment.    The low thyroid may also affect cholesterol levels.  She really needs to work on her compliance and take it regularly.

## 2020-06-15 NOTE — TELEPHONE ENCOUNTER
Crestor 10 mg to be taken every other day sent in to [facility].  She has had problems tolerating it in the past but we will try the every other day dosing and see if she can tolerate it.    Orders in for CMP, A1c, lipid panel, CPK in three months

## 2020-07-07 DIAGNOSIS — J44.9 CHRONIC OBSTRUCTIVE PULMONARY DISEASE, UNSPECIFIED COPD TYPE: ICD-10-CM

## 2020-07-07 DIAGNOSIS — J44.89 ASTHMA WITH CHRONIC OBSTRUCTIVE PULMONARY DISEASE (COPD): Chronic | ICD-10-CM

## 2020-07-07 DIAGNOSIS — J45.50 SEVERE PERSISTENT ASTHMA WITHOUT COMPLICATION: ICD-10-CM

## 2020-07-07 RX ORDER — PREDNISONE 20 MG/1
TABLET ORAL
Qty: 21 TABLET | Refills: 0 | Status: SHIPPED | OUTPATIENT
Start: 2020-07-07 | End: 2022-07-26

## 2020-07-29 ENCOUNTER — LAB VISIT (OUTPATIENT)
Dept: LAB | Facility: HOSPITAL | Age: 70
End: 2020-07-29
Attending: FAMILY MEDICINE
Payer: MEDICARE

## 2020-07-29 DIAGNOSIS — Z12.11 COLON CANCER SCREENING: ICD-10-CM

## 2020-07-29 PROCEDURE — 82274 ASSAY TEST FOR BLOOD FECAL: CPT

## 2020-08-05 DIAGNOSIS — R19.5 POSITIVE FIT (FECAL IMMUNOCHEMICAL TEST): Primary | ICD-10-CM

## 2020-08-05 LAB — HEMOCCULT STL QL IA: POSITIVE

## 2020-08-19 ENCOUNTER — TELEPHONE (OUTPATIENT)
Dept: GASTROENTEROLOGY | Facility: CLINIC | Age: 70
End: 2020-08-19

## 2020-08-21 ENCOUNTER — TELEPHONE (OUTPATIENT)
Dept: GASTROENTEROLOGY | Facility: CLINIC | Age: 70
End: 2020-08-21

## 2020-08-21 NOTE — TELEPHONE ENCOUNTER
----- Message from Marlin Banks sent at 8/21/2020  2:22 PM CDT -----  Type:  Patient Returning Call    Who Called:  Patient   Who Left Message for Patient:  Jeffery   Does the patient know what this is regarding?:  no  Best Call Back Number:  [phone]  Additional Information:  Please advise-thank you

## 2020-08-24 ENCOUNTER — TELEPHONE (OUTPATIENT)
Dept: FAMILY MEDICINE | Facility: CLINIC | Age: 70
End: 2020-08-24

## 2020-08-24 NOTE — TELEPHONE ENCOUNTER
----- Message from Sofía Wray LPN sent at 8/21/2020  5:53 PM CDT -----  Regarding: FW: Needs to speak with    ----- Message -----  From: Evan Sánchez MD  Sent: 8/21/2020   5:27 PM CDT  To: Princess Domingo  Subject: FW: Needs to speak with                            ----- Message -----  From: Rowdy Brooks  Sent: 8/21/2020   2:32 PM CDT  To: Evan Sánchez MD  Subject: Needs to speak with                              Type: Needs Medical Advice    Who Called:  Ptnt  [phone]      Additional Information:     Advised needs to speak with Jeane. Please call.

## 2020-09-16 ENCOUNTER — LAB VISIT (OUTPATIENT)
Dept: LAB | Facility: HOSPITAL | Age: 70
End: 2020-09-16
Attending: FAMILY MEDICINE
Payer: MEDICARE

## 2020-09-16 DIAGNOSIS — E11.59 HYPERTENSION ASSOCIATED WITH DIABETES: ICD-10-CM

## 2020-09-16 DIAGNOSIS — I15.2 HYPERTENSION ASSOCIATED WITH DIABETES: ICD-10-CM

## 2020-09-16 DIAGNOSIS — I25.10 CORONARY ARTERY DISEASE INVOLVING NATIVE CORONARY ARTERY OF NATIVE HEART WITHOUT ANGINA PECTORIS: ICD-10-CM

## 2020-09-16 DIAGNOSIS — E11.59 TYPE 2 DIABETES MELLITUS WITH OTHER CIRCULATORY COMPLICATION, WITHOUT LONG-TERM CURRENT USE OF INSULIN: ICD-10-CM

## 2020-09-16 DIAGNOSIS — E11.69 HYPERLIPIDEMIA ASSOCIATED WITH TYPE 2 DIABETES MELLITUS: Chronic | ICD-10-CM

## 2020-09-16 DIAGNOSIS — E78.5 HYPERLIPIDEMIA ASSOCIATED WITH TYPE 2 DIABETES MELLITUS: Chronic | ICD-10-CM

## 2020-09-16 LAB
ALBUMIN SERPL BCP-MCNC: 3.7 G/DL (ref 3.5–5.2)
ALP SERPL-CCNC: 103 U/L (ref 55–135)
ALT SERPL W/O P-5'-P-CCNC: 13 U/L (ref 10–44)
ANION GAP SERPL CALC-SCNC: 10 MMOL/L (ref 8–16)
AST SERPL-CCNC: 19 U/L (ref 10–40)
BILIRUB SERPL-MCNC: 0.3 MG/DL (ref 0.1–1)
BUN SERPL-MCNC: 17 MG/DL (ref 8–23)
CALCIUM SERPL-MCNC: 9.3 MG/DL (ref 8.7–10.5)
CHLORIDE SERPL-SCNC: 106 MMOL/L (ref 95–110)
CHOLEST SERPL-MCNC: 190 MG/DL (ref 120–199)
CHOLEST/HDLC SERPL: 4.8 {RATIO} (ref 2–5)
CK SERPL-CCNC: 69 U/L (ref 20–180)
CO2 SERPL-SCNC: 30 MMOL/L (ref 23–29)
CREAT SERPL-MCNC: 0.8 MG/DL (ref 0.5–1.4)
EST. GFR  (AFRICAN AMERICAN): >60 ML/MIN/1.73 M^2
EST. GFR  (NON AFRICAN AMERICAN): >60 ML/MIN/1.73 M^2
ESTIMATED AVG GLUCOSE: 134 MG/DL (ref 68–131)
GLUCOSE SERPL-MCNC: 124 MG/DL (ref 70–110)
HBA1C MFR BLD HPLC: 6.3 % (ref 4–5.6)
HDLC SERPL-MCNC: 40 MG/DL (ref 40–75)
HDLC SERPL: 21.1 % (ref 20–50)
LDLC SERPL CALC-MCNC: 86.2 MG/DL (ref 63–159)
NONHDLC SERPL-MCNC: 150 MG/DL
POTASSIUM SERPL-SCNC: 4.3 MMOL/L (ref 3.5–5.1)
PROT SERPL-MCNC: 7.4 G/DL (ref 6–8.4)
SODIUM SERPL-SCNC: 146 MMOL/L (ref 136–145)
TRIGL SERPL-MCNC: 319 MG/DL (ref 30–150)

## 2020-09-16 PROCEDURE — 80053 COMPREHEN METABOLIC PANEL: CPT

## 2020-09-16 PROCEDURE — 82550 ASSAY OF CK (CPK): CPT

## 2020-09-16 PROCEDURE — 36415 COLL VENOUS BLD VENIPUNCTURE: CPT | Mod: PO

## 2020-09-16 PROCEDURE — 83036 HEMOGLOBIN GLYCOSYLATED A1C: CPT

## 2020-09-16 PROCEDURE — 80061 LIPID PANEL: CPT

## 2020-10-01 ENCOUNTER — TELEPHONE (OUTPATIENT)
Dept: FAMILY MEDICINE | Facility: CLINIC | Age: 70
End: 2020-10-01

## 2020-10-01 ENCOUNTER — PATIENT OUTREACH (OUTPATIENT)
Dept: ADMINISTRATIVE | Facility: OTHER | Age: 70
End: 2020-10-01

## 2020-10-01 ENCOUNTER — OFFICE VISIT (OUTPATIENT)
Dept: PHYSICAL MEDICINE AND REHAB | Facility: CLINIC | Age: 70
End: 2020-10-01
Attending: FAMILY MEDICINE
Payer: MEDICARE

## 2020-10-01 ENCOUNTER — HOSPITAL ENCOUNTER (OUTPATIENT)
Dept: RADIOLOGY | Facility: HOSPITAL | Age: 70
Discharge: HOME | End: 2020-10-01
Attending: PHYSICAL MEDICINE & REHABILITATION
Payer: MEDICARE

## 2020-10-01 VITALS
HEART RATE: 65 BPM | HEIGHT: 62 IN | DIASTOLIC BLOOD PRESSURE: 71 MMHG | SYSTOLIC BLOOD PRESSURE: 159 MMHG | BODY MASS INDEX: 32.94 KG/M2 | WEIGHT: 179 LBS

## 2020-10-01 DIAGNOSIS — M54.50 BILATERAL LOW BACK PAIN, UNSPECIFIED CHRONICITY, UNSPECIFIED WHETHER SCIATICA PRESENT: ICD-10-CM

## 2020-10-01 DIAGNOSIS — M70.61 GREATER TROCHANTERIC BURSITIS, RIGHT: ICD-10-CM

## 2020-10-01 DIAGNOSIS — I69.351 HEMIPARESIS AFFECTING RIGHT SIDE AS LATE EFFECT OF CEREBROVASCULAR ACCIDENT (CVA): ICD-10-CM

## 2020-10-01 DIAGNOSIS — M54.9 DORSALGIA, UNSPECIFIED: ICD-10-CM

## 2020-10-01 DIAGNOSIS — M25.559 HIP PAIN: ICD-10-CM

## 2020-10-01 DIAGNOSIS — G89.29 CHRONIC LEFT-SIDED LOW BACK PAIN WITH LEFT-SIDED SCIATICA: Primary | ICD-10-CM

## 2020-10-01 DIAGNOSIS — M62.830 SPASM OF MUSCLE OF LOWER BACK: ICD-10-CM

## 2020-10-01 DIAGNOSIS — G89.29 CHRONIC LEFT-SIDED LOW BACK PAIN WITH LEFT-SIDED SCIATICA: ICD-10-CM

## 2020-10-01 DIAGNOSIS — M54.42 CHRONIC LEFT-SIDED LOW BACK PAIN WITH LEFT-SIDED SCIATICA: ICD-10-CM

## 2020-10-01 DIAGNOSIS — I63.9 BRAINSTEM STROKE: ICD-10-CM

## 2020-10-01 DIAGNOSIS — M67.951 TENDINOPATHY OF RIGHT GLUTEUS MEDIUS: ICD-10-CM

## 2020-10-01 DIAGNOSIS — M54.42 CHRONIC LEFT-SIDED LOW BACK PAIN WITH LEFT-SIDED SCIATICA: Primary | ICD-10-CM

## 2020-10-01 DIAGNOSIS — M25.559 HIP PAIN: Primary | ICD-10-CM

## 2020-10-01 DIAGNOSIS — E11.59 TYPE 2 DIABETES MELLITUS WITH OTHER CIRCULATORY COMPLICATION, WITHOUT LONG-TERM CURRENT USE OF INSULIN: Primary | ICD-10-CM

## 2020-10-01 PROCEDURE — 99213 OFFICE O/P EST LOW 20 MIN: CPT | Mod: 25,S$PBB,, | Performed by: PHYSICAL MEDICINE & REHABILITATION

## 2020-10-01 PROCEDURE — 72070 X-RAY EXAM THORAC SPINE 2VWS: CPT | Mod: 26,,, | Performed by: RADIOLOGY

## 2020-10-01 PROCEDURE — 72110 X-RAY EXAM L-2 SPINE 4/>VWS: CPT | Mod: 26,,, | Performed by: RADIOLOGY

## 2020-10-01 PROCEDURE — 20611 DRAIN/INJ JOINT/BURSA W/US: CPT | Mod: PBBFAC,PN | Performed by: PHYSICAL MEDICINE & REHABILITATION

## 2020-10-01 PROCEDURE — 72070 X-RAY EXAM THORAC SPINE 2VWS: CPT | Mod: TC,FY

## 2020-10-01 PROCEDURE — 72110 X-RAY EXAM L-2 SPINE 4/>VWS: CPT | Mod: TC,FY

## 2020-10-01 PROCEDURE — 99213 OFFICE O/P EST LOW 20 MIN: CPT | Mod: PBBFAC,25,PN | Performed by: PHYSICAL MEDICINE & REHABILITATION

## 2020-10-01 PROCEDURE — 72110 XR LUMBAR SPINE COMPLETE 5 VIEW: ICD-10-PCS | Mod: 26,,, | Performed by: RADIOLOGY

## 2020-10-01 PROCEDURE — 20611 LARGE JOINT ASPIRATION/INJECTION: R GREATER TROCHANTERIC BURSA: ICD-10-PCS | Mod: S$PBB,RT,, | Performed by: PHYSICAL MEDICINE & REHABILITATION

## 2020-10-01 PROCEDURE — 99213 PR OFFICE/OUTPT VISIT, EST, LEVL III, 20-29 MIN: ICD-10-PCS | Mod: 25,S$PBB,, | Performed by: PHYSICAL MEDICINE & REHABILITATION

## 2020-10-01 PROCEDURE — 99999 PR PBB SHADOW E&M-EST. PATIENT-LVL III: ICD-10-PCS | Mod: PBBFAC,,, | Performed by: PHYSICAL MEDICINE & REHABILITATION

## 2020-10-01 PROCEDURE — 72070 XR THORACIC SPINE AP LATERAL: ICD-10-PCS | Mod: 26,,, | Performed by: RADIOLOGY

## 2020-10-01 PROCEDURE — 99999 PR PBB SHADOW E&M-EST. PATIENT-LVL III: CPT | Mod: PBBFAC,,, | Performed by: PHYSICAL MEDICINE & REHABILITATION

## 2020-10-01 RX ORDER — METHYLPREDNISOLONE ACETATE 40 MG/ML
40 INJECTION, SUSPENSION INTRA-ARTICULAR; INTRALESIONAL; INTRAMUSCULAR; SOFT TISSUE
Status: DISCONTINUED | OUTPATIENT
Start: 2020-10-01 | End: 2020-10-01 | Stop reason: HOSPADM

## 2020-10-01 RX ADMIN — METHYLPREDNISOLONE ACETATE 40 MG: 40 INJECTION, SUSPENSION INTRA-ARTICULAR; INTRALESIONAL; INTRAMUSCULAR; SOFT TISSUE at 02:10

## 2020-10-01 NOTE — PROGRESS NOTES
Today's Date: 10/01/2020     Referring Provider: Dr. Evan Sánchez     Chief Complaint:   Chief Complaint   Patient presents with    Back Pain    Hip Pain       HPI: Tereza Larose is a 70 y.o. female  who presents today for evaluation and treatment of right lateral hip pain.  She has had this pain prior.  She had a right greater trochanteric bursa injection approximately 2 years ago which provided her with excellent relief for 1 year.  She complains of pain over the right lateral hip which radiates into the lateral thigh stopping at about the knee.  She describes the pain as an achy type sensation.  She denies any numbness or tingling in the leg or foot.  She denies any groin pain.  She describes the pain as an achy 5/10 pain.  Pain is worse with walking, sleeping on her right side, getting up out of a chair, and standing up straight.  Pain is better with lying in her bed on her back or on her left side.  She also complains of pain in the right side of her low back radiating up the paraspinals to approximately the scapular region.  She describes his pain as a dull achy spasm type pain.    Review of Systems   Constitutional: Negative for chills and fever.   HENT: Negative for congestion and tinnitus.    Eyes: Negative for blurred vision and photophobia.   Respiratory: Negative for shortness of breath and wheezing.    Cardiovascular: Negative for chest pain and palpitations.   Gastrointestinal: Negative for nausea and vomiting.   Genitourinary: Negative for dysuria and frequency.   Musculoskeletal: Positive for back pain, joint pain and myalgias.   Skin: Negative for itching and rash.   Neurological: Positive for sensory change. Negative for speech change and focal weakness.   Endo/Heme/Allergies: Negative for environmental allergies. Does not bruise/bleed easily.   Psychiatric/Behavioral: Negative for depression. The patient is not nervous/anxious.         Social History     Socioeconomic History    Marital  status:      Spouse name: Not on file    Number of children: Not on file    Years of education: Not on file    Highest education level: Not on file   Occupational History    Not on file   Social Needs    Financial resource strain: Not on file    Food insecurity     Worry: Not on file     Inability: Not on file    Transportation needs     Medical: Not on file     Non-medical: Not on file   Tobacco Use    Smoking status: Never Smoker    Smokeless tobacco: Never Used   Substance and Sexual Activity    Alcohol use: Yes     Comment: seldom    Drug use: No    Sexual activity: Never   Lifestyle    Physical activity     Days per week: Not on file     Minutes per session: Not on file    Stress: Not on file   Relationships    Social connections     Talks on phone: Not on file     Gets together: Not on file     Attends Spiritism service: Not on file     Active member of club or organization: Not on file     Attends meetings of clubs or organizations: Not on file     Relationship status: Not on file   Other Topics Concern    Are you pregnant or think you may be? Not Asked    Breast-feeding Not Asked   Social History Narrative    Not on file       Family History   Problem Relation Age of Onset    Arthritis Mother     Diabetes Mother     Heart disease Mother     Hypertension Mother     Hypertension Father     Heart disease Father     Mental illness Father     Asthma Sister     Diabetes Sister     Hypertension Sister     Asthma Son     COPD Son     Depression Son     Diabetes Son     Hypertension Son     Stroke Son     Diabetes Maternal Uncle     Heart disease Maternal Uncle     Mental illness Paternal Aunt     Cancer Paternal Aunt     Heart disease Paternal Aunt     Hypertension Paternal Aunt     Heart disease Paternal Uncle     Hypertension Maternal Grandmother     Mental illness Maternal Grandmother     Aneurysm Maternal Grandfather     Mental illness Paternal Grandmother      Heart disease Paternal Grandfather     Melanoma Neg Hx     Psoriasis Neg Hx     Lupus Neg Hx     Eczema Neg Hx        Current Outpatient Medications on File Prior to Visit   Medication Sig Dispense Refill    albuterol (VENTOLIN HFA) 90 mcg/actuation inhaler INHALE 2 PUFFS BY MOUTH EVERY 4 TO 6 HOURS AS NEEDED FOR SHORTNESS OF BREATH (Patient taking differently: Inhale 2 puffs into the lungs every 4 to 6 hours as needed. INHALE 2 PUFFS BY MOUTH EVERY 4 TO 6 HOURS AS NEEDED FOR SHORTNESS OF BREATH) 18 g 5    albuterol-ipratropium (DUO-NEB) 2.5 mg-0.5 mg/3 mL nebulizer solution USE ONE VIAL IN NEBULIZER EVERY 6 HOURS AS NEEDED FOR WHEEZING (Patient taking differently: Take 3 mLs by nebulization every 6 (six) hours as needed for Wheezing. ) 1620 mL 2    amLODIPine (NORVASC) 10 MG tablet TAKE ONE TABLET BY MOUTH ONCE DAILY- HOLD FOR SYSTOLIC BLOOD PRESSURE<120 (Patient taking differently: Take 10 mg by mouth once daily. TAKE ONE TABLET BY MOUTH ONCE DAILY- HOLD FOR SYSTOLIC BLOOD PRESSURE<120) 90 tablet 1    arformoterol (BROVANA) 15 mcg/2 mL Nebu Take 2 mLs (15 mcg total) by nebulization 2 (two) times daily. Controller (Patient not taking: Reported on 4/30/2020) 60 vial 11    aspirin (ECOTRIN) 81 MG EC tablet Take 81 mg by mouth once daily.      budesonide (PULMICORT) 0.5 mg/2 mL nebulizer solution Take 2 mLs (0.5 mg total) by nebulization 2 (two) times daily. Controller (Patient not taking: Reported on 4/30/2020) 120 mL 11    cholecalciferol, vitamin D3, (VITAMIN D3) 1,000 unit capsule Take 1,000 Units by mouth once daily.      cyanocobalamin (VITAMIN B-12) 1000 MCG tablet Take 100 mcg by mouth once daily.      dimenhyDRINATE (DRAMAMINE) 50 MG tablet Take 50 mg by mouth nightly as needed.      evolocumab (REPATHA SYRINGE) 140 mg/mL Syrg Inject 140 mg into the skin every 14 (fourteen) days.       FLUoxetine 20 MG capsule TAKE 1 CAPSULE(20 MG) BY MOUTH EVERY DAY 90 capsule 1    fluticasone  furoate-vilanterol (BREO ELLIPTA) 200-25 mcg/dose DsDv diskus inhaler Inhale 1 puff into the lungs once daily. Controller 1 each 11    L. ACIDOPHILUS/BIFIDO LONGUM (PROBIOTIC PEARLS ORAL) Take 1 each by mouth 3 (three) times a week.       lancets Misc True track Check glucose once daily 100 each 3    levothyroxine (SYNTHROID) 125 MCG tablet TAKE 1 TABLET(125 MCG) BY MOUTH EVERY DAY 90 tablet 2    metFORMIN (GLUCOPHAGE) 500 MG tablet Take 2 tablets (1,000 mg total) by mouth 2 (two) times daily with meals. 360 tablet 1    metoprolol succinate (TOPROL-XL) 50 MG 24 hr tablet Take 1 tablet (50 mg total) by mouth once daily. (Patient not taking: Reported on 4/30/2020) 30 tablet 11    montelukast (SINGULAIR) 10 mg tablet Take 1 tablet (10 mg total) by mouth every evening. 30 tablet 5    potassium chloride (MICRO-K) 8 mEq CpSR Take 1 capsule (8 mEq total) by mouth once daily. 90 capsule 1    predniSONE (DELTASONE) 20 MG tablet Take one daily for 3 days and may repeat for shortness of breath. 21 tablet 0    rosuvastatin (CRESTOR) 10 MG tablet Take 1 tablet (10 mg total) by mouth every other day. 45 tablet 3    theophylline 400 mg Tb24 Take 400 mg by mouth 2 (two) times daily. 60 tablet 5    torsemide (DEMADEX) 20 MG Tab Take 1 tablet (20 mg total) by mouth once daily. 30 tablet 5    traMADoL (ULTRAM) 50 mg tablet Take 1 tablet (50 mg total) by mouth every 6 (six) hours as needed for Pain. 40 tablet 0    vitamin E 400 UNIT capsule Take 400 Units by mouth once daily.       No current facility-administered medications on file prior to visit.         Review of patient's allergies indicates:   Allergen Reactions    Corn Itching     Other reaction(s): Sneezing  Other reaction(s): Rhinorrhea    Potato starch Hives     Other reaction(s): Sneezing  Other reaction(s): Rhinorrhea    Pravastatin Other (See Comments)     Muscle pain    Statins-hmg-coa reductase inhibitors Other (See Comments)    Hydrochlorothiazide  Other (See Comments)     weakness    Welchol [colesevelam] Other (See Comments)     Weakness        Past Surgical History:   Procedure Laterality Date    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/25/2017    Dr Griggs    ADRENALECTOMY      AORTIC VALVE REPLACEMENT  12/09/2016    arthroscopy lt knee Right     BLADDER REPAIR      sling    BREAST BIOPSY Bilateral     benign    COLONOSCOPY  2004    10 year recheck    CORONARY ARTERY BYPASS GRAFT  12/09/2016    X3    HYSTERECTOMY      INSERTION OF PACEMAKER Left 1/13/2020    Procedure: INSERTION, PACEMAKER;  Surgeon: Marko Batres MD;  Location: [facility] CATH[facility]EP [facility];  Service: Cardiology;  Laterality: Left;    OOPHORECTOMY      THYROIDECTOMY         Past Medical History:   Diagnosis Date    Allergy     Arthritis     Asthma     Brain bleed     COPD (chronic obstructive pulmonary disease)     Depression     Diabetes mellitus type II     Diverticulitis     Fatty liver     GERD (gastroesophageal reflux disease)     Goiter     s/p thyroidectomy    Heart murmur     Hernia of abdominal wall     History of intracranial hemorrhage 6/15/2013    History of non-ST elevation myocardial infarction (NSTEMI) 6/15/2013    Hyperlipidemia     Hypertension     Metabolic syndrome 6/14/2012    Myocardial infarction     On home oxygen therapy     states uses 2L at night or when sat < 90    Statin intolerance     Stroke 2012    left hand shaky, loss of balance     Vitals:    10/01/20 1419   BP: (!) 159/71   Pulse: 65       Physical Exam  Constitutional:       General: She is not in acute distress.     Appearance: She is obese.   HENT:      Head: Normocephalic and atraumatic.      Nose: Nose normal. No congestion.      Mouth/Throat:      Mouth: Mucous membranes are dry.   Eyes:      General: Lids are normal.      Extraocular Movements: Extraocular movements intact.      Pupils: Pupils are equal, round, and reactive to light.   Neck:      Trachea: Trachea and phonation normal.    Pulmonary:      Effort: Pulmonary effort is normal.      Breath sounds: Normal breath sounds.   Abdominal:      General: Abdomen is flat. There is no distension.   Musculoskeletal:      Lumbar back: She exhibits tenderness (Tenderness over the right lumbar paraspinal/multifidi I/external oblique) and pain. She exhibits no bony tenderness.        Back:    Skin:     General: Skin is warm and dry.   Neurological:      General: No focal deficit present.      Mental Status: She is alert and oriented to person, place, and time.      Cranial Nerves: Cranial nerves are intact.      Sensory: Sensory deficit (Altered sensation in the right anterior thigh otherwise symmetric throughout the lower extremities) present.      Motor: Motor function is intact.   Psychiatric:         Mood and Affect: Mood and affect normal.         Behavior: Behavior normal.        Right Hip Exam     Tenderness   The patient is experiencing tenderness in the greater trochanter.    Tests   ANUEL: positive             Chronic left-sided low back pain with left-sided sciatica  -     X-Ray Thoracic Spine AP Lateral; Future; Expected date: 10/01/2020    Hip pain  -     Ambulatory referral/consult to Physical Medicine Rehab    Bilateral low back pain, unspecified chronicity, unspecified whether sciatica present  -     Ambulatory referral/consult to Physical Medicine Rehab    Dorsalgia, unspecified  -     X-Ray Lumbar Spine Complete 5 View; Future; Expected date: 10/01/2020    Tendinopathy of right gluteus medius    Greater trochanteric bursitis, right    Spasm of muscle of lower back    Hemiparesis affecting right side as late effect of cerebrovascular accident (CVA)    Brainstem stroke           PLAN:   Tereza Larose is a 70 y.o. female with right lateral hip pain and right lateral thigh pain.  History and physical examination is consistent with right gluteus medius tendinosis with referred pain down the right lower extremity.  She has had a greater  trochanteric bursa injection in the past which provided her with 1 year of excellent relief.  Her pain has returned and is otherwise unchanged in location, duration, intensity, or characteristics.  At this time, I will proceed with a repeat right greater trochanteric bursa injection.  She also complains of pain in the right thoracic and lumbar region.  History and physical examination is consistent with likely myofascial pain.  I will order a x-ray of the thoracic and lumbar spine.  Should her pain continue or worsen in the low back, I will likely order a MRI of the lumbar spine.  She will follow up in clinic as needed.        Marko Robin D.O.  Physical Medicine and Rehabilitation          CC: Patients PCP: Evan Sánchez MD  CC: Referring Provider: Dr. Evan Sánchez

## 2020-10-01 NOTE — TELEPHONE ENCOUNTER
----- Message from Raven Emanuel sent at 10/1/2020 10:18 AM CDT -----  Contact: patient  Type: Needs Medical Advice  Who Called:  patient  Symptoms (please be specific):    How long has patient had these symptoms:    Pharmacy name and phone #:    Best Call Back Number: [phone]  Additional Information: requesting a call back to discuss labs results

## 2020-10-01 NOTE — LETTER
October 1, 2020      Evan Sánchez MD  [address]  [place] [address]           [place] [facility]  [address] [place] [address] [address]  [place] [address]  Phone: [phone]  Fax: [phone]          Patient: Tereza Larose   MR Number: [id number]   YOB: 1950   Date of Visit: 10/1/2020       Dear Dr. Evan Sánchez:    Thank you for referring Tereza Larose to me for evaluation. Attached you will find relevant portions of my assessment and plan of care.    If you have questions, please do not hesitate to call me. I look forward to following Tereza Larose along with you.    Sincerely,    Marko Robin MD    Enclosure  CC:  No Recipients    If you would like to receive this communication electronically, please contact [email or web address] or [phone] to request more information on [employer] access.    For providers and/or their staff who would like to refer a patient to [facility], please contact us through our one-stop-shop provider referral line, [facility], at [phone].    If you feel you have received this communication in error or would no longer like to receive these types of communications, please e-mail [email or web address]

## 2020-10-01 NOTE — PROGRESS NOTES
Chart was reviewed for overdue Proactive [facility] Encounters (PEEWEE)  topics  Updates were requested from care everywhere  Health Maintenance has been updated  LINKS immunization registry triggered

## 2020-10-01 NOTE — PROCEDURES
Large Joint Aspiration/Injection: R greater trochanteric bursa    Date/Time: 10/1/2020 2:40 PM  Performed by: Marko Robin MD  Authorized by: Marko Robin MD     Consent Done?:  Yes (Written)  Indications:  Pain and diagnostic evaluation  Timeout: prior to procedure the correct patient, procedure, and site was verified    Prep: patient was prepped and draped in usual sterile fashion      Local anesthesia used?: Yes    Anesthesia:  Local infiltration  Local anesthetic:  Lidocaine 1% without epinephrine  Ultrasonic Guidance for needle placement?: Yes    Images are saved and documented.  Location:  Hip  Site:  R greater trochanteric bursa  Medications:  40 mg methylPREDNISolone acetate 40 mg/mL    Procedure: Right greater trochanteric bursa injection with ultrasound guidance    Preprocedure diagnosis:  Right gluteus medius tendinosis    Postprocedure diagnosis:  Same    Anesthetic:  Local    Assistant:  None    Equipment used:  [id number] with a curvilinear transducer    Procedure in detail:  Risks and benefits were discussed and written informed consent was obtained.  Patient was placed in the left lateral decubitus position.  Using ultrasound, the anterior and lateral facets of the right greater trochanter were visualized at the insertion points of the gluteus minimus and medius tendons respectively.  Posteriorly, skin was cleaned with ChloraPrep and allowed to dry.  Skin and tract was anesthetized using a 27 gauge 1.5 in needle using approximately 2 cc of 1% lidocaine.  This needle was removed and was replaced with a  22 gauge 3.5 in needle which was advanced under direct ultrasound visualization into the greater trochanteric bursa superficial to the gluteus medius tendon do where a 4 cc solution of 3 cc of 0.50% ropivacaine and 1 cc of 40 milligrams/milliliter of depomedrol was injected under direct ultrasound visualization into the bursa.  Needle was removed and Band-Aid was applied.  The patient tolerated the  procedure well with no adverse events.

## 2020-10-13 DIAGNOSIS — J82.83 EOSINOPHILIC ASTHMA: ICD-10-CM

## 2020-10-13 DIAGNOSIS — I10 ESSENTIAL HYPERTENSION: ICD-10-CM

## 2020-10-13 DIAGNOSIS — J44.89 ASTHMA WITH CHRONIC OBSTRUCTIVE PULMONARY DISEASE (COPD): Chronic | ICD-10-CM

## 2020-10-13 DIAGNOSIS — R09.89 CHRONIC SINUS COMPLAINTS: ICD-10-CM

## 2020-10-13 RX ORDER — MONTELUKAST SODIUM 10 MG/1
10 TABLET ORAL NIGHTLY
Qty: 30 TABLET | Refills: 11 | Status: SHIPPED | OUTPATIENT
Start: 2020-10-13 | End: 2022-09-29

## 2020-10-13 NOTE — TELEPHONE ENCOUNTER
Care Due:                  Date            Visit Type   Department     Provider  --------------------------------------------------------------------------------                                ESTABLISHED                              PATIENT      [facility]  Last Visit: 11-      EXTENDED     MEDICINE       Evan Sánchez  Next Visit: None Scheduled  None         None Found                                                            Last  Test          Frequency    Reason                     Performed    Due Date  --------------------------------------------------------------------------------    Office Visit  12 months..  FLUoxetine, metFORMIN,     11- 11-                             rosuvastatin.............    Powered by [employer]. Reference number: [id number]. 10/13/2020 3:55:25 PM   CDT

## 2020-10-14 RX ORDER — AMLODIPINE BESYLATE 10 MG/1
TABLET ORAL
Qty: 90 TABLET | Refills: 0 | Status: SHIPPED | OUTPATIENT
Start: 2020-10-14 | End: 2021-01-12

## 2020-10-14 NOTE — TELEPHONE ENCOUNTER
Provider Staff:     Action is required for this patient.     Please schedule patient for Annual    Thanks!  [facility]     Appointments  past 12m or future 3m with PCP    Date Provider   Last Visit   11/29/2019 Evan Sánchez MD   Next Visit   Visit date not found Evan Sánchez MD     Note composed:8:48 AM 10/14/2020

## 2020-10-14 NOTE — PROGRESS NOTES
Refill Authorization Note   Tereza Larose is requesting a refill authorization.  Brief assessment and rationale for refill: Approve    -Medication-related problems identified: Requires appointment  Medication Therapy Plan: CDMR. need appt(ANNUAL); approve 3 more     Medication reconciliation completed: No   Comments:       Requested Prescriptions   Pending Prescriptions Disp Refills    amLODIPine (NORVASC) 10 MG tablet 90 tablet 0     Sig: TAKE ONE TABLET BY MOUTH ONCE DAILY- HOLD FOR SYSTOLIC BLOOD PRESSURE<120       Cardiovascular:  Calcium Channel Blockers Failed - 10/13/2020  3:54 PM        Failed - Last BP in normal range within 360 days     BP Readings from Last 3 Encounters:   10/01/20 (!) 159/71   04/30/20 (!) 162/88   01/29/20 (!) 152/67              Passed - Patient is at least 18 years old        Passed - Office Visit within last 12 months or future 90 days.     Recent Outpatient Visits            1 week ago Chronic left-sided low back pain with left-sided sciatica    [facility] Marko Robin MD    5 months ago Asthma with chronic obstructive pulmonary disease (COPD)    [facility] Chuck Vaca MD    8 months ago Pneumonia of left lung due to infectious organism, unspecified part of lung    [facility] Chuck Vaca MD    10 months ago Type 2 diabetes mellitus with other circulatory complication, without long-term current use of insulin    [facility] Evan Sánchez MD    10 months ago Asthma with chronic obstructive pulmonary disease (COPD)    [facility] Chuck Vaca MD          Future Appointments              Tomorrow [facility]    In 2 weeks MD [facility] - [facility]                    Appointments  past 12m or future 3m with PCP    Date Provider   Last Visit   11/29/2019 Evan Sánchez MD   Next Visit   Visit date not found Evan  JULIANNA Sánchez MD   ED visits in past 90 days: 0     Note composed:8:47 AM 10/14/2020

## 2020-10-15 ENCOUNTER — OFFICE VISIT (OUTPATIENT)
Dept: GASTROENTEROLOGY | Facility: CLINIC | Age: 70
End: 2020-10-15
Payer: MEDICARE

## 2020-10-15 DIAGNOSIS — R19.5 POSITIVE FIT (FECAL IMMUNOCHEMICAL TEST): Primary | ICD-10-CM

## 2020-10-15 DIAGNOSIS — Z01.818 PREOP TESTING: ICD-10-CM

## 2020-10-15 DIAGNOSIS — Z86.2 HISTORY OF ANEMIA: ICD-10-CM

## 2020-10-15 DIAGNOSIS — K59.09 INTERMITTENT CONSTIPATION: ICD-10-CM

## 2020-10-15 PROCEDURE — 99214 OFFICE O/P EST MOD 30 MIN: CPT | Mod: S$PBB,,, | Performed by: NURSE PRACTITIONER

## 2020-10-15 PROCEDURE — 99214 OFFICE O/P EST MOD 30 MIN: CPT | Mod: PBBFAC,PO | Performed by: NURSE PRACTITIONER

## 2020-10-15 PROCEDURE — 99999 PR PBB SHADOW E&M-EST. PATIENT-LVL IV: ICD-10-PCS | Mod: PBBFAC,,, | Performed by: NURSE PRACTITIONER

## 2020-10-15 PROCEDURE — 99999 PR PBB SHADOW E&M-EST. PATIENT-LVL IV: CPT | Mod: PBBFAC,,, | Performed by: NURSE PRACTITIONER

## 2020-10-15 PROCEDURE — 99214 PR OFFICE/OUTPT VISIT, EST, LEVL IV, 30-39 MIN: ICD-10-PCS | Mod: S$PBB,,, | Performed by: NURSE PRACTITIONER

## 2020-10-15 RX ORDER — BEMPEDOIC ACID 180 MG/1
1 TABLET, FILM COATED ORAL DAILY
Status: ON HOLD | COMMUNITY

## 2020-10-15 NOTE — PROGRESS NOTES
Subjective:       Patient ID: Tereza Larose is a 70 y.o. female, There is no height or weight on file to calculate BMI.    Chief Complaint: Rectal Bleeding and Other (pos fit test)      Patient is new to me. Referred by Dr. Sánchez for positive FIT.    GI Problem  Primary symptoms do not include fever, weight loss, fatigue, abdominal pain, nausea, vomiting, diarrhea, melena, hematemesis, jaundice, hematochezia, dysuria or rash.   The illness is also significant for constipation (chronic problem; bowel movements are typically once per day; occ will go couple days without a bowel movement; takes Milk of Magnesia OTC PRN with relief). The illness does not include chills, anorexia, dysphagia, odynophagia or bloating. Associated symptoms comments: Presents to clinic for further evaluation of positive FIT done 8/4/2020. Last c-scope done over 8 years ago; unremarkable per pt report. Feeling well, no complaints. . Significant associated medical issues include hemorrhoids. Associated medical issues do not include inflammatory bowel disease, GERD, gallstones, liver disease, alcohol abuse, PUD, gastric bypass, bowel resection, irritable bowel syndrome or diverticulitis.     Review of Systems   Constitutional: Negative for appetite change, chills, fatigue, fever, unexpected weight change and weight loss.   HENT: Negative for trouble swallowing.    Respiratory: Negative for cough and shortness of breath.    Cardiovascular: Negative for chest pain.   Gastrointestinal: Positive for constipation (chronic problem; bowel movements are typically once per day; occ will go couple days without a bowel movement; takes Milk of Magnesia OTC PRN with relief). Negative for abdominal distention, abdominal pain, anal bleeding, anorexia, bloating, blood in stool, diarrhea, dysphagia, hematemesis, hematochezia, jaundice, melena, nausea, rectal pain and vomiting.   Genitourinary: Negative for difficulty urinating and dysuria.   Musculoskeletal:  Positive for gait problem.   Skin: Negative for rash.   Neurological: Negative for speech difficulty.   Psychiatric/Behavioral: Negative for confusion.       Past Medical History:   Diagnosis Date    Allergy     Arthritis     Asthma     Brain bleed     COPD (chronic obstructive pulmonary disease)     Depression     Diabetes mellitus type II     Diverticulitis     Fatty liver     GERD (gastroesophageal reflux disease)     Goiter     s/p thyroidectomy    Heart murmur     Hernia of abdominal wall     History of intracranial hemorrhage 6/15/2013    History of non-ST elevation myocardial infarction (NSTEMI) 6/15/2013    Hyperlipidemia     Hypertension     Metabolic syndrome 6/14/2012    Myocardial infarction     On home oxygen therapy     states uses 2L at night or when sat < 90    Statin intolerance     Stroke 2012    left hand shaky, loss of balance      Past Surgical History:   Procedure Laterality Date    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/25/2017    Dr Griggs    ADRENALECTOMY      AORTIC VALVE REPLACEMENT  12/09/2016    arthroscopy lt knee Right     BLADDER REPAIR      sling    BREAST BIOPSY Bilateral     benign    COLONOSCOPY  2004    10 year recheck    CORONARY ARTERY BYPASS GRAFT  12/09/2016    X3    HYSTERECTOMY      INSERTION OF PACEMAKER Left 1/13/2020    Procedure: INSERTION, PACEMAKER;  Surgeon: Marko Batres MD;  [facility] CATH[facility]EP LAB;  Service: Cardiology;  Laterality: Left;    OOPHORECTOMY      THYROIDECTOMY        Family History   Problem Relation Age of Onset    Arthritis Mother     Diabetes Mother     Heart disease Mother     Hypertension Mother     Hypertension Father     Heart disease Father     Mental illness Father     Asthma Sister     Diabetes Sister     Hypertension Sister     Asthma Son     COPD Son     Depression Son     Diabetes Son     Hypertension Son     Stroke Son     Diabetes Maternal Uncle     Heart disease Maternal Uncle     Mental  illness Paternal Aunt     Cancer Paternal Aunt     Heart disease Paternal Aunt     Hypertension Paternal Aunt     Heart disease Paternal Uncle     Hypertension Maternal Grandmother     Mental illness Maternal Grandmother     Aneurysm Maternal Grandfather     Mental illness Paternal Grandmother     Heart disease Paternal Grandfather     Melanoma Neg Hx     Psoriasis Neg Hx     Lupus Neg Hx     Eczema Neg Hx       Wt Readings from Last 10 Encounters:   10/01/20 79 kg (174 lb 2.6 oz)   10/01/20 81.2 kg (179 lb)   04/30/20 81.5 kg (179 lb 12.6 oz)   01/29/20 82.2 kg (181 lb 5.3 oz)   01/10/20 86.6 kg (190 lb 14.7 oz)   11/29/19 80.7 kg (177 lb 14.6 oz)   11/21/19 80.7 kg (178 lb 0.3 oz)   03/11/19 78.4 kg (172 lb 13.5 oz)   03/08/19 76.9 kg (169 lb 8.5 oz)   11/05/18 79.8 kg (176 lb)     Lab Results   Component Value Date    WBC 10.42 01/14/2020    HGB 11.9 (L) 01/14/2020    HCT 36.9 (L) 01/14/2020    MCV 95 01/14/2020     01/14/2020     CMP  Sodium   Date Value Ref Range Status   09/16/2020 146 (H) 136 - 145 mmol/L Final     Potassium   Date Value Ref Range Status   09/16/2020 4.3 3.5 - 5.1 mmol/L Final     Chloride   Date Value Ref Range Status   09/16/2020 106 95 - 110 mmol/L Final     CO2   Date Value Ref Range Status   09/16/2020 30 (H) 23 - 29 mmol/L Final     Glucose   Date Value Ref Range Status   09/16/2020 124 (H) 70 - 110 mg/dL Final     BUN, Bld   Date Value Ref Range Status   09/16/2020 17 8 - 23 mg/dL Final     Creatinine   Date Value Ref Range Status   09/16/2020 0.8 0.5 - 1.4 mg/dL Final   10/03/2012 0.6 0.2 - 1.4 mg/dl Final     Calcium   Date Value Ref Range Status   09/16/2020 9.3 8.7 - 10.5 mg/dL Final   10/03/2012 8.9 8.6 - 10.2 mg/dl Final     Total Protein   Date Value Ref Range Status   09/16/2020 7.4 6.0 - 8.4 g/dL Final     Albumin   Date Value Ref Range Status   09/16/2020 3.7 3.5 - 5.2 g/dL Final   10/21/2016 4.1 3.6 - 5.1 g/dL Final     Comment:     @ Test Performed  By:  [facility]  Domenico Blair M.D., [address]   [address] [place] [address]  [facility]  [id number]       Total Bilirubin   Date Value Ref Range Status   09/16/2020 0.3 0.1 - 1.0 mg/dL Final     Comment:     For infants and newborns, interpretation of results should be based  on gestational age, weight and in agreement with clinical  observations.  Premature Infant recommended reference ranges:  Up to 24 hours.............<8.0 mg/dL  Up to 48 hours............<12.0 mg/dL  3-5 days..................<15.0 mg/dL  6-29 days.................<15.0 mg/dL       Alkaline Phosphatase   Date Value Ref Range Status   09/16/2020 103 55 - 135 U/L Final     AST   Date Value Ref Range Status   09/16/2020 19 10 - 40 U/L Final     ALT   Date Value Ref Range Status   09/16/2020 13 10 - 44 U/L Final     Anion Gap   Date Value Ref Range Status   09/16/2020 10 8 - 16 mmol/L Final   10/03/2012 10 5 - 15 meq/L Final     eGFR if    Date Value Ref Range Status   09/16/2020 >60.0 >60 mL/min/1.73 m^2 Final     eGFR if non    Date Value Ref Range Status   09/16/2020 >60.0 >60 mL/min/1.73 m^2 Final     Comment:     Calculation used to obtain the estimated glomerular filtration  rate (eGFR) is the CKD-EPI equation.           Lab Results   Component Value Date    TSH 7.644 (H) 06/10/2020          Reviewed prior medical records including endoscopy history (see surgical history).     Objective:      Physical Exam  Constitutional:       General: She is not in acute distress.     Appearance: She is well-developed.   HENT:      Head: Normocephalic.      Right Ear: Hearing normal.      Left Ear: Hearing normal.      Nose: Nose normal.      Mouth/Throat:      Comments: Pt wearing mask due to COVID concerns  Eyes:      General: Lids are normal.      Conjunctiva/sclera: Conjunctivae normal.      Pupils: Pupils are equal, round, and reactive to light.   Neck:       Musculoskeletal: Normal range of motion.      Trachea: Trachea normal.   Cardiovascular:      Rate and Rhythm: Normal rate and regular rhythm.      Heart sounds: Murmur present.   Pulmonary:      Effort: Pulmonary effort is normal. No respiratory distress.      Breath sounds: Normal breath sounds. No stridor. No wheezing.   Abdominal:      General: Bowel sounds are normal. There is no distension.      Palpations: Abdomen is soft. There is no mass.      Tenderness: There is no abdominal tenderness. There is no guarding or rebound.      Comments: Well healed surgical scars noted   Musculoskeletal: Normal range of motion.      Comments: Ambulates with cane   Skin:     General: Skin is warm and dry.      Findings: No rash.      Comments: Non jaundiced   Neurological:      Mental Status: She is alert and oriented to person, place, and time.   Psychiatric:         Speech: Speech normal.         Behavior: Behavior normal. Behavior is cooperative.           Assessment:       1. Positive FIT (fecal immunochemical test)    2. Intermittent constipation    3. History of anemia           Plan:   All diagnoses and orders for this visit:    Positive FIT (fecal immunochemical test)   - Schedule Colonoscopy    Intermittent constipation   - Recommend daily exercise as tolerated, adequate water intake, and high fiber diet.    - Recommend OTC fiber supplement   - Recommend OTC stool softener    - Recommend OTC MiraLax once daily (17g PO) as directed    History of anemia   - Recommend follow-up with PCP for continued evaluation and management    If no improvement in symptoms or symptoms worsen, call/follow-up at clinic or go to ER

## 2020-10-15 NOTE — LETTER
October 15, 2020      Evan Sánchez MD  [address]  [place] [address]           [facility] [facility]  [address]  [facility] [address]  Phone: [phone]          Patient: Tereza Larose   MR Number: [id number]   YOB: 1950   Date of Visit: 10/15/2020       Dear Dr. Evan Sánchez:    Thank you for referring Tereza Larose to me for evaluation. Attached you will find relevant portions of my assessment and plan of care.    If you have questions, please do not hesitate to call me. I look forward to following Tereza Larose along with you.    Sincerely,    Joy Carmen, NP    Enclosure  CC:  No Recipients    If you would like to receive this communication electronically, please contact [email or web address] or [phone] to request more information on [employer] Link access.    For providers and/or their staff who would like to refer a patient to [facility], please contact us through our one-stop-shop provider referral line, [facility] , at [phone].    If you feel you have received this communication in error or would no longer like to receive these types of communications, please e-mail [email or web address]

## 2020-10-15 NOTE — PATIENT INSTRUCTIONS
Constipation (Adult)  Constipation means that you have bowel movements that are less frequent than usual. Stools often become very hard and difficult to pass.  Constipation is very common. At some point in life it affects almost everyone. Since everyone's bowel habits are different, what is constipation to one person may not be to another. Your healthcare provider may do tests to diagnose constipation. It depends on what he or she finds when evaluating you.    Symptoms of constipation include:  · Abdominal pain  · Bloating  · Vomiting  · Painful bowel movements  · Itching, swelling, bleeding, or pain around the anus  Causes  Constipation can have many causes. These include:  · Diet low in fiber  · Too much dairy  · Not drinking enough liquids  · Lack of exercise or physical activity. This is especially true for older adults.  · Changes in lifestyle or daily routine, including pregnancy, aging, work, and travel  · Frequent use or misuse of laxatives  · Ignoring the urge to have a bowel movement or delaying it until later  · Medicines, such as certain prescription pain medicines, iron supplements, antacids, certain antidepressants, and calcium supplements  · Diseases like irritable bowel syndrome, bowel obstructions, stroke, diabetes, thyroid disease, Parkinson disease, hemorrhoids, and colon cancer  Complications  Potential complications of constipation can include:  · Hemorrhoids  · Rectal bleeding from hemorrhoids or anal fissures (skin tears)  · Hernias  · Dependency on laxatives  · Chronic constipation  · Fecal impaction  · Bowel obstruction or perforation  Home care  All treatment should be done after talking with your healthcare provider. This is especially true if you have another medical problems, are taking prescription medicines, or are an older adult. Treatment most often involves lifestyle changes. You may also need medicines. Your healthcare provider will tell you which will work best for you. Follow  the advice below to help avoid this problem in the future.  Lifestyle changes  These lifestyle changes can help prevent constipation:  · Diet. Eat a high-fiber diet, with fresh fruit and vegetables, and reduce dairy intake, meats, and processed foods  · Fluids. It's important to get enough fluids each day. Drink plenty of water when you eat more fiber. If you are on diet that limits the amount of fluid you can have, talk about this with your healthcare provider.  · Regular exercise. Check with your healthcare provider first.  Medications  Take any medicines as directed. Some laxatives are safe to use only every now and then. Others can be taken on a regular basis. Talk with your doctor or pharmacist if you have questions.  Prescription pain medicines can cause constipation. If you are taking this kind of medicine, ask your healthcare provider if you should also take a stool softener.  Medicines you may take to treat constipation include:  · Fiber supplements  · Stool softeners  · Laxatives  · Enemas  · Rectal suppositories  Follow-up care  Follow up with your healthcare provider if symptoms don't get better in the next few days. You may need to have more tests or see a specialist.  Call 911  Call 911 if any of these occur:  · Trouble breathing  · Stiff, rigid abdomen that is severely painful to touch  · Confusion  · Fainting or loss of consciousness  · Rapid heart rate  · Chest pain  When to seek medical advice  Call your healthcare provider right away if any of these occur:  · Fever over 100.4°F (38°C)  · Failure to resume normal bowel movements  · Pain in your abdomen or back gets worse  · Nausea or vomiting  · Swelling in your abdomen  · Blood in the stool  · Black, tarry stool  · Involuntary weight loss  · Weakness  Date Last Reviewed: 12/30/2015  © 6475-9866 [employer]. [address] [place] [address], [place] [address]. All rights reserved. This information is not intended as a substitute for  professional medical care. Always follow your healthcare professional's instructions.

## 2020-10-15 NOTE — H&P (VIEW-ONLY)
Subjective:       Patient ID: Tereza Larose is a 70 y.o. female, There is no height or weight on file to calculate BMI.    Chief Complaint: Rectal Bleeding and Other (pos fit test)      Patient is new to me. Referred by Dr. Sánchez for positive FIT.    GI Problem  Primary symptoms do not include fever, weight loss, fatigue, abdominal pain, nausea, vomiting, diarrhea, melena, hematemesis, jaundice, hematochezia, dysuria or rash.   The illness is also significant for constipation (chronic problem; bowel movements are typically once per day; occ will go couple days without a bowel movement; takes Milk of Magnesia OTC PRN with relief). The illness does not include chills, anorexia, dysphagia, odynophagia or bloating. Associated symptoms comments: Presents to clinic for further evaluation of positive FIT done 8/4/2020. Last c-scope done over 8 years ago; unremarkable per pt report. Feeling well, no complaints. . Significant associated medical issues include hemorrhoids. Associated medical issues do not include inflammatory bowel disease, GERD, gallstones, liver disease, alcohol abuse, PUD, gastric bypass, bowel resection, irritable bowel syndrome or diverticulitis.     Review of Systems   Constitutional: Negative for appetite change, chills, fatigue, fever, unexpected weight change and weight loss.   HENT: Negative for trouble swallowing.    Respiratory: Negative for cough and shortness of breath.    Cardiovascular: Negative for chest pain.   Gastrointestinal: Positive for constipation (chronic problem; bowel movements are typically once per day; occ will go couple days without a bowel movement; takes Milk of Magnesia OTC PRN with relief). Negative for abdominal distention, abdominal pain, anal bleeding, anorexia, bloating, blood in stool, diarrhea, dysphagia, hematemesis, hematochezia, jaundice, melena, nausea, rectal pain and vomiting.   Genitourinary: Negative for difficulty urinating and dysuria.   Musculoskeletal:  Positive for gait problem.   Skin: Negative for rash.   Neurological: Negative for speech difficulty.   Psychiatric/Behavioral: Negative for confusion.       Past Medical History:   Diagnosis Date    Allergy     Arthritis     Asthma     Brain bleed     COPD (chronic obstructive pulmonary disease)     Depression     Diabetes mellitus type II     Diverticulitis     Fatty liver     GERD (gastroesophageal reflux disease)     Goiter     s/p thyroidectomy    Heart murmur     Hernia of abdominal wall     History of intracranial hemorrhage 6/15/2013    History of non-ST elevation myocardial infarction (NSTEMI) 6/15/2013    Hyperlipidemia     Hypertension     Metabolic syndrome 6/14/2012    Myocardial infarction     On home oxygen therapy     states uses 2L at night or when sat < 90    Statin intolerance     Stroke 2012    left hand shaky, loss of balance      Past Surgical History:   Procedure Laterality Date    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/25/2017    Dr Griggs    ADRENALECTOMY      AORTIC VALVE REPLACEMENT  12/09/2016    arthroscopy lt knee Right     BLADDER REPAIR      sling    BREAST BIOPSY Bilateral     benign    COLONOSCOPY  2004    10 year recheck    CORONARY ARTERY BYPASS GRAFT  12/09/2016    X3    HYSTERECTOMY      INSERTION OF PACEMAKER Left 1/13/2020    Procedure: INSERTION, PACEMAKER;  Surgeon: Marko Batres MD;  [facility] CATH[facility]EP LAB;  Service: Cardiology;  Laterality: Left;    OOPHORECTOMY      THYROIDECTOMY        Family History   Problem Relation Age of Onset    Arthritis Mother     Diabetes Mother     Heart disease Mother     Hypertension Mother     Hypertension Father     Heart disease Father     Mental illness Father     Asthma Sister     Diabetes Sister     Hypertension Sister     Asthma Son     COPD Son     Depression Son     Diabetes Son     Hypertension Son     Stroke Son     Diabetes Maternal Uncle     Heart disease Maternal Uncle     Mental  illness Paternal Aunt     Cancer Paternal Aunt     Heart disease Paternal Aunt     Hypertension Paternal Aunt     Heart disease Paternal Uncle     Hypertension Maternal Grandmother     Mental illness Maternal Grandmother     Aneurysm Maternal Grandfather     Mental illness Paternal Grandmother     Heart disease Paternal Grandfather     Melanoma Neg Hx     Psoriasis Neg Hx     Lupus Neg Hx     Eczema Neg Hx       Wt Readings from Last 10 Encounters:   10/01/20 79 kg (174 lb 2.6 oz)   10/01/20 81.2 kg (179 lb)   04/30/20 81.5 kg (179 lb 12.6 oz)   01/29/20 82.2 kg (181 lb 5.3 oz)   01/10/20 86.6 kg (190 lb 14.7 oz)   11/29/19 80.7 kg (177 lb 14.6 oz)   11/21/19 80.7 kg (178 lb 0.3 oz)   03/11/19 78.4 kg (172 lb 13.5 oz)   03/08/19 76.9 kg (169 lb 8.5 oz)   11/05/18 79.8 kg (176 lb)     Lab Results   Component Value Date    WBC 10.42 01/14/2020    HGB 11.9 (L) 01/14/2020    HCT 36.9 (L) 01/14/2020    MCV 95 01/14/2020     01/14/2020     CMP  Sodium   Date Value Ref Range Status   09/16/2020 146 (H) 136 - 145 mmol/L Final     Potassium   Date Value Ref Range Status   09/16/2020 4.3 3.5 - 5.1 mmol/L Final     Chloride   Date Value Ref Range Status   09/16/2020 106 95 - 110 mmol/L Final     CO2   Date Value Ref Range Status   09/16/2020 30 (H) 23 - 29 mmol/L Final     Glucose   Date Value Ref Range Status   09/16/2020 124 (H) 70 - 110 mg/dL Final     BUN, Bld   Date Value Ref Range Status   09/16/2020 17 8 - 23 mg/dL Final     Creatinine   Date Value Ref Range Status   09/16/2020 0.8 0.5 - 1.4 mg/dL Final   10/03/2012 0.6 0.2 - 1.4 mg/dl Final     Calcium   Date Value Ref Range Status   09/16/2020 9.3 8.7 - 10.5 mg/dL Final   10/03/2012 8.9 8.6 - 10.2 mg/dl Final     Total Protein   Date Value Ref Range Status   09/16/2020 7.4 6.0 - 8.4 g/dL Final     Albumin   Date Value Ref Range Status   09/16/2020 3.7 3.5 - 5.2 g/dL Final   10/21/2016 4.1 3.6 - 5.1 g/dL Final     Comment:     @ Test Performed  By:  [employer] [facility]  Domenico Blair M.D., [address]   [address] [place] [address]  [facility]  [id number]       Total Bilirubin   Date Value Ref Range Status   09/16/2020 0.3 0.1 - 1.0 mg/dL Final     Comment:     For infants and newborns, interpretation of results should be based  on gestational age, weight and in agreement with clinical  observations.  Premature Infant recommended reference ranges:  Up to 24 hours.............<8.0 mg/dL  Up to 48 hours............<12.0 mg/dL  3-5 days..................<15.0 mg/dL  6-29 days.................<15.0 mg/dL       Alkaline Phosphatase   Date Value Ref Range Status   09/16/2020 103 55 - 135 U/L Final     AST   Date Value Ref Range Status   09/16/2020 19 10 - 40 U/L Final     ALT   Date Value Ref Range Status   09/16/2020 13 10 - 44 U/L Final     Anion Gap   Date Value Ref Range Status   09/16/2020 10 8 - 16 mmol/L Final   10/03/2012 10 5 - 15 meq/L Final     eGFR if    Date Value Ref Range Status   09/16/2020 >60.0 >60 mL/min/1.73 m^2 Final     eGFR if non    Date Value Ref Range Status   09/16/2020 >60.0 >60 mL/min/1.73 m^2 Final     Comment:     Calculation used to obtain the estimated glomerular filtration  rate (eGFR) is the CKD-EPI equation.           Lab Results   Component Value Date    TSH 7.644 (H) 06/10/2020          Reviewed prior medical records including endoscopy history (see surgical history).     Objective:      Physical Exam  Constitutional:       General: She is not in acute distress.     Appearance: She is well-developed.   HENT:      Head: Normocephalic.      Right Ear: Hearing normal.      Left Ear: Hearing normal.      Nose: Nose normal.      Mouth/Throat:      Comments: Pt wearing mask due to COVID concerns  Eyes:      General: Lids are normal.      Conjunctiva/sclera: Conjunctivae normal.      Pupils: Pupils are equal, round, and reactive to light.   Neck:       Musculoskeletal: Normal range of motion.      Trachea: Trachea normal.   Cardiovascular:      Rate and Rhythm: Normal rate and regular rhythm.      Heart sounds: Murmur present.   Pulmonary:      Effort: Pulmonary effort is normal. No respiratory distress.      Breath sounds: Normal breath sounds. No stridor. No wheezing.   Abdominal:      General: Bowel sounds are normal. There is no distension.      Palpations: Abdomen is soft. There is no mass.      Tenderness: There is no abdominal tenderness. There is no guarding or rebound.      Comments: Well healed surgical scars noted   Musculoskeletal: Normal range of motion.      Comments: Ambulates with cane   Skin:     General: Skin is warm and dry.      Findings: No rash.      Comments: Non jaundiced   Neurological:      Mental Status: She is alert and oriented to person, place, and time.   Psychiatric:         Speech: Speech normal.         Behavior: Behavior normal. Behavior is cooperative.           Assessment:       1. Positive FIT (fecal immunochemical test)    2. Intermittent constipation    3. History of anemia           Plan:   All diagnoses and orders for this visit:    Positive FIT (fecal immunochemical test)   - Schedule Colonoscopy    Intermittent constipation   - Recommend daily exercise as tolerated, adequate water intake, and high fiber diet.    - Recommend OTC fiber supplement   - Recommend OTC stool softener    - Recommend OTC MiraLax once daily (17g PO) as directed    History of anemia   - Recommend follow-up with PCP for continued evaluation and management    If no improvement in symptoms or symptoms worsen, call/follow-up at clinic or go to ER

## 2020-11-03 ENCOUNTER — LAB VISIT (OUTPATIENT)
Dept: PRIMARY CARE CLINIC | Facility: CLINIC | Age: 70
End: 2020-11-03
Payer: MEDICARE

## 2020-11-03 DIAGNOSIS — Z01.818 PRE-OP TESTING: ICD-10-CM

## 2020-11-03 PROCEDURE — U0003 INFECTIOUS AGENT DETECTION BY NUCLEIC ACID (DNA OR RNA); SEVERE ACUTE RESPIRATORY SYNDROME CORONAVIRUS 2 (SARS-COV-2) (CORONAVIRUS DISEASE [COVID-19]), AMPLIFIED PROBE TECHNIQUE, MAKING USE OF HIGH THROUGHPUT TECHNOLOGIES AS DESCRIBED BY CMS-2020-01-R: HCPCS

## 2020-11-04 LAB — SARS-COV-2 RNA RESP QL NAA+PROBE: NOT DETECTED

## 2020-11-06 ENCOUNTER — ANESTHESIA EVENT (OUTPATIENT)
Dept: ENDOSCOPY | Facility: HOSPITAL | Age: 70
End: 2020-11-06
Payer: MEDICARE

## 2020-11-06 ENCOUNTER — ANESTHESIA (OUTPATIENT)
Dept: ENDOSCOPY | Facility: HOSPITAL | Age: 70
End: 2020-11-06
Payer: MEDICARE

## 2020-11-06 ENCOUNTER — HOSPITAL ENCOUNTER (OUTPATIENT)
Facility: HOSPITAL | Age: 70
Discharge: HOME | End: 2020-11-06
Attending: INTERNAL MEDICINE | Admitting: INTERNAL MEDICINE
Payer: MEDICARE

## 2020-11-06 VITALS
WEIGHT: 161 LBS | SYSTOLIC BLOOD PRESSURE: 156 MMHG | DIASTOLIC BLOOD PRESSURE: 68 MMHG | BODY MASS INDEX: 29.63 KG/M2 | TEMPERATURE: 98 F | OXYGEN SATURATION: 94 % | RESPIRATION RATE: 18 BRPM | HEIGHT: 62 IN | HEART RATE: 75 BPM

## 2020-11-06 DIAGNOSIS — R19.5 POSITIVE FIT (FECAL IMMUNOCHEMICAL TEST): ICD-10-CM

## 2020-11-06 DIAGNOSIS — D12.5 ADENOMATOUS POLYP OF SIGMOID COLON: Primary | ICD-10-CM

## 2020-11-06 PROBLEM — K63.5 COLON POLYPS: Status: ACTIVE | Noted: 2020-11-06

## 2020-11-06 PROCEDURE — 27201089 HC SNARE, DISP (ANY): Performed by: INTERNAL MEDICINE

## 2020-11-06 PROCEDURE — 88305 TISSUE EXAM BY PATHOLOGIST: CPT | Performed by: PATHOLOGY

## 2020-11-06 PROCEDURE — 37000008 HC ANESTHESIA 1ST 15 MINUTES: Performed by: INTERNAL MEDICINE

## 2020-11-06 PROCEDURE — D9220A PRA ANESTHESIA: Mod: CRNA,,, | Performed by: NURSE ANESTHETIST, CERTIFIED REGISTERED

## 2020-11-06 PROCEDURE — 25000003 PHARM REV CODE 250: Performed by: NURSE ANESTHETIST, CERTIFIED REGISTERED

## 2020-11-06 PROCEDURE — 88305 TISSUE EXAM BY PATHOLOGIST: ICD-10-PCS | Mod: 26,,, | Performed by: PATHOLOGY

## 2020-11-06 PROCEDURE — D9220A PRA ANESTHESIA: ICD-10-PCS | Mod: CRNA,,, | Performed by: NURSE ANESTHETIST, CERTIFIED REGISTERED

## 2020-11-06 PROCEDURE — D9220A PRA ANESTHESIA: Mod: ANES,,, | Performed by: ANESTHESIOLOGY

## 2020-11-06 PROCEDURE — 45385 COLONOSCOPY W/LESION REMOVAL: CPT | Mod: ,,, | Performed by: INTERNAL MEDICINE

## 2020-11-06 PROCEDURE — 37000009 HC ANESTHESIA EA ADD 15 MINS: Performed by: INTERNAL MEDICINE

## 2020-11-06 PROCEDURE — 45385 PR COLONOSCOPY,REMV LESN,SNARE: ICD-10-PCS | Mod: ,,, | Performed by: INTERNAL MEDICINE

## 2020-11-06 PROCEDURE — 63600175 PHARM REV CODE 636 W HCPCS: Performed by: NURSE ANESTHETIST, CERTIFIED REGISTERED

## 2020-11-06 PROCEDURE — 45385 COLONOSCOPY W/LESION REMOVAL: CPT | Performed by: INTERNAL MEDICINE

## 2020-11-06 PROCEDURE — 25000003 PHARM REV CODE 250: Performed by: INTERNAL MEDICINE

## 2020-11-06 PROCEDURE — 88305 TISSUE EXAM BY PATHOLOGIST: CPT | Mod: 26,,, | Performed by: PATHOLOGY

## 2020-11-06 PROCEDURE — D9220A PRA ANESTHESIA: ICD-10-PCS | Mod: ANES,,, | Performed by: ANESTHESIOLOGY

## 2020-11-06 RX ORDER — LIDOCAINE HYDROCHLORIDE 20 MG/ML
INJECTION INTRAVENOUS
Status: DISCONTINUED | OUTPATIENT
Start: 2020-11-06 | End: 2020-11-06

## 2020-11-06 RX ORDER — SODIUM CHLORIDE 9 MG/ML
INJECTION, SOLUTION INTRAVENOUS CONTINUOUS
Status: DISCONTINUED | OUTPATIENT
Start: 2020-11-06 | End: 2020-11-06 | Stop reason: HOSPADM

## 2020-11-06 RX ORDER — PROPOFOL 10 MG/ML
VIAL (ML) INTRAVENOUS
Status: DISCONTINUED | OUTPATIENT
Start: 2020-11-06 | End: 2020-11-06

## 2020-11-06 RX ADMIN — PROPOFOL 100 MG: 10 INJECTION, EMULSION INTRAVENOUS at 01:11

## 2020-11-06 RX ADMIN — SODIUM CHLORIDE: 0.9 INJECTION, SOLUTION INTRAVENOUS at 01:11

## 2020-11-06 RX ADMIN — PROPOFOL 50 MG: 10 INJECTION, EMULSION INTRAVENOUS at 02:11

## 2020-11-06 RX ADMIN — PROPOFOL 50 MG: 10 INJECTION, EMULSION INTRAVENOUS at 01:11

## 2020-11-06 RX ADMIN — LIDOCAINE HYDROCHLORIDE 50 MG: 20 INJECTION, SOLUTION INTRAVENOUS at 01:11

## 2020-11-06 NOTE — TRANSFER OF CARE
"Anesthesia Transfer of Care Note    Patient: Tereza Larose    Procedure(s) Performed: Procedure(s) (LRB):  COLONOSCOPY (N/A)    Patient location: PACU    Anesthesia Type: general    Transport from OR: Transported from OR on 2-3 L/min O2 by NC with adequate spontaneous ventilation    Post pain: adequate analgesia    Post assessment: no apparent anesthetic complications and tolerated procedure well    Post vital signs: stable    Level of consciousness: sedated    Nausea/Vomiting: no nausea/vomiting    Complications: none    Transfer of care protocol was followed      Last vitals:   Visit Vitals  /60 (BP Location: Left arm, Patient Position: Lying)   Pulse 80   Temp 36.7 °C (98.1 °F) (Skin)   Resp 16   Ht 5' 2" (1.575 m)   Wt 73 kg (161 lb)   SpO2 (!) 94%   Breastfeeding No   BMI 29.45 kg/m²     "

## 2020-11-06 NOTE — ANESTHESIA POSTPROCEDURE EVALUATION
Anesthesia Post Evaluation    Patient: Tereza Larose    Procedure(s) Performed: Procedure(s) (LRB):  COLONOSCOPY (N/A)    Final Anesthesia Type: general    Patient location during evaluation: PACU  Patient participation: Yes- Able to Participate  Level of consciousness: awake and alert  Post-procedure vital signs: reviewed and stable  Pain management: adequate  Airway patency: patent    PONV status at discharge: No PONV  Anesthetic complications: no      Cardiovascular status: hemodynamically stable  Respiratory status: unassisted and room air  Hydration status: euvolemic  Follow-up not needed.          Vitals Value Taken Time   /68 11/06/20 1422     11/06/20 1539   Pulse 75 11/06/20 1437   Resp 18 11/06/20 1437   SpO2 94 % 11/06/20 1437         Event Time   Out of Recovery 14:55:35         Pain/Davi Score: Davi Score: 10 (11/6/2020  2:21 PM)

## 2020-11-06 NOTE — PROVATION PATIENT INSTRUCTIONS
Discharge Summary/Instructions after an Endoscopic Procedure  Patient Name: Tereza Larose  Patient MRN: [id number]  Patient YOB: 1950 Friday, November 6, 2020  Yakelin Lowery MD  RESTRICTIONS:  During your procedure today, you received medications for sedation.  These   medications may affect your judgment, balance and coordination.  Therefore,   for 24 hours, you have the following restrictions:   - DO NOT drive a car, operate machinery, make legal/financial decisions,   sign important papers or drink alcohol.    ACTIVITY:  Today: no heavy lifting, straining or running due to procedural   sedation/anesthesia.  The following day: return to full activity including work.  DIET:  Eat and drink normally unless instructed otherwise.     TREATMENT FOR COMMON SIDE EFFECTS:  - Mild abdominal pain, nausea, belching, bloating or excessive gas:  rest,   eat lightly and use a heating pad.  - Sore Throat: treat with throat lozenges and/or gargle with warm salt   water.  - Because air was used during the procedure, expelling large amounts of air   from your rectum or belching is normal.  - If a bowel prep was taken, you may not have a bowel movement for 1-3 days.    This is normal.  SYMPTOMS TO WATCH FOR AND REPORT TO YOUR PHYSICIAN:  1. Abdominal pain or bloating, other than gas cramps.  2. Chest pain.  3. Back pain.  4. Signs of infection such as: chills or fever occurring within 24 hours   after the procedure.  5. Rectal bleeding, which would show as bright red, maroon, or black stools.   (A tablespoon of blood from the rectum is not serious, especially if   hemorrhoids are present.)  6. Vomiting.  7. Weakness or dizziness.  GO DIRECTLY TO THE NEAREST EMERGENCY ROOM IF YOU HAVE ANY OF THE FOLLOWING:      Difficulty breathing              Chills and/or fever over 101 F   Persistent vomiting and/or vomiting blood   Severe abdominal pain   Severe chest pain   Black, tarry stools   Bleeding- more than one  tablespoon   Any other symptom or condition that you feel may need urgent attention  Your doctor recommends these additional instructions:  If any biopsies were taken, your doctors clinic will contact you in 1 to 2   weeks with any results.  - Discharge patient to home (with escort).   - Patient has a contact number available for emergencies.  The signs and   symptoms of potential delayed complications were discussed with the   patient.  Return to normal activities tomorrow.  Written discharge   instructions were provided to the patient.   - Resume previous diet.   - Continue present medications.   - Await pathology results.   - Repeat colonoscopy in 3 years for surveillance.   - Return to my office PRN.   -Trial of Linzess  For questions, problems or results please call your physician - Yakelin Lowery MD at Work:  [phone].  [facility], EMERGENCY ROOM PHONE NUMBER: [phone]  IF A COMPLICATION OR EMERGENCY SITUATION ARISES AND YOU ARE UNABLE TO REACH   YOUR PHYSICIAN - GO DIRECTLY TO THE EMERGENCY ROOM.  Yakelin Lowery MD  11/6/2020 2:09:49 PM  This report has been verified and signed electronically.  [employer]ATION

## 2020-11-06 NOTE — ANESTHESIA PREPROCEDURE EVALUATION
11/06/2020  Tereza Larose is a 70 y.o., female.    Anesthesia Evaluation    I have reviewed the Patient Summary Reports.    I have reviewed the Nursing Notes. I have reviewed the NPO Status.   I have reviewed the Medications.     Review of Systems  Anesthesia Hx:  No problems with previous Anesthesia    Social:  Non-Smoker    Cardiovascular:   Hypertension Past MI CAD  Dysrhythmias     Pulmonary:   Pneumonia COPD Asthma Shortness of breath Sleep Apnea    Hepatic/GI:   GERD Liver Disease,    Musculoskeletal:   Arthritis     Neurological:   CVA    Endocrine:   Diabetes Hypothyroidism    Psych:   Psychiatric History depression          Physical Exam  General:  Well nourished    Airway/Jaw/Neck:  Airway Findings: Mouth Opening: Normal Tongue: Normal  General Airway Assessment: Adult  Mallampati: II        Eyes/Ears/Nose:  EYES/EARS/NOSE FINDINGS: Normal   Dental:  Dental Findings: In tact   Chest/Lungs:  Chest/Lungs Findings: Clear to auscultation, Normal Respiratory Rate     Heart/Vascular:  Heart Findings: Rate: Normal  Rhythm: Regular Rhythm        Mental Status:  Mental Status Findings:  Cooperative, Alert and Oriented         Anesthesia Plan  Type of Anesthesia, risks & benefits discussed:  Anesthesia Type:  general  Patient's Preference:   Intra-op Monitoring Plan: standard ASA monitors  Intra-op Monitoring Plan Comments:   Post Op Pain Control Plan:   Post Op Pain Control Plan Comments:   Induction:   IV  Beta Blocker:  Patient is on a Beta-Blocker and has received one dose within the past 24 hours (No further documentation required).       Informed Consent: Patient understands risks and agrees with Anesthesia plan.  Questions answered. Anesthesia consent signed with patient.  ASA Score: 3     Day of Surgery Review of History & Physical: I have interviewed and examined the patient. I have reviewed the  patient's H&P dated:    H&P update referred to the provider.         Ready For Surgery From Anesthesia Perspective.

## 2020-11-11 LAB
FINAL PATHOLOGIC DIAGNOSIS: NORMAL
GROSS: NORMAL
Lab: NORMAL

## 2020-11-17 ENCOUNTER — TELEPHONE (OUTPATIENT)
Dept: GASTROENTEROLOGY | Facility: CLINIC | Age: 70
End: 2020-11-17

## 2020-11-17 NOTE — TELEPHONE ENCOUNTER
----- Message from Yakelin Lowery MD sent at 11/12/2020  2:03 PM CST -----  Please let patient know her colon biopsies are benign adenomas. She should repeat colonoscopy in 3 years.

## 2021-01-04 ENCOUNTER — PATIENT MESSAGE (OUTPATIENT)
Dept: ADMINISTRATIVE | Facility: HOSPITAL | Age: 71
End: 2021-01-04

## 2021-01-10 DIAGNOSIS — I10 ESSENTIAL HYPERTENSION: ICD-10-CM

## 2021-01-12 RX ORDER — AMLODIPINE BESYLATE 10 MG/1
TABLET ORAL
Qty: 90 TABLET | Refills: 0 | Status: SHIPPED | OUTPATIENT
Start: 2021-01-12 | End: 2021-05-03

## 2021-02-01 LAB
CHOLEST SERPL-MSCNC: 231 MG/DL (ref 0–200)
HDLC SERPL-MCNC: 48 MG/DL
LDLC SERPL CALC-MCNC: 120 MG/DL
TRIGL SERPL-MCNC: 360 MG/DL

## 2021-02-19 ENCOUNTER — IMMUNIZATION (OUTPATIENT)
Dept: FAMILY MEDICINE | Facility: CLINIC | Age: 71
End: 2021-02-19
Payer: MEDICARE

## 2021-02-19 DIAGNOSIS — Z23 NEED FOR VACCINATION: Primary | ICD-10-CM

## 2021-02-19 PROCEDURE — 91300 COVID-19, MRNA, LNP-S, PF, 30 MCG/0.3 ML DOSE VACCINE: ICD-10-PCS | Mod: ,,, | Performed by: FAMILY MEDICINE

## 2021-02-19 PROCEDURE — 91300 COVID-19, MRNA, LNP-S, PF, 30 MCG/0.3 ML DOSE VACCINE: CPT | Mod: ,,, | Performed by: FAMILY MEDICINE

## 2021-02-19 PROCEDURE — 0001A COVID-19, MRNA, LNP-S, PF, 30 MCG/0.3 ML DOSE VACCINE: CPT | Mod: CV19,,, | Performed by: FAMILY MEDICINE

## 2021-02-19 PROCEDURE — 0001A COVID-19, MRNA, LNP-S, PF, 30 MCG/0.3 ML DOSE VACCINE: ICD-10-PCS | Mod: CV19,,, | Performed by: FAMILY MEDICINE

## 2021-03-12 ENCOUNTER — IMMUNIZATION (OUTPATIENT)
Dept: FAMILY MEDICINE | Facility: CLINIC | Age: 71
End: 2021-03-12
Payer: MEDICARE

## 2021-03-12 DIAGNOSIS — Z23 NEED FOR VACCINATION: Primary | ICD-10-CM

## 2021-03-12 PROCEDURE — 0002A COVID-19, MRNA, LNP-S, PF, 30 MCG/0.3 ML DOSE VACCINE: CPT | Mod: CV19,S$GLB,, | Performed by: FAMILY MEDICINE

## 2021-03-12 PROCEDURE — 91300 COVID-19, MRNA, LNP-S, PF, 30 MCG/0.3 ML DOSE VACCINE: CPT | Mod: S$GLB,,, | Performed by: FAMILY MEDICINE

## 2021-03-12 PROCEDURE — 0002A COVID-19, MRNA, LNP-S, PF, 30 MCG/0.3 ML DOSE VACCINE: ICD-10-PCS | Mod: CV19,S$GLB,, | Performed by: FAMILY MEDICINE

## 2021-03-12 PROCEDURE — 91300 COVID-19, MRNA, LNP-S, PF, 30 MCG/0.3 ML DOSE VACCINE: ICD-10-PCS | Mod: S$GLB,,, | Performed by: FAMILY MEDICINE

## 2021-03-15 NOTE — CARE UPDATE
01/11/20 2045   Patient Assessment/Suction   Level of Consciousness (AVPU) alert   All Lung Fields Breath Sounds clear   PRE-TX-O2   O2 Device (Oxygen Therapy) High Flow nasal Cannula   $ Is the patient on Low Flow Oxygen? Yes   Flow (L/min) 5   SpO2 (!) 92 %   Pulse Oximetry Type Continuous   $ Pulse Oximetry - Multiple Charge Pulse Oximetry - Multiple   Pulse 60   Resp 18   Aerosol Therapy   $ Aerosol Therapy Charges Aerosol Treatment   Daily Review of Necessity (SVN) completed   Respiratory Treatment Status (SVN) given   Treatment Route (SVN) mask   Patient Position (SVN) semi-Valdez's   Post Treatment Assessment (SVN) breath sounds unchanged   Signs of Intolerance (SVN) none   Breath Sounds Post-Respiratory Treatment   Throughout All Fields Post-Treatment All Fields   Throughout All Fields Post-Treatment aeration increased   Post-treatment Heart Rate (beats/min) 60   Post-treatment Resp Rate (breaths/min) 18      same name as above

## 2021-04-05 ENCOUNTER — PATIENT MESSAGE (OUTPATIENT)
Dept: ADMINISTRATIVE | Facility: HOSPITAL | Age: 71
End: 2021-04-05

## 2021-04-22 ENCOUNTER — TELEPHONE (OUTPATIENT)
Dept: FAMILY MEDICINE | Facility: CLINIC | Age: 71
End: 2021-04-22

## 2021-04-29 DIAGNOSIS — I10 ESSENTIAL HYPERTENSION: ICD-10-CM

## 2021-04-30 ENCOUNTER — OFFICE VISIT (OUTPATIENT)
Dept: FAMILY MEDICINE | Facility: CLINIC | Age: 71
End: 2021-04-30
Attending: FAMILY MEDICINE
Payer: MEDICARE

## 2021-04-30 VITALS
HEIGHT: 62 IN | TEMPERATURE: 98 F | WEIGHT: 185.19 LBS | DIASTOLIC BLOOD PRESSURE: 78 MMHG | BODY MASS INDEX: 34.08 KG/M2 | OXYGEN SATURATION: 92 % | SYSTOLIC BLOOD PRESSURE: 140 MMHG | HEART RATE: 82 BPM

## 2021-04-30 DIAGNOSIS — E78.5 HYPERLIPIDEMIA ASSOCIATED WITH TYPE 2 DIABETES MELLITUS: Chronic | ICD-10-CM

## 2021-04-30 DIAGNOSIS — Z78.9 STATIN INTOLERANCE: ICD-10-CM

## 2021-04-30 DIAGNOSIS — E11.59 TYPE 2 DIABETES MELLITUS WITH OTHER CIRCULATORY COMPLICATION, WITHOUT LONG-TERM CURRENT USE OF INSULIN: ICD-10-CM

## 2021-04-30 DIAGNOSIS — E11.69 HYPERLIPIDEMIA ASSOCIATED WITH TYPE 2 DIABETES MELLITUS: Chronic | ICD-10-CM

## 2021-04-30 DIAGNOSIS — I15.2 HYPERTENSION ASSOCIATED WITH DIABETES: ICD-10-CM

## 2021-04-30 DIAGNOSIS — N18.31 STAGE 3A CHRONIC KIDNEY DISEASE: ICD-10-CM

## 2021-04-30 DIAGNOSIS — E11.59 HYPERTENSION ASSOCIATED WITH DIABETES: ICD-10-CM

## 2021-04-30 DIAGNOSIS — J01.00 ACUTE NON-RECURRENT MAXILLARY SINUSITIS: Primary | ICD-10-CM

## 2021-04-30 PROCEDURE — 99999 PR PBB SHADOW E&M-EST. PATIENT-LVL V: ICD-10-PCS | Mod: PBBFAC,,, | Performed by: FAMILY MEDICINE

## 2021-04-30 PROCEDURE — 99215 OFFICE O/P EST HI 40 MIN: CPT | Mod: PBBFAC,PO | Performed by: FAMILY MEDICINE

## 2021-04-30 PROCEDURE — 99999 PR PBB SHADOW E&M-EST. PATIENT-LVL V: CPT | Mod: PBBFAC,,, | Performed by: FAMILY MEDICINE

## 2021-04-30 PROCEDURE — 99213 OFFICE O/P EST LOW 20 MIN: CPT | Mod: S$PBB,,, | Performed by: FAMILY MEDICINE

## 2021-04-30 PROCEDURE — 99213 PR OFFICE/OUTPT VISIT, EST, LEVL III, 20-29 MIN: ICD-10-PCS | Mod: S$PBB,,, | Performed by: FAMILY MEDICINE

## 2021-04-30 RX ORDER — AMOXICILLIN AND CLAVULANATE POTASSIUM 875; 125 MG/1; MG/1
1 TABLET, FILM COATED ORAL 2 TIMES DAILY
Qty: 20 TABLET | Refills: 0 | Status: SHIPPED | OUTPATIENT
Start: 2021-04-30 | End: 2021-05-10

## 2021-04-30 RX ORDER — SOTALOL HYDROCHLORIDE 80 MG/1
80 TABLET ORAL 2 TIMES DAILY
Status: ON HOLD | COMMUNITY
Start: 2021-02-17 | End: 2021-07-27

## 2021-04-30 RX ORDER — PREDNISONE 20 MG/1
20 TABLET ORAL DAILY
Qty: 5 TABLET | Refills: 0 | Status: SHIPPED | OUTPATIENT
Start: 2021-04-30 | End: 2021-05-05

## 2021-04-30 RX ORDER — TEMAZEPAM 15 MG/1
15 CAPSULE ORAL NIGHTLY PRN
Status: ON HOLD | COMMUNITY
Start: 2021-04-22 | End: 2022-09-15 | Stop reason: HOSPADM

## 2021-05-03 RX ORDER — AMLODIPINE BESYLATE 10 MG/1
TABLET ORAL
Qty: 90 TABLET | Refills: 3 | Status: SHIPPED | OUTPATIENT
Start: 2021-05-03 | End: 2022-09-29 | Stop reason: SDUPTHER

## 2021-05-05 ENCOUNTER — PATIENT OUTREACH (OUTPATIENT)
Dept: ADMINISTRATIVE | Facility: HOSPITAL | Age: 71
End: 2021-05-05

## 2021-05-05 DIAGNOSIS — E11.9 TYPE 2 DIABETES MELLITUS WITHOUT COMPLICATION: ICD-10-CM

## 2021-06-24 ENCOUNTER — TELEPHONE (OUTPATIENT)
Dept: FAMILY MEDICINE | Facility: CLINIC | Age: 71
End: 2021-06-24

## 2021-06-24 ENCOUNTER — PATIENT OUTREACH (OUTPATIENT)
Dept: ADMINISTRATIVE | Facility: HOSPITAL | Age: 71
End: 2021-06-24

## 2021-06-24 DIAGNOSIS — G89.29 CHRONIC LOW BACK PAIN WITH RIGHT-SIDED SCIATICA, UNSPECIFIED BACK PAIN LATERALITY: Primary | ICD-10-CM

## 2021-06-24 DIAGNOSIS — M54.41 CHRONIC LOW BACK PAIN WITH RIGHT-SIDED SCIATICA, UNSPECIFIED BACK PAIN LATERALITY: Primary | ICD-10-CM

## 2021-06-24 DIAGNOSIS — G89.29 CHRONIC RIGHT-SIDED LOW BACK PAIN WITH RIGHT-SIDED SCIATICA: ICD-10-CM

## 2021-06-24 DIAGNOSIS — M54.41 CHRONIC RIGHT-SIDED LOW BACK PAIN WITH RIGHT-SIDED SCIATICA: ICD-10-CM

## 2021-06-24 DIAGNOSIS — Z12.31 ENCOUNTER FOR SCREENING MAMMOGRAM FOR MALIGNANT NEOPLASM OF BREAST: Primary | ICD-10-CM

## 2021-06-28 DIAGNOSIS — J82.83 EOSINOPHILIC ASTHMA: ICD-10-CM

## 2021-06-28 DIAGNOSIS — J44.89 ASTHMA WITH CHRONIC OBSTRUCTIVE PULMONARY DISEASE (COPD): Chronic | ICD-10-CM

## 2021-06-28 DIAGNOSIS — J44.9 CHRONIC OBSTRUCTIVE PULMONARY DISEASE, UNSPECIFIED COPD TYPE: ICD-10-CM

## 2021-06-28 DIAGNOSIS — J45.50 SEVERE PERSISTENT ASTHMA WITHOUT COMPLICATION: ICD-10-CM

## 2021-06-28 RX ORDER — FLUTICASONE FUROATE AND VILANTEROL TRIFENATATE 200; 25 UG/1; UG/1
1 POWDER RESPIRATORY (INHALATION) DAILY
Qty: 1 EACH | Refills: 1 | Status: SHIPPED | OUTPATIENT
Start: 2021-06-28 | End: 2022-09-07

## 2021-06-29 ENCOUNTER — PATIENT MESSAGE (OUTPATIENT)
Dept: PRIMARY CARE CLINIC | Facility: CLINIC | Age: 71
End: 2021-06-29

## 2021-07-02 ENCOUNTER — PATIENT MESSAGE (OUTPATIENT)
Dept: PRIMARY CARE CLINIC | Facility: CLINIC | Age: 71
End: 2021-07-02

## 2021-07-05 LAB
CHOLEST SERPL-MSCNC: 217 MG/DL (ref 0–200)
HBA1C MFR BLD: 7.3 % (ref 4–6)
HDLC SERPL-MCNC: 44 MG/DL (ref 35–70)
LDLC SERPL CALC-MCNC: 106 MG/DL (ref 0–160)
TRIGL SERPL-MCNC: 396 MG/DL (ref 40–160)

## 2021-07-07 ENCOUNTER — PATIENT MESSAGE (OUTPATIENT)
Dept: ADMINISTRATIVE | Facility: HOSPITAL | Age: 71
End: 2021-07-07

## 2021-07-09 ENCOUNTER — PATIENT OUTREACH (OUTPATIENT)
Dept: ADMINISTRATIVE | Facility: OTHER | Age: 71
End: 2021-07-09

## 2021-07-12 ENCOUNTER — OFFICE VISIT (OUTPATIENT)
Dept: PAIN MEDICINE | Facility: CLINIC | Age: 71
End: 2021-07-12
Payer: MEDICARE

## 2021-07-12 VITALS
SYSTOLIC BLOOD PRESSURE: 190 MMHG | WEIGHT: 185 LBS | DIASTOLIC BLOOD PRESSURE: 76 MMHG | HEART RATE: 60 BPM | BODY MASS INDEX: 34.04 KG/M2 | HEIGHT: 62 IN

## 2021-07-12 DIAGNOSIS — M47.896 OTHER SPONDYLOSIS, LUMBAR REGION: Primary | ICD-10-CM

## 2021-07-12 DIAGNOSIS — M51.36 DDD (DEGENERATIVE DISC DISEASE), LUMBAR: ICD-10-CM

## 2021-07-12 DIAGNOSIS — M54.16 LUMBAR RADICULITIS: ICD-10-CM

## 2021-07-12 DIAGNOSIS — M54.16 LUMBAR RADICULITIS: Primary | ICD-10-CM

## 2021-07-12 PROCEDURE — 99204 PR OFFICE/OUTPT VISIT, NEW, LEVL IV, 45-59 MIN: ICD-10-PCS | Mod: S$PBB,,, | Performed by: ANESTHESIOLOGY

## 2021-07-12 PROCEDURE — 99213 OFFICE O/P EST LOW 20 MIN: CPT | Mod: PBBFAC,PN | Performed by: ANESTHESIOLOGY

## 2021-07-12 PROCEDURE — 99204 OFFICE O/P NEW MOD 45 MIN: CPT | Mod: S$PBB,,, | Performed by: ANESTHESIOLOGY

## 2021-07-12 PROCEDURE — 96372 THER/PROPH/DIAG INJ SC/IM: CPT | Mod: PBBFAC,PN

## 2021-07-12 PROCEDURE — 99999 PR PBB SHADOW E&M-EST. PATIENT-LVL III: ICD-10-PCS | Mod: PBBFAC,,, | Performed by: ANESTHESIOLOGY

## 2021-07-12 PROCEDURE — 99999 PR PBB SHADOW E&M-EST. PATIENT-LVL III: CPT | Mod: PBBFAC,,, | Performed by: ANESTHESIOLOGY

## 2021-07-12 RX ORDER — ICOSAPENT ETHYL 1000 MG/1
2 CAPSULE ORAL 2 TIMES DAILY
COMMUNITY
Start: 2021-07-08 | End: 2022-07-26

## 2021-07-12 RX ADMIN — KETOROLAC TROMETHAMINE 30 MG: 30 INJECTION, SOLUTION INTRAMUSCULAR; INTRAVENOUS at 03:07

## 2021-07-13 RX ORDER — KETOROLAC TROMETHAMINE 30 MG/ML
30 INJECTION, SOLUTION INTRAMUSCULAR; INTRAVENOUS ONCE
Status: COMPLETED | OUTPATIENT
Start: 2021-07-12 | End: 2021-07-12

## 2021-07-27 ENCOUNTER — HOSPITAL ENCOUNTER (OUTPATIENT)
Facility: AMBULARY SURGERY CENTER | Age: 71
Discharge: HOME | End: 2021-07-27
Attending: ANESTHESIOLOGY | Admitting: ANESTHESIOLOGY
Payer: MEDICARE

## 2021-07-27 DIAGNOSIS — M54.16 LUMBAR RADICULITIS: Primary | ICD-10-CM

## 2021-07-27 LAB — POCT GLUCOSE: 257 MG/DL (ref 70–110)

## 2021-07-27 PROCEDURE — 62323 NJX INTERLAMINAR LMBR/SAC: CPT | Mod: ,,, | Performed by: ANESTHESIOLOGY

## 2021-07-27 PROCEDURE — 62323 NJX INTERLAMINAR LMBR/SAC: CPT | Performed by: ANESTHESIOLOGY

## 2021-07-27 PROCEDURE — 62323 PR INJ LUMBAR/SACRAL, W/IMAGING GUIDANCE: ICD-10-PCS | Mod: ,,, | Performed by: ANESTHESIOLOGY

## 2021-07-27 RX ORDER — SODIUM CHLORIDE 9 MG/ML
INJECTION, SOLUTION INTRAMUSCULAR; INTRAVENOUS; SUBCUTANEOUS
Status: DISCONTINUED | OUTPATIENT
Start: 2021-07-27 | End: 2021-07-27 | Stop reason: HOSPADM

## 2021-07-27 RX ORDER — DEXAMETHASONE SODIUM PHOSPHATE 10 MG/ML
INJECTION INTRAMUSCULAR; INTRAVENOUS
Status: DISCONTINUED | OUTPATIENT
Start: 2021-07-27 | End: 2021-07-27 | Stop reason: HOSPADM

## 2021-07-27 RX ORDER — LIDOCAINE HYDROCHLORIDE 10 MG/ML
INJECTION, SOLUTION EPIDURAL; INFILTRATION; INTRACAUDAL; PERINEURAL
Status: DISCONTINUED | OUTPATIENT
Start: 2021-07-27 | End: 2021-07-27 | Stop reason: HOSPADM

## 2021-07-27 RX ORDER — ALPRAZOLAM 1 MG/1
1 TABLET, ORALLY DISINTEGRATING ORAL ONCE
Status: COMPLETED | OUTPATIENT
Start: 2021-07-27 | End: 2021-07-27

## 2021-07-27 RX ORDER — SODIUM CHLORIDE, SODIUM LACTATE, POTASSIUM CHLORIDE, CALCIUM CHLORIDE 600; 310; 30; 20 MG/100ML; MG/100ML; MG/100ML; MG/100ML
INJECTION, SOLUTION INTRAVENOUS ONCE AS NEEDED
Status: DISCONTINUED | OUTPATIENT
Start: 2021-07-27 | End: 2021-07-27 | Stop reason: HOSPADM

## 2021-07-27 RX ORDER — SOTALOL HYDROCHLORIDE 80 MG/1
80 TABLET ORAL 2 TIMES DAILY
COMMUNITY
End: 2022-07-26

## 2021-07-27 RX ADMIN — ALPRAZOLAM 1 MG: 1 TABLET, ORALLY DISINTEGRATING ORAL at 09:07

## 2021-07-28 VITALS
OXYGEN SATURATION: 93 % | WEIGHT: 185 LBS | HEIGHT: 62 IN | HEART RATE: 64 BPM | TEMPERATURE: 98 F | DIASTOLIC BLOOD PRESSURE: 82 MMHG | SYSTOLIC BLOOD PRESSURE: 134 MMHG | RESPIRATION RATE: 18 BRPM | BODY MASS INDEX: 34.04 KG/M2

## 2021-08-03 ENCOUNTER — PATIENT OUTREACH (OUTPATIENT)
Dept: ADMINISTRATIVE | Facility: HOSPITAL | Age: 71
End: 2021-08-03

## 2021-08-03 DIAGNOSIS — Z78.0 ASYMPTOMATIC MENOPAUSAL STATE: Primary | ICD-10-CM

## 2021-08-05 ENCOUNTER — TELEPHONE (OUTPATIENT)
Dept: FAMILY MEDICINE | Facility: CLINIC | Age: 71
End: 2021-08-05

## 2021-08-12 ENCOUNTER — PATIENT OUTREACH (OUTPATIENT)
Dept: ADMINISTRATIVE | Facility: HOSPITAL | Age: 71
End: 2021-08-12

## 2021-08-19 ENCOUNTER — TELEPHONE (OUTPATIENT)
Dept: FAMILY MEDICINE | Facility: CLINIC | Age: 71
End: 2021-08-19

## 2021-08-24 ENCOUNTER — TELEPHONE (OUTPATIENT)
Dept: FAMILY MEDICINE | Facility: CLINIC | Age: 71
End: 2021-08-24

## 2021-08-24 ENCOUNTER — PATIENT OUTREACH (OUTPATIENT)
Dept: ADMINISTRATIVE | Facility: HOSPITAL | Age: 71
End: 2021-08-24

## 2021-08-26 ENCOUNTER — OFFICE VISIT (OUTPATIENT)
Dept: PAIN MEDICINE | Facility: CLINIC | Age: 71
End: 2021-08-26
Payer: MEDICARE

## 2021-08-26 DIAGNOSIS — M51.36 DDD (DEGENERATIVE DISC DISEASE), LUMBAR: ICD-10-CM

## 2021-08-26 DIAGNOSIS — M47.896 OTHER SPONDYLOSIS, LUMBAR REGION: Primary | ICD-10-CM

## 2021-08-26 DIAGNOSIS — M54.16 LUMBAR RADICULITIS: ICD-10-CM

## 2021-08-26 PROCEDURE — 99443 PR PHYSICIAN TELEPHONE EVALUATION 21-30 MIN: CPT | Mod: 95,,, | Performed by: PHYSICIAN ASSISTANT

## 2021-08-26 PROCEDURE — 99443 PR PHYSICIAN TELEPHONE EVALUATION 21-30 MIN: ICD-10-PCS | Mod: 95,,, | Performed by: PHYSICIAN ASSISTANT

## 2021-10-07 ENCOUNTER — PATIENT MESSAGE (OUTPATIENT)
Dept: ADMINISTRATIVE | Facility: HOSPITAL | Age: 71
End: 2021-10-07

## 2021-11-09 ENCOUNTER — TELEPHONE (OUTPATIENT)
Dept: FAMILY MEDICINE | Facility: CLINIC | Age: 71
End: 2021-11-09
Payer: MEDICARE

## 2021-11-16 ENCOUNTER — TELEPHONE (OUTPATIENT)
Dept: FAMILY MEDICINE | Facility: CLINIC | Age: 71
End: 2021-11-16
Payer: MEDICARE

## 2021-11-16 DIAGNOSIS — E11.59 TYPE 2 DIABETES MELLITUS WITH OTHER CIRCULATORY COMPLICATION, WITHOUT LONG-TERM CURRENT USE OF INSULIN: Primary | ICD-10-CM

## 2021-11-17 ENCOUNTER — HOSPITAL ENCOUNTER (OUTPATIENT)
Dept: RADIOLOGY | Facility: CLINIC | Age: 71
Discharge: HOME | End: 2021-11-17
Attending: FAMILY MEDICINE
Payer: MEDICARE

## 2021-11-17 DIAGNOSIS — Z78.0 ASYMPTOMATIC MENOPAUSAL STATE: ICD-10-CM

## 2021-11-17 DIAGNOSIS — Z12.31 ENCOUNTER FOR SCREENING MAMMOGRAM FOR MALIGNANT NEOPLASM OF BREAST: ICD-10-CM

## 2021-11-17 PROCEDURE — 77067 MAMMO DIGITAL SCREENING BILAT WITH TOMO: ICD-10-PCS | Mod: 26,,, | Performed by: RADIOLOGY

## 2021-11-17 PROCEDURE — 77063 MAMMO DIGITAL SCREENING BILAT WITH TOMO: ICD-10-PCS | Mod: 26,,, | Performed by: RADIOLOGY

## 2021-11-17 PROCEDURE — 77080 DEXA BONE DENSITY SPINE HIP: ICD-10-PCS | Mod: 26,,, | Performed by: RADIOLOGY

## 2021-11-17 PROCEDURE — 77080 DXA BONE DENSITY AXIAL: CPT | Mod: 26,,, | Performed by: RADIOLOGY

## 2021-11-17 PROCEDURE — 77067 SCR MAMMO BI INCL CAD: CPT | Mod: 26,,, | Performed by: RADIOLOGY

## 2021-11-17 PROCEDURE — 77063 BREAST TOMOSYNTHESIS BI: CPT | Mod: 26,,, | Performed by: RADIOLOGY

## 2021-11-17 PROCEDURE — 77067 SCR MAMMO BI INCL CAD: CPT | Mod: TC,PO

## 2021-11-17 PROCEDURE — 77080 DXA BONE DENSITY AXIAL: CPT | Mod: TC,PO

## 2022-01-11 LAB
LEFT EYE DM RETINOPATHY: NEGATIVE
RIGHT EYE DM RETINOPATHY: NEGATIVE

## 2022-01-18 ENCOUNTER — PATIENT OUTREACH (OUTPATIENT)
Dept: ADMINISTRATIVE | Facility: HOSPITAL | Age: 72
End: 2022-01-18
Payer: MEDICARE

## 2022-01-18 LAB
ALT SERPL-CCNC: ABNORMAL U/L
AST SERPL-CCNC: ABNORMAL U/L
BUN BLD-MCNC: ABNORMAL MG/DL
CHOLEST SERPL-MSCNC: 217 MG/DL (ref 0–200)
CREAT SERPL-MCNC: ABNORMAL MG/DL
GLUCOSE 1H P 100 G GLC PO SERPL-MCNC: ABNORMAL MG/DL
GLUCOSE 2H P 100 G GLC PO SERPL-MCNC: ABNORMAL MG/DL
HBA1C MFR BLD: 7 %
HDLC SERPL-MCNC: 42 MG/DL
LDLC SERPL CALC-MCNC: 68 MG/DL (ref 0–160)
MICROALBUMIN URINE: ABNORMAL
MICROALBUMIN/CREATININE RATIO: ABNORMAL
PROTEIN/CREATININE RATIO: ABNORMAL
TRIGL SERPL-MCNC: 237 MG/DL

## 2022-03-21 ENCOUNTER — TELEPHONE (OUTPATIENT)
Dept: FAMILY MEDICINE | Facility: CLINIC | Age: 72
End: 2022-03-21
Payer: MEDICARE

## 2022-03-21 NOTE — TELEPHONE ENCOUNTER
----- Message from Heather Gann MA sent at 3/21/2022  2:27 PM CDT -----  Contact: vicente  Type: Needs Medical Advice    Who Called:vicente juarez  Best Call Back Number: [phone]  Inquiry/Question Would you kindly call vicente regarding above listed pt SHAYY VELA [[id number]] Thank you~

## 2022-03-21 NOTE — TELEPHONE ENCOUNTER
Form received. Will fax once  has signed it and will send for scanning. This is for patients oxygen due to copd.

## 2022-03-22 ENCOUNTER — TELEPHONE (OUTPATIENT)
Dept: FAMILY MEDICINE | Facility: CLINIC | Age: 72
End: 2022-03-22
Payer: MEDICARE

## 2022-03-22 NOTE — TELEPHONE ENCOUNTER
----- Message from Marlin Darren sent at 3/22/2022 12:44 PM CDT -----  Contact: @Isabel  Type:  Patient Returning Call    Who Called:  Darcy   Who Left Message for Patient:  Jeane   Does the patient know what this is regarding?:  yes   Best Call Back Number:  [phone]   Additional Information:  Per Isabel, requesting a call back regarding incorrect information being faxed to her, states the frequency of use was missing and she faxed it back to include frequency and providers signature-please advise-thank you

## 2022-03-22 NOTE — TELEPHONE ENCOUNTER
Spoke to patient- she uses her oxygen at night and in the morning- some days continuously for copd.

## 2022-03-22 NOTE — TELEPHONE ENCOUNTER
----- Message from Judy Good sent at 3/22/2022  9:35 AM CDT -----  Contact: isabel  Type: Needs Medical Advice  Who Called:  Isabel with [employer] for Jeane!  Best Call Back Number: [phone], fax# [phone]  Additional Information: requesting a call back because something was missing from the prescription- the frequency of use was missing. She appreciates you being so quick getting that back to her!

## 2022-03-25 ENCOUNTER — TELEPHONE (OUTPATIENT)
Dept: FAMILY MEDICINE | Facility: CLINIC | Age: 72
End: 2022-03-25
Payer: MEDICARE

## 2022-03-25 NOTE — TELEPHONE ENCOUNTER
[facility] medical attestation form received and faxed to [phone]-copy sent to scanning.

## 2022-03-25 NOTE — TELEPHONE ENCOUNTER
----- Message from Julianna Vargas sent at 3/25/2022  2:17 PM CDT -----  Who Called: Isabel ([facility])     What is the request in detail: Chart notes from Jeane has been received but they weren't signed. Form sent back for doctor to sign. Please advise.     Can the clinic reply by [facility]? NO     Best Call Back Number: [phone]    Additional Information:

## 2022-03-28 ENCOUNTER — TELEPHONE (OUTPATIENT)
Dept: FAMILY MEDICINE | Facility: CLINIC | Age: 72
End: 2022-03-28
Payer: MEDICARE

## 2022-03-28 NOTE — TELEPHONE ENCOUNTER
Received: Today  SHAVONNE Fuentes Staff  Isabel with [employer] is requesting that attestation and chart notes be sent over together not separately.       Phone:  [phone]   Fax:  [phone]     She says thank you, she appreciates all that you've done

## 2022-05-31 ENCOUNTER — PATIENT MESSAGE (OUTPATIENT)
Dept: ADMINISTRATIVE | Facility: HOSPITAL | Age: 72
End: 2022-05-31
Payer: MEDICARE

## 2022-07-11 DIAGNOSIS — E11.59 TYPE 2 DIABETES MELLITUS WITH OTHER CIRCULATORY COMPLICATION, WITHOUT LONG-TERM CURRENT USE OF INSULIN: ICD-10-CM

## 2022-07-11 NOTE — TELEPHONE ENCOUNTER
No new care gaps identified.  [facility] Embedded Care Gaps. Reference number: [id number]. 7/11/2022   11:31:20 AM CDT

## 2022-07-12 NOTE — ANESTHESIA POSTPROCEDURE EVALUATION
"Anesthesia Post Evaluation    Patient: Tereza Larose    Procedure(s) Performed: Procedure(s) (LRB):  ADRENALECTOMY-LAPAROSCOPIC 87617 (Left)    Final Anesthesia Type: general  Patient location during evaluation: ICU  Patient participation: Yes- Able to Participate  Level of consciousness: awake and alert  Post-procedure vital signs: reviewed and stable  Pain management: adequate  Airway patency: patent  PONV status at discharge: No PONV  Anesthetic complications: no      Cardiovascular status: blood pressure returned to baseline  Respiratory status: unassisted  Hydration status: euvolemic  Follow-up not needed.        Visit Vitals  BP (!) 147/66   Pulse 60   Temp 36.3 °C (97.4 °F) (Axillary)   Resp 13   Ht 5' 2" (1.575 m)   Wt 79.2 kg (174 lb 9.7 oz)   SpO2 97%   Breastfeeding? No   BMI 31.94 kg/m²       Pain/Davi Score: Pain Assessment Performed: Yes (7/25/2017  4:30 PM)  Presence of Pain: denies (7/25/2017  4:30 PM)      "
Negative

## 2022-07-12 NOTE — TELEPHONE ENCOUNTER
Refill Routing Note   Medication(s) are not appropriate for processing by [facility] for the following reason(s):      - Required laboratory values are outdated  - Required laboratory values are abnormal    [facility] action(s):  Defer          Medication reconciliation completed: No     Appointments  past 12m or future 3m with PCP    Date Provider   Last Visit   4/30/2021 Evan Sánchez MD   Next Visit   Visit date not found Evan Sánchez MD   ED visits in past 90 days: 0        Note composed:11:10 AM 07/12/2022

## 2022-07-13 RX ORDER — SITAGLIPTIN 100 MG/1
TABLET, FILM COATED ORAL
Qty: 90 TABLET | Refills: 1 | Status: ON HOLD | OUTPATIENT
Start: 2022-07-13 | End: 2022-09-15 | Stop reason: HOSPADM

## 2022-07-26 ENCOUNTER — OFFICE VISIT (OUTPATIENT)
Dept: FAMILY MEDICINE | Facility: CLINIC | Age: 72
End: 2022-07-26
Payer: MEDICARE

## 2022-07-26 VITALS
HEIGHT: 62 IN | DIASTOLIC BLOOD PRESSURE: 88 MMHG | SYSTOLIC BLOOD PRESSURE: 154 MMHG | HEART RATE: 60 BPM | WEIGHT: 175.5 LBS | BODY MASS INDEX: 32.3 KG/M2 | TEMPERATURE: 98 F | OXYGEN SATURATION: 95 %

## 2022-07-26 DIAGNOSIS — J82.89 PULMONARY EOSINOPHILIA: ICD-10-CM

## 2022-07-26 DIAGNOSIS — R26.9 ABNORMALITY OF GAIT AND MOBILITY: ICD-10-CM

## 2022-07-26 DIAGNOSIS — E89.0 POSTOPERATIVE HYPOTHYROIDISM: ICD-10-CM

## 2022-07-26 DIAGNOSIS — I15.2 HYPERTENSION ASSOCIATED WITH DIABETES: ICD-10-CM

## 2022-07-26 DIAGNOSIS — J96.11 HYPOXEMIC RESPIRATORY FAILURE, CHRONIC: ICD-10-CM

## 2022-07-26 DIAGNOSIS — F32.5 MAJOR DEPRESSIVE DISORDER WITH SINGLE EPISODE, IN FULL REMISSION: ICD-10-CM

## 2022-07-26 DIAGNOSIS — Z99.81 DEPENDENCE ON SUPPLEMENTAL OXYGEN: ICD-10-CM

## 2022-07-26 DIAGNOSIS — Z74.09 OTHER REDUCED MOBILITY: ICD-10-CM

## 2022-07-26 DIAGNOSIS — I50.9 CONGESTIVE HEART FAILURE, UNSPECIFIED HF CHRONICITY, UNSPECIFIED HEART FAILURE TYPE: ICD-10-CM

## 2022-07-26 DIAGNOSIS — E11.59 HYPERTENSION ASSOCIATED WITH DIABETES: ICD-10-CM

## 2022-07-26 DIAGNOSIS — E24.9 ADRENAL CUSHING'S SYNDROME: ICD-10-CM

## 2022-07-26 DIAGNOSIS — G47.33 OSA (OBSTRUCTIVE SLEEP APNEA): Chronic | ICD-10-CM

## 2022-07-26 DIAGNOSIS — I65.23 ATHEROSCLEROSIS OF BOTH CAROTID ARTERIES: ICD-10-CM

## 2022-07-26 DIAGNOSIS — E78.5 HYPERLIPIDEMIA ASSOCIATED WITH TYPE 2 DIABETES MELLITUS: Chronic | ICD-10-CM

## 2022-07-26 DIAGNOSIS — I25.10 CORONARY ARTERY DISEASE INVOLVING NATIVE CORONARY ARTERY OF NATIVE HEART WITHOUT ANGINA PECTORIS: ICD-10-CM

## 2022-07-26 DIAGNOSIS — J44.89 ASTHMA WITH CHRONIC OBSTRUCTIVE PULMONARY DISEASE (COPD): Chronic | ICD-10-CM

## 2022-07-26 DIAGNOSIS — Z00.00 ENCOUNTER FOR PREVENTIVE HEALTH EXAMINATION: Primary | ICD-10-CM

## 2022-07-26 DIAGNOSIS — I70.0 ABDOMINAL AORTIC ATHEROSCLEROSIS: ICD-10-CM

## 2022-07-26 DIAGNOSIS — E11.59 TYPE 2 DIABETES MELLITUS WITH OTHER CIRCULATORY COMPLICATION, WITHOUT LONG-TERM CURRENT USE OF INSULIN: ICD-10-CM

## 2022-07-26 DIAGNOSIS — E11.69 HYPERLIPIDEMIA ASSOCIATED WITH TYPE 2 DIABETES MELLITUS: Chronic | ICD-10-CM

## 2022-07-26 DIAGNOSIS — Z86.79 HISTORY OF INTRACRANIAL HEMORRHAGE: ICD-10-CM

## 2022-07-26 PROBLEM — I69.351 HEMIPARESIS AFFECTING RIGHT SIDE AS LATE EFFECT OF CEREBROVASCULAR ACCIDENT (CVA): Status: ACTIVE | Noted: 2022-07-26

## 2022-07-26 PROBLEM — R19.5 POSITIVE FIT (FECAL IMMUNOCHEMICAL TEST): Status: RESOLVED | Noted: 2020-11-06 | Resolved: 2022-07-26

## 2022-07-26 PROBLEM — J82.83 EOSINOPHILIC ASTHMA: Status: ACTIVE | Noted: 2022-07-26

## 2022-07-26 PROBLEM — E87.20 LACTIC ACIDOSIS: Status: RESOLVED | Noted: 2020-01-11 | Resolved: 2022-07-26

## 2022-07-26 PROBLEM — J18.9 PNEUMONIA: Status: RESOLVED | Noted: 2020-01-10 | Resolved: 2022-07-26

## 2022-07-26 PROBLEM — I65.29 CAROTID ATHEROSCLEROSIS: Status: ACTIVE | Noted: 2022-07-26

## 2022-07-26 PROBLEM — R06.02 SOB (SHORTNESS OF BREATH): Status: RESOLVED | Noted: 2020-01-13 | Resolved: 2022-07-26

## 2022-07-26 PROBLEM — J45.50 SEVERE PERSISTENT ASTHMA WITHOUT COMPLICATION: Status: RESOLVED | Noted: 2020-04-30 | Resolved: 2022-07-26

## 2022-07-26 PROBLEM — I69.351 HEMIPARESIS AFFECTING RIGHT SIDE AS LATE EFFECT OF CEREBROVASCULAR ACCIDENT (CVA): Status: RESOLVED | Noted: 2022-07-26 | Resolved: 2022-07-26

## 2022-07-26 PROBLEM — J44.9 CHRONIC OBSTRUCTIVE PULMONARY DISEASE: Status: RESOLVED | Noted: 2020-04-30 | Resolved: 2022-07-26

## 2022-07-26 PROBLEM — J82.83 EOSINOPHILIC ASTHMA: Status: RESOLVED | Noted: 2022-07-26 | Resolved: 2022-07-26

## 2022-07-26 PROCEDURE — 99999 PR PBB SHADOW E&M-EST. PATIENT-LVL V: CPT | Mod: PBBFAC,,, | Performed by: PHYSICIAN ASSISTANT

## 2022-07-26 PROCEDURE — 99999 PR PBB SHADOW E&M-EST. PATIENT-LVL V: ICD-10-PCS | Mod: PBBFAC,,, | Performed by: PHYSICIAN ASSISTANT

## 2022-07-26 PROCEDURE — G0439 PPPS, SUBSEQ VISIT: HCPCS | Mod: ,,, | Performed by: PHYSICIAN ASSISTANT

## 2022-07-26 PROCEDURE — 99215 OFFICE O/P EST HI 40 MIN: CPT | Mod: PBBFAC,PN | Performed by: PHYSICIAN ASSISTANT

## 2022-07-26 PROCEDURE — G0439 PR MEDICARE ANNUAL WELLNESS SUBSEQUENT VISIT: ICD-10-PCS | Mod: ,,, | Performed by: PHYSICIAN ASSISTANT

## 2022-07-26 RX ORDER — GLYBURIDE 2.5 MG/1
2.5 TABLET ORAL DAILY
Status: ON HOLD | COMMUNITY
Start: 2022-04-04 | End: 2022-09-15 | Stop reason: HOSPADM

## 2022-07-26 RX ORDER — LOSARTAN POTASSIUM 25 MG/1
25 TABLET ORAL DAILY
Status: ON HOLD | COMMUNITY
Start: 2022-04-01 | End: 2022-09-15 | Stop reason: HOSPADM

## 2022-07-26 NOTE — PATIENT INSTRUCTIONS
Counseling and Referral of Other Preventative  (Italic type indicates deductible and co-insurance are waived)    Patient Name: Tereza Larose  Today's Date: 7/26/2022    Health Maintenance         Date Due Completion Date    Diabetes Urine Screening 11/29/2020 11/29/2019    Foot Exam 11/29/2020 11/29/2019    Shingles Vaccine (1 of 2) 07/26/2023 (Originally 9/17/2000) ---    COVID-19 Vaccine (3 - Booster for Pfizer series) 07/26/2023 (Originally 8/12/2021) 3/12/2021    Influenza Vaccine (1) 09/01/2022 11/29/2019    Hemoglobin A1c 09/28/2022 3/28/2022    Mammogram 11/17/2022 11/17/2021    Eye Exam 01/11/2023 1/11/2022    Override on 5/7/2014: Done ([facility])    Lipid Panel 03/28/2023 3/28/2022    Colorectal Cancer Screening 11/06/2023 11/6/2020    DEXA Scan 11/17/2024 11/17/2021    TETANUS VACCINE 05/09/2026 5/9/2016          Orders Placed This Encounter   Procedures    Hemoglobin A1C    CBC Auto Differential    Comprehensive Metabolic Panel    Microalbumin/Creatinine Ratio, Urine    Lipid Panel    TSH     The following information is provided to all patients.  This information is to help you find resources for any of the problems found today that may be affecting your health:                Living healthy guide: www.[facility].[place].gov      Understanding Diabetes: www.diabetes.org      Eating healthy: www.cdc.gov/healthyweight      CDC home safety checklist: www.cdc.gov/steadi/patient.html      Agency on Aging: www.goea.[place].[facility]      Alcoholics anonymous (AA): www.aa.org      Physical Activity: www.latisha.nih.gov/pt8ssvb      Tobacco use: www.quitwithusla.org

## 2022-07-26 NOTE — PROGRESS NOTES
"Tereza Larose presented for a  Medicare AWV and comprehensive Health Risk Assessment today. The following components were reviewed and updated:    · Medical history  · Family History  · Social history  · Allergies and Current Medications  · Health Risk Assessment  · Health Maintenance  · Care Team     ** See Completed Assessments for Annual Wellness Visit within the encounter summary.**       The following assessments were completed:  · Living Situation  · CAGE  · Depression Screening  · Timed Get Up and Go  · Whisper Test  · Cognitive Function Screening  · Nutrition Screening  · ADL Screening  · PAQ Screening    Vitals:    07/26/22 1018   BP: (!) 154/88   Pulse: 60   Temp: 98.4 °F (36.9 °C)   SpO2: 95%   Weight: 79.6 kg (175 lb 7.8 oz)   Height: 5' 2" (1.575 m)     Body mass index is 32.1 kg/m².  Physical Exam  Constitutional:       Appearance: She is well-developed.   Cardiovascular:      Pulses:           Dorsalis pedis pulses are 2+ on the right side and 2+ on the left side.        Posterior tibial pulses are 2+ on the right side and 2+ on the left side.   Pulmonary:      Effort: Pulmonary effort is normal.   Musculoskeletal:      Right foot: Normal range of motion. No deformity.      Left foot: Normal range of motion. No deformity.   Feet:      Right foot:      Protective Sensation: 5 sites tested. 5 sites sensed.      Skin integrity: No ulcer, blister, skin breakdown, erythema, warmth, callus or dry skin.      Left foot:      Protective Sensation: 5 sites tested. 5 sites sensed.      Skin integrity: No ulcer, blister, skin breakdown, erythema, warmth, callus or dry skin.   Neurological:      Mental Status: She is alert and oriented to person, place, and time.   Psychiatric:         Behavior: Behavior normal.         Thought Content: Thought content normal.         Judgment: Judgment normal.               Diagnoses and health risks identified today and associated recommendations/orders:    Tereza was seen today " for medicare awv.    Diagnoses and all orders for this visit:    Encounter for preventive health examination  Comments:  Patient refuses COVID booster.  Patient refuses shingles vaccine.    Major depressive disorder with single episode, in full remission  Comments:  Controlled, continue current regimen    Asthma with chronic obstructive pulmonary disease (COPD)  Comments:  Controlled, continue current regimen    Hypoxemic respiratory failure, chronic  Comments:  Stable, continue oxygen use    Hyperlipidemia associated with type 2 diabetes mellitus  Comments:  Controlled, continue current regimen  Orders:  -     Lipid Panel; Future    Coronary artery disease involving native coronary artery of native heart without angina pectoris  Comments:  Stable, followed by Cardiology    Hypertension associated with diabetes  Comments:  Uncontrolled at present.  Patient has been out of medication for 3 days.  Will restart medications today.  Has follow-up scheduled with Dr. Sánchez    Type 2 diabetes mellitus with other circulatory complication, without long-term current use of insulin  Comments:  Controlled, continue current regimen  Orders:  -     Hemoglobin A1C; Future  -     CBC Auto Differential; Future  -     Comprehensive Metabolic Panel; Future  -     Microalbumin/Creatinine Ratio, Urine; Future    Congestive heart failure, unspecified HF chronicity, unspecified heart failure type  Comments:  Stable, followed by Cardiology    Adrenal Cushing's syndrome  Comments:  Stable, followed by endocrinology    Pulmonary eosinophilia  Comments:  Stable, followed by pulmonology    History of intracranial hemorrhage  Comments:  Stable, continue to monitor    Abdominal aortic atherosclerosis  Comments:  Stable, continue regimen    Atherosclerosis of both carotid arteries  Comments:  Stable, continue to monitor    Postoperative hypothyroidism  Comments:  Stable, TSH ordered  Orders:  -     TSH; Future    BMI  32.0-32.9,adult  Comments:  Uncontrolled, encourage diet    BRIJESH (obstructive sleep apnea)  Comments:  Controlled, continue current regimen    Dependence on supplemental oxygen    Abnormality of gait and mobility    Other reduced mobility        Provided Tereza with a 5-10 year written screening schedule and personal prevention plan. Recommendations were developed using the USPSTF age appropriate recommendations. Education, counseling, and referrals were provided as needed. After Visit Summary printed and given to patient which includes a list of additional screenings\tests needed.    No follow-ups on file.    CHRISSY Narayanan  I offered to discuss advanced care planning, including how to pick a person who would make decisions for you if you were unable to make them for yourself, called a health care power of , and what kind of decisions you might make such as use of life sustaining treatments such as ventilators and tube feeding when faced with a life limiting illness recorded on a living will that they will need to know. (How you want to be cared for as you near the end of your natural life)     X Patient is interested in learning more about how to make advanced directives.  I provided them paperwork and offered to discuss this with them.

## 2022-08-18 DIAGNOSIS — J44.9 COPD (CHRONIC OBSTRUCTIVE PULMONARY DISEASE): ICD-10-CM

## 2022-08-18 RX ORDER — ALBUTEROL SULFATE 90 UG/1
AEROSOL, METERED RESPIRATORY (INHALATION)
Qty: 18 G | Refills: 0 | Status: SHIPPED | OUTPATIENT
Start: 2022-08-18 | End: 2022-09-07 | Stop reason: SDUPTHER

## 2022-08-18 NOTE — TELEPHONE ENCOUNTER
No new care gaps identified.  [facility] Embedded Care Gaps. Reference number: [id number]. 8/18/2022   3:27:11 PM CDT

## 2022-08-24 ENCOUNTER — TELEPHONE (OUTPATIENT)
Dept: FAMILY MEDICINE | Facility: CLINIC | Age: 72
End: 2022-08-24
Payer: COMMERCIAL

## 2022-08-24 NOTE — TELEPHONE ENCOUNTER
Patient called me- she couldn't remember her pulmonologists name. She sees Dr. Vaca. She is having problems with her copd and asthma and asking for appt. I offered to send a message. Please call patient. Thank you.

## 2022-08-25 ENCOUNTER — TELEPHONE (OUTPATIENT)
Dept: PULMONOLOGY | Facility: CLINIC | Age: 72
End: 2022-08-25
Payer: COMMERCIAL

## 2022-08-25 NOTE — TELEPHONE ENCOUNTER
Spoke with patient. Offered appointment tomorrow with NP. Patient declined. She wants to see Dr. Vaca. Offered her first appointment on 9/7 and added to wait list. Advised patient if her SOB gets worse to proceed straight to ER. Patient v/u.

## 2022-09-07 ENCOUNTER — OFFICE VISIT (OUTPATIENT)
Dept: PULMONOLOGY | Facility: CLINIC | Age: 72
End: 2022-09-07
Payer: MEDICARE

## 2022-09-07 VITALS
WEIGHT: 173.31 LBS | SYSTOLIC BLOOD PRESSURE: 171 MMHG | BODY MASS INDEX: 31.89 KG/M2 | HEART RATE: 65 BPM | HEIGHT: 62 IN | OXYGEN SATURATION: 91 % | DIASTOLIC BLOOD PRESSURE: 72 MMHG

## 2022-09-07 DIAGNOSIS — J82.83 EOSINOPHILIC ASTHMA: ICD-10-CM

## 2022-09-07 DIAGNOSIS — J45.50 SEVERE PERSISTENT ASTHMA WITHOUT COMPLICATION: Primary | ICD-10-CM

## 2022-09-07 DIAGNOSIS — J44.89 ASTHMA-COPD OVERLAP SYNDROME: ICD-10-CM

## 2022-09-07 DIAGNOSIS — J44.9 COPD (CHRONIC OBSTRUCTIVE PULMONARY DISEASE): ICD-10-CM

## 2022-09-07 DIAGNOSIS — J44.89 ASTHMA WITH CHRONIC OBSTRUCTIVE PULMONARY DISEASE (COPD): Chronic | ICD-10-CM

## 2022-09-07 PROCEDURE — 99214 OFFICE O/P EST MOD 30 MIN: CPT | Mod: PBBFAC,PO | Performed by: INTERNAL MEDICINE

## 2022-09-07 PROCEDURE — 99999 PR PBB SHADOW E&M-EST. PATIENT-LVL IV: CPT | Mod: PBBFAC,,, | Performed by: INTERNAL MEDICINE

## 2022-09-07 PROCEDURE — 99214 OFFICE O/P EST MOD 30 MIN: CPT | Mod: S$PBB,,, | Performed by: INTERNAL MEDICINE

## 2022-09-07 PROCEDURE — 99214 PR OFFICE/OUTPT VISIT, EST, LEVL IV, 30-39 MIN: ICD-10-PCS | Mod: S$PBB,,, | Performed by: INTERNAL MEDICINE

## 2022-09-07 PROCEDURE — 99999 PR PBB SHADOW E&M-EST. PATIENT-LVL IV: ICD-10-PCS | Mod: PBBFAC,,, | Performed by: INTERNAL MEDICINE

## 2022-09-07 RX ORDER — ALBUTEROL SULFATE 0.83 MG/ML
2.5 SOLUTION RESPIRATORY (INHALATION) EVERY 4 HOURS PRN
Qty: 30 EACH | Refills: 11 | Status: SHIPPED | OUTPATIENT
Start: 2022-09-07 | End: 2023-07-20 | Stop reason: SDUPTHER

## 2022-09-07 RX ORDER — ARFORMOTEROL TARTRATE 15 UG/2ML
15 SOLUTION RESPIRATORY (INHALATION) 2 TIMES DAILY
Qty: 60 EACH | Refills: 11 | Status: SHIPPED | OUTPATIENT
Start: 2022-09-07 | End: 2022-09-29 | Stop reason: SDUPTHER

## 2022-09-07 RX ORDER — THEOPHYLLINE 400 MG/1
400 TABLET, EXTENDED RELEASE ORAL 2 TIMES DAILY
Qty: 60 TABLET | Refills: 5 | Status: ON HOLD | OUTPATIENT
Start: 2022-09-07 | End: 2022-09-15 | Stop reason: HOSPADM

## 2022-09-07 RX ORDER — PREDNISONE 20 MG/1
TABLET ORAL
Qty: 21 TABLET | Refills: 1 | Status: ON HOLD | OUTPATIENT
Start: 2022-09-07 | End: 2022-09-15 | Stop reason: HOSPADM

## 2022-09-07 RX ORDER — REVEFENACIN 175 UG/3ML
1 SOLUTION RESPIRATORY (INHALATION) DAILY
Qty: 30 EACH | Refills: 11 | Status: ON HOLD | OUTPATIENT
Start: 2022-09-07 | End: 2022-09-15 | Stop reason: HOSPADM

## 2022-09-07 RX ORDER — BUDESONIDE 0.5 MG/2ML
0.5 INHALANT ORAL 2 TIMES DAILY
Qty: 120 ML | Refills: 11 | Status: SHIPPED | OUTPATIENT
Start: 2022-09-07 | End: 2022-09-29 | Stop reason: SDUPTHER

## 2022-09-07 RX ORDER — ALBUTEROL SULFATE 90 UG/1
1-2 AEROSOL, METERED RESPIRATORY (INHALATION) EVERY 4 HOURS PRN
Qty: 108 G | Refills: 3 | Status: SHIPPED | OUTPATIENT
Start: 2022-09-07 | End: 2022-09-29 | Stop reason: SDUPTHER

## 2022-09-07 NOTE — PROGRESS NOTES
9/7/2022    Tereza G Thuan  New Patient Consult    Chief Complaint   Patient presents with    Shortness of Breath     Everyday, when pt walks. When she bends down.    Wheezing     Sometimes     Cough     Pt coughs up clear mucus     Chest Pain     Pac maker on the left side, she is getting chest tight feeling.         HPI:    9/7/2022 uses roller walker last 4-5 yrs, occ falls.  No hemoptysis. More sob. Uses ox chr.  Cough and min wheezes.  Cough white .  No recent prednisone -- used in past with benefit.  Uses ventolin now-- used breo in past but felt ventoloin helped more.     Still worsens cold weather.     Hoarse last 2 yrs- stable.       4/30/2020 - no more blood streaks, min cough am mucous white.  Uses resp rx with breo only - prn ventolin also. Uses ox at home.  Patient Instructions   Chest xray nearly cleared completely by 1/29/2020    Would use celebrex 100 mg (stop for stomach upset) once or twice daily for severe right low back pain causing walking problems.  Also sent in tramadol 50 mg for as needed use.    Continue breo and as needed albuterol      Call if problem , may  Use prednisone if cough/wheezes   1/29/2020- had cold front pass with problems coughing up blood streaks.  Admitted to [facility] wbc 1/10 of 24, creat 2.  Got iv abx, none at dc.  Uses ox at home.      Uses brovana and budesonide , also breo, and singulair.  No asthma arousal.    Patient Instructions   Need to follow up chest xray as was not clear by 1/14.  Pneumonia not typical  For heart block  Use breo or brovana and budesonide - not both.  Use breo til out.  You have used and benefited from your home oxygen.  11/21/2019 asthma since age 19 with cough/wheezes/sob, weather change and noct worsening.  Uses 02 to sleep and sat walking 89 or so sat.  Uses ox daytime 1/2 day.    Sinuses problem- flonase prn, claritin.  No infection sinus.  Mucous from lungs white to clear.    Uses theophylline.  Takes albuterol 1-2/wk.  Awakens with asthma  "4/month.  No emergency treatments.    Uses steroid shots once a yr.  No apneas suggested- on home ox 3 yrs.  Use cane to ambulate as had cva 3 yrs ago.  Falls occasionally- fell last month.    Patient Instructions   Asthma likely caused some copd.  Take prednisone 20mg  Daily for 3 days and note sugars, and breathing.  Would check lung capacity next wk, Check chest xray and breathing test and oxygen level walking.  .   May repeat prednisone if needed.  Prevention medications -Use Singulair daily&  Use breo once daily, or  consider continuing or use budesonide and brovana (similar medications but should be covered with old medicare benefit).  Use nebulizer as needed.  Control sinuses- use Claritin and Flonase.  singulair may help.    Could skip theophylline and stay off    The chief compliant  problem is new to me",   PFSH:  Past Medical History:   Diagnosis Date    Abnormal EKG 9/26/2012    Allergy     Arthritis     Asthma     Brain bleed     Colon polyps 11/6/2020    COPD (chronic obstructive pulmonary disease)     Depression     Diabetes mellitus type II     Diverticulitis     Fatty liver     GERD (gastroesophageal reflux disease)     Goiter     s/p thyroidectomy    Heart murmur     Hemiparesis affecting right side as late effect of cerebrovascular accident (CVA) 7/26/2022    Hernia of abdominal wall     History of intracranial hemorrhage 6/15/2013    History of non-ST elevation myocardial infarction (NSTEMI) 6/15/2013    Hyperlipidemia     Hypertension     Lactic acidosis 1/11/2020    Metabolic syndrome 6/14/2012    Myocardial infarction     On home oxygen therapy     states uses 2L at night or when sat < 90    Pacemaker     Positive FIT (fecal immunochemical test) 11/6/2020    Severe persistent asthma without complication 4/30/2020    Statin intolerance     Stroke 2012    left hand shaky, loss of balance         Past Surgical History:   Procedure Laterality Date    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/25/2017    " Idnu    ADRENALECTOMY      AORTIC VALVE REPLACEMENT  12/09/2016    arthroscopy lt knee Right     BLADDER REPAIR      sling    BREAST BIOPSY Bilateral     benign    COLONOSCOPY  2004    10 year recheck    COLONOSCOPY N/A 11/6/2020    Procedure: COLONOSCOPY;  Surgeon: Yakelin Lowery MD;  Location: [facility] ENDO;  Service: Endoscopy;  Laterality: N/A;    CORONARY ARTERY BYPASS GRAFT  12/09/2016    X3    EPIDURAL STEROID INJECTION INTO LUMBAR SPINE N/A 7/27/2021    Procedure: Injection-steroid-epidural-lumbar;  Surgeon: Valerio Jay MD;  Location: [facility] OR;  Service: Pain Management;  Laterality: N/A;  L5-S1     HYSTERECTOMY      INSERTION OF PACEMAKER Left 1/13/2020    Procedure: INSERTION, PACEMAKER;  Surgeon: Marko Batres MD;  Location: [facility] CATH/EP LAB;  Service: Cardiology;  Laterality: Left;    OOPHORECTOMY      THYROIDECTOMY       Social History     Tobacco Use    Smoking status: Never    Smokeless tobacco: Never   Substance Use Topics    Alcohol use: Not Currently     Comment: seldom    Drug use: No     Family History   Problem Relation Age of Onset    Arthritis Mother     Diabetes Mother     Heart disease Mother     Hypertension Mother     Hypertension Father     Heart disease Father     Mental illness Father     Asthma Sister     Diabetes Sister     Hypertension Sister     Asthma Son     COPD Son     Depression Son     Diabetes Son     Hypertension Son     Stroke Son     Diabetes Maternal Uncle     Heart disease Maternal Uncle     Mental illness Paternal Aunt     Cancer Paternal Aunt     Heart disease Paternal Aunt     Hypertension Paternal Aunt     Heart disease Paternal Uncle     Hypertension Maternal Grandmother     Mental illness Maternal Grandmother     Aneurysm Maternal Grandfather     Mental illness Paternal Grandmother     Heart disease Paternal Grandfather     Melanoma Neg Hx     Psoriasis Neg Hx     Lupus Neg Hx     Eczema Neg Hx      Review of patient's allergies indicates:   Allergen Reactions  "   Metformin Diarrhea     Diarrhea      Corn Itching     Other reaction(s): Sneezing  Other reaction(s): Rhinorrhea    Potato starch Hives     Other reaction(s): Sneezing  Other reaction(s): Rhinorrhea    Pravastatin Other (See Comments)     Muscle pain    Statins-hmg-coa reductase inhibitors Other (See Comments)    Hydrochlorothiazide Other (See Comments)     weakness    Welchol [colesevelam] Other (See Comments)     Weakness        Performance Status:The patient's activity level is housebound activities.      Review of Systems:  a review of eleven systems covering constitutional, Eye, HEENT, Psych, Respiratory, Cardiac, GI, , Musculoskeletal, Endocrine, Dermatologic was negative except for pertinent findings as listed ABOVE and below:  pertinent positive as above, rest is good       Exam:Comprehensive exam done. BP (!) 171/72 (BP Location: Left arm, Patient Position: Sitting, BP Method: Medium (Automatic))   Pulse 65   Ht 5' 2" (1.575 m)   Wt 78.6 kg (173 lb 4.5 oz)   SpO2 (!) 91% Comment: room air at rest  BMI 31.69 kg/m²   Exam included Vitals as listed, and patient's appearance and affect and alertness and mood, oral exam for yeast and hygiene and pharynx lesions and Mallapatti (M) score, neck with inspection for jvd and masses and thyroid abnormalities and lymph nodes (supraclavicular and infraclavicular nodes and axillary also examined and noted if abn), chest exam included symmetry and effort and fremitus and percussion and auscultation, cardiac exam included rhythm and gallops and murmur and rubs and jvd and edema, abdominal exam for mass and hepatosplenomegaly and tenderness and hernias and bowel sounds, Musculoskeletal exam with muscle tone and posture and mobility/gait and  strength, and skin for rashes and cyanosis and pallor and turgor, extremity for clubbing.  Findings were normal except for pertinent findings listed below:  M3,chest is symmetric, no distress, normal percussion, normal " fremitus and good normal decreased breath sounds  No clubbing nor edema     Radiographs (ct chest and cxr) reviewed: cxr 1/10/2020- patchy left > right pneumonia.     cxr 1/29/20 nearly clear.      Labs reviewed         Results for TEREZA VELA (MRN [id number]) as of 11/21/2019 10:37   Ref. Range 8/28/2018 16:38   Eosinophil% Latest Ref Range: 0.0 - 8.0 % 5.2   Eos # Latest Ref Range: 0.0 - 0.5 K/uL 0.4     PFT will be done and results to be reviewed       Plan:  Clinical impression is apparently straight forward and impression with management as below.     Tereza was seen today for shortness of breath, wheezing, cough and chest pain.    Diagnoses and all orders for this visit:    Severe persistent asthma without complication  -     theophylline 400 mg Tb24; Take 1 tablet (400 mg total) by mouth 2 (two) times daily.  -     arformoteroL (BROVANA) 15 mcg/2 mL Nebu; Take 2 mLs (15 mcg total) by nebulization 2 (two) times a day. Controller  -     revefenacin (YUPELRI) 175 mcg/3 mL Nebu; Inhale 1 ampule into the lungs once daily at 6am.  -     predniSONE (DELTASONE) 20 MG tablet; Take one daily for 7 days and may repeat for shortness of breath.  -     budesonide (PULMICORT) 0.5 mg/2 mL nebulizer solution; Take 2 mLs (0.5 mg total) by nebulization 2 (two) times daily. Controller  -     Complete PFT w/ bronchodilator; Future  -     albuterol (PROVENTIL/VENTOLIN HFA) 90 mcg/actuation inhaler; Inhale 1-2 puffs into the lungs every 4 (four) hours as needed for Wheezing. Inhale 2 puffs by mouth into lungs every 4 hours as needed  -     albuterol (PROVENTIL) 2.5 mg /3 mL (0.083 %) nebulizer solution; Take 3 mLs (2.5 mg total) by nebulization every 4 (four) hours as needed for Shortness of Breath or Wheezing.    Asthma with chronic obstructive pulmonary disease (COPD)  -     theophylline 400 mg Tb24; Take 1 tablet (400 mg total) by mouth 2 (two) times daily.    COPD (chronic obstructive pulmonary disease)  -     albuterol  (PROVENTIL/VENTOLIN HFA) 90 mcg/actuation inhaler; Inhale 1-2 puffs into the lungs every 4 (four) hours as needed for Wheezing. Inhale 2 puffs by mouth into lungs every 4 hours as needed    Eosinophilic asthma  -     theophylline 400 mg Tb24; Take 1 tablet (400 mg total) by mouth 2 (two) times daily.  -     arformoteroL (BROVANA) 15 mcg/2 mL Nebu; Take 2 mLs (15 mcg total) by nebulization 2 (two) times a day. Controller  -     revefenacin (YUPELRI) 175 mcg/3 mL Nebu; Inhale 1 ampule into the lungs once daily at 6am.  -     predniSONE (DELTASONE) 20 MG tablet; Take one daily for 7 days and may repeat for shortness of breath.  -     budesonide (PULMICORT) 0.5 mg/2 mL nebulizer solution; Take 2 mLs (0.5 mg total) by nebulization 2 (two) times daily. Controller  -     Complete PFT w/ bronchodilator; Future  -     albuterol (PROVENTIL/VENTOLIN HFA) 90 mcg/actuation inhaler; Inhale 1-2 puffs into the lungs every 4 (four) hours as needed for Wheezing. Inhale 2 puffs by mouth into lungs every 4 hours as needed  -     albuterol (PROVENTIL) 2.5 mg /3 mL (0.083 %) nebulizer solution; Take 3 mLs (2.5 mg total) by nebulization every 4 (four) hours as needed for Shortness of Breath or Wheezing.    Asthma-COPD overlap syndrome  -     theophylline 400 mg Tb24; Take 1 tablet (400 mg total) by mouth 2 (two) times daily.  -     arformoteroL (BROVANA) 15 mcg/2 mL Nebu; Take 2 mLs (15 mcg total) by nebulization 2 (two) times a day. Controller  -     revefenacin (YUPELRI) 175 mcg/3 mL Nebu; Inhale 1 ampule into the lungs once daily at 6am.  -     predniSONE (DELTASONE) 20 MG tablet; Take one daily for 7 days and may repeat for shortness of breath.  -     budesonide (PULMICORT) 0.5 mg/2 mL nebulizer solution; Take 2 mLs (0.5 mg total) by nebulization 2 (two) times daily. Controller  -     Complete PFT w/ bronchodilator; Future  -     albuterol (PROVENTIL/VENTOLIN HFA) 90 mcg/actuation inhaler; Inhale 1-2 puffs into the lungs every 4  (four) hours as needed for Wheezing. Inhale 2 puffs by mouth into lungs every 4 hours as needed  -     albuterol (PROVENTIL) 2.5 mg /3 mL (0.083 %) nebulizer solution; Take 3 mLs (2.5 mg total) by nebulization every 4 (four) hours as needed for Shortness of Breath or Wheezing.        Follow up in about 3 months (around 12/7/2022), or if symptoms worsen or fail to improve.    Discussed with patient above for education the following:      Patient Instructions   Need to be on preventive therapy -- use budesonide twice daily.   Brovnana twice daily and yupleri once daily    Would take prednisone 20 mg daily for 7 days -- repeat.  Would check sugar, perhaps daily -- to assure sugars less than 200-250.     If on preventive therapy may be able to  use asthma shots as asthma seems severe?       Will resume theophylline -- if available.    Need to do breathing test with next visit.    Get blood work today or am.

## 2022-09-07 NOTE — PATIENT INSTRUCTIONS
Need to be on preventive therapy -- use budesonide twice daily.   Brovnana twice daily and yupleri once daily    Would take prednisone 20 mg daily for 7 days -- repeat.  Would check sugar, perhaps daily -- to assure sugars less than 200-250.     If on preventive therapy may be able to  use asthma shots as asthma seems severe?       Will resume theophylline -- if available.    Need to do breathing test with next visit.    Get blood work today or am.

## 2022-09-08 ENCOUNTER — TELEPHONE (OUTPATIENT)
Dept: PULMONOLOGY | Facility: CLINIC | Age: 72
End: 2022-09-08
Payer: COMMERCIAL

## 2022-09-09 ENCOUNTER — HOSPITAL ENCOUNTER (INPATIENT)
Facility: HOSPITAL | Age: 72
LOS: 4 days | Discharge: HOME HEALTH | DRG: 246 | End: 2022-09-15
Attending: EMERGENCY MEDICINE | Admitting: FAMILY MEDICINE
Payer: MEDICARE

## 2022-09-09 DIAGNOSIS — I34.0 MITRAL REGURGITATION: ICD-10-CM

## 2022-09-09 DIAGNOSIS — R73.9 HYPERGLYCEMIA: ICD-10-CM

## 2022-09-09 DIAGNOSIS — I50.9 CONGESTIVE HEART FAILURE, UNSPECIFIED HF CHRONICITY, UNSPECIFIED HEART FAILURE TYPE: ICD-10-CM

## 2022-09-09 DIAGNOSIS — I46.9 CARDIAC ARREST: ICD-10-CM

## 2022-09-09 DIAGNOSIS — I50.20 SYSTOLIC CONGESTIVE HEART FAILURE, UNSPECIFIED HF CHRONICITY: ICD-10-CM

## 2022-09-09 DIAGNOSIS — R79.89 ELEVATED SERUM CREATININE: ICD-10-CM

## 2022-09-09 DIAGNOSIS — R00.0 TACHYARRHYTHMIA: ICD-10-CM

## 2022-09-09 DIAGNOSIS — I50.43 ACUTE ON CHRONIC COMBINED SYSTOLIC AND DIASTOLIC CHF (CONGESTIVE HEART FAILURE): ICD-10-CM

## 2022-09-09 DIAGNOSIS — R07.9 CHEST PAIN: ICD-10-CM

## 2022-09-09 DIAGNOSIS — I50.9 CHF (CONGESTIVE HEART FAILURE): Primary | ICD-10-CM

## 2022-09-09 DIAGNOSIS — G47.33 OSA (OBSTRUCTIVE SLEEP APNEA): Chronic | ICD-10-CM

## 2022-09-09 DIAGNOSIS — R06.02 SHORTNESS OF BREATH: ICD-10-CM

## 2022-09-09 DIAGNOSIS — I47.20 VT (VENTRICULAR TACHYCARDIA): ICD-10-CM

## 2022-09-09 DIAGNOSIS — I27.20 PULMONARY HYPERTENSION: ICD-10-CM

## 2022-09-09 DIAGNOSIS — J44.1 COPD EXACERBATION: ICD-10-CM

## 2022-09-09 PROBLEM — I45.81 LONG Q-T SYNDROME: Status: ACTIVE | Noted: 2022-09-09

## 2022-09-09 LAB
ALBUMIN SERPL BCP-MCNC: 3.8 G/DL (ref 3.5–5.2)
ALP SERPL-CCNC: 75 U/L (ref 55–135)
ALT SERPL W/O P-5'-P-CCNC: 30 U/L (ref 10–44)
ANION GAP SERPL CALC-SCNC: 9 MMOL/L (ref 8–16)
AST SERPL-CCNC: 44 U/L (ref 10–40)
BACTERIA #/AREA URNS HPF: NEGATIVE /HPF
BASOPHILS # BLD AUTO: 0.01 K/UL (ref 0–0.2)
BASOPHILS NFR BLD: 0.1 % (ref 0–1.9)
BILIRUB SERPL-MCNC: 0.8 MG/DL (ref 0.1–1)
BILIRUB UR QL STRIP: NEGATIVE
BNP SERPL-MCNC: 1501 PG/ML (ref 0–99)
BUN SERPL-MCNC: 30 MG/DL (ref 8–23)
CALCIUM SERPL-MCNC: 8.3 MG/DL (ref 8.7–10.5)
CHLORIDE SERPL-SCNC: 92 MMOL/L (ref 95–110)
CLARITY UR: CLEAR
CO2 SERPL-SCNC: 36 MMOL/L (ref 23–29)
COLOR UR: YELLOW
CREAT SERPL-MCNC: 1 MG/DL (ref 0.5–1.4)
DIFFERENTIAL METHOD: ABNORMAL
EOSINOPHIL # BLD AUTO: 0 K/UL (ref 0–0.5)
EOSINOPHIL NFR BLD: 0 % (ref 0–8)
ERYTHROCYTE [DISTWIDTH] IN BLOOD BY AUTOMATED COUNT: 13.3 % (ref 11.5–14.5)
EST. GFR  (NO RACE VARIABLE): >60 ML/MIN/1.73 M^2
GLUCOSE SERPL-MCNC: 184 MG/DL (ref 70–110)
GLUCOSE SERPL-MCNC: 199 MG/DL (ref 70–110)
GLUCOSE SERPL-MCNC: 497 MG/DL (ref 70–110)
GLUCOSE UR QL STRIP: ABNORMAL
HCT VFR BLD AUTO: 37.9 % (ref 37–48.5)
HGB BLD-MCNC: 12.2 G/DL (ref 12–16)
HGB UR QL STRIP: NEGATIVE
HYALINE CASTS #/AREA URNS LPF: 0 /LPF
IMM GRANULOCYTES # BLD AUTO: 0.08 K/UL (ref 0–0.04)
IMM GRANULOCYTES NFR BLD AUTO: 1 % (ref 0–0.5)
KETONES UR QL STRIP: NEGATIVE
LACTATE SERPL-SCNC: 1.6 MMOL/L (ref 0.5–1.9)
LACTATE SERPL-SCNC: 2.4 MMOL/L (ref 0.5–1.9)
LEUKOCYTE ESTERASE UR QL STRIP: NEGATIVE
LYMPHOCYTES # BLD AUTO: 0.5 K/UL (ref 1–4.8)
LYMPHOCYTES NFR BLD: 6.3 % (ref 18–48)
MCH RBC QN AUTO: 31 PG (ref 27–31)
MCHC RBC AUTO-ENTMCNC: 32.2 G/DL (ref 32–36)
MCV RBC AUTO: 96 FL (ref 82–98)
MICROSCOPIC COMMENT: NORMAL
MONOCYTES # BLD AUTO: 0.2 K/UL (ref 0.3–1)
MONOCYTES NFR BLD: 3 % (ref 4–15)
NEUTROPHILS # BLD AUTO: 7.3 K/UL (ref 1.8–7.7)
NEUTROPHILS NFR BLD: 89.6 % (ref 38–73)
NITRITE UR QL STRIP: NEGATIVE
NRBC BLD-RTO: 0 /100 WBC
PH UR STRIP: 7 [PH] (ref 5–8)
PLATELET # BLD AUTO: 294 K/UL (ref 150–450)
PMV BLD AUTO: 9.7 FL (ref 9.2–12.9)
POTASSIUM SERPL-SCNC: 3.8 MMOL/L (ref 3.5–5.1)
PROT SERPL-MCNC: 7.2 G/DL (ref 6–8.4)
PROT UR QL STRIP: NEGATIVE
RBC # BLD AUTO: 3.93 M/UL (ref 4–5.4)
RBC #/AREA URNS HPF: 1 /HPF (ref 0–4)
SARS-COV-2 RDRP RESP QL NAA+PROBE: NEGATIVE
SODIUM SERPL-SCNC: 137 MMOL/L (ref 136–145)
SP GR UR STRIP: 1.01 (ref 1–1.03)
SQUAMOUS #/AREA URNS HPF: 1 /HPF
TROPONIN I SERPL DL<=0.01 NG/ML-MCNC: <0.03 NG/ML
URN SPEC COLLECT METH UR: ABNORMAL
UROBILINOGEN UR STRIP-ACNC: NEGATIVE EU/DL
WBC # BLD AUTO: 8.12 K/UL (ref 3.9–12.7)
WBC #/AREA URNS HPF: 1 /HPF (ref 0–5)
YEAST URNS QL MICRO: NORMAL

## 2022-09-09 PROCEDURE — 99900031 HC PATIENT EDUCATION (STAT)

## 2022-09-09 PROCEDURE — 92950 HEART/LUNG RESUSCITATION CPR: CPT

## 2022-09-09 PROCEDURE — 83880 ASSAY OF NATRIURETIC PEPTIDE: CPT | Performed by: EMERGENCY MEDICINE

## 2022-09-09 PROCEDURE — 84484 ASSAY OF TROPONIN QUANT: CPT | Performed by: EMERGENCY MEDICINE

## 2022-09-09 PROCEDURE — 93010 EKG 12-LEAD: ICD-10-PCS | Mod: ,,, | Performed by: INTERNAL MEDICINE

## 2022-09-09 PROCEDURE — G0378 HOSPITAL OBSERVATION PER HR: HCPCS

## 2022-09-09 PROCEDURE — 25000003 PHARM REV CODE 250: Performed by: FAMILY MEDICINE

## 2022-09-09 PROCEDURE — 96372 THER/PROPH/DIAG INJ SC/IM: CPT | Performed by: FAMILY MEDICINE

## 2022-09-09 PROCEDURE — 96366 THER/PROPH/DIAG IV INF ADDON: CPT

## 2022-09-09 PROCEDURE — 94799 UNLISTED PULMONARY SVC/PX: CPT

## 2022-09-09 PROCEDURE — 99900035 HC TECH TIME PER 15 MIN (STAT)

## 2022-09-09 PROCEDURE — 63600175 PHARM REV CODE 636 W HCPCS: Performed by: FAMILY MEDICINE

## 2022-09-09 PROCEDURE — 93010 ELECTROCARDIOGRAM REPORT: CPT | Mod: ,,, | Performed by: INTERNAL MEDICINE

## 2022-09-09 PROCEDURE — 85025 COMPLETE CBC W/AUTO DIFF WBC: CPT | Performed by: EMERGENCY MEDICINE

## 2022-09-09 PROCEDURE — 87040 BLOOD CULTURE FOR BACTERIA: CPT | Performed by: EMERGENCY MEDICINE

## 2022-09-09 PROCEDURE — 83605 ASSAY OF LACTIC ACID: CPT | Performed by: EMERGENCY MEDICINE

## 2022-09-09 PROCEDURE — 81001 URINALYSIS AUTO W/SCOPE: CPT | Performed by: EMERGENCY MEDICINE

## 2022-09-09 PROCEDURE — 96365 THER/PROPH/DIAG IV INF INIT: CPT

## 2022-09-09 PROCEDURE — 83605 ASSAY OF LACTIC ACID: CPT | Mod: 91 | Performed by: FAMILY MEDICINE

## 2022-09-09 PROCEDURE — 96375 TX/PRO/DX INJ NEW DRUG ADDON: CPT

## 2022-09-09 PROCEDURE — 25000242 PHARM REV CODE 250 ALT 637 W/ HCPCS: Performed by: FAMILY MEDICINE

## 2022-09-09 PROCEDURE — 80053 COMPREHEN METABOLIC PANEL: CPT | Performed by: EMERGENCY MEDICINE

## 2022-09-09 PROCEDURE — 94761 N-INVAS EAR/PLS OXIMETRY MLT: CPT

## 2022-09-09 PROCEDURE — 93005 ELECTROCARDIOGRAM TRACING: CPT | Performed by: INTERNAL MEDICINE

## 2022-09-09 PROCEDURE — U0002 COVID-19 LAB TEST NON-CDC: HCPCS | Performed by: EMERGENCY MEDICINE

## 2022-09-09 PROCEDURE — 99291 CRITICAL CARE FIRST HOUR: CPT

## 2022-09-09 PROCEDURE — 82962 GLUCOSE BLOOD TEST: CPT

## 2022-09-09 PROCEDURE — 25000242 PHARM REV CODE 250 ALT 637 W/ HCPCS: Performed by: EMERGENCY MEDICINE

## 2022-09-09 PROCEDURE — 63600175 PHARM REV CODE 636 W HCPCS: Performed by: EMERGENCY MEDICINE

## 2022-09-09 PROCEDURE — 25000003 PHARM REV CODE 250: Performed by: EMERGENCY MEDICINE

## 2022-09-09 PROCEDURE — 96376 TX/PRO/DX INJ SAME DRUG ADON: CPT

## 2022-09-09 PROCEDURE — 94640 AIRWAY INHALATION TREATMENT: CPT

## 2022-09-09 RX ORDER — SOTALOL HYDROCHLORIDE 80 MG/1
80 TABLET ORAL 2 TIMES DAILY
Status: ON HOLD | COMMUNITY
Start: 2022-08-10 | End: 2022-09-15 | Stop reason: HOSPADM

## 2022-09-09 RX ORDER — HYDROCODONE BITARTRATE AND ACETAMINOPHEN 5; 325 MG/1; MG/1
1 TABLET ORAL EVERY 4 HOURS PRN
Status: DISCONTINUED | OUTPATIENT
Start: 2022-09-09 | End: 2022-09-15 | Stop reason: HOSPADM

## 2022-09-09 RX ORDER — METHYLPREDNISOLONE SOD SUCC 125 MG
125 VIAL (EA) INJECTION
Status: COMPLETED | OUTPATIENT
Start: 2022-09-09 | End: 2022-09-09

## 2022-09-09 RX ORDER — POLYETHYLENE GLYCOL 3350 17 G/17G
17 POWDER, FOR SOLUTION ORAL 2 TIMES DAILY PRN
Status: DISCONTINUED | OUTPATIENT
Start: 2022-09-09 | End: 2022-09-15 | Stop reason: HOSPADM

## 2022-09-09 RX ORDER — NALOXONE HCL 0.4 MG/ML
0.02 VIAL (ML) INJECTION
Status: DISCONTINUED | OUTPATIENT
Start: 2022-09-09 | End: 2022-09-15 | Stop reason: HOSPADM

## 2022-09-09 RX ORDER — IPRATROPIUM BROMIDE AND ALBUTEROL SULFATE 2.5; .5 MG/3ML; MG/3ML
3 SOLUTION RESPIRATORY (INHALATION)
Status: COMPLETED | OUTPATIENT
Start: 2022-09-09 | End: 2022-09-09

## 2022-09-09 RX ORDER — SIMETHICONE 80 MG
1 TABLET,CHEWABLE ORAL 4 TIMES DAILY PRN
Status: DISCONTINUED | OUTPATIENT
Start: 2022-09-09 | End: 2022-09-15 | Stop reason: HOSPADM

## 2022-09-09 RX ORDER — MORPHINE SULFATE 4 MG/ML
4 INJECTION, SOLUTION INTRAMUSCULAR; INTRAVENOUS EVERY 4 HOURS PRN
Status: DISCONTINUED | OUTPATIENT
Start: 2022-09-09 | End: 2022-09-15 | Stop reason: HOSPADM

## 2022-09-09 RX ORDER — IPRATROPIUM BROMIDE AND ALBUTEROL SULFATE 2.5; .5 MG/3ML; MG/3ML
3 SOLUTION RESPIRATORY (INHALATION)
Status: DISCONTINUED | OUTPATIENT
Start: 2022-09-10 | End: 2022-09-15 | Stop reason: HOSPADM

## 2022-09-09 RX ORDER — IPRATROPIUM BROMIDE AND ALBUTEROL SULFATE 2.5; .5 MG/3ML; MG/3ML
3 SOLUTION RESPIRATORY (INHALATION) EVERY 4 HOURS
Status: DISCONTINUED | OUTPATIENT
Start: 2022-09-09 | End: 2022-09-09

## 2022-09-09 RX ORDER — LOSARTAN POTASSIUM 25 MG/1
25 TABLET ORAL DAILY
Status: DISCONTINUED | OUTPATIENT
Start: 2022-09-10 | End: 2022-09-11

## 2022-09-09 RX ORDER — IBUPROFEN 200 MG
24 TABLET ORAL
Status: DISCONTINUED | OUTPATIENT
Start: 2022-09-09 | End: 2022-09-15 | Stop reason: HOSPADM

## 2022-09-09 RX ORDER — FUROSEMIDE 10 MG/ML
40 INJECTION INTRAMUSCULAR; INTRAVENOUS
Status: COMPLETED | OUTPATIENT
Start: 2022-09-09 | End: 2022-09-09

## 2022-09-09 RX ORDER — POTASSIUM CHLORIDE 7.45 MG/ML
20 INJECTION INTRAVENOUS
Status: DISCONTINUED | OUTPATIENT
Start: 2022-09-09 | End: 2022-09-15 | Stop reason: HOSPADM

## 2022-09-09 RX ORDER — MAGNESIUM SULFATE HEPTAHYDRATE 40 MG/ML
4 INJECTION, SOLUTION INTRAVENOUS
Status: DISCONTINUED | OUTPATIENT
Start: 2022-09-09 | End: 2022-09-15 | Stop reason: HOSPADM

## 2022-09-09 RX ORDER — BUMETANIDE 0.25 MG/ML
1 INJECTION INTRAMUSCULAR; INTRAVENOUS 2 TIMES DAILY
Status: DISCONTINUED | OUTPATIENT
Start: 2022-09-09 | End: 2022-09-11

## 2022-09-09 RX ORDER — POTASSIUM CHLORIDE 20 MEQ/1
40 TABLET, EXTENDED RELEASE ORAL
Status: DISCONTINUED | OUTPATIENT
Start: 2022-09-09 | End: 2022-09-15 | Stop reason: HOSPADM

## 2022-09-09 RX ORDER — POTASSIUM CHLORIDE 20 MEQ/1
20 TABLET, EXTENDED RELEASE ORAL
Status: DISCONTINUED | OUTPATIENT
Start: 2022-09-09 | End: 2022-09-15 | Stop reason: HOSPADM

## 2022-09-09 RX ORDER — ACETAMINOPHEN 325 MG/1
650 TABLET ORAL EVERY 8 HOURS PRN
Status: DISCONTINUED | OUTPATIENT
Start: 2022-09-09 | End: 2022-09-15 | Stop reason: HOSPADM

## 2022-09-09 RX ORDER — MAGNESIUM SULFATE HEPTAHYDRATE 40 MG/ML
2 INJECTION, SOLUTION INTRAVENOUS
Status: DISCONTINUED | OUTPATIENT
Start: 2022-09-09 | End: 2022-09-15 | Stop reason: HOSPADM

## 2022-09-09 RX ORDER — MAGNESIUM SULFATE 1 G/100ML
1 INJECTION INTRAVENOUS
Status: DISCONTINUED | OUTPATIENT
Start: 2022-09-09 | End: 2022-09-15 | Stop reason: HOSPADM

## 2022-09-09 RX ORDER — TALC
6 POWDER (GRAM) TOPICAL NIGHTLY PRN
Status: DISCONTINUED | OUTPATIENT
Start: 2022-09-09 | End: 2022-09-15 | Stop reason: HOSPADM

## 2022-09-09 RX ORDER — SODIUM CHLORIDE 0.9 % (FLUSH) 0.9 %
10 SYRINGE (ML) INJECTION
Status: DISCONTINUED | OUTPATIENT
Start: 2022-09-09 | End: 2022-09-15 | Stop reason: HOSPADM

## 2022-09-09 RX ORDER — ONDANSETRON 2 MG/ML
4 INJECTION INTRAMUSCULAR; INTRAVENOUS EVERY 6 HOURS PRN
Status: DISCONTINUED | OUTPATIENT
Start: 2022-09-09 | End: 2022-09-09

## 2022-09-09 RX ORDER — ENOXAPARIN SODIUM 100 MG/ML
40 INJECTION SUBCUTANEOUS EVERY 24 HOURS
Status: DISCONTINUED | OUTPATIENT
Start: 2022-09-09 | End: 2022-09-15 | Stop reason: HOSPADM

## 2022-09-09 RX ORDER — LANOLIN ALCOHOL/MO/W.PET/CERES
800 CREAM (GRAM) TOPICAL
Status: DISCONTINUED | OUTPATIENT
Start: 2022-09-09 | End: 2022-09-15 | Stop reason: HOSPADM

## 2022-09-09 RX ORDER — IBUPROFEN 200 MG
16 TABLET ORAL
Status: DISCONTINUED | OUTPATIENT
Start: 2022-09-09 | End: 2022-09-15 | Stop reason: HOSPADM

## 2022-09-09 RX ORDER — METHYLPREDNISOLONE SOD SUCC 125 MG
80 VIAL (EA) INJECTION EVERY 8 HOURS
Status: DISCONTINUED | OUTPATIENT
Start: 2022-09-09 | End: 2022-09-10

## 2022-09-09 RX ORDER — POTASSIUM CHLORIDE 7.45 MG/ML
40 INJECTION INTRAVENOUS
Status: DISCONTINUED | OUTPATIENT
Start: 2022-09-09 | End: 2022-09-15 | Stop reason: HOSPADM

## 2022-09-09 RX ORDER — INSULIN ASPART 100 [IU]/ML
1-12 INJECTION, SOLUTION INTRAVENOUS; SUBCUTANEOUS
Status: DISCONTINUED | OUTPATIENT
Start: 2022-09-09 | End: 2022-09-15 | Stop reason: HOSPADM

## 2022-09-09 RX ORDER — AMOXICILLIN 250 MG
1 CAPSULE ORAL 2 TIMES DAILY PRN
Status: DISCONTINUED | OUTPATIENT
Start: 2022-09-09 | End: 2022-09-15 | Stop reason: HOSPADM

## 2022-09-09 RX ORDER — IPRATROPIUM BROMIDE AND ALBUTEROL SULFATE 2.5; .5 MG/3ML; MG/3ML
3 SOLUTION RESPIRATORY (INHALATION) EVERY 4 HOURS PRN
Status: DISCONTINUED | OUTPATIENT
Start: 2022-09-09 | End: 2022-09-15 | Stop reason: HOSPADM

## 2022-09-09 RX ORDER — BUDESONIDE 0.5 MG/2ML
0.5 INHALANT ORAL 2 TIMES DAILY
Status: DISCONTINUED | OUTPATIENT
Start: 2022-09-09 | End: 2022-09-15 | Stop reason: HOSPADM

## 2022-09-09 RX ORDER — METOPROLOL SUCCINATE 50 MG/1
50 TABLET, EXTENDED RELEASE ORAL DAILY
Status: DISCONTINUED | OUTPATIENT
Start: 2022-09-10 | End: 2022-09-15 | Stop reason: HOSPADM

## 2022-09-09 RX ORDER — HYDRALAZINE HYDROCHLORIDE 20 MG/ML
5 INJECTION INTRAMUSCULAR; INTRAVENOUS EVERY 4 HOURS PRN
Status: DISCONTINUED | OUTPATIENT
Start: 2022-09-09 | End: 2022-09-15 | Stop reason: HOSPADM

## 2022-09-09 RX ORDER — HYDRALAZINE HYDROCHLORIDE 20 MG/ML
10 INJECTION INTRAMUSCULAR; INTRAVENOUS EVERY 4 HOURS PRN
Status: DISCONTINUED | OUTPATIENT
Start: 2022-09-09 | End: 2022-09-15 | Stop reason: HOSPADM

## 2022-09-09 RX ORDER — LEVOTHYROXINE SODIUM 150 UG/1
150 TABLET ORAL DAILY
Status: ON HOLD | COMMUNITY
Start: 2022-08-10

## 2022-09-09 RX ORDER — LEVOFLOXACIN 5 MG/ML
750 INJECTION, SOLUTION INTRAVENOUS
Status: DISCONTINUED | OUTPATIENT
Start: 2022-09-10 | End: 2022-09-10

## 2022-09-09 RX ORDER — GLUCAGON 1 MG
1 KIT INJECTION
Status: DISCONTINUED | OUTPATIENT
Start: 2022-09-09 | End: 2022-09-15 | Stop reason: HOSPADM

## 2022-09-09 RX ORDER — ARFORMOTEROL TARTRATE 15 UG/2ML
15 SOLUTION RESPIRATORY (INHALATION) 2 TIMES DAILY
Status: DISCONTINUED | OUTPATIENT
Start: 2022-09-09 | End: 2022-09-15 | Stop reason: HOSPADM

## 2022-09-09 RX ORDER — ASPIRIN 81 MG/1
81 TABLET ORAL DAILY
Status: DISCONTINUED | OUTPATIENT
Start: 2022-09-10 | End: 2022-09-15 | Stop reason: HOSPADM

## 2022-09-09 RX ORDER — SOTALOL HYDROCHLORIDE 80 MG/1
80 TABLET ORAL 2 TIMES DAILY
Status: DISCONTINUED | OUTPATIENT
Start: 2022-09-09 | End: 2022-09-09

## 2022-09-09 RX ORDER — LEVOFLOXACIN 5 MG/ML
750 INJECTION, SOLUTION INTRAVENOUS
Status: COMPLETED | OUTPATIENT
Start: 2022-09-09 | End: 2022-09-09

## 2022-09-09 RX ORDER — MONTELUKAST SODIUM 10 MG/1
10 TABLET ORAL NIGHTLY
Status: DISCONTINUED | OUTPATIENT
Start: 2022-09-09 | End: 2022-09-15 | Stop reason: HOSPADM

## 2022-09-09 RX ADMIN — METHYLPREDNISOLONE SODIUM SUCCINATE 80 MG: 125 INJECTION, POWDER, FOR SOLUTION INTRAMUSCULAR; INTRAVENOUS at 10:09

## 2022-09-09 RX ADMIN — ARFORMOTEROL TARTRATE 15 MCG: 15 SOLUTION RESPIRATORY (INHALATION) at 11:09

## 2022-09-09 RX ADMIN — LEVOFLOXACIN 750 MG: 5 INJECTION, SOLUTION INTRAVENOUS at 05:09

## 2022-09-09 RX ADMIN — SOTALOL HYDROCHLORIDE 80 MG: 80 TABLET ORAL at 10:09

## 2022-09-09 RX ADMIN — NITROGLYCERIN 1 INCH: 20 OINTMENT TOPICAL at 01:09

## 2022-09-09 RX ADMIN — BUMETANIDE 1 MG: 0.25 INJECTION, SOLUTION INTRAMUSCULAR; INTRAVENOUS at 08:09

## 2022-09-09 RX ADMIN — FUROSEMIDE 40 MG: 10 INJECTION, SOLUTION INTRAVENOUS at 05:09

## 2022-09-09 RX ADMIN — HYDRALAZINE HYDROCHLORIDE 5 MG: 20 INJECTION INTRAMUSCULAR; INTRAVENOUS at 08:09

## 2022-09-09 RX ADMIN — MONTELUKAST 10 MG: 10 TABLET, FILM COATED ORAL at 10:09

## 2022-09-09 RX ADMIN — IPRATROPIUM BROMIDE AND ALBUTEROL SULFATE 3 ML: .5; 3 SOLUTION RESPIRATORY (INHALATION) at 02:09

## 2022-09-09 RX ADMIN — ENOXAPARIN SODIUM 40 MG: 40 INJECTION SUBCUTANEOUS at 10:09

## 2022-09-09 RX ADMIN — INSULIN HUMAN 10 UNITS: 100 INJECTION, SOLUTION PARENTERAL at 05:09

## 2022-09-09 RX ADMIN — BUDESONIDE 0.5 MG: 0.5 INHALANT RESPIRATORY (INHALATION) at 11:09

## 2022-09-09 RX ADMIN — Medication 6 MG: at 10:09

## 2022-09-09 RX ADMIN — INSULIN ASPART 2 UNITS: 100 INJECTION, SOLUTION INTRAVENOUS; SUBCUTANEOUS at 10:09

## 2022-09-09 RX ADMIN — HYDRALAZINE HYDROCHLORIDE 10 MG: 20 INJECTION INTRAMUSCULAR; INTRAVENOUS at 06:09

## 2022-09-09 RX ADMIN — METHYLPREDNISOLONE SODIUM SUCCINATE 125 MG: 125 INJECTION, POWDER, FOR SOLUTION INTRAMUSCULAR; INTRAVENOUS at 05:09

## 2022-09-09 RX ADMIN — ACETAMINOPHEN 650 MG: 325 TABLET ORAL at 10:09

## 2022-09-09 RX ADMIN — IPRATROPIUM BROMIDE AND ALBUTEROL SULFATE 3 ML: .5; 3 SOLUTION RESPIRATORY (INHALATION) at 11:09

## 2022-09-09 NOTE — Clinical Note
The catheter was inserted into the ostium   right coronary artery. An angiography was performed of the right coronary arteries. Multiple views were taken. The angiography was performed via power injection. The injected amount was 6 mL contrast at 3 mL/s. The PSI from the power injection was 300.

## 2022-09-09 NOTE — Clinical Note
The catheter was inserted into the ostium   left main. An angiography was performed of the left coronary arteries. Multiple views were taken. The angiography was performed via power injection. The injected amount was 8 mL contrast at 4 mL/s. The PSI from the power injection was 400.

## 2022-09-09 NOTE — Clinical Note
145 ml of contrast were injected throughout the case. 55 mL of contrast was the total wasted during the case. 200 mL was the total amount used during the case.

## 2022-09-09 NOTE — ED PROVIDER NOTES
Encounter Date: 9/9/2022       History     Chief Complaint   Patient presents with    Shortness of Breath     Pt presents to ED with c/o SOB that started a couple of weeks ago and has gotten progressively worse. Also states her BP was high today. Denies any CP, n/v      71-year-old female with history of asthma and COPD presented emergency department with 1-2 weeks of progressively getting cough and shortness of breath.  Patient also has elevated blood pressure.  Patient on home oxygen.  Patient denies any chest pain however feels discomfort and gradual worsening of shortness of breath.  Denies dysuria or hematuria or any focal weakness or numbness.  Patient complains of generalized malaise.    Review of patient's allergies indicates:   Allergen Reactions    Metformin Diarrhea     Diarrhea      Corn Itching     Other reaction(s): Sneezing  Other reaction(s): Rhinorrhea    Potato starch Hives     Other reaction(s): Sneezing  Other reaction(s): Rhinorrhea    Pravastatin Other (See Comments)     Muscle pain    Statins-hmg-coa reductase inhibitors Other (See Comments)    Hydrochlorothiazide Other (See Comments)     weakness    Welchol [colesevelam] Other (See Comments)     Weakness      Past Medical History:   Diagnosis Date    Abnormal EKG 9/26/2012    Allergy     Arthritis     Asthma     Brain bleed     Colon polyps 11/6/2020    COPD (chronic obstructive pulmonary disease)     Depression     Diabetes mellitus type II     Diverticulitis     Fatty liver     GERD (gastroesophageal reflux disease)     Goiter     s/p thyroidectomy    Heart murmur     Hemiparesis affecting right side as late effect of cerebrovascular accident (CVA) 7/26/2022    Hernia of abdominal wall     History of intracranial hemorrhage 6/15/2013    History of non-ST elevation myocardial infarction (NSTEMI) 6/15/2013    Hyperlipidemia     Hypertension     Lactic acidosis 1/11/2020    Metabolic syndrome 6/14/2012    Myocardial infarction     On home  oxygen therapy     states uses 2L at night or when sat < 90    Pacemaker     Positive FIT (fecal immunochemical test) 11/6/2020    Severe persistent asthma without complication 4/30/2020    Statin intolerance     Stroke 2012    left hand shaky, loss of balance     Past Surgical History:   Procedure Laterality Date    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/25/2017    Dr Griggs    ADRENALECTOMY      AORTIC VALVE REPLACEMENT  12/09/2016    arthroscopy lt knee Right     BLADDER REPAIR      sling    BREAST BIOPSY Bilateral     benign    COLONOSCOPY  2004    10 year recheck    COLONOSCOPY N/A 11/6/2020    Procedure: COLONOSCOPY;  Surgeon: Yakelin Lowery MD;  Location: [facility] ENDO;  Service: Endoscopy;  Laterality: N/A;    CORONARY ARTERY BYPASS GRAFT  12/09/2016    X3    EPIDURAL STEROID INJECTION INTO LUMBAR SPINE N/A 7/27/2021    Procedure: Injection-steroid-epidural-lumbar;  Surgeon: Valerio Jay MD;  Location: [facility] OR;  Service: Pain Management;  Laterality: N/A;  L5-S1     HYSTERECTOMY      INSERTION OF PACEMAKER Left 1/13/2020    Procedure: INSERTION, PACEMAKER;  Surgeon: Marko Batres MD;  Location: [facility] CATH/EP LAB;  Service: Cardiology;  Laterality: Left;    OOPHORECTOMY      THYROIDECTOMY       Family History   Problem Relation Age of Onset    Arthritis Mother     Diabetes Mother     Heart disease Mother     Hypertension Mother     Hypertension Father     Heart disease Father     Mental illness Father     Asthma Sister     Diabetes Sister     Hypertension Sister     Asthma Son     COPD Son     Depression Son     Diabetes Son     Hypertension Son     Stroke Son     Diabetes Maternal Uncle     Heart disease Maternal Uncle     Mental illness Paternal Aunt     Cancer Paternal Aunt     Heart disease Paternal Aunt     Hypertension Paternal Aunt     Heart disease Paternal Uncle     Hypertension Maternal Grandmother     Mental illness Maternal Grandmother     Aneurysm Maternal Grandfather     Mental illness Paternal Grandmother      Heart disease Paternal Grandfather     Melanoma Neg Hx     Psoriasis Neg Hx     Lupus Neg Hx     Eczema Neg Hx      Social History     Tobacco Use    Smoking status: Never    Smokeless tobacco: Never   Substance Use Topics    Alcohol use: Not Currently     Comment: seldom    Drug use: No     Review of Systems   Constitutional:  Positive for fatigue.   HENT: Negative.     Eyes: Negative.    Respiratory:  Positive for cough and shortness of breath.    Cardiovascular:  Negative for chest pain.   Gastrointestinal: Negative.    Endocrine: Negative.    Genitourinary: Negative.    Musculoskeletal: Negative.    Skin: Negative.    Allergic/Immunologic: Negative.    Neurological: Negative.    Hematological: Negative.    Psychiatric/Behavioral: Negative.     All other systems reviewed and are negative.    Physical Exam     Initial Vitals   BP Pulse Resp Temp SpO2   09/09/22 1351 09/09/22 1346 09/09/22 1346 09/09/22 1346 09/09/22 1346   (!) 224/149 67 (!) 24 98 °F (36.7 °C) (!) 72 %      MAP       --                Physical Exam    Nursing note and vitals reviewed.  Constitutional: She appears well-developed and well-nourished.   HENT:   Head: Normocephalic and atraumatic.   Nose: Nose normal.   Mouth/Throat: Oropharynx is clear and moist.   Eyes: Conjunctivae and EOM are normal. Pupils are equal, round, and reactive to light.   Neck: Neck supple. No thyromegaly present. No tracheal deviation present. No JVD present.   Normal range of motion.  Cardiovascular:  Normal rate, regular rhythm, normal heart sounds and intact distal pulses.           No murmur heard.  Pulmonary/Chest: No stridor. No respiratory distress. She has wheezes. She has no rales. She exhibits no tenderness.   Mild wheezing noted   Abdominal: Abdomen is soft. Bowel sounds are normal. She exhibits no distension. There is no abdominal tenderness.   Musculoskeletal:         General: No edema. Normal range of motion.      Cervical back: Normal range of motion  and neck supple.     Neurological: She is alert and oriented to person, place, and time. She has normal strength. GCS score is 15. GCS eye subscore is 4. GCS verbal subscore is 5. GCS motor subscore is 6.   Skin: Skin is warm. Capillary refill takes less than 2 seconds.   Psychiatric: She has a normal mood and affect. Thought content normal.       ED Course   Critical Care    Date/Time: 9/9/2022 10:15 PM  Performed by: Jean Pierre Linn MD  Authorized by: Kathy Fleming MD   Direct patient critical care time: 20 minutes  Ordering / reviewing critical care time: 5 minutes  Documentation critical care time: 5 minutes  Consulting other physicians critical care time: 5 minutes  Total critical care time (exclusive of procedural time) : 35 minutes      Labs Reviewed   CBC W/ AUTO DIFFERENTIAL - Abnormal; Notable for the following components:       Result Value    RBC 3.93 (*)     Immature Granulocytes 1.0 (*)     Immature Grans (Abs) 0.08 (*)     Lymph # 0.5 (*)     Mono # 0.2 (*)     Gran % 89.6 (*)     Lymph % 6.3 (*)     Mono % 3.0 (*)     All other components within normal limits   COMPREHENSIVE METABOLIC PANEL - Abnormal; Notable for the following components:    Chloride 92 (*)     CO2 36 (*)     Glucose 497 (*)     BUN 30 (*)     Calcium 8.3 (*)     AST 44 (*)     All other components within normal limits    Narrative:     Glucose critical result(s) called and verbal readback obtained from   Jarad Boland RN by HS3 09/09/2022 15:35   B-TYPE NATRIURETIC PEPTIDE - Abnormal; Notable for the following components:    BNP 1,501 (*)     All other components within normal limits   LACTIC ACID, PLASMA - Abnormal; Notable for the following components:    Lactate (Lactic Acid) 2.4 (*)     All other components within normal limits    Narrative:     LA critical result(s) repeated. Called and verbal readback obtained   from Cheyanne East RN/ED by [facility] 09/09/2022 16:39   URINALYSIS, REFLEX TO URINE CULTURE - Abnormal; Notable for the  following components:    Glucose, UA 4+ (*)     All other components within normal limits    Narrative:     Specimen Source->Urine   POCT GLUCOSE - Abnormal; Notable for the following components:    POC Glucose 199 (*)     All other components within normal limits   CULTURE, BLOOD   CULTURE, BLOOD   TROPONIN I   SARS-COV-2 RNA AMPLIFICATION, QUAL   URINALYSIS MICROSCOPIC    Narrative:     Specimen Source->Urine   LACTIC ACID, PLASMA   POCT GLUCOSE MONITORING CONTINUOUS     EKG Readings: (Independently Interpreted)   Initial Reading: No STEMI. Rhythm: Normal Sinus Rhythm. Ectopy: No Ectopy.   ECG Results              EKG 12-lead (In process)  Result time 09/09/22 15:44:59      In process by Interface, Lab In [facility] (09/09/22 15:44:59)                   Narrative:    Test Reason : R06.02,    Vent. Rate : 061 BPM     Atrial Rate : 061 BPM     P-R Int : 256 ms          QRS Dur : 170 ms      QT Int : 574 ms       P-R-T Axes : 068 102 116 degrees     QTc Int : 577 ms    AV dual-paced rhythm with prolonged AV conduction  Abnormal ECG  When compared with ECG of 13-JAN-2020 17:02,  Vent. rate has decreased BY  40 BPM    Referred By: [email or web address]   SELF           Confirmed By:                                   Imaging Results              X-Ray Chest AP Portable (Final result)  Result time 09/09/22 14:16:56      Final result by Ernesto Gerber MD (09/09/22 14:16:56)                   Narrative:    Reason: CHF    FINDINGS:    Portable chest with comparison chest x-ray January 29, 2022 show mildly enlarged cardiac mediastinal silhouette. Median sternotomy wires noted. Left dual lead cardiac pacemaker noted.  Left basilar linear opacities are noted, felt to reflect subsegmental atelectasis or scarring. Right lung is clear. There is prominence of the central hilar vascular structures. No acute osseous abnormality.    IMPRESSION:    1.  Enlarged cardiomediastinal silhouette with prominence of central hilar vascular structures may  reflect pulmonary vascular congestion and CHF.  2.  Left basilar subsegmental atelectasis or scarring.    Electronically signed by:  Ernesto Gerber DO  9/9/2022 2:16 PM CDT Workstation: [id number]                                  X-Rays:   Independently Interpreted Readings:   Other Readings:  No acute pulmonary infiltrate  Medications   hydrALAZINE injection 10 mg (10 mg Intravenous Given 9/9/22 1857)   hydrALAZINE injection 5 mg (5 mg Intravenous Given 9/9/22 2019)   bumetanide injection 1 mg (1 mg Intravenous Given 9/9/22 2019)   methylPREDNISolone sodium succinate injection 80 mg (has no administration in time range)   levoFLOXacin 750 mg/150 mL IVPB 750 mg (has no administration in time range)   albuterol-ipratropium 2.5 mg-0.5 mg/3 mL nebulizer solution 3 mL (3 mLs Nebulization Not Given 9/9/22 2000)   arformoteroL nebulizer solution 15 mcg (has no administration in time range)   aspirin EC tablet 81 mg (has no administration in time range)   budesonide nebulizer solution 0.5 mg (has no administration in time range)   levothyroxine tablet 150 mcg (has no administration in time range)   metoprolol succinate (TOPROL-XL) 24 hr tablet 50 mg (has no administration in time range)   losartan tablet 25 mg (has no administration in time range)   montelukast tablet 10 mg (has no administration in time range)   sotaloL tablet 80 mg (has no administration in time range)   sodium chloride 0.9% flush 10 mL (has no administration in time range)   albuterol-ipratropium 2.5 mg-0.5 mg/3 mL nebulizer solution 3 mL (has no administration in time range)   melatonin tablet 6 mg (has no administration in time range)   ondansetron injection 4 mg (has no administration in time range)   polyethylene glycol packet 17 g (has no administration in time range)   senna-docusate 8.6-50 mg per tablet 1 tablet (has no administration in time range)   acetaminophen tablet 650 mg (has no administration in time range)   simethicone chewable tablet  80 mg (has no administration in time range)   HYDROcodone-acetaminophen 5-325 mg per tablet 1 tablet (has no administration in time range)   morphine injection 4 mg (has no administration in time range)   naloxone 0.4 mg/mL injection 0.02 mg (has no administration in time range)   glucose chewable tablet 16 g (has no administration in time range)   glucose chewable tablet 24 g (has no administration in time range)   dextrose 50% injection 12.5 g (has no administration in time range)   dextrose 50% injection 25 g (has no administration in time range)   glucagon (human recombinant) injection 1 mg (has no administration in time range)   enoxaparin injection 40 mg (has no administration in time range)   potassium chloride SA CR tablet 20 mEq (has no administration in time range)   potassium chloride SA CR tablet 40 mEq (has no administration in time range)   potassium chloride SA CR tablet 20 mEq (has no administration in time range)   potassium chloride SA CR tablet 40 mEq (has no administration in time range)   potassium chloride 10 mEq in 100 mL IVPB (has no administration in time range)   potassium chloride 10 mEq in 100 mL IVPB (has no administration in time range)   potassium chloride 10 mEq in 100 mL IVPB (has no administration in time range)   potassium chloride 10 mEq in 100 mL IVPB (has no administration in time range)   magnesium oxide tablet 800 mg (has no administration in time range)   magnesium sulfate 2g in water 50mL IVPB (premix) (has no administration in time range)   magnesium sulfate in dextrose IVPB (premix) 1 g (has no administration in time range)   magnesium sulfate 2g in water 50mL IVPB (premix) (has no administration in time range)   magnesium sulfate 2g in water 50mL IVPB (premix) (has no administration in time range)   sodium phosphate 15 mmol in dextrose 5 % 250 mL IVPB (has no administration in time range)   sodium phosphate 20.01 mmol in dextrose 5 % 250 mL IVPB (has no administration in  "time range)   sodium phosphate 15 mmol in dextrose 5 % 250 mL IVPB (has no administration in time range)   sodium phosphate 30 mmol in dextrose 5 % 250 mL IVPB (has no administration in time range)   sodium phosphate 20.01 mmol in dextrose 5 % 250 mL IVPB (has no administration in time range)   calcium chloride 1 g in dextrose 5 % 100 mL IVPB (has no administration in time range)   calcium chloride 1 g in dextrose 5 % 100 mL IVPB (has no administration in time range)   calcium chloride 1 g in dextrose 5 % 100 mL IVPB (has no administration in time range)   insulin aspart U-100 pen 1-12 Units (has no administration in time range)   nitroGLYCERIN 2% TD oint ointment 1 inch (1 inch Topical (Top) Given 9/9/22 1357)   albuterol-ipratropium 2.5 mg-0.5 mg/3 mL nebulizer solution 3 mL (3 mLs Nebulization Given 9/9/22 1410)   methylPREDNISolone sodium succinate injection 125 mg (125 mg Intravenous Given 9/9/22 1711)   furosemide injection 40 mg (40 mg Intravenous Given 9/9/22 1711)   levoFLOXacin 750 mg/150 mL IVPB 750 mg (0 mg Intravenous Stopped 9/9/22 1858)   insulin regular injection 10 Units 0.1 mL (10 Units Intravenous Given 9/9/22 1710)     Medical Decision Making:   Differential Diagnosis:   Patient with productive cough and shortness of breath and wheezing and edema.  Patient does have evidence of pulmonary edema and also infection and given this started on antibiotics and diuretics given.  Patient started on supplemental oxygen and patient felt better after that.  Screening labs reviewed.  Hospital Medicine consulted for evaluation for admission and further management.  Slight lactic acidosis noted however fluids could not be given as patient has evidence of fluid overload.  Screening labs reviewed and Hospital Medicine consulted for admission and further management  Clinical Tests:   Lab Tests: Reviewed  Radiological Study: Reviewed  Medical Tests: Reviewed  Sepsis Perfusion Assessment: "I attest a sepsis " "perfusion exam was performed within 6 hours of sepsis, severe sepsis, or septic shock presentation, following fluid resuscitation."                    Clinical Impression:   Final diagnoses:  [R06.02] Shortness of breath  [I50.20] Systolic congestive heart failure, unspecified HF chronicity (Primary)  [J44.1] COPD exacerbation  [R73.9] Hyperglycemia  [I50.43] Acute on chronic combined systolic and diastolic CHF (congestive heart failure)        ED Disposition Condition    Observation                 Jean Pierre Linn MD  09/09/22 [id number]    "

## 2022-09-09 NOTE — Clinical Note
The catheter was inserted into the aorta. An angiography was performed of the aorta. Multiple views were taken. The angiography was performed via power injection. The injected amount was 24 mL contrast at 12 mL/s. The PSI from the power injection was 700. Aortic root injection

## 2022-09-09 NOTE — H&P
Formerly [facility]   History & Physical     Patient Name: Tereza Larose  MRN: [id number]  Admission Date: 9/9/2022  1:41 PM  Attending Physician: Kathy Fleming MD  Primary Care Provider: Evan Sánchez MD  Face-to-Face encounter date: 09/09/2022    Patient information was obtained from patient, past medical records, ER physician, and ER records.     HISTORY OF PRESENT ILLNESS:     Tereza Larose is a 71 y.o. White female   With PMH of chronic respiratory failure on 2L NC,  CHF, CAD, COPD-asthma overlap, eosinophilic asthma,   HTN, DM2, previous CVA,   who presents with SOB.    Onset 2 - 3 weeks ago  Progressively worsening  Worse with exertion  Minimally alleviated with rest  Pt has been prescribed torsemide  She is noncompliant with it because it causes increased urination  Also seen by Dr Vaca (pulmonology) two days ago  Pt was in asthma exacerbation  She was prescribed steroids and increased controller meds  No CP  No palpitations   +productive cough  +generalized weakness  +malaise  +fatigue  No fever or chills     UPDATE:    Later pt revealed she has been having syncopal episodes  Her  reports it occurred 2x yesterday  It occurred again this AM  She was sitting on a stool and peeling shrimp  Fell from the stool  She had LOC for 10 min    REVIEW OF SYSTEMS:     All systems reviewed and are negative except as noted per above.    PAST MEDICAL HISTORY:     Past Medical History:   Diagnosis Date    Abnormal EKG 9/26/2012    Allergy     Arthritis     Asthma     Brain bleed     Colon polyps 11/6/2020    COPD (chronic obstructive pulmonary disease)     Depression     Diabetes mellitus type II     Diverticulitis     Fatty liver     GERD (gastroesophageal reflux disease)     Goiter     s/p thyroidectomy    Heart murmur     Hemiparesis affecting right side as late effect of cerebrovascular accident (CVA) 7/26/2022    Hernia of abdominal wall     History of intracranial hemorrhage  6/15/2013    History of non-ST elevation myocardial infarction (NSTEMI) 6/15/2013    Hyperlipidemia     Hypertension     Lactic acidosis 1/11/2020    Metabolic syndrome 6/14/2012    Myocardial infarction     On home oxygen therapy     states uses 2L at night or when sat < 90    Pacemaker     Positive FIT (fecal immunochemical test) 11/6/2020    Severe persistent asthma without complication 4/30/2020    Statin intolerance     Stroke 2012    left hand shaky, loss of balance       PAST SURGICAL HISTORY:     Past Surgical History:   Procedure Laterality Date    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/25/2017    Dr Griggs    ADRENALECTOMY      AORTIC VALVE REPLACEMENT  12/09/2016    arthroscopy lt knee Right     BLADDER REPAIR      sling    BREAST BIOPSY Bilateral     benign    COLONOSCOPY  2004    10 year recheck    COLONOSCOPY N/A 11/6/2020    Procedure: COLONOSCOPY;  Surgeon: Yakelin Lowery MD;  Location: [facility];  Service: Endoscopy;  Laterality: N/A;    CORONARY ARTERY BYPASS GRAFT  12/09/2016    X3    EPIDURAL STEROID INJECTION INTO LUMBAR SPINE N/A 7/27/2021    Procedure: Injection-steroid-epidural-lumbar;  Surgeon: Valerio Jay MD;  Location: [facility] OR;  Service: Pain Management;  Laterality: N/A;  L5-S1     HYSTERECTOMY      INSERTION OF PACEMAKER Left 1/13/2020    Procedure: INSERTION, PACEMAKER;  Surgeon: Marko Batres MD;  Location: [facility] CATH/EP LAB;  Service: Cardiology;  Laterality: Left;    OOPHORECTOMY      THYROIDECTOMY         ALLERGIES:   Metformin, Corn, Potato starch, Pravastatin, Statins-hmg-coa reductase inhibitors, Hydrochlorothiazide, and Welchol [colesevelam]    FAMILY HISTORY:     Family History   Problem Relation Age of Onset    Arthritis Mother     Diabetes Mother     Heart disease Mother     Hypertension Mother     Hypertension Father     Heart disease Father     Mental illness Father     Asthma Sister     Diabetes Sister     Hypertension Sister     Asthma Son     COPD Son     Depression Son      Diabetes Son     Hypertension Son     Stroke Son     Diabetes Maternal Uncle     Heart disease Maternal Uncle     Mental illness Paternal Aunt     Cancer Paternal Aunt     Heart disease Paternal Aunt     Hypertension Paternal Aunt     Heart disease Paternal Uncle     Hypertension Maternal Grandmother     Mental illness Maternal Grandmother     Aneurysm Maternal Grandfather     Mental illness Paternal Grandmother     Heart disease Paternal Grandfather     Melanoma Neg Hx     Psoriasis Neg Hx     Lupus Neg Hx     Eczema Neg Hx        SOCIAL HISTORY:     Social History     Tobacco Use    Smoking status: Never    Smokeless tobacco: Never   Substance Use Topics    Alcohol use: Not Currently     Comment: seldom        Social History     Substance and Sexual Activity   Sexual Activity Never        HOME MEDICATIONS:     Prior to Admission medications    Medication Sig Start Date End Date Taking? Authorizing Provider   albuterol (PROVENTIL/VENTOLIN HFA) 90 mcg/actuation inhaler Inhale 1-2 puffs into the lungs every 4 (four) hours as needed for Wheezing. Inhale 2 puffs by mouth into lungs every 4 hours as needed 9/7/22  Yes Chuck Vaca MD   evolocumab (REPATHA SYRINGE) 140 mg/mL Syrg Inject 140 mg into the skin every 14 (fourteen) days.    Yes Historical Provider   glyBURIDE (DIABETA) 2.5 MG tablet Take 2.5 mg by mouth once daily. 4/4/22  Yes Historical Provider   levothyroxine (SYNTHROID) 150 MCG tablet Take 150 mcg by mouth once daily. 8/10/22  Yes Historical Provider   montelukast (SINGULAIR) 10 mg tablet Take 1 tablet (10 mg total) by mouth every evening. 10/13/20  Yes Chuck Vaca MD   potassium chloride (MICRO-K) 8 mEq CpSR Take 1 capsule (8 mEq total) by mouth once daily. 10/13/16  Yes Evan Sánchez MD   predniSONE (DELTASONE) 20 MG tablet Take one daily for 7 days and may repeat for shortness of breath. 9/7/22  Yes Chuck Vaca MD   revefenacin (YUPELRI) 175 mcg/3 mL Nebu Inhale 1 ampule into the lungs once  daily at 6am. 9/7/22  Yes Chuck Vaca MD   sotaloL (BETAPACE) 80 MG tablet Take 80 mg by mouth 2 (two) times daily. 8/10/22  Yes Historical Provider   albuterol (PROVENTIL) 2.5 mg /3 mL (0.083 %) nebulizer solution Take 3 mLs (2.5 mg total) by nebulization every 4 (four) hours as needed for Shortness of Breath or Wheezing. 9/7/22 9/7/23  Chuck Vaca MD   amLODIPine (NORVASC) 10 MG tablet TAKE 1 TABLET BY MOUTH EVERY DAY FOR SYSTOLIC BLOOD PRESSURE GREATER THAN 120 5/3/21   Evan Sánchez MD   arformoteroL (BROVANA) 15 mcg/2 mL Nebu Take 2 mLs (15 mcg total) by nebulization 2 (two) times a day. Controller 9/7/22 9/7/23  Chuck Vaca MD   aspirin (ECOTRIN) 81 MG EC tablet Take 81 mg by mouth once daily.    Aaareferral Self   bempedoic acid (NEXLETOL) 180 mg Tab Take 1 tablet by mouth once daily.    Historical Provider   budesonide (PULMICORT) 0.5 mg/2 mL nebulizer solution Take 2 mLs (0.5 mg total) by nebulization 2 (two) times daily. Controller 9/7/22 9/7/23  Chuck Vaca MD   cholecalciferol, vitamin D3, (VITAMIN D3) 25 mcg (1,000 unit) capsule Take 1,000 Units by mouth once daily.    Historical Provider   cyanocobalamin (VITAMIN B-12) 1000 MCG tablet Take 100 mcg by mouth once daily.    Historical Provider   dimenhyDRINATE (DRAMAMINE) 50 MG tablet Take 50 mg by mouth nightly as needed.    Historical Provider   JANUVIA 100 mg Tab TAKE 1 TABLET(100 MG) BY MOUTH EVERY DAY 7/13/22   CHRISSY Clemente   [facility] True [facility] Check glucose once daily 6/7/16   Evan Sánchez MD   levothyroxine (SYNTHROID) 125 MCG tablet TAKE 1 TABLET(125 MCG) BY MOUTH EVERY DAY 6/19/20   Evan Sánchez MD   losartan (COZAAR) 25 MG tablet Take 25 mg by mouth once daily. 4/1/22   Historical Provider   metoprolol succinate (TOPROL-XL) 50 MG 24 hr tablet Take 1 tablet (50 mg total) by mouth once daily. 1/15/20 7/26/22  Js Burgess MD   RESTORIL 15 mg Cap Take 15 mg by mouth nightly as needed. 4/22/21   Historical  "Provider   theophylline 400 mg Tb24 Take 1 tablet (400 mg total) by mouth 2 (two) times daily. 9/7/22 9/7/23  Chuck Vaca MD   torsemide (DEMADEX) 20 MG Tab Take 1 tablet (20 mg total) by mouth once daily. 3/8/19   Shauna Arroyo NP   vitamin E 400 UNIT capsule Take 400 Units by mouth once daily.    Historical Provider       PHYSICAL EXAM:     BP (!) 187/76   Pulse 61   Temp 98 °F (36.7 °C) (Oral)   Resp (!) 22   Ht 5' 2" (1.575 m)   Wt 78 kg (172 lb)   SpO2 96%   BMI 31.46 kg/m²     Gen: alert, responsive  HEENT:  Eyes - no pallor  External ears with no lesions  Nares patent  Mouth - lips chapped  CV: RRR  Lungs: RALES, WHEEZING  Abd: +BS, soft, NT, ND  Ext: no atrophy; +BLE edema  Skin: warm, dry  Neuro: grossly intact  Psych: pleasant     LABS AND IMAGING:     Labs Reviewed   CBC W/ AUTO DIFFERENTIAL - Abnormal; Notable for the following components:       Result Value    RBC 3.93 (*)     Immature Granulocytes 1.0 (*)     Immature Grans (Abs) 0.08 (*)     Lymph # 0.5 (*)     Mono # 0.2 (*)     Gran % 89.6 (*)     Lymph % 6.3 (*)     Mono % 3.0 (*)     All other components within normal limits   COMPREHENSIVE METABOLIC PANEL - Abnormal; Notable for the following components:    Chloride 92 (*)     CO2 36 (*)     Glucose 497 (*)     BUN 30 (*)     Calcium 8.3 (*)     AST 44 (*)     All other components within normal limits    Narrative:     Glucose critical result(s) called and verbal readback obtained from   Jarad Boland RN by HS3 09/09/2022 15:35   B-TYPE NATRIURETIC PEPTIDE - Abnormal; Notable for the following components:    BNP 1,501 (*)     All other components within normal limits   LACTIC ACID, PLASMA - Abnormal; Notable for the following components:    Lactate (Lactic Acid) 2.4 (*)     All other components within normal limits    Narrative:     LA critical result(s) repeated. Called and verbal readback obtained   from Cheyanne East RN/ED by [facility] 09/09/2022 16:39   URINALYSIS, REFLEX TO URINE " CULTURE - Abnormal; Notable for the following components:    Glucose, UA 4+ (*)     All other components within normal limits    Narrative:     Specimen Source->Urine   CULTURE, BLOOD   CULTURE, BLOOD   TROPONIN I   URINALYSIS MICROSCOPIC    Narrative:     Specimen Source->Urine   SARS-COV-2 RNA AMPLIFICATION, QUAL   LACTIC ACID, PLASMA   POCT GLUCOSE MONITORING CONTINUOUS     Imaging Results              X-Ray Chest AP Portable (Final result)  Result time 09/09/22 14:16:56      Final result by Ernesto Gerber MD (09/09/22 14:16:56)                   Narrative:    Reason: CHF    FINDINGS:    Portable chest with comparison chest x-ray January 29, 2022 show mildly enlarged cardiac mediastinal silhouette. Median sternotomy wires noted. Left dual lead cardiac pacemaker noted.  Left basilar linear opacities are noted, felt to reflect subsegmental atelectasis or scarring. Right lung is clear. There is prominence of the central hilar vascular structures. No acute osseous abnormality.    IMPRESSION:    1.  Enlarged cardiomediastinal silhouette with prominence of central hilar vascular structures may reflect pulmonary vascular congestion and CHF.  2.  Left basilar subsegmental atelectasis or scarring.    Electronically signed by:  Ernesto Gerber DO  9/9/2022 2:16 PM CDT Workstation: [id number]                                      ASSESSMENT & PLAN:   Tereza Larose is a 71 y.o. female admitted for    Active Hospital Problems    Diagnosis  POA    *Acute on chronic combined systolic and diastolic CHF (congestive heart failure) [I50.43]  Unknown    Eosinophilic asthma [J82.83]  Yes    Complete heart block [I44.2]  Yes    Anticoagulant long-term use [Z79.01]  Not Applicable    Hypoxemic respiratory failure, chronic [J96.11]  Yes    Postoperative hypothyroidism [E89.0]  Yes    Adrenal Cushing's syndrome [E24.9]  Yes    Type 2 diabetes mellitus with circulatory disorder, without long-term current use of insulin [E11.59]  Yes     History of intracranial hemorrhage [Z86.79]  Not Applicable    Hypertension associated with diabetes [E11.59, I15.2]  Yes    CAD (coronary artery disease) [I25.10]  Yes    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        Plan    Acute on chronic hypoxemic respiratory failure  Due to acute on chronic CHF  Complicated by COPD-eosinophilic asthma overlap syndrome  - supplemental oxygen, wean as tolerated  - bumex (pt is supposed to be on torsemide at home; she is noncompliant)  - strict I/Os, daily weights, 1.5L fluid restrictions  - pt started recently on COPD exacerbation therapy by pulmonology 2 days ago, continuing now:  solumedrol, levquin, nebs    DM2  - SSI    BRIJESH  - CPAP QHS    [UPDATE:  Pt seen at bedside on telemetry floor, she reported improvement in SOB.  Approx 5 min later after I had left her room, code blue was called, pt in cardiac arrest with torsades on monitor.  ACLS started; pt received chest compressions and epinephrine; ROSC achieved without further intervention.  Giving magnesium now, starting amiodarone.  Pt also now with L facial droop; she is alert and oriented only to person.  She doesn't remember coming to [facility].  MAEW, following commands, PERRL.  CT head in progress.  Pt continues with episodes of VT while in the ICU.  Amiodarone gtt in progress.  Further management per clinical course.]    [UPDATE 2:  confusion has resolved.  Pt is alert and oriented to person, place, and situation; she thinks it is 9/8/22.  Memory improving; she remembers her time at [facility] now but not what brought her to the hospital.  L facial droop has resolved.  No other neurological deficits at this time.  Adding neuro checks.  MRI Brain pending pacemaker compatibility.]    Chronic conditions as noted above/below; home medications reviewed personally by me and restarted as appropriate  Electrolyte  derangement:  Trending BMP; Mg; replacement prn  DVT ppx: lovenox  FULL CODE    Kathy Fleming MD  [facility] Hospitalist  09/09/2022

## 2022-09-09 NOTE — Clinical Note
The catheter was inserted into the ostial SVG graft. An angiography was performed of the graft. The angiography was performed via power injection. The injected amount was 6 mL contrast at 3 mL/s. The PSI from the power injection was 300. Svg-rca

## 2022-09-09 NOTE — Clinical Note
An angiography was performed of the graft. The angiography was performed via power injection. The injected amount was 6 mL contrast at 3 mL/s. The PSI from the power injection was 300. Svg-rca

## 2022-09-09 NOTE — Clinical Note
The catheter was repositioned into the left subclavian artery l subclavian artery graft. The result was suboptimal..

## 2022-09-09 NOTE — Clinical Note
The catheter was inserted into the ostial LIMA graft. An angiography was performed of the graft. Multiple views were taken. The angiography was performed via power injection. The injected amount was 8 mL contrast at 4 mL/s. The PSI from the power injection was 400. Pull back obtained

## 2022-09-10 ENCOUNTER — CLINICAL SUPPORT (OUTPATIENT)
Dept: CARDIOLOGY | Facility: HOSPITAL | Age: 72
DRG: 246 | End: 2022-09-10
Attending: FAMILY MEDICINE
Payer: COMMERCIAL

## 2022-09-10 VITALS — WEIGHT: 173 LBS | BODY MASS INDEX: 31.83 KG/M2 | HEIGHT: 62 IN

## 2022-09-10 LAB
ALLENS TEST: ABNORMAL
ANION GAP SERPL CALC-SCNC: 15 MMOL/L (ref 8–16)
ANION GAP SERPL CALC-SCNC: 17 MMOL/L (ref 8–16)
APTT PPP: 28.6 SEC (ref 23.3–35.1)
AV INDEX (PROSTH): 0.35
AV MEAN GRADIENT: 20 MMHG
AV VALVE AREA: 0.72 CM2
BASOPHILS # BLD AUTO: 0.01 K/UL (ref 0–0.2)
BASOPHILS # BLD AUTO: 0.02 K/UL (ref 0–0.2)
BASOPHILS NFR BLD: 0.1 % (ref 0–1.9)
BASOPHILS NFR BLD: 0.1 % (ref 0–1.9)
BSA FOR ECHO PROCEDURE: 1.85 M2
BUN SERPL-MCNC: 28 MG/DL (ref 8–23)
BUN SERPL-MCNC: 29 MG/DL (ref 8–23)
CA-I BLDV-SCNC: 1.05 MMOL/L (ref 1.06–1.42)
CALCIUM SERPL-MCNC: 8.9 MG/DL (ref 8.7–10.5)
CALCIUM SERPL-MCNC: 9.7 MG/DL (ref 8.7–10.5)
CHLORIDE SERPL-SCNC: 88 MMOL/L (ref 95–110)
CHLORIDE SERPL-SCNC: 89 MMOL/L (ref 95–110)
CO2 SERPL-SCNC: 36 MMOL/L (ref 23–29)
CO2 SERPL-SCNC: 38 MMOL/L (ref 23–29)
CREAT SERPL-MCNC: 1 MG/DL (ref 0.5–1.4)
CREAT SERPL-MCNC: 1 MG/DL (ref 0.5–1.4)
CV ECHO LV RWT: 0.38 CM
DELSYS: ABNORMAL
DIFFERENTIAL METHOD: ABNORMAL
DIFFERENTIAL METHOD: ABNORMAL
DOP CALC AO VTI: 77.73 CM
DOP CALC LVOT AREA: 2.1 CM2
DOP CALC LVOT DIAMETER: 1.63 CM
DOP CALC LVOT PEAK VEL: 102.99 M/S
DOP CALC LVOT STROKE VOLUME: 56.27 CM3
DOP CALCLVOT PEAK VEL VTI: 26.98 CM
E WAVE DECELERATION TIME: 248.63 MSEC
E/A RATIO: 0.98
E/E' RATIO: 32.2 M/S
ECHO LV POSTERIOR WALL: 0.98 CM (ref 0.6–1.1)
EJECTION FRACTION: 60 %
EOSINOPHIL # BLD AUTO: 0 K/UL (ref 0–0.5)
EOSINOPHIL # BLD AUTO: 0 K/UL (ref 0–0.5)
EOSINOPHIL NFR BLD: 0 % (ref 0–8)
EOSINOPHIL NFR BLD: 0 % (ref 0–8)
ERYTHROCYTE [DISTWIDTH] IN BLOOD BY AUTOMATED COUNT: 13.7 % (ref 11.5–14.5)
ERYTHROCYTE [DISTWIDTH] IN BLOOD BY AUTOMATED COUNT: 13.8 % (ref 11.5–14.5)
ERYTHROCYTE [SEDIMENTATION RATE] IN BLOOD BY WESTERGREN METHOD: 16 MM/H
EST. GFR  (NO RACE VARIABLE): >60 ML/MIN/1.73 M^2
EST. GFR  (NO RACE VARIABLE): >60 ML/MIN/1.73 M^2
ESTIMATED AVG GLUCOSE: 120 MG/DL (ref 68–131)
FIO2: 60
FLOW: 10
FRACTIONAL SHORTENING: 34 % (ref 28–44)
GLUCOSE SERPL-MCNC: 148 MG/DL (ref 70–110)
GLUCOSE SERPL-MCNC: 225 MG/DL (ref 70–110)
GLUCOSE SERPL-MCNC: 303 MG/DL (ref 70–110)
GLUCOSE SERPL-MCNC: 314 MG/DL (ref 70–110)
GLUCOSE SERPL-MCNC: 330 MG/DL (ref 70–110)
HBA1C MFR BLD: 5.8 % (ref 4.5–6.2)
HCO3 UR-SCNC: 41.7 MMOL/L (ref 24–28)
HCT VFR BLD AUTO: 38.9 % (ref 37–48.5)
HCT VFR BLD AUTO: 39.7 % (ref 37–48.5)
HCT VFR BLD CALC: 41 %PCV (ref 36–54)
HGB BLD-MCNC: 12.7 G/DL (ref 12–16)
HGB BLD-MCNC: 12.9 G/DL (ref 12–16)
IMM GRANULOCYTES # BLD AUTO: 0.08 K/UL (ref 0–0.04)
IMM GRANULOCYTES # BLD AUTO: 0.12 K/UL (ref 0–0.04)
IMM GRANULOCYTES NFR BLD AUTO: 0.5 % (ref 0–0.5)
IMM GRANULOCYTES NFR BLD AUTO: 1.4 % (ref 0–0.5)
INR PPP: 1.2
INTERVENTRICULAR SEPTUM: 0.85 CM (ref 0.6–1.1)
LACTATE SERPL-SCNC: 2.3 MMOL/L (ref 0.5–1.9)
LACTATE SERPL-SCNC: 2.3 MMOL/L (ref 0.5–1.9)
LEFT ATRIUM SIZE: 4.44 CM
LEFT INTERNAL DIMENSION IN SYSTOLE: 3.36 CM (ref 2.1–4)
LEFT VENTRICLE DIASTOLIC VOLUME INDEX: 73.69 ML/M2
LEFT VENTRICLE DIASTOLIC VOLUME: 132.64 ML
LEFT VENTRICLE MASS INDEX: 93 G/M2
LEFT VENTRICLE SYSTOLIC VOLUME INDEX: 31.1 ML/M2
LEFT VENTRICLE SYSTOLIC VOLUME: 56.04 ML
LEFT VENTRICULAR INTERNAL DIMENSION IN DIASTOLE: 5.1 CM (ref 3.5–6)
LEFT VENTRICULAR MASS: 167.13 G
LV LATERAL E/E' RATIO: 26.83 M/S
LV SEPTAL E/E' RATIO: 40.25 M/S
LYMPHOCYTES # BLD AUTO: 0.5 K/UL (ref 1–4.8)
LYMPHOCYTES # BLD AUTO: 0.7 K/UL (ref 1–4.8)
LYMPHOCYTES NFR BLD: 4.9 % (ref 18–48)
LYMPHOCYTES NFR BLD: 5.8 % (ref 18–48)
MAGNESIUM SERPL-MCNC: 1.6 MG/DL (ref 1.6–2.6)
MAGNESIUM SERPL-MCNC: 1.9 MG/DL (ref 1.6–2.6)
MAGNESIUM SERPL-MCNC: 1.9 MG/DL (ref 1.6–2.6)
MCH RBC QN AUTO: 31 PG (ref 27–31)
MCH RBC QN AUTO: 31.2 PG (ref 27–31)
MCHC RBC AUTO-ENTMCNC: 32.5 G/DL (ref 32–36)
MCHC RBC AUTO-ENTMCNC: 32.6 G/DL (ref 32–36)
MCV RBC AUTO: 95 FL (ref 82–98)
MCV RBC AUTO: 96 FL (ref 82–98)
MODE: ABNORMAL
MONOCYTES # BLD AUTO: 0.1 K/UL (ref 0.3–1)
MONOCYTES # BLD AUTO: 0.6 K/UL (ref 0.3–1)
MONOCYTES NFR BLD: 1 % (ref 4–15)
MONOCYTES NFR BLD: 4.3 % (ref 4–15)
MV PEAK A VEL: 1.64 M/S
MV PEAK E VEL: 1.61 M/S
NEUTROPHILS # BLD AUTO: 13.6 K/UL (ref 1.8–7.7)
NEUTROPHILS # BLD AUTO: 8.1 K/UL (ref 1.8–7.7)
NEUTROPHILS NFR BLD: 90.2 % (ref 38–73)
NEUTROPHILS NFR BLD: 91.7 % (ref 38–73)
NRBC BLD-RTO: 0 /100 WBC
NRBC BLD-RTO: 0 /100 WBC
PCO2 BLDA: 59 MMHG (ref 35–45)
PH SMN: 7.46 [PH] (ref 7.35–7.45)
PHOSPHATE SERPL-MCNC: 2.7 MG/DL (ref 2.7–4.5)
PISA MRMAX VEL: 674.67 M/S
PISA TR MAX VEL: 4.23 M/S
PLATELET # BLD AUTO: 356 K/UL (ref 150–450)
PLATELET # BLD AUTO: 358 K/UL (ref 150–450)
PMV BLD AUTO: 9.7 FL (ref 9.2–12.9)
PMV BLD AUTO: 9.8 FL (ref 9.2–12.9)
PO2 BLDA: 110 MMHG (ref 80–100)
POC BE: 18 MMOL/L
POC IONIZED CALCIUM: 1.06 MMOL/L (ref 1.06–1.42)
POC SATURATED O2: 98 % (ref 95–100)
POC TCO2: 43 MMOL/L (ref 23–27)
POTASSIUM BLD-SCNC: 4.2 MMOL/L (ref 3.5–5.1)
POTASSIUM SERPL-SCNC: 3.4 MMOL/L (ref 3.5–5.1)
POTASSIUM SERPL-SCNC: 4.1 MMOL/L (ref 3.5–5.1)
PROTHROMBIN TIME: 14.2 SEC (ref 11.4–13.7)
RA PRESSURE: 3 MMHG
RBC # BLD AUTO: 4.07 M/UL (ref 4–5.4)
RBC # BLD AUTO: 4.16 M/UL (ref 4–5.4)
RIGHT VENTRICULAR END-DIASTOLIC DIMENSION: 3.47 CM
SAMPLE: ABNORMAL
SITE: ABNORMAL
SODIUM BLD-SCNC: 138 MMOL/L (ref 136–145)
SODIUM SERPL-SCNC: 141 MMOL/L (ref 136–145)
SODIUM SERPL-SCNC: 142 MMOL/L (ref 136–145)
SP02: 99
TDI LATERAL: 0.06 M/S
TDI SEPTAL: 0.04 M/S
TDI: 0.05 M/S
TR MAX PG: 72 MMHG
TRICUSPID ANNULAR PLANE SYSTOLIC EXCURSION: 1.52 CM
TROPONIN I SERPL DL<=0.01 NG/ML-MCNC: 0.03 NG/ML
TV REST PULMONARY ARTERY PRESSURE: 75 MMHG
WBC # BLD AUTO: 15.04 K/UL (ref 3.9–12.7)
WBC # BLD AUTO: 8.83 K/UL (ref 3.9–12.7)

## 2022-09-10 PROCEDURE — 82330 ASSAY OF CALCIUM: CPT

## 2022-09-10 PROCEDURE — 85730 THROMBOPLASTIN TIME PARTIAL: CPT | Performed by: INTERNAL MEDICINE

## 2022-09-10 PROCEDURE — 25000003 PHARM REV CODE 250: Performed by: FAMILY MEDICINE

## 2022-09-10 PROCEDURE — 25000003 PHARM REV CODE 250: Performed by: STUDENT IN AN ORGANIZED HEALTH CARE EDUCATION/TRAINING PROGRAM

## 2022-09-10 PROCEDURE — 93306 TTE W/DOPPLER COMPLETE: CPT

## 2022-09-10 PROCEDURE — 94799 UNLISTED PULMONARY SVC/PX: CPT

## 2022-09-10 PROCEDURE — 63600175 PHARM REV CODE 636 W HCPCS

## 2022-09-10 PROCEDURE — 96366 THER/PROPH/DIAG IV INF ADDON: CPT

## 2022-09-10 PROCEDURE — G0378 HOSPITAL OBSERVATION PER HR: HCPCS

## 2022-09-10 PROCEDURE — 63600175 PHARM REV CODE 636 W HCPCS: Performed by: STUDENT IN AN ORGANIZED HEALTH CARE EDUCATION/TRAINING PROGRAM

## 2022-09-10 PROCEDURE — 94761 N-INVAS EAR/PLS OXIMETRY MLT: CPT

## 2022-09-10 PROCEDURE — 84132 ASSAY OF SERUM POTASSIUM: CPT

## 2022-09-10 PROCEDURE — 84484 ASSAY OF TROPONIN QUANT: CPT | Performed by: INTERNAL MEDICINE

## 2022-09-10 PROCEDURE — 94640 AIRWAY INHALATION TREATMENT: CPT

## 2022-09-10 PROCEDURE — 80053 COMPREHEN METABOLIC PANEL: CPT | Performed by: STUDENT IN AN ORGANIZED HEALTH CARE EDUCATION/TRAINING PROGRAM

## 2022-09-10 PROCEDURE — 25000242 PHARM REV CODE 250 ALT 637 W/ HCPCS: Performed by: FAMILY MEDICINE

## 2022-09-10 PROCEDURE — 36415 COLL VENOUS BLD VENIPUNCTURE: CPT | Performed by: INTERNAL MEDICINE

## 2022-09-10 PROCEDURE — 82330 ASSAY OF CALCIUM: CPT | Performed by: INTERNAL MEDICINE

## 2022-09-10 PROCEDURE — 93005 ELECTROCARDIOGRAM TRACING: CPT | Performed by: INTERNAL MEDICINE

## 2022-09-10 PROCEDURE — 84100 ASSAY OF PHOSPHORUS: CPT | Mod: 91 | Performed by: STUDENT IN AN ORGANIZED HEALTH CARE EDUCATION/TRAINING PROGRAM

## 2022-09-10 PROCEDURE — 83735 ASSAY OF MAGNESIUM: CPT | Mod: 91 | Performed by: FAMILY MEDICINE

## 2022-09-10 PROCEDURE — 99900031 HC PATIENT EDUCATION (STAT)

## 2022-09-10 PROCEDURE — 85610 PROTHROMBIN TIME: CPT | Performed by: INTERNAL MEDICINE

## 2022-09-10 PROCEDURE — 83605 ASSAY OF LACTIC ACID: CPT | Mod: 91 | Performed by: FAMILY MEDICINE

## 2022-09-10 PROCEDURE — 99900035 HC TECH TIME PER 15 MIN (STAT)

## 2022-09-10 PROCEDURE — 96372 THER/PROPH/DIAG INJ SC/IM: CPT | Performed by: STUDENT IN AN ORGANIZED HEALTH CARE EDUCATION/TRAINING PROGRAM

## 2022-09-10 PROCEDURE — 83036 HEMOGLOBIN GLYCOSYLATED A1C: CPT | Performed by: FAMILY MEDICINE

## 2022-09-10 PROCEDURE — 85025 COMPLETE CBC W/AUTO DIFF WBC: CPT | Mod: 91 | Performed by: FAMILY MEDICINE

## 2022-09-10 PROCEDURE — 36415 COLL VENOUS BLD VENIPUNCTURE: CPT | Performed by: STUDENT IN AN ORGANIZED HEALTH CARE EDUCATION/TRAINING PROGRAM

## 2022-09-10 PROCEDURE — 96372 THER/PROPH/DIAG INJ SC/IM: CPT | Performed by: FAMILY MEDICINE

## 2022-09-10 PROCEDURE — 83735 ASSAY OF MAGNESIUM: CPT | Mod: 91 | Performed by: STUDENT IN AN ORGANIZED HEALTH CARE EDUCATION/TRAINING PROGRAM

## 2022-09-10 PROCEDURE — 63600175 PHARM REV CODE 636 W HCPCS: Performed by: FAMILY MEDICINE

## 2022-09-10 PROCEDURE — 93005 ELECTROCARDIOGRAM TRACING: CPT | Performed by: GENERAL PRACTICE

## 2022-09-10 PROCEDURE — 93010 ELECTROCARDIOGRAM REPORT: CPT | Mod: ,,, | Performed by: INTERNAL MEDICINE

## 2022-09-10 PROCEDURE — 96375 TX/PRO/DX INJ NEW DRUG ADDON: CPT

## 2022-09-10 PROCEDURE — 93306 ECHO (CUPID ONLY): ICD-10-PCS | Mod: 26,,, | Performed by: GENERAL PRACTICE

## 2022-09-10 PROCEDURE — 93010 ELECTROCARDIOGRAM REPORT: CPT | Mod: ,,, | Performed by: GENERAL PRACTICE

## 2022-09-10 PROCEDURE — 83735 ASSAY OF MAGNESIUM: CPT | Mod: 91 | Performed by: INTERNAL MEDICINE

## 2022-09-10 PROCEDURE — 36600 WITHDRAWAL OF ARTERIAL BLOOD: CPT

## 2022-09-10 PROCEDURE — 82803 BLOOD GASES ANY COMBINATION: CPT

## 2022-09-10 PROCEDURE — 96367 TX/PROPH/DG ADDL SEQ IV INF: CPT

## 2022-09-10 PROCEDURE — 96376 TX/PRO/DX INJ SAME DRUG ADON: CPT

## 2022-09-10 PROCEDURE — 84295 ASSAY OF SERUM SODIUM: CPT

## 2022-09-10 PROCEDURE — 84100 ASSAY OF PHOSPHORUS: CPT | Performed by: INTERNAL MEDICINE

## 2022-09-10 PROCEDURE — 83605 ASSAY OF LACTIC ACID: CPT | Performed by: INTERNAL MEDICINE

## 2022-09-10 PROCEDURE — 85014 HEMATOCRIT: CPT

## 2022-09-10 PROCEDURE — 25000003 PHARM REV CODE 250: Performed by: INTERNAL MEDICINE

## 2022-09-10 PROCEDURE — 93010 EKG 12-LEAD: ICD-10-PCS | Mod: ,,, | Performed by: GENERAL PRACTICE

## 2022-09-10 PROCEDURE — 85025 COMPLETE CBC W/AUTO DIFF WBC: CPT | Performed by: INTERNAL MEDICINE

## 2022-09-10 PROCEDURE — 27000221 HC OXYGEN, UP TO 24 HOURS

## 2022-09-10 PROCEDURE — 83735 ASSAY OF MAGNESIUM: CPT | Performed by: FAMILY MEDICINE

## 2022-09-10 PROCEDURE — 84443 ASSAY THYROID STIM HORMONE: CPT | Performed by: STUDENT IN AN ORGANIZED HEALTH CARE EDUCATION/TRAINING PROGRAM

## 2022-09-10 PROCEDURE — 93010 EKG 12-LEAD: ICD-10-PCS | Mod: ,,, | Performed by: INTERNAL MEDICINE

## 2022-09-10 PROCEDURE — 80048 BASIC METABOLIC PNL TOTAL CA: CPT | Mod: 91 | Performed by: FAMILY MEDICINE

## 2022-09-10 PROCEDURE — 93306 TTE W/DOPPLER COMPLETE: CPT | Mod: 26,,, | Performed by: GENERAL PRACTICE

## 2022-09-10 PROCEDURE — 80048 BASIC METABOLIC PNL TOTAL CA: CPT | Performed by: INTERNAL MEDICINE

## 2022-09-10 RX ORDER — INSULIN ASPART 100 [IU]/ML
7 INJECTION, SOLUTION INTRAVENOUS; SUBCUTANEOUS
Status: DISCONTINUED | OUTPATIENT
Start: 2022-09-11 | End: 2022-09-15 | Stop reason: HOSPADM

## 2022-09-10 RX ORDER — MAGNESIUM SULFATE HEPTAHYDRATE 40 MG/ML
1 INJECTION, SOLUTION INTRAVENOUS CONTINUOUS
Status: DISCONTINUED | OUTPATIENT
Start: 2022-09-10 | End: 2022-09-15 | Stop reason: HOSPADM

## 2022-09-10 RX ORDER — CALCIUM GLUCONATE 20 MG/ML
1 INJECTION, SOLUTION INTRAVENOUS ONCE
Status: DISCONTINUED | OUTPATIENT
Start: 2022-09-10 | End: 2022-09-10

## 2022-09-10 RX ORDER — CALCIUM GLUCONATE 20 MG/ML
1 INJECTION, SOLUTION INTRAVENOUS ONCE
Status: COMPLETED | OUTPATIENT
Start: 2022-09-10 | End: 2022-09-10

## 2022-09-10 RX ORDER — PREDNISONE 20 MG/1
40 TABLET ORAL DAILY
Status: DISCONTINUED | OUTPATIENT
Start: 2022-09-10 | End: 2022-09-11

## 2022-09-10 RX ORDER — MAGNESIUM SULFATE 1 G/100ML
1 INJECTION INTRAVENOUS ONCE
Status: COMPLETED | OUTPATIENT
Start: 2022-09-10 | End: 2022-09-10

## 2022-09-10 RX ORDER — DIPHENHYDRAMINE HCL 25 MG
25 CAPSULE ORAL EVERY 6 HOURS PRN
Status: DISCONTINUED | OUTPATIENT
Start: 2022-09-10 | End: 2022-09-15 | Stop reason: HOSPADM

## 2022-09-10 RX ADMIN — LEVOTHYROXINE SODIUM 150 MCG: 0.1 TABLET ORAL at 06:09

## 2022-09-10 RX ADMIN — HYDROCODONE BITARTRATE AND ACETAMINOPHEN 1 TABLET: 5; 325 TABLET ORAL at 03:09

## 2022-09-10 RX ADMIN — POTASSIUM CHLORIDE 40 MEQ: 1500 TABLET, EXTENDED RELEASE ORAL at 08:09

## 2022-09-10 RX ADMIN — HYDRALAZINE HYDROCHLORIDE 10 MG: 20 INJECTION INTRAMUSCULAR; INTRAVENOUS at 03:09

## 2022-09-10 RX ADMIN — AMIODARONE HYDROCHLORIDE 0.5 MG/MIN: 1.8 INJECTION, SOLUTION INTRAVENOUS at 07:09

## 2022-09-10 RX ADMIN — MAGNESIUM SULFATE IN DEXTROSE 1 G: 10 INJECTION, SOLUTION INTRAVENOUS at 01:09

## 2022-09-10 RX ADMIN — AMIODARONE HYDROCHLORIDE 1 MG/MIN: 1.8 INJECTION, SOLUTION INTRAVENOUS at 02:09

## 2022-09-10 RX ADMIN — ARFORMOTEROL TARTRATE 15 MCG: 15 SOLUTION RESPIRATORY (INHALATION) at 08:09

## 2022-09-10 RX ADMIN — CALCIUM GLUCONATE 1 G: 20 INJECTION, SOLUTION INTRAVENOUS at 03:09

## 2022-09-10 RX ADMIN — AMIODARONE HYDROCHLORIDE 150 MG: 1.5 INJECTION, SOLUTION INTRAVENOUS at 02:09

## 2022-09-10 RX ADMIN — PREDNISONE 40 MG: 20 TABLET ORAL at 08:09

## 2022-09-10 RX ADMIN — IPRATROPIUM BROMIDE AND ALBUTEROL SULFATE 3 ML: .5; 3 SOLUTION RESPIRATORY (INHALATION) at 12:09

## 2022-09-10 RX ADMIN — DIPHENHYDRAMINE HYDROCHLORIDE 25 MG: 25 CAPSULE ORAL at 07:09

## 2022-09-10 RX ADMIN — PIPERACILLIN SODIUM AND TAZOBACTAM SODIUM 3.38 G: 3; .375 INJECTION, POWDER, LYOPHILIZED, FOR SOLUTION INTRAVENOUS at 06:09

## 2022-09-10 RX ADMIN — MORPHINE SULFATE 4 MG: 4 INJECTION, SOLUTION INTRAMUSCULAR; INTRAVENOUS at 05:09

## 2022-09-10 RX ADMIN — METOPROLOL SUCCINATE 50 MG: 50 TABLET, FILM COATED, EXTENDED RELEASE ORAL at 08:09

## 2022-09-10 RX ADMIN — MAGNESIUM SULFATE 1 G: 1 INJECTION INTRAVENOUS at 12:09

## 2022-09-10 RX ADMIN — ASPIRIN 81 MG: 81 TABLET, COATED ORAL at 08:09

## 2022-09-10 RX ADMIN — HYDROCODONE BITARTRATE AND ACETAMINOPHEN 1 TABLET: 5; 325 TABLET ORAL at 01:09

## 2022-09-10 RX ADMIN — INSULIN DETEMIR 10 UNITS: 100 INJECTION, SOLUTION SUBCUTANEOUS at 09:09

## 2022-09-10 RX ADMIN — BUDESONIDE 0.5 MG: 0.5 INHALANT RESPIRATORY (INHALATION) at 08:09

## 2022-09-10 RX ADMIN — HYDROCODONE BITARTRATE AND ACETAMINOPHEN 1 TABLET: 5; 325 TABLET ORAL at 08:09

## 2022-09-10 RX ADMIN — IPRATROPIUM BROMIDE AND ALBUTEROL SULFATE 3 ML: .5; 3 SOLUTION RESPIRATORY (INHALATION) at 07:09

## 2022-09-10 RX ADMIN — POTASSIUM BICARBONATE 25 MEQ: 977.5 TABLET, EFFERVESCENT ORAL at 12:09

## 2022-09-10 RX ADMIN — CALCIUM CHLORIDE 1 G: 100 INJECTION INTRAVENOUS; INTRAVENTRICULAR at 08:09

## 2022-09-10 RX ADMIN — BUDESONIDE 0.5 MG: 0.5 INHALANT RESPIRATORY (INHALATION) at 07:09

## 2022-09-10 RX ADMIN — IPRATROPIUM BROMIDE AND ALBUTEROL SULFATE 3 ML: .5; 3 SOLUTION RESPIRATORY (INHALATION) at 04:09

## 2022-09-10 RX ADMIN — ARFORMOTEROL TARTRATE 15 MCG: 15 SOLUTION RESPIRATORY (INHALATION) at 07:09

## 2022-09-10 RX ADMIN — ENOXAPARIN SODIUM 40 MG: 40 INJECTION SUBCUTANEOUS at 05:09

## 2022-09-10 RX ADMIN — LOSARTAN POTASSIUM 25 MG: 25 TABLET, FILM COATED ORAL at 08:09

## 2022-09-10 RX ADMIN — HYDRALAZINE HYDROCHLORIDE 10 MG: 20 INJECTION INTRAMUSCULAR; INTRAVENOUS at 02:09

## 2022-09-10 RX ADMIN — PIPERACILLIN SODIUM AND TAZOBACTAM SODIUM 3.38 G: 3; .375 INJECTION, POWDER, LYOPHILIZED, FOR SOLUTION INTRAVENOUS at 09:09

## 2022-09-10 RX ADMIN — MONTELUKAST 10 MG: 10 TABLET, FILM COATED ORAL at 09:09

## 2022-09-10 RX ADMIN — MORPHINE SULFATE 4 MG: 4 INJECTION, SOLUTION INTRAMUSCULAR; INTRAVENOUS at 07:09

## 2022-09-10 RX ADMIN — PIPERACILLIN SODIUM AND TAZOBACTAM SODIUM 3.38 G: 3; .375 INJECTION, POWDER, LYOPHILIZED, FOR SOLUTION INTRAVENOUS at 12:09

## 2022-09-10 RX ADMIN — POTASSIUM BICARBONATE 40 MEQ: 391 TABLET, EFFERVESCENT ORAL at 12:09

## 2022-09-10 RX ADMIN — MORPHINE SULFATE 4 MG: 4 INJECTION, SOLUTION INTRAMUSCULAR; INTRAVENOUS at 11:09

## 2022-09-10 RX ADMIN — BUMETANIDE 1 MG: 0.25 INJECTION, SOLUTION INTRAMUSCULAR; INTRAVENOUS at 09:09

## 2022-09-10 RX ADMIN — AMIODARONE HYDROCHLORIDE 1 MG/MIN: 1.8 INJECTION, SOLUTION INTRAVENOUS at 07:09

## 2022-09-10 NOTE — RESPIRATORY THERAPY
09/10/22 0303   Patient Assessment/Suction   Level of Consciousness (AVPU) alert   Respiratory Effort Normal;Unlabored   Expansion/Accessory Muscles/Retractions no use of accessory muscles   Rhythm/Pattern, Respiratory unlabored;pattern regular;depth regular;no shortness of breath reported   PRE-TX-O2   O2 Device (Oxygen Therapy) nasal cannula   Flow (L/min) 2   SpO2 (!) 92 %   Pulse Oximetry Type Continuous   $ Pulse Oximetry - Multiple Charge Pulse Oximetry - Multiple   Pulse 61   Resp 18   BP (!) 173/76   Education   $ Education DME Oxygen;15 min   Respiratory Evaluation   $ Care Plan Tech Time 15 min   $ Eval/Re-eval Charges Evaluation   Evaluation For Transfer   Pt has copd, co2 retainer, was 94% on 3L NC decreased to 2L NC

## 2022-09-10 NOTE — PLAN OF CARE
09/10/22 0746   Patient Assessment/Suction   Level of Consciousness (AVPU) alert   Respiratory Effort Normal;Unlabored   Expansion/Accessory Muscles/Retractions no use of accessory muscles   All Lung Fields Breath Sounds clear;diminished   Rhythm/Pattern, Respiratory pattern regular;shallow   Cough Frequency infrequent   Cough Type nonproductive;fair   PRE-TX-O2   O2 Device (Oxygen Therapy) nasal cannula   $ Is the patient on Low Flow Oxygen? Yes   Flow (L/min) 2   SpO2 99 %   Pulse Oximetry Type Continuous   $ Pulse Oximetry - Multiple Charge Pulse Oximetry - Multiple   Pulse 60   Resp 14   Aerosol Therapy   $ Aerosol Therapy Charges Aerosol Treatment   Daily Review of Necessity (SVN) completed   Respiratory Treatment Status (SVN) given   Treatment Route (SVN) mouthpiece   Patient Position (SVN) semi-Valdez's   Post Treatment Assessment (SVN) breath sounds improved   Signs of Intolerance (SVN) none   Breath Sounds Post-Respiratory Treatment   Throughout All Fields Post-Treatment All Fields   Throughout All Fields Post-Treatment aeration increased   Post-treatment Heart Rate (beats/min) 60   Post-treatment Resp Rate (breaths/min) 16   Education   $ Education DME Oxygen;15 min   Respiratory Evaluation   $ Care Plan Tech Time 15 min   $ Eval/Re-eval Charges Evaluation   Evaluation For New Orders   Home Oxygen   Has Home Oxygen? Yes   Liter Flow 3   Duration continuous   Route nasal cannula   Mode continuous   Device home concentrator with portable unit

## 2022-09-10 NOTE — CARE UPDATE
09/09/22 1539   Patient Assessment/Suction   Level of Consciousness (AVPU) alert   Respiratory Effort Normal;Unlabored   Expansion/Accessory Muscles/Retractions no use of accessory muscles   All Lung Fields Breath Sounds clear;diminished   Cough Frequency infrequent   Cough Type nonproductive   PRE-TX-O2   O2 Device (Oxygen Therapy) nasal cannula   Flow (L/min) 3   SpO2 96 %   Pulse Oximetry Type Continuous   $ Pulse Oximetry - Multiple Charge Pulse Oximetry - Multiple   Pulse 62   Resp 20   Aerosol Therapy   $ Aerosol Therapy Charges Aerosol Treatment   Daily Review of Necessity (SVN) completed   Respiratory Treatment Status (SVN) given   Treatment Route (SVN) mouthpiece   Patient Position (SVN) semi-Valdez's   Post Treatment Assessment (SVN) increased aeration   Signs of Intolerance (SVN) none   Breath Sounds Post-Respiratory Treatment   Throughout All Fields Post-Treatment All Fields   Throughout All Fields Post-Treatment aeration increased   Post-treatment Heart Rate (beats/min) 59   Post-treatment Resp Rate (breaths/min) 16   Preset CPAP/BiPAP Settings   Mode Of Delivery CPAP   $ Is patient using? No/refused   Change to qid and prn/pt. States she does not use cpap

## 2022-09-10 NOTE — NURSING
"2339: Notified FRANKLYN Fleming MD/ hospitalist that patient had a 5 beat run of vtach. Checked on patient and patient stated "yes I feel fine can I get some christofer crackers" Order of stat Magnesium lab draw ordered potassium replacement ordered.    2349: Notified FRANKLYN Fleming MD/ hospitalist that patient had a 31 beat run of vtach. Followed with multiple runs of vtach vs torsades.    2349 FRANKLYN Fleming MD/ hospitalist asked how long the Qt was and current rhythm. I stated Qt was 0.482 and current rhythm was paced 76.    0012: 40 meq of Potassium bicarbonate disintegrating tablets PO given per order     FRANKLYN Fleming then came to bed side and talked to patient. Bernadette Bhakta then came to nurses station and talked to me, and then went another patients room. About that time monitor room notified me stating patient was having another run, but sustained this time Tech and nurse went to patients room and found patient to be having agonal respirations, non responsive, pulseless and code blue was initiated.        "

## 2022-09-10 NOTE — PLAN OF CARE
Treatment plan discussed with patient and spouse with time allowed for questions and answers     Problem: Adult Inpatient Plan of Care  Goal: Absence of Hospital-Acquired Illness or Injury  Outcome: Ongoing, Progressing-safety maintained.     Admitted during shift for multiple episodes of V-Tach, witnessed X 1 after admission to unit. Amiodorone infusion initiated with no further arrhythmias noted.

## 2022-09-11 LAB
ALBUMIN SERPL BCP-MCNC: 3.7 G/DL (ref 3.5–5.2)
ALP SERPL-CCNC: 54 U/L (ref 55–135)
ALT SERPL W/O P-5'-P-CCNC: 43 U/L (ref 10–44)
ANION GAP SERPL CALC-SCNC: 11 MMOL/L (ref 8–16)
ANION GAP SERPL CALC-SCNC: 9 MMOL/L (ref 8–16)
AST SERPL-CCNC: 42 U/L (ref 10–40)
BASOPHILS # BLD AUTO: 0.02 K/UL (ref 0–0.2)
BASOPHILS NFR BLD: 0.1 % (ref 0–1.9)
BILIRUB SERPL-MCNC: 0.8 MG/DL (ref 0.1–1)
BNP SERPL-MCNC: 1383 PG/ML (ref 0–99)
BUN SERPL-MCNC: 36 MG/DL (ref 8–23)
BUN SERPL-MCNC: 38 MG/DL (ref 8–23)
CALCIUM SERPL-MCNC: 9.4 MG/DL (ref 8.7–10.5)
CALCIUM SERPL-MCNC: 9.6 MG/DL (ref 8.7–10.5)
CHLORIDE SERPL-SCNC: 89 MMOL/L (ref 95–110)
CHLORIDE SERPL-SCNC: 90 MMOL/L (ref 95–110)
CO2 SERPL-SCNC: 36 MMOL/L (ref 23–29)
CO2 SERPL-SCNC: 38 MMOL/L (ref 23–29)
CREAT SERPL-MCNC: 1.4 MG/DL (ref 0.5–1.4)
CREAT SERPL-MCNC: 1.5 MG/DL (ref 0.5–1.4)
DIFFERENTIAL METHOD: ABNORMAL
EOSINOPHIL # BLD AUTO: 0 K/UL (ref 0–0.5)
EOSINOPHIL NFR BLD: 0 % (ref 0–8)
ERYTHROCYTE [DISTWIDTH] IN BLOOD BY AUTOMATED COUNT: 14.1 % (ref 11.5–14.5)
EST. GFR  (NO RACE VARIABLE): 37 ML/MIN/1.73 M^2
EST. GFR  (NO RACE VARIABLE): 40.2 ML/MIN/1.73 M^2
GLUCOSE SERPL-MCNC: 111 MG/DL (ref 70–110)
GLUCOSE SERPL-MCNC: 113 MG/DL (ref 70–110)
GLUCOSE SERPL-MCNC: 128 MG/DL (ref 70–110)
GLUCOSE SERPL-MCNC: 160 MG/DL (ref 70–110)
GLUCOSE SERPL-MCNC: 181 MG/DL (ref 70–110)
HCT VFR BLD AUTO: 37.2 % (ref 37–48.5)
HGB BLD-MCNC: 12 G/DL (ref 12–16)
IMM GRANULOCYTES # BLD AUTO: 0.06 K/UL (ref 0–0.04)
IMM GRANULOCYTES NFR BLD AUTO: 0.4 % (ref 0–0.5)
LYMPHOCYTES # BLD AUTO: 1.5 K/UL (ref 1–4.8)
LYMPHOCYTES NFR BLD: 10.6 % (ref 18–48)
MAGNESIUM SERPL-MCNC: 2 MG/DL (ref 1.6–2.6)
MAGNESIUM SERPL-MCNC: 2.1 MG/DL (ref 1.6–2.6)
MCH RBC QN AUTO: 31.2 PG (ref 27–31)
MCHC RBC AUTO-ENTMCNC: 32.3 G/DL (ref 32–36)
MCV RBC AUTO: 97 FL (ref 82–98)
MONOCYTES # BLD AUTO: 1.4 K/UL (ref 0.3–1)
MONOCYTES NFR BLD: 9.9 % (ref 4–15)
NEUTROPHILS # BLD AUTO: 11.2 K/UL (ref 1.8–7.7)
NEUTROPHILS NFR BLD: 79 % (ref 38–73)
NRBC BLD-RTO: 0 /100 WBC
PHOSPHATE SERPL-MCNC: 4.9 MG/DL (ref 2.7–4.5)
PLATELET # BLD AUTO: 347 K/UL (ref 150–450)
PMV BLD AUTO: 9.5 FL (ref 9.2–12.9)
POTASSIUM SERPL-SCNC: 3.7 MMOL/L (ref 3.5–5.1)
POTASSIUM SERPL-SCNC: 4.1 MMOL/L (ref 3.5–5.1)
PROT SERPL-MCNC: 7.1 G/DL (ref 6–8.4)
RBC # BLD AUTO: 3.85 M/UL (ref 4–5.4)
SODIUM SERPL-SCNC: 135 MMOL/L (ref 136–145)
SODIUM SERPL-SCNC: 138 MMOL/L (ref 136–145)
TSH SERPL DL<=0.005 MIU/L-ACNC: 1.35 UIU/ML (ref 0.34–5.6)
WBC # BLD AUTO: 14.11 K/UL (ref 3.9–12.7)

## 2022-09-11 PROCEDURE — 94640 AIRWAY INHALATION TREATMENT: CPT

## 2022-09-11 PROCEDURE — 96376 TX/PRO/DX INJ SAME DRUG ADON: CPT

## 2022-09-11 PROCEDURE — 36415 COLL VENOUS BLD VENIPUNCTURE: CPT | Performed by: FAMILY MEDICINE

## 2022-09-11 PROCEDURE — 83880 ASSAY OF NATRIURETIC PEPTIDE: CPT | Performed by: FAMILY MEDICINE

## 2022-09-11 PROCEDURE — 25000242 PHARM REV CODE 250 ALT 637 W/ HCPCS: Performed by: FAMILY MEDICINE

## 2022-09-11 PROCEDURE — 27000221 HC OXYGEN, UP TO 24 HOURS

## 2022-09-11 PROCEDURE — 63600175 PHARM REV CODE 636 W HCPCS: Performed by: STUDENT IN AN ORGANIZED HEALTH CARE EDUCATION/TRAINING PROGRAM

## 2022-09-11 PROCEDURE — 25000003 PHARM REV CODE 250: Performed by: INTERNAL MEDICINE

## 2022-09-11 PROCEDURE — 80048 BASIC METABOLIC PNL TOTAL CA: CPT | Performed by: FAMILY MEDICINE

## 2022-09-11 PROCEDURE — 20000000 HC ICU ROOM

## 2022-09-11 PROCEDURE — 83735 ASSAY OF MAGNESIUM: CPT | Performed by: FAMILY MEDICINE

## 2022-09-11 PROCEDURE — 96366 THER/PROPH/DIAG IV INF ADDON: CPT

## 2022-09-11 PROCEDURE — 63600175 PHARM REV CODE 636 W HCPCS: Performed by: FAMILY MEDICINE

## 2022-09-11 PROCEDURE — 94761 N-INVAS EAR/PLS OXIMETRY MLT: CPT

## 2022-09-11 PROCEDURE — 85025 COMPLETE CBC W/AUTO DIFF WBC: CPT | Performed by: FAMILY MEDICINE

## 2022-09-11 PROCEDURE — 25000003 PHARM REV CODE 250: Performed by: FAMILY MEDICINE

## 2022-09-11 RX ORDER — DIPHENHYDRAMINE HCL 25 MG
50 CAPSULE ORAL ONCE
Status: CANCELLED | OUTPATIENT
Start: 2022-09-11 | End: 2022-09-11

## 2022-09-11 RX ORDER — SODIUM CHLORIDE 9 MG/ML
INJECTION, SOLUTION INTRAVENOUS CONTINUOUS
Status: CANCELLED | OUTPATIENT
Start: 2022-09-11 | End: 2022-09-11

## 2022-09-11 RX ADMIN — HYDROCODONE BITARTRATE AND ACETAMINOPHEN 1 TABLET: 5; 325 TABLET ORAL at 11:09

## 2022-09-11 RX ADMIN — PIPERACILLIN SODIUM AND TAZOBACTAM SODIUM 3.38 G: 3; .375 INJECTION, POWDER, LYOPHILIZED, FOR SOLUTION INTRAVENOUS at 12:09

## 2022-09-11 RX ADMIN — PIPERACILLIN SODIUM AND TAZOBACTAM SODIUM 3.38 G: 3; .375 INJECTION, POWDER, LYOPHILIZED, FOR SOLUTION INTRAVENOUS at 05:09

## 2022-09-11 RX ADMIN — BUDESONIDE 0.5 MG: 0.5 INHALANT RESPIRATORY (INHALATION) at 10:09

## 2022-09-11 RX ADMIN — IPRATROPIUM BROMIDE AND ALBUTEROL SULFATE 3 ML: .5; 3 SOLUTION RESPIRATORY (INHALATION) at 09:09

## 2022-09-11 RX ADMIN — ENOXAPARIN SODIUM 40 MG: 40 INJECTION SUBCUTANEOUS at 05:09

## 2022-09-11 RX ADMIN — LEVOTHYROXINE SODIUM 150 MCG: 0.1 TABLET ORAL at 05:09

## 2022-09-11 RX ADMIN — PREDNISONE 40 MG: 20 TABLET ORAL at 08:09

## 2022-09-11 RX ADMIN — INSULIN DETEMIR 10 UNITS: 100 INJECTION, SOLUTION SUBCUTANEOUS at 09:09

## 2022-09-11 RX ADMIN — ARFORMOTEROL TARTRATE 15 MCG: 15 SOLUTION RESPIRATORY (INHALATION) at 07:09

## 2022-09-11 RX ADMIN — HYDROCODONE BITARTRATE AND ACETAMINOPHEN 1 TABLET: 5; 325 TABLET ORAL at 02:09

## 2022-09-11 RX ADMIN — DIPHENHYDRAMINE HYDROCHLORIDE 25 MG: 25 CAPSULE ORAL at 11:09

## 2022-09-11 RX ADMIN — DIPHENHYDRAMINE HYDROCHLORIDE 25 MG: 25 CAPSULE ORAL at 05:09

## 2022-09-11 RX ADMIN — BUDESONIDE 0.5 MG: 0.5 INHALANT RESPIRATORY (INHALATION) at 07:09

## 2022-09-11 RX ADMIN — LOSARTAN POTASSIUM 25 MG: 25 TABLET, FILM COATED ORAL at 08:09

## 2022-09-11 RX ADMIN — METOPROLOL SUCCINATE 50 MG: 50 TABLET, FILM COATED, EXTENDED RELEASE ORAL at 08:09

## 2022-09-11 RX ADMIN — IPRATROPIUM BROMIDE AND ALBUTEROL SULFATE 3 ML: .5; 3 SOLUTION RESPIRATORY (INHALATION) at 11:09

## 2022-09-11 RX ADMIN — INSULIN ASPART 2 UNITS: 100 INJECTION, SOLUTION INTRAVENOUS; SUBCUTANEOUS at 09:09

## 2022-09-11 RX ADMIN — BUMETANIDE 1 MG: 0.25 INJECTION, SOLUTION INTRAMUSCULAR; INTRAVENOUS at 08:09

## 2022-09-11 RX ADMIN — POTASSIUM CHLORIDE 20 MEQ: 1500 TABLET, EXTENDED RELEASE ORAL at 06:09

## 2022-09-11 RX ADMIN — MORPHINE SULFATE 4 MG: 4 INJECTION, SOLUTION INTRAMUSCULAR; INTRAVENOUS at 05:09

## 2022-09-11 RX ADMIN — HYDROCODONE BITARTRATE AND ACETAMINOPHEN 1 TABLET: 5; 325 TABLET ORAL at 05:09

## 2022-09-11 RX ADMIN — HYDRALAZINE HYDROCHLORIDE 5 MG: 20 INJECTION INTRAMUSCULAR; INTRAVENOUS at 01:09

## 2022-09-11 RX ADMIN — ARFORMOTEROL TARTRATE 15 MCG: 15 SOLUTION RESPIRATORY (INHALATION) at 09:09

## 2022-09-11 RX ADMIN — AMIODARONE HYDROCHLORIDE 0.5 MG/MIN: 1.8 INJECTION, SOLUTION INTRAVENOUS at 06:09

## 2022-09-11 RX ADMIN — IPRATROPIUM BROMIDE AND ALBUTEROL SULFATE 3 ML: .5; 3 SOLUTION RESPIRATORY (INHALATION) at 05:09

## 2022-09-11 RX ADMIN — MONTELUKAST 10 MG: 10 TABLET, FILM COATED ORAL at 09:09

## 2022-09-11 RX ADMIN — ASPIRIN 81 MG: 81 TABLET, COATED ORAL at 08:09

## 2022-09-11 RX ADMIN — PIPERACILLIN SODIUM AND TAZOBACTAM SODIUM 3.38 G: 3; .375 INJECTION, POWDER, LYOPHILIZED, FOR SOLUTION INTRAVENOUS at 10:09

## 2022-09-11 RX ADMIN — INSULIN ASPART 7 UNITS: 100 INJECTION, SOLUTION INTRAVENOUS; SUBCUTANEOUS at 05:09

## 2022-09-11 RX ADMIN — IPRATROPIUM BROMIDE AND ALBUTEROL SULFATE 3 ML: .5; 3 SOLUTION RESPIRATORY (INHALATION) at 07:09

## 2022-09-11 NOTE — RESPIRATORY THERAPY
09/10/22 1953   Patient Assessment/Suction   Level of Consciousness (AVPU) alert   Respiratory Effort Normal;Unlabored   Expansion/Accessory Muscles/Retractions no use of accessory muscles   All Lung Fields Breath Sounds Anterior:;diminished   Rhythm/Pattern, Respiratory unlabored;pattern regular;depth regular   Cough Frequency infrequent   PRE-TX-O2   O2 Device (Oxygen Therapy) nasal cannula   Flow (L/min) 3   SpO2 97 %   Pulse Oximetry Type Continuous   $ Pulse Oximetry - Multiple Charge Pulse Oximetry - Multiple   Pulse 60   Resp 18   Aerosol Therapy   $ Aerosol Therapy Charges Aerosol Treatment   Daily Review of Necessity (SVN) completed   Respiratory Treatment Status (SVN) given   Treatment Route (SVN) mouthpiece   Patient Position (SVN) semi-Valdez's   Post Treatment Assessment (SVN) breath sounds improved   Signs of Intolerance (SVN) none   Breath Sounds Post-Respiratory Treatment   Throughout All Fields Post-Treatment All Fields   Throughout All Fields Post-Treatment Anterior:;clear;diminished   Post-treatment Heart Rate (beats/min) 60   Post-treatment Resp Rate (breaths/min) 17   Education   $ Education Bronchodilator;DME Oxygen;15 min   Respiratory Evaluation   $ Care Plan Tech Time 15 min   $ Eval/Re-eval Charges Evaluation

## 2022-09-11 NOTE — PROGRESS NOTES
[facility] Medicine  Progress Note    Patient name: Tereza Larose  MRN: [id number]  Admit Date: 9/9/2022   LOS: 0 days     SUBJECTIVE:     Principal problem: Acute on chronic combined systolic and diastolic CHF (congestive heart failure)  Ventricular Tachycardia, cardiac arrest      Interval History:  No acute events overnight.  The patient previously had ventricular tachycardia and cardiac arrest.  Appreciate cardiology consult, planning to do left heart catheterization in the morning.  Creatinine is rising today will hold all nephrotoxic agents.  Patient feeling well otherwise.    Scheduled Meds:   albuterol-ipratropium  3 mL Nebulization QID WAKE    arformoteroL  15 mcg Nebulization BID    aspirin  81 mg Oral Daily    budesonide  0.5 mg Nebulization BID    bumetanide  1 mg Intravenous BID    enoxaparin  40 mg Subcutaneous Daily    insulin aspart U-100  7 Units Subcutaneous TIDWM    insulin detemir U-100  10 Units Subcutaneous QHS    levothyroxine  150 mcg Oral Before breakfast    losartan  25 mg Oral Daily    metoprolol succinate  50 mg Oral Daily    montelukast  10 mg Oral QHS    piperacillin-tazobactam (ZOSYN) IVPB  3.375 g Intravenous Q8H    predniSONE  40 mg Oral Daily     Continuous Infusions:   amiodarone in dextrose 5% 0.5 mg/min (09/11/22 0600)    magnesium sulfate in water       PRN Meds:acetaminophen, albuterol-ipratropium, calcium chloride IVPB, calcium chloride IVPB, calcium chloride IVPB, dextrose 50%, dextrose 50%, diphenhydrAMINE, glucagon (human recombinant), glucose, glucose, hydrALAZINE, hydrALAZINE, HYDROcodone-acetaminophen, insulin aspart U-100, magnesium oxide, magnesium sulfate IVPB, magnesium sulfate IVPB, magnesium sulfate IVPB, magnesium sulfate IVPB, melatonin, morphine, naloxone, polyethylene glycol, potassium chloride in water, potassium chloride in water, potassium chloride in water, potassium chloride in water, potassium chloride, potassium chloride,  potassium chloride, potassium chloride, senna-docusate 8.6-50 mg, simethicone, sodium chloride 0.9%, sodium phosphate IVPB, sodium phosphate IVPB, sodium phosphate IVPB, sodium phosphate IVPB, sodium phosphate IVPB    Review of patient's allergies indicates:   Allergen Reactions    Metformin Diarrhea     Diarrhea      Corn Itching     Other reaction(s): Sneezing  Other reaction(s): Rhinorrhea    Potato starch Hives     Other reaction(s): Sneezing  Other reaction(s): Rhinorrhea    Pravastatin Other (See Comments)     Muscle pain    Statins-hmg-coa reductase inhibitors Other (See Comments)    Hydrochlorothiazide Other (See Comments)     weakness    Welchol [colesevelam] Other (See Comments)     Weakness        Review of Systems: As per interval history    OBJECTIVE:     Vital Signs (Most Recent)  Temp: 98.4 °F (36.9 °C) (09/11/22 1300)  Pulse: 60 (09/11/22 1500)  Resp: 10 (09/11/22 1500)  BP: (!) 155/66 (09/11/22 1500)  SpO2: (!) 91 % (09/11/22 1500)    Vital Signs Range (Last 24H):  Temp:  [98.1 °F (36.7 °C)-98.4 °F (36.9 °C)]   Pulse:  [59-61]   Resp:  [10-26]   BP: (128-176)/(59-77)   SpO2:  [88 %-100 %]     I & O (Last 24H):  Intake/Output Summary (Last 24 hours) at 9/11/2022 1624  Last data filed at 9/11/2022 1301  Gross per 24 hour   Intake 1569.48 ml   Output 1250 ml   Net 319.48 ml       Physical Exam:  General: Patient resting comfortably in no acute distress.  Eyes: No conjunctival injection. No scleral icterus.  ENT: Hearing grossly intact. No discharge from ears. No nasal discharge.   Neck: Supple, trachea midline. No JVD  CVS: RRR. No LE edema BL.  +systolic murmur, best heard at right 2nd IC  Chest:  tender to palp r/t compressions  Lungs:  No tachypnea or accessory muscle use.  Crackles greater on left  Abdomen:  Soft, nontender and nondistended.  No organomegaly  Neuro: AOx3. Moves all extremities. Follows commands. Responds appropriately   Skin:  No rash or erythema noted    Laboratory:  I have  reviewed all pertinent lab results within the past 24 hours.  CBC:   Recent Labs   Lab 09/11/22  0343   WBC 14.11*   RBC 3.85*   HGB 12.0   HCT 37.2      MCV 97   MCH 31.2*   MCHC 32.3     CMP:   Recent Labs   Lab 09/10/22  2359 09/11/22  0343   * 113*   CALCIUM 9.4 9.6   ALBUMIN 3.7  --    PROT 7.1  --    * 138   K 4.1 3.7   CO2 36* 38*   CL 90* 89*   BUN 36* 38*   CREATININE 1.4 1.5*   ALKPHOS 54*  --    ALT 43  --    AST 42*  --    BILITOT 0.8  --      Cardiac markers:   Recent Labs   Lab 09/10/22  0212   TROPONINI 0.034       Diagnostic Results:  Labs: Reviewed  ECG: Reviewed  X-Ray: Reviewed      ASSESSMENT/PLAN:     Patient is 72 yo female with hx of CHF, COPD on 2L at home/eos asthma overlap, CAD, HTN, DM2, CVA who is here with progressive shortness of breath for 2-3 weeks.  She admits to one episode where she had LOC for around 10 minutes.  She has been non-compliant with her diuretic.  Recently saw her pulmonologist and was given steroids and stepped up her controller medication regimen.  Soon after admission, she was noted to have multiple episodes of VTAC or torsades and around midnight had a cardiac arrest with ROSC prior to intubation.       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]  Yes    Complete heart block [I44.2]  Yes    Anticoagulant long-term use [Z79.01]  Not Applicable    Hypoxemic respiratory failure, chronic [J96.11]  Yes    Postoperative hypothyroidism [E89.0]  Yes    Adrenal Cushing's syndrome [E24.9]  Yes    Type 2 diabetes mellitus with circulatory disorder, without long-term current use of insulin [E11.59]  Yes    History of intracranial hemorrhage [Z86.79]  Not Applicable    Hypertension associated with diabetes [E11.59, I15.2]  Yes    CAD (coronary artery disease) [I25.10]  Yes    Asthma-COPD overlap syndrome [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       Plan:     Cardiac Arrest; VTAC, Torsades  QTc 580 on EKG prior to arrest, pacemaker.  Received epi and compressions.  ROSC achieved quickly, now patient is alert and oriented.   -See code docmentation  -IV mag infusion started  -IV amio infusion to continue  - NPO midnight  - left heart catheterization in the morning  -cardiology consult appreciate recommendations  -serial cmp, mag, phos and prn electrolyte replacement  -monitor in ICU      Acute on chronic hypoxemic respiratory failure  Due to acute on chronic CHF  Complicated by COPD-eosinophilic asthma overlap syndrome  - supplemental oxygen, wean as tolerated  -losartan and metoprolol, asa  - bumex 1 mg IV BID ( torsemide at home; she is noncompliant)  - K+ 3.1, replace with 40 meq PO x 2 doses and Mag IV prior to diuretic  - strict I/Os, daily weights, 1.5L fluid restrictions  - pt started recently on COPD exacerbation therapy by pulmonology 2 days ago, continuing now:  solumedrol, levquin, nebs  -Montelukast 10 mg daily  -received levaquin x 1 dose, now discontinued due to QT prolongation  -started zosyn    Acute kidney injury  - nephrology consulted  - holding nephrotoxic agents  - hold further diuresis at this time  - monitor closely in the morning as she is planned to get contrast with left heart catheterization     Hypertension  Restart home dose  - hold losartan given VERONA  -metoprolol xl 50 mg daily      DM2  -SSI accuchecks ac/hs  -Detemir 10 units qHS  -novolog 7 units TID    Hypothyroidism  -check TSH  -Start home dose synthroid 150 mcg       BRIJESH  - CPAP QHS         VTE Risk Mitigation (From admission, onward)           Ordered     enoxaparin injection 40 mg  Daily         09/09/22 2112     IP VTE HIGH RISK PATIENT  Once         09/09/22 2112     Place sequential compression device  Until discontinued         09/09/22 2112                         Department Hospital Medicine  [facility]  Cristo Bocanegra MD  Date of service: 09/11/2022

## 2022-09-11 NOTE — PLAN OF CARE
[facility]  Initial Discharge Assessment       Primary Care Provider: Evan Sánchez MD    Admission Diagnosis: Acute on chronic combined systolic and diastolic CHF (congestive heart failure) [I50.43]  Systolic congestive heart failure, unspecified HF chronicity [I50.20]    Admission Date: 9/9/2022  Expected Discharge Date:     Discharge Barriers Identified: None    Payor: MEDICARE / Plan: MEDICARE PART A & B / Product Type: Government /     Extended Emergency Contact Information  Primary Emergency Contact: Pollo Larose  Address: [address] [place] [address] [place] [address] [facility]  Mobile Phone: [phone]  Relation: Spouse  Preferred language: English   needed? No    Discharge Plan A: Home with family  Discharge Plan B: Appear Here Health      [id number] - [place], [place] - [address] [facility] [address]  [address]  [place] [address]  Phone: [phone] Fax: [phone]    [id number] - [place], [place] - [address]  [address]  [place] [address]  Phone: [phone] Fax: [phone]      Initial Assessment (most recent)       Adult Discharge Assessment - 09/11/22 1155          Discharge Assessment    Assessment Type Discharge Planning Assessment     Confirmed/corrected address, phone number and insurance Yes     Confirmed Demographics Correct on Facesheet     Source of Information patient;health record     When was your last doctors appointment? 09/07/22   Dr. Lio Woods    Does patient/caregiver understand observation status Yes     Reason For Admission Acute on chronic combined systolic and diastolic CHF (congestive heart failure)     Lives With spouse     Do you expect to return to your current living situation? Yes     Do you have help at home or someone to help you manage your care at home? Yes     Who are your caregiver(s) and their phone number(s)? Pollo Larose (Spouse)   [phone] (Mobile)     Prior to  hospitilization cognitive status: Alert/Oriented     Current cognitive status: Alert/Oriented     Walking or Climbing Stairs Difficulty ambulation difficulty, requires equipment     Mobility Management Rolling walker, Rollator     Dressing/Bathing Difficulty bathing difficulty, requires equipment     Equipment Currently Used at Home rollator;walker, rolling;bedside commode;oxygen;nebulizer     Readmission within 30 days? No     Patient currently being followed by outpatient case management? No     Do you currently have service(s) that help you manage your care at home? No     Do you take prescription medications? Yes     Do you have prescription coverage? Yes     Coverage Medicare A and B     Do you have any problems affording any of your prescribed medications? No     Is the patient taking medications as prescribed? yes     Who is going to help you get home at discharge? Pollo Larose (Spouse)   [phone] (Mobile)     How do you get to doctors appointments? family or friend will provide     Are you on dialysis? No     Do you take coumadin? No     Discharge Plan A Home with family     Discharge Plan B Home Health     DME Needed Upon Discharge  none     Discharge Plan discussed with: Patient     Discharge Barriers Identified None        Physical Activity    On average, how many days per week do you engage in moderate to strenuous exercise (like a brisk walk)? 0 days     On average, how many minutes do you engage in exercise at this level? 0 min        Financial Resource Strain    How hard is it for you to pay for the very basics like food, housing, medical care, and heating? Not hard at all        Housing Stability    In the last 12 months, was there a time when you were not able to pay the mortgage or rent on time? No     In the last 12 months, was there a time when you did not have a steady place to sleep or slept in a shelter (including now)? No        Transportation Needs    In the past 12 months, has lack of  transportation kept you from medical appointments or from getting medications? No     In the past 12 months, has lack of transportation kept you from meetings, work, or from getting things needed for daily living? No        Food Insecurity    Within the past 12 months, you worried that your food would run out before you got the money to buy more. Never true     Within the past 12 months, the food you bought just didn't last and you didn't have money to get more. Never true        Stress    Do you feel stress - tense, restless, nervous, or anxious, or unable to sleep at night because your mind is troubled all the time - these days? To some extent        Social Connections    In a typical week, how many times do you talk on the phone with family, friends, or neighbors? More than three times a week     How often do you get together with friends or relatives? More than three times a week     How often do you attend Mormonism or Religion services? 1 to 4 times per year     Do you belong to any clubs or organizations such as Mormonism groups, unions, fraternal or athletic groups, or school groups? No     How often do you attend meetings of the clubs or organizations you belong to? Never     Are you , , , , never , or living with a partner?         Alcohol Use    Q1: How often do you have a drink containing alcohol? Never     Q2: How many drinks containing alcohol do you have on a typical day when you are drinking? Patient does not drink     Q3: How often do you have six or more drinks on one occasion? Never        Relationship/Environment    Name(s) of Who Lives With Patient Pollo Larose (Spouse)   [phone] (Mobile)

## 2022-09-11 NOTE — CONSULTS
[facility]   Cardiology Note    Consult Requested By: hospital medicine  Reason for Consult: cardiac arrest    SUBJECTIVE:     History of Present Illness: Patient is a 70 yo with PMHx of CAD s/p bypass 2016, aortic valve replacement, COPD, asthma, chronic respiratory failure on 2L NC, HTN, s/p PM, and DM2 who presented to the ED with complaints of shortness of breath and syncope. The shortness of breath was worsening over 2-3 weeks. It is worse with exertion and is associated with fatigue and productive cough. She saw her pulmonologist last week for asthma exacerbation and her medications were adjusted. She also endorsed 2-3 syncopal episodes since Thursday. ED workup showed BNP 1500, CXR showed evidence of pulmonary edema. Troponins negative. EKG AV paced with Qtc 577.She was admitted to the hospital at that time for acute on chronic CHF exacerbation. She began to have episodes of Vtach yesterday and developed cardiac arrest with torsades. ACLs was started, she received chest compressions and epinephrine and ROSC was achieved. She was not intubated. There was concern for stroke but CTA head was negative. Echo showed EF 60%, RVSP 75 mmHg which is significantly elevated from last year, moderate TR, mod to severe MR and mild to moderate AR, MINGO is 0.71 cm with mean gradient of 20 mmHg. She has not had an angiogram since bypass in 2016. Today she is complaining of chest discomfort. VSS. No further Vtach documented. Labs reviewed, Cr has increased from 1 to 1.5, Mg 2.1, K 3.7. H&H stable. WBC 14, no fevers and BCx are NGTD.     Review of patient's allergies indicates:   Allergen Reactions    Metformin Diarrhea     Diarrhea      Corn Itching     Other reaction(s): Sneezing  Other reaction(s): Rhinorrhea    Potato starch Hives     Other reaction(s): Sneezing  Other reaction(s): Rhinorrhea    Pravastatin Other (See Comments)     Muscle pain    Statins-hmg-coa reductase inhibitors Other (See Comments)     Hydrochlorothiazide Other (See Comments)     weakness    Welchol [colesevelam] Other (See Comments)     Weakness        Past Medical History:   Diagnosis Date    Abnormal EKG 9/26/2012    Allergy     Arthritis     Asthma     Brain bleed     Colon polyps 11/6/2020    COPD (chronic obstructive pulmonary disease)     Depression     Diabetes mellitus type II     Diverticulitis     Fatty liver     GERD (gastroesophageal reflux disease)     Goiter     s/p thyroidectomy    Heart murmur     Hemiparesis affecting right side as late effect of cerebrovascular accident (CVA) 7/26/2022    Hernia of abdominal wall     History of intracranial hemorrhage 6/15/2013    History of non-ST elevation myocardial infarction (NSTEMI) 6/15/2013    Hyperlipidemia     Hypertension     Lactic acidosis 1/11/2020    Metabolic syndrome 6/14/2012    Myocardial infarction     On home oxygen therapy     states uses 2L at night or when sat < 90    Pacemaker     Positive FIT (fecal immunochemical test) 11/6/2020    Severe persistent asthma without complication 4/30/2020    Statin intolerance     Stroke 2012    left hand shaky, loss of balance     Past Surgical History:   Procedure Laterality Date    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/25/2017    Dr Griggs    ADRENALECTOMY      AORTIC VALVE REPLACEMENT  12/09/2016    arthroscopy lt knee Right     BLADDER REPAIR      sling    BREAST BIOPSY Bilateral     benign    COLONOSCOPY  2004    10 year recheck    COLONOSCOPY N/A 11/6/2020    Procedure: COLONOSCOPY;  Surgeon: Yakelin Lowery MD;  Location: [facility];  Service: Endoscopy;  Laterality: N/A;    CORONARY ARTERY BYPASS GRAFT  12/09/2016    X3    EPIDURAL STEROID INJECTION INTO LUMBAR SPINE N/A 7/27/2021    Procedure: Injection-steroid-epidural-lumbar;  Surgeon: Valerio Jay MD;  Location: [facility] OR;  Service: Pain Management;  Laterality: N/A;  L5-S1     HYSTERECTOMY      INSERTION OF PACEMAKER Left 1/13/2020    Procedure: INSERTION, PACEMAKER;  Surgeon: Marko  MD Gisel;  Location: [facility] CATH/EP LAB;  Service: Cardiology;  Laterality: Left;    OOPHORECTOMY      THYROIDECTOMY       Family History   Problem Relation Age of Onset    Arthritis Mother     Diabetes Mother     Heart disease Mother     Hypertension Mother     Hypertension Father     Heart disease Father     Mental illness Father     Asthma Sister     Diabetes Sister     Hypertension Sister     Asthma Son     COPD Son     Depression Son     Diabetes Son     Hypertension Son     Stroke Son     Diabetes Maternal Uncle     Heart disease Maternal Uncle     Mental illness Paternal Aunt     Cancer Paternal Aunt     Heart disease Paternal Aunt     Hypertension Paternal Aunt     Heart disease Paternal Uncle     Hypertension Maternal Grandmother     Mental illness Maternal Grandmother     Aneurysm Maternal Grandfather     Mental illness Paternal Grandmother     Heart disease Paternal Grandfather     Melanoma Neg Hx     Psoriasis Neg Hx     Lupus Neg Hx     Eczema Neg Hx      Social History     Tobacco Use    Smoking status: Never    Smokeless tobacco: Never   Substance Use Topics    Alcohol use: Not Currently     Comment: seldom    Drug use: No       Review of Systems:  Review of Systems   Constitutional:  Positive for malaise/fatigue. Negative for diaphoresis.   Respiratory:  Positive for cough and shortness of breath.    Cardiovascular:  Positive for chest pain.   Neurological:  Positive for loss of consciousness.   All other systems reviewed and are negative.    OBJECTIVE:     Vital Signs (Most Recent)  Temp: 98.2 °F (36.8 °C) (09/11/22 0746)  Pulse: 60 (09/11/22 0746)  Resp: 15 (09/11/22 0746)  BP: (!) 143/65 (09/11/22 0700)  SpO2: 95 % (09/11/22 0746)    Vital Signs Range (Last 24H):  Temp:  [98 °F (36.7 °C)-98.2 °F (36.8 °C)]   Pulse:  [59-61]   Resp:  [12-26]   BP: (128-188)/(59-77)   SpO2:  [88 %-100 %]     I & O (Last 24H):    Intake/Output Summary (Last 24 hours) at 9/11/2022 0917  Last data filed at 9/11/2022  0600  Gross per 24 hour   Intake 1602.3 ml   Output 850 ml   Net 752.3 ml       Current Diet:     Current Diet Order   Procedures    Diet NPO        Allergies:  Review of patient's allergies indicates:   Allergen Reactions    Metformin Diarrhea     Diarrhea      Corn Itching     Other reaction(s): Sneezing  Other reaction(s): Rhinorrhea    Potato starch Hives     Other reaction(s): Sneezing  Other reaction(s): Rhinorrhea    Pravastatin Other (See Comments)     Muscle pain    Statins-hmg-coa reductase inhibitors Other (See Comments)    Hydrochlorothiazide Other (See Comments)     weakness    Welchol [colesevelam] Other (See Comments)     Weakness        Meds:  Scheduled Meds:   albuterol-ipratropium  3 mL Nebulization QID WAKE    arformoteroL  15 mcg Nebulization BID    aspirin  81 mg Oral Daily    budesonide  0.5 mg Nebulization BID    bumetanide  1 mg Intravenous BID    enoxaparin  40 mg Subcutaneous Daily    insulin aspart U-100  7 Units Subcutaneous TIDWM    insulin detemir U-100  10 Units Subcutaneous QHS    levothyroxine  150 mcg Oral Before breakfast    losartan  25 mg Oral Daily    metoprolol succinate  50 mg Oral Daily    montelukast  10 mg Oral QHS    piperacillin-tazobactam (ZOSYN) IVPB  3.375 g Intravenous Q8H    predniSONE  40 mg Oral Daily     Continuous Infusions:   amiodarone in dextrose 5% 0.5 mg/min (09/11/22 0600)    magnesium sulfate in water       PRN Meds:acetaminophen, albuterol-ipratropium, calcium chloride IVPB, calcium chloride IVPB, calcium chloride IVPB, dextrose 50%, dextrose 50%, diphenhydrAMINE, glucagon (human recombinant), glucose, glucose, hydrALAZINE, hydrALAZINE, HYDROcodone-acetaminophen, insulin aspart U-100, magnesium oxide, magnesium sulfate IVPB, magnesium sulfate IVPB, magnesium sulfate IVPB, magnesium sulfate IVPB, melatonin, morphine, naloxone, polyethylene glycol, potassium chloride in water, potassium chloride in water, potassium chloride in water, potassium chloride in  water, potassium chloride, potassium chloride, potassium chloride, potassium chloride, senna-docusate 8.6-50 mg, simethicone, sodium chloride 0.9%, sodium phosphate IVPB, sodium phosphate IVPB, sodium phosphate IVPB, sodium phosphate IVPB, sodium phosphate IVPB    Oxygen/Ventilator Data (Last 24H):  (if applicable)   Oxygen Concentration (%):  [2] 2    Hemodynamic Parameters (Last 24H):   (if applicable)        Laboratory and Radiology Data:  Recent Results (from the past 24 hour(s))   Echo    Collection Time: 09/10/22  2:06 PM   Result Value Ref Range    BSA 1.85 m2    TDI SEPTAL 0.04 m/s    LV LATERAL E/E' RATIO 26.83 m/s    LV SEPTAL E/E' RATIO 40.25 m/s    TDI LATERAL 0.06 m/s    LVIDd 5.10 3.5 - 6.0 cm    IVS 0.85 0.6 - 1.1 cm    Posterior Wall 0.98 0.6 - 1.1 cm    LVIDs 3.36 2.1 - 4.0 cm    FS 34 28 - 44 %    LV mass 167.13 g    LA size 4.44 cm    RVDD 3.47 cm    TAPSE 1.52 cm    Left Ventricle Relative Wall Thickness 0.38 cm    AV mean gradient 20 mmHg    AV valve area 0.72 cm2    AV index (prosthetic) 0.35     E/A ratio 0.98     Mean e' 0.05 m/s    E wave deceleration time 248.63 msec    LVOT diameter 1.63 cm    LVOT area 2.1 cm2    LVOT peak kristopher 102.99 m/s    LVOT peak VTI 26.98 cm    Ao VTI 77.73 cm    Mr max kristopher 674.67 m/s    LVOT stroke volume 56.27 cm3    E/E' ratio 32.20 m/s    MV Peak E Kristopher 1.61 m/s    TR Max Kristopher 4.23 m/s    MV Peak A Kristopher 1.64 m/s    LV Systolic Volume 56.04 mL    LV Systolic Volume Index 31.1 mL/m2    LV Diastolic Volume 132.64 mL    LV Diastolic Volume Index 73.69 mL/m2    LV Mass Index 93 g/m2    Triscuspid Valve Regurgitation Peak Gradient 72 mmHg    Right Atrial Pressure (from IVC) 3 mmHg    EF 60 %    TV rest pulmonary artery pressure 75 mmHg   POCT glucose    Collection Time: 09/10/22 10:14 PM   Result Value Ref Range    POC Glucose 148 (H) 70 - 110   Comprehensive metabolic panel    Collection Time: 09/10/22 11:59 PM   Result Value Ref Range    Sodium 135 (L) 136 - 145 mmol/L     Potassium 4.1 3.5 - 5.1 mmol/L    Chloride 90 (L) 95 - 110 mmol/L    CO2 36 (H) 23 - 29 mmol/L    Glucose 128 (H) 70 - 110 mg/dL    BUN 36 (H) 8 - 23 mg/dL    Creatinine 1.4 0.5 - 1.4 mg/dL    Calcium 9.4 8.7 - 10.5 mg/dL    Total Protein 7.1 6.0 - 8.4 g/dL    Albumin 3.7 3.5 - 5.2 g/dL    Total Bilirubin 0.8 0.1 - 1.0 mg/dL    Alkaline Phosphatase 54 (L) 55 - 135 U/L    AST 42 (H) 10 - 40 U/L    ALT 43 10 - 44 U/L    Anion Gap 9 8 - 16 mmol/L    eGFR 40.2 (A) >60 mL/min/1.73 m^2   Magnesium    Collection Time: 09/10/22 11:59 PM   Result Value Ref Range    Magnesium 2.0 1.6 - 2.6 mg/dL   Phosphorus    Collection Time: 09/10/22 11:59 PM   Result Value Ref Range    Phosphorus 4.9 (H) 2.7 - 4.5 mg/dL   TSH    Collection Time: 09/10/22 11:59 PM   Result Value Ref Range    TSH 1.350 0.340 - 5.600 uIU/mL   Basic Metabolic Panel (BMP)    Collection Time: 09/11/22  3:43 AM   Result Value Ref Range    Sodium 138 136 - 145 mmol/L    Potassium 3.7 3.5 - 5.1 mmol/L    Chloride 89 (L) 95 - 110 mmol/L    CO2 38 (H) 23 - 29 mmol/L    Glucose 113 (H) 70 - 110 mg/dL    BUN 38 (H) 8 - 23 mg/dL    Creatinine 1.5 (H) 0.5 - 1.4 mg/dL    Calcium 9.6 8.7 - 10.5 mg/dL    Anion Gap 11 8 - 16 mmol/L    eGFR 37.0 (A) >60 mL/min/1.73 m^2   Magnesium    Collection Time: 09/11/22  3:43 AM   Result Value Ref Range    Magnesium 2.1 1.6 - 2.6 mg/dL   CBC with Automated Differential    Collection Time: 09/11/22  3:43 AM   Result Value Ref Range    WBC 14.11 (H) 3.90 - 12.70 K/uL    RBC 3.85 (L) 4.00 - 5.40 M/uL    Hemoglobin 12.0 12.0 - 16.0 g/dL    Hematocrit 37.2 37.0 - 48.5 %    MCV 97 82 - 98 fL    MCH 31.2 (H) 27.0 - 31.0 pg    MCHC 32.3 32.0 - 36.0 g/dL    RDW 14.1 11.5 - 14.5 %    Platelets 347 150 - 450 K/uL    MPV 9.5 9.2 - 12.9 fL    Immature Granulocytes 0.4 0.0 - 0.5 %    Gran # (ANC) 11.2 (H) 1.8 - 7.7 K/uL    Immature Grans (Abs) 0.06 (H) 0.00 - 0.04 K/uL    Lymph # 1.5 1.0 - 4.8 K/uL    Mono # 1.4 (H) 0.3 - 1.0 K/uL    Eos # 0.0  0.0 - 0.5 K/uL    Baso # 0.02 0.00 - 0.20 K/uL    nRBC 0 0 /100 WBC    Gran % 79.0 (H) 38.0 - 73.0 %    Lymph % 10.6 (L) 18.0 - 48.0 %    Mono % 9.9 4.0 - 15.0 %    Eosinophil % 0.0 0.0 - 8.0 %    Basophil % 0.1 0.0 - 1.9 %    Differential Method Automated      Imaging Results              X-Ray Chest AP Portable (Final result)  Result time 09/09/22 14:16:56      Final result by Ernesto Gerber MD (09/09/22 14:16:56)                   Narrative:    Reason: CHF    FINDINGS:    Portable chest with comparison chest x-ray January 29, 2022 show mildly enlarged cardiac mediastinal silhouette. Median sternotomy wires noted. Left dual lead cardiac pacemaker noted.  Left basilar linear opacities are noted, felt to reflect subsegmental atelectasis or scarring. Right lung is clear. There is prominence of the central hilar vascular structures. No acute osseous abnormality.    IMPRESSION:    1.  Enlarged cardiomediastinal silhouette with prominence of central hilar vascular structures may reflect pulmonary vascular congestion and CHF.  2.  Left basilar subsegmental atelectasis or scarring.    Electronically signed by:  Ernesto Gerber DO  9/9/2022 2:16 PM CDT Workstation: [id number]                                    12-lead EKG interpretation:  (if applicable)      Current Cardiac Rhythm:   (if applicable)    Physical Exam:   Physical Exam  Vitals and nursing note reviewed.   Constitutional:       General: She is not in acute distress.     Appearance: She is ill-appearing.   Cardiovascular:      Rate and Rhythm: Normal rate and regular rhythm.      Heart sounds: Murmur heard.   Pulmonary:      Effort: No respiratory distress.      Breath sounds: Rales present.   Chest:      Chest wall: Tenderness present.   Musculoskeletal:      Right lower leg: No edema.      Left lower leg: No edema.   Skin:     General: Skin is warm and dry.      Findings: No rash.   Neurological:      Mental Status: She is alert and oriented to person,  place, and time.       ASSESSMENT/PLAN:   Assessment:   Cardiac Arrest - ACLS initiated and ROSC was achieved fairly quickly, not intubated.   Ventricular Tachycardia/Torsades - given IV Mg, Magnesium is 2.1 today. K 3.7.   Acute on chronic CHF exacerbation - on bumex IV, BNP 1500, CXR showed evidence of pulmonary edema. Echo EF 60%, RVSP 75 mmHg, previously 20 mmHg last year.  CAD s/p CABG 3v 2016  Chronic Respiratory Failure  COPD  Asthma  Aortic Valve Replacement 2016 - Echo showed moderate aortic stenosis, MINGO 0.71 cm, mean gradient 20 mmHg, mod TR, mod to severe MR, mild to mod AR.   Hypertension  Diabetes Mellitus Type 2   Leukocytosis- WBC 14, patient is on prednisone    Plan:   Echo showed normal LV function, RVSP was elevated to 75 mmHg. Will proceed with VQ scan for rule out of pulmonary embolism. Her Cr has increased from 1.0 to 1.5 with a GFR of 37.   Patient is on bumetanide and losartan, will consult nephrology for recommendations for  diuretic dosing due to worsening renal function.  Will plan for left heart cath tomorrow morning.  Strict I/Os.  Low sodium diet.  Continue amiodarone gtt today, will transition to PO tomorrow.   On lovenox.   Discussed patient with Dr. Armenta.     Thank you for your consult. We will continue to follow the patient.

## 2022-09-11 NOTE — PROGRESS NOTES
[facility] Medicine  Progress Note    Patient name: Tereza Larose  MRN: [id number]  Admit Date: 9/9/2022   LOS: 0 days     SUBJECTIVE:     Principal problem: Acute on chronic combined systolic and diastolic CHF (congestive heart failure)  Ventricular Tachycardia, cardiac arrest      Interval History:  patient is alert, oriented and says she is feeling better.  She had cardiac arrest after multiple runs of VTAC/ torsades sometime after midnight.  She received epi and chest compressions with ROSC quickly.  She was not intubated.     Scheduled Meds:   albuterol-ipratropium  3 mL Nebulization QID WAKE    arformoteroL  15 mcg Nebulization BID    aspirin  81 mg Oral Daily    budesonide  0.5 mg Nebulization BID    bumetanide  1 mg Intravenous BID    enoxaparin  40 mg Subcutaneous Daily    levothyroxine  150 mcg Oral Before breakfast    losartan  25 mg Oral Daily    metoprolol succinate  50 mg Oral Daily    montelukast  10 mg Oral QHS    piperacillin-tazobactam (ZOSYN) IVPB  3.375 g Intravenous Q8H    predniSONE  40 mg Oral Daily     Continuous Infusions:   amiodarone in dextrose 5% 0.5 mg/min (09/10/22 1916)    magnesium sulfate in water       PRN Meds:acetaminophen, albuterol-ipratropium, calcium chloride IVPB, calcium chloride IVPB, calcium chloride IVPB, dextrose 50%, dextrose 50%, diphenhydrAMINE, glucagon (human recombinant), glucose, glucose, hydrALAZINE, hydrALAZINE, HYDROcodone-acetaminophen, insulin aspart U-100, magnesium oxide, magnesium sulfate IVPB, magnesium sulfate IVPB, magnesium sulfate IVPB, magnesium sulfate IVPB, melatonin, morphine, naloxone, polyethylene glycol, potassium chloride in water, potassium chloride in water, potassium chloride in water, potassium chloride in water, potassium chloride, potassium chloride, potassium chloride, potassium chloride, senna-docusate 8.6-50 mg, simethicone, sodium chloride 0.9%, sodium phosphate IVPB, sodium phosphate IVPB, sodium phosphate  IVPB, sodium phosphate IVPB, sodium phosphate IVPB    Review of patient's allergies indicates:   Allergen Reactions    Metformin Diarrhea     Diarrhea      Corn Itching     Other reaction(s): Sneezing  Other reaction(s): Rhinorrhea    Potato starch Hives     Other reaction(s): Sneezing  Other reaction(s): Rhinorrhea    Pravastatin Other (See Comments)     Muscle pain    Statins-hmg-coa reductase inhibitors Other (See Comments)    Hydrochlorothiazide Other (See Comments)     weakness    Welchol [colesevelam] Other (See Comments)     Weakness        Review of Systems: As per interval history    OBJECTIVE:     Vital Signs (Most Recent)  Temp: 98 °F (36.7 °C) (09/10/22 1400)  Pulse: 60 (09/10/22 1648)  Resp: (!) 26 (09/10/22 1735)  BP: (!) 142/64 (09/10/22 1600)  SpO2: 97 % (09/10/22 1648)    Vital Signs Range (Last 24H):  Temp:  [97.9 °F (36.6 °C)-98.6 °F (37 °C)]   Pulse:  [59-86]   Resp:  [13-26]   BP: (135-225)/(60-97)   SpO2:  [89 %-100 %]     I & O (Last 24H):  Intake/Output Summary (Last 24 hours) at 9/10/2022 1926  Last data filed at 9/10/2022 1700  Gross per 24 hour   Intake 1538.97 ml   Output 550 ml   Net 988.97 ml       Physical Exam:  General: Patient resting comfortably in no acute distress.  Eyes: No conjunctival injection. No scleral icterus.  ENT: Hearing grossly intact. No discharge from ears. No nasal discharge.   Neck: Supple, trachea midline. No JVD  CVS: RRR. No LE edema BL.  +systolic murmur, best heard at right 2nd IC  Chest:  tender to palp r/t compressions  Lungs:  No tachypnea or accessory muscle use.  Crackles greater on left  Abdomen:  Soft, nontender and nondistended.  No organomegaly  Neuro: AOx3. Moves all extremities. Follows commands. Responds appropriately   Skin:  No rash or erythema noted    Laboratory:  I have reviewed all pertinent lab results within the past 24 hours.  CBC:   Recent Labs   Lab 09/10/22  0651   WBC 15.04*   RBC 4.07   HGB 12.7   HCT 38.9      MCV 96   MCH  31.2*   MCHC 32.6     CMP:   Recent Labs   Lab 09/09/22  1415 09/10/22  0212 09/10/22  0651   *   < > 225*   CALCIUM 8.3*   < > 9.7   ALBUMIN 3.8  --   --    PROT 7.2  --   --       < > 142   K 3.8   < > 3.4*   CO2 36*   < > 36*   CL 92*   < > 89*   BUN 30*   < > 29*   CREATININE 1.0   < > 1.0   ALKPHOS 75  --   --    ALT 30  --   --    AST 44*  --   --    BILITOT 0.8  --   --     < > = values in this interval not displayed.     Cardiac markers:   Recent Labs   Lab 09/10/22  0212   TROPONINI 0.034       Diagnostic Results:  Labs: Reviewed  ECG: Reviewed  X-Ray: Reviewed      ASSESSMENT/PLAN:     Patient is 72 yo female with hx of CHF, COPD on 2L at home/eos asthma overlap, CAD, HTN, DM2, CVA who is here with progressive shortness of breath for 2-3 weeks.  She admits to one episode where she had LOC for around 10 minutes.  She has been non-compliant with her diuretic.  Recently saw her pulmonologist and was given steroids and stepped up her controller medication regimen.  Soon after admission, she was noted to have multiple episodes of VTAC or torsades and around midnight had a cardiac arrest with ROSC prior to intubation.       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]  Yes    Complete heart block [I44.2]  Yes    Anticoagulant long-term use [Z79.01]  Not Applicable    Hypoxemic respiratory failure, chronic [J96.11]  Yes    Postoperative hypothyroidism [E89.0]  Yes    Adrenal Cushing's syndrome [E24.9]  Yes    Type 2 diabetes mellitus with circulatory disorder, without long-term current use of insulin [E11.59]  Yes    History of intracranial hemorrhage [Z86.79]  Not Applicable    Hypertension associated with diabetes [E11.59, I15.2]  Yes    CAD (coronary artery disease) [I25.10]  Yes    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       Plan:     Cardiac Arrest; VTAC, Torsades  QTc 580 on EKG prior to arrest, pacemaker.  Received epi and compressions.  ROSC achieved quickly, now patient is alert and oriented.   -See code docmentation  -IV mag infusion started  -IV amio infusion  -cardiology consult  -serial cmp, mag, phos and prn electrolyte replacement  -monitor in ICU      Acute on chronic hypoxemic respiratory failure  Due to acute on chronic CHF  Complicated by COPD-eosinophilic asthma overlap syndrome  - supplemental oxygen, wean as tolerated  -losartan and metoprolol, asa  - bumex 1 mg IV BID ( torsemide at home; she is noncompliant)  - K+ 3.1, replace with 40 meq PO x 2 doses and Mag IV prior to diuretic  - strict I/Os, daily weights, 1.5L fluid restrictions  - pt started recently on COPD exacerbation therapy by pulmonology 2 days ago, continuing now:  solumedrol, levquin, nebs  -Montelukast 10 mg daily  -received levaquin x 1 dose, now discontinued due to QT prolongation  -started zosyn     Hypertension  Restart home dose  -losartan 25 mg daily  -metoprolol xl 50 mg daily      DM2  -SSI accuchecks ac/hs  -Detemir 10 units qHS  -novolog 7 units TID    Hypothyroidism  -check TSH  -Start home dose synthroid 150 mcg       BRIJESH  - CPAP QHS         VTE Risk Mitigation (From admission, onward)           Ordered     enoxaparin injection 40 mg  Daily         09/09/22 2112     IP VTE HIGH RISK PATIENT  Once         09/09/22 2112     Place sequential compression device  Until discontinued         09/09/22 2112                        Department Hospital Medicine  [facility]  Javi Mackenzie MD  Date of service: 09/10/2022

## 2022-09-11 NOTE — CONSULTS
Nephrology Consult Note        Patient Name: Tereza Larose  MRN: [id number]    Patient Class: OP- Observation   Admission Date: 9/9/2022  Length of Stay: 0 days  Date of Service: 9/11/2022    Attending Physician: Cristo Bocanegra MD  Primary Care Provider: Evan Sánchez MD    Reason for Consult: sari    SUBJECTIVE:     HPI: 71F with CABG in 2016, s/p PM, s/p AVR, COPD, asthma, chronic respiratory failure on 2L NC, HTN, DM2 presented with progressive shortness of breath for 2-3 weeks and recurrent syncope. She saw her pulmonologist last week for asthma exacerbation and her medications were adjusted. She also endorsed 2-3 syncopal episodes for last week. ED workup showed BNP 1,500, CXR showed evidence of pulmonary edema. Troponins negative. EKG AV paced with Qtc 577.She was admitted to the hospital for acute on chronic CHF exacerbation. She began to have episodes of Vtach and developed cardiac arrest with torsades. ACLs was started, she received chest compressions and epinephrine and ROSC. She was not intubated. There was concern for stroke but CTA head was negative. Echo showed EF 60%, RVSP 75 mmHg which is significantly elevated from last year, moderate TR, mod to severe MR and mild to moderate AR, MINGO is 0.71 cm with mean gradient of 20 mmHg. She has not had an angiogram since bypass in 2016.     Today she is complaining of some chest discomfort. VSS. No further Vtach documented. sCr has increased from 1 to 1.5, Mg 2.1, K 3.7. H&H stable. WBC 14, no fevers and BCx are NGTD.     Past Medical History:   Diagnosis Date    Abnormal EKG 9/26/2012    Allergy     Arthritis     Asthma     Brain bleed     Colon polyps 11/6/2020    COPD (chronic obstructive pulmonary disease)     Depression     Diabetes mellitus type II     Diverticulitis     Fatty liver     GERD (gastroesophageal reflux disease)     Goiter     s/p thyroidectomy    Heart murmur     Hemiparesis affecting right side as late effect of cerebrovascular  accident (CVA) 7/26/2022    Hernia of abdominal wall     History of intracranial hemorrhage 6/15/2013    History of non-ST elevation myocardial infarction (NSTEMI) 6/15/2013    Hyperlipidemia     Hypertension     Lactic acidosis 1/11/2020    Metabolic syndrome 6/14/2012    Myocardial infarction     On home oxygen therapy     states uses 2L at night or when sat < 90    Pacemaker     Positive FIT (fecal immunochemical test) 11/6/2020    Severe persistent asthma without complication 4/30/2020    Statin intolerance     Stroke 2012    left hand shaky, loss of balance     Past Surgical History:   Procedure Laterality Date    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/25/2017    Dr Griggs    ADRENALECTOMY      AORTIC VALVE REPLACEMENT  12/09/2016    arthroscopy lt knee Right     BLADDER REPAIR      sling    BREAST BIOPSY Bilateral     benign    COLONOSCOPY  2004    10 year recheck    COLONOSCOPY N/A 11/6/2020    Procedure: COLONOSCOPY;  Surgeon: Yakelin Lowery MD;  Location: [facility];  Service: Endoscopy;  Laterality: N/A;    CORONARY ARTERY BYPASS GRAFT  12/09/2016    X3    EPIDURAL STEROID INJECTION INTO LUMBAR SPINE N/A 7/27/2021    Procedure: Injection-steroid-epidural-lumbar;  Surgeon: Valerio Jay MD;  Location: [facility] OR;  Service: Pain Management;  Laterality: N/A;  L5-S1     HYSTERECTOMY      INSERTION OF PACEMAKER Left 1/13/2020    Procedure: INSERTION, PACEMAKER;  Surgeon: Marko Batres MD;  Location: [facility] CATH/EP LAB;  Service: Cardiology;  Laterality: Left;    OOPHORECTOMY      THYROIDECTOMY       Family History   Problem Relation Age of Onset    Arthritis Mother     Diabetes Mother     Heart disease Mother     Hypertension Mother     Hypertension Father     Heart disease Father     Mental illness Father     Asthma Sister     Diabetes Sister     Hypertension Sister     Asthma Son     COPD Son     Depression Son     Diabetes Son     Hypertension Son     Stroke Son     Diabetes Maternal Uncle     Heart disease Maternal  Uncle     Mental illness Paternal Aunt     Cancer Paternal Aunt     Heart disease Paternal Aunt     Hypertension Paternal Aunt     Heart disease Paternal Uncle     Hypertension Maternal Grandmother     Mental illness Maternal Grandmother     Aneurysm Maternal Grandfather     Mental illness Paternal Grandmother     Heart disease Paternal Grandfather     Melanoma Neg Hx     Psoriasis Neg Hx     Lupus Neg Hx     Eczema Neg Hx      Social History     Tobacco Use    Smoking status: Never    Smokeless tobacco: Never   Substance Use Topics    Alcohol use: Not Currently     Comment: seldom    Drug use: No       Review of patient's allergies indicates:   Allergen Reactions    Metformin Diarrhea     Diarrhea      Corn Itching     Other reaction(s): Sneezing  Other reaction(s): Rhinorrhea    Potato starch Hives     Other reaction(s): Sneezing  Other reaction(s): Rhinorrhea    Pravastatin Other (See Comments)     Muscle pain    Statins-hmg-coa reductase inhibitors Other (See Comments)    Hydrochlorothiazide Other (See Comments)     weakness    Welchol [colesevelam] Other (See Comments)     Weakness        Outpatient meds:  No current facility-administered medications on file prior to encounter.     Current Outpatient Medications on File Prior to Encounter   Medication Sig Dispense Refill    albuterol (PROVENTIL/VENTOLIN HFA) 90 mcg/actuation inhaler Inhale 1-2 puffs into the lungs every 4 (four) hours as needed for Wheezing. Inhale 2 puffs by mouth into lungs every 4 hours as needed 108 g 3    evolocumab (REPATHA SYRINGE) 140 mg/mL Syrg Inject 140 mg into the skin every 14 (fourteen) days.       glyBURIDE (DIABETA) 2.5 MG tablet Take 2.5 mg by mouth once daily.      levothyroxine (SYNTHROID) 150 MCG tablet Take 150 mcg by mouth once daily.      montelukast (SINGULAIR) 10 mg tablet Take 1 tablet (10 mg total) by mouth every evening. 30 tablet 11    potassium chloride (MICRO-K) 8 mEq CpSR Take 1 capsule (8 mEq total) by mouth  once daily. 90 capsule 1    predniSONE (DELTASONE) 20 MG tablet Take one daily for 7 days and may repeat for shortness of breath. 21 tablet 1    revefenacin (YUPELRI) 175 mcg/3 mL Nebu Inhale 1 ampule into the lungs once daily at 6am. 30 each 11    sotaloL (BETAPACE) 80 MG tablet Take 80 mg by mouth 2 (two) times daily.      albuterol (PROVENTIL) 2.5 mg /3 mL (0.083 %) nebulizer solution Take 3 mLs (2.5 mg total) by nebulization every 4 (four) hours as needed for Shortness of Breath or Wheezing. 30 each 11    amLODIPine (NORVASC) 10 MG tablet TAKE 1 TABLET BY MOUTH EVERY DAY FOR SYSTOLIC BLOOD PRESSURE GREATER THAN 120 90 tablet 3    arformoteroL (BROVANA) 15 mcg/2 mL Nebu Take 2 mLs (15 mcg total) by nebulization 2 (two) times a day. Controller 60 each 11    aspirin (ECOTRIN) 81 MG EC tablet Take 81 mg by mouth once daily.      bempedoic acid (NEXLETOL) 180 mg Tab Take 1 tablet by mouth once daily.      budesonide (PULMICORT) 0.5 mg/2 mL nebulizer solution Take 2 mLs (0.5 mg total) by nebulization 2 (two) times daily. Controller 120 mL 11    cholecalciferol, vitamin D3, (VITAMIN D3) 25 mcg (1,000 unit) capsule Take 1,000 Units by mouth once daily.      cyanocobalamin (VITAMIN B-12) 1000 MCG tablet Take 100 mcg by mouth once daily.      dimenhyDRINATE (DRAMAMINE) 50 MG tablet Take 50 mg by mouth nightly as needed.      JANUVIA 100 mg Tab TAKE 1 TABLET(100 MG) BY MOUTH EVERY DAY 90 tablet 1    lancets Misc True track Check glucose once daily 100 each 3    losartan (COZAAR) 25 MG tablet Take 25 mg by mouth once daily.      metoprolol succinate (TOPROL-XL) 50 MG 24 hr tablet Take 1 tablet (50 mg total) by mouth once daily. 30 tablet 11    RESTORIL 15 mg Cap Take 15 mg by mouth nightly as needed.      theophylline 400 mg Tb24 Take 1 tablet (400 mg total) by mouth 2 (two) times daily. 60 tablet 5    torsemide (DEMADEX) 20 MG Tab Take 1 tablet (20 mg total) by mouth once daily. 30 tablet 5    vitamin E 400 UNIT capsule  Take 400 Units by mouth once daily.         Scheduled meds:   albuterol-ipratropium  3 mL Nebulization QID WAKE    arformoteroL  15 mcg Nebulization BID    aspirin  81 mg Oral Daily    budesonide  0.5 mg Nebulization BID    bumetanide  1 mg Intravenous BID    enoxaparin  40 mg Subcutaneous Daily    insulin aspart U-100  7 Units Subcutaneous TIDWM    insulin detemir U-100  10 Units Subcutaneous QHS    levothyroxine  150 mcg Oral Before breakfast    losartan  25 mg Oral Daily    metoprolol succinate  50 mg Oral Daily    montelukast  10 mg Oral QHS    piperacillin-tazobactam (ZOSYN) IVPB  3.375 g Intravenous Q8H    predniSONE  40 mg Oral Daily       Infusions:   amiodarone in dextrose 5% 0.5 mg/min (09/11/22 0600)    magnesium sulfate in water         PRN meds:  acetaminophen, albuterol-ipratropium, calcium chloride IVPB, calcium chloride IVPB, calcium chloride IVPB, dextrose 50%, dextrose 50%, diphenhydrAMINE, glucagon (human recombinant), glucose, glucose, hydrALAZINE, hydrALAZINE, HYDROcodone-acetaminophen, insulin aspart U-100, magnesium oxide, magnesium sulfate IVPB, magnesium sulfate IVPB, magnesium sulfate IVPB, magnesium sulfate IVPB, melatonin, morphine, naloxone, polyethylene glycol, potassium chloride in water, potassium chloride in water, potassium chloride in water, potassium chloride in water, potassium chloride, potassium chloride, potassium chloride, potassium chloride, senna-docusate 8.6-50 mg, simethicone, sodium chloride 0.9%, sodium phosphate IVPB, sodium phosphate IVPB, sodium phosphate IVPB, sodium phosphate IVPB, sodium phosphate IVPB    Review of Systems:  Review of Systems   Constitutional:  Negative for chills, fever, malaise/fatigue and weight loss.   HENT:  Negative for hearing loss and nosebleeds.    Eyes:  Negative for blurred vision, double vision and photophobia.   Respiratory:  Negative for cough, shortness of breath and wheezing.    Cardiovascular:  Negative for chest pain,  palpitations and leg swelling.   Gastrointestinal:  Negative for abdominal pain, constipation, diarrhea, heartburn, nausea and vomiting.   Genitourinary:  Negative for dysuria, frequency and urgency.   Musculoskeletal:  Negative for falls, joint pain and myalgias.   Skin:  Negative for itching and rash.   Neurological:  Negative for dizziness, speech change, focal weakness, loss of consciousness and headaches.   Endo/Heme/Allergies:  Does not bruise/bleed easily.   Psychiatric/Behavioral:  Negative for depression and substance abuse. The patient is not nervous/anxious.      OBJECTIVE:     Vital Signs and IO (Last 24H):  Temp:  [98 °F (36.7 °C)-98.2 °F (36.8 °C)]   Pulse:  [59-61]   Resp:  [12-26]   BP: (128-188)/(59-77)   SpO2:  [88 %-100 %]   I/O last 3 completed shifts:  In: 1876.1 [P.O.:900; I.V.:776.1; IV Piggyback:200]  Out: 1000 [Urine:1000]    Wt Readings from Last 5 Encounters:   09/11/22 80.5 kg (177 lb 7.5 oz)   09/10/22 78.5 kg (173 lb)   09/07/22 78.6 kg (173 lb 4.5 oz)   07/26/22 79.6 kg (175 lb 7.8 oz)   07/21/21 83.9 kg (185 lb)     Physical Exam:  Physical Exam  Constitutional:       General: She is not in acute distress.     Appearance: She is well-developed. She is not diaphoretic.   HENT:      Head: Normocephalic and atraumatic.      Mouth/Throat:      Mouth: Mucous membranes are moist.   Eyes:      General: No scleral icterus.     Pupils: Pupils are equal, round, and reactive to light.   Cardiovascular:      Rate and Rhythm: Normal rate and regular rhythm.   Pulmonary:      Effort: Pulmonary effort is normal. No respiratory distress.      Breath sounds: No stridor.   Abdominal:      General: There is no distension.      Palpations: Abdomen is soft.   Musculoskeletal:         General: No deformity. Normal range of motion.      Cervical back: Neck supple.   Skin:     General: Skin is warm and dry.      Findings: No erythema or rash.   Neurological:      Mental Status: She is alert and oriented to  person, place, and time.      Cranial Nerves: No cranial nerve deficit.   Psychiatric:         Behavior: Behavior normal.       Body mass index is 32.46 kg/m².    Laboratory:  Recent Labs   Lab 09/10/22  0651 09/10/22  2359 09/11/22  0343    135* 138   K 3.4* 4.1 3.7   CL 89* 90* 89*   CO2 36* 36* 38*   BUN 29* 36* 38*   CREATININE 1.0 1.4 1.5*   * 128* 113*       Recent Labs   Lab 09/09/22  1415 09/10/22  0036 09/10/22  0212 09/10/22  0651 09/10/22  2359 09/11/22  0343   CALCIUM 8.3*  --  8.9 9.7 9.4 9.6   ALBUMIN 3.8  --   --   --  3.7  --    PHOS  --   --  2.7  --  4.9*  --    MG  --    < > 1.9 1.9 2.0 2.1    < > = values in this interval not displayed.             No results for input(s): POCTGLUCOSE in the last 168 hours.    Recent Labs   Lab 07/05/21  0000 11/17/21  0000 09/10/22  0036   Hemoglobin A1C 7.3 A 7.0 5.8       Recent Labs   Lab 09/10/22  0212 09/10/22  0651 09/11/22  0343   WBC 8.83 15.04* 14.11*   HGB 12.9 12.7 12.0   HCT 39.7 38.9 37.2    356 347   MCV 95 96 97   MCHC 32.5 32.6 32.3   MONO 1.0*  0.1* 4.3  0.6 9.9  1.4*       Recent Labs   Lab 09/09/22  1415 09/10/22  2359   BILITOT 0.8 0.8   PROT 7.2 7.1   ALBUMIN 3.8 3.7   ALKPHOS 75 54*   ALT 30 43   AST 44* 42*       Recent Labs   Lab 01/10/20  1735 09/09/22  1524   Color, UA Yellow Yellow   Appearance, UA Hazy A Clear   pH, UA 5.0 7.0   Specific Gravity, UA 1.020 1.015   Protein, UA Trace A Negative   Glucose, UA Negative 4+ A   Ketones, UA Trace A Negative   Urobilinogen, UA 2.0-3.0 A Negative   Bilirubin (UA) Negative Negative   Occult Blood UA Negative Negative   Nitrite, UA Negative Negative   RBC, UA  --  1   WBC, UA  --  1   Bacteria  --  Negative   Hyaline Casts, UA  --  0       Recent Labs   Lab 01/10/20  1641 01/10/20  1710 09/10/22  0134   POC PH  --  7.360 7.457 H   POC PCO2  --  44.3 59.0 HH   POC HCO3  --  25.0 41.7 H   POC PO2  --  112 H 110 H   POC SATURATED O2  --  98 98   POC BE  --  0 18   Sample  VENOUS ARTERIAL ARTERIAL       Microbiology Results (last 7 days)       Procedure Component Value Units Date/Time    Blood culture x two cultures. Draw prior to antibiotics. [[id number]] Collected: 09/09/22 1524    Order Status: Completed Specimen: Blood from Peripheral, Hand, Right Updated: 09/10/22 1632     Blood Culture, Routine No Growth to date      No Growth to date    Narrative:      Aerobic and anaerobic    Blood culture x two cultures. Draw prior to antibiotics. [[id number]] Collected: 09/09/22 1524    Order Status: Completed Specimen: Blood from Peripheral, Hand, Left Updated: 09/10/22 1632     Blood Culture, Routine No Growth to date      No Growth to date    Narrative:      Aerobic and anaerobic          ASSESSMENT/PLAN:     VERONA likely due to hemodynamic instability, clear UA  VT with torsades and hospital cardiac arrest 9/10 s/p successful CPR  CAD with CABG, AVR, PPM, CHF  CKD stage 3  COPD with CO2 retention and metabolic compensation  DM2  Anemia of CKD  HTN  No NSAIDs, ACEI/ARB, IV contrast or other nephrotoxins.  Keep MAP > 60, SBP > 100.  Dose meds for GFR < 30 ml/min.  Renal diet - low K, low phos.  Appreciate cards input.  Monitor IO and labs.    Control COPD and DM2.    Hgb and HCT are acceptable. Monitor.    BP seem controlled. Tolerate asymptomatic HTN up to -160.  Continue home meds. Might need to hold ARB is scr not better in AM. Low sodium diet.    Thank you for allowing us to participate in the care of your patient!   We will follow the patient and provide recommendations as needed.    Patient care time was spent personally by me on the following activities:   Obtaining a history.  Examination of patient.  Providing medical care at the patients bedside.  Developing a treatment plan with patient or surrogate and bedside caregivers.  Ordering and reviewing laboratory studies, radiographic studies, pulse oximetry.  Ordering and performing treatments and interventions.  Evaluation of  patient's response to treatment.  Discussions with consultants while on the unit and immediately available to the patient.  Re-evaluation of the patient's condition.  Documentation in the medical record.     Brian Andrew MD    [facility]  [address] [place] [address] [place] [id number]    [phone] - tel  [phone] - fax    9/11/2022

## 2022-09-12 LAB
ALLENS TEST: ABNORMAL
ALLENS TEST: ABNORMAL
ANION GAP SERPL CALC-SCNC: 12 MMOL/L (ref 8–16)
BASOPHILS # BLD AUTO: 0.01 K/UL (ref 0–0.2)
BASOPHILS NFR BLD: 0.1 % (ref 0–1.9)
BUN SERPL-MCNC: 46 MG/DL (ref 8–23)
CALCIUM SERPL-MCNC: 9 MG/DL (ref 8.7–10.5)
CHLORIDE SERPL-SCNC: 89 MMOL/L (ref 95–110)
CO2 SERPL-SCNC: 37 MMOL/L (ref 23–29)
CREAT SERPL-MCNC: 1.7 MG/DL (ref 0.5–1.4)
DELSYS: ABNORMAL
DELSYS: ABNORMAL
DIFFERENTIAL METHOD: ABNORMAL
EOSINOPHIL # BLD AUTO: 0 K/UL (ref 0–0.5)
EOSINOPHIL NFR BLD: 0 % (ref 0–8)
EP: 6
ERYTHROCYTE [DISTWIDTH] IN BLOOD BY AUTOMATED COUNT: 13.5 % (ref 11.5–14.5)
ERYTHROCYTE [SEDIMENTATION RATE] IN BLOOD BY WESTERGREN METHOD: 22 MM/H
EST. GFR  (NO RACE VARIABLE): 31.9 ML/MIN/1.73 M^2
FIO2: 45
FLOW: 5
GLUCOSE SERPL-MCNC: 115 MG/DL (ref 70–110)
GLUCOSE SERPL-MCNC: 135 MG/DL (ref 70–110)
GLUCOSE SERPL-MCNC: 146 MG/DL (ref 70–110)
GLUCOSE SERPL-MCNC: 156 MG/DL (ref 70–110)
HCO3 UR-SCNC: 40.1 MMOL/L (ref 24–28)
HCO3 UR-SCNC: 42.2 MMOL/L (ref 24–28)
HCT VFR BLD AUTO: 43 % (ref 37–48.5)
HCT VFR BLD CALC: 41 %PCV (ref 36–54)
HGB BLD-MCNC: 13.5 G/DL (ref 12–16)
IMM GRANULOCYTES # BLD AUTO: 0.09 K/UL (ref 0–0.04)
IMM GRANULOCYTES NFR BLD AUTO: 0.6 % (ref 0–0.5)
IP: 18
LYMPHOCYTES # BLD AUTO: 1.6 K/UL (ref 1–4.8)
LYMPHOCYTES NFR BLD: 11.6 % (ref 18–48)
MAGNESIUM SERPL-MCNC: 2.1 MG/DL (ref 1.6–2.6)
MCH RBC QN AUTO: 30.3 PG (ref 27–31)
MCHC RBC AUTO-ENTMCNC: 31.4 G/DL (ref 32–36)
MCV RBC AUTO: 97 FL (ref 82–98)
MODE: ABNORMAL
MODE: ABNORMAL
MONOCYTES # BLD AUTO: 1.5 K/UL (ref 0.3–1)
MONOCYTES NFR BLD: 10.9 % (ref 4–15)
NEUTROPHILS # BLD AUTO: 10.9 K/UL (ref 1.8–7.7)
NEUTROPHILS NFR BLD: 76.8 % (ref 38–73)
NRBC BLD-RTO: 0 /100 WBC
PCO2 BLDA: 76.5 MMHG (ref 35–45)
PCO2 BLDA: 89.8 MMHG (ref 35–45)
PH SMN: 7.26 [PH] (ref 7.35–7.45)
PH SMN: 7.35 [PH] (ref 7.35–7.45)
PLATELET # BLD AUTO: 423 K/UL (ref 150–450)
PMV BLD AUTO: 9.6 FL (ref 9.2–12.9)
PO2 BLDA: 83 MMHG (ref 80–100)
PO2 BLDA: 84 MMHG (ref 80–100)
POC BE: 13 MMOL/L
POC BE: 17 MMOL/L
POC IONIZED CALCIUM: 1.21 MMOL/L (ref 1.06–1.42)
POC SATURATED O2: 93 % (ref 95–100)
POC SATURATED O2: 95 % (ref 95–100)
POC TCO2: 43 MMOL/L (ref 23–27)
POC TCO2: 45 MMOL/L (ref 23–27)
POTASSIUM BLD-SCNC: 3.7 MMOL/L (ref 3.5–5.1)
POTASSIUM SERPL-SCNC: 4 MMOL/L (ref 3.5–5.1)
RBC # BLD AUTO: 4.45 M/UL (ref 4–5.4)
SAMPLE: ABNORMAL
SAMPLE: ABNORMAL
SITE: ABNORMAL
SITE: ABNORMAL
SODIUM BLD-SCNC: 136 MMOL/L (ref 136–145)
SODIUM SERPL-SCNC: 138 MMOL/L (ref 136–145)
WBC # BLD AUTO: 14.17 K/UL (ref 3.9–12.7)

## 2022-09-12 PROCEDURE — C1887 CATHETER, GUIDING: HCPCS | Performed by: INTERNAL MEDICINE

## 2022-09-12 PROCEDURE — 27000221 HC OXYGEN, UP TO 24 HOURS

## 2022-09-12 PROCEDURE — 36600 WITHDRAWAL OF ARTERIAL BLOOD: CPT

## 2022-09-12 PROCEDURE — 25000003 PHARM REV CODE 250: Performed by: INTERNAL MEDICINE

## 2022-09-12 PROCEDURE — 20000000 HC ICU ROOM

## 2022-09-12 PROCEDURE — C1751 CATH, INF, PER/CENT/MIDLINE: HCPCS | Performed by: INTERNAL MEDICINE

## 2022-09-12 PROCEDURE — 80048 BASIC METABOLIC PNL TOTAL CA: CPT | Performed by: FAMILY MEDICINE

## 2022-09-12 PROCEDURE — C1729 CATH, DRAINAGE: HCPCS | Performed by: INTERNAL MEDICINE

## 2022-09-12 PROCEDURE — C1769 GUIDE WIRE: HCPCS | Performed by: INTERNAL MEDICINE

## 2022-09-12 PROCEDURE — 85025 COMPLETE CBC W/AUTO DIFF WBC: CPT | Performed by: FAMILY MEDICINE

## 2022-09-12 PROCEDURE — 93567 NJX CAR CTH SPRVLV AORTGRPHY: CPT | Performed by: INTERNAL MEDICINE

## 2022-09-12 PROCEDURE — 94799 UNLISTED PULMONARY SVC/PX: CPT

## 2022-09-12 PROCEDURE — 63600175 PHARM REV CODE 636 W HCPCS: Performed by: FAMILY MEDICINE

## 2022-09-12 PROCEDURE — C1760 CLOSURE DEV, VASC: HCPCS | Performed by: INTERNAL MEDICINE

## 2022-09-12 PROCEDURE — 99223 1ST HOSP IP/OBS HIGH 75: CPT | Mod: ,,, | Performed by: INTERNAL MEDICINE

## 2022-09-12 PROCEDURE — C9604 PERC D-E COR REVASC T CABG S: HCPCS | Mod: RC | Performed by: INTERNAL MEDICINE

## 2022-09-12 PROCEDURE — 25000003 PHARM REV CODE 250

## 2022-09-12 PROCEDURE — 94640 AIRWAY INHALATION TREATMENT: CPT

## 2022-09-12 PROCEDURE — 36415 COLL VENOUS BLD VENIPUNCTURE: CPT | Performed by: FAMILY MEDICINE

## 2022-09-12 PROCEDURE — 99153 MOD SED SAME PHYS/QHP EA: CPT | Performed by: INTERNAL MEDICINE

## 2022-09-12 PROCEDURE — 99152 MOD SED SAME PHYS/QHP 5/>YRS: CPT | Performed by: INTERNAL MEDICINE

## 2022-09-12 PROCEDURE — 25000003 PHARM REV CODE 250: Performed by: FAMILY MEDICINE

## 2022-09-12 PROCEDURE — 99900035 HC TECH TIME PER 15 MIN (STAT)

## 2022-09-12 PROCEDURE — 93457 R HRT ART/GRFT ANGIO: CPT | Mod: XU | Performed by: INTERNAL MEDICINE

## 2022-09-12 PROCEDURE — 82803 BLOOD GASES ANY COMBINATION: CPT

## 2022-09-12 PROCEDURE — 94761 N-INVAS EAR/PLS OXIMETRY MLT: CPT

## 2022-09-12 PROCEDURE — 99900031 HC PATIENT EDUCATION (STAT)

## 2022-09-12 PROCEDURE — C1874 STENT, COATED/COV W/DEL SYS: HCPCS | Performed by: INTERNAL MEDICINE

## 2022-09-12 PROCEDURE — 99223 PR INITIAL HOSPITAL CARE,LEVL III: ICD-10-PCS | Mod: ,,, | Performed by: INTERNAL MEDICINE

## 2022-09-12 PROCEDURE — C1725 CATH, TRANSLUMIN NON-LASER: HCPCS | Performed by: INTERNAL MEDICINE

## 2022-09-12 PROCEDURE — 25000242 PHARM REV CODE 250 ALT 637 W/ HCPCS: Performed by: FAMILY MEDICINE

## 2022-09-12 PROCEDURE — 94660 CPAP INITIATION&MGMT: CPT

## 2022-09-12 PROCEDURE — 27201423 OPTIME MED/SURG SUP & DEVICES STERILE SUPPLY: Performed by: INTERNAL MEDICINE

## 2022-09-12 PROCEDURE — 63600175 PHARM REV CODE 636 W HCPCS: Performed by: INTERNAL MEDICINE

## 2022-09-12 PROCEDURE — 83735 ASSAY OF MAGNESIUM: CPT | Performed by: FAMILY MEDICINE

## 2022-09-12 DEVICE — STENT RONYX25022UX RESOLUTE ONYX 2.50X22
Type: IMPLANTABLE DEVICE | Site: CHEST | Status: FUNCTIONAL
Brand: RESOLUTE ONYX™

## 2022-09-12 DEVICE — ANGIO-SEAL VIP VASCULAR CLOSURE DEVICE
Type: IMPLANTABLE DEVICE | Site: CHEST | Status: FUNCTIONAL
Brand: ANGIO-SEAL

## 2022-09-12 RX ORDER — HEPARIN SODIUM 1000 [USP'U]/ML
INJECTION, SOLUTION INTRAVENOUS; SUBCUTANEOUS
Status: DISCONTINUED | OUTPATIENT
Start: 2022-09-12 | End: 2022-09-12 | Stop reason: HOSPADM

## 2022-09-12 RX ORDER — FENTANYL CITRATE 50 UG/ML
INJECTION, SOLUTION INTRAMUSCULAR; INTRAVENOUS
Status: DISCONTINUED | OUTPATIENT
Start: 2022-09-12 | End: 2022-09-12 | Stop reason: HOSPADM

## 2022-09-12 RX ORDER — SODIUM CHLORIDE 9 MG/ML
75 INJECTION, SOLUTION INTRAVENOUS CONTINUOUS
Status: ACTIVE | OUTPATIENT
Start: 2022-09-12 | End: 2022-09-12

## 2022-09-12 RX ORDER — CLOPIDOGREL BISULFATE 75 MG/1
75 TABLET ORAL DAILY
Status: DISCONTINUED | OUTPATIENT
Start: 2022-09-13 | End: 2022-09-15 | Stop reason: HOSPADM

## 2022-09-12 RX ORDER — CHLORHEXIDINE GLUCONATE ORAL RINSE 1.2 MG/ML
15 SOLUTION DENTAL 2 TIMES DAILY
Status: DISCONTINUED | OUTPATIENT
Start: 2022-09-12 | End: 2022-09-15 | Stop reason: HOSPADM

## 2022-09-12 RX ORDER — CLOPIDOGREL BISULFATE 75 MG/1
TABLET ORAL
Status: DISCONTINUED | OUTPATIENT
Start: 2022-09-12 | End: 2022-09-12 | Stop reason: HOSPADM

## 2022-09-12 RX ORDER — ASPIRIN 325 MG
TABLET ORAL
Status: DISCONTINUED | OUTPATIENT
Start: 2022-09-12 | End: 2022-09-12 | Stop reason: HOSPADM

## 2022-09-12 RX ORDER — LABETALOL HYDROCHLORIDE 5 MG/ML
INJECTION, SOLUTION INTRAVENOUS
Status: DISCONTINUED | OUTPATIENT
Start: 2022-09-12 | End: 2022-09-12 | Stop reason: HOSPADM

## 2022-09-12 RX ORDER — MUPIROCIN 20 MG/G
OINTMENT TOPICAL 2 TIMES DAILY
Status: DISCONTINUED | OUTPATIENT
Start: 2022-09-12 | End: 2022-09-15 | Stop reason: HOSPADM

## 2022-09-12 RX ORDER — MIDAZOLAM HYDROCHLORIDE 1 MG/ML
INJECTION INTRAMUSCULAR; INTRAVENOUS
Status: DISCONTINUED | OUTPATIENT
Start: 2022-09-12 | End: 2022-09-12 | Stop reason: HOSPADM

## 2022-09-12 RX ADMIN — HYDRALAZINE HYDROCHLORIDE 10 MG: 20 INJECTION INTRAMUSCULAR; INTRAVENOUS at 08:09

## 2022-09-12 RX ADMIN — HYDROCODONE BITARTRATE AND ACETAMINOPHEN 1 TABLET: 5; 325 TABLET ORAL at 08:09

## 2022-09-12 RX ADMIN — ARFORMOTEROL TARTRATE 15 MCG: 15 SOLUTION RESPIRATORY (INHALATION) at 08:09

## 2022-09-12 RX ADMIN — HYDRALAZINE HYDROCHLORIDE 5 MG: 20 INJECTION INTRAMUSCULAR; INTRAVENOUS at 03:09

## 2022-09-12 RX ADMIN — LEVOTHYROXINE SODIUM 150 MCG: 0.1 TABLET ORAL at 05:09

## 2022-09-12 RX ADMIN — HYDROCODONE BITARTRATE AND ACETAMINOPHEN 1 TABLET: 5; 325 TABLET ORAL at 11:09

## 2022-09-12 RX ADMIN — ENOXAPARIN SODIUM 40 MG: 40 INJECTION SUBCUTANEOUS at 05:09

## 2022-09-12 RX ADMIN — MONTELUKAST 10 MG: 10 TABLET, FILM COATED ORAL at 08:09

## 2022-09-12 RX ADMIN — METOPROLOL SUCCINATE 50 MG: 50 TABLET, FILM COATED, EXTENDED RELEASE ORAL at 11:09

## 2022-09-12 RX ADMIN — AMIODARONE HYDROCHLORIDE 0.5 MG/MIN: 1.8 INJECTION, SOLUTION INTRAVENOUS at 08:09

## 2022-09-12 RX ADMIN — CALCIUM CHLORIDE 1 G: 100 INJECTION INTRAVENOUS; INTRAVENTRICULAR at 10:09

## 2022-09-12 RX ADMIN — HYDRALAZINE HYDROCHLORIDE 10 MG: 20 INJECTION INTRAMUSCULAR; INTRAVENOUS at 02:09

## 2022-09-12 RX ADMIN — MUPIROCIN 1 G: 20 OINTMENT TOPICAL at 08:09

## 2022-09-12 RX ADMIN — SODIUM CHLORIDE 75 ML/HR: 0.9 INJECTION, SOLUTION INTRAVENOUS at 10:09

## 2022-09-12 RX ADMIN — PIPERACILLIN SODIUM AND TAZOBACTAM SODIUM 3.38 G: 3; .375 INJECTION, POWDER, LYOPHILIZED, FOR SOLUTION INTRAVENOUS at 05:09

## 2022-09-12 RX ADMIN — PIPERACILLIN SODIUM AND TAZOBACTAM SODIUM 3.38 G: 3; .375 INJECTION, POWDER, LYOPHILIZED, FOR SOLUTION INTRAVENOUS at 08:09

## 2022-09-12 RX ADMIN — DIPHENHYDRAMINE HYDROCHLORIDE 25 MG: 25 CAPSULE ORAL at 02:09

## 2022-09-12 RX ADMIN — IPRATROPIUM BROMIDE AND ALBUTEROL SULFATE 3 ML: .5; 3 SOLUTION RESPIRATORY (INHALATION) at 03:09

## 2022-09-12 RX ADMIN — IPRATROPIUM BROMIDE AND ALBUTEROL SULFATE 3 ML: .5; 3 SOLUTION RESPIRATORY (INHALATION) at 12:09

## 2022-09-12 RX ADMIN — IPRATROPIUM BROMIDE AND ALBUTEROL SULFATE 3 ML: .5; 3 SOLUTION RESPIRATORY (INHALATION) at 08:09

## 2022-09-12 RX ADMIN — MUPIROCIN 1 G: 20 OINTMENT TOPICAL at 11:09

## 2022-09-12 RX ADMIN — Medication 6 MG: at 09:09

## 2022-09-12 RX ADMIN — BUDESONIDE 0.5 MG: 0.5 INHALANT RESPIRATORY (INHALATION) at 08:09

## 2022-09-12 RX ADMIN — CHLORHEXIDINE GLUCONATE 15 ML: 1.2 RINSE ORAL at 08:09

## 2022-09-12 RX ADMIN — CHLORHEXIDINE GLUCONATE 15 ML: 1.2 RINSE ORAL at 11:09

## 2022-09-12 RX ADMIN — HYDROCODONE BITARTRATE AND ACETAMINOPHEN 1 TABLET: 5; 325 TABLET ORAL at 03:09

## 2022-09-12 RX ADMIN — PIPERACILLIN SODIUM AND TAZOBACTAM SODIUM 3.38 G: 3; .375 INJECTION, POWDER, LYOPHILIZED, FOR SOLUTION INTRAVENOUS at 01:09

## 2022-09-12 NOTE — CONSULTS
Pulmonary/Critical Care Consult      PATIENT NAME: Tereza Larose  MRN: [id number]  TODAY'S DATE: 2022  5:26 PM  ADMIT DATE: 2022  AGE: 71 y.o. : 1950    CONSULT REQUESTED BY: Cristo Bocanegra MD    REASON FOR CONSULT:   Acute hypoxic hypercapnic respiratory failure    HISTORY OF PRESENT ILLNESS   Tereza Larose is a 71 y.o. female with a PMH of asthma/?COPD on 2L at home, CAD s/p CABG, AVR, htn, and DM2 currently hospitalized s/p Torsades de Pointes cardiac arrest on whom we have been consulted for hypoxic and hypercapnic respiratory failure.    Ms. Larose presented on  with SOB and syncope. Shortly after midnight that night, she had multiple episodes of VT culminating with torsades de pointes and cardiac arrest. However, ROSC was reportedly achieved quickly with Epi an CPR. The patient underwent LHC this morning, and a stent was placed in the RCA. She was lethargic today and found to be hypercapnic and acidotic with ABG 7.26/90/83/40 at noon. She was placed on BiPAP, and ABG improved to 7.35/77/84/42 by 13:00. The patient's confusion and lethargy improved, and she was weaned to nasal cannula.    Per the patient's , she has had SOB and multiple episodes of syncope over the last few weeks.    SMOKING HISTORY  Never smoker    EXPOSURE HISTORY  Never cooked with indoor woodburning stoves.    REVIEW OF SYSTEMS  GENERAL: Feeling well.  EYES: Vision is good.  ENT: No sinusitis or pharyngitis.   HEART: No chest pain or palpitations.  LUNGS: No cough, sputum, or wheezing.  GI: No nausea, vomiting, constipation, diarrhea, or reflux.  : No dysuria, hesitancy, or nocturia.  SKIN: No lesions or rashes.  MUSCULOSKELETAL: Back pains after compressions.  NEURO: No headaches or neuropathy.  LYMPH: No edema or adenopathy.  PSYCH: No anxiety or depression.  ENDO: No weight change.    ALLERGIES  Review of patient's allergies indicates:   Allergen Reactions    Metformin Diarrhea     Diarrhea      Corn  Itching     Other reaction(s): Sneezing  Other reaction(s): Rhinorrhea    Potato starch Hives     Other reaction(s): Sneezing  Other reaction(s): Rhinorrhea    Pravastatin Other (See Comments)     Muscle pain    Statins-hmg-coa reductase inhibitors Other (See Comments)    Hydrochlorothiazide Other (See Comments)     weakness    Welchol [colesevelam] Other (See Comments)     Weakness        INPATIENT SCHEDULED MEDICATIONS   albuterol-ipratropium  3 mL Nebulization QID WAKE    arformoteroL  15 mcg Nebulization BID    aspirin  81 mg Oral Daily    budesonide  0.5 mg Nebulization BID    chlorhexidine  15 mL Mouth/Throat BID    [START ON 9/13/2022] clopidogreL  75 mg Oral Daily    enoxaparin  40 mg Subcutaneous Daily    insulin aspart U-100  7 Units Subcutaneous TIDWM    insulin detemir U-100  10 Units Subcutaneous QHS    levothyroxine  150 mcg Oral Before breakfast    metoprolol succinate  50 mg Oral Daily    montelukast  10 mg Oral QHS    mupirocin   Nasal BID    piperacillin-tazobactam (ZOSYN) IVPB  3.375 g Intravenous Q8H      amiodarone in dextrose 5% Stopped (09/12/22 1046)    magnesium sulfate in water         MEDICAL AND SURGICAL HISTORY  Past Medical History:   Diagnosis Date    Abnormal EKG 9/26/2012    Allergy     Arthritis     Asthma     Brain bleed     Colon polyps 11/6/2020    COPD (chronic obstructive pulmonary disease)     Depression     Diabetes mellitus type II     Diverticulitis     Fatty liver     GERD (gastroesophageal reflux disease)     Goiter     s/p thyroidectomy    Heart murmur     Hemiparesis affecting right side as late effect of cerebrovascular accident (CVA) 7/26/2022    Hernia of abdominal wall     History of intracranial hemorrhage 6/15/2013    History of non-ST elevation myocardial infarction (NSTEMI) 6/15/2013    Hyperlipidemia     Hypertension     Lactic acidosis 1/11/2020    Metabolic syndrome 6/14/2012    Myocardial infarction     On home oxygen therapy     states uses 2L at night or  when sat < 90    Pacemaker     Positive FIT (fecal immunochemical test) 11/6/2020    Severe persistent asthma without complication 4/30/2020    Statin intolerance     Stroke 2012    left hand shaky, loss of balance     Past Surgical History:   Procedure Laterality Date    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/25/2017    Dr Griggs    ADRENALECTOMY      AORTIC VALVE REPLACEMENT  12/09/2016    arthroscopy lt knee Right     BLADDER REPAIR      sling    BREAST BIOPSY Bilateral     benign    COLONOSCOPY  2004    10 year recheck    COLONOSCOPY N/A 11/6/2020    Procedure: COLONOSCOPY;  Surgeon: Yakelin Lowery MD;  Location: [facility] ENDO;  Service: Endoscopy;  Laterality: N/A;    CORONARY ARTERY BYPASS GRAFT  12/09/2016    X3    EPIDURAL STEROID INJECTION INTO LUMBAR SPINE N/A 7/27/2021    Procedure: Injection-steroid-epidural-lumbar;  Surgeon: Valerio Jay MD;  Location: [facility] OR;  Service: Pain Management;  Laterality: N/A;  L5-S1     HYSTERECTOMY      INSERTION OF PACEMAKER Left 1/13/2020    Procedure: INSERTION, PACEMAKER;  Surgeon: Marko Batres MD;  Location: [facility] CATH/EP LAB;  Service: Cardiology;  Laterality: Left;    OOPHORECTOMY      THYROIDECTOMY         ALCOHOL, TOBACCO AND DRUG USE  Social History     Tobacco Use   Smoking Status Never   Smokeless Tobacco Never     Social History     Substance and Sexual Activity   Alcohol Use Not Currently    Comment: seldom     Social History     Substance and Sexual Activity   Drug Use No       FAMILY HISTORY  Family History   Problem Relation Age of Onset    Arthritis Mother     Diabetes Mother     Heart disease Mother     Hypertension Mother     Hypertension Father     Heart disease Father     Mental illness Father     Asthma Sister     Diabetes Sister     Hypertension Sister     Asthma Son     COPD Son     Depression Son     Diabetes Son     Hypertension Son     Stroke Son     Diabetes Maternal Uncle     Heart disease Maternal Uncle     Mental illness Paternal Aunt     Cancer  Paternal Aunt     Heart disease Paternal Aunt     Hypertension Paternal Aunt     Heart disease Paternal Uncle     Hypertension Maternal Grandmother     Mental illness Maternal Grandmother     Aneurysm Maternal Grandfather     Mental illness Paternal Grandmother     Heart disease Paternal Grandfather     Melanoma Neg Hx     Psoriasis Neg Hx     Lupus Neg Hx     Eczema Neg Hx        VITAL SIGNS (MOST RECENT)  Temp: 97.8 °F (36.6 °C) (09/12/22 1516)  Pulse: 61 (09/12/22 1631)  Resp: 16 (09/12/22 1631)  BP: (!) 141/66 (09/12/22 1631)  SpO2: (!) 91 % (09/12/22 1631)    INTAKE AND OUTPUT (LAST 24 HOURS):  Intake/Output Summary (Last 24 hours) at 9/12/2022 1726  Last data filed at 9/12/2022 1446  Gross per 24 hour   Intake 836.8 ml   Output 550 ml   Net 286.8 ml       WEIGHT  Wt Readings from Last 1 Encounters:   09/11/22 80.5 kg (177 lb 7.5 oz)       PHYSICAL EXAM  GENERAL: A&O. NAD.  HEENT: Extraocular movements intact. Pharynx moist.  NECK: Supple. No JVD or HJD.  HEART: Regular rate and normal rhythm. 3/6 systoilic murmur at LUSB radiating to carotids.  LUNGS: Clear to auscultation anteriorly. Lung excursion symmetrical.   ABDOMEN: Soft, non-tender, non-distended, no masses palpated.  EXTREMITIES: Normal muscle tone and joint movement, no cyanosis or clubbing.   LYMPHATICS: No adenopathy palpated, no edema.  SKIN: Dry, intact, no lesions.   NEURO: No gross deficit.  PSYCH: Appropriate affect    ACUTE PHASE REACTANT (LAST 24 HOURS)  No results for input(s): FERRITIN, CRP, LDH, DDIMER in the last 24 hours.    CBC LAST (LAST 24 HOURS)  Recent Labs   Lab 09/12/22  0425 09/12/22  1154   WBC 14.17*  --    RBC 4.45  --    HGB 13.5  --    HCT 43.0 41   MCV 97  --    MCH 30.3  --    MCHC 31.4*  --    RDW 13.5  --      --    MPV 9.6  --    GRAN 76.8*  10.9*  --    LYMPH 11.6*  1.6  --    MONO 10.9  1.5*  --    BASO 0.01  --    NRBC 0  --        CHEMISTRY LAST (LAST 24 HOURS)  Recent Labs   Lab 09/12/22  0202  09/12/22  1154 09/12/22  1307     --   --    K 4.0  --   --    CL 89*  --   --    CO2 37*  --   --    ANIONGAP 12  --   --    BUN 46*  --   --    CREATININE 1.7*  --   --    *  --   --    CALCIUM 9.0  --   --    PH  --    < > 7.350   MG 2.1  --   --     < > = values in this interval not displayed.       COAGULATION LAST (LAST 24 HOURS)  No results for input(s): LABPT, INR, APTT in the last 24 hours.    CARDIAC PROFILE (LAST 24 HOURS)  Recent Labs   Lab 09/10/22  0212 09/11/22  0343   BNP  --  1,383*   TROPONINI 0.034  --        LAST 7 DAYS MICROBIOLOGY   Microbiology Results (last 7 days)       Procedure Component Value Units Date/Time    Blood culture x two cultures. Draw prior to antibiotics. [[id number]] Collected: 09/09/22 1524    Order Status: Completed Specimen: Blood from Peripheral, Hand, Right Updated: 09/12/22 1632     Blood Culture, Routine No Growth to date      No Growth to date      No Growth to date      No Growth to date    Narrative:      Aerobic and anaerobic    Blood culture x two cultures. Draw prior to antibiotics. [[id number]] Collected: 09/09/22 1524    Order Status: Completed Specimen: Blood from Peripheral, Hand, Left Updated: 09/12/22 1632     Blood Culture, Routine No Growth to date      No Growth to date      No Growth to date      No Growth to date    Narrative:      Aerobic and anaerobic            MOST RECENT IMAGING  Cardiac catheterization    The Origin of the graft to the right coronary artery lesion was 99%   stenosed with 0% stenosis post-intervention.    A STENT JOHNIE LEVAR 2.5 X 22 stent was successfully placed at 14 LOU for   14 sec. at the origin of the SVG to right coronary artery    The estimated blood loss was none.    Severe pulmonary hypertension with PA pressure is 80 over 30 pulmonary   capillary wedge about 12    Total occlusion of the mid LAD    Normal LIMA to the LAD    Normal circumflex    Presumed nonfunctioning graft to the OM, could not engage in did not    see on aortic root injection    Aortic root demonstrating 2+ aortic insufficiency of prosthetic aortic   valve.  I did not cross the valve due to the dye load of the intervention   of the SVG to the right.  Of note there is significant mitral annular   calcification    Will consider DELORIS to see the degree of MR and see if there is any other   reasons as to why patient has such severe pulmonary hypertension    Continue dual anti-platelet therapy minimum of 1 year    The procedure log was documented by Documenter: Sarah Tschume, RN and   verified by Marko Batres MD.    Date: 9/12/2022  Time: 10:14 AM      CURRENT VISIT EKG  Results for orders placed or performed during the hospital encounter of 09/09/22   EKG 12-lead    Narrative    Test Reason : R00.0,    Vent. Rate : 060 BPM     Atrial Rate : 060 BPM     P-R Int : 260 ms          QRS Dur : 164 ms      QT Int : 566 ms       P-R-T Axes : 093 072 077 degrees     QTc Int : 566 ms    AV dual-paced rhythm with prolonged AV conduction  Abnormal ECG  When compared with ECG of 10-SEP-2022 01:27,  Vent. rate has decreased BY   6 BPM  Confirmed by Mery LICEA, Mayo LEVINE ([id number]) on 9/11/2022 2:49:27 PM    Referred By: [email or web address]   SELF           Confirmed By:Mayo Ordonez MD       ECHOCARDIOGRAM RESULTS  Results for orders placed during the hospital encounter of 09/09/22    Echo    Interpretation Summary  · The left ventricle is normal in size with normal systolic function.  · Grade II left ventricular diastolic dysfunction.  · The estimated PA systolic pressure is 75 mmHg.  · Normal right ventricular size with normal right ventricular systolic function.  · There is moderate to severe pulmonary hypertension.  · Normal central venous pressure (3 mmHg).  · Moderate left atrial enlargement.  · Moderate tricuspid regurgitation.  · Moderate-to-severe mitral regurgitation.  · The estimated ejection fraction is 60%.  · Mild-to-moderate aortic regurgitation.  · There is moderate  aortic valve stenosis.  · Aortic valve area is 0.72 cm2; peak velocity is m/s; mean gradient is 20 mmHg.        VENTILATOR INFORMATION  Oxygen Concentration (%):  [0.45] 0.45       LAST ARTERIAL BLOOD GAS  ABG  Recent Labs   Lab 09/12/22  1307   PH 7.350   PO2 84   PCO2 76.5*   HCO3 42.2*   BE 17       IMPRESSION AND PLAN  Tereza Larose is a 71 y.o. female with a PMH of asthma/?COPD on 2L at home, CAD s/p CABG, AVR, htn, prior stroke, and DM2 currently hospitalized s/p Torsades de Pointes cardiac arrest on whom we have been consulted for hypoxic and hypercapnic respiratory failure.    #Acute on chronic hypercapnic, hypoxic respiratory failure  #Asthma/COPD with acute exacerbation  #Possible BRIJESH  #Possible obesity hypoventilation syndrome  #Possible community-acquired pneumonia  #Likely pulmonary hypertension, likely group 2 +/- 3 (due to left heart failure, pulmonary parenchymal disease)  #Encephalopathy 2/2 hypercapnia  Baseline HCO3 for the last couple of years is in the 30s, indicating chronic, compensated respiratory acidosis. Current CHF exacerbation has likely led to this acute decompensation. STOP-BANG score indicates high risk for BRIJESH.  - BiPAP while sleeping (ordered at 18/6)  - continue DuoNebs QID WA  - titrate O2 to SpO2 88-92%  - continue budesonide and arformoterol  - complete 5 days total corticosteroid therapy (9/9 - 9/14)  - complete 5 days of antibiotic therapy (consider narrowing to ceftriaxone/azithromycin)  - will need sleep study outpatient  - may benefit from NIV at home for OHS/chronic hypercapnia    #Acute decompensated heart failure secondary to ischemic cardiomyopathy and valvulopathy  #CAD s/p stent to RCA (9/12/22)  #s/p Cardiac arrest  #QT prolongation  - defer to cardiology  - close hemodynamic monitoring, telemetry  - avoid QT prolonging meds    #Acute kidney injury  Likely due to decompensated heart failure.  - diurese to euvolemia  - daily serum chemistry    Asher Arteaga  MD  [facility]  Department of Pulmonology  Date of Service: 09/12/2022  5:26 PM

## 2022-09-12 NOTE — PROGRESS NOTES
[facility] Medicine  Progress Note    Patient name: Tereza Larose  MRN: [id number]  Admit Date: 9/9/2022   LOS: 1 day     SUBJECTIVE:     Principal problem: Acute on chronic combined systolic and diastolic CHF (congestive heart failure)  Ventricular Tachycardia, cardiac arrest      Interval History:  The patient underwent left heart catheterization today and had stent placed.  Return to the ICU and is feeling short of breath.  She was started on IV fluids by Cardiology team post catheterization given that she has a mild VERONA and received contrast.  Nephrology was consulted by the Cardiology team.  Appreciate their recommendations.  The patient says she is feeling okay at this time.  No chest pain.    Scheduled Meds:   albuterol-ipratropium  3 mL Nebulization QID WAKE    arformoteroL  15 mcg Nebulization BID    aspirin  81 mg Oral Daily    budesonide  0.5 mg Nebulization BID    chlorhexidine  15 mL Mouth/Throat BID    [START ON 9/13/2022] clopidogreL  75 mg Oral Daily    enoxaparin  40 mg Subcutaneous Daily    insulin aspart U-100  7 Units Subcutaneous TIDWM    insulin detemir U-100  10 Units Subcutaneous QHS    levothyroxine  150 mcg Oral Before breakfast    metoprolol succinate  50 mg Oral Daily    montelukast  10 mg Oral QHS    mupirocin   Nasal BID    piperacillin-tazobactam (ZOSYN) IVPB  3.375 g Intravenous Q8H     Continuous Infusions:   amiodarone in dextrose 5% Stopped (09/12/22 1046)    magnesium sulfate in water       PRN Meds:acetaminophen, albuterol-ipratropium, calcium chloride IVPB, calcium chloride IVPB, calcium chloride IVPB, dextrose 50%, dextrose 50%, diphenhydrAMINE, glucagon (human recombinant), glucose, glucose, hydrALAZINE, hydrALAZINE, HYDROcodone-acetaminophen, insulin aspart U-100, magnesium oxide, magnesium sulfate IVPB, magnesium sulfate IVPB, magnesium sulfate IVPB, magnesium sulfate IVPB, melatonin, morphine, naloxone, polyethylene glycol, potassium chloride in  water, potassium chloride in water, potassium chloride in water, potassium chloride in water, potassium chloride, potassium chloride, potassium chloride, potassium chloride, senna-docusate 8.6-50 mg, simethicone, sodium chloride 0.9%, sodium phosphate IVPB, sodium phosphate IVPB, sodium phosphate IVPB, sodium phosphate IVPB, sodium phosphate IVPB    Review of patient's allergies indicates:   Allergen Reactions    Metformin Diarrhea     Diarrhea      Corn Itching     Other reaction(s): Sneezing  Other reaction(s): Rhinorrhea    Potato starch Hives     Other reaction(s): Sneezing  Other reaction(s): Rhinorrhea    Pravastatin Other (See Comments)     Muscle pain    Statins-hmg-coa reductase inhibitors Other (See Comments)    Hydrochlorothiazide Other (See Comments)     weakness    Welchol [colesevelam] Other (See Comments)     Weakness        Review of Systems: As per interval history    OBJECTIVE:     Vital Signs (Most Recent)  Temp: 97.8 °F (36.6 °C) (09/12/22 1516)  Pulse: 61 (09/12/22 1631)  Resp: 16 (09/12/22 1631)  BP: (!) 141/66 (09/12/22 1631)  SpO2: (!) 91 % (09/12/22 1631)    Vital Signs Range (Last 24H):  Temp:  [97.5 °F (36.4 °C)-97.8 °F (36.6 °C)]   Pulse:  [56-66]   Resp:  [13-32]   BP: (119-199)/()   SpO2:  [89 %-100 %]     I & O (Last 24H):  Intake/Output Summary (Last 24 hours) at 9/12/2022 1647  Last data filed at 9/12/2022 1446  Gross per 24 hour   Intake 886.8 ml   Output 550 ml   Net 336.8 ml       Physical Exam:  General: Patient resting comfortably in no acute distress.  Eyes: No conjunctival injection. No scleral icterus.  ENT: Hearing grossly intact. No discharge from ears. No nasal discharge.   Neck: Supple, trachea midline. No JVD  CVS: RRR. No LE edema BL.  +systolic murmur, best heard at right 2nd IC  Chest:  tender to palp r/t compressions  Lungs:  No tachypnea or accessory muscle use.  Crackles greater on left  Abdomen:  Soft, nontender and nondistended.  No organomegaly  Neuro:  AOx3. Moves all extremities. Follows commands. Responds appropriately   Skin:  No rash or erythema noted    Laboratory:  I have reviewed all pertinent lab results within the past 24 hours.  CBC:   Recent Labs   Lab 09/12/22  0425 09/12/22  1154   WBC 14.17*  --    RBC 4.45  --    HGB 13.5  --    HCT 43.0 41     --    MCV 97  --    MCH 30.3  --    MCHC 31.4*  --      CMP:   Recent Labs   Lab 09/10/22  2359 09/11/22  0343 09/12/22  0425   *   < > 135*   CALCIUM 9.4   < > 9.0   ALBUMIN 3.7  --   --    PROT 7.1  --   --    *   < > 138   K 4.1   < > 4.0   CO2 36*   < > 37*   CL 90*   < > 89*   BUN 36*   < > 46*   CREATININE 1.4   < > 1.7*   ALKPHOS 54*  --   --    ALT 43  --   --    AST 42*  --   --    BILITOT 0.8  --   --     < > = values in this interval not displayed.     Cardiac markers:   Recent Labs   Lab 09/10/22  0212   TROPONINI 0.034       Diagnostic Results:  Labs: Reviewed  ECG: Reviewed  X-Ray: Reviewed      ASSESSMENT/PLAN:     Patient is 70 yo female with hx of CHF, COPD on 2L at home/eos asthma overlap, CAD, HTN, DM2, CVA who is here with progressive shortness of breath for 2-3 weeks.  She admits to one episode where she had LOC for around 10 minutes.  She has been non-compliant with her diuretic.  Recently saw her pulmonologist and was given steroids and stepped up her controller medication regimen.  Soon after admission, she was noted to have multiple episodes of VTAC or torsades and around midnight had a cardiac arrest with ROSC prior to intubation.       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]  Yes    Complete heart block [I44.2]  Yes    Anticoagulant long-term use [Z79.01]  Not Applicable    Hypoxemic respiratory failure, chronic [J96.11]  Yes    Postoperative hypothyroidism [E89.0]  Yes    Adrenal Cushing's syndrome [E24.9]  Yes    Type 2 diabetes  mellitus with circulatory disorder, without long-term current use of insulin [E11.59]  Yes    History of intracranial hemorrhage [Z86.79]  Not Applicable    Hypertension associated with diabetes [E11.59, I15.2]  Yes    CAD (coronary artery disease) [I25.10]  Yes    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       Plan:     Cardiac Arrest; VTAC, Torsades  QTc 580 on EKG prior to arrest, pacemaker.  Received epi and compressions.  ROSC achieved quickly, now patient is alert and oriented.   -See code docmentation  -given IV magnesium  -amiodarone per Cardiology  - status post left heart catheterization and PCI  -cardiology consult appreciate recommendations  -serial cmp, mag, phos and prn electrolyte replacement  -monitor in ICU      Acute on chronic hypoxemic respiratory failure  Due to acute on chronic CHF  Complicated by COPD-eosinophilic asthma overlap syndrome  - supplemental oxygen, wean as tolerated  -losartan and metoprolol, asa  - bumex 1 mg IV BID ( torsemide at home; she is noncompliant)  - K+ 3.1, replace with 40 meq PO x 2 doses and Mag IV prior to diuretic  - strict I/Os, daily weights, 1.5L fluid restrictions  - pt started recently on COPD exacerbation therapy by pulmonology 2 days ago, continuing now:  solumedrol, nebulizers  -started zosyn given QT prolongation with Levaquin  - pulmonology reconsulted    Acute kidney injury  - nephrology consulted  - holding nephrotoxic agents  - hold further diuresis at this time  - appreciate nephrology recommendations     Hypertension  Restart home dose  - hold losartan given VERONA  -metoprolol xl 50 mg daily      DM2  -SSI accuchecks ac/hs  -Detemir 10 units qHS  -novolog 7 units TID    Hypothyroidism  -check TSH  -Start home dose synthroid 150 mcg       BRIJESH  - CPAP QHS         VTE Risk Mitigation (From admission, onward)           Ordered      enoxaparin injection 40 mg  Daily         09/09/22 2112     IP VTE HIGH RISK PATIENT  Once         09/09/22 2112     Place sequential compression device  Until discontinued         09/09/22 2112                        Department Hospital Medicine  [facility] Bocanegra MD  Date of service: 09/12/2022

## 2022-09-12 NOTE — PROGRESS NOTES
Nephrology Progress Note        Patient Name: Tereza Larose  MRN: [id number]    Patient Class: IP- Inpatient   Admission Date: 9/9/2022  Length of Stay: 1 days  Date of Service: 9/12/2022    Attending Physician: Cristo Bocanegra MD  Primary Care Provider: Evan Sánchez MD    Reason for Consult: sari    SUBJECTIVE:     HPI: 71F with CABG in 2016, s/p PM, s/p AVR, COPD, asthma, chronic respiratory failure on 2L NC, HTN, DM2 presented with progressive shortness of breath for 2-3 weeks and recurrent syncope. She saw her pulmonologist last week for asthma exacerbation and her medications were adjusted. She also endorsed 2-3 syncopal episodes for last week. ED workup showed BNP 1,500, CXR showed evidence of pulmonary edema. Troponins negative. EKG AV paced with Qtc 577.She was admitted to the hospital for acute on chronic CHF exacerbation. She began to have episodes of Vtach and developed cardiac arrest with torsades. ACLs was started, she received chest compressions and epinephrine and ROSC. She was not intubated. There was concern for stroke but CTA head was negative. Echo showed EF 60%, RVSP 75 mmHg which is significantly elevated from last year, moderate TR, mod to severe MR and mild to moderate AR, MINGO is 0.71 cm with mean gradient of 20 mmHg. She has not had an angiogram since bypass in 2016.     Today she is complaining of some chest discomfort. VSS. No further Vtach documented. sCr has increased from 1 to 1.5, Mg 2.1, K 3.7. H&H stable. WBC 14, no fevers and BCx are NGTD.     9/12- went for [facility]- had stent placed in RCA- going to do 12 hours of IVFs, some spikes in BP, on BIPAP, UOP 950cc    Outpatient meds:  No current facility-administered medications on file prior to encounter.     Current Outpatient Medications on File Prior to Encounter   Medication Sig Dispense Refill    albuterol (PROVENTIL/VENTOLIN HFA) 90 mcg/actuation inhaler Inhale 1-2 puffs into the lungs every 4 (four) hours as needed for Wheezing.  Inhale 2 puffs by mouth into lungs every 4 hours as needed 108 g 3    evolocumab (REPATHA SYRINGE) 140 mg/mL Syrg Inject 140 mg into the skin every 14 (fourteen) days.       glyBURIDE (DIABETA) 2.5 MG tablet Take 2.5 mg by mouth once daily.      levothyroxine (SYNTHROID) 150 MCG tablet Take 150 mcg by mouth once daily.      montelukast (SINGULAIR) 10 mg tablet Take 1 tablet (10 mg total) by mouth every evening. 30 tablet 11    potassium chloride (MICRO-K) 8 mEq CpSR Take 1 capsule (8 mEq total) by mouth once daily. 90 capsule 1    predniSONE (DELTASONE) 20 MG tablet Take one daily for 7 days and may repeat for shortness of breath. 21 tablet 1    revefenacin (YUPELRI) 175 mcg/3 mL Nebu Inhale 1 ampule into the lungs once daily at 6am. 30 each 11    sotaloL (BETAPACE) 80 MG tablet Take 80 mg by mouth 2 (two) times daily.      albuterol (PROVENTIL) 2.5 mg /3 mL (0.083 %) nebulizer solution Take 3 mLs (2.5 mg total) by nebulization every 4 (four) hours as needed for Shortness of Breath or Wheezing. 30 each 11    amLODIPine (NORVASC) 10 MG tablet TAKE 1 TABLET BY MOUTH EVERY DAY FOR SYSTOLIC BLOOD PRESSURE GREATER THAN 120 90 tablet 3    arformoteroL (BROVANA) 15 mcg/2 mL Nebu Take 2 mLs (15 mcg total) by nebulization 2 (two) times a day. Controller 60 each 11    aspirin (ECOTRIN) 81 MG EC tablet Take 81 mg by mouth once daily.      bempedoic acid (NEXLETOL) 180 mg Tab Take 1 tablet by mouth once daily.      budesonide (PULMICORT) 0.5 mg/2 mL nebulizer solution Take 2 mLs (0.5 mg total) by nebulization 2 (two) times daily. Controller 120 mL 11    cholecalciferol, vitamin D3, (VITAMIN D3) 25 mcg (1,000 unit) capsule Take 1,000 Units by mouth once daily.      cyanocobalamin (VITAMIN B-12) 1000 MCG tablet Take 100 mcg by mouth once daily.      dimenhyDRINATE (DRAMAMINE) 50 MG tablet Take 50 mg by mouth nightly as needed.      JANUVIA 100 mg Tab TAKE 1 TABLET(100 MG) BY MOUTH EVERY DAY 90 tablet 1    lancets Misc True track  Check glucose once daily 100 each 3    losartan (COZAAR) 25 MG tablet Take 25 mg by mouth once daily.      metoprolol succinate (TOPROL-XL) 50 MG 24 hr tablet Take 1 tablet (50 mg total) by mouth once daily. 30 tablet 11    RESTORIL 15 mg Cap Take 15 mg by mouth nightly as needed.      theophylline 400 mg Tb24 Take 1 tablet (400 mg total) by mouth 2 (two) times daily. 60 tablet 5    torsemide (DEMADEX) 20 MG Tab Take 1 tablet (20 mg total) by mouth once daily. 30 tablet 5    vitamin E 400 UNIT capsule Take 400 Units by mouth once daily.         Scheduled meds:   albuterol-ipratropium  3 mL Nebulization QID WAKE    arformoteroL  15 mcg Nebulization BID    aspirin  81 mg Oral Daily    budesonide  0.5 mg Nebulization BID    chlorhexidine  15 mL Mouth/Throat BID    [START ON 9/13/2022] clopidogreL  75 mg Oral Daily    enoxaparin  40 mg Subcutaneous Daily    insulin aspart U-100  7 Units Subcutaneous TIDWM    insulin detemir U-100  10 Units Subcutaneous QHS    levothyroxine  150 mcg Oral Before breakfast    metoprolol succinate  50 mg Oral Daily    montelukast  10 mg Oral QHS    mupirocin   Nasal BID    piperacillin-tazobactam (ZOSYN) IVPB  3.375 g Intravenous Q8H       Infusions:   sodium chloride 0.9% 75 mL/hr (09/12/22 1046)    amiodarone in dextrose 5% 0.5 mg/min (09/12/22 0837)    magnesium sulfate in water         PRN meds:  acetaminophen, albuterol-ipratropium, calcium chloride IVPB, calcium chloride IVPB, calcium chloride IVPB, dextrose 50%, dextrose 50%, diphenhydrAMINE, glucagon (human recombinant), glucose, glucose, hydrALAZINE, hydrALAZINE, HYDROcodone-acetaminophen, insulin aspart U-100, magnesium oxide, magnesium sulfate IVPB, magnesium sulfate IVPB, magnesium sulfate IVPB, magnesium sulfate IVPB, melatonin, morphine, naloxone, polyethylene glycol, potassium chloride in water, potassium chloride in water, potassium chloride in water, potassium chloride in water, potassium chloride, potassium chloride,  potassium chloride, potassium chloride, senna-docusate 8.6-50 mg, simethicone, sodium chloride 0.9%, sodium phosphate IVPB, sodium phosphate IVPB, sodium phosphate IVPB, sodium phosphate IVPB, sodium phosphate IVPB    Review of Systems:  Sedated post procedure    OBJECTIVE:     Vital Signs and IO (Last 24H):  Temp:  [97.5 °F (36.4 °C)-97.8 °F (36.6 °C)]   Pulse:  [56-66]   Resp:  [10-32]   BP: (119-186)/()   SpO2:  [89 %-100 %]   I/O last 3 completed shifts:  In: 1184.3 [P.O.:690; I.V.:344.3; IV Piggyback:150]  Out: 1400 [Urine:1400]    Wt Readings from Last 5 Encounters:   09/11/22 80.5 kg (177 lb 7.5 oz)   09/10/22 78.5 kg (173 lb)   09/07/22 78.6 kg (173 lb 4.5 oz)   07/26/22 79.6 kg (175 lb 7.8 oz)   07/21/21 83.9 kg (185 lb)     Physical Exam:  Constitutional: nad, sedated, ill, non toxic  Heart: rrr, no m/r/g, wwp, no edema  Lungs: ctab, no w/r/r/c, no lb  Abdomen: s/nt/nd, +BS    Body mass index is 32.46 kg/m².    Laboratory:  Recent Labs   Lab 09/10/22  2359 09/11/22  0343 09/12/22  0425   * 138 138   K 4.1 3.7 4.0   CL 90* 89* 89*   CO2 36* 38* 37*   BUN 36* 38* 46*   CREATININE 1.4 1.5* 1.7*   * 113* 135*         Recent Labs   Lab 09/09/22  1415 09/10/22  0036 09/10/22  0212 09/10/22  0651 09/10/22  2359 09/11/22  0343 09/12/22  0425   CALCIUM 8.3*  --  8.9   < > 9.4 9.6 9.0   ALBUMIN 3.8  --   --   --  3.7  --   --    PHOS  --   --  2.7  --  4.9*  --   --    MG  --    < > 1.9   < > 2.0 2.1 2.1    < > = values in this interval not displayed.               No results for input(s): POCTGLUCOSE in the last 168 hours.    Recent Labs   Lab 07/05/21  0000 11/17/21  0000 09/10/22  0036   Hemoglobin A1C 7.3 A 7.0 5.8         Recent Labs   Lab 09/10/22  0651 09/11/22  0343 09/12/22  0425 09/12/22  1154   WBC 15.04* 14.11* 14.17*  --    HGB 12.7 12.0 13.5  --    HCT 38.9 37.2 43.0 41    347 423  --    MCV 96 97 97  --    MCHC 32.6 32.3 31.4*  --    MONO 4.3  0.6 9.9  1.4* 10.9  1.5*  --           Recent Labs   Lab 09/09/22  1415 09/10/22  2359   BILITOT 0.8 0.8   PROT 7.2 7.1   ALBUMIN 3.8 3.7   ALKPHOS 75 54*   ALT 30 43   AST 44* 42*         Recent Labs   Lab 01/10/20  1735 09/09/22  1524   Color, UA Yellow Yellow   Appearance, UA Hazy A Clear   pH, UA 5.0 7.0   Specific Gravity, UA 1.020 1.015   Protein, UA Trace A Negative   Glucose, UA Negative 4+ A   Ketones, UA Trace A Negative   Urobilinogen, UA 2.0-3.0 A Negative   Bilirubin (UA) Negative Negative   Occult Blood UA Negative Negative   Nitrite, UA Negative Negative   RBC, UA  --  1   WBC, UA  --  1   Bacteria  --  Negative   Hyaline Casts, UA  --  0         Recent Labs   Lab 01/10/20  1710 09/10/22  0134 09/12/22  1154   POC PH 7.360 7.457 H 7.258 LL   POC PCO2 44.3 59.0 HH 89.8 HH   POC HCO3 25.0 41.7 H 40.1 H   POC PO2 112 H 110 H 83   POC SATURATED O2 98 98 93 L   POC BE 0 18 13   Sample ARTERIAL ARTERIAL ARTERIAL         Microbiology Results (last 7 days)       Procedure Component Value Units Date/Time    Blood culture x two cultures. Draw prior to antibiotics. [[id number]] Collected: 09/09/22 1524    Order Status: Completed Specimen: Blood from Peripheral, Hand, Right Updated: 09/11/22 1632     Blood Culture, Routine No Growth to date      No Growth to date      No Growth to date    Narrative:      Aerobic and anaerobic    Blood culture x two cultures. Draw prior to antibiotics. [[id number]] Collected: 09/09/22 1524    Order Status: Completed Specimen: Blood from Peripheral, Hand, Left Updated: 09/11/22 1632     Blood Culture, Routine No Growth to date      No Growth to date      No Growth to date    Narrative:      Aerobic and anaerobic          ASSESSMENT/PLAN:     VERONA likely due to hemodynamic instability; CKD III  VT with torsades and hospital cardiac arrest 9/10 s/p successful CPR, s/p PCI to RCA 9/12  HTN/CHF  COPD with CO2 retention and metabolic compensation  SHPT  Anemia of CKD    - slight bump in SCr noted- likely hemodynamic-  s/p IV contrast procedure today so will monitor for SHARON- continue NS 75cc/hr for 12 hour post procedure- strict ins/outs- no nsaids- dose meds for CrCl 30-60  - trend BP- norvasc, losartan and torsemide are on hold  - trend CO2  - no active BMM issues  - no active hematologic issues    Thank you for allowing us to participate in the care of your patient!   We will follow the patient and provide recommendations as needed.    Patient care time was spent personally by me on the following activities: > 35 min  Obtaining a history.  Examination of patient.  Providing medical care at the patients bedside.  Developing a treatment plan with patient or surrogate and bedside caregivers.  Ordering and reviewing laboratory studies, radiographic studies, pulse oximetry.  Ordering and performing treatments and interventions.  Evaluation of patient's response to treatment.  Discussions with consultants while on the unit and immediately available to the patient.  Re-evaluation of the patient's condition.  Documentation in the medical record.     Yessenia Narayan MD    [facility]  [address] [place] [address], [place] [id number]    [phone] - tel  [phone] - fax    9/12/2022

## 2022-09-12 NOTE — PLAN OF CARE
Today she went to the cath lab  and had a stent placed. Came back to the unit and had a hard time breathing and was confused.  Dr. Hoffman saw the patient and ordered a ABG. She was then placed on bipap. Later in the day she is awake and following simple commands. No more bipap and on 3 liters nasal cannula. Good urine output via a purewick. Normal saline at 75 cc/hr for 4 hours as ordered. Calcium replaced and antibiotics given as ordered.  has been at bedside all afternoon. The patient can answer simple questions but is not fully oriented.

## 2022-09-12 NOTE — PLAN OF CARE
NPO for cardiac angiogram in AM. Denies chest pain/discomfort. Still receiving IV Amiodarone infusion.

## 2022-09-13 ENCOUNTER — CLINICAL SUPPORT (OUTPATIENT)
Dept: CARDIOLOGY | Facility: HOSPITAL | Age: 72
DRG: 246 | End: 2022-09-13
Attending: STUDENT IN AN ORGANIZED HEALTH CARE EDUCATION/TRAINING PROGRAM
Payer: MEDICARE

## 2022-09-13 ENCOUNTER — ANESTHESIA (OUTPATIENT)
Dept: INTENSIVE CARE | Facility: HOSPITAL | Age: 72
DRG: 246 | End: 2022-09-13
Payer: MEDICARE

## 2022-09-13 ENCOUNTER — ANESTHESIA EVENT (OUTPATIENT)
Dept: INTENSIVE CARE | Facility: HOSPITAL | Age: 72
DRG: 246 | End: 2022-09-13
Payer: MEDICARE

## 2022-09-13 VITALS
HEART RATE: 81 BPM | DIASTOLIC BLOOD PRESSURE: 47 MMHG | RESPIRATION RATE: 18 BRPM | SYSTOLIC BLOOD PRESSURE: 103 MMHG | OXYGEN SATURATION: 97 %

## 2022-09-13 LAB
ALLENS TEST: ABNORMAL
ANION GAP SERPL CALC-SCNC: 9 MMOL/L (ref 8–16)
BASOPHILS # BLD AUTO: 0.01 K/UL (ref 0–0.2)
BASOPHILS NFR BLD: 0.1 % (ref 0–1.9)
BSA FOR ECHO PROCEDURE: 1.85 M2
BUN SERPL-MCNC: 44 MG/DL (ref 8–23)
CALCIUM SERPL-MCNC: 9.1 MG/DL (ref 8.7–10.5)
CHLORIDE SERPL-SCNC: 94 MMOL/L (ref 95–110)
CO2 SERPL-SCNC: 36 MMOL/L (ref 23–29)
CREAT SERPL-MCNC: 1.3 MG/DL (ref 0.5–1.4)
DELSYS: ABNORMAL
DIFFERENTIAL METHOD: ABNORMAL
EJECTION FRACTION: 65 %
EOSINOPHIL # BLD AUTO: 0.2 K/UL (ref 0–0.5)
EOSINOPHIL NFR BLD: 1.3 % (ref 0–8)
EP: 6
ERYTHROCYTE [DISTWIDTH] IN BLOOD BY AUTOMATED COUNT: 13.4 % (ref 11.5–14.5)
EST. GFR  (NO RACE VARIABLE): 44 ML/MIN/1.73 M^2
FIO2: 40
GLUCOSE SERPL-MCNC: 126 MG/DL (ref 70–110)
GLUCOSE SERPL-MCNC: 182 MG/DL (ref 70–110)
GLUCOSE SERPL-MCNC: 219 MG/DL (ref 70–110)
GLUCOSE SERPL-MCNC: 243 MG/DL (ref 70–110)
HCO3 UR-SCNC: 35 MMOL/L (ref 24–28)
HCT VFR BLD AUTO: 38.5 % (ref 37–48.5)
HGB BLD-MCNC: 12.4 G/DL (ref 12–16)
IMM GRANULOCYTES # BLD AUTO: 0.06 K/UL (ref 0–0.04)
IMM GRANULOCYTES NFR BLD AUTO: 0.5 % (ref 0–0.5)
IP: 18
LYMPHOCYTES # BLD AUTO: 1.1 K/UL (ref 1–4.8)
LYMPHOCYTES NFR BLD: 9.3 % (ref 18–48)
MAGNESIUM SERPL-MCNC: 2.1 MG/DL (ref 1.6–2.6)
MCH RBC QN AUTO: 30.6 PG (ref 27–31)
MCHC RBC AUTO-ENTMCNC: 32.2 G/DL (ref 32–36)
MCV RBC AUTO: 95 FL (ref 82–98)
MODE: ABNORMAL
MONOCYTES # BLD AUTO: 1.2 K/UL (ref 0.3–1)
MONOCYTES NFR BLD: 10 % (ref 4–15)
NEUTROPHILS # BLD AUTO: 9.7 K/UL (ref 1.8–7.7)
NEUTROPHILS NFR BLD: 78.8 % (ref 38–73)
NRBC BLD-RTO: 0 /100 WBC
PCO2 BLDA: 50.6 MMHG (ref 35–45)
PH SMN: 7.45 [PH] (ref 7.35–7.45)
PLATELET # BLD AUTO: 305 K/UL (ref 150–450)
PMV BLD AUTO: 9.4 FL (ref 9.2–12.9)
PO2 BLDA: 67 MMHG (ref 80–100)
POC BE: 11 MMOL/L
POC SATURATED O2: 93 % (ref 95–100)
POC TCO2: 37 MMOL/L (ref 23–27)
POTASSIUM SERPL-SCNC: 3.7 MMOL/L (ref 3.5–5.1)
PROCALCITONIN SERPL IA-MCNC: 0.14 NG/ML (ref 0–0.5)
RBC # BLD AUTO: 4.05 M/UL (ref 4–5.4)
SAMPLE: ABNORMAL
SITE: ABNORMAL
SODIUM SERPL-SCNC: 139 MMOL/L (ref 136–145)
SP02: 93
WBC # BLD AUTO: 12.28 K/UL (ref 3.9–12.7)

## 2022-09-13 PROCEDURE — 94640 AIRWAY INHALATION TREATMENT: CPT

## 2022-09-13 PROCEDURE — 25000242 PHARM REV CODE 250 ALT 637 W/ HCPCS: Performed by: FAMILY MEDICINE

## 2022-09-13 PROCEDURE — 83735 ASSAY OF MAGNESIUM: CPT | Performed by: FAMILY MEDICINE

## 2022-09-13 PROCEDURE — 27000221 HC OXYGEN, UP TO 24 HOURS

## 2022-09-13 PROCEDURE — 36600 WITHDRAWAL OF ARTERIAL BLOOD: CPT

## 2022-09-13 PROCEDURE — 99900035 HC TECH TIME PER 15 MIN (STAT)

## 2022-09-13 PROCEDURE — 63600175 PHARM REV CODE 636 W HCPCS: Performed by: FAMILY MEDICINE

## 2022-09-13 PROCEDURE — 25000003 PHARM REV CODE 250: Performed by: INTERNAL MEDICINE

## 2022-09-13 PROCEDURE — 63600175 PHARM REV CODE 636 W HCPCS: Performed by: INTERNAL MEDICINE

## 2022-09-13 PROCEDURE — 25000003 PHARM REV CODE 250: Performed by: STUDENT IN AN ORGANIZED HEALTH CARE EDUCATION/TRAINING PROGRAM

## 2022-09-13 PROCEDURE — 80048 BASIC METABOLIC PNL TOTAL CA: CPT | Performed by: FAMILY MEDICINE

## 2022-09-13 PROCEDURE — 84145 PROCALCITONIN (PCT): CPT | Performed by: FAMILY MEDICINE

## 2022-09-13 PROCEDURE — 99232 PR SUBSEQUENT HOSPITAL CARE,LEVL II: ICD-10-PCS | Mod: ,,, | Performed by: INTERNAL MEDICINE

## 2022-09-13 PROCEDURE — 94799 UNLISTED PULMONARY SVC/PX: CPT

## 2022-09-13 PROCEDURE — 63600175 PHARM REV CODE 636 W HCPCS: Performed by: STUDENT IN AN ORGANIZED HEALTH CARE EDUCATION/TRAINING PROGRAM

## 2022-09-13 PROCEDURE — 25000003 PHARM REV CODE 250

## 2022-09-13 PROCEDURE — 94660 CPAP INITIATION&MGMT: CPT

## 2022-09-13 PROCEDURE — 20000000 HC ICU ROOM

## 2022-09-13 PROCEDURE — 94761 N-INVAS EAR/PLS OXIMETRY MLT: CPT

## 2022-09-13 PROCEDURE — 99900031 HC PATIENT EDUCATION (STAT)

## 2022-09-13 PROCEDURE — 82803 BLOOD GASES ANY COMBINATION: CPT

## 2022-09-13 PROCEDURE — 99232 SBSQ HOSP IP/OBS MODERATE 35: CPT | Mod: ,,, | Performed by: INTERNAL MEDICINE

## 2022-09-13 PROCEDURE — 85025 COMPLETE CBC W/AUTO DIFF WBC: CPT | Performed by: FAMILY MEDICINE

## 2022-09-13 PROCEDURE — 25000003 PHARM REV CODE 250: Performed by: FAMILY MEDICINE

## 2022-09-13 PROCEDURE — 36415 COLL VENOUS BLD VENIPUNCTURE: CPT | Performed by: FAMILY MEDICINE

## 2022-09-13 PROCEDURE — 93312 ECHO TRANSESOPHAGEAL: CPT

## 2022-09-13 RX ORDER — PREDNISONE 20 MG/1
40 TABLET ORAL DAILY
Status: DISCONTINUED | OUTPATIENT
Start: 2022-09-13 | End: 2022-09-15

## 2022-09-13 RX ORDER — PROPOFOL 10 MG/ML
VIAL (ML) INTRAVENOUS
Status: DISCONTINUED | OUTPATIENT
Start: 2022-09-13 | End: 2022-09-13

## 2022-09-13 RX ADMIN — IPRATROPIUM BROMIDE AND ALBUTEROL SULFATE 3 ML: .5; 3 SOLUTION RESPIRATORY (INHALATION) at 07:09

## 2022-09-13 RX ADMIN — PROPOFOL 20 MG: 10 INJECTION, EMULSION INTRAVENOUS at 02:09

## 2022-09-13 RX ADMIN — METOPROLOL SUCCINATE 50 MG: 50 TABLET, FILM COATED, EXTENDED RELEASE ORAL at 08:09

## 2022-09-13 RX ADMIN — PREDNISONE 40 MG: 20 TABLET ORAL at 09:09

## 2022-09-13 RX ADMIN — CLOPIDOGREL BISULFATE 75 MG: 75 TABLET, FILM COATED ORAL at 08:09

## 2022-09-13 RX ADMIN — MUPIROCIN 1 G: 20 OINTMENT TOPICAL at 08:09

## 2022-09-13 RX ADMIN — ENOXAPARIN SODIUM 40 MG: 40 INJECTION SUBCUTANEOUS at 04:09

## 2022-09-13 RX ADMIN — CHLORHEXIDINE GLUCONATE 15 ML: 1.2 RINSE ORAL at 08:09

## 2022-09-13 RX ADMIN — SACUBITRIL AND VALSARTAN 1 TABLET: 24; 26 TABLET, FILM COATED ORAL at 08:09

## 2022-09-13 RX ADMIN — INSULIN ASPART 1 UNITS: 100 INJECTION, SOLUTION INTRAVENOUS; SUBCUTANEOUS at 12:09

## 2022-09-13 RX ADMIN — DIPHENHYDRAMINE HYDROCHLORIDE 25 MG: 25 CAPSULE ORAL at 08:09

## 2022-09-13 RX ADMIN — INSULIN ASPART 7 UNITS: 100 INJECTION, SOLUTION INTRAVENOUS; SUBCUTANEOUS at 04:09

## 2022-09-13 RX ADMIN — PIPERACILLIN SODIUM AND TAZOBACTAM SODIUM 3.38 G: 3; .375 INJECTION, POWDER, LYOPHILIZED, FOR SOLUTION INTRAVENOUS at 08:09

## 2022-09-13 RX ADMIN — SODIUM CHLORIDE: 0.9 INJECTION, SOLUTION INTRAVENOUS at 02:09

## 2022-09-13 RX ADMIN — LEVOTHYROXINE SODIUM 150 MCG: 0.1 TABLET ORAL at 05:09

## 2022-09-13 RX ADMIN — PROPOFOL 50 MG: 10 INJECTION, EMULSION INTRAVENOUS at 02:09

## 2022-09-13 RX ADMIN — INSULIN ASPART 4 UNITS: 100 INJECTION, SOLUTION INTRAVENOUS; SUBCUTANEOUS at 04:09

## 2022-09-13 RX ADMIN — PIPERACILLIN SODIUM AND TAZOBACTAM SODIUM 3.38 G: 3; .375 INJECTION, POWDER, LYOPHILIZED, FOR SOLUTION INTRAVENOUS at 05:09

## 2022-09-13 RX ADMIN — IPRATROPIUM BROMIDE AND ALBUTEROL SULFATE 3 ML: .5; 3 SOLUTION RESPIRATORY (INHALATION) at 03:09

## 2022-09-13 RX ADMIN — INSULIN DETEMIR 10 UNITS: 100 INJECTION, SOLUTION SUBCUTANEOUS at 08:09

## 2022-09-13 RX ADMIN — ASPIRIN 81 MG: 81 TABLET, COATED ORAL at 08:09

## 2022-09-13 RX ADMIN — HYDROCODONE BITARTRATE AND ACETAMINOPHEN 1 TABLET: 5; 325 TABLET ORAL at 08:09

## 2022-09-13 RX ADMIN — PIPERACILLIN SODIUM AND TAZOBACTAM SODIUM 3.38 G: 3; .375 INJECTION, POWDER, LYOPHILIZED, FOR SOLUTION INTRAVENOUS at 12:09

## 2022-09-13 RX ADMIN — BUDESONIDE 0.5 MG: 0.5 INHALANT RESPIRATORY (INHALATION) at 08:09

## 2022-09-13 RX ADMIN — ARFORMOTEROL TARTRATE 15 MCG: 15 SOLUTION RESPIRATORY (INHALATION) at 07:09

## 2022-09-13 RX ADMIN — POTASSIUM CHLORIDE 20 MEQ: 1500 TABLET, EXTENDED RELEASE ORAL at 06:09

## 2022-09-13 RX ADMIN — HYDRALAZINE HYDROCHLORIDE 10 MG: 20 INJECTION INTRAMUSCULAR; INTRAVENOUS at 03:09

## 2022-09-13 RX ADMIN — IPRATROPIUM BROMIDE AND ALBUTEROL SULFATE 3 ML: .5; 3 SOLUTION RESPIRATORY (INHALATION) at 11:09

## 2022-09-13 RX ADMIN — HYDROCODONE BITARTRATE AND ACETAMINOPHEN 1 TABLET: 5; 325 TABLET ORAL at 02:09

## 2022-09-13 RX ADMIN — MORPHINE SULFATE 4 MG: 4 INJECTION, SOLUTION INTRAMUSCULAR; INTRAVENOUS at 06:09

## 2022-09-13 RX ADMIN — MONTELUKAST 10 MG: 10 TABLET, FILM COATED ORAL at 08:09

## 2022-09-13 NOTE — PROGRESS NOTES
Based on the transesophageal echocardiogram of the mitral regurgitation is not severe enough to have been the etiology of her pulmonary hypertension.  The angiogram showed normal wedge pressure which would be elevated with severe MR.  The patient had an RCA stent placed which would explain her ischemic mitral regurgitation with restricted motion of the posterior leaflet.  The valve was degenerated but is not the etiology of her pulmonary hypertension.  She likely has group 1 or group 3 pulmonary hypertension will need targeted therapy if possible.  She should be evaluated by a pulmonary hypertension clinic because she will require prostacyclin analogs likely IV based on her pulmonary pressures.  She will need a COPD workup prior therapy.  If she is group 3 she will not be a candidate.  The aortic valve is functioning fine.  The right-sided chambers were twice the size of the left-sided chambers consistent with long-standing pulmonary hypertension.  She may have had more severe MR but once the RCA was stented the ischemic burden was last seen in the mitral regurgitation improved.  None the less the left atrial dimensions were not consistent with longstanding severe MR.

## 2022-09-13 NOTE — PROGRESS NOTES
Pulmonary/Critical Care Consult      PATIENT NAME: Tereza Larose  MRN: [id number]  TODAY'S DATE: 2022  5:26 PM  ADMIT DATE: 2022  AGE: 71 y.o. : 1950    CONSULT REQUESTED BY: Daron Bolton MD    REASON FOR CONSULT:   Acute hypoxic hypercapnic respiratory failure    HISTORY OF PRESENT ILLNESS   Tereza Larose is a 71 y.o. female with a PMH of asthma/?COPD on 2L at home, CAD s/p CABG, AVR, htn, and DM2 currently hospitalized s/p Torsades de Pointes cardiac arrest on whom we have been consulted for hypoxic and hypercapnic respiratory failure.    Ms. Larose presented on  with SOB and syncope. Shortly after midnight that night, she had multiple episodes of VT culminating with torsades de pointes and cardiac arrest. However, ROSC was reportedly achieved quickly with Epi an CPR. The patient underwent LHC this morning, and a stent was placed in the RCA. She was lethargic today and found to be hypercapnic and acidotic with ABG 7.26/90/83/40 at noon. She was placed on BiPAP, and ABG improved to 7.35/77/84/42 by 13:00. The patient's confusion and lethargy improved, and she was weaned to nasal cannula.    Per the patient's , she has had SOB and multiple episodes of syncope over the last few weeks.    22: Slept comfortably on BiPAP last night. She feels well this morning.    SMOKING HISTORY  Never smoker    EXPOSURE HISTORY  Never cooked with indoor woodburning stoves.    REVIEW OF SYSTEMS  GENERAL: Feeling well.  EYES: Vision is good.  ENT: No sinusitis or pharyngitis.   HEART: No chest pain or palpitations.  LUNGS: No cough, sputum, or wheezing.  GI: No nausea, vomiting, constipation, diarrhea, or reflux.  : No dysuria, hesitancy, or nocturia.  SKIN: No lesions or rashes.  MUSCULOSKELETAL: Back pain.  NEURO: No headaches or neuropathy.  LYMPH: No edema or adenopathy.  PSYCH: No anxiety or depression.  ENDO: No weight change.    ALLERGIES  Review of patient's allergies indicates:    Allergen Reactions    Metformin Diarrhea     Diarrhea      Corn Itching     Other reaction(s): Sneezing  Other reaction(s): Rhinorrhea    Potato starch Hives     Other reaction(s): Sneezing  Other reaction(s): Rhinorrhea    Pravastatin Other (See Comments)     Muscle pain    Statins-hmg-coa reductase inhibitors Other (See Comments)    Hydrochlorothiazide Other (See Comments)     weakness    Welchol [colesevelam] Other (See Comments)     Weakness        INPATIENT SCHEDULED MEDICATIONS   albuterol-ipratropium  3 mL Nebulization QID WAKE    arformoteroL  15 mcg Nebulization BID    aspirin  81 mg Oral Daily    budesonide  0.5 mg Nebulization BID    chlorhexidine  15 mL Mouth/Throat BID    clopidogreL  75 mg Oral Daily    enoxaparin  40 mg Subcutaneous Daily    insulin aspart U-100  7 Units Subcutaneous TIDWM    insulin detemir U-100  10 Units Subcutaneous QHS    levothyroxine  150 mcg Oral Before breakfast    metoprolol succinate  50 mg Oral Daily    montelukast  10 mg Oral QHS    mupirocin   Nasal BID    piperacillin-tazobactam (ZOSYN) IVPB  3.375 g Intravenous Q8H    sacubitriL-valsartan  1 tablet Oral BID      amiodarone in dextrose 5% Stopped (09/12/22 1046)    magnesium sulfate in water         MEDICAL AND SURGICAL HISTORY  Past Medical History:   Diagnosis Date    Abnormal EKG 9/26/2012    Allergy     Arthritis     Asthma     Brain bleed     Colon polyps 11/6/2020    COPD (chronic obstructive pulmonary disease)     Depression     Diabetes mellitus type II     Diverticulitis     Fatty liver     GERD (gastroesophageal reflux disease)     Goiter     s/p thyroidectomy    Heart murmur     Hemiparesis affecting right side as late effect of cerebrovascular accident (CVA) 7/26/2022    Hernia of abdominal wall     History of intracranial hemorrhage 6/15/2013    History of non-ST elevation myocardial infarction (NSTEMI) 6/15/2013    Hyperlipidemia     Hypertension     Lactic acidosis 1/11/2020    Metabolic syndrome  6/14/2012    Myocardial infarction     On home oxygen therapy     states uses 2L at night or when sat < 90    Pacemaker     Positive FIT (fecal immunochemical test) 11/6/2020    Severe persistent asthma without complication 4/30/2020    Statin intolerance     Stroke 2012    left hand shaky, loss of balance     Past Surgical History:   Procedure Laterality Date    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/25/2017    Dr Griggs    ADRENALECTOMY      AORTIC VALVE REPLACEMENT  12/09/2016    ARTERIOGRAPHY OF AORTIC ROOT N/A 9/12/2022    Procedure: ARTERIOGRAM, AORTIC ROOT;  Surgeon: Marko Batres MD;  Location: [facility] CATH/EP LAB;  Service: Cardiology;  Laterality: N/A;    arthroscopy lt knee Right     BLADDER REPAIR      sling    BREAST BIOPSY Bilateral     benign    COLONOSCOPY  2004    10 year recheck    COLONOSCOPY N/A 11/6/2020    Procedure: COLONOSCOPY;  Surgeon: Yakelin Lowery MD;  Location: [facility] ENDO;  Service: Endoscopy;  Laterality: N/A;    CORONARY ANGIOGRAPHY INCLUDING BYPASS GRAFTS WITH CATHETERIZATION OF LEFT HEART Left 9/12/2022    Procedure: Left heart cath including bypass graft, with left heart catheterization;  Surgeon: Marko Batres MD;  Location: [facility] CATH/EP LAB;  Service: Cardiology;  Laterality: Left;    CORONARY ARTERY BYPASS GRAFT  12/09/2016    X3    EPIDURAL STEROID INJECTION INTO LUMBAR SPINE N/A 7/27/2021    Procedure: Injection-steroid-epidural-lumbar;  Surgeon: Valerio Jay MD;  Location: [facility] OR;  Service: Pain Management;  Laterality: N/A;  L5-S1     HYSTERECTOMY      INSERTION OF PACEMAKER Left 1/13/2020    Procedure: INSERTION, PACEMAKER;  Surgeon: Marko Batres MD;  Location: [facility] CATH/EP LAB;  Service: Cardiology;  Laterality: Left;    OOPHORECTOMY      RIGHT HEART CATHETERIZATION Right 9/12/2022    Procedure: INSERTION, CATHETER, RIGHT HEART;  Surgeon: Marko Batres MD;  Location: [facility] CATH/EP LAB;  Service: Cardiology;  Laterality: Right;    THYROIDECTOMY         ALCOHOL, TOBACCO AND  DRUG USE  Social History     Tobacco Use   Smoking Status Never   Smokeless Tobacco Never     Social History     Substance and Sexual Activity   Alcohol Use Not Currently    Comment: seldom     Social History     Substance and Sexual Activity   Drug Use No       FAMILY HISTORY  Family History   Problem Relation Age of Onset    Arthritis Mother     Diabetes Mother     Heart disease Mother     Hypertension Mother     Hypertension Father     Heart disease Father     Mental illness Father     Asthma Sister     Diabetes Sister     Hypertension Sister     Asthma Son     COPD Son     Depression Son     Diabetes Son     Hypertension Son     Stroke Son     Diabetes Maternal Uncle     Heart disease Maternal Uncle     Mental illness Paternal Aunt     Cancer Paternal Aunt     Heart disease Paternal Aunt     Hypertension Paternal Aunt     Heart disease Paternal Uncle     Hypertension Maternal Grandmother     Mental illness Maternal Grandmother     Aneurysm Maternal Grandfather     Mental illness Paternal Grandmother     Heart disease Paternal Grandfather     Melanoma Neg Hx     Psoriasis Neg Hx     Lupus Neg Hx     Eczema Neg Hx        VITAL SIGNS (MOST RECENT)  Temp: 97.5 °F (36.4 °C) (09/13/22 0301)  Pulse: 83 (09/13/22 0802)  Resp: 18 (09/13/22 0802)  BP: 133/62 (09/13/22 0854)  SpO2: 95 % (09/13/22 0802)    INTAKE AND OUTPUT (LAST 24 HOURS):  Intake/Output Summary (Last 24 hours) at 9/13/2022 0926  Last data filed at 9/12/2022 1800  Gross per 24 hour   Intake 686.8 ml   Output 600 ml   Net 86.8 ml       WEIGHT  Wt Readings from Last 1 Encounters:   09/11/22 80.5 kg (177 lb 7.5 oz)       PHYSICAL EXAM  GENERAL: A&O. NAD.  HEENT: Extraocular movements intact. Pharynx moist.  NECK: Supple. No JVD or HJR.  HEART: Regular rate and normal rhythm. 3/6 systoilic murmur at LUSB radiating to carotids.  LUNGS: Clear to auscultation anteriorly. Lung excursion symmetrical.   ABDOMEN: Soft, non-tender, non-distended, no masses  palpated.  EXTREMITIES: Normal muscle tone and joint movement, no cyanosis or clubbing.   LYMPHATICS: No adenopathy palpated, no edema.  SKIN: Dry, intact, no lesions.   NEURO: No gross deficit. A&Ox3.  PSYCH: Appropriate affect    ACUTE PHASE REACTANT (LAST 24 HOURS)  No results for input(s): FERRITIN, CRP, LDH, DDIMER in the last 24 hours.    CBC LAST (LAST 24 HOURS)  Recent Labs   Lab 09/13/22  0451   WBC 12.28   RBC 4.05   HGB 12.4   HCT 38.5   MCV 95   MCH 30.6   MCHC 32.2   RDW 13.4      MPV 9.4   GRAN 78.8*  9.7*   LYMPH 9.3*  1.1   MONO 10.0  1.2*   BASO 0.01   NRBC 0       CHEMISTRY LAST (LAST 24 HOURS)  Recent Labs   Lab 09/12/22  1307 09/13/22  0451   NA  --  139   K  --  3.7   CL  --  94*   CO2  --  36*   ANIONGAP  --  9   BUN  --  44*   CREATININE  --  1.3   GLU  --  126*   CALCIUM  --  9.1   PH 7.350  --    MG  --  2.1       COAGULATION LAST (LAST 24 HOURS)  No results for input(s): LABPT, INR, APTT in the last 24 hours.    CARDIAC PROFILE (LAST 24 HOURS)  Recent Labs   Lab 09/10/22  0212 09/11/22  0343   BNP  --  1,383*   TROPONINI 0.034  --        LAST 7 DAYS MICROBIOLOGY   Microbiology Results (last 7 days)       Procedure Component Value Units Date/Time    Blood culture x two cultures. Draw prior to antibiotics. [[id number]] Collected: 09/09/22 1524    Order Status: Completed Specimen: Blood from Peripheral, Hand, Right Updated: 09/12/22 1632     Blood Culture, Routine No Growth to date      No Growth to date      No Growth to date      No Growth to date    Narrative:      Aerobic and anaerobic    Blood culture x two cultures. Draw prior to antibiotics. [[id number]] Collected: 09/09/22 1524    Order Status: Completed Specimen: Blood from Peripheral, Hand, Left Updated: 09/12/22 1632     Blood Culture, Routine No Growth to date      No Growth to date      No Growth to date      No Growth to date    Narrative:      Aerobic and anaerobic            MOST RECENT IMAGING  Cardiac  catheterization    The Origin of the graft to the right coronary artery lesion was 99%   stenosed with 0% stenosis post-intervention.    A STENT JOHNIE LEVAR 2.5 X 22 stent was successfully placed at 14 LOU for   14 sec. at the origin of the SVG to right coronary artery    The estimated blood loss was none.    Severe pulmonary hypertension with PA pressure is 80 over 30 pulmonary   capillary wedge about 12    Total occlusion of the mid LAD    Normal LIMA to the LAD    Normal circumflex    Presumed nonfunctioning graft to the OM, could not engage in did not   see on aortic root injection    Aortic root demonstrating 2+ aortic insufficiency of prosthetic aortic   valve.  I did not cross the valve due to the dye load of the intervention   of the SVG to the right.  Of note there is significant mitral annular   calcification    Will consider DELORIS to see the degree of MR and see if there is any other   reasons as to why patient has such severe pulmonary hypertension    Continue dual anti-platelet therapy minimum of 1 year    The procedure log was documented by Documenter: Sarah Tschume, RN and   verified by Marko Batres MD.    Date: 9/12/2022  Time: 10:14 AM      CURRENT VISIT EKG  Results for orders placed or performed during the hospital encounter of 09/09/22   EKG 12-lead    Narrative    Test Reason : R00.0,    Vent. Rate : 060 BPM     Atrial Rate : 060 BPM     P-R Int : 260 ms          QRS Dur : 164 ms      QT Int : 566 ms       P-R-T Axes : 093 072 077 degrees     QTc Int : 566 ms    AV dual-paced rhythm with prolonged AV conduction  Abnormal ECG  When compared with ECG of 10-SEP-2022 01:27,  Vent. rate has decreased BY   6 BPM  Confirmed by Mery LICEA, Mayo LEVINE ([id number]) on 9/11/2022 2:49:27 PM    Referred By: [email or web address]   SELF           Confirmed By:Mayo Ordonez MD       ECHOCARDIOGRAM RESULTS  Results for orders placed during the hospital encounter of 09/09/22    Echo    Interpretation Summary  · The left ventricle  is normal in size with normal systolic function.  · Grade II left ventricular diastolic dysfunction.  · The estimated PA systolic pressure is 75 mmHg.  · Normal right ventricular size with normal right ventricular systolic function.  · There is moderate to severe pulmonary hypertension.  · Normal central venous pressure (3 mmHg).  · Moderate left atrial enlargement.  · Moderate tricuspid regurgitation.  · Moderate-to-severe mitral regurgitation.  · The estimated ejection fraction is 60%.  · Mild-to-moderate aortic regurgitation.  · There is moderate aortic valve stenosis.  · Aortic valve area is 0.72 cm2; peak velocity is m/s; mean gradient is 20 mmHg.        VENTILATOR INFORMATION  Oxygen Concentration (%):  [0.45-35] 35       LAST ARTERIAL BLOOD GAS  ABG  Recent Labs   Lab 09/12/22  1307   PH 7.350   PO2 84   PCO2 76.5*   HCO3 42.2*   BE 17       IMPRESSION AND PLAN  Tereza Larose is a 71 y.o. female with a PMH of asthma/?COPD on 2L at home, CAD s/p CABG, AVR, htn, prior stroke, and DM2 currently hospitalized s/p Torsades de Pointes cardiac arrest on whom we have been consulted for hypoxic and hypercapnic respiratory failure.    #Acute on chronic hypercapnic, hypoxic respiratory failure  #Asthma/COPD with acute exacerbation  #Possible BRIJESH  #Possible obesity hypoventilation syndrome  #Possible community-acquired pneumonia  #Likely pulmonary hypertension, likely group 2 +/- 3 (due to left heart failure, pulmonary parenchymal disease)  #Encephalopathy 2/2 hypercapnia  Baseline HCO3 for the last couple of years is in the 30s, indicating chronic, compensated respiratory acidosis. Current CHF exacerbation has likely led to this acute decompensation. STOP-BANG score indicates high risk for BRIJESH.  - BiPAP while sleeping (ordered at 18/6)  - continue DuoNebs QID WA  - resume prednisone 40 mg daily for 3 more days  - titrate O2 to SpO2 88-92%  - continue budesonide and arformoterol  - complete 5 days total corticosteroid  therapy (9/9 - 9/14)  - complete 5 days of antibiotic therapy (consider narrowing to ceftriaxone/azithromycin)   - end date set to end of day today  - will need sleep study outpatient  - discharge with BiPAP nightly at home if possible    #Acute decompensated heart failure secondary to ischemic cardiomyopathy and valvulopathy  #CAD s/p stent to RCA (9/12/22)  #s/p Cardiac arrest  #QT prolongation  - defer to cardiology  - close hemodynamic monitoring, telemetry  - avoid QT prolonging meds    #Acute kidney injury  Likely due to decompensated heart failure.  - diurese to euvolemia  - daily serum chemistry    Asher Arteaga MD  [facility]  Department of Pulmonology  Date of Service: 09/13/2022  5:26 PM

## 2022-09-13 NOTE — PROGRESS NOTES
[facility]    Critical Care Cardiology Progress Note    Subjective:     Patient is resting comfortably at this time.  Her CO2  up dramatically yesterday evening.  She denies any chest pains.  Her pulmonary hypertension is quite severe.  She is scheduled for DELORIS today to see the degree of aortic insufficiency and mitral regurgitation as I was not able to cross the valve due to her dye load and the length of the procedure    Objective:  Vital Signs (Most Recent)  Temp: 97.5 °F (36.4 °C) (09/13/22 0301)  Pulse: 83 (09/13/22 0802)  Resp: 18 (09/13/22 0802)  BP: (!) 181/79 (09/13/22 0601)  SpO2: 95 % (09/13/22 0802)    Vital Signs Range (Last 24H):  Temp:  [97.5 °F (36.4 °C)-97.9 °F (36.6 °C)]   Pulse:  [60-85]   Resp:  [16-50]   BP: ()/()   SpO2:  [91 %-99 %]     I & O (Last 24H):    Intake/Output Summary (Last 24 hours) at 9/13/2022 0825  Last data filed at 9/12/2022 1800  Gross per 24 hour   Intake 686.8 ml   Output 600 ml   Net 86.8 ml       Current Diet:     Current Diet Order   Procedures    Diet renal [facility]; Cardiac (Low Na/Chol), Consistent Carbohydrate, Diabetic Diet; Standard Tray     Order Specific Question:   Indicate patient location for additional diet options:     Answer:   [facility]     Order Specific Question:   Additional Diet Options:     Answer:   Cardiac (Low Na/Chol)     Order Specific Question:   Additional Diet Options:     Answer:   Consistent Carbohydrate     Order Specific Question:   Additional Diet Options:     Answer:   Diabetic Diet     Order Specific Question:   Tray type:     Answer:   Standard Tray        Allergies:  Review of patient's allergies indicates:   Allergen Reactions    Metformin Diarrhea     Diarrhea      Corn Itching     Other reaction(s): Sneezing  Other reaction(s): Rhinorrhea    Potato starch Hives     Other reaction(s): Sneezing  Other reaction(s): Rhinorrhea    Pravastatin Other (See Comments)     Muscle pain    Statins-hmg-coa reductase inhibitors  Other (See Comments)    Hydrochlorothiazide Other (See Comments)     weakness    Welchol [colesevelam] Other (See Comments)     Weakness        Meds:  Scheduled Meds:   albuterol-ipratropium  3 mL Nebulization QID WAKE    arformoteroL  15 mcg Nebulization BID    aspirin  81 mg Oral Daily    budesonide  0.5 mg Nebulization BID    chlorhexidine  15 mL Mouth/Throat BID    clopidogreL  75 mg Oral Daily    enoxaparin  40 mg Subcutaneous Daily    insulin aspart U-100  7 Units Subcutaneous TIDWM    insulin detemir U-100  10 Units Subcutaneous QHS    levothyroxine  150 mcg Oral Before breakfast    metoprolol succinate  50 mg Oral Daily    montelukast  10 mg Oral QHS    mupirocin   Nasal BID    piperacillin-tazobactam (ZOSYN) IVPB  3.375 g Intravenous Q8H     Continuous Infusions:   amiodarone in dextrose 5% Stopped (09/12/22 1046)    magnesium sulfate in water       PRN Meds:acetaminophen, albuterol-ipratropium, calcium chloride IVPB, calcium chloride IVPB, calcium chloride IVPB, dextrose 50%, dextrose 50%, diphenhydrAMINE, glucagon (human recombinant), glucose, glucose, hydrALAZINE, hydrALAZINE, HYDROcodone-acetaminophen, insulin aspart U-100, magnesium oxide, magnesium sulfate IVPB, magnesium sulfate IVPB, magnesium sulfate IVPB, magnesium sulfate IVPB, melatonin, morphine, naloxone, polyethylene glycol, potassium chloride in water, potassium chloride in water, potassium chloride in water, potassium chloride in water, potassium chloride, potassium chloride, potassium chloride, potassium chloride, senna-docusate 8.6-50 mg, simethicone, sodium chloride 0.9%, sodium phosphate IVPB, sodium phosphate IVPB, sodium phosphate IVPB, sodium phosphate IVPB, sodium phosphate IVPB    Oxygen/Ventilator Data (Last 24H):  (if applicable)   Oxygen Concentration (%):  [0.45-35] 35    Hemodynamic Parameters (Last 24H):   (if applicable)        Lines/Drains:  (if applicable)       Peripheral IV - Single Lumen 09/09/22 1430 20 G Right  Antecubital (Active)   Site Assessment Clean;Dry;Intact;No swelling;No redness 09/13/22 0701   Extremity Assessment Distal to IV No abnormal discoloration;No redness;No swelling;No warmth 09/13/22 0701   Line Status Infusing 09/13/22 0701   Dressing Status Clean;Intact;Dry 09/13/22 0701   Dressing Intervention Integrity maintained 09/13/22 0701   Dressing Change Due 09/13/22 09/13/22 0701   Site Change Due 09/13/22 09/13/22 0701   Number of days: 3            Peripheral IV - Single Lumen 09/11/22 2200 22 G Left;Posterior;Proximal Forearm (Active)   Site Assessment Clean;Dry;Intact;No redness;No swelling 09/13/22 0701   Extremity Assessment Distal to IV No abnormal discoloration;No redness;No swelling;No warmth 09/13/22 0701   Line Status Saline locked 09/13/22 0701   Dressing Status Clean;Dry;Intact 09/13/22 0701   Dressing Intervention Integrity maintained 09/13/22 0701   Dressing Change Due 09/15/22 09/13/22 0701   Site Change Due 09/15/22 09/13/22 0701   Number of days: 1       Female External Urinary Catheter 09/10/22 1200 (Active)   Skin no breakdown 09/13/22 0701   Tolerance no signs/symptoms of discomfort 09/13/22 0701   Suction Continuous suction at 40 mmHg 09/13/22 0701   Date of last wick change 09/12/22 09/13/22 0701   Time of last wick change 0600 09/13/22 0701   Number of days: 2       Lab Results :  Recent Results (from the past 24 hour(s))   ISTAT PROCEDURE    Collection Time: 09/12/22 11:54 AM   Result Value Ref Range    POC PH 7.258 (LL) 7.35 - 7.45    POC PCO2 89.8 (HH) 35 - 45 mmHg    POC PO2 83 80 - 100 mmHg    POC HCO3 40.1 (H) 24 - 28 mmol/L    POC BE 13 -2 to 2 mmol/L    POC SATURATED O2 93 (L) 95 - 100 %    POC Glucose 156 (H) 70 - 110 mg/dL    POC Sodium 136 136 - 145 mmol/L    POC Potassium 3.7 3.5 - 5.1 mmol/L    POC TCO2 43 (H) 23 - 27 mmol/L    POC Ionized Calcium 1.21 1.06 - 1.42 mmol/L    POC Hematocrit 41 36 - 54 %PCV    Sample ARTERIAL     Site RR     Allens Test Pass     DelSys  Nasal Can     Mode SPONT     Flow 5    ISTAT PROCEDURE    Collection Time: 09/12/22  1:07 PM   Result Value Ref Range    POC PH 7.350 7.35 - 7.45    POC PCO2 76.5 (HH) 35 - 45 mmHg    POC PO2 84 80 - 100 mmHg    POC HCO3 42.2 (H) 24 - 28 mmol/L    POC BE 17 -2 to 2 mmol/L    POC SATURATED O2 95 95 - 100 %    POC TCO2 45 (H) 23 - 27 mmol/L    Rate 22     Sample ARTERIAL     Site RR     Allens Test N/A     DelSys CPAP/BiPAP     Mode BiPAP     FiO2 45     IP 18     EP 6    POCT glucose    Collection Time: 09/12/22  5:31 PM   Result Value Ref Range    POC Glucose 115 (H) 70 - 110   POCT glucose    Collection Time: 09/12/22  8:41 PM   Result Value Ref Range    POC Glucose 146 (H) 70 - 110   Basic Metabolic Panel (BMP)    Collection Time: 09/13/22  4:51 AM   Result Value Ref Range    Sodium 139 136 - 145 mmol/L    Potassium 3.7 3.5 - 5.1 mmol/L    Chloride 94 (L) 95 - 110 mmol/L    CO2 36 (H) 23 - 29 mmol/L    Glucose 126 (H) 70 - 110 mg/dL    BUN 44 (H) 8 - 23 mg/dL    Creatinine 1.3 0.5 - 1.4 mg/dL    Calcium 9.1 8.7 - 10.5 mg/dL    Anion Gap 9 8 - 16 mmol/L    eGFR 44.0 (A) >60 mL/min/1.73 m^2   Magnesium    Collection Time: 09/13/22  4:51 AM   Result Value Ref Range    Magnesium 2.1 1.6 - 2.6 mg/dL   CBC with Automated Differential    Collection Time: 09/13/22  4:51 AM   Result Value Ref Range    WBC 12.28 3.90 - 12.70 K/uL    RBC 4.05 4.00 - 5.40 M/uL    Hemoglobin 12.4 12.0 - 16.0 g/dL    Hematocrit 38.5 37.0 - 48.5 %    MCV 95 82 - 98 fL    MCH 30.6 27.0 - 31.0 pg    MCHC 32.2 32.0 - 36.0 g/dL    RDW 13.4 11.5 - 14.5 %    Platelets 305 150 - 450 K/uL    MPV 9.4 9.2 - 12.9 fL    Immature Granulocytes 0.5 0.0 - 0.5 %    Gran # (ANC) 9.7 (H) 1.8 - 7.7 K/uL    Immature Grans (Abs) 0.06 (H) 0.00 - 0.04 K/uL    Lymph # 1.1 1.0 - 4.8 K/uL    Mono # 1.2 (H) 0.3 - 1.0 K/uL    Eos # 0.2 0.0 - 0.5 K/uL    Baso # 0.01 0.00 - 0.20 K/uL    nRBC 0 0 /100 WBC    Gran % 78.8 (H) 38.0 - 73.0 %    Lymph % 9.3 (L) 18.0 - 48.0 %     "Mono % 10.0 4.0 - 15.0 %    Eosinophil % 1.3 0.0 - 8.0 %    Basophil % 0.1 0.0 - 1.9 %    Differential Method Automated        Diagnostic Results:  Imaging Results              X-Ray Chest AP Portable (Final result)  Result time 09/09/22 14:16:56      Final result by Ernesto Gerber MD (09/09/22 14:16:56)                   Narrative:    Reason: CHF    FINDINGS:    Portable chest with comparison chest x-ray January 29, 2022 show mildly enlarged cardiac mediastinal silhouette. Median sternotomy wires noted. Left dual lead cardiac pacemaker noted.  Left basilar linear opacities are noted, felt to reflect subsegmental atelectasis or scarring. Right lung is clear. There is prominence of the central hilar vascular structures. No acute osseous abnormality.    IMPRESSION:    1.  Enlarged cardiomediastinal silhouette with prominence of central hilar vascular structures may reflect pulmonary vascular congestion and CHF.  2.  Left basilar subsegmental atelectasis or scarring.    Electronically signed by:  Ernesto Gerber DO  9/9/2022 2:16 PM CDT Workstation: [id number]                                    12-lead EKG interpretation:   (if applicable)    Recent Cardiac Rhythm:  (if applicable)      Physical Exam:  Objective:  General Appearance:  In no acute distress.    Vital signs: (most recent): Blood pressure (!) 181/79, pulse 83, temperature 97.5 °F (36.4 °C), temperature source Oral, resp. rate 18, height 5' 2" (1.575 m), weight 80.5 kg (177 lb 7.5 oz), SpO2 95 %.    Lungs:  There are decreased breath sounds.    Heart: Normal rate.  Regular rhythm.  S1 normal and S2 normal.  Positive for murmur.    Abdomen: Abdomen is soft.  Bowel sounds are normal.       Current Consults:  IP CONSULT TO HOSPITALIST  IP CONSULT TO CARDIOLOGY  IP CONSULT TO NEPHROLOGY  IP CONSULT TO PULMONOLOGY    Assessment/Plan:  Assessment:   Cardiac Arrest - ACLS initiated and ROSC was achieved fairly quickly, not intubated.   Ventricular " Tachycardia/Torsades - given IV Mg, Magnesium is 2.1 today. K 3.7.   Acute on chronic CHF exacerbation - on bumex IV, BNP 1500, CXR showed evidence of pulmonary edema. Echo EF 60%, RVSP 75 mmHg, previously 20 mmHg last year.  CAD s/p CABG 3v 2016-status post intervention of SVG to right coronary artery  Chronic Respiratory Failure  COPD-severe pulmonary hypertension  Asthma  Aortic Valve Replacement 2016 - Echo showed moderate aortic stenosis, MINGO 0.71 cm, mean gradient 20 mmHg, mod TR, mod to severe MR, mild to mod AR.   Hypertension  Diabetes Mellitus Type 2   Leukocytosis- WBC 14, patient is on prednisone     Plan:    For DELORIS today, may consider meds for pulmonary hypertension., creatinine is much better than yesterday  Will maximize blood pressure medications

## 2022-09-13 NOTE — ANESTHESIA POSTPROCEDURE EVALUATION
Anesthesia Post Evaluation    Patient: Tereza Larose    Procedure(s) Performed: * No procedures listed *    Final Anesthesia Type: MAC      Patient location during evaluation: GI PACU  Patient participation: Yes- Able to Participate  Level of consciousness: confused and awake  Post-procedure vital signs: reviewed and stable  Pain management: adequate  Airway patency: patent    PONV status at discharge: No PONV  Anesthetic complications: no      Cardiovascular status: blood pressure returned to baseline and hemodynamically stable  Respiratory status: unassisted, spontaneous ventilation and face mask  Hydration status: euvolemic  Follow-up not needed.          Vitals Value Taken Time   /47 09/13/22 1435   Temp 36.7 °C (98.1 °F) 09/13/22 1431   Pulse 81 09/13/22 1435   Resp 18 09/13/22 1435   SpO2 97 % 09/13/22 1435         No case tracking events are documented in the log.      Pain/Davi Score: Pain Rating Prior to Med Admin: 10 (9/13/2022  6:06 AM)  Pain Rating Post Med Admin: 0 (9/13/2022  3:01 AM)  Davi Score: 8 (9/13/2022  7:01 AM)

## 2022-09-13 NOTE — PROGRESS NOTES
Nephrology Progress Note        Patient Name: Tereza Larose  MRN: [id number]    Patient Class: IP- Inpatient   Admission Date: 9/9/2022  Length of Stay: 2 days  Date of Service: 9/13/2022    Attending Physician: Daron Bolton MD  Primary Care Provider: Evan Sánchez MD    Reason for Consult: sari    SUBJECTIVE:     HPI: 71F with CABG in 2016, s/p PM, s/p AVR, COPD, asthma, chronic respiratory failure on 2L NC, HTN, DM2 presented with progressive shortness of breath for 2-3 weeks and recurrent syncope. She saw her pulmonologist last week for asthma exacerbation and her medications were adjusted. She also endorsed 2-3 syncopal episodes for last week. ED workup showed BNP 1,500, CXR showed evidence of pulmonary edema. Troponins negative. EKG AV paced with Qtc 577.She was admitted to the hospital for acute on chronic CHF exacerbation. She began to have episodes of Vtach and developed cardiac arrest with torsades. ACLs was started, she received chest compressions and epinephrine and ROSC. She was not intubated. There was concern for stroke but CTA head was negative. Echo showed EF 60%, RVSP 75 mmHg which is significantly elevated from last year, moderate TR, mod to severe MR and mild to moderate AR, MINGO is 0.71 cm with mean gradient of 20 mmHg. She has not had an angiogram since bypass in 2016.     Today she is complaining of some chest discomfort. VSS. No further Vtach documented. sCr has increased from 1 to 1.5, Mg 2.1, K 3.7. H&H stable. WBC 14, no fevers and BCx are NGTD.     9/12- went for C- had stent placed in RCA- going to do 12 hours of IVFs, some spikes in BP, on BIPAP, UOP 950cc    9/13- saw Dr. Batres's noted- severe pulm HTN, scheduled for DELORIS today to assess degree of AR and MR, VSS, on 5L NC/BIPAP, UOP 600cc + voids- started on entresto    Outpatient meds:  No current facility-administered medications on file prior to encounter.     Current Outpatient Medications on File Prior to Encounter    Medication Sig Dispense Refill    albuterol (PROVENTIL/VENTOLIN HFA) 90 mcg/actuation inhaler Inhale 1-2 puffs into the lungs every 4 (four) hours as needed for Wheezing. Inhale 2 puffs by mouth into lungs every 4 hours as needed 108 g 3    evolocumab (REPATHA SYRINGE) 140 mg/mL Syrg Inject 140 mg into the skin every 14 (fourteen) days.       glyBURIDE (DIABETA) 2.5 MG tablet Take 2.5 mg by mouth once daily.      levothyroxine (SYNTHROID) 150 MCG tablet Take 150 mcg by mouth once daily.      montelukast (SINGULAIR) 10 mg tablet Take 1 tablet (10 mg total) by mouth every evening. 30 tablet 11    potassium chloride (MICRO-K) 8 mEq CpSR Take 1 capsule (8 mEq total) by mouth once daily. 90 capsule 1    predniSONE (DELTASONE) 20 MG tablet Take one daily for 7 days and may repeat for shortness of breath. 21 tablet 1    revefenacin (YUPELRI) 175 mcg/3 mL Nebu Inhale 1 ampule into the lungs once daily at 6am. 30 each 11    sotaloL (BETAPACE) 80 MG tablet Take 80 mg by mouth 2 (two) times daily.      albuterol (PROVENTIL) 2.5 mg /3 mL (0.083 %) nebulizer solution Take 3 mLs (2.5 mg total) by nebulization every 4 (four) hours as needed for Shortness of Breath or Wheezing. 30 each 11    amLODIPine (NORVASC) 10 MG tablet TAKE 1 TABLET BY MOUTH EVERY DAY FOR SYSTOLIC BLOOD PRESSURE GREATER THAN 120 90 tablet 3    arformoteroL (BROVANA) 15 mcg/2 mL Nebu Take 2 mLs (15 mcg total) by nebulization 2 (two) times a day. Controller 60 each 11    aspirin (ECOTRIN) 81 MG EC tablet Take 81 mg by mouth once daily.      bempedoic acid (NEXLETOL) 180 mg Tab Take 1 tablet by mouth once daily.      budesonide (PULMICORT) 0.5 mg/2 mL nebulizer solution Take 2 mLs (0.5 mg total) by nebulization 2 (two) times daily. Controller 120 mL 11    cholecalciferol, vitamin D3, (VITAMIN D3) 25 mcg (1,000 unit) capsule Take 1,000 Units by mouth once daily.      cyanocobalamin (VITAMIN B-12) 1000 MCG tablet Take 100 mcg by mouth once daily.       dimenhyDRINATE (DRAMAMINE) 50 MG tablet Take 50 mg by mouth nightly as needed.      JANUVIA 100 mg Tab TAKE 1 TABLET(100 MG) BY MOUTH EVERY DAY 90 tablet 1    lancets Misc True track Check glucose once daily 100 each 3    losartan (COZAAR) 25 MG tablet Take 25 mg by mouth once daily.      metoprolol succinate (TOPROL-XL) 50 MG 24 hr tablet Take 1 tablet (50 mg total) by mouth once daily. 30 tablet 11    RESTORIL 15 mg Cap Take 15 mg by mouth nightly as needed.      theophylline 400 mg Tb24 Take 1 tablet (400 mg total) by mouth 2 (two) times daily. 60 tablet 5    torsemide (DEMADEX) 20 MG Tab Take 1 tablet (20 mg total) by mouth once daily. 30 tablet 5    vitamin E 400 UNIT capsule Take 400 Units by mouth once daily.         Scheduled meds:   albuterol-ipratropium  3 mL Nebulization QID WAKE    arformoteroL  15 mcg Nebulization BID    aspirin  81 mg Oral Daily    budesonide  0.5 mg Nebulization BID    chlorhexidine  15 mL Mouth/Throat BID    clopidogreL  75 mg Oral Daily    enoxaparin  40 mg Subcutaneous Daily    insulin aspart U-100  7 Units Subcutaneous TIDWM    insulin detemir U-100  10 Units Subcutaneous QHS    levothyroxine  150 mcg Oral Before breakfast    metoprolol succinate  50 mg Oral Daily    montelukast  10 mg Oral QHS    mupirocin   Nasal BID    piperacillin-tazobactam (ZOSYN) IVPB  3.375 g Intravenous Q8H    predniSONE  40 mg Oral Daily    sacubitriL-valsartan  1 tablet Oral BID       Infusions:   amiodarone in dextrose 5% Stopped (09/12/22 1046)    magnesium sulfate in water         PRN meds:  acetaminophen, albuterol-ipratropium, calcium chloride IVPB, calcium chloride IVPB, calcium chloride IVPB, dextrose 50%, dextrose 50%, diphenhydrAMINE, glucagon (human recombinant), glucose, glucose, hydrALAZINE, hydrALAZINE, HYDROcodone-acetaminophen, insulin aspart U-100, magnesium oxide, magnesium sulfate IVPB, magnesium sulfate IVPB, magnesium sulfate IVPB, magnesium sulfate IVPB, melatonin, morphine,  naloxone, polyethylene glycol, potassium chloride in water, potassium chloride in water, potassium chloride in water, potassium chloride in water, potassium chloride, potassium chloride, potassium chloride, potassium chloride, senna-docusate 8.6-50 mg, simethicone, sodium chloride 0.9%, sodium phosphate IVPB, sodium phosphate IVPB, sodium phosphate IVPB, sodium phosphate IVPB, sodium phosphate IVPB    Review of Systems:  Sedated post procedure    OBJECTIVE:     Vital Signs and IO (Last 24H):  Temp:  [97.5 °F (36.4 °C)-97.9 °F (36.6 °C)]   Pulse:  [60-85]   Resp:  [15-50]   BP: ()/(51-84)   SpO2:  [89 %-99 %]   I/O last 3 completed shifts:  In: 956.8 [P.O.:390; I.V.:466.8; IV Piggyback:100]  Out: 1150 [Urine:1150]    Wt Readings from Last 5 Encounters:   09/11/22 80.5 kg (177 lb 7.5 oz)   09/10/22 78.5 kg (173 lb)   09/07/22 78.6 kg (173 lb 4.5 oz)   07/26/22 79.6 kg (175 lb 7.8 oz)   07/21/21 83.9 kg (185 lb)     Physical Exam:  Constitutional: nad, aao x 3, ill  Heart: rrr, no m/r/g, wwp, no edema  Lungs: ctab, no w/r/r/c, on BIPAP  Abdomen: s/nt/nd, +BS    Body mass index is 32.46 kg/m².    Laboratory:  Recent Labs   Lab 09/11/22 0343 09/12/22 0425 09/13/22  0451    138 139   K 3.7 4.0 3.7   CL 89* 89* 94*   CO2 38* 37* 36*   BUN 38* 46* 44*   CREATININE 1.5* 1.7* 1.3   * 135* 126*         Recent Labs   Lab 09/09/22  1415 09/10/22  0036 09/10/22  0212 09/10/22  0651 09/10/22  2359 09/11/22  0343 09/12/22  0425 09/13/22  0451   CALCIUM 8.3*  --  8.9   < > 9.4 9.6 9.0 9.1   ALBUMIN 3.8  --   --   --  3.7  --   --   --    PHOS  --   --  2.7  --  4.9*  --   --   --    MG  --    < > 1.9   < > 2.0 2.1 2.1 2.1    < > = values in this interval not displayed.               No results for input(s): POCTGLUCOSE in the last 168 hours.    Recent Labs   Lab 07/05/21  0000 11/17/21  0000 09/10/22  0036   Hemoglobin A1C 7.3 A 7.0 5.8         Recent Labs   Lab 09/11/22  0343 09/12/22  0425 09/12/22  1154  09/13/22  0451   WBC 14.11* 14.17*  --  12.28   HGB 12.0 13.5  --  12.4   HCT 37.2 43.0 41 38.5    423  --  305   MCV 97 97  --  95   MCHC 32.3 31.4*  --  32.2   MONO 9.9  1.4* 10.9  1.5*  --  10.0  1.2*         Recent Labs   Lab 09/09/22  1415 09/10/22  2359   BILITOT 0.8 0.8   PROT 7.2 7.1   ALBUMIN 3.8 3.7   ALKPHOS 75 54*   ALT 30 43   AST 44* 42*         Recent Labs   Lab 01/10/20  1735 09/09/22  1524   Color, UA Yellow Yellow   Appearance, UA Hazy A Clear   pH, UA 5.0 7.0   Specific Gravity, UA 1.020 1.015   Protein, UA Trace A Negative   Glucose, UA Negative 4+ A   Ketones, UA Trace A Negative   Urobilinogen, UA 2.0-3.0 A Negative   Bilirubin (UA) Negative Negative   Occult Blood UA Negative Negative   Nitrite, UA Negative Negative   RBC, UA  --  1   WBC, UA  --  1   Bacteria  --  Negative   Hyaline Casts, UA  --  0         Recent Labs   Lab 09/10/22  0134 09/12/22  1154 09/12/22  1307   POC PH 7.457 H 7.258 LL 7.350   POC PCO2 59.0 HH 89.8 HH 76.5 HH   POC HCO3 41.7 H 40.1 H 42.2 H   POC PO2 110 H 83 84   POC SATURATED O2 98 93 L 95   POC BE 18 13 17   Sample ARTERIAL ARTERIAL ARTERIAL         Microbiology Results (last 7 days)       Procedure Component Value Units Date/Time    Blood culture x two cultures. Draw prior to antibiotics. [[id number]] Collected: 09/09/22 1524    Order Status: Completed Specimen: Blood from Peripheral, Hand, Right Updated: 09/12/22 1632     Blood Culture, Routine No Growth to date      No Growth to date      No Growth to date      No Growth to date    Narrative:      Aerobic and anaerobic    Blood culture x two cultures. Draw prior to antibiotics. [[id number]] Collected: 09/09/22 1524    Order Status: Completed Specimen: Blood from Peripheral, Hand, Left Updated: 09/12/22 1632     Blood Culture, Routine No Growth to date      No Growth to date      No Growth to date      No Growth to date    Narrative:      Aerobic and anaerobic          ASSESSMENT/PLAN:     VERONA likely due  to hemodynamic instability; CKD III  VT with torsades and hospital cardiac arrest 9/10 s/p successful CPR, s/p PCI of SVG to RCA 9/12  HTN/CHF  COPD with CO2 retention and metabolic compensation; severe pulm HTN  SHPT  Anemia of CKD  HypoK/HypoMg    - SCr is improved- no evidence of SHARON- off NS IVFs- no nsaids- dose meds for CrCl 30-60  - trend BP/volume status- norvasc, losartan and torsemide are on hold- now on entresto  - trend CO2  - no active BMM issues  - no active hematologic issues  - replete K, Mg PRN    Thank you for allowing us to participate in the care of your patient!   We will follow the patient and provide recommendations as needed.    Patient care time was spent personally by me on the following activities: > 35 min  Obtaining a history.  Examination of patient.  Providing medical care at the patients bedside.  Developing a treatment plan with patient or surrogate and bedside caregivers.  Ordering and reviewing laboratory studies, radiographic studies, pulse oximetry.  Ordering and performing treatments and interventions.  Evaluation of patient's response to treatment.  Discussions with consultants while on the unit and immediately available to the patient.  Re-evaluation of the patient's condition.  Documentation in the medical record.     Yessenia Narayan MD    [facility]  [address] [place] [address], [place] [address]    [phone] - tel  [phone] - fax    9/13/2022

## 2022-09-13 NOTE — ANESTHESIA PREPROCEDURE EVALUATION
09/13/2022  Tereza Larose is a 71 y.o., female.      Patient Active Problem List   Diagnosis    Asthma-COPD overlap syndrome    OA (osteoarthritis)    Fatty liver    Multiple allergies    Hyperuricemia, 3/19/2011    Hyperlipidemia associated with type 2 diabetes mellitus    Metabolic syndrome    Scoliosis    Abdominal obesity    BRIJESH (obstructive sleep apnea)    Arthritis of knee    Abnormality of gait    CAD (coronary artery disease)    History of intracranial hemorrhage    Hypertension associated with diabetes    History of non-ST elevation myocardial infarction (NSTEMI)    Type 2 diabetes mellitus with circulatory disorder, without long-term current use of insulin    Adrenal Cushing's syndrome    Statin intolerance    Hypoxemic respiratory failure, chronic    H/O prosthetic aortic valve replacement    Postoperative hypothyroidism    Primary insomnia    Major depressive disorder with single episode, in full remission    Anticoagulant long-term use    Complete heart block    Right-sided low back pain without sciatica    Severe persistent asthma without complication    Colon polyps    Lumbar radiculitis    Congestive heart failure, unspecified HF chronicity, unspecified heart failure type    Eosinophilic asthma    Pulmonary eosinophilia    Abdominal aortic atherosclerosis    Carotid atherosclerosis    BMI 32.0-32.9,adult    Acute on chronic combined systolic and diastolic CHF (congestive heart failure)       Past Surgical History:   Procedure Laterality Date    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/25/2017    Dr Griggs    ADRENALECTOMY      AORTIC VALVE REPLACEMENT  12/09/2016    ARTERIOGRAPHY OF AORTIC ROOT N/A 9/12/2022    Procedure: ARTERIOGRAM, AORTIC ROOT;  Surgeon: Marko Batres MD;  Location: [facility] CATH/EP LAB;  Service: Cardiology;  Laterality: N/A;    arthroscopy lt  knee Right     BLADDER REPAIR      sling    BREAST BIOPSY Bilateral     benign    COLONOSCOPY  2004    10 year recheck    COLONOSCOPY N/A 11/6/2020    Procedure: COLONOSCOPY;  Surgeon: Yakelin Lowery MD;  Location: [facility] ENDO;  Service: Endoscopy;  Laterality: N/A;    CORONARY ANGIOGRAPHY INCLUDING BYPASS GRAFTS WITH CATHETERIZATION OF LEFT HEART Left 9/12/2022    Procedure: Left heart cath including bypass graft, with left heart catheterization;  Surgeon: Marko Batres MD;  Location: [facility] CATH/EP LAB;  Service: Cardiology;  Laterality: Left;    CORONARY ARTERY BYPASS GRAFT  12/09/2016    X3    EPIDURAL STEROID INJECTION INTO LUMBAR SPINE N/A 7/27/2021    Procedure: Injection-steroid-epidural-lumbar;  Surgeon: Valerio Jay MD;  Location: [facility] OR;  Service: Pain Management;  Laterality: N/A;  L5-S1     HYSTERECTOMY      INSERTION OF PACEMAKER Left 1/13/2020    Procedure: INSERTION, PACEMAKER;  Surgeon: Marko Batres MD;  Location: [facility] CATH/EP LAB;  Service: Cardiology;  Laterality: Left;    OOPHORECTOMY      RIGHT HEART CATHETERIZATION Right 9/12/2022    Procedure: INSERTION, CATHETER, RIGHT HEART;  Surgeon: Marko Batres MD;  Location: [facility] CATH/EP LAB;  Service: Cardiology;  Laterality: Right;    THYROIDECTOMY          Tobacco Use:  The patient  reports that she has never smoked. She has never used smokeless tobacco.     Results for orders placed or performed during the hospital encounter of 09/09/22   EKG 12-lead    Collection Time: 09/10/22 11:53 PM    Narrative    Test Reason : R00.0,    Vent. Rate : 060 BPM     Atrial Rate : 060 BPM     P-R Int : 260 ms          QRS Dur : 164 ms      QT Int : 566 ms       P-R-T Axes : 093 072 077 degrees     QTc Int : 566 ms    AV dual-paced rhythm with prolonged AV conduction  Abnormal ECG  When compared with ECG of 10-SEP-2022 01:27,  Vent. rate has decreased BY   6 BPM  Confirmed by Mery LICEA, Mayo LEVINE ([id number]) on 9/11/2022 2:49:27 PM    Referred By:  [email or web address]   SELF           Confirmed By:Mayo Ordonez MD        Imaging Results          X-Ray Chest AP Portable (Final result)  Result time 09/09/22 14:16:56    Final result by Ernesto Gerber MD (09/09/22 14:16:56)                 Narrative:    Reason: CHF    FINDINGS:    Portable chest with comparison chest x-ray January 29, 2022 show mildly enlarged cardiac mediastinal silhouette. Median sternotomy wires noted. Left dual lead cardiac pacemaker noted.  Left basilar linear opacities are noted, felt to reflect subsegmental atelectasis or scarring. Right lung is clear. There is prominence of the central hilar vascular structures. No acute osseous abnormality.    IMPRESSION:    1.  Enlarged cardiomediastinal silhouette with prominence of central hilar vascular structures may reflect pulmonary vascular congestion and CHF.  2.  Left basilar subsegmental atelectasis or scarring.    Electronically signed by:  Ernesto Gerber DO  9/9/2022 2:16 PM CDT Workstation: [id number]                               Lab Results   Component Value Date    WBC 12.28 09/13/2022    HGB 12.4 09/13/2022    HCT 38.5 09/13/2022    MCV 95 09/13/2022     09/13/2022     BMP  Lab Results   Component Value Date     09/13/2022    K 3.7 09/13/2022    CL 94 (L) 09/13/2022    CO2 36 (H) 09/13/2022    BUN 44 (H) 09/13/2022    CREATININE 1.3 09/13/2022    CALCIUM 9.1 09/13/2022    ANIONGAP 9 09/13/2022     (H) 09/13/2022     (H) 09/12/2022     (H) 09/11/2022       Results for orders placed during the hospital encounter of 09/09/22    Echo    Interpretation Summary  · The left ventricle is normal in size with normal systolic function.  · Grade II left ventricular diastolic dysfunction.  · The estimated PA systolic pressure is 75 mmHg.  · Normal right ventricular size with normal right ventricular systolic function.  · There is moderate to severe pulmonary hypertension.  · Normal central venous pressure (3 mmHg).  ·  Moderate left atrial enlargement.  · Moderate tricuspid regurgitation.  · Moderate-to-severe mitral regurgitation.  · The estimated ejection fraction is 60%.  · Mild-to-moderate aortic regurgitation.  · There is moderate aortic valve stenosis.  · Aortic valve area is 0.72 cm2; peak velocity is m/s; mean gradient is 20 mmHg.        Pre-op Assessment    I have reviewed the Patient Summary Reports.     I have reviewed the Nursing Notes. I have reviewed the NPO Status.   I have reviewed the Medications.     Review of Systems  Anesthesia Hx:  No problems with previous Anesthesia  History of prior surgery of interest to airway management or planning: heart surgery. Denies Family Hx of Anesthesia complications.   Denies Personal Hx of Anesthesia complications.   Social:  No Alcohol Use, Non-Smoker    Cardiovascular:   Pacemaker Hypertension, well controlled Valvular problems/Murmurs, MR, AS Past MI CAD asymptomatic CABG/stent Dysrhythmias  CHF PVD hyperlipidemia ECG has been reviewed. Congestive heart failure, unspecified HF chronicity, unspecified    Pulmonary:   COPD Asthma severe Sleep Apnea On home oxygen therapy   Education provided regarding risk of obstructive sleep apnea     Hepatic/GI:   GERD, well controlled Liver Disease,    Musculoskeletal:   Arthritis   Spine Disorders:    Neurological:   CVA, residual symptoms Neuromuscular Disease, Hemiparesis affecting right side as late effect of cerebrovascular accident (CVA   Endocrine:   Diabetes, poorly controlled, type 2, using insulin Hypothyroidism Adrenal Cushing's syndrome   Psych:   Psychiatric History depression          Physical Exam  General: Cooperative, Confusion and Alert    Airway:  Mallampati: III / II  Mouth Opening: Normal  TM Distance: Normal  Tongue: Large  Neck ROM: Normal ROM    Dental:  Periodontal disease    Chest/Lungs:  Normal Respiratory Rate    Heart:  Rate: Normal  Rhythm: Regular Rhythm        Anesthesia Plan  Type of Anesthesia, risks &  benefits discussed:    Anesthesia Type: MAC  Intra-op Monitoring Plan: Standard ASA Monitors  Induction:  IV  Informed Consent: Informed consent signed with the Patient and all parties understand the risks and agree with anesthesia plan.  All questions answered.   ASA Score: 4  Anesthesia Plan Notes:     MAC with Propofol  POM Mask    Ready For Surgery From Anesthesia Perspective.     .

## 2022-09-13 NOTE — PLAN OF CARE
She has been on and off the bipap today. Tolerating it well. Remains confused about some things and oriented about others. External purewick inplace draining clear yellow urine. Held NPO for DELORIS all day and will release her diet for dinner. DELORIS done by Dr. Fair with Dr. East and crna at bedside. Post procedure she was place back on the BIPAP and is resting quietly.

## 2022-09-13 NOTE — TRANSFER OF CARE
"Anesthesia Transfer of Care Note    Patient: Tereza Larose    Procedure(s) Performed: * No procedures listed *    Patient location: ICU    Anesthesia Type: MAC    Transport from OR: Transported from OR on room air with adequate spontaneous ventilation    Post pain: adequate analgesia    Post assessment: no apparent anesthetic complications and tolerated procedure well    Post vital signs: stable    Level of consciousness: awake and alert    Nausea/Vomiting: no nausea/vomiting    Complications: none    Transfer of care protocol was followed      Last vitals:   Visit Vitals  BP (!) 202/90   Pulse 86   Temp 36.7 °C (98.1 °F) (Oral)   Resp (!) 30   Ht 5' 2" (1.575 m)   Wt 80.5 kg (177 lb 7.5 oz)   SpO2 (!) 91%   BMI 32.46 kg/m²     "

## 2022-09-13 NOTE — PROGRESS NOTES
"[facility]  Adult Nutrition   Progress Note (Initial Assessment)     SUMMARY     Recommendations  Continue current nutritional plan of care.    Goals:   Pt to meet 75 to 100% of her EEN and EPN.    Nutrition Goal Status: goal met    Dietitian Rounds Brief  LOS: Pt has been recorded to have 100% PO intake and LBM on 9/12/22. She has senna-docusate ordered prn for this. Will check with her RN to be sure that she is aware of her bowel status. Will continue to follow prn.    Diet order:   Renal/Cardiac/Diabetic      Evaluation of Received Nutrient/Fluid Intake  Energy Calories Required: meeting needs  Protein Required: meeting needs  Fluid Required: meeting needs  Tolerance: tolerating     % Intake of Estimated Energy Needs: 75 - 100 %  % Meal Intake: 75 - 100 %      Intake/Output Summary (Last 24 hours) at 9/13/2022 1306  Last data filed at 9/12/2022 1800  Gross per 24 hour   Intake 520 ml   Output 600 ml   Net -80 ml        Anthropometrics  Temp: 98.1 °F (36.7 °C)  Height Method: Stated  Height: 5' 2" (157.5 cm)  Height (inches): 62 in  Weight Method: Bed Scale  Weight: 80.5 kg (177 lb 7.5 oz)  Weight (lb): 177.47 lb  Ideal Body Weight (IBW), Female: 110 lb  % Ideal Body Weight, Female (lb): 157.33 %  BMI (Calculated): 32.5  BMI Grade: 30 - 34.9- obesity - grade I       Estimated/Assessed Needs  Weight Used For Calorie Calculations: 80.5 kg (177 lb 7.5 oz)  Energy Calorie Requirements (kcal): 886-1127 kcals/day (11-14 kcals/kg ABW)  Energy Need Method: Kcal/kg  Protein Requirements: 100-125 g/day (22-2.5 g/kg IBW)  Weight Used For Protein Calculations: 50 kg (110 lb 3.7 oz)     Estimated Fluid Requirement Method: RDA Method  RDA Method (mL): 886       Reason for Assessment  Reason For Assessment: length of stay  Diagnosis: other (see comments) (Acute on chronic combined systolic and diastolic CHF (congestive heart failure))  Relevant Medical History: Allergy, Arthritis, Asthma, COPD (chronic obstructive " pulmonary disease), Depression, GERD (gastroesophageal reflux disease), Heart murmur, Hypertension, Fatty liver, Hyperlipidemia, Metabolic syndrome, Myocardial infarction, Brain bleed, Diabetes mellitus type II, Diverticulitis, Stroke, left hand shaky, loss of balance, On home oxygen therapy, states uses 2L at night or when sat < 90, Hernia of abdominal wall, History of intracranial hemorrhage, History of non-ST elevation myocardial infarction (NSTEMI), Goiter, s/p thyroidectomy, Statin intolerance, Pacemaker, Colon polyps, Severe persistent asthma without complication, Abnormal EKG, Lactic acidosis, Positive FIT (fecal immunochemical test), Hemiparesis affecting right side as late effect of cerebrovascular accident (CVA)    Nutrition/Diet History  Spiritual, Cultural Beliefs, Spiritism Practices, Values that Affect Care: no  Food Allergies: corn, other (see comments) (potato starch)  Factors Affecting Nutritional Intake: None identified at this time    Nutrition Risk Screen  Nutrition Risk Screen: no indicators present     MST Score: 0  Have you recently lost weight without trying?: No  Weight loss score: 0  Have you been eating poorly because of a decreased appetite?: No  Appetite score: 0       Weight History:  Wt Readings from Last 5 Encounters:   09/11/22 80.5 kg (177 lb 7.5 oz)   09/10/22 78.5 kg (173 lb)   09/07/22 78.6 kg (173 lb 4.5 oz)   07/26/22 79.6 kg (175 lb 7.8 oz)   07/21/21 83.9 kg (185 lb)        Lab/Procedures/Meds: Pertinent Labs/Meds Reviewed    Medications:Pertinent Medications Reviewed  Scheduled Meds:   albuterol-ipratropium  3 mL Nebulization QID WAKE    arformoteroL  15 mcg Nebulization BID    aspirin  81 mg Oral Daily    budesonide  0.5 mg Nebulization BID    chlorhexidine  15 mL Mouth/Throat BID    clopidogreL  75 mg Oral Daily    enoxaparin  40 mg Subcutaneous Daily    insulin aspart U-100  7 Units Subcutaneous TIDWM    insulin detemir U-100  10 Units Subcutaneous QHS    levothyroxine   150 mcg Oral Before breakfast    metoprolol succinate  50 mg Oral Daily    montelukast  10 mg Oral QHS    mupirocin   Nasal BID    piperacillin-tazobactam (ZOSYN) IVPB  3.375 g Intravenous Q8H    predniSONE  40 mg Oral Daily    sacubitriL-valsartan  1 tablet Oral BID     Continuous Infusions:   amiodarone in dextrose 5% Stopped (09/12/22 1046)    magnesium sulfate in water       PRN Meds:.acetaminophen, albuterol-ipratropium, calcium chloride IVPB, calcium chloride IVPB, calcium chloride IVPB, dextrose 50%, dextrose 50%, diphenhydrAMINE, glucagon (human recombinant), glucose, glucose, hydrALAZINE, hydrALAZINE, HYDROcodone-acetaminophen, insulin aspart U-100, magnesium oxide, magnesium sulfate IVPB, magnesium sulfate IVPB, magnesium sulfate IVPB, magnesium sulfate IVPB, melatonin, morphine, naloxone, polyethylene glycol, potassium chloride in water, potassium chloride in water, potassium chloride in water, potassium chloride in water, potassium chloride, potassium chloride, potassium chloride, potassium chloride, senna-docusate 8.6-50 mg, simethicone, sodium chloride 0.9%, sodium phosphate IVPB, sodium phosphate IVPB, sodium phosphate IVPB, sodium phosphate IVPB, sodium phosphate IVPB    Labs: Pertinent Labs Reviewed  Clinical Chemistry:  Recent Labs   Lab 09/10/22  0212 09/10/22  0651 09/10/22  2359 09/11/22  0343 09/13/22  0451      < > 135*   < > 139   K 4.1   < > 4.1   < > 3.7   CL 88*   < > 90*   < > 94*   CO2 38*   < > 36*   < > 36*   *   < > 128*   < > 126*   BUN 28*   < > 36*   < > 44*   CREATININE 1.0   < > 1.4   < > 1.3   CALCIUM 8.9   < > 9.4   < > 9.1   PROT  --   --  7.1  --   --    ALBUMIN  --   --  3.7  --   --    BILITOT  --   --  0.8  --   --    ALKPHOS  --   --  54*  --   --    AST  --   --  42*  --   --    ALT  --   --  43  --   --    ANIONGAP 15   < > 9   < > 9   MG 1.9   < > 2.0   < > 2.1   PHOS 2.7  --  4.9*  --   --     < > = values in this interval not displayed.     CBC:   Recent  Labs   Lab 09/13/22  0451   WBC 12.28   RBC 4.05   HGB 12.4   HCT 38.5      MCV 95   MCH 30.6   MCHC 32.2     Cardiac Profile:  Recent Labs   Lab 09/09/22  1415 09/10/22  0212 09/11/22  0343   BNP 1,501*  --  1,383*   TROPONINI <0.030 0.034  --      No results for input(s): CRP in the last 168 hours.  Diabetes:  Recent Labs   Lab 09/10/22  0036   HGBA1C 5.8     Thyroid & Parathyroid:  Recent Labs   Lab 09/10/22  2359   TSH 1.350       Monitor and Evaluation  Food and Nutrient Intake: energy intake, food and beverage intake  Food and Nutrient Adminstration: diet order  Knowledge/Beliefs/Attitudes: food and nutrition knowledge/skill, beliefs and attitudes  Physical Activity and Function: nutrition-related ADLs and IADLs, factors affecting access to physical activity  Anthropometric Measurements: weight, weight change, body mass index  Biochemical Data, Medical Tests and Procedures: lipid profile, inflammatory profile, glucose/endocrine profile, gastrointestinal profile, electrolyte and renal panel  Nutrition-Focused Physical Findings: overall appearance     Nutrition Risk  Level of Risk/Frequency of Follow-up: low     Nutrition Follow-Up  RD Follow-up?: Yes      Josefina Rodriguez, MARYA 09/13/2022 1:06 PM

## 2022-09-13 NOTE — RESPIRATORY THERAPY
09/12/22 2001   Patient Assessment/Suction   Level of Consciousness (AVPU) alert   Respiratory Effort Normal;Unlabored   Expansion/Accessory Muscles/Retractions no use of accessory muscles   All Lung Fields Breath Sounds equal bilaterally;diminished   Cough Frequency infrequent   PRE-TX-O2   O2 Device (Oxygen Therapy) nasal cannula   Flow (L/min) 4   SpO2 (!) 94 %   Pulse Oximetry Type Continuous   $ Pulse Oximetry - Multiple Charge Pulse Oximetry - Multiple   Pulse 71   Resp 20   Aerosol Therapy   $ Aerosol Therapy Charges Aerosol Treatment  (Duoneb)   Daily Review of Necessity (SVN) completed   Respiratory Treatment Status (SVN) given   Treatment Route (SVN) mouthpiece   Patient Position (SVN) HOB elevated   Post Treatment Assessment (SVN) breath sounds unchanged   Signs of Intolerance (SVN) none   Breath Sounds Post-Respiratory Treatment   Post-treatment Heart Rate (beats/min) 70   Post-treatment Resp Rate (breaths/min) 20   Education   $ Education Bronchodilator;15 min   Respiratory Evaluation   $ Care Plan Tech Time 15 min   $ Eval/Re-eval Charges Re-evaluation   Evaluation For Re-Eval 3 day

## 2022-09-14 LAB
ANION GAP SERPL CALC-SCNC: 8 MMOL/L (ref 8–16)
BACTERIA BLD CULT: NORMAL
BACTERIA BLD CULT: NORMAL
BASOPHILS # BLD AUTO: 0.01 K/UL (ref 0–0.2)
BASOPHILS NFR BLD: 0.1 % (ref 0–1.9)
BUN SERPL-MCNC: 54 MG/DL (ref 8–23)
CALCIUM SERPL-MCNC: 9 MG/DL (ref 8.7–10.5)
CHLORIDE SERPL-SCNC: 95 MMOL/L (ref 95–110)
CO2 SERPL-SCNC: 34 MMOL/L (ref 23–29)
CREAT SERPL-MCNC: 1.6 MG/DL (ref 0.5–1.4)
DIFFERENTIAL METHOD: ABNORMAL
EOSINOPHIL # BLD AUTO: 0 K/UL (ref 0–0.5)
EOSINOPHIL NFR BLD: 0.1 % (ref 0–8)
ERYTHROCYTE [DISTWIDTH] IN BLOOD BY AUTOMATED COUNT: 13.2 % (ref 11.5–14.5)
EST. GFR  (NO RACE VARIABLE): 34.3 ML/MIN/1.73 M^2
GLUCOSE SERPL-MCNC: 108 MG/DL (ref 70–110)
GLUCOSE SERPL-MCNC: 116 MG/DL (ref 70–110)
GLUCOSE SERPL-MCNC: 131 MG/DL (ref 70–110)
GLUCOSE SERPL-MCNC: 169 MG/DL (ref 70–110)
GLUCOSE SERPL-MCNC: 221 MG/DL (ref 70–110)
GLUCOSE SERPL-MCNC: 243 MG/DL (ref 70–110)
HCT VFR BLD AUTO: 38.3 % (ref 37–48.5)
HGB BLD-MCNC: 12.5 G/DL (ref 12–16)
IMM GRANULOCYTES # BLD AUTO: 0.06 K/UL (ref 0–0.04)
IMM GRANULOCYTES NFR BLD AUTO: 0.5 % (ref 0–0.5)
LYMPHOCYTES # BLD AUTO: 1.3 K/UL (ref 1–4.8)
LYMPHOCYTES NFR BLD: 11.7 % (ref 18–48)
MAGNESIUM SERPL-MCNC: 2.3 MG/DL (ref 1.6–2.6)
MCH RBC QN AUTO: 30.9 PG (ref 27–31)
MCHC RBC AUTO-ENTMCNC: 32.6 G/DL (ref 32–36)
MCV RBC AUTO: 95 FL (ref 82–98)
MONOCYTES # BLD AUTO: 1.3 K/UL (ref 0.3–1)
MONOCYTES NFR BLD: 11.3 % (ref 4–15)
NEUTROPHILS # BLD AUTO: 8.6 K/UL (ref 1.8–7.7)
NEUTROPHILS NFR BLD: 76.3 % (ref 38–73)
NRBC BLD-RTO: 0 /100 WBC
PHOSPHATE SERPL-MCNC: 3.2 MG/DL (ref 2.7–4.5)
PLATELET # BLD AUTO: 336 K/UL (ref 150–450)
PMV BLD AUTO: 9.7 FL (ref 9.2–12.9)
POTASSIUM SERPL-SCNC: 4.1 MMOL/L (ref 3.5–5.1)
RBC # BLD AUTO: 4.04 M/UL (ref 4–5.4)
SODIUM SERPL-SCNC: 137 MMOL/L (ref 136–145)
WBC # BLD AUTO: 11.32 K/UL (ref 3.9–12.7)

## 2022-09-14 PROCEDURE — 99233 PR SUBSEQUENT HOSPITAL CARE,LEVL III: ICD-10-PCS | Mod: ,,, | Performed by: INTERNAL MEDICINE

## 2022-09-14 PROCEDURE — 85025 COMPLETE CBC W/AUTO DIFF WBC: CPT | Performed by: FAMILY MEDICINE

## 2022-09-14 PROCEDURE — 25000242 PHARM REV CODE 250 ALT 637 W/ HCPCS: Performed by: FAMILY MEDICINE

## 2022-09-14 PROCEDURE — 99900031 HC PATIENT EDUCATION (STAT)

## 2022-09-14 PROCEDURE — 25000003 PHARM REV CODE 250: Performed by: INTERNAL MEDICINE

## 2022-09-14 PROCEDURE — 25000003 PHARM REV CODE 250: Performed by: FAMILY MEDICINE

## 2022-09-14 PROCEDURE — 97165 OT EVAL LOW COMPLEX 30 MIN: CPT

## 2022-09-14 PROCEDURE — 94799 UNLISTED PULMONARY SVC/PX: CPT

## 2022-09-14 PROCEDURE — 84100 ASSAY OF PHOSPHORUS: CPT | Performed by: INTERNAL MEDICINE

## 2022-09-14 PROCEDURE — 97535 SELF CARE MNGMENT TRAINING: CPT

## 2022-09-14 PROCEDURE — 97530 THERAPEUTIC ACTIVITIES: CPT

## 2022-09-14 PROCEDURE — 99233 SBSQ HOSP IP/OBS HIGH 50: CPT | Mod: ,,, | Performed by: INTERNAL MEDICINE

## 2022-09-14 PROCEDURE — 36415 COLL VENOUS BLD VENIPUNCTURE: CPT | Performed by: FAMILY MEDICINE

## 2022-09-14 PROCEDURE — 94660 CPAP INITIATION&MGMT: CPT

## 2022-09-14 PROCEDURE — 97161 PT EVAL LOW COMPLEX 20 MIN: CPT

## 2022-09-14 PROCEDURE — 25000003 PHARM REV CODE 250

## 2022-09-14 PROCEDURE — 94640 AIRWAY INHALATION TREATMENT: CPT

## 2022-09-14 PROCEDURE — 63600175 PHARM REV CODE 636 W HCPCS: Performed by: FAMILY MEDICINE

## 2022-09-14 PROCEDURE — 83735 ASSAY OF MAGNESIUM: CPT | Performed by: FAMILY MEDICINE

## 2022-09-14 PROCEDURE — 80048 BASIC METABOLIC PNL TOTAL CA: CPT | Performed by: FAMILY MEDICINE

## 2022-09-14 PROCEDURE — 63600175 PHARM REV CODE 636 W HCPCS: Performed by: INTERNAL MEDICINE

## 2022-09-14 PROCEDURE — 99900035 HC TECH TIME PER 15 MIN (STAT)

## 2022-09-14 PROCEDURE — 27000221 HC OXYGEN, UP TO 24 HOURS

## 2022-09-14 PROCEDURE — 21000000 HC CCU ICU ROOM CHARGE

## 2022-09-14 PROCEDURE — 94761 N-INVAS EAR/PLS OXIMETRY MLT: CPT

## 2022-09-14 RX ORDER — BISACODYL 5 MG
10 TABLET, DELAYED RELEASE (ENTERIC COATED) ORAL ONCE
Status: DISCONTINUED | OUTPATIENT
Start: 2022-09-14 | End: 2022-09-15 | Stop reason: HOSPADM

## 2022-09-14 RX ORDER — DEXTROMETHORPHAN POLISTIREX 30 MG/5 ML
1 SUSPENSION, EXTENDED RELEASE 12 HR ORAL ONCE
Status: COMPLETED | OUTPATIENT
Start: 2022-09-14 | End: 2022-09-14

## 2022-09-14 RX ORDER — BISACODYL 10 MG
10 SUPPOSITORY, RECTAL RECTAL ONCE
Status: COMPLETED | OUTPATIENT
Start: 2022-09-14 | End: 2022-09-14

## 2022-09-14 RX ORDER — SYRING-NEEDL,DISP,INSUL,0.3 ML 29 G X1/2"
296 SYRINGE, EMPTY DISPOSABLE MISCELLANEOUS ONCE
Status: DISCONTINUED | OUTPATIENT
Start: 2022-09-14 | End: 2022-09-15 | Stop reason: HOSPADM

## 2022-09-14 RX ORDER — AMOXICILLIN 250 MG
2 CAPSULE ORAL 2 TIMES DAILY
Status: DISCONTINUED | OUTPATIENT
Start: 2022-09-14 | End: 2022-09-15 | Stop reason: HOSPADM

## 2022-09-14 RX ADMIN — BUDESONIDE 0.5 MG: 0.5 INHALANT RESPIRATORY (INHALATION) at 07:09

## 2022-09-14 RX ADMIN — SACUBITRIL AND VALSARTAN 1 TABLET: 24; 26 TABLET, FILM COATED ORAL at 08:09

## 2022-09-14 RX ADMIN — MUPIROCIN 1 G: 20 OINTMENT TOPICAL at 09:09

## 2022-09-14 RX ADMIN — INSULIN DETEMIR 10 UNITS: 100 INJECTION, SOLUTION SUBCUTANEOUS at 08:09

## 2022-09-14 RX ADMIN — IPRATROPIUM BROMIDE AND ALBUTEROL SULFATE 3 ML: .5; 3 SOLUTION RESPIRATORY (INHALATION) at 11:09

## 2022-09-14 RX ADMIN — CLOPIDOGREL BISULFATE 75 MG: 75 TABLET, FILM COATED ORAL at 09:09

## 2022-09-14 RX ADMIN — INSULIN ASPART 2 UNITS: 100 INJECTION, SOLUTION INTRAVENOUS; SUBCUTANEOUS at 01:09

## 2022-09-14 RX ADMIN — IPRATROPIUM BROMIDE AND ALBUTEROL SULFATE 3 ML: .5; 3 SOLUTION RESPIRATORY (INHALATION) at 03:09

## 2022-09-14 RX ADMIN — MUPIROCIN 1 G: 20 OINTMENT TOPICAL at 08:09

## 2022-09-14 RX ADMIN — POLYETHYLENE GLYCOL 3350 17 G: 17 POWDER, FOR SOLUTION ORAL at 05:09

## 2022-09-14 RX ADMIN — IPRATROPIUM BROMIDE AND ALBUTEROL SULFATE 3 ML: .5; 3 SOLUTION RESPIRATORY (INHALATION) at 07:09

## 2022-09-14 RX ADMIN — CHLORHEXIDINE GLUCONATE 15 ML: 1.2 RINSE ORAL at 09:09

## 2022-09-14 RX ADMIN — MINERAL OIL 1 ENEMA: 100 ENEMA RECTAL at 01:09

## 2022-09-14 RX ADMIN — CHLORHEXIDINE GLUCONATE 15 ML: 1.2 RINSE ORAL at 08:09

## 2022-09-14 RX ADMIN — METOPROLOL SUCCINATE 50 MG: 50 TABLET, FILM COATED, EXTENDED RELEASE ORAL at 09:09

## 2022-09-14 RX ADMIN — INSULIN ASPART 7 UNITS: 100 INJECTION, SOLUTION INTRAVENOUS; SUBCUTANEOUS at 05:09

## 2022-09-14 RX ADMIN — ARFORMOTEROL TARTRATE 15 MCG: 15 SOLUTION RESPIRATORY (INHALATION) at 07:09

## 2022-09-14 RX ADMIN — HYDRALAZINE HYDROCHLORIDE 10 MG: 20 INJECTION INTRAMUSCULAR; INTRAVENOUS at 05:09

## 2022-09-14 RX ADMIN — MINERAL OIL 1 ENEMA: 100 ENEMA RECTAL at 03:09

## 2022-09-14 RX ADMIN — SENNOSIDES AND DOCUSATE SODIUM 2 TABLET: 8.6; 5 TABLET ORAL at 03:09

## 2022-09-14 RX ADMIN — ENOXAPARIN SODIUM 40 MG: 40 INJECTION SUBCUTANEOUS at 05:09

## 2022-09-14 RX ADMIN — SENNOSIDES AND DOCUSATE SODIUM 1 TABLET: 8.6; 5 TABLET ORAL at 03:09

## 2022-09-14 RX ADMIN — BISACODYL 10 MG: 10 SUPPOSITORY RECTAL at 10:09

## 2022-09-14 RX ADMIN — PREDNISONE 40 MG: 20 TABLET ORAL at 09:09

## 2022-09-14 RX ADMIN — ASPIRIN 81 MG: 81 TABLET, COATED ORAL at 09:09

## 2022-09-14 RX ADMIN — MONTELUKAST 10 MG: 10 TABLET, FILM COATED ORAL at 08:09

## 2022-09-14 RX ADMIN — SENNOSIDES AND DOCUSATE SODIUM 2 TABLET: 8.6; 5 TABLET ORAL at 08:09

## 2022-09-14 RX ADMIN — LEVOTHYROXINE SODIUM 150 MCG: 0.1 TABLET ORAL at 05:09

## 2022-09-14 NOTE — PROGRESS NOTES
Nephrology Progress Note        Patient Name: Tereza Larose  MRN: [id number]    Patient Class: IP- Inpatient   Admission Date: 9/9/2022  Length of Stay: 3 days  Date of Service: 9/14/2022    Attending Physician: Daron Bolton MD  Primary Care Provider: Evan Sánchez MD    Reason for Consult: sari    SUBJECTIVE:     HPI: 71F with CABG in 2016, s/p PM, s/p AVR, COPD, asthma, chronic respiratory failure on 2L NC, HTN, DM2 presented with progressive shortness of breath for 2-3 weeks and recurrent syncope. She saw her pulmonologist last week for asthma exacerbation and her medications were adjusted. She also endorsed 2-3 syncopal episodes for last week. ED workup showed BNP 1,500, CXR showed evidence of pulmonary edema. Troponins negative. EKG AV paced with Qtc 577.She was admitted to the hospital for acute on chronic CHF exacerbation. She began to have episodes of Vtach and developed cardiac arrest with torsades. ACLs was started, she received chest compressions and epinephrine and ROSC. She was not intubated. There was concern for stroke but CTA head was negative. Echo showed EF 60%, RVSP 75 mmHg which is significantly elevated from last year, moderate TR, mod to severe MR and mild to moderate AR, MINGO is 0.71 cm with mean gradient of 20 mmHg. She has not had an angiogram since bypass in 2016.     Today she is complaining of some chest discomfort. VSS. No further Vtach documented. sCr has increased from 1 to 1.5, Mg 2.1, K 3.7. H&H stable. WBC 14, no fevers and BCx are NGTD.     9/12- went for C- had stent placed in RCA- going to do 12 hours of IVFs, some spikes in BP, on BIPAP, UOP 950cc    9/13- saw Dr. Batres's noted- severe pulm HTN, scheduled for DELORIS today to assess degree of AR and MR, VSS, on 5L NC/BIPAP, UOP 600cc + voids- started on entresto    9/14- VSS, on 3L NC, UOP 550cc, being evaluated by PT/OT    Outpatient meds:  No current facility-administered medications on file prior to encounter.      Current Outpatient Medications on File Prior to Encounter   Medication Sig Dispense Refill    albuterol (PROVENTIL/VENTOLIN HFA) 90 mcg/actuation inhaler Inhale 1-2 puffs into the lungs every 4 (four) hours as needed for Wheezing. Inhale 2 puffs by mouth into lungs every 4 hours as needed 108 g 3    evolocumab (REPATHA SYRINGE) 140 mg/mL Syrg Inject 140 mg into the skin every 14 (fourteen) days.       glyBURIDE (DIABETA) 2.5 MG tablet Take 2.5 mg by mouth once daily.      levothyroxine (SYNTHROID) 150 MCG tablet Take 150 mcg by mouth once daily.      montelukast (SINGULAIR) 10 mg tablet Take 1 tablet (10 mg total) by mouth every evening. 30 tablet 11    potassium chloride (MICRO-K) 8 mEq CpSR Take 1 capsule (8 mEq total) by mouth once daily. 90 capsule 1    predniSONE (DELTASONE) 20 MG tablet Take one daily for 7 days and may repeat for shortness of breath. 21 tablet 1    revefenacin (YUPELRI) 175 mcg/3 mL Nebu Inhale 1 ampule into the lungs once daily at 6am. 30 each 11    sotaloL (BETAPACE) 80 MG tablet Take 80 mg by mouth 2 (two) times daily.      albuterol (PROVENTIL) 2.5 mg /3 mL (0.083 %) nebulizer solution Take 3 mLs (2.5 mg total) by nebulization every 4 (four) hours as needed for Shortness of Breath or Wheezing. 30 each 11    amLODIPine (NORVASC) 10 MG tablet TAKE 1 TABLET BY MOUTH EVERY DAY FOR SYSTOLIC BLOOD PRESSURE GREATER THAN 120 90 tablet 3    arformoteroL (BROVANA) 15 mcg/2 mL Nebu Take 2 mLs (15 mcg total) by nebulization 2 (two) times a day. Controller 60 each 11    aspirin (ECOTRIN) 81 MG EC tablet Take 81 mg by mouth once daily.      bempedoic acid (NEXLETOL) 180 mg Tab Take 1 tablet by mouth once daily.      budesonide (PULMICORT) 0.5 mg/2 mL nebulizer solution Take 2 mLs (0.5 mg total) by nebulization 2 (two) times daily. Controller 120 mL 11    cholecalciferol, vitamin D3, (VITAMIN D3) 25 mcg (1,000 unit) capsule Take 1,000 Units by mouth once daily.      cyanocobalamin (VITAMIN B-12)  1000 MCG tablet Take 100 mcg by mouth once daily.      dimenhyDRINATE (DRAMAMINE) 50 MG tablet Take 50 mg by mouth nightly as needed.      JANUVIA 100 mg Tab TAKE 1 TABLET(100 MG) BY MOUTH EVERY DAY 90 tablet 1    lancets Misc True track Check glucose once daily 100 each 3    losartan (COZAAR) 25 MG tablet Take 25 mg by mouth once daily.      metoprolol succinate (TOPROL-XL) 50 MG 24 hr tablet Take 1 tablet (50 mg total) by mouth once daily. 30 tablet 11    RESTORIL 15 mg Cap Take 15 mg by mouth nightly as needed.      theophylline 400 mg Tb24 Take 1 tablet (400 mg total) by mouth 2 (two) times daily. 60 tablet 5    torsemide (DEMADEX) 20 MG Tab Take 1 tablet (20 mg total) by mouth once daily. 30 tablet 5    vitamin E 400 UNIT capsule Take 400 Units by mouth once daily.         Scheduled meds:   albuterol-ipratropium  3 mL Nebulization QID WAKE    arformoteroL  15 mcg Nebulization BID    aspirin  81 mg Oral Daily    budesonide  0.5 mg Nebulization BID    chlorhexidine  15 mL Mouth/Throat BID    clopidogreL  75 mg Oral Daily    enoxaparin  40 mg Subcutaneous Daily    insulin aspart U-100  7 Units Subcutaneous TIDWM    insulin detemir U-100  10 Units Subcutaneous QHS    levothyroxine  150 mcg Oral Before breakfast    metoprolol succinate  50 mg Oral Daily    montelukast  10 mg Oral QHS    mupirocin   Nasal BID    predniSONE  40 mg Oral Daily    sacubitriL-valsartan  1 tablet Oral BID       Infusions:   amiodarone in dextrose 5% Stopped (09/12/22 1046)    magnesium sulfate in water         PRN meds:  acetaminophen, albuterol-ipratropium, calcium chloride IVPB, calcium chloride IVPB, calcium chloride IVPB, dextrose 50%, dextrose 50%, diphenhydrAMINE, glucagon (human recombinant), glucose, glucose, hydrALAZINE, hydrALAZINE, HYDROcodone-acetaminophen, insulin aspart U-100, magnesium oxide, magnesium sulfate IVPB, magnesium sulfate IVPB, magnesium sulfate IVPB, magnesium sulfate IVPB, melatonin, morphine, naloxone,  polyethylene glycol, potassium chloride in water, potassium chloride in water, potassium chloride in water, potassium chloride in water, potassium chloride, potassium chloride, potassium chloride, potassium chloride, senna-docusate 8.6-50 mg, simethicone, sodium chloride 0.9%, sodium phosphate IVPB, sodium phosphate IVPB, sodium phosphate IVPB, sodium phosphate IVPB, sodium phosphate IVPB    Review of Systems:  Neg    OBJECTIVE:     Vital Signs and IO (Last 24H):  Temp:  [97.9 °F (36.6 °C)-98.6 °F (37 °C)]   Pulse:  [62-90]   Resp:  [15-31]   BP: (103-209)/(47-90)   SpO2:  [89 %-99 %]   I/O last 3 completed shifts:  In: 634.3 [P.O.:540; I.V.:3; IV Piggyback:91.3]  Out: 550 [Urine:550]    Wt Readings from Last 5 Encounters:   09/14/22 84.8 kg (186 lb 15.2 oz)   09/10/22 78.5 kg (173 lb)   09/07/22 78.6 kg (173 lb 4.5 oz)   07/26/22 79.6 kg (175 lb 7.8 oz)   07/21/21 83.9 kg (185 lb)     Physical Exam:  Deferred    Body mass index is 34.19 kg/m².    Laboratory:  Recent Labs   Lab 09/12/22 0425 09/13/22 0451 09/14/22  0344    139 137   K 4.0 3.7 4.1   CL 89* 94* 95   CO2 37* 36* 34*   BUN 46* 44* 54*   CREATININE 1.7* 1.3 1.6*   * 126* 131*         Recent Labs   Lab 09/09/22  1415 09/10/22  0036 09/10/22  0212 09/10/22  0651 09/10/22  2359 09/11/22  0343 09/12/22  0425 09/13/22  0451 09/14/22  0344   CALCIUM 8.3*  --  8.9   < > 9.4   < > 9.0 9.1 9.0   ALBUMIN 3.8  --   --   --  3.7  --   --   --   --    PHOS  --   --  2.7  --  4.9*  --   --   --  3.2   MG  --    < > 1.9   < > 2.0   < > 2.1 2.1 2.3    < > = values in this interval not displayed.               No results for input(s): POCTGLUCOSE in the last 168 hours.    Recent Labs   Lab 07/05/21  0000 11/17/21  0000 09/10/22  0036   Hemoglobin A1C 7.3 A 7.0 5.8         Recent Labs   Lab 09/12/22  0425 09/12/22  1154 09/13/22  0451 09/14/22  0344   WBC 14.17*  --  12.28 11.32   HGB 13.5  --  12.4 12.5   HCT 43.0 41 38.5 38.3     --  305 336   MCV  97  --  95 95   MCHC 31.4*  --  32.2 32.6   MONO 10.9  1.5*  --  10.0  1.2* 11.3  1.3*         Recent Labs   Lab 09/09/22  1415 09/10/22  2359   BILITOT 0.8 0.8   PROT 7.2 7.1   ALBUMIN 3.8 3.7   ALKPHOS 75 54*   ALT 30 43   AST 44* 42*         Recent Labs   Lab 01/10/20  1735 09/09/22  1524   Color, UA Yellow Yellow   Appearance, UA Hazy A Clear   pH, UA 5.0 7.0   Specific Gravity, UA 1.020 1.015   Protein, UA Trace A Negative   Glucose, UA Negative 4+ A   Ketones, UA Trace A Negative   Urobilinogen, UA 2.0-3.0 A Negative   Bilirubin (UA) Negative Negative   Occult Blood UA Negative Negative   Nitrite, UA Negative Negative   RBC, UA  --  1   WBC, UA  --  1   Bacteria  --  Negative   Hyaline Casts, UA  --  0         Recent Labs   Lab 09/12/22  1154 09/12/22  1307 09/13/22  1606   POC PH 7.258 LL 7.350 7.448   POC PCO2 89.8 HH 76.5 HH 50.6 H   POC HCO3 40.1 H 42.2 H 35.0 H   POC PO2 83 84 67 L   POC SATURATED O2 93 L 95 93 L   POC BE 13 17 11   Sample ARTERIAL ARTERIAL ARTERIAL         Microbiology Results (last 7 days)       Procedure Component Value Units Date/Time    Blood culture x two cultures. Draw prior to antibiotics. [[id number]] Collected: 09/09/22 1524    Order Status: Completed Specimen: Blood from Peripheral, Hand, Right Updated: 09/13/22 1632     Blood Culture, Routine No Growth to date      No Growth to date      No Growth to date      No Growth to date      No Growth to date    Narrative:      Aerobic and anaerobic    Blood culture x two cultures. Draw prior to antibiotics. [[id number]] Collected: 09/09/22 1524    Order Status: Completed Specimen: Blood from Peripheral, Hand, Left Updated: 09/13/22 1632     Blood Culture, Routine No Growth to date      No Growth to date      No Growth to date      No Growth to date      No Growth to date    Narrative:      Aerobic and anaerobic          ASSESSMENT/PLAN:     VERONA likely due to hemodynamic instability; CKD III  VT with torsades and hospital cardiac  arrest 9/10 s/p successful CPR, s/p PCI of SVG to RCA 9/12  HTN/CHF  COPD with CO2 retention and metabolic compensation; severe pulm HTN  SHPT  Anemia of CKD  HypoK/HypoMg    - SCr bumped some with entresto- nonoliguric- will monitor- no nsaids- dose meds for CrCl 30-60  - trend BP/volume status- norvasc, losartan and torsemide are on hold  - CO2 is stable  - no active BMM issues  - no active hematologic issues  - replete K, Mg PRN    Thank you for allowing us to participate in the care of your patient!   We will follow the patient and provide recommendations as needed.    Patient care time was spent personally by me on the following activities: > 35 min  Obtaining a history.  Examination of patient.  Providing medical care at the patients bedside.  Developing a treatment plan with patient or surrogate and bedside caregivers.  Ordering and reviewing laboratory studies, radiographic studies, pulse oximetry.  Ordering and performing treatments and interventions.  Evaluation of patient's response to treatment.  Discussions with consultants while on the unit and immediately available to the patient.  Re-evaluation of the patient's condition.  Documentation in the medical record.     Yessenia Narayan MD    [facility]  [address] [place] [address]  [place] [address]    [phone] - tel  [phone] - fax    9/14/2022

## 2022-09-14 NOTE — PT/OT/SLP EVAL
Physical Therapy Evaluation    Patient Name:  Tereza Larose   MRN:  [id number]    Recommendations:     Discharge Recommendations:  nursing facility, skilled, home health PT (SNF vs HH PT w/ 24/7 care depending on progress)   Discharge Equipment Recommendations: none   Barriers to discharge:  level of caregiver assist needed    Assessment:     Tereza Larose is a 71 y.o. female admitted with a medical diagnosis of Acute on chronic combined systolic and diastolic CHF (congestive heart failure).  She presents with the following impairments/functional limitations:  weakness, impaired endurance, impaired self care skills, impaired functional mobility, gait instability, impaired balance, impaired cognition, decreased safety awareness, pain, impaired coordination, edema, impaired cardiopulmonary response to activity.    Pt found HOB elevated and agreeable to working with PT/OT for co-eval/tx. Pt A & O x  4 and has the following co-morbidities: DM, BRIJESH, CAD, HTN, ICH, Cushing's Syndrome, NSTEMI, Gt Abnormality.  Pt tolerated session fairly and required moderate to minimal A of 2 persons for safe mobilization during session today. Pt would benefit from acute PT during hospitalization to increase strength, endurance and safety with mobility and would benefit from SNF vs  PT w/ 24/7 care upon discharge.      Rehab Prognosis: Fair; patient would benefit from acute skilled PT services to address these deficits and reach maximum level of function.    Recent Surgery: Procedure(s) (LRB):  Left heart cath including bypass graft, with left heart catheterization (Left)  INSERTION, CATHETER, RIGHT HEART (Right)  ARTERIOGRAM, AORTIC ROOT (N/A)  Percutaneous coronary intervention (N/A) 2 Days Post-Op    Plan:     During this hospitalization, patient to be seen 6 x/week to address the identified rehab impairments via gait training, therapeutic activities, therapeutic exercises, neuromuscular re-education and progress toward the  following goals:    Plan of Care Expires:  10/12/22    Subjective     Chief Complaint: R back/scapular region pain  Patient/Family Comments/goals: SNF vs HH PT w/ 24/7 care depending on progress  Pain/Comfort:  Pain Rating 1:  (not rated)  Location - Side 1: Right  Location 1: scapula  Pain Addressed 1: Reposition, Distraction, Cessation of Activity    Patients cultural, spiritual, Restorationist conflicts given the current situation:      Living Environment:  Pt lives with her /caregiver in a single story house with 5 steps to enter(single rail on L).  Prior to admission, patients level of function was needing A from  for some ADLs and transfers/gt.  Equipment used at home: walker, rolling, rollator, bedside commode, cane, straight, wheelchair, oxygen.  DME owned (not currently used): none.  Upon discharge, patient will have assistance from her .    Objective:     Communicated with RN prior to session.  Patient found HOB elevated with bed alarm, blood pressure cuff, oxygen, PureWick, peripheral IV, pulse ox (continuous), telemetry  upon PT entry to room.    General Precautions: Standard, fall, diabetic   Orthopedic Precautions:N/A   Braces: N/A  Respiratory Status: Nasal cannula, flow 3 L/min    Exams:  Cognitive Exam:  Patient is oriented to Person, Place, Time, and Situation  RLE ROM: WFL  RLE Strength: grossly 4-/5  LLE ROM: WFL  LLE Strength: grossly 4-/5    Functional Mobility:  Bed Mobility:     Scooting: moderate assistance and of 1-2 persons  Supine to Sit: moderate assistance and of 2 persons  Transfers:     Sit to Stand:  minimum assistance, moderate assistance, and of 2 persons with rolling walker and vc for technique  Bed to Chair: minimum assistance and of 2 persons with  rolling walker  using  Step Transfer and vc for technique and sequencing    Therapeutic Activities and Exercises:   Pt was educated on the following: call light use, importance of OOB activity and functional mobility  to negate the negative effects of prolonged bed rest during this hospitalization, safe transfers/ambulation and discharge planning recommendations/options.      AM-PAC 6 CLICK MOBILITY  Total Score:14     Patient left up in chair with all lines intact, call button in reach, chair alarm on, and RN notified.    GOALS:   Multidisciplinary Problems       Physical Therapy Goals          Problem: Physical Therapy    Goal Priority Disciplines Outcome Goal Variances Interventions   Physical Therapy Goal     PT, PT/OT      Description: Goals to be met by: 10/12/22    Patient will increase functional independence with mobility by performin. Supine to sit with MInimal Assistance  2. Sit to stand transfer with Minimal Assistance  3. Bed to chair transfer with CG Assistance using Rolling Walker  4. Gait  x 75 feet with Minimal Assistance using Rolling Walker.                             History:     Past Medical History:   Diagnosis Date    Abnormal EKG 2012    Allergy     Arthritis     Asthma     Brain bleed     Colon polyps 2020    COPD (chronic obstructive pulmonary disease)     Depression     Diabetes mellitus type II     Diverticulitis     Fatty liver     GERD (gastroesophageal reflux disease)     Goiter     s/p thyroidectomy    Heart murmur     Hemiparesis affecting right side as late effect of cerebrovascular accident (CVA) 2022    Hernia of abdominal wall     History of intracranial hemorrhage 6/15/2013    History of non-ST elevation myocardial infarction (NSTEMI) 6/15/2013    Hyperlipidemia     Hypertension     Lactic acidosis 2020    Metabolic syndrome 2012    Myocardial infarction     On home oxygen therapy     states uses 2L at night or when sat < 90    Pacemaker     Positive FIT (fecal immunochemical test) 2020    Severe persistent asthma without complication 2020    Statin intolerance     Stroke     left hand shaky, loss of balance       Past Surgical History:    Procedure Laterality Date    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/25/2017    Dr Griggs    ADRENALECTOMY      AORTIC VALVE REPLACEMENT  12/09/2016    ARTERIOGRAPHY OF AORTIC ROOT N/A 9/12/2022    Procedure: ARTERIOGRAM, AORTIC ROOT;  Surgeon: Marko Batres MD;  Location: [facility] CATH/EP LAB;  Service: Cardiology;  Laterality: N/A;    arthroscopy lt knee Right     BLADDER REPAIR      sling    BREAST BIOPSY Bilateral     benign    COLONOSCOPY  2004    10 year recheck    COLONOSCOPY N/A 11/6/2020    Procedure: COLONOSCOPY;  Surgeon: Yakelin Lowery MD;  Location: [facility] ENDO;  Service: Endoscopy;  Laterality: N/A;    CORONARY ANGIOGRAPHY INCLUDING BYPASS GRAFTS WITH CATHETERIZATION OF LEFT HEART Left 9/12/2022    Procedure: Left heart cath including bypass graft, with left heart catheterization;  Surgeon: Marko Batres MD;  Location: [facility] CATH/EP LAB;  Service: Cardiology;  Laterality: Left;    CORONARY ARTERY BYPASS GRAFT  12/09/2016    X3    EPIDURAL STEROID INJECTION INTO LUMBAR SPINE N/A 7/27/2021    Procedure: Injection-steroid-epidural-lumbar;  Surgeon: Valerio Jay MD;  [facility];  Service: Pain Management;  Laterality: N/A;  L5-S1     HYSTERECTOMY      INSERTION OF PACEMAKER Left 1/13/2020    Procedure: INSERTION, PACEMAKER;  Surgeon: Marko Batres MD;  Location: [facility] CATH/EP LAB;  Service: Cardiology;  Laterality: Left;    OOPHORECTOMY      RIGHT HEART CATHETERIZATION Right 9/12/2022    Procedure: INSERTION, CATHETER, RIGHT HEART;  Surgeon: Marko Batres MD;  Location: [facility] CATH/EP LAB;  Service: Cardiology;  Laterality: Right;    THYROIDECTOMY         Time Tracking:     PT Received On: 09/14/22  PT Start Time: 0930     PT Stop Time: 0954  PT Total Time (min): 24 min     Billable Minutes: Evaluation 10 and Therapeutic Activity 14      09/14/2022

## 2022-09-14 NOTE — PLAN OF CARE
Goals to be met by: 10/12/22    Patient will increase functional independence with mobility by performin. Supine to sit with MInimal Assistance  2. Sit to stand transfer with Minimal Assistance  3. Bed to chair transfer with CG Assistance using Rolling Walker  4. Gait  x 75 feet with Minimal Assistance using Rolling Walker.

## 2022-09-14 NOTE — PLAN OF CARE
Patient denies chest pain/discomfort.  Denies shortness of breath.  Patient displays short term memory loss.  Stated that she was home and often called out for her , forgetting that she told him janna as he left her room to go home.

## 2022-09-14 NOTE — PROGRESS NOTES
[facility]    Critical Care Cardiology Progress Note    Subjective:     Patient seen and examined this morning.   S/p DELORIS 9/13, reviewed Dr. Fair's note. MR is not severe enough to be the cause of the pulmonary hypertension. She had an RCA stent placed which could be the cause of the ischemic MR. Aortic valve functioning properly.   Vitals reviewed. BP elevated, meds not given yet today.   Labs reviewed. Cr 1.6.   Tele reviewed.     Objective:  Vital Signs (Most Recent)  Temp: 98.6 °F (37 °C) (09/14/22 0701)  Pulse: 83 (09/14/22 0815)  Resp: (!) 23 (09/14/22 0815)  BP: (!) 180/76 (09/14/22 0800)  SpO2: (!) 92 % (09/14/22 0815)    Vital Signs Range (Last 24H):  Temp:  [97.9 °F (36.6 °C)-98.6 °F (37 °C)]   Pulse:  [62-90]   Resp:  [15-31]   BP: (103-209)/(47-90)   SpO2:  [90 %-99 %]     I & O (Last 24H):    Intake/Output Summary (Last 24 hours) at 9/14/2022 0922  Last data filed at 9/14/2022 0500  Gross per 24 hour   Intake 634.25 ml   Output 550 ml   Net 84.25 ml         Current Diet:     Current Diet Order   Procedures    Diet renal [facility]; Cardiac (Low Na/Chol), Consistent Carbohydrate, Diabetic Diet; Standard Tray     Order Specific Question:   Indicate patient location for additional diet options:     Answer:   [facility]     Order Specific Question:   Additional Diet Options:     Answer:   Cardiac (Low Na/Chol)     Order Specific Question:   Additional Diet Options:     Answer:   Consistent Carbohydrate     Order Specific Question:   Additional Diet Options:     Answer:   Diabetic Diet     Order Specific Question:   Tray type:     Answer:   Standard Tray        Allergies:  Review of patient's allergies indicates:   Allergen Reactions    Metformin Diarrhea     Diarrhea      Corn Itching     Other reaction(s): Sneezing  Other reaction(s): Rhinorrhea    Potato starch Hives     Other reaction(s): Sneezing  Other reaction(s): Rhinorrhea    Pravastatin Other (See Comments)     Muscle pain    Statins-hmg-coa  reductase inhibitors Other (See Comments)    Hydrochlorothiazide Other (See Comments)     weakness    Welchol [colesevelam] Other (See Comments)     Weakness        Meds:  Scheduled Meds:   albuterol-ipratropium  3 mL Nebulization QID WAKE    arformoteroL  15 mcg Nebulization BID    aspirin  81 mg Oral Daily    budesonide  0.5 mg Nebulization BID    chlorhexidine  15 mL Mouth/Throat BID    clopidogreL  75 mg Oral Daily    enoxaparin  40 mg Subcutaneous Daily    insulin aspart U-100  7 Units Subcutaneous TIDWM    insulin detemir U-100  10 Units Subcutaneous QHS    levothyroxine  150 mcg Oral Before breakfast    metoprolol succinate  50 mg Oral Daily    montelukast  10 mg Oral QHS    mupirocin   Nasal BID    predniSONE  40 mg Oral Daily    sacubitriL-valsartan  1 tablet Oral BID     Continuous Infusions:   amiodarone in dextrose 5% Stopped (09/12/22 1046)    magnesium sulfate in water       PRN Meds:acetaminophen, albuterol-ipratropium, calcium chloride IVPB, calcium chloride IVPB, calcium chloride IVPB, dextrose 50%, dextrose 50%, diphenhydrAMINE, glucagon (human recombinant), glucose, glucose, hydrALAZINE, hydrALAZINE, HYDROcodone-acetaminophen, insulin aspart U-100, magnesium oxide, magnesium sulfate IVPB, magnesium sulfate IVPB, magnesium sulfate IVPB, magnesium sulfate IVPB, melatonin, morphine, naloxone, polyethylene glycol, potassium chloride in water, potassium chloride in water, potassium chloride in water, potassium chloride in water, potassium chloride, potassium chloride, potassium chloride, potassium chloride, senna-docusate 8.6-50 mg, simethicone, sodium chloride 0.9%, sodium phosphate IVPB, sodium phosphate IVPB, sodium phosphate IVPB, sodium phosphate IVPB, sodium phosphate IVPB    Oxygen/Ventilator Data (Last 24H):  (if applicable)   Oxygen Concentration (%):  [0.35-40] 40    Hemodynamic Parameters (Last 24H):   (if applicable)        Lines/Drains:  (if applicable)       Peripheral IV - Single Lumen  09/09/22 1430 20 G Right Antecubital (Active)   Site Assessment Clean;Dry;Intact;No swelling;No redness 09/13/22 0701   Extremity Assessment Distal to IV No abnormal discoloration;No redness;No swelling;No warmth 09/13/22 0701   Line Status Infusing 09/13/22 0701   Dressing Status Clean;Intact;Dry 09/13/22 0701   Dressing Intervention Integrity maintained 09/13/22 0701   Dressing Change Due 09/13/22 09/13/22 0701   Site Change Due 09/13/22 09/13/22 0701   Number of days: 3            Peripheral IV - Single Lumen 09/11/22 2200 22 G Left;Posterior;Proximal Forearm (Active)   Site Assessment Clean;Dry;Intact;No redness;No swelling 09/13/22 0701   Extremity Assessment Distal to IV No abnormal discoloration;No redness;No swelling;No warmth 09/13/22 0701   Line Status Saline locked 09/13/22 0701   Dressing Status Clean;Dry;Intact 09/13/22 0701   Dressing Intervention Integrity maintained 09/13/22 0701   Dressing Change Due 09/15/22 09/13/22 0701   Site Change Due 09/15/22 09/13/22 0701   Number of days: 1       Female External Urinary Catheter 09/10/22 1200 (Active)   Skin no breakdown 09/13/22 0701   Tolerance no signs/symptoms of discomfort 09/13/22 0701   Suction Continuous suction at 40 mmHg 09/13/22 0701   Date of last wick change 09/12/22 09/13/22 0701   Time of last wick change 0600 09/13/22 0701   Number of days: 2       Lab Results :  Recent Results (from the past 24 hour(s))   Procalcitonin    Collection Time: 09/13/22  9:47 AM   Result Value Ref Range    Procalcitonin 0.14 0.00 - 0.50 ng/mL   POCT glucose    Collection Time: 09/13/22 12:06 PM   Result Value Ref Range    POC Glucose 182 (H) 70 - 110   Transesophageal echo (DELORIS)    Collection Time: 09/13/22  2:25 PM   Result Value Ref Range    BSA 1.85 m2    EF 65 %   ISTAT PROCEDURE    Collection Time: 09/13/22  4:06 PM   Result Value Ref Range    POC PH 7.448 7.35 - 7.45    POC PCO2 50.6 (H) 35 - 45 mmHg    POC PO2 67 (L) 80 - 100 mmHg    POC HCO3 35.0 (H) 24 -  28 mmol/L    POC BE 11 -2 to 2 mmol/L    POC SATURATED O2 93 (L) 95 - 100 %    POC TCO2 37 (H) 23 - 27 mmol/L    Sample ARTERIAL     Site LR     Allens Test Pass     DelSys CPAP/BiPAP     Mode BiPAP     FiO2 40     Sp02 93     IP 18     EP 6    POCT glucose    Collection Time: 09/13/22  4:28 PM   Result Value Ref Range    POC Glucose 219 (H) 70 - 110   POCT glucose    Collection Time: 09/13/22  8:48 PM   Result Value Ref Range    POC Glucose 243 (H) 70 - 110   Basic Metabolic Panel (BMP)    Collection Time: 09/14/22  3:44 AM   Result Value Ref Range    Sodium 137 136 - 145 mmol/L    Potassium 4.1 3.5 - 5.1 mmol/L    Chloride 95 95 - 110 mmol/L    CO2 34 (H) 23 - 29 mmol/L    Glucose 131 (H) 70 - 110 mg/dL    BUN 54 (H) 8 - 23 mg/dL    Creatinine 1.6 (H) 0.5 - 1.4 mg/dL    Calcium 9.0 8.7 - 10.5 mg/dL    Anion Gap 8 8 - 16 mmol/L    eGFR 34.3 (A) >60 mL/min/1.73 m^2   Magnesium    Collection Time: 09/14/22  3:44 AM   Result Value Ref Range    Magnesium 2.3 1.6 - 2.6 mg/dL   CBC with Automated Differential    Collection Time: 09/14/22  3:44 AM   Result Value Ref Range    WBC 11.32 3.90 - 12.70 K/uL    RBC 4.04 4.00 - 5.40 M/uL    Hemoglobin 12.5 12.0 - 16.0 g/dL    Hematocrit 38.3 37.0 - 48.5 %    MCV 95 82 - 98 fL    MCH 30.9 27.0 - 31.0 pg    MCHC 32.6 32.0 - 36.0 g/dL    RDW 13.2 11.5 - 14.5 %    Platelets 336 150 - 450 K/uL    MPV 9.7 9.2 - 12.9 fL    Immature Granulocytes 0.5 0.0 - 0.5 %    Gran # (ANC) 8.6 (H) 1.8 - 7.7 K/uL    Immature Grans (Abs) 0.06 (H) 0.00 - 0.04 K/uL    Lymph # 1.3 1.0 - 4.8 K/uL    Mono # 1.3 (H) 0.3 - 1.0 K/uL    Eos # 0.0 0.0 - 0.5 K/uL    Baso # 0.01 0.00 - 0.20 K/uL    nRBC 0 0 /100 WBC    Gran % 76.3 (H) 38.0 - 73.0 %    Lymph % 11.7 (L) 18.0 - 48.0 %    Mono % 11.3 4.0 - 15.0 %    Eosinophil % 0.1 0.0 - 8.0 %    Basophil % 0.1 0.0 - 1.9 %    Differential Method Automated    Phosphorus    Collection Time: 09/14/22  3:44 AM   Result Value Ref Range    Phosphorus 3.2 2.7 - 4.5 mg/dL  "  POCT glucose    Collection Time: 09/14/22  7:32 AM   Result Value Ref Range    POC Glucose 116 (H) 70 - 110       Diagnostic Results:  Imaging Results              X-Ray Chest AP Portable (Final result)  Result time 09/09/22 14:16:56      Final result by Ernesto Gerber MD (09/09/22 14:16:56)                   Narrative:    Reason: CHF    FINDINGS:    Portable chest with comparison chest x-ray January 29, 2022 show mildly enlarged cardiac mediastinal silhouette. Median sternotomy wires noted. Left dual lead cardiac pacemaker noted.  Left basilar linear opacities are noted, felt to reflect subsegmental atelectasis or scarring. Right lung is clear. There is prominence of the central hilar vascular structures. No acute osseous abnormality.    IMPRESSION:    1.  Enlarged cardiomediastinal silhouette with prominence of central hilar vascular structures may reflect pulmonary vascular congestion and CHF.  2.  Left basilar subsegmental atelectasis or scarring.    Electronically signed by:  Ernesto Gerber DO  9/9/2022 2:16 PM CDT Workstation: [id number]                                    12-lead EKG interpretation:   (if applicable)    Recent Cardiac Rhythm:  (if applicable)      Physical Exam:  Objective:  General Appearance:  In no acute distress.    Vital signs: (most recent): Blood pressure (!) 180/76, pulse 83, temperature 98.6 °F (37 °C), resp. rate (!) 23, height 5' 2" (1.575 m), weight 84.8 kg (186 lb 15.2 oz), SpO2 (!) 92 %.    Lungs:  There are decreased breath sounds.    Heart: Normal rate.  Regular rhythm.  S1 normal and S2 normal.  Positive for murmur.    Abdomen: Abdomen is soft.  Bowel sounds are normal.       Current Consults:  IP CONSULT TO HOSPITALIST  IP CONSULT TO CARDIOLOGY  IP CONSULT TO NEPHROLOGY  IP CONSULT TO PULMONOLOGY    Assessment/Plan:  Assessment:   Cardiac Arrest - ACLS initiated and ROSC was achieved fairly quickly, not intubated.   Ventricular Tachycardia/Torsades - given IV Mg,   Acute " on chronic CHF exacerbation - on bumex IV, BNP 1500, CXR showed evidence of pulmonary edema. Echo EF 60%, RVSP 75 mmHg, previously 20 mmHg last year.  CAD s/p CABG 3v 2016-status post intervention of SVG to right coronary artery  Chronic Respiratory Failure  COPD-severe pulmonary hypertension  Asthma  Aortic Valve Replacement 2016 - Echo showed moderate aortic stenosis, MINGO 0.71 cm, mean gradient 20 mmHg, mod TR, mod to severe MR, mild to mod AR.   Hypertension - on entresto , toprol, norvasc on hold  Diabetes Mellitus Type 2   Leukocytosis- WBC 14, patient is on prednisone. Now resolved.  S/p DELORIS - Patient seen and examined this morning.   S/p DELORIS 9/13, reviewed Dr. Fair's note. MR is not severe enough to be the cause of the pulmonary hypertension. She had an RCA stent placed which could be the cause of the ischemic MR. Aortic valve functioning properly.   Pulmonary Hypertension - group 1 or group 3 per DELORIS report. Pulmonology consulted.  VERONA - nephrology following.  Hypercapnia- pulmonology following. Recommend BiPap nightly and outpatient sleep study    Plan:   DELORIS showed MR is not severe enough to be the cause of the severe pulmonary hypertension.  Pulmonology following, appreciate recommendations.   Dr. Fair recommends pulmonary hypertension clinic.   Monitor BP.  On entresto and toprol. Norvasc discontinued.   Cr 1.6 today, previously 1.3. Recently started on entresto, continue if okay with nephrology.

## 2022-09-14 NOTE — NURSING TRANSFER
Nursing Transfer Note      9/14/2022     Reason patient is being transferred: lower level of care    Transfer To: 2533    Transfer via bed    Transfer with 3l to O2, cardiac monitoring    Transported by JEFFERY Ellis    Medicines sent: yes insulin pens (2)    Any special needs or follow-up needed: no    Chart send with patient: Yes    Notified: spouse    Patient reassessed at: 1630   Upon arrival to floor: cardiac monitor applied, patient oriented to room, call bell in reach, and bed in lowest position

## 2022-09-14 NOTE — PROGRESS NOTES
Critical access hospital Medicine  Progress Note    Patient name: Tereza Larose  MRN: [id number]  Admit Date: 9/9/2022   LOS: 2 days     SUBJECTIVE:     Principal problem: Acute on chronic combined systolic and diastolic CHF (congestive heart failure)    Interval History: DELORIS done today, report of pulm HTN type 1 or type 3.      Scheduled Meds:   albuterol-ipratropium  3 mL Nebulization QID WAKE    arformoteroL  15 mcg Nebulization BID    aspirin  81 mg Oral Daily    budesonide  0.5 mg Nebulization BID    chlorhexidine  15 mL Mouth/Throat BID    clopidogreL  75 mg Oral Daily    enoxaparin  40 mg Subcutaneous Daily    insulin aspart U-100  7 Units Subcutaneous TIDWM    insulin detemir U-100  10 Units Subcutaneous QHS    levothyroxine  150 mcg Oral Before breakfast    metoprolol succinate  50 mg Oral Daily    montelukast  10 mg Oral QHS    mupirocin   Nasal BID    piperacillin-tazobactam (ZOSYN) IVPB  3.375 g Intravenous Q8H    predniSONE  40 mg Oral Daily    sacubitriL-valsartan  1 tablet Oral BID     Continuous Infusions:   amiodarone in dextrose 5% Stopped (09/12/22 1046)    magnesium sulfate in water       PRN Meds:acetaminophen, albuterol-ipratropium, calcium chloride IVPB, calcium chloride IVPB, calcium chloride IVPB, dextrose 50%, dextrose 50%, diphenhydrAMINE, glucagon (human recombinant), glucose, glucose, hydrALAZINE, hydrALAZINE, HYDROcodone-acetaminophen, insulin aspart U-100, magnesium oxide, magnesium sulfate IVPB, magnesium sulfate IVPB, magnesium sulfate IVPB, magnesium sulfate IVPB, melatonin, morphine, naloxone, polyethylene glycol, potassium chloride in water, potassium chloride in water, potassium chloride in water, potassium chloride in water, potassium chloride, potassium chloride, potassium chloride, potassium chloride, senna-docusate 8.6-50 mg, simethicone, sodium chloride 0.9%, sodium phosphate IVPB, sodium phosphate IVPB, sodium phosphate IVPB, sodium phosphate IVPB, sodium  phosphate IVPB    Review of patient's allergies indicates:   Allergen Reactions    Metformin Diarrhea     Diarrhea      Corn Itching     Other reaction(s): Sneezing  Other reaction(s): Rhinorrhea    Potato starch Hives     Other reaction(s): Sneezing  Other reaction(s): Rhinorrhea    Pravastatin Other (See Comments)     Muscle pain    Statins-hmg-coa reductase inhibitors Other (See Comments)    Hydrochlorothiazide Other (See Comments)     weakness    Welchol [colesevelam] Other (See Comments)     Weakness        Review of Systems: As per interval history    OBJECTIVE:     Vital Signs (Most Recent)  Temp: 97.9 °F (36.6 °C) (09/13/22 1501)  Pulse: 74 (09/13/22 2251)  Resp: 19 (09/13/22 2251)  BP: (!) 149/66 (09/13/22 2100)  SpO2: 95 % (09/13/22 2251)    Vital Signs Range (Last 24H):  Temp:  [97.5 °F (36.4 °C)-98.1 °F (36.7 °C)]   Pulse:  [67-90]   Resp:  [15-50]   BP: (103-209)/(47-90)   SpO2:  [89 %-99 %]     I & O (Last 24H):  Intake/Output Summary (Last 24 hours) at 9/13/2022 2326  Last data filed at 9/13/2022 2100  Gross per 24 hour   Intake 490 ml   Output 350 ml   Net 140 ml       Physical Exam:    General: Patient resting comfortably in no acute distress.  Eyes: No conjunctival injection. No scleral icterus.  ENT: Hearing grossly intact. No discharge from ears. No nasal discharge.   Neck: Supple, trachea midline. No JVD  CVS: RRR. No LE edema BL.  +systolic murmur, best heard at right 2nd IC  Chest:  tender to palp r/t compressions  Lungs:  No tachypnea or accessory muscle use.  Crackles greater on left  Abdomen:  Soft, nontender and nondistended.  No organomegaly  Neuro: AOx3. Moves all extremities. Follows commands. Responds appropriately   Skin:  No rash or erythema noted         Laboratory:  I have reviewed all pertinent lab results within the past 24 hours.        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]  Yes    Complete heart block [I44.2]  Yes    Anticoagulant long-term use [Z79.01]  Not Applicable    Hypoxemic respiratory failure, chronic [J96.11]  Yes    Postoperative hypothyroidism [E89.0]  Yes    Adrenal Cushing's syndrome [E24.9]  Yes    Type 2 diabetes mellitus with circulatory disorder, without long-term current use of insulin [E11.59]  Yes    History of intracranial hemorrhage [Z86.79]  Not Applicable    Hypertension associated with diabetes [E11.59, I15.2]  Yes    CAD (coronary artery disease) [I25.10]  Yes    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         Plan: Cardiac Arrest; VTAC, Torsades  QTc 580 on EKG prior to arrest, pacemaker.  Received epi and compressions.  ROSC achieved quickly, now patient is alert and oriented.   -See code docmentation  -given IV magnesium  -amiodarone per Cardiology  - status post left heart catheterization and PCI, DELORIS on 09/13/2022, cardiology report:"... evaluated by a pulmonary hypertension clinic because she will require prostacyclin analogs likely IV based on her pulmonary pressures.  She will need a COPD workup prior therapy."  -cardiology consult appreciate recommendations  -serial cmp, mag, phos and prn electrolyte replacement  -monitor in ICU        Acute on chronic hypoxemic respiratory failure  Due to acute on chronic CHF  Complicated by COPD-eosinophilic asthma overlap syndrome  - supplemental oxygen, wean as tolerated  -losartan and metoprolol, asa  - bumex 1 mg IV BID ( torsemide at home; she is noncompliant)  - replace electrolytes  - strict I/Os, daily weights, 1.5L fluid restrictions  - pt started recently on COPD exacerbation therapy by pulmonology 2 days ago, continuing now:  solumedrol, nebulizers  -started zosyn given QT prolongation with Levaquin  - pulmonology " reconsulted     Acute kidney injury  - nephrology consulted  - holding nephrotoxic agents  - hold further diuresis at this time  - appreciate nephrology recommendations     Hypertension  Restart home dose  - hold losartan given VERONA  -metoprolol xl 50 mg daily        DM2  -SSI accuchecks ac/hs  -Detemir 10 units qHS  -novolog 7 units TID     Hypothyroidism  -check TSH  -Start home dose synthroid 150 mcg        BRIJESH  - CPAP QHS       VTE Risk Mitigation (From admission, onward)           Ordered     enoxaparin injection 40 mg  Daily         09/09/22 2112     IP VTE HIGH RISK PATIENT  Once         09/09/22 2112     Place sequential compression device  Until discontinued         09/09/22 2112                        Department Hospital Medicine  [facility]  Daron Bolton MD  Date of service: 09/13/2022

## 2022-09-14 NOTE — RESPIRATORY THERAPY
09/13/22 1933   Patient Assessment/Suction   Level of Consciousness (AVPU) alert   Respiratory Effort Normal;Unlabored   Expansion/Accessory Muscles/Retractions no use of accessory muscles   All Lung Fields Breath Sounds equal bilaterally;clear;diminished   Rhythm/Pattern, Respiratory unlabored   Cough Frequency infrequent   PRE-TX-O2   O2 Device (Oxygen Therapy) nasal cannula   Flow (L/min) 4   SpO2 (!) 94 %   Pulse Oximetry Type Continuous   $ Pulse Oximetry - Multiple Charge Pulse Oximetry - Multiple   Pulse 84   Resp 20   Aerosol Therapy   $ Aerosol Therapy Charges Aerosol Treatment  (duoneb)   Daily Review of Necessity (SVN) completed   Respiratory Treatment Status (SVN) given   Treatment Route (SVN) mouthpiece   Patient Position (SVN) HOB elevated   Post Treatment Assessment (SVN) breath sounds unchanged   Signs of Intolerance (SVN) none   Breath Sounds Post-Respiratory Treatment   Post-treatment Heart Rate (beats/min) 99   Post-treatment Resp Rate (breaths/min) 20   Preset CPAP/BiPAP Settings   Mode Of Delivery Standby   Education   $ Education Bronchodilator;DME BiPAP;15 min   Respiratory Evaluation   $ Care Plan Tech Time 15 min   $ Eval/Re-eval Charges Re-evaluation   Evaluation For Re-Eval 5+ day

## 2022-09-14 NOTE — PT/OT/SLP EVAL
Occupational Therapy   Evaluation    Name: Tereza Larose  MRN: [id number]  Admitting Diagnosis:  Acute on chronic combined systolic and diastolic CHF (congestive heart failure)  Recent Surgery: Procedure(s) (LRB):  Left heart cath including bypass graft, with left heart catheterization (Left)  INSERTION, CATHETER, RIGHT HEART (Right)  ARTERIOGRAM, AORTIC ROOT (N/A)  Percutaneous coronary intervention (N/A) 2 Days Post-Op    Recommendations:     Discharge Recommendations:  (SNF vs HHOT depending on progress with therapy.)  Discharge Equipment Recommendations:  none  Barriers to discharge:  None    Assessment:     Tereza Larose is a 71 y.o. female with a medical diagnosis of Acute on chronic combined systolic and diastolic CHF (congestive heart failure).  She presents with general weakness and O2 desating with activity. Performance deficits affecting function: weakness, impaired endurance, impaired self care skills, impaired functional mobility, gait instability, impaired balance, decreased lower extremity function, decreased upper extremity function, decreased safety awareness, impaired cardiopulmonary response to activity.      Rehab Prognosis: Fair; patient would benefit from acute skilled OT services to address these deficits and reach maximum level of function.       Plan:     Patient to be seen 5 x/week to address the above listed problems via self-care/home management, therapeutic activities, therapeutic exercises  Plan of Care Expires: 10/14/22  Plan of Care Reviewed with: patient    Subjective     Chief Complaint: General weakness and SOB with activity.  Patient/Family Comments/goals: increased endurance, functional mobility and ADL independence.    Occupational Profile:  Living Environment: lives with spouse in a 1 story home, 5 steps, left side rail to enter home.  Previous level of function: modified independence using a cane, rollator or rolling walker to ambulate in the home. Spouse assists patient with  lower body bathing/dressing.   Roles and Routines: limited homemaker  Equipment Used at Home:  walker, rolling, rollator, bedside commode, cane, straight, wheelchair, oxygen  Assistance upon Discharge: Spouse    Pain/Comfort:  Pain Rating 1: 0/10  Pain Rating Post-Intervention 1: 0/10    Patients cultural, spiritual, Jewish conflicts given the current situation: no    Objective:     Communicated with: nurse prior to session.  Patient found HOB elevated with telemetry, peripheral IV, pulse ox (continuous), oxygen, PureWick upon OT entry to room.    General Precautions: Standard, fall   Orthopedic Precautions:N/A   Braces: N/A  Respiratory Status: Nasal cannula, flow 3 L/min    Occupational Performance:    Bed Mobility:    Patient completed Scooting/Bridging with maximal assistance  Patient completed Supine to Sit with maximal assistance  Performed unsupported sitting EOB with contact guard assistance.    Functional Mobility/Transfers:  Patient completed Bed <> Chair Transfer using Step Transfer technique with moderate assistance with hand-held assist and rolling walker    Activities of Daily Living:  Lower Body Dressing: maximal assistance to don/doff socks sitting EOB.    Cognitive/Visual Perceptual:  Cognitive/Psychosocial Skills:     -       Oriented to: Person, Place, and Situation   -       Follows Commands/attention:Follows two-step commands  -       Communication: clear/fluent  -       Memory: Poor immediate recall  -       Safety awareness/insight to disability: impaired   -       Mood/Affect/Coping skills/emotional control: Cooperative and Pleasant  Visual/Perceptual:      -Intact Acuity    Physical Exam:  Balance:    -       Sitting: Contact Guard, Standing: Moderate Assist  Upper Extremity Range of Motion:     -       Right Upper Extremity: WFL  -       Left Upper Extremity: WFL  Upper Extremity Strength:    -       Right Upper Extremity: 3+/5  -       Left Upper Extremity: 3+/5   Strength:    -        Right Upper Extremity: fair  -       Left Upper Extremity: fair  Fine Motor Coordination:    -       Intact    AMPAC 6 Click ADL:  AMPAC Total Score: 15    Treatment & Education:  Patient educated on the purpose of Occupational Therapy and the importance of getting OOB.    Patient left up in chair with all lines intact, call button in reach, and chair alarm on    GOALS:   Multidisciplinary Problems       Occupational Therapy Goals          Problem: Occupational Therapy    Goal Priority Disciplines Outcome Interventions   Occupational Therapy Goal     OT, PT/OT     Description: Goals to be met by: 10/14/2022     Patient will increase functional independence with ADLs by performing:    UE Dressing with Stand-by Assistance.  LE Dressing with Stand-by Assistance.  Grooming while seated with Stand-by Assistance.  Toileting from toilet with Stand-by Assistance for hygiene and clothing management.   Supine to sit with Stand-by Assistance.  Toilet transfer to toilet with Stand-by Assistance.  Perform 30 minutes of sitting/standing ADL activity with O2 sats 90% or above.                         History:     Past Medical History:   Diagnosis Date    Abnormal EKG 9/26/2012    Allergy     Arthritis     Asthma     Brain bleed     Colon polyps 11/6/2020    COPD (chronic obstructive pulmonary disease)     Depression     Diabetes mellitus type II     Diverticulitis     Fatty liver     GERD (gastroesophageal reflux disease)     Goiter     s/p thyroidectomy    Heart murmur     Hemiparesis affecting right side as late effect of cerebrovascular accident (CVA) 7/26/2022    Hernia of abdominal wall     History of intracranial hemorrhage 6/15/2013    History of non-ST elevation myocardial infarction (NSTEMI) 6/15/2013    Hyperlipidemia     Hypertension     Lactic acidosis 1/11/2020    Metabolic syndrome 6/14/2012    Myocardial infarction     On home oxygen therapy     states uses 2L at night or when sat < 90    Pacemaker     Positive  FIT (fecal immunochemical test) 11/6/2020    Severe persistent asthma without complication 4/30/2020    Statin intolerance     Stroke 2012    left hand shaky, loss of balance         Past Surgical History:   Procedure Laterality Date    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/25/2017    Dr Griggs    ADRENALECTOMY      AORTIC VALVE REPLACEMENT  12/09/2016    ARTERIOGRAPHY OF AORTIC ROOT N/A 9/12/2022    Procedure: ARTERIOGRAM, AORTIC ROOT;  Surgeon: Marko Batres MD;  Location: [facility] CATH/EP LAB;  Service: Cardiology;  Laterality: N/A;    arthroscopy lt knee Right     BLADDER REPAIR      sling    BREAST BIOPSY Bilateral     benign    COLONOSCOPY  2004    10 year recheck    COLONOSCOPY N/A 11/6/2020    Procedure: COLONOSCOPY;  Surgeon: Yakelin Lowery MD;  Location: [facility] ENDO;  Service: Endoscopy;  Laterality: N/A;    CORONARY ANGIOGRAPHY INCLUDING BYPASS GRAFTS WITH CATHETERIZATION OF LEFT HEART Left 9/12/2022    Procedure: Left heart cath including bypass graft, with left heart catheterization;  Surgeon: Marko Batres MD;  Location: [facility] CATH/EP LAB;  Service: Cardiology;  Laterality: Left;    CORONARY ARTERY BYPASS GRAFT  12/09/2016    X3    EPIDURAL STEROID INJECTION INTO LUMBAR SPINE N/A 7/27/2021    Procedure: Injection-steroid-epidural-lumbar;  Surgeon: Valerio Jay MD;  Location: [facility] OR;  Service: Pain Management;  Laterality: N/A;  L5-S1     HYSTERECTOMY      INSERTION OF PACEMAKER Left 1/13/2020    Procedure: INSERTION, PACEMAKER;  Surgeon: Marko Batres MD;  Location: [facility] CATH/EP LAB;  Service: Cardiology;  Laterality: Left;    OOPHORECTOMY      RIGHT HEART CATHETERIZATION Right 9/12/2022    Procedure: INSERTION, CATHETER, RIGHT HEART;  Surgeon: Marko Batres MD;  Location: [facility] CATH/EP LAB;  Service: Cardiology;  Laterality: Right;    THYROIDECTOMY         Time Tracking:     OT Date of Treatment: 09/14/22  OT Start Time: 0921  OT Stop Time: 0952  OT Total Time (min): 31 min    Billable Minutes:Evaluation  10  Self Care/Home Management 21    Co-Eval between OT and PT. OT addressed ADLs, PT addressed functional mobility.    9/14/2022

## 2022-09-14 NOTE — PROGRESS NOTES
Pulmonary/Critical Care Consult      PATIENT NAME: Tereza Larose  MRN: [id number]  TODAY'S DATE: 2022  5:26 PM  ADMIT DATE: 2022  AGE: 71 y.o. : 1950    CONSULT REQUESTED BY: Daron Bolton MD    REASON FOR CONSULT:   Acute hypoxic hypercapnic respiratory failure    HISTORY OF PRESENT ILLNESS   Tereza Larose is a 71 y.o. female with a PMH of asthma/?COPD on 2L at home, CAD s/p CABG, AVR, htn, and DM2 currently hospitalized s/p Torsades de Pointes cardiac arrest on whom we have been consulted for hypoxic and hypercapnic respiratory failure.    Ms. Larose presented on  with SOB and syncope. Shortly after midnight that night, she had multiple episodes of VT culminating with torsades de pointes and cardiac arrest. However, ROSC was reportedly achieved quickly with Epi an CPR. The patient underwent LHC this morning, and a stent was placed in the RCA. She was lethargic today and found to be hypercapnic and acidotic with ABG 7.26/90/83/40 at noon. She was placed on BiPAP, and ABG improved to 7.35/77/84/42 by 13:00. The patient's confusion and lethargy improved, and she was weaned to nasal cannula.    Per the patient's , she has had SOB and multiple episodes of syncope over the last few weeks.    22: Slept comfortably on BiPAP last night. She feels well this morning.    22: DELORIS completed yesterday showing evidence of longstanding pulmonary hypertension. See below. Ms. Larose feels well this morning without complaints. Slept well with BiPAP last night and says it may be helping her sleep when asked. She has been using her incentive spirometer.    SMOKING HISTORY  Never smoker    EXPOSURE HISTORY  Never cooked with indoor woodburning stoves.    REVIEW OF SYSTEMS  GENERAL: Feeling well.  EYES: Vision is good.  ENT: No sinusitis or pharyngitis.   HEART: No chest pain or palpitations.  LUNGS: No cough, sputum, or wheezing.  GI: No nausea, vomiting, constipation, diarrhea, or  reflux.  : No dysuria, hesitancy, or nocturia.  SKIN: No lesions or rashes.  MUSCULOSKELETAL: Back pain.  NEURO: No headaches or neuropathy.  LYMPH: No edema or adenopathy.  PSYCH: No anxiety or depression.  ENDO: No weight change.    ALLERGIES  Review of patient's allergies indicates:   Allergen Reactions    Metformin Diarrhea     Diarrhea      Corn Itching     Other reaction(s): Sneezing  Other reaction(s): Rhinorrhea    Potato starch Hives     Other reaction(s): Sneezing  Other reaction(s): Rhinorrhea    Pravastatin Other (See Comments)     Muscle pain    Statins-hmg-coa reductase inhibitors Other (See Comments)    Hydrochlorothiazide Other (See Comments)     weakness    Welchol [colesevelam] Other (See Comments)     Weakness        INPATIENT SCHEDULED MEDICATIONS   albuterol-ipratropium  3 mL Nebulization QID WAKE    arformoteroL  15 mcg Nebulization BID    aspirin  81 mg Oral Daily    budesonide  0.5 mg Nebulization BID    chlorhexidine  15 mL Mouth/Throat BID    clopidogreL  75 mg Oral Daily    enoxaparin  40 mg Subcutaneous Daily    insulin aspart U-100  7 Units Subcutaneous TIDWM    insulin detemir U-100  10 Units Subcutaneous QHS    levothyroxine  150 mcg Oral Before breakfast    metoprolol succinate  50 mg Oral Daily    montelukast  10 mg Oral QHS    mupirocin   Nasal BID    predniSONE  40 mg Oral Daily    sacubitriL-valsartan  1 tablet Oral BID      amiodarone in dextrose 5% Stopped (09/12/22 1046)    magnesium sulfate in water         MEDICAL AND SURGICAL HISTORY  Past Medical History:   Diagnosis Date    Abnormal EKG 9/26/2012    Allergy     Arthritis     Asthma     Brain bleed     Colon polyps 11/6/2020    COPD (chronic obstructive pulmonary disease)     Depression     Diabetes mellitus type II     Diverticulitis     Fatty liver     GERD (gastroesophageal reflux disease)     Goiter     s/p thyroidectomy    Heart murmur     Hemiparesis affecting right side as late effect of cerebrovascular accident  (CVA) 7/26/2022    Hernia of abdominal wall     History of intracranial hemorrhage 6/15/2013    History of non-ST elevation myocardial infarction (NSTEMI) 6/15/2013    Hyperlipidemia     Hypertension     Lactic acidosis 1/11/2020    Metabolic syndrome 6/14/2012    Myocardial infarction     On home oxygen therapy     states uses 2L at night or when sat < 90    Pacemaker     Positive FIT (fecal immunochemical test) 11/6/2020    Severe persistent asthma without complication 4/30/2020    Statin intolerance     Stroke 2012    left hand shaky, loss of balance     Past Surgical History:   Procedure Laterality Date    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/25/2017    Dr Griggs    ADRENALECTOMY      AORTIC VALVE REPLACEMENT  12/09/2016    ARTERIOGRAPHY OF AORTIC ROOT N/A 9/12/2022    Procedure: ARTERIOGRAM, AORTIC ROOT;  Surgeon: Marko Batres MD;  Location: [facility] CATH/EP LAB;  Service: Cardiology;  Laterality: N/A;    arthroscopy lt knee Right     BLADDER REPAIR      sling    BREAST BIOPSY Bilateral     benign    COLONOSCOPY  2004    10 year recheck    COLONOSCOPY N/A 11/6/2020    Procedure: COLONOSCOPY;  Surgeon: Yakelin Lowery MD;  Location: [facility] ENDO;  Service: Endoscopy;  Laterality: N/A;    CORONARY ANGIOGRAPHY INCLUDING BYPASS GRAFTS WITH CATHETERIZATION OF LEFT HEART Left 9/12/2022    Procedure: Left heart cath including bypass graft, with left heart catheterization;  Surgeon: Marko Batres MD;  Location: [facility] CATH/EP LAB;  Service: Cardiology;  Laterality: Left;    CORONARY ARTERY BYPASS GRAFT  12/09/2016    X3    EPIDURAL STEROID INJECTION INTO LUMBAR SPINE N/A 7/27/2021    Procedure: Injection-steroid-epidural-lumbar;  Surgeon: Valerio Jay MD;  Location: [facility] OR;  Service: Pain Management;  Laterality: N/A;  L5-S1     HYSTERECTOMY      INSERTION OF PACEMAKER Left 1/13/2020    Procedure: INSERTION, PACEMAKER;  Surgeon: Marko Batres MD;  Location: [facility] CATH/EP LAB;  Service: Cardiology;  Laterality: Left;     OOPHORECTOMY      RIGHT HEART CATHETERIZATION Right 9/12/2022    Procedure: INSERTION, CATHETER, RIGHT HEART;  Surgeon: Marko Batres MD;  Location: [facility] CATH/EP LAB;  Service: Cardiology;  Laterality: Right;    THYROIDECTOMY         ALCOHOL, TOBACCO AND DRUG USE  Social History     Tobacco Use   Smoking Status Never   Smokeless Tobacco Never     Social History     Substance and Sexual Activity   Alcohol Use Not Currently    Comment: seldom     Social History     Substance and Sexual Activity   Drug Use No       FAMILY HISTORY  Family History   Problem Relation Age of Onset    Arthritis Mother     Diabetes Mother     Heart disease Mother     Hypertension Mother     Hypertension Father     Heart disease Father     Mental illness Father     Asthma Sister     Diabetes Sister     Hypertension Sister     Asthma Son     COPD Son     Depression Son     Diabetes Son     Hypertension Son     Stroke Son     Diabetes Maternal Uncle     Heart disease Maternal Uncle     Mental illness Paternal Aunt     Cancer Paternal Aunt     Heart disease Paternal Aunt     Hypertension Paternal Aunt     Heart disease Paternal Uncle     Hypertension Maternal Grandmother     Mental illness Maternal Grandmother     Aneurysm Maternal Grandfather     Mental illness Paternal Grandmother     Heart disease Paternal Grandfather     Melanoma Neg Hx     Psoriasis Neg Hx     Lupus Neg Hx     Eczema Neg Hx        VITAL SIGNS (MOST RECENT)  Temp: 98.5 °F (36.9 °C) (09/14/22 0300)  Pulse: 81 (09/14/22 0800)  Resp: 20 (09/14/22 0800)  BP: (!) 176/76 (09/14/22 0605)  SpO2: 95 % (09/14/22 0800)    INTAKE AND OUTPUT (LAST 24 HOURS):  Intake/Output Summary (Last 24 hours) at 9/14/2022 0827  Last data filed at 9/14/2022 0500  Gross per 24 hour   Intake 634.25 ml   Output 550 ml   Net 84.25 ml       WEIGHT  Wt Readings from Last 1 Encounters:   09/14/22 84.8 kg (186 lb 15.2 oz)       PHYSICAL EXAM  GENERAL: A&O. NAD.  HEENT: Extraocular movements intact. Pharynx  moist.  NECK: Supple. No JVD or HJR.  HEART: Regular rate and normal rhythm. 3/6 systoilic murmur at LUSB radiating to carotids.  LUNGS: Clear to auscultation anteriorly. Lung excursion symmetrical.   ABDOMEN: Soft, non-tender, non-distended, no masses palpated.  EXTREMITIES: Normal muscle tone and joint movement, no cyanosis or clubbing.   LYMPHATICS: No adenopathy palpated, no edema.  SKIN: Dry, intact, no lesions.   NEURO: No gross deficit. A&Ox3.  PSYCH: Appropriate affect    ACUTE PHASE REACTANT (LAST 24 HOURS)  No results for input(s): FERRITIN, CRP, LDH, DDIMER in the last 24 hours.    CBC LAST (LAST 24 HOURS)  Recent Labs   Lab 09/14/22  0344   WBC 11.32   RBC 4.04   HGB 12.5   HCT 38.3   MCV 95   MCH 30.9   MCHC 32.6   RDW 13.2      MPV 9.7   GRAN 76.3*  8.6*   LYMPH 11.7*  1.3   MONO 11.3  1.3*   BASO 0.01   NRBC 0       CHEMISTRY LAST (LAST 24 HOURS)  Recent Labs   Lab 09/13/22  1606 09/14/22  0344   NA  --  137   K  --  4.1   CL  --  95   CO2  --  34*   ANIONGAP  --  8   BUN  --  54*   CREATININE  --  1.6*   GLU  --  131*   CALCIUM  --  9.0   PH 7.448  --    MG  --  2.3       COAGULATION LAST (LAST 24 HOURS)  No results for input(s): LABPT, INR, APTT in the last 24 hours.    CARDIAC PROFILE (LAST 24 HOURS)  Recent Labs   Lab 09/10/22  0212 09/11/22  0343   BNP  --  1,383*   TROPONINI 0.034  --        LAST 7 DAYS MICROBIOLOGY   Microbiology Results (last 7 days)       Procedure Component Value Units Date/Time    Blood culture x two cultures. Draw prior to antibiotics. [[id number]] Collected: 09/09/22 1524    Order Status: Completed Specimen: Blood from Peripheral, Hand, Right Updated: 09/13/22 1632     Blood Culture, Routine No Growth to date      No Growth to date      No Growth to date      No Growth to date      No Growth to date    Narrative:      Aerobic and anaerobic    Blood culture x two cultures. Draw prior to antibiotics. [[id number]] Collected: 09/09/22 1524    Order Status: Completed  Specimen: Blood from Peripheral, Hand, Left Updated: 09/13/22 4630     Blood Culture, Routine No Growth to date      No Growth to date      No Growth to date      No Growth to date      No Growth to date    Narrative:      Aerobic and anaerobic            MOST RECENT IMAGING  Transesophageal echo (DELORIS)  · The left ventricle is normal in size with normal systolic function.  · The estimated ejection fraction is 65%.  · Severe right ventricular enlargement with moderately to severely reduced   right ventricular systolic function. The right ventricle is twice the size   of the left ventricle consistent with severe underlying long-standing   pulmonary hypertension  · Mild left atrial enlargement.  · No interatrial septal defect present. The septum did bow from right to   left consistent with elevated right-sided pressures. The right atrium was   2 times the size of the left atrium  · Normal appearing left atrial appendage.  · Severe right atrial enlargement.  · Interatrial septum bows to left, consider elevated right atrial   pressure.  · There is a bioprosthetic aortic valve present. There is no aortic   insufficiency present. There was no significant aortic stenosis  · Moderate mitral regurgitation. There was significant degenerative   changes of the mitral valve with restricted motion of posterior leaflet   consistent with ischemic mitral regurgitation. The jet did not reach the   posterior wall of the atrium or the septum  · Moderate tricuspid regurgitation. Tricuspid valve was difficult to image   because of the anterior location. Suspect severe TR based on right atrial   dimensions.  · There is severe pulmonary hypertension .  · Grade 2 plaque present in the descending aorta. Layered plaque . It was   technically difficult to image the aorta     Cardiac catheterization    The Origin of the graft to the right coronary artery lesion was 99%   stenosed with 0% stenosis post-intervention.    A STENT JOHNIE LEVAR 2.5 X 22  stent was successfully placed at 14 LOU for   14 sec. at the origin of the SVG to right coronary artery    The estimated blood loss was none.    Severe pulmonary hypertension with PA pressure is 80 over 30 pulmonary   capillary wedge about 12    Total occlusion of the mid LAD    Normal LIMA to the LAD    Normal circumflex    Presumed nonfunctioning graft to the OM, could not engage in did not   see on aortic root injection    Aortic root demonstrating 2+ aortic insufficiency of prosthetic aortic   valve.  I did not cross the valve due to the dye load of the intervention   of the SVG to the right.  Of note there is significant mitral annular   calcification    Will consider DELORIS to see the degree of MR and see if there is any other   reasons as to why patient has such severe pulmonary hypertension    Continue dual anti-platelet therapy minimum of 1 year    The procedure log was documented by Documenter: Sarah Tschume, RN and   verified by Marko Batres MD.    Date: 9/12/2022  Time: 10:14 AM      CURRENT VISIT EKG  Results for orders placed or performed during the hospital encounter of 09/09/22   EKG 12-lead    Narrative    Test Reason : R00.0,    Vent. Rate : 060 BPM     Atrial Rate : 060 BPM     P-R Int : 260 ms          QRS Dur : 164 ms      QT Int : 566 ms       P-R-T Axes : 093 072 077 degrees     QTc Int : 566 ms    AV dual-paced rhythm with prolonged AV conduction  Abnormal ECG  When compared with ECG of 10-SEP-2022 01:27,  Vent. rate has decreased BY   6 BPM  Confirmed by Mery LICEA, Mayo LEVINE ([id number]) on 9/11/2022 2:49:27 PM    Referred By: [email or web address]   SELF           Confirmed By:Mayo Ordonez MD       ECHOCARDIOGRAM RESULTS  Results for orders placed during the hospital encounter of 09/09/22    TTE 9/9/22    Interpretation Summary  · The left ventricle is normal in size with normal systolic function.  · Grade II left ventricular diastolic dysfunction.  · The estimated PA systolic pressure is 75 mmHg.  ·  Normal right ventricular size with normal right ventricular systolic function.  · There is moderate to severe pulmonary hypertension.  · Normal central venous pressure (3 mmHg).  · Moderate left atrial enlargement.  · Moderate tricuspid regurgitation.  · Moderate-to-severe mitral regurgitation.  · The estimated ejection fraction is 60%.  · Mild-to-moderate aortic regurgitation.  · There is moderate aortic valve stenosis.  · Aortic valve area is 0.72 cm2; peak velocity is m/s; mean gradient is 20 mmHg.    DELORIS 9/13/22  Summary    The left ventricle is normal in size with normal systolic function.  The estimated ejection fraction is 65%.  Severe right ventricular enlargement with moderately to severely reduced right ventricular systolic function. The right ventricle is twice the size of the left ventricle consistent with severe underlying long-standing pulmonary hypertension  Mild left atrial enlargement.  No interatrial septal defect present. The septum did bow from right to left consistent with elevated right-sided pressures. The right atrium was 2 times the size of the left atrium  Normal appearing left atrial appendage.  Severe right atrial enlargement.  Interatrial septum bows to left, consider elevated right atrial pressure.  There is a bioprosthetic aortic valve present. There is no aortic insufficiency present. There was no significant aortic stenosis  Moderate mitral regurgitation. There was significant degenerative changes of the mitral valve with restricted motion of posterior leaflet consistent with ischemic mitral regurgitation. The jet did not reach the posterior wall of the atrium or the septum  Moderate tricuspid regurgitation. Tricuspid valve was difficult to image because of the anterior location. Suspect severe TR based on right atrial dimensions.  There is severe pulmonary hypertension .  Grade 2 plaque present in the descending aorta. Layered plaque . It was technically difficult to image the  aorta        RESPIRATORY SUPPORT  Oxygen Concentration (%):  [0.35-40] 40  NC 4-5 L       LAST ARTERIAL BLOOD GAS  ABG  Recent Labs   Lab 09/13/22  1606   PH 7.448   PO2 67*   PCO2 50.6*   HCO3 35.0*   BE 11     Right Heart Catheterization (9/12/22)  RA: 8 RV: 80/ 19 PA: 82/ 30/ 49 PWP: 12     IMPRESSION AND PLAN  Tereza Larose is a 71 y.o. female with a PMH of asthma/?COPD on 2L at home, CAD s/p CABG, AVR, htn, prior stroke, and DM2 currently hospitalized s/p Torsades de Pointes cardiac arrest on whom we have been consulted for hypoxic and hypercapnic respiratory failure.    #Severe pulmonary hypertension, likely group 1 or 3 (due to intrinsic pulmonary arterial hypertension or pulmonary parenchymal disease)  In light of RHC, agree with cardiology assessment. PCWP is high normal, but excludes left heart disease as the explanation for pulmonary hypertension.  - refer to pulmonary hypertension specialist outpatient  - do not start pulmonary vasodilators now    #Acute on chronic hypercapnic, hypoxic respiratory failure  #Asthma/COPD with acute exacerbation  #Possible BRIJESH  #Possible obesity hypoventilation syndrome  #Possible community-acquired pneumonia  #Encephalopathy 2/2 hypercapnia, improved  Baseline HCO3 for the last couple of years is in the 30s, indicating chronic, compensated respiratory acidosis. Current CHF exacerbation has likely led to this acute decompensation. STOP-BANG score indicates high risk for BRIJESH.  - BiPAP while sleeping (ordered at 18/6)  - continue DuoNebs QID WA  - resume prednisone 40 mg daily until 9/15  - titrate O2 to SpO2 88-92%  - continue budesonide and arformoterol  - complete 5 days total corticosteroid therapy (9/9 - 9/14)  - completed 5 days of antibiotic therapy   - will need sleep study outpatient  - discharge with NIV (BiPAP 18/6) nightly at home if possible  -  consult ordered    #Acute decompensated heart failure secondary to ischemic cardiomyopathy and  valvulopathy  #CAD s/p LEVAR to RCA (9/12/22)  #s/p Cardiac arrest  #QT prolongation  #Uncontrolled, severe hypertension  - titrate goal-directed medical therapy/antihypertensives as tolerated  - defer to cardiology  - close hemodynamic monitoring, telemetry  - avoid QT prolonging meds    #Acute kidney injury  Likely due to decompensated heart failure.  - diurese to euvolemia  - daily serum chemistry    #Sundowning  - delirium precautions    Asher Arteaga MD  [facility]  Department of Pulmonology  Date of Service: 09/14/2022  9:00 am

## 2022-09-15 ENCOUNTER — TELEPHONE (OUTPATIENT)
Dept: PULMONOLOGY | Facility: CLINIC | Age: 72
End: 2022-09-15
Payer: COMMERCIAL

## 2022-09-15 VITALS
HEART RATE: 79 BPM | RESPIRATION RATE: 20 BRPM | DIASTOLIC BLOOD PRESSURE: 71 MMHG | TEMPERATURE: 99 F | HEIGHT: 62 IN | SYSTOLIC BLOOD PRESSURE: 164 MMHG | WEIGHT: 188.25 LBS | BODY MASS INDEX: 34.64 KG/M2 | OXYGEN SATURATION: 92 %

## 2022-09-15 PROBLEM — R79.89 ELEVATED SERUM CREATININE: Status: ACTIVE | Noted: 2022-09-15

## 2022-09-15 PROBLEM — I27.20 PULMONARY HYPERTENSION: Status: ACTIVE | Noted: 2022-09-15

## 2022-09-15 LAB
ANION GAP SERPL CALC-SCNC: 8 MMOL/L (ref 8–16)
BASOPHILS # BLD AUTO: 0.01 K/UL (ref 0–0.2)
BASOPHILS NFR BLD: 0.1 % (ref 0–1.9)
BUN SERPL-MCNC: 65 MG/DL (ref 8–23)
CALCIUM SERPL-MCNC: 9.5 MG/DL (ref 8.7–10.5)
CHLORIDE SERPL-SCNC: 96 MMOL/L (ref 95–110)
CO2 SERPL-SCNC: 35 MMOL/L (ref 23–29)
CREAT SERPL-MCNC: 1.6 MG/DL (ref 0.5–1.4)
DIFFERENTIAL METHOD: ABNORMAL
EOSINOPHIL # BLD AUTO: 0.1 K/UL (ref 0–0.5)
EOSINOPHIL NFR BLD: 0.5 % (ref 0–8)
ERYTHROCYTE [DISTWIDTH] IN BLOOD BY AUTOMATED COUNT: 13.1 % (ref 11.5–14.5)
EST. GFR  (NO RACE VARIABLE): 34.3 ML/MIN/1.73 M^2
GLUCOSE SERPL-MCNC: 121 MG/DL (ref 70–110)
GLUCOSE SERPL-MCNC: 137 MG/DL (ref 70–110)
GLUCOSE SERPL-MCNC: 164 MG/DL (ref 70–110)
HCT VFR BLD AUTO: 39.9 % (ref 37–48.5)
HGB BLD-MCNC: 12.8 G/DL (ref 12–16)
IMM GRANULOCYTES # BLD AUTO: 0.07 K/UL (ref 0–0.04)
IMM GRANULOCYTES NFR BLD AUTO: 0.5 % (ref 0–0.5)
LYMPHOCYTES # BLD AUTO: 1.7 K/UL (ref 1–4.8)
LYMPHOCYTES NFR BLD: 11.5 % (ref 18–48)
MAGNESIUM SERPL-MCNC: 2.4 MG/DL (ref 1.6–2.6)
MCH RBC QN AUTO: 30.8 PG (ref 27–31)
MCHC RBC AUTO-ENTMCNC: 32.1 G/DL (ref 32–36)
MCV RBC AUTO: 96 FL (ref 82–98)
MONOCYTES # BLD AUTO: 0.9 K/UL (ref 0.3–1)
MONOCYTES NFR BLD: 5.9 % (ref 4–15)
NEUTROPHILS # BLD AUTO: 11.8 K/UL (ref 1.8–7.7)
NEUTROPHILS NFR BLD: 81.5 % (ref 38–73)
NRBC BLD-RTO: 0 /100 WBC
PLATELET # BLD AUTO: 384 K/UL (ref 150–450)
PMV BLD AUTO: 9.8 FL (ref 9.2–12.9)
POTASSIUM SERPL-SCNC: 3.6 MMOL/L (ref 3.5–5.1)
RBC # BLD AUTO: 4.16 M/UL (ref 4–5.4)
SODIUM SERPL-SCNC: 139 MMOL/L (ref 136–145)
WBC # BLD AUTO: 14.48 K/UL (ref 3.9–12.7)

## 2022-09-15 PROCEDURE — 63600175 PHARM REV CODE 636 W HCPCS: Performed by: INTERNAL MEDICINE

## 2022-09-15 PROCEDURE — 83735 ASSAY OF MAGNESIUM: CPT | Performed by: FAMILY MEDICINE

## 2022-09-15 PROCEDURE — 25000003 PHARM REV CODE 250

## 2022-09-15 PROCEDURE — 82962 GLUCOSE BLOOD TEST: CPT

## 2022-09-15 PROCEDURE — 25000003 PHARM REV CODE 250: Performed by: INTERNAL MEDICINE

## 2022-09-15 PROCEDURE — 25000003 PHARM REV CODE 250: Performed by: FAMILY MEDICINE

## 2022-09-15 PROCEDURE — 80048 BASIC METABOLIC PNL TOTAL CA: CPT | Performed by: FAMILY MEDICINE

## 2022-09-15 PROCEDURE — 25000242 PHARM REV CODE 250 ALT 637 W/ HCPCS: Performed by: FAMILY MEDICINE

## 2022-09-15 PROCEDURE — 27000221 HC OXYGEN, UP TO 24 HOURS

## 2022-09-15 PROCEDURE — 97530 THERAPEUTIC ACTIVITIES: CPT

## 2022-09-15 PROCEDURE — 94660 CPAP INITIATION&MGMT: CPT

## 2022-09-15 PROCEDURE — 99232 SBSQ HOSP IP/OBS MODERATE 35: CPT | Mod: ,,, | Performed by: INTERNAL MEDICINE

## 2022-09-15 PROCEDURE — 99900035 HC TECH TIME PER 15 MIN (STAT)

## 2022-09-15 PROCEDURE — 85025 COMPLETE CBC W/AUTO DIFF WBC: CPT | Performed by: FAMILY MEDICINE

## 2022-09-15 PROCEDURE — 97110 THERAPEUTIC EXERCISES: CPT

## 2022-09-15 PROCEDURE — 94761 N-INVAS EAR/PLS OXIMETRY MLT: CPT

## 2022-09-15 PROCEDURE — 36415 COLL VENOUS BLD VENIPUNCTURE: CPT | Performed by: FAMILY MEDICINE

## 2022-09-15 PROCEDURE — 99232 PR SUBSEQUENT HOSPITAL CARE,LEVL II: ICD-10-PCS | Mod: ,,, | Performed by: INTERNAL MEDICINE

## 2022-09-15 PROCEDURE — 94799 UNLISTED PULMONARY SVC/PX: CPT

## 2022-09-15 PROCEDURE — 94640 AIRWAY INHALATION TREATMENT: CPT

## 2022-09-15 RX ORDER — AMLODIPINE BESYLATE 5 MG/1
5 TABLET ORAL DAILY
Status: DISCONTINUED | OUTPATIENT
Start: 2022-09-15 | End: 2022-09-15 | Stop reason: HOSPADM

## 2022-09-15 RX ORDER — CLOPIDOGREL BISULFATE 75 MG/1
75 TABLET ORAL DAILY
Qty: 30 TABLET | Refills: 11 | Status: SHIPPED | OUTPATIENT
Start: 2022-09-16 | End: 2022-09-29 | Stop reason: SDUPTHER

## 2022-09-15 RX ADMIN — METOPROLOL SUCCINATE 50 MG: 50 TABLET, FILM COATED, EXTENDED RELEASE ORAL at 09:09

## 2022-09-15 RX ADMIN — CHLORHEXIDINE GLUCONATE 15 ML: 1.2 RINSE ORAL at 08:09

## 2022-09-15 RX ADMIN — IPRATROPIUM BROMIDE AND ALBUTEROL SULFATE 3 ML: .5; 3 SOLUTION RESPIRATORY (INHALATION) at 07:09

## 2022-09-15 RX ADMIN — MUPIROCIN 1 G: 20 OINTMENT TOPICAL at 08:09

## 2022-09-15 RX ADMIN — HYDROCODONE BITARTRATE AND ACETAMINOPHEN 1 TABLET: 5; 325 TABLET ORAL at 04:09

## 2022-09-15 RX ADMIN — SACUBITRIL AND VALSARTAN 1 TABLET: 24; 26 TABLET, FILM COATED ORAL at 09:09

## 2022-09-15 RX ADMIN — AMLODIPINE BESYLATE 5 MG: 5 TABLET ORAL at 09:09

## 2022-09-15 RX ADMIN — BUDESONIDE 0.5 MG: 0.5 INHALANT RESPIRATORY (INHALATION) at 07:09

## 2022-09-15 RX ADMIN — INSULIN ASPART 7 UNITS: 100 INJECTION, SOLUTION INTRAVENOUS; SUBCUTANEOUS at 08:09

## 2022-09-15 RX ADMIN — PREDNISONE 40 MG: 20 TABLET ORAL at 08:09

## 2022-09-15 RX ADMIN — IPRATROPIUM BROMIDE AND ALBUTEROL SULFATE 3 ML: .5; 3 SOLUTION RESPIRATORY (INHALATION) at 11:09

## 2022-09-15 RX ADMIN — SENNOSIDES AND DOCUSATE SODIUM 2 TABLET: 8.6; 5 TABLET ORAL at 09:09

## 2022-09-15 RX ADMIN — LEVOTHYROXINE SODIUM 150 MCG: 0.1 TABLET ORAL at 05:09

## 2022-09-15 RX ADMIN — ASPIRIN 81 MG: 81 TABLET, COATED ORAL at 09:09

## 2022-09-15 RX ADMIN — ARFORMOTEROL TARTRATE 15 MCG: 15 SOLUTION RESPIRATORY (INHALATION) at 07:09

## 2022-09-15 RX ADMIN — CLOPIDOGREL BISULFATE 75 MG: 75 TABLET, FILM COATED ORAL at 09:09

## 2022-09-15 RX ADMIN — INSULIN ASPART 7 UNITS: 100 INJECTION, SOLUTION INTRAVENOUS; SUBCUTANEOUS at 12:09

## 2022-09-15 NOTE — TELEPHONE ENCOUNTER
Approved generic    ----- Message from Gretta Valencia, Patient Care Assistant sent at 9/15/2022 12:20 PM CDT -----  Regarding: advice  Contact: kelby with [facility]  Type: Needs Medical Advice  Who Called:  kelby with [facility]   Pharmacy name and phone #:      [id number] - [place], [place] - [address]  [address]  [place] [address]  Phone: [phone] Fax: [phone]      Best Call Back Number: [phone] (home)     Additional Information: kelby with [facility] states she would like a callback regarding albuterol (PROVENTIL/VENTOLIN HFA) 90 mcg/actuation inhaler. Please call to advise. Thanks!

## 2022-09-15 NOTE — PLAN OF CARE
Patient will transport home with spouse in private vehicle.  Discharge orders and chart reviewed with no further post-acute discharge needs identified at this time.  At this time, patient is cleared for discharge from Case Management standpoint.     Spoke with Miguelina at [facility] [phone] who states start of care for home health is 9/16/2022.       09/15/22 1310   Final Note   Assessment Type Final Discharge Note   Anticipated Discharge Disposition Home-Health  (SET UP WITH [facility])   Post-Acute Status   Post-Acute Authorization Home Health;HME   HME Status Set-up Complete/Auth obtained  (BiPAP set up through [facility])   Home Health Status Set-up Complete/Auth obtained  (Home health set up with [facility].)   Discharge Delays None known at this time

## 2022-09-15 NOTE — PLAN OF CARE
Problem: Occupational Therapy  Goal: Occupational Therapy Goal  Description: Goals to be met by: 10/14/2022     Patient will increase functional independence with ADLs by performing:    UE Dressing with Stand-by Assistance.  LE Dressing with Stand-by Assistance.  Grooming while seated with Stand-by Assistance.  Toileting from toilet with Stand-by Assistance for hygiene and clothing management.   Supine to sit with Stand-by Assistance.  Toilet transfer to toilet with Stand-by Assistance.  Perform 30 minutes of sitting/standing ADL activity with O2 sats 90% or above.    Outcome: Ongoing, Progressing

## 2022-09-15 NOTE — PROGRESS NOTES
Former[facility] Medicine  Progress Note    Patient name: Tereza Larose  MRN: [id number]  Admit Date: 9/9/2022   LOS: 3 days     SUBJECTIVE:     Principal problem: Acute on chronic combined systolic and diastolic CHF (congestive heart failure)    Interval History:  Patient c/o no BM for 5 days.    Scheduled Meds:   albuterol-ipratropium  3 mL Nebulization QID WAKE    arformoteroL  15 mcg Nebulization BID    aspirin  81 mg Oral Daily    bisacodyL  10 mg Oral Once    budesonide  0.5 mg Nebulization BID    chlorhexidine  15 mL Mouth/Throat BID    clopidogreL  75 mg Oral Daily    enoxaparin  40 mg Subcutaneous Daily    insulin aspart U-100  7 Units Subcutaneous TIDWM    insulin detemir U-100  10 Units Subcutaneous QHS    levothyroxine  150 mcg Oral Before breakfast    metoprolol succinate  50 mg Oral Daily    montelukast  10 mg Oral QHS    mupirocin   Nasal BID    predniSONE  40 mg Oral Daily    sacubitriL-valsartan  1 tablet Oral BID    senna-docusate 8.6-50 mg  2 tablet Oral BID     Continuous Infusions:   amiodarone in dextrose 5% Stopped (09/12/22 1046)    magnesium sulfate in water       PRN Meds:acetaminophen, albuterol-ipratropium, calcium chloride IVPB, calcium chloride IVPB, calcium chloride IVPB, dextrose 50%, dextrose 50%, diphenhydrAMINE, glucagon (human recombinant), glucose, glucose, hydrALAZINE, hydrALAZINE, HYDROcodone-acetaminophen, insulin aspart U-100, magnesium oxide, magnesium sulfate IVPB, magnesium sulfate IVPB, magnesium sulfate IVPB, magnesium sulfate IVPB, melatonin, morphine, naloxone, polyethylene glycol, potassium chloride in water, potassium chloride in water, potassium chloride in water, potassium chloride in water, potassium chloride, potassium chloride, potassium chloride, potassium chloride, senna-docusate 8.6-50 mg, simethicone, sodium chloride 0.9%, sodium phosphate IVPB, sodium phosphate IVPB, sodium phosphate IVPB, sodium phosphate IVPB, sodium phosphate  IVPB    Review of patient's allergies indicates:   Allergen Reactions    Metformin Diarrhea     Diarrhea      Corn Itching     Other reaction(s): Sneezing  Other reaction(s): Rhinorrhea    Potato starch Hives     Other reaction(s): Sneezing  Other reaction(s): Rhinorrhea    Pravastatin Other (See Comments)     Muscle pain    Statins-hmg-coa reductase inhibitors Other (See Comments)    Hydrochlorothiazide Other (See Comments)     weakness    Welchol [colesevelam] Other (See Comments)     Weakness        Review of Systems: As per interval history    OBJECTIVE:     Vital Signs (Most Recent)  Temp: 97.7 °F (36.5 °C) (09/14/22 1914)  Pulse: 80 (09/14/22 1951)  Resp: (!) 26 (09/14/22 1951)  BP: (!) 120/56 (09/14/22 1914)  SpO2: (!) 94 % (09/14/22 1951)    Vital Signs Range (Last 24H):  Temp:  [97.7 °F (36.5 °C)-98.6 °F (37 °C)]   Pulse:  [62-84]   Resp:  [14-26]   BP: (105-180)/(50-77)   SpO2:  [91 %-99 %]     I & O (Last 24H):  Intake/Output Summary (Last 24 hours) at 9/14/2022 2124  Last data filed at 9/14/2022 1809  Gross per 24 hour   Intake 804.25 ml   Output 500 ml   Net 304.25 ml       Physical Exam:    General: Patient resting comfortably in no acute distress.  Eyes: No conjunctival injection. No scleral icterus.  ENT: Hearing grossly intact. No discharge from ears. No nasal discharge.   Neck: Supple, trachea midline. No JVD  CVS: RRR. No LE edema BL.  +systolic murmur, best heard at right 2nd IC  Chest:  tender to palp r/t compressions  Lungs:  No tachypnea or accessory muscle use.  Crackles greater on left  Abdomen:  Soft, nontender and nondistended.  No organomegaly  Neuro: AOx3. Moves all extremities. Follows commands. Responds appropriately   Skin:  No rash or erythema noted              Laboratory:  I have reviewed all pertinent lab results within the past 24 hours.  CBC:   Recent Labs   Lab 09/14/22  0344   WBC 11.32   RBC 4.04   HGB 12.5   HCT 38.3      MCV 95   MCH 30.9   MCHC 32.6     BMP:  "  Recent Labs   Lab 09/14/22  0344   *      K 4.1   CL 95   CO2 34*   BUN 54*   CREATININE 1.6*   CALCIUM 9.0   MG 2.3     CMP:   Recent Labs   Lab 09/10/22  2359 09/11/22  0343 09/14/22  0344   *   < > 131*   CALCIUM 9.4   < > 9.0   ALBUMIN 3.7  --   --    PROT 7.1  --   --    *   < > 137   K 4.1   < > 4.1   CO2 36*   < > 34*   CL 90*   < > 95   BUN 36*   < > 54*   CREATININE 1.4   < > 1.6*   ALKPHOS 54*  --   --    ALT 43  --   --    AST 42*  --   --    BILITOT 0.8  --   --     < > = values in this interval not displayed.       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]   Yes    Complete heart block [I44.2]   Yes    Anticoagulant long-term use [Z79.01]   Not Applicable    Hypoxemic respiratory failure, chronic [J96.11]   Yes    Postoperative hypothyroidism [E89.0]   Yes    Adrenal Cushing's syndrome [E24.9]   Yes    Type 2 diabetes mellitus with circulatory disorder, without long-term current use of insulin [E11.59]   Yes    History of intracranial hemorrhage [Z86.79]   Not Applicable    Hypertension associated with diabetes [E11.59, I15.2]   Yes    CAD (coronary artery disease) [I25.10]   Yes    Asthma-COPD overlap syndrome [J44.9]   Yes    BRIJESH (obstructive sleep apnea) [G47.33]   Yes       Chronic       Resolved Hospital Problems   No resolved problems to display.            Plan: Cardiac Arrest; VTAC, Torsades  QTc 580 on EKG prior to arrest, pacemaker.  Received epi and compressions.  ROSC achieved quickly, now patient is alert and oriented.   -See code docmentation  -given IV magnesium  -amiodarone per Cardiology  - status post left heart catheterization and PCI, DELORIS on 09/13/2022, cardiology report:"... evaluated by a pulmonary hypertension clinic " "because she will require prostacyclin analogs likely IV based on her pulmonary pressures.  She will need a COPD workup prior therapy." Case discussed with Intensivist on the case, Dr Arteaga, agrees with sending follow upwith pulm htn clinic, input appreciated. Patient needs bipap as outpatient.  -cardiology consult appreciate recommendations  -serial cmp, mag, phos and prn electrolyte replacement  -monitor in ICU        Acute on chronic hypoxemic respiratory failure  Due to acute on chronic CHF  Complicated by COPD-eosinophilic asthma overlap syndrome  - supplemental oxygen, wean as tolerated  -losartan and metoprolol, asa  - bumex 1 mg IV BID ( torsemide at home; she is noncompliant)  - replace electrolytes  - strict I/Os, daily weights, 1.5L fluid restrictions  - pt started recently on COPD exacerbation therapy by pulmonology 2 days ago, continuing now:  solumedrol, nebulizers  -started zosyn given QT prolongation with Levaquin  - pulmonology reconsulted     Acute kidney injury  - nephrology consulted, Scr bumped after entresto, will keep monitoring it, input appreciated.  - holding nephrotoxic agents  - hold further diuresis at this time  - appreciate nephrology recommendations     Hypertension  Restart home dose  - hold losartan given VERONA  -metoprolol xl 50 mg daily        DM2  -SSI accuchecks ac/hs  -Detemir 10 units qHS  -novolog 7 units TID     Hypothyroidism  -check TSH  -Start home dose synthroid 150 mcg        BRIJESH  - CPAP QHS        VTE Risk Mitigation (From admission, onward)              Ordered       enoxaparin injection 40 mg  Daily         09/09/22 2112       IP VTE HIGH RISK PATIENT  Once         09/09/22 2112       Place sequential compression device  Until discontinued         09/09/22 2112                            Department Hospital Medicine  Formerly [facility]  Daron Bolton MD  Date of service: 09/14/2022     "

## 2022-09-15 NOTE — PROGRESS NOTES
Nephrology Progress Note        Patient Name: Tereza Larose  MRN: [id number]    Patient Class: IP- Inpatient   Admission Date: 9/9/2022  Length of Stay: 4 days  Date of Service: 9/15/2022    Attending Physician: Daron Bolton MD  Primary Care Provider: Evan Sánchez MD    Reason for Consult: sari    SUBJECTIVE:     HPI: 71F with CABG in 2016, s/p PM, s/p AVR, COPD, asthma, chronic respiratory failure on 2L NC, HTN, DM2 presented with progressive shortness of breath for 2-3 weeks and recurrent syncope. She saw her pulmonologist last week for asthma exacerbation and her medications were adjusted. She also endorsed 2-3 syncopal episodes for last week. ED workup showed BNP 1,500, CXR showed evidence of pulmonary edema. Troponins negative. EKG AV paced with Qtc 577.She was admitted to the hospital for acute on chronic CHF exacerbation. She began to have episodes of Vtach and developed cardiac arrest with torsades. ACLs was started, she received chest compressions and epinephrine and ROSC. She was not intubated. There was concern for stroke but CTA head was negative. Echo showed EF 60%, RVSP 75 mmHg which is significantly elevated from last year, moderate TR, mod to severe MR and mild to moderate AR, MINGO is 0.71 cm with mean gradient of 20 mmHg. She has not had an angiogram since bypass in 2016.     Today she is complaining of some chest discomfort. VSS. No further Vtach documented. sCr has increased from 1 to 1.5, Mg 2.1, K 3.7. H&H stable. WBC 14, no fevers and BCx are NGTD.     9/12- went for [facility]- had stent placed in RCA graft- going to do 12 hours of IVFs, some spikes in BP, on BIPAP, UOP 950cc    9/13- saw Dr. Batres's noted- severe pulm HTN, scheduled for DELORIS today to assess degree of AR and MR, VSS, on 5L NC/BIPAP, UOP 600cc + voids- started on entresto    9/14- VSS, on 3L NC, UOP 550cc, being evaluated by PT/OT- DELORIS showed MR is not severe enough to be the cause of the severe pulmonary hypertension- will be  followed by pulmonary clinic    9/15- some spikes in BP, on 3L NC, UOP 700cc, renal function stabilized, nonoliguric    Outpatient meds:  No current facility-administered medications on file prior to encounter.     Current Outpatient Medications on File Prior to Encounter   Medication Sig Dispense Refill    albuterol (PROVENTIL/VENTOLIN HFA) 90 mcg/actuation inhaler Inhale 1-2 puffs into the lungs every 4 (four) hours as needed for Wheezing. Inhale 2 puffs by mouth into lungs every 4 hours as needed 108 g 3    evolocumab (REPATHA SYRINGE) 140 mg/mL Syrg Inject 140 mg into the skin every 14 (fourteen) days.       glyBURIDE (DIABETA) 2.5 MG tablet Take 2.5 mg by mouth once daily.      levothyroxine (SYNTHROID) 150 MCG tablet Take 150 mcg by mouth once daily.      montelukast (SINGULAIR) 10 mg tablet Take 1 tablet (10 mg total) by mouth every evening. 30 tablet 11    potassium chloride (MICRO-K) 8 mEq CpSR Take 1 capsule (8 mEq total) by mouth once daily. 90 capsule 1    predniSONE (DELTASONE) 20 MG tablet Take one daily for 7 days and may repeat for shortness of breath. 21 tablet 1    revefenacin (YUPELRI) 175 mcg/3 mL Nebu Inhale 1 ampule into the lungs once daily at 6am. 30 each 11    sotaloL (BETAPACE) 80 MG tablet Take 80 mg by mouth 2 (two) times daily.      albuterol (PROVENTIL) 2.5 mg /3 mL (0.083 %) nebulizer solution Take 3 mLs (2.5 mg total) by nebulization every 4 (four) hours as needed for Shortness of Breath or Wheezing. 30 each 11    amLODIPine (NORVASC) 10 MG tablet TAKE 1 TABLET BY MOUTH EVERY DAY FOR SYSTOLIC BLOOD PRESSURE GREATER THAN 120 90 tablet 3    arformoteroL (BROVANA) 15 mcg/2 mL Nebu Take 2 mLs (15 mcg total) by nebulization 2 (two) times a day. Controller 60 each 11    aspirin (ECOTRIN) 81 MG EC tablet Take 81 mg by mouth once daily.      bempedoic acid (NEXLETOL) 180 mg Tab Take 1 tablet by mouth once daily.      budesonide (PULMICORT) 0.5 mg/2 mL nebulizer solution Take 2 mLs (0.5 mg  total) by nebulization 2 (two) times daily. Controller 120 mL 11    cholecalciferol, vitamin D3, (VITAMIN D3) 25 mcg (1,000 unit) capsule Take 1,000 Units by mouth once daily.      cyanocobalamin (VITAMIN B-12) 1000 MCG tablet Take 100 mcg by mouth once daily.      dimenhyDRINATE (DRAMAMINE) 50 MG tablet Take 50 mg by mouth nightly as needed.      JANUVIA 100 mg Tab TAKE 1 TABLET(100 MG) BY MOUTH EVERY DAY 90 tablet 1    lancets Misc True track Check glucose once daily 100 each 3    losartan (COZAAR) 25 MG tablet Take 25 mg by mouth once daily.      metoprolol succinate (TOPROL-XL) 50 MG 24 hr tablet Take 1 tablet (50 mg total) by mouth once daily. 30 tablet 11    RESTORIL 15 mg Cap Take 15 mg by mouth nightly as needed.      theophylline 400 mg Tb24 Take 1 tablet (400 mg total) by mouth 2 (two) times daily. 60 tablet 5    torsemide (DEMADEX) 20 MG Tab Take 1 tablet (20 mg total) by mouth once daily. 30 tablet 5    vitamin E 400 UNIT capsule Take 400 Units by mouth once daily.         Scheduled meds:   albuterol-ipratropium  3 mL Nebulization QID WAKE    amLODIPine  5 mg Oral Daily    arformoteroL  15 mcg Nebulization BID    aspirin  81 mg Oral Daily    bisacodyL  10 mg Oral Once    budesonide  0.5 mg Nebulization BID    chlorhexidine  15 mL Mouth/Throat BID    clopidogreL  75 mg Oral Daily    enoxaparin  40 mg Subcutaneous Daily    insulin aspart U-100  7 Units Subcutaneous TIDWM    insulin detemir U-100  10 Units Subcutaneous QHS    levothyroxine  150 mcg Oral Before breakfast    magnesium citrate  296 mL Oral Once    metoprolol succinate  50 mg Oral Daily    montelukast  10 mg Oral QHS    mupirocin   Nasal BID    predniSONE  40 mg Oral Daily    sacubitriL-valsartan  1 tablet Oral BID    senna-docusate 8.6-50 mg  2 tablet Oral BID       Infusions:   amiodarone in dextrose 5% Stopped (09/12/22 1046)    magnesium sulfate in water         PRN meds:  acetaminophen, albuterol-ipratropium, calcium chloride IVPB, calcium  chloride IVPB, calcium chloride IVPB, dextrose 50%, dextrose 50%, diphenhydrAMINE, glucagon (human recombinant), glucose, glucose, hydrALAZINE, hydrALAZINE, HYDROcodone-acetaminophen, insulin aspart U-100, magnesium oxide, magnesium sulfate IVPB, magnesium sulfate IVPB, magnesium sulfate IVPB, magnesium sulfate IVPB, melatonin, morphine, naloxone, polyethylene glycol, potassium chloride in water, potassium chloride in water, potassium chloride in water, potassium chloride in water, potassium chloride, potassium chloride, potassium chloride, potassium chloride, senna-docusate 8.6-50 mg, simethicone, sodium chloride 0.9%, sodium phosphate IVPB, sodium phosphate IVPB, sodium phosphate IVPB, sodium phosphate IVPB, sodium phosphate IVPB    Review of Systems:  Neg    OBJECTIVE:     Vital Signs and IO (Last 24H):  Temp:  [97.3 °F (36.3 °C)-98.3 °F (36.8 °C)]   Pulse:  [71-81]   Resp:  [14-26]   BP: (115-174)/()   SpO2:  [92 %-100 %]   I/O last 3 completed shifts:  In: 1004.3 [P.O.:960; I.V.:3; IV Piggyback:41.3]  Out: 1100 [Urine:1100]    Wt Readings from Last 5 Encounters:   09/15/22 85.4 kg (188 lb 4.4 oz)   09/10/22 78.5 kg (173 lb)   09/07/22 78.6 kg (173 lb 4.5 oz)   07/26/22 79.6 kg (175 lb 7.8 oz)   07/21/21 83.9 kg (185 lb)     Physical Exam:  Constitutional: nad, aao x 3  Heart: rrr, no r/g, wwp, no edema  Lungs: ctab, no w/r/r/c, no lb  Abdomen: s/nt/nd, +BS      Body mass index is 34.44 kg/m².    Laboratory:  Recent Labs   Lab 09/13/22  0451 09/14/22  0344 09/15/22  0444    137 139   K 3.7 4.1 3.6   CL 94* 95 96   CO2 36* 34* 35*   BUN 44* 54* 65*   CREATININE 1.3 1.6* 1.6*   * 131* 137*         Recent Labs   Lab 09/09/22  1415 09/10/22  0036 09/10/22  0212 09/10/22  0651 09/10/22  2359 09/11/22  0343 09/13/22  0451 09/14/22  0344 09/15/22  0444   CALCIUM 8.3*  --  8.9   < > 9.4   < > 9.1 9.0 9.5   ALBUMIN 3.8  --   --   --  3.7  --   --   --   --    PHOS  --   --  2.7  --  4.9*  --   --  3.2   --    MG  --    < > 1.9   < > 2.0   < > 2.1 2.3 2.4    < > = values in this interval not displayed.               No results for input(s): POCTGLUCOSE in the last 168 hours.    Recent Labs   Lab 07/05/21  0000 11/17/21  0000 09/10/22  0036   Hemoglobin A1C 7.3 A 7.0 5.8         Recent Labs   Lab 09/13/22  0451 09/14/22  0344 09/15/22  0444   WBC 12.28 11.32 14.48*   HGB 12.4 12.5 12.8   HCT 38.5 38.3 39.9    336 384   MCV 95 95 96   MCHC 32.2 32.6 32.1   MONO 10.0  1.2* 11.3  1.3* 5.9  0.9         Recent Labs   Lab 09/09/22  1415 09/10/22  2359   BILITOT 0.8 0.8   PROT 7.2 7.1   ALBUMIN 3.8 3.7   ALKPHOS 75 54*   ALT 30 43   AST 44* 42*         Recent Labs   Lab 01/10/20  1735 09/09/22  1524   Color, UA Yellow Yellow   Appearance, UA Hazy A Clear   pH, UA 5.0 7.0   Specific Gravity, UA 1.020 1.015   Protein, UA Trace A Negative   Glucose, UA Negative 4+ A   Ketones, UA Trace A Negative   Urobilinogen, UA 2.0-3.0 A Negative   Bilirubin (UA) Negative Negative   Occult Blood UA Negative Negative   Nitrite, UA Negative Negative   RBC, UA  --  1   WBC, UA  --  1   Bacteria  --  Negative   Hyaline Casts, UA  --  0         Recent Labs   Lab 09/12/22  1154 09/12/22  1307 09/13/22  1606   POC PH 7.258 LL 7.350 7.448   POC PCO2 89.8 HH 76.5 HH 50.6 H   POC HCO3 40.1 H 42.2 H 35.0 H   POC PO2 83 84 67 L   POC SATURATED O2 93 L 95 93 L   POC BE 13 17 11   Sample ARTERIAL ARTERIAL ARTERIAL         Microbiology Results (last 7 days)       Procedure Component Value Units Date/Time    Blood culture x two cultures. Draw prior to antibiotics. [[id number]] Collected: 09/09/22 1524    Order Status: Completed Specimen: Blood from Peripheral, Hand, Right Updated: 09/14/22 1632     Blood Culture, Routine No growth after 5 days.    Narrative:      Aerobic and anaerobic    Blood culture x two cultures. Draw prior to antibiotics. [[id number]] Collected: 09/09/22 1524    Order Status: Completed Specimen: Blood from Peripheral, Hand,  Left Updated: 09/14/22 1632     Blood Culture, Routine No growth after 5 days.    Narrative:      Aerobic and anaerobic          ASSESSMENT/PLAN:     VERONA likely due to hemodynamic instability; CKD III  VT with torsades and hospital cardiac arrest 9/10 s/p successful CPR, s/p PCI of SVG to RCA 9/12  HTN/CHF  COPD with CO2 retention and metabolic compensation; severe pulm HTN  SHPT  Anemia of CKD  HypoK/HypoMg    - SCr bumped some with entresto but then stabilized- nonoliguric- will monitor- no nsaids- dose meds for CrCl 30-60  - trend BP/volume status- norvasc, losartan and torsemide are on hold- cardiology and pulm are managing  - CO2 is stable  - no active BMM issues  - no active hematologic issues  - replete K, Mg PRN    Ok with discharge home from a renal standpoint  Defer to cardiology about resuming torsemide  Needs renal panel Monday and f/u in 2-4 weeks    Thank you for allowing us to participate in the care of your patient!   We will follow the patient and provide recommendations as needed.    Patient care time was spent personally by me on the following activities: > 35 min  Obtaining a history.  Examination of patient.  Providing medical care at the patients bedside.  Developing a treatment plan with patient or surrogate and bedside caregivers.  Ordering and reviewing laboratory studies, radiographic studies, pulse oximetry.  Ordering and performing treatments and interventions.  Evaluation of patient's response to treatment.  Discussions with consultants while on the unit and immediately available to the patient.  Re-evaluation of the patient's condition.  Documentation in the medical record.     Yessenia Narayan MD    [facility]  [address] [place] [id number]    [phone] - tel  [phone] - fax    9/15/2022      98 Breath sounds clear and equal bilaterally.

## 2022-09-15 NOTE — CARE UPDATE
09/14/22 1942   Patient Assessment/Suction   Level of Consciousness (AVPU) alert   Respiratory Effort Normal;Unlabored   Expansion/Accessory Muscles/Retractions no use of accessory muscles   All Lung Fields Breath Sounds clear;diminished   Cough Frequency infrequent   Cough Type nonproductive   PRE-TX-O2   O2 Device (Oxygen Therapy) nasal cannula   Flow (L/min) 3   SpO2 (!) 94 %   Pulse Oximetry Type Continuous   $ Pulse Oximetry - Multiple Charge Pulse Oximetry - Multiple   Pulse 81   Resp 14   Aerosol Therapy   $ Aerosol Therapy Charges Aerosol Treatment   Daily Review of Necessity (SVN) completed   Respiratory Treatment Status (SVN) given   Treatment Route (SVN) mouthpiece   Patient Position (SVN) semi-Valdez's   Post Treatment Assessment (SVN) increased aeration   Signs of Intolerance (SVN) none   Breath Sounds Post-Respiratory Treatment   Throughout All Fields Post-Treatment All Fields   Throughout All Fields Post-Treatment aeration increased   Post-treatment Heart Rate (beats/min) 80   Post-treatment Resp Rate (breaths/min) 16   Incentive Spirometer   $ Incentive Spirometer Charges done with encouragement   Incentive Spirometer Predicted Level (mL) 2000   Administration (IS) mouthpiece utilized   Number of Repetitions (IS) 5   Level Incentive Spirometer (mL) 750   Patient Tolerance (IS) fair   Continue tx. As ordered/pt. Is compliant with bipap

## 2022-09-15 NOTE — PROGRESS NOTES
[facility]    Critical Care Cardiology Progress Note    Subjective:     Patient seen and examined this morning.   S/p DELORIS 9/13, reviewed Dr. Fair's note. MR is not severe enough to be the cause of the pulmonary hypertension. She had an RCA stent placed which could be the cause of the ischemic MR. Aortic valve functioning properly.   She has no complaints today. States she had a BM last night. Denies chest pain, states breathing is stable.   Vitals reviewed. BP elevated, this AM. On 3 L NC.   Labs reviewed. Cr 1.6. Nephrology following.   Tele reviewed.     Objective:  Vital Signs (Most Recent)  Temp: 98.3 °F (36.8 °C) (09/15/22 0733)  Pulse: 79 (09/15/22 0733)  Resp: 16 (09/15/22 0733)  BP: (!) 174/103 (09/15/22 0733)  SpO2: (!) 94 % (09/15/22 0733)    Vital Signs Range (Last 24H):  Temp:  [97.3 °F (36.3 °C)-98.3 °F (36.8 °C)]   Pulse:  [71-84]   Resp:  [14-26]   BP: (115-174)/()   SpO2:  [91 %-100 %]     I & O (Last 24H):    Intake/Output Summary (Last 24 hours) at 9/15/2022 0818  Last data filed at 9/15/2022 0200  Gross per 24 hour   Intake 660 ml   Output 700 ml   Net -40 ml         Current Diet:     Current Diet Order   Procedures    Diet renal [facility]; Cardiac (Low Na/Chol), Consistent Carbohydrate, Diabetic Diet; Standard Tray     Order Specific Question:   Indicate patient location for additional diet options:     Answer:   [facility]     Order Specific Question:   Additional Diet Options:     Answer:   Cardiac (Low Na/Chol)     Order Specific Question:   Additional Diet Options:     Answer:   Consistent Carbohydrate     Order Specific Question:   Additional Diet Options:     Answer:   Diabetic Diet     Order Specific Question:   Tray type:     Answer:   Standard Tray        Allergies:  Review of patient's allergies indicates:   Allergen Reactions    Metformin Diarrhea     Diarrhea      Corn Itching     Other reaction(s): Sneezing  Other reaction(s): Rhinorrhea    Potato starch Hives     Other  reaction(s): Sneezing  Other reaction(s): Rhinorrhea    Pravastatin Other (See Comments)     Muscle pain    Statins-hmg-coa reductase inhibitors Other (See Comments)    Hydrochlorothiazide Other (See Comments)     weakness    Welchol [colesevelam] Other (See Comments)     Weakness        Meds:  Scheduled Meds:   albuterol-ipratropium  3 mL Nebulization QID WAKE    arformoteroL  15 mcg Nebulization BID    aspirin  81 mg Oral Daily    bisacodyL  10 mg Oral Once    budesonide  0.5 mg Nebulization BID    chlorhexidine  15 mL Mouth/Throat BID    clopidogreL  75 mg Oral Daily    enoxaparin  40 mg Subcutaneous Daily    insulin aspart U-100  7 Units Subcutaneous TIDWM    insulin detemir U-100  10 Units Subcutaneous QHS    levothyroxine  150 mcg Oral Before breakfast    magnesium citrate  296 mL Oral Once    metoprolol succinate  50 mg Oral Daily    montelukast  10 mg Oral QHS    mupirocin   Nasal BID    predniSONE  40 mg Oral Daily    sacubitriL-valsartan  1 tablet Oral BID    senna-docusate 8.6-50 mg  2 tablet Oral BID     Continuous Infusions:   amiodarone in dextrose 5% Stopped (09/12/22 1046)    magnesium sulfate in water       PRN Meds:acetaminophen, albuterol-ipratropium, calcium chloride IVPB, calcium chloride IVPB, calcium chloride IVPB, dextrose 50%, dextrose 50%, diphenhydrAMINE, glucagon (human recombinant), glucose, glucose, hydrALAZINE, hydrALAZINE, HYDROcodone-acetaminophen, insulin aspart U-100, magnesium oxide, magnesium sulfate IVPB, magnesium sulfate IVPB, magnesium sulfate IVPB, magnesium sulfate IVPB, melatonin, morphine, naloxone, polyethylene glycol, potassium chloride in water, potassium chloride in water, potassium chloride in water, potassium chloride in water, potassium chloride, potassium chloride, potassium chloride, potassium chloride, senna-docusate 8.6-50 mg, simethicone, sodium chloride 0.9%, sodium phosphate IVPB, sodium phosphate IVPB, sodium phosphate IVPB, sodium phosphate IVPB, sodium  phosphate IVPB    Oxygen/Ventilator Data (Last 24H):  (if applicable)        Hemodynamic Parameters (Last 24H):   (if applicable)        Lines/Drains:  (if applicable)       Peripheral IV - Single Lumen 09/09/22 1430 20 G Right Antecubital (Active)   Site Assessment Clean;Dry;Intact;No swelling;No redness 09/13/22 0701   Extremity Assessment Distal to IV No abnormal discoloration;No redness;No swelling;No warmth 09/13/22 0701   Line Status Infusing 09/13/22 0701   Dressing Status Clean;Intact;Dry 09/13/22 0701   Dressing Intervention Integrity maintained 09/13/22 0701   Dressing Change Due 09/13/22 09/13/22 0701   Site Change Due 09/13/22 09/13/22 0701   Number of days: 3            Peripheral IV - Single Lumen 09/11/22 2200 22 G Left;Posterior;Proximal Forearm (Active)   Site Assessment Clean;Dry;Intact;No redness;No swelling 09/13/22 0701   Extremity Assessment Distal to IV No abnormal discoloration;No redness;No swelling;No warmth 09/13/22 0701   Line Status Saline locked 09/13/22 0701   Dressing Status Clean;Dry;Intact 09/13/22 0701   Dressing Intervention Integrity maintained 09/13/22 0701   Dressing Change Due 09/15/22 09/13/22 0701   Site Change Due 09/15/22 09/13/22 0701   Number of days: 1       Female External Urinary Catheter 09/10/22 1200 (Active)   Skin no breakdown 09/13/22 0701   Tolerance no signs/symptoms of discomfort 09/13/22 0701   Suction Continuous suction at 40 mmHg 09/13/22 0701   Date of last wick change 09/12/22 09/13/22 0701   Time of last wick change 0600 09/13/22 0701   Number of days: 2       Lab Results :  Recent Results (from the past 24 hour(s))   POCT glucose    Collection Time: 09/14/22 11:56 AM   Result Value Ref Range    POC Glucose 169 (H) 70 - 110   POCT glucose    Collection Time: 09/14/22  3:36 PM   Result Value Ref Range    POC Glucose 221 (H) 70 - 110   POCT glucose    Collection Time: 09/14/22  5:19 PM   Result Value Ref Range    POC Glucose 243 (H) 70 - 110   POCT glucose     Collection Time: 09/14/22  8:42 PM   Result Value Ref Range    POC Glucose 108 70 - 110   Basic Metabolic Panel (BMP)    Collection Time: 09/15/22  4:44 AM   Result Value Ref Range    Sodium 139 136 - 145 mmol/L    Potassium 3.6 3.5 - 5.1 mmol/L    Chloride 96 95 - 110 mmol/L    CO2 35 (H) 23 - 29 mmol/L    Glucose 137 (H) 70 - 110 mg/dL    BUN 65 (H) 8 - 23 mg/dL    Creatinine 1.6 (H) 0.5 - 1.4 mg/dL    Calcium 9.5 8.7 - 10.5 mg/dL    Anion Gap 8 8 - 16 mmol/L    eGFR 34.3 (A) >60 mL/min/1.73 m^2   Magnesium    Collection Time: 09/15/22  4:44 AM   Result Value Ref Range    Magnesium 2.4 1.6 - 2.6 mg/dL   CBC with Automated Differential    Collection Time: 09/15/22  4:44 AM   Result Value Ref Range    WBC 14.48 (H) 3.90 - 12.70 K/uL    RBC 4.16 4.00 - 5.40 M/uL    Hemoglobin 12.8 12.0 - 16.0 g/dL    Hematocrit 39.9 37.0 - 48.5 %    MCV 96 82 - 98 fL    MCH 30.8 27.0 - 31.0 pg    MCHC 32.1 32.0 - 36.0 g/dL    RDW 13.1 11.5 - 14.5 %    Platelets 384 150 - 450 K/uL    MPV 9.8 9.2 - 12.9 fL    Immature Granulocytes 0.5 0.0 - 0.5 %    Gran # (ANC) 11.8 (H) 1.8 - 7.7 K/uL    Immature Grans (Abs) 0.07 (H) 0.00 - 0.04 K/uL    Lymph # 1.7 1.0 - 4.8 K/uL    Mono # 0.9 0.3 - 1.0 K/uL    Eos # 0.1 0.0 - 0.5 K/uL    Baso # 0.01 0.00 - 0.20 K/uL    nRBC 0 0 /100 WBC    Gran % 81.5 (H) 38.0 - 73.0 %    Lymph % 11.5 (L) 18.0 - 48.0 %    Mono % 5.9 4.0 - 15.0 %    Eosinophil % 0.5 0.0 - 8.0 %    Basophil % 0.1 0.0 - 1.9 %    Differential Method Automated    POCT glucose    Collection Time: 09/15/22  6:05 AM   Result Value Ref Range    POC Glucose 121 (H) 70 - 110       Diagnostic Results:  Imaging Results              X-Ray Chest AP Portable (Final result)  Result time 09/09/22 14:16:56      Final result by Ernesto Gerber MD (09/09/22 14:16:56)                   Narrative:    Reason: CHF    FINDINGS:    Portable chest with comparison chest x-ray January 29, 2022 show mildly enlarged cardiac mediastinal silhouette. Median  "sternotomy wires noted. Left dual lead cardiac pacemaker noted.  Left basilar linear opacities are noted, felt to reflect subsegmental atelectasis or scarring. Right lung is clear. There is prominence of the central hilar vascular structures. No acute osseous abnormality.    IMPRESSION:    1.  Enlarged cardiomediastinal silhouette with prominence of central hilar vascular structures may reflect pulmonary vascular congestion and CHF.  2.  Left basilar subsegmental atelectasis or scarring.    Electronically signed by:  Ernesto Gerber DO  9/9/2022 2:16 PM CDT Workstation: [id number]                                    12-lead EKG interpretation:   (if applicable)    Recent Cardiac Rhythm:  (if applicable)      Physical Exam:  Objective:  General Appearance:  In no acute distress.    Vital signs: (most recent): Blood pressure (!) 174/103, pulse 79, temperature 98.3 °F (36.8 °C), temperature source Oral, resp. rate 16, height 5' 2" (1.575 m), weight 85.4 kg (188 lb 4.4 oz), SpO2 (!) 94 %.    Lungs:  There are decreased breath sounds.    Heart: Normal rate.  Regular rhythm.  S1 normal and S2 normal.  Positive for murmur.    Abdomen: Abdomen is soft.  Bowel sounds are normal.       Current Consults:  IP CONSULT TO HOSPITALIST  IP CONSULT TO CARDIOLOGY  IP CONSULT TO NEPHROLOGY  IP CONSULT TO PULMONOLOGY  IP CONSULT TO SOCIAL WORK/CASE MANAGEMENT    Assessment/Plan:  Assessment:   Cardiac Arrest - ACLS initiated and ROSC was achieved fairly quickly, not intubated.   Ventricular Tachycardia/Torsades - given IV Mg,   Acute on chronic CHF exacerbation - on bumex IV, BNP 1500, CXR showed evidence of pulmonary edema. Echo EF 60%, RVSP 75 mmHg, previously 20 mmHg last year.  CAD s/p CABG 3v 2016-status post intervention of SVG to right coronary artery. S/p LHC with stent placement  in SVG to RCA. Severe pulmonary hypertension noted.   Chronic Respiratory Failure  COPD-severe pulmonary hypertension  Asthma  Aortic Valve " Replacement 2016 - Echo showed moderate aortic stenosis, MINGO 0.71 cm, mean gradient 20 mmHg, mod TR, mod to severe MR, mild to mod AR.   Hypertension - on entresto , toprol, norvasc on hold  Diabetes Mellitus Type 2   Leukocytosis- WBC 14, patient is on prednisone. Now resolved.  S/p DELORIS - Patient seen and examined this morning.   S/p DELORIS 9/13, reviewed Dr. Fair's note. MR is not severe enough to be the cause of the pulmonary hypertension. She had an RCA stent placed which could be the cause of the ischemic MR. Aortic valve functioning properly.   Pulmonary Hypertension - group 1 or group 3 per DELORIS report. Pulmonology consulted.  VERONA - nephrology following.  Hypercapnia- pulmonology following. Recommend BiPap nightly and outpatient sleep study    Plan:   S/p LHC with stent placement  in SVG to RCA. Severe pulmonary hypertension noted.   DELORIS showed MR is not severe enough to be the cause of the severe pulmonary hypertension.  Pulmonology following. Plan to set her up with pulmonary hypertension clinic outpatient.   Needs Bipap at discharge.   Monitor BP. Add amlodipine 5 mg daily.   On entresto and toprol.   Cr 1.6 today, recently started on entresto. Nephrology following, appreciate their recommendations.    PT/OT recommends SNF vs HH with PT

## 2022-09-15 NOTE — PROGRESS NOTES
Pulmonary/Critical Care Consult      PATIENT NAME: Tereza Larose  MRN: [id number]  TODAY'S DATE: 09/15/2022  5:26 PM  ADMIT DATE: 2022  AGE: 71 y.o. : 1950    CONSULT REQUESTED BY: Daron Bolton MD    REASON FOR CONSULT:   Acute hypoxic hypercapnic respiratory failure    HISTORY OF PRESENT ILLNESS   Terzea Larose is a 71 y.o. female with a PMH of asthma/?COPD on 2L at home, CAD s/p CABG, AVR, htn, and DM2 currently hospitalized s/p Torsades de Pointes cardiac arrest on whom we have been consulted for hypoxic and hypercapnic respiratory failure.    Ms. Larose presented on  with SOB and syncope. Shortly after midnight that night, she had multiple episodes of VT culminating with torsades de pointes and cardiac arrest. However, ROSC was reportedly achieved quickly with Epi an CPR. The patient underwent LHC this morning, and a stent was placed in the RCA. She was lethargic today and found to be hypercapnic and acidotic with ABG 7.26/90/83/40 at noon. She was placed on BiPAP, and ABG improved to 7.35/77/84/42 by 13:00. The patient's confusion and lethargy improved, and she was weaned to nasal cannula.    Per the patient's , she has had SOB and multiple episodes of syncope over the last few weeks.    22: Slept comfortably on BiPAP last night. She feels well this morning.    22: DELORIS completed yesterday showing evidence of longstanding pulmonary hypertension. See below. Ms. Larose feels well this morning without complaints. Slept well with BiPAP last night and says it may be helping her sleep when asked. She has been using her incentive spirometer.    9/15/22: Having pain in her side when moving and walking with PT. Continues to sleep comfortably with BiPAP.    SMOKING HISTORY  Never smoker    EXPOSURE HISTORY  Never cooked with indoor woodburning stoves.    REVIEW OF SYSTEMS  GENERAL: Feeling well.  EYES: Vision is good.  ENT: No sinusitis or pharyngitis.   HEART: No chest pain or  palpitations.  LUNGS: No cough, sputum, or wheezing.  GI: No nausea, vomiting, constipation, diarrhea, or reflux.  : No dysuria, hesitancy, or nocturia.  SKIN: No lesions or rashes.  MUSCULOSKELETAL: Back pain.  NEURO: No headaches or neuropathy.  LYMPH: No edema or adenopathy.  PSYCH: No anxiety or depression.  ENDO: No weight change.    ALLERGIES  Review of patient's allergies indicates:   Allergen Reactions    Metformin Diarrhea     Diarrhea      Corn Itching     Other reaction(s): Sneezing  Other reaction(s): Rhinorrhea    Potato starch Hives     Other reaction(s): Sneezing  Other reaction(s): Rhinorrhea    Pravastatin Other (See Comments)     Muscle pain    Statins-hmg-coa reductase inhibitors Other (See Comments)    Hydrochlorothiazide Other (See Comments)     weakness    Welchol [colesevelam] Other (See Comments)     Weakness        INPATIENT SCHEDULED MEDICATIONS   albuterol-ipratropium  3 mL Nebulization QID WAKE    amLODIPine  5 mg Oral Daily    arformoteroL  15 mcg Nebulization BID    aspirin  81 mg Oral Daily    bisacodyL  10 mg Oral Once    budesonide  0.5 mg Nebulization BID    chlorhexidine  15 mL Mouth/Throat BID    clopidogreL  75 mg Oral Daily    enoxaparin  40 mg Subcutaneous Daily    insulin aspart U-100  7 Units Subcutaneous TIDWM    insulin detemir U-100  10 Units Subcutaneous QHS    levothyroxine  150 mcg Oral Before breakfast    magnesium citrate  296 mL Oral Once    metoprolol succinate  50 mg Oral Daily    montelukast  10 mg Oral QHS    mupirocin   Nasal BID    predniSONE  40 mg Oral Daily    sacubitriL-valsartan  1 tablet Oral BID    senna-docusate 8.6-50 mg  2 tablet Oral BID      amiodarone in dextrose 5% Stopped (09/12/22 1046)    magnesium sulfate in water         MEDICAL AND SURGICAL HISTORY  Past Medical History:   Diagnosis Date    Abnormal EKG 9/26/2012    Allergy     Arthritis     Asthma     Brain bleed     Colon polyps 11/6/2020    COPD (chronic obstructive pulmonary disease)      Depression     Diabetes mellitus type II     Diverticulitis     Fatty liver     GERD (gastroesophageal reflux disease)     Goiter     s/p thyroidectomy    Heart murmur     Hemiparesis affecting right side as late effect of cerebrovascular accident (CVA) 7/26/2022    Hernia of abdominal wall     History of intracranial hemorrhage 6/15/2013    History of non-ST elevation myocardial infarction (NSTEMI) 6/15/2013    Hyperlipidemia     Hypertension     Lactic acidosis 1/11/2020    Metabolic syndrome 6/14/2012    Myocardial infarction     On home oxygen therapy     states uses 2L at night or when sat < 90    Pacemaker     Positive FIT (fecal immunochemical test) 11/6/2020    Severe persistent asthma without complication 4/30/2020    Statin intolerance     Stroke 2012    left hand shaky, loss of balance     Past Surgical History:   Procedure Laterality Date    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/25/2017    Dr Griggs    ADRENALECTOMY      AORTIC VALVE REPLACEMENT  12/09/2016    ARTERIOGRAPHY OF AORTIC ROOT N/A 9/12/2022    Procedure: ARTERIOGRAM, AORTIC ROOT;  Surgeon: Marko Batres MD;  Location: [facility] CATH/EP LAB;  Service: Cardiology;  Laterality: N/A;    arthroscopy lt knee Right     BLADDER REPAIR      sling    BREAST BIOPSY Bilateral     benign    COLONOSCOPY  2004    10 year recheck    COLONOSCOPY N/A 11/6/2020    Procedure: COLONOSCOPY;  Surgeon: Yakelin Lowery MD;  Location: [facility];  Service: Endoscopy;  Laterality: N/A;    CORONARY ANGIOGRAPHY INCLUDING BYPASS GRAFTS WITH CATHETERIZATION OF LEFT HEART Left 9/12/2022    Procedure: Left heart cath including bypass graft, with left heart catheterization;  Surgeon: Marko Batres MD;  Location: [facility] CATH/EP LAB;  Service: Cardiology;  Laterality: Left;    CORONARY ARTERY BYPASS GRAFT  12/09/2016    X3    EPIDURAL STEROID INJECTION INTO LUMBAR SPINE N/A 7/27/2021    Procedure: Injection-steroid-epidural-lumbar;  Surgeon: Valerio Jay MD;  Location: [facility] OR;   Service: Pain Management;  Laterality: N/A;  L5-S1     HYSTERECTOMY      INSERTION OF PACEMAKER Left 1/13/2020    Procedure: INSERTION, PACEMAKER;  Surgeon: Marko Batres MD;  Location: [facility] CATH/EP LAB;  Service: Cardiology;  Laterality: Left;    OOPHORECTOMY      RIGHT HEART CATHETERIZATION Right 9/12/2022    Procedure: INSERTION, CATHETER, RIGHT HEART;  Surgeon: Marko Batres MD;  Location: [facility] CATH/EP LAB;  Service: Cardiology;  Laterality: Right;    THYROIDECTOMY         ALCOHOL, TOBACCO AND DRUG USE  Social History     Tobacco Use   Smoking Status Never   Smokeless Tobacco Never     Social History     Substance and Sexual Activity   Alcohol Use Not Currently    Comment: seldom     Social History     Substance and Sexual Activity   Drug Use No       FAMILY HISTORY  Family History   Problem Relation Age of Onset    Arthritis Mother     Diabetes Mother     Heart disease Mother     Hypertension Mother     Hypertension Father     Heart disease Father     Mental illness Father     Asthma Sister     Diabetes Sister     Hypertension Sister     Asthma Son     COPD Son     Depression Son     Diabetes Son     Hypertension Son     Stroke Son     Diabetes Maternal Uncle     Heart disease Maternal Uncle     Mental illness Paternal Aunt     Cancer Paternal Aunt     Heart disease Paternal Aunt     Hypertension Paternal Aunt     Heart disease Paternal Uncle     Hypertension Maternal Grandmother     Mental illness Maternal Grandmother     Aneurysm Maternal Grandfather     Mental illness Paternal Grandmother     Heart disease Paternal Grandfather     Melanoma Neg Hx     Psoriasis Neg Hx     Lupus Neg Hx     Eczema Neg Hx        VITAL SIGNS (MOST RECENT)  Temp: 98.3 °F (36.8 °C) (09/15/22 0733)  Pulse: 79 (09/15/22 0733)  Resp: 16 (09/15/22 0733)  BP: (!) 174/103 (09/15/22 0733)  SpO2: (!) 94 % (09/15/22 0733)    INTAKE AND OUTPUT (LAST 24 HOURS):  Intake/Output Summary (Last 24 hours) at 9/15/2022 0937  Last data filed at  9/15/2022 0200  Gross per 24 hour   Intake 660 ml   Output 700 ml   Net -40 ml       WEIGHT  Wt Readings from Last 1 Encounters:   09/15/22 85.4 kg (188 lb 4.4 oz)       PHYSICAL EXAM  GENERAL: A&O. NAD.  HEENT: Extraocular movements intact. Pharynx moist.  NECK: Supple. No JVD or HJR.  HEART: Regular rate and normal rhythm. 3/6 systoilic murmur at LUSB radiating to carotids.  LUNGS: Clear to auscultation anteriorly. Lung excursion symmetrical.   ABDOMEN: Soft, non-tender, non-distended, no masses palpated.  EXTREMITIES: Normal muscle tone and joint movement, no cyanosis or clubbing.   LYMPHATICS: No adenopathy palpated, no edema.  SKIN: Dry, intact, no lesions.   NEURO: No gross deficit. A&Ox3.  PSYCH: Appropriate affect    ACUTE PHASE REACTANT (LAST 24 HOURS)  No results for input(s): FERRITIN, CRP, LDH, DDIMER in the last 24 hours.    CBC LAST (LAST 24 HOURS)  Recent Labs   Lab 09/15/22  0444   WBC 14.48*   RBC 4.16   HGB 12.8   HCT 39.9   MCV 96   MCH 30.8   MCHC 32.1   RDW 13.1      MPV 9.8   GRAN 81.5*  11.8*   LYMPH 11.5*  1.7   MONO 5.9  0.9   BASO 0.01   NRBC 0       CHEMISTRY LAST (LAST 24 HOURS)  Recent Labs   Lab 09/15/22  0444      K 3.6   CL 96   CO2 35*   ANIONGAP 8   BUN 65*   CREATININE 1.6*   *   CALCIUM 9.5   MG 2.4       COAGULATION LAST (LAST 24 HOURS)  No results for input(s): LABPT, INR, APTT in the last 24 hours.    CARDIAC PROFILE (LAST 24 HOURS)  Recent Labs   Lab 09/10/22  0212 09/11/22  0343   BNP  --  1,383*   TROPONINI 0.034  --        LAST 7 DAYS MICROBIOLOGY   Microbiology Results (last 7 days)       Procedure Component Value Units Date/Time    Blood culture x two cultures. Draw prior to antibiotics. [[id number]] Collected: 09/09/22 1524    Order Status: Completed Specimen: Blood from Peripheral, Hand, Right Updated: 09/14/22 1632     Blood Culture, Routine No growth after 5 days.    Narrative:      Aerobic and anaerobic    Blood culture x two cultures. Draw  prior to antibiotics. [[id number]] Collected: 09/09/22 1524    Order Status: Completed Specimen: Blood from Peripheral, Hand, Left Updated: 09/14/22 1632     Blood Culture, Routine No growth after 5 days.    Narrative:      Aerobic and anaerobic            MOST RECENT IMAGING  Transesophageal echo (DELORIS)  · The left ventricle is normal in size with normal systolic function.  · The estimated ejection fraction is 65%.  · Severe right ventricular enlargement with moderately to severely reduced   right ventricular systolic function. The right ventricle is twice the size   of the left ventricle consistent with severe underlying long-standing   pulmonary hypertension  · Mild left atrial enlargement.  · No interatrial septal defect present. The septum did bow from right to   left consistent with elevated right-sided pressures. The right atrium was   2 times the size of the left atrium  · Normal appearing left atrial appendage.  · Severe right atrial enlargement.  · Interatrial septum bows to left, consider elevated right atrial   pressure.  · There is a bioprosthetic aortic valve present. There is no aortic   insufficiency present. There was no significant aortic stenosis  · Moderate mitral regurgitation. There was significant degenerative   changes of the mitral valve with restricted motion of posterior leaflet   consistent with ischemic mitral regurgitation. The jet did not reach the   posterior wall of the atrium or the septum  · Moderate tricuspid regurgitation. Tricuspid valve was difficult to image   because of the anterior location. Suspect severe TR based on right atrial   dimensions.  · There is severe pulmonary hypertension .  · Grade 2 plaque present in the descending aorta. Layered plaque . It was   technically difficult to image the aorta     Cardiac catheterization    The Origin of the graft to the right coronary artery lesion was 99%   stenosed with 0% stenosis post-intervention.    A STENT JOHNIE LEVAR 2.5 X 22  stent was successfully placed at 14 LOU for   14 sec. at the origin of the SVG to right coronary artery    The estimated blood loss was none.    Severe pulmonary hypertension with PA pressure is 80 over 30 pulmonary   capillary wedge about 12    Total occlusion of the mid LAD    Normal LIMA to the LAD    Normal circumflex    Presumed nonfunctioning graft to the OM, could not engage in did not   see on aortic root injection    Aortic root demonstrating 2+ aortic insufficiency of prosthetic aortic   valve.  I did not cross the valve due to the dye load of the intervention   of the SVG to the right.  Of note there is significant mitral annular   calcification    Will consider DELORIS to see the degree of MR and see if there is any other   reasons as to why patient has such severe pulmonary hypertension    Continue dual anti-platelet therapy minimum of 1 year    The procedure log was documented by Documenter: Sarah Tschume, RN and   verified by Marko Batres MD.    Date: 9/12/2022  Time: 10:14 AM      CURRENT VISIT EKG  Results for orders placed or performed during the hospital encounter of 09/09/22   EKG 12-lead    Narrative    Test Reason : R00.0,    Vent. Rate : 060 BPM     Atrial Rate : 060 BPM     P-R Int : 260 ms          QRS Dur : 164 ms      QT Int : 566 ms       P-R-T Axes : 093 072 077 degrees     QTc Int : 566 ms    AV dual-paced rhythm with prolonged AV conduction  Abnormal ECG  When compared with ECG of 10-SEP-2022 01:27,  Vent. rate has decreased BY   6 BPM  Confirmed by Mery LICEA, Mayo LEVINE ([id number]) on 9/11/2022 2:49:27 PM    Referred By: [email or web address]   SELF           Confirmed By:Mayo Ordonez MD       ECHOCARDIOGRAM RESULTS  Results for orders placed during the hospital encounter of 09/09/22    TTE 9/9/22    Interpretation Summary  · The left ventricle is normal in size with normal systolic function.  · Grade II left ventricular diastolic dysfunction.  · The estimated PA systolic pressure is 75 mmHg.  ·  Normal right ventricular size with normal right ventricular systolic function.  · There is moderate to severe pulmonary hypertension.  · Normal central venous pressure (3 mmHg).  · Moderate left atrial enlargement.  · Moderate tricuspid regurgitation.  · Moderate-to-severe mitral regurgitation.  · The estimated ejection fraction is 60%.  · Mild-to-moderate aortic regurgitation.  · There is moderate aortic valve stenosis.  · Aortic valve area is 0.72 cm2; peak velocity is m/s; mean gradient is 20 mmHg.    DELORIS 9/13/22  Summary    The left ventricle is normal in size with normal systolic function.  The estimated ejection fraction is 65%.  Severe right ventricular enlargement with moderately to severely reduced right ventricular systolic function. The right ventricle is twice the size of the left ventricle consistent with severe underlying long-standing pulmonary hypertension  Mild left atrial enlargement.  No interatrial septal defect present. The septum did bow from right to left consistent with elevated right-sided pressures. The right atrium was 2 times the size of the left atrium  Normal appearing left atrial appendage.  Severe right atrial enlargement.  Interatrial septum bows to left, consider elevated right atrial pressure.  There is a bioprosthetic aortic valve present. There is no aortic insufficiency present. There was no significant aortic stenosis  Moderate mitral regurgitation. There was significant degenerative changes of the mitral valve with restricted motion of posterior leaflet consistent with ischemic mitral regurgitation. The jet did not reach the posterior wall of the atrium or the septum  Moderate tricuspid regurgitation. Tricuspid valve was difficult to image because of the anterior location. Suspect severe TR based on right atrial dimensions.  There is severe pulmonary hypertension .  Grade 2 plaque present in the descending aorta. Layered plaque . It was technically difficult to image the  aorta        RESPIRATORY SUPPORT     NC 4-5 L       LAST ARTERIAL BLOOD GAS  ABG  Recent Labs   Lab 09/13/22  1606   PH 7.448   PO2 67*   PCO2 50.6*   HCO3 35.0*   BE 11     Right Heart Catheterization (9/12/22)  RA: 8 RV: 80/ 19 PA: 82/ 30/ 49 PWP: 12     IMPRESSION AND PLAN  Tereza Larose is a 71 y.o. female with a PMH of asthma/?COPD on 2L at home, CAD s/p CABG, AVR, htn, prior stroke, and DM2 currently hospitalized s/p Torsades de Pointes cardiac arrest on whom we have been consulted for hypoxic and hypercapnic respiratory failure.    #Severe pulmonary hypertension, likely group 1 or 3 (due to intrinsic pulmonary arterial hypertension or pulmonary parenchymal disease)  In light of RHC, agree with cardiology assessment. PCWP is high normal, but excludes left heart disease as the explanation for pulmonary hypertension.  - refer to pulmonary hypertension specialist outpatient    #Acute on chronic hypercapnic, hypoxic respiratory failure  #Asthma/COPD with acute exacerbation  #Possible BRIJESH  #Possible obesity hypoventilation syndrome  #Possible community-acquired pneumonia  #Encephalopathy 2/2 hypercapnia, improved  Baseline HCO3 for the last couple of years is in the 30s, indicating chronic, compensated respiratory acidosis. Current CHF exacerbation has likely led to this acute decompensation. STOP-BANG score indicates high risk for BRIJESH.  - BiPAP while sleeping (ordered at 18/6)  - continue DuoNebs QID WA  - resume prednisone 40 mg daily until 9/15  - titrate O2 to SpO2 88-92%  - continue budesonide and arformoterol  - completed 5 days total corticosteroid therapy (9/9 - 9/14)  - completed 5 days of antibiotic therapy   - will need sleep study outpatient  - discharge with NIV (BiPAP 18/6) nightly at home if possible  -  consult ordered    #Acute decompensated heart failure secondary to ischemic cardiomyopathy and valvulopathy  #CAD s/p LEVAR to RCA (9/12/22)  #s/p Cardiac arrest  #QT  prolongation  #Uncontrolled, severe hypertension  - titrate goal-directed medical therapy/antihypertensives as tolerated  - defer to cardiology  - close hemodynamic monitoring, telemetry  - avoid QT prolonging meds    #Acute kidney injury  Likely due to decompensated heart failure.  - diurese to euvolemia  - daily serum chemistry    #Sundowning  - delirium precautions    Asher Arteaga MD  [facility]  Department of Pulmonology  Date of Service: 09/15/2022  9:00 am

## 2022-09-15 NOTE — PT/OT/SLP PROGRESS
Occupational Therapy   Treatment    Name: Tereza Larose  MRN: [id number]  Admitting Diagnosis:  Acute on chronic combined systolic and diastolic CHF (congestive heart failure)  3 Days Post-Op    Recommendations:     Discharge Recommendations: nursing facility, skilled  Discharge Equipment Recommendations:  none  Barriers to discharge:       Assessment:     Tereza Larose is a 71 y.o. female with a medical diagnosis of Acute on chronic combined systolic and diastolic CHF (congestive heart failure).  She presents agreeable to OT. Performance deficits affecting function are weakness, impaired endurance, impaired self care skills, impaired functional mobility, gait instability, impaired balance, decreased safety awareness, impaired cardiopulmonary response to activity.     Rehab Prognosis:  Good; patient would benefit from acute skilled OT services to address these deficits and reach maximum level of function.       Plan:     Patient to be seen 5 x/week to address the above listed problems via self-care/home management, therapeutic activities, therapeutic exercises  Plan of Care Expires: 10/14/22  Plan of Care Reviewed with: patient    Subjective     Pain/Comfort:  Pain Rating 1: 5/10  Location - Side 1: Right  Location - Orientation 1: upper  Location 1: chest  Pain Addressed 1: Reposition, Distraction, Cessation of Activity  Pain Rating Post-Intervention 1: 4/10    Objective:     Communicated with: nurse prior to session.  Patient found HOB elevated with pulse ox (continuous), telemetry, peripheral IV, PureWick, bed alarm upon OT entry to room.    General Precautions: Standard, fall, diabetic   Orthopedic Precautions:N/A   Braces: N/A  Respiratory Status: Nasal cannula, flow 2 L/min     Occupational Performance:     Bed Mobility:    Patient completed Rolling/Turning to Left with  moderate assistance  Patient completed Rolling/Turning to Right with moderate assistance  Patient completed Scooting/Bridging with maximal  assistance  Patient completed Supine to Sit with maximal assistance  Patient completed Sit to Supine with maximal assistance     Activities of Daily Living:  Declined today      [facility] 6 Click ADL:      Treatment & Education:  Role of OT, safety awareness, call bell use, importance of OOB activity.  Pt completed BUE exercises while sitting unsupported EOB against gravity. Completed Core exercises at EOB: pelvic tilts, reaching outside base of support while maintaining proper posture.     Patient left HOB elevated with all lines intact, call button in reach, and bed alarm on    GOALS:   Multidisciplinary Problems       Occupational Therapy Goals          Problem: Occupational Therapy    Goal Priority Disciplines Outcome Interventions   Occupational Therapy Goal     OT, PT/OT Ongoing, Progressing    Description: Goals to be met by: 10/14/2022     Patient will increase functional independence with ADLs by performing:    UE Dressing with Stand-by Assistance.  LE Dressing with Stand-by Assistance.  Grooming while seated with Stand-by Assistance.  Toileting from toilet with Stand-by Assistance for hygiene and clothing management.   Supine to sit with Stand-by Assistance.  Toilet transfer to toilet with Stand-by Assistance.  Perform 30 minutes of sitting/standing ADL activity with O2 sats 90% or above.                         Time Tracking:     OT Date of Treatment: 09/15/22  OT Start Time: 0858  OT Stop Time: 0921  OT Total Time (min): 23 min    Billable Minutes:Therapeutic Activity 13  Therapeutic Exercise 10    OT/CASTRO: OT          9/15/2022

## 2022-09-15 NOTE — PLAN OF CARE
Home BiPAP set up with [facility] and will be delivered to patient's home.    Home health set up with [facility] in [place] office, per patient's choice. Patient choice obtained at bedside, form scanned into .

## 2022-09-16 PROCEDURE — G0180 MD CERTIFICATION HHA PATIENT: HCPCS | Mod: ,,, | Performed by: FAMILY MEDICINE

## 2022-09-16 PROCEDURE — G0180 PR HOME HEALTH MD CERTIFICATION: ICD-10-PCS | Mod: ,,, | Performed by: FAMILY MEDICINE

## 2022-09-16 NOTE — DISCHARGE SUMMARY
[facility]  Discharge Summary  Patient Name: Tereza Larose MRN: [id number]   Patient Class: IP- Inpatient  Length of Stay: 4   Admission Date: 9/9/2022  1:41 PM Attending Physician: Daron Bolton MD   Primary Care Provider: Evan Sánchez MD Face-to-Face encounter date: 09/15/2022   Chief Complaint: Shortness of Breath (Pt presents to ED with c/o SOB that started a couple of weeks ago and has gotten progressively worse. Also states her BP was high today. Denies any CP, n/v )    Date of Discharge: 9/15/2022  Discharge Disposition: Home with Home Health Service  Condition: Stable     Hospital Course By Problem with Pertinent Findings     Tereza Larose is a 71 y.o. female with a PMH of asthma/?COPD on 2L at home, 5 L NC, CAD s/p CABG, AVR, htn, and DM2 currently hospitalized s/p Torsades de Pointes cardiac arrest , hypoxic and hypercapnic respiratory failure.     Ms. Larose presented on 9/9 with SOB and syncope. Shortly after midnight that night, she had multiple episodes of VT culminating with torsades de pointes and cardiac arrest. However, ROSC was reportedly achieved quickly with Epi an CPR. The patient underwent LHC this morning, and a stent was placed in the RCA. She was lethargic today and found to be hypercapnic and acidotic with ABG 7.26/90/83/40 at noon. She was placed on BiPAP, and ABG improved to 7.35/77/84/42 by 13:00. The patient's confusion and lethargy improved, and she was weaned to nasal cannula.      Per the patient's , she has had SOB and multiple episodes of syncope over the last few weeks.    During admission she had LHC with stent placement  in SVG to RCA     9/13/22: Slept comfortably on BiPAP last night. She feels well this morning.     9/14/22: DELORIS completed yesterday showing evidence of longstanding pulmonary hypertension. See below. Ms. Larose feels well this morning without complaints. Slept well with BiPAP last night and says it may be helping her sleep when  "asked. She has been using her incentive spirometer.     9/15/22: Having pain in her side when moving and walking with PT. Continues to sleep comfortably with BiPAP.    aT the time of discharge, she has been stable for more than 24 hours, back to her baseline Oxygen requirement, by Pulmonary( Dr Arteaga) recommendations BiPaP has been arranged for her at home, and also appointment with pulmonary clinic for pulmonary hypertension work up and treatment, ok to be discharge by Dr Arteaga from piulmonary standpount case also discussed with cardiology today (dr. Fair), ok to discharge from their standpoint, ok to resume torsemide prn as shce takes at home,case also discussed with nephrology, ok to discharge from her standpoint (DR Narayan), recommends follow up BMP as outpatient in 5 days, input appreciated, aptient was started on Entereso as well during hospitalization. Input from consultants appreciated. She is stable, doing well, so she was discharged.    =    Physical Exam  BP (!) 164/71 (BP Location: Right arm, Patient Position: Lying)   Pulse 79   Temp 98.9 °F (37.2 °C) (Axillary)   Resp 20   Ht 5' 2" (1.575 m)   Wt 85.4 kg (188 lb 4.4 oz)   SpO2 (!) 92%   BMI 34.44 kg/m²   Vitals reviewed.    Constitutional: No distress.   HENT: Atraumatic.   Cardiovascular: Normal rate, regular rhythm and normal heart sounds.   Pulmonary/Chest: Effort normal. Clear to auscultation bilaterally. No wheezes.   Abdominal: Soft. Bowel sounds are normal. Exhibits no distension and no mass. No tenderness  Neurological: Alert.   Skin: Skin is warm and dry.     Following labs were Reviewed   Recent Labs   Lab 09/15/22  0444   WBC 14.48*   HGB 12.8   HCT 39.9      CALCIUM 9.5      K 3.6   CO2 35*   CL 96   BUN 65*   CREATININE 1.6*     No results found for: POCTGLUCOSE     BMP:   Recent Labs   Lab 09/14/22  0344 09/15/22  0444   * 137*    139   K 4.1 3.6   CL 95 96   CO2 34* 35*   BUN 54* 65*   CREATININE 1.6* " 1.6*   CALCIUM 9.0 9.5   MG 2.3 2.4   , CMP   Recent Labs   Lab 09/14/22  0344 09/15/22  0444    139   K 4.1 3.6   CL 95 96   CO2 34* 35*   * 137*   BUN 54* 65*   CREATININE 1.6* 1.6*   CALCIUM 9.0 9.5   ANIONGAP 8 8   , CBC   Recent Labs   Lab 09/14/22  0344 09/15/22  0444   WBC 11.32 14.48*   HGB 12.5 12.8   HCT 38.3 39.9    384   , and INR   Lab Results   Component Value Date    INR 1.2 09/10/2022    INR 1.1 01/14/2020    INR 1.2 01/10/2020       Microbiology Results (last 7 days)       Procedure Component Value Units Date/Time    Blood culture x two cultures. Draw prior to antibiotics. [[id number]] Collected: 09/09/22 1524    Order Status: Completed Specimen: Blood from Peripheral, Hand, Right Updated: 09/14/22 1632     Blood Culture, Routine No growth after 5 days.    Narrative:      Aerobic and anaerobic    Blood culture x two cultures. Draw prior to antibiotics. [[id number]] Collected: 09/09/22 1524    Order Status: Completed Specimen: Blood from Peripheral, Hand, Left Updated: 09/14/22 1632     Blood Culture, Routine No growth after 5 days.    Narrative:      Aerobic and anaerobic          X-Ray Chest AP Portable   Final Result      NM Lung Scan Ventilation Perfusion   Final Result      CT HEAD FOR STROKE   Final Result      X-Ray Chest AP Portable   Final Result          No results found for this or any previous visit.      Consultants and Procedures   Consultants:  Dr Arteaga, pulmonary  Dr Narayan, nephrology  Dr. Fair/Dr Batres, cardiology/.    Procedures:   [facility] with stent placement  DELORIS, showing sings of pulm HTN      Discharge Information:   Physical Activity:  Activity as tolerated    Instructions:  1. Take all medications as prescribed  2. Keep all follow-up appointments  3. Return to the hospital or call your primary care physicians if any worsening symptoms occur.      Follow-Up Appointments:  Please call your primary care physician to schedule an appointment in 1 week time.  2.  Follow up- with Cardiology, pulmonary and Nephrology in 1 week, bmp in 5 days with reslts to be send to nephrology (Dr Narayan).    Pending laboratory work/Tests to be performed/followed by the Primary care Physician:    The patient was discharged in the care of her parents//wife/family/caregiver, with discharge instructions were reviewed in written and verbal form. All pertinent questions were discussed and prescriptions were provided. The importance of making follow up appointments and compliance of medications has been stressed repeatedly. The patient will follow up in 1 week or sooner as needed with the PCP, and the patient is on board with the plan. Upon discharge, patient needs to be on following medications.    Discharge Medications:     Medication List        START taking these medications      clopidogreL 75 mg tablet  Commonly known as: PLAVIX  Take 1 tablet (75 mg total) by mouth once daily.  Start taking on: September 16, 2022     sacubitriL-valsartan 24-26 mg per tablet  Commonly known as: ENTRESTO  Take 1 tablet by mouth 2 (two) times daily.            CHANGE how you take these medications      levothyroxine 150 MCG tablet  Commonly known as: SYNTHROID  What changed: Another medication with the same name was removed. Continue taking this medication, and follow the directions you see here.            CONTINUE taking these medications      * albuterol 90 mcg/actuation inhaler  Commonly known as: PROVENTIL/VENTOLIN HFA  Inhale 1-2 puffs into the lungs every 4 (four) hours as needed for Wheezing. Inhale 2 puffs by mouth into lungs every 4 hours as needed     * albuterol 2.5 mg /3 mL (0.083 %) nebulizer solution  Commonly known as: PROVENTIL  Take 3 mLs (2.5 mg total) by nebulization every 4 (four) hours as needed for Shortness of Breath or Wheezing.     amLODIPine 10 MG tablet  Commonly known as: NORVASC  TAKE 1 TABLET BY MOUTH EVERY DAY FOR SYSTOLIC BLOOD PRESSURE GREATER THAN 120     arformoteroL 15  mcg/2 mL Nebu  Commonly known as: BROVANA  Take 2 mLs (15 mcg total) by nebulization 2 (two) times a day. Controller     aspirin 81 MG EC tablet  Commonly known as: ECOTRIN     budesonide 0.5 mg/2 mL nebulizer solution  Commonly known as: PULMICORT  Take 2 mLs (0.5 mg total) by nebulization 2 (two) times daily. Controller     cholecalciferol (vitamin D3) 25 mcg (1,000 unit) capsule  Commonly known as: VITAMIN D3     cyanocobalamin 1000 MCG tablet  Commonly known as: VITAMIN B-12     evolocumab 140 mg/mL Syrg  Commonly known as: REPATHA SYRINGE     lancets Misc  True track Check glucose once daily     metoprolol succinate 50 MG 24 hr tablet  Commonly known as: TOPROL-XL  Take 1 tablet (50 mg total) by mouth once daily.     montelukast 10 mg tablet  Commonly known as: SINGULAIR  Take 1 tablet (10 mg total) by mouth every evening.     NEXLETOL 180 mg Tab  Generic drug: bempedoic acid     torsemide 20 MG Tab  Commonly known as: DEMADEX  Take 1 tablet (20 mg total) by mouth once daily.     vitamin E 400 UNIT capsule           * This list has 2 medication(s) that are the same as other medications prescribed for you. Read the directions carefully, and ask your doctor or other care provider to review them with you.                STOP taking these medications      dimenhyDRINATE 50 MG tablet  Commonly known as: DRAMAMINE     glyBURIDE 2.5 MG tablet  Commonly known as: DIABETA     JANUVIA 100 MG Tab  Generic drug: SITagliptin     losartan 25 MG tablet  Commonly known as: COZAAR     potassium chloride 8 mEq Cpsr  Commonly known as: MICRO-K     predniSONE 20 MG tablet  Commonly known as: DELTASONE     RestoriL 15 mg Cap  Generic drug: temazepam     sotaloL 80 MG tablet  Commonly known as: BETAPACE     theophylline 400 mg Tb24     YUPELRI 175 mcg/3 mL Nebu  Generic drug: revefenacin               Where to Get Your Medications        These medications were sent to [id number] - [place], [place] - [address]  [facility] [address]  [address], [place] [address]      Phone: [phone]   clopidogreL 75 mg tablet  sacubitriL-valsartan 24-26 mg per tablet           I spent 35 minutes preparing the discharge including reviewing records from previous encounters, preparation of discharge summary, assessing and final examination of the patient, discharge medicine reconciliation, discussing plan of care, follow up and education and prescriptions.       Daron Bolton  [facility] Hospitalist  09/15/2022

## 2022-09-19 ENCOUNTER — DOCUMENTATION ONLY (OUTPATIENT)
Dept: PULMONOLOGY | Facility: HOSPITAL | Age: 72
End: 2022-09-19
Payer: COMMERCIAL

## 2022-09-19 NOTE — PT/OT/SLP DISCHARGE
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  Please refer to prior OT note dated 9/15/2022 for functional status.    Assessment:      Patient has not met goals.    Objective:     GOALS:   Multidisciplinary Problems       Occupational Therapy Goals       Not on file              Multidisciplinary Problems (Resolved)          Problem: Occupational Therapy    Goal Priority Disciplines Outcome Interventions   Occupational Therapy Goal   (Resolved)     OT, PT/OT Met    Description: Goals to be met by: 10/14/2022     Patient will increase functional independence with ADLs by performing:    UE Dressing with Stand-by Assistance.  LE Dressing with Stand-by Assistance.  Grooming while seated with Stand-by Assistance.  Toileting from toilet with Stand-by Assistance for hygiene and clothing management.   Supine to sit with Stand-by Assistance.  Toilet transfer to toilet with Stand-by Assistance.  Perform 30 minutes of sitting/standing ADL activity with O2 sats 90% or above.                         Reasons for Discontinuation of Therapy Services  Patient d/c home.       Plan:     Patient Discharged to: Home with Home Health Service    9/19/2022

## 2022-09-19 NOTE — PROGRESS NOTES
Pt hosp  [facility] 9/9 to 9/15.  After reviewing hospital clinicals, patient needs home NIV and requires guaranteed target volume and battery backup for portability. Bilevel has been considered and will not prevent exacerbations due to patient's disease state.

## 2022-09-20 ENCOUNTER — TELEPHONE (OUTPATIENT)
Dept: PULMONOLOGY | Facility: CLINIC | Age: 72
End: 2022-09-20
Payer: COMMERCIAL

## 2022-09-20 NOTE — TELEPHONE ENCOUNTER
Please see message below from home health nurse (Debbie). Patient's blood sugar 250 today, patient's family requesting to know why Glyburide was discontinued. Upon further assessment Glyburide discontinued upon discharge from hospital. Patient does not have hospital follow up appointment scheduled. Call placed to Mrs. Lewis to discuss. Mrs. Lewis was at patient's home at time of call. Hospital follow up appointment scheduled with Dr. Sánchez at time of call for the date of 10-4-22. Offered earlier appointment with a different provider, patient requested Dr. Sánchez. Patient would like to know if Glyburide should be reinstated. Please advise. Thank you.           ----- Message from Estelle Kellogg sent at 9/20/2022 12:20 PM CDT -----  Type: Needs Medical Advice  Who Called:  Debbie with [facility]  Symptoms (please be specific):  yesterday pt blood sugar was 162 and today it was 250--and the pt family wanted to know why pt Glyburide was stopped--please call and advise--  Best Call Back Number: [phone]  Additional Information: thank you

## 2022-09-20 NOTE — TELEPHONE ENCOUNTER
Spoke with Jovany. They will attempt to contact patient again. Patient just released from hospital.     ----- Message from Sierra Sorensen sent at 9/20/2022 10:22 AM CDT -----  Contact: Jovany rodriguez/ [facility]  Type:  Pharmacy Calling to Clarify an RX    Name of Caller:  Jovany    Pharmacy Name:    [facility]    Prescription Name:    arformoteroL (BROVANA) 15 mcg/2 mL Rakan Curiel     What do they need to clarify?:  They aren't able to reach patient to schedule meds.    Best Call Back Number:  [phone]    Additional Information:  Please call back.  Thanks.

## 2022-09-21 NOTE — TELEPHONE ENCOUNTER
Try to keep a blood sugar log with a morning and evening reading before breakfast and before dinner after starting Januvia and let me see what kind of reading she is getting in one week.  Also let me know if they need the Januvia sent to the pharmacy.  If her kidney in function improves and stabilizes we can possibly increase the Januvia back to the 100.

## 2022-09-21 NOTE — TELEPHONE ENCOUNTER
I guess they did not notice but the hospital also stopped her Januvia leaving her with nothing for her diabetes.  It looks like she had an acute kidney injury during her ordeal and the kidneys were not able to handle the medications.  At the time of discharge it looks like she could handle a 50 mg Januvia.  I do not believe I would start the glyburide back however as there would be a high risk of hypoglycemia which could stimulate an adrenaline surge and bring on more heart trouble.  Are they giving her the Januvia?  They did not seem to notice it had been stopped.  She should not be taking the whole 100 mg she was taking previously.  I would start with the 50 mg, they can cut the 100 in half to do that if she has some left.  Otherwise I can send some in.  We may have to put her on some mealtime insulin and perhaps some basal insulin at bedtime if the Januvia is not enough to control her.

## 2022-09-21 NOTE — TELEPHONE ENCOUNTER
Call placed to Debbie (nurse) with [facility] for notification. Mrs. Lewis verbalized understanding. States patient was not taking Januvia. Call also placed to patient. Call answered by patient's  (Pollo) who was informed of information in attached message from Dr. Sánchez. Mr. Abad states patient was not taking Januvia, states he will make the adjustments to patient's medication to include 50 mg of Januvia. States patient will log blood pressure readings, and will reach out to office if blood sugar readings remain elevated. Will send follow up message to Dr. Sánchez for notification.

## 2022-09-22 ENCOUNTER — TELEPHONE (OUTPATIENT)
Dept: PULMONOLOGY | Facility: CLINIC | Age: 72
End: 2022-09-22
Payer: COMMERCIAL

## 2022-09-22 NOTE — TELEPHONE ENCOUNTER
Please send in the Januvia 50 mg to [facility]. I asked patient to do glucose log and send in numbers to .

## 2022-09-22 NOTE — TELEPHONE ENCOUNTER
Scheduled patient for hospital f/u 9/29/2022 at 1:00 PM.    ----- Message from Jose Woods sent at 9/22/2022  9:23 AM CDT -----  Type:  Patient Returning Call    Who Called:Pt     Who Left Message for Patient:Ashley Daniel    Does the patient know what this is regarding?:yes     Would the patient rather a call back or a response via [facility]? Call back     Best Call Back Number:([email or web address]). [phone]    Additional Information:

## 2022-09-22 NOTE — TELEPHONE ENCOUNTER
No new care gaps identified.  [facility] Embedded Care Gaps. Reference number: [id number]. 9/22/2022   9:45:34 AM AMADOT

## 2022-09-28 NOTE — Clinical Note
[facility] Care Coordination Contact    Completed 4 attempts to reach client with no response.  Member is officially unable to contact effective today.  Completed MMIS entry.  Completed health plan required [facility] POC.    Contact information for [facility] CM is invalid.     Follow-up Plan: CC will attempt to reach member in six months.    This CC note routed to PCP.    Patty Jarvis RN  [facility]  Cell: [phone]   The catheter was removed from the aorta.

## 2022-09-29 ENCOUNTER — OFFICE VISIT (OUTPATIENT)
Dept: PULMONOLOGY | Facility: CLINIC | Age: 72
End: 2022-09-29
Payer: MEDICARE

## 2022-09-29 VITALS — BODY MASS INDEX: 34.64 KG/M2 | HEIGHT: 62 IN | WEIGHT: 188.25 LBS

## 2022-09-29 DIAGNOSIS — R09.89 CHRONIC SINUS COMPLAINTS: ICD-10-CM

## 2022-09-29 DIAGNOSIS — J82.83 EOSINOPHILIC ASTHMA: ICD-10-CM

## 2022-09-29 DIAGNOSIS — I25.10 CORONARY ARTERY DISEASE INVOLVING NATIVE CORONARY ARTERY OF NATIVE HEART WITHOUT ANGINA PECTORIS: ICD-10-CM

## 2022-09-29 DIAGNOSIS — J44.89 ASTHMA-COPD OVERLAP SYNDROME: ICD-10-CM

## 2022-09-29 DIAGNOSIS — Z95.5 CORONARY STENT PATENT: ICD-10-CM

## 2022-09-29 DIAGNOSIS — J45.50 SEVERE PERSISTENT ASTHMA WITHOUT COMPLICATION: ICD-10-CM

## 2022-09-29 DIAGNOSIS — J96.22 ACUTE ON CHRONIC RESPIRATORY FAILURE WITH HYPOXIA AND HYPERCAPNIA: Primary | ICD-10-CM

## 2022-09-29 DIAGNOSIS — J44.9 COPD (CHRONIC OBSTRUCTIVE PULMONARY DISEASE): ICD-10-CM

## 2022-09-29 DIAGNOSIS — J96.21 ACUTE ON CHRONIC RESPIRATORY FAILURE WITH HYPOXIA AND HYPERCAPNIA: Primary | ICD-10-CM

## 2022-09-29 DIAGNOSIS — I10 ESSENTIAL HYPERTENSION: ICD-10-CM

## 2022-09-29 PROCEDURE — 99999 PR PBB SHADOW E&M-EST. PATIENT-LVL II: ICD-10-PCS | Mod: PBBFAC,,, | Performed by: INTERNAL MEDICINE

## 2022-09-29 PROCEDURE — 99999 PR PBB SHADOW E&M-EST. PATIENT-LVL II: CPT | Mod: PBBFAC,,, | Performed by: INTERNAL MEDICINE

## 2022-09-29 PROCEDURE — 99213 OFFICE O/P EST LOW 20 MIN: CPT | Mod: S$PBB,,, | Performed by: INTERNAL MEDICINE

## 2022-09-29 PROCEDURE — 99212 OFFICE O/P EST SF 10 MIN: CPT | Mod: PBBFAC,PO | Performed by: INTERNAL MEDICINE

## 2022-09-29 PROCEDURE — 99213 PR OFFICE/OUTPT VISIT, EST, LEVL III, 20-29 MIN: ICD-10-PCS | Mod: S$PBB,,, | Performed by: INTERNAL MEDICINE

## 2022-09-29 RX ORDER — LEVOCETIRIZINE DIHYDROCHLORIDE 5 MG/1
5 TABLET, FILM COATED ORAL NIGHTLY
Qty: 90 TABLET | Refills: 3 | Status: SHIPPED | OUTPATIENT
Start: 2022-09-29 | End: 2022-10-14 | Stop reason: SDUPTHER

## 2022-09-29 RX ORDER — ALBUTEROL SULFATE 90 UG/1
1-2 AEROSOL, METERED RESPIRATORY (INHALATION) EVERY 4 HOURS PRN
Qty: 108 G | Refills: 3 | Status: SHIPPED | OUTPATIENT
Start: 2022-09-29 | End: 2023-07-20 | Stop reason: SDUPTHER

## 2022-09-29 RX ORDER — ARFORMOTEROL TARTRATE 15 UG/2ML
15 SOLUTION RESPIRATORY (INHALATION) 2 TIMES DAILY
Qty: 60 EACH | Refills: 11 | Status: SHIPPED | OUTPATIENT
Start: 2022-09-29 | End: 2023-07-20 | Stop reason: SDUPTHER

## 2022-09-29 RX ORDER — BUDESONIDE 0.5 MG/2ML
0.5 INHALANT ORAL 2 TIMES DAILY
Qty: 120 ML | Refills: 11 | Status: SHIPPED | OUTPATIENT
Start: 2022-09-29 | End: 2022-10-05 | Stop reason: SDUPTHER

## 2022-09-29 RX ORDER — AMLODIPINE BESYLATE 10 MG/1
TABLET ORAL
Qty: 90 TABLET | Refills: 3 | Status: SHIPPED | OUTPATIENT
Start: 2022-09-29 | End: 2022-10-14 | Stop reason: SDUPTHER

## 2022-09-29 RX ORDER — CLOPIDOGREL BISULFATE 75 MG/1
75 TABLET ORAL DAILY
Qty: 90 TABLET | Refills: 3 | Status: SHIPPED | OUTPATIENT
Start: 2022-09-29 | End: 2022-10-20 | Stop reason: SDUPTHER

## 2022-09-29 NOTE — PATIENT INSTRUCTIONS
Pt having problem tolerating niv. Continue to try -- let us know if helps.     Meds renewed    Cxr good 9/9/2022       Use oxygen.     For allergies -- may use xyzal bedtime-- sent in to [facility].

## 2022-09-29 NOTE — PROGRESS NOTES
9/29/2022    Tereza HERMAN Thuan  New Patient Consult    Chief Complaint   Patient presents with    Follow-up     Hospital f/u            HPI:  9/29/2022 pt had torsades at [facility] after being admitted on 9/9 with sob.  She had rhc with r coronary stent.  Pt dced on 9/15 after 6 days stay.     Pt had had 2 loc - one with fall. Presented to hosp.  Pt had poor recall.    On neb rx bid.  Home health arranged. Plavix and entresto now and levothyroxine adjusted.  Had high sugars.      Pt hosp  [facility] 9/9 to 9/15.  After reviewing hospital clinicals, patient needs home NIV and requires guaranteed target volume and battery backup for portability. Bilevel has been considered and will not prevent exacerbations due to patient's disease state.      9/7/2022 uses roller walker last 4-5 yrs, occ falls.  No hemoptysis. More sob. Uses ox chr.  Cough and min wheezes.  Cough white .  No recent prednisone -- used in past with benefit.  Uses ventolin now-- used breo in past but felt ventoloin helped more.   Still worsens cold weather.   Hoarse last 2 yrs- stable.   Patient Instructions   Need to be on preventive therapy -- use budesonide twice daily.   Brovnana twice daily and yupleri once daily  Would take prednisone 20 mg daily for 7 days -- repeat.  Would check sugar, perhaps daily -- to assure sugars less than 200-250.   If on preventive therapy may be able to  use asthma shots as asthma seems severe?   Will resume theophylline -- if available.  Need to do breathing test with next visit.  Get blood work today or am.     4/30/2020 - no more blood streaks, min cough am mucous white.  Uses resp rx with breo only - prn ventolin also. Uses ox at home.  Patient Instructions   Chest xray nearly cleared completely by 1/29/2020    Would use celebrex 100 mg (stop for stomach upset) once or twice daily for severe right low back pain causing walking problems.  Also sent in tramadol 50 mg for as needed use.    Continue breo and as needed  "albuterol      Call if problem , may  Use prednisone if cough/wheezes   1/29/2020- had cold front pass with problems coughing up blood streaks.  Admitted to [facility] wbc 1/10 of 24, creat 2.  Got iv abx, none at dc.  Uses ox at home.      Uses brovana and budesonide , also breo, and singulair.  No asthma arousal.    Patient Instructions   Need to follow up chest xray as was not clear by 1/14.  Pneumonia not typical  For heart block  Use breo or brovana and budesonide - not both.  Use breo til out.  You have used and benefited from your home oxygen.  11/21/2019 asthma since age 19 with cough/wheezes/sob, weather change and noct worsening.  Uses 02 to sleep and sat walking 89 or so sat.  Uses ox daytime 1/2 day.    Sinuses problem- flonase prn, claritin.  No infection sinus.  Mucous from lungs white to clear.    Uses theophylline.  Takes albuterol 1-2/wk.  Awakens with asthma 4/month.  No emergency treatments.    Uses steroid shots once a yr.  No apneas suggested- on home ox 3 yrs.  Use cane to ambulate as had cva 3 yrs ago.  Falls occasionally- fell last month.    Patient Instructions   Asthma likely caused some copd.  Take prednisone 20mg  Daily for 3 days and note sugars, and breathing.  Would check lung capacity next wk, Check chest xray and breathing test and oxygen level walking.  .   May repeat prednisone if needed.  Prevention medications -Use Singulair daily&  Use breo once daily, or  consider continuing or use budesonide and brovana (similar medications but should be covered with old medicare benefit).  Use nebulizer as needed.  Control sinuses- use Claritin and Flonase.  singulair may help.    Could skip theophylline and stay off    The chief compliant  problem is new to me",   [facility]:  Past Medical History:   Diagnosis Date    Abnormal EKG 9/26/2012    Allergy     Arthritis     Asthma     Brain bleed     Colon polyps 11/6/2020    COPD (chronic obstructive pulmonary disease)     Depression     Diabetes mellitus " type II     Diverticulitis     Fatty liver     GERD (gastroesophageal reflux disease)     Goiter     s/p thyroidectomy    Heart murmur     Hemiparesis affecting right side as late effect of cerebrovascular accident (CVA) 7/26/2022    Hernia of abdominal wall     History of intracranial hemorrhage 6/15/2013    History of non-ST elevation myocardial infarction (NSTEMI) 6/15/2013    Hyperlipidemia     Hypertension     Lactic acidosis 1/11/2020    Metabolic syndrome 6/14/2012    Myocardial infarction     On home oxygen therapy     states uses 2L at night or when sat < 90    Pacemaker     Positive FIT (fecal immunochemical test) 11/6/2020    Severe persistent asthma without complication 4/30/2020    Statin intolerance     Stroke 2012    left hand shaky, loss of balance         Past Surgical History:   Procedure Laterality Date    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/25/2017    Dr Griggs    ADRENALECTOMY      AORTIC VALVE REPLACEMENT  12/09/2016    ARTERIOGRAPHY OF AORTIC ROOT N/A 9/12/2022    Procedure: ARTERIOGRAM, AORTIC ROOT;  Surgeon: Marko Batres MD;  Location: [facility] CATH/EP LAB;  Service: Cardiology;  Laterality: N/A;    arthroscopy lt knee Right     BLADDER REPAIR      sling    BREAST BIOPSY Bilateral     benign    COLONOSCOPY  2004    10 year recheck    COLONOSCOPY N/A 11/6/2020    Procedure: COLONOSCOPY;  Surgeon: Yakelin Lowery MD;  Location: [facility];  Service: Endoscopy;  Laterality: N/A;    CORONARY ANGIOGRAPHY INCLUDING BYPASS GRAFTS WITH CATHETERIZATION OF LEFT HEART Left 9/12/2022    Procedure: Left heart cath including bypass graft, with left heart catheterization;  Surgeon: Marko Batres MD;  Location: [facility] CATH/EP LAB;  Service: Cardiology;  Laterality: Left;    CORONARY ARTERY BYPASS GRAFT  12/09/2016    X3    EPIDURAL STEROID INJECTION INTO LUMBAR SPINE N/A 7/27/2021    Procedure: Injection-steroid-epidural-lumbar;  Surgeon: Valerio Jay MD;  Location: [facility] OR;  Service: Pain Management;  Laterality:  N/A;  L5-S1     HYSTERECTOMY      INSERTION OF PACEMAKER Left 1/13/2020    Procedure: INSERTION, PACEMAKER;  Surgeon: Marko Batres MD;  Location: [facility] CATH/EP LAB;  Service: Cardiology;  Laterality: Left;    OOPHORECTOMY      RIGHT HEART CATHETERIZATION Right 9/12/2022    Procedure: INSERTION, CATHETER, RIGHT HEART;  Surgeon: Marko Batres MD;  Location: [facility] CATH/EP LAB;  Service: Cardiology;  Laterality: Right;    THYROIDECTOMY       Social History     Tobacco Use    Smoking status: Never    Smokeless tobacco: Never   Substance Use Topics    Alcohol use: Not Currently     Comment: seldom    Drug use: No     Family History   Problem Relation Age of Onset    Arthritis Mother     Diabetes Mother     Heart disease Mother     Hypertension Mother     Hypertension Father     Heart disease Father     Mental illness Father     Asthma Sister     Diabetes Sister     Hypertension Sister     Asthma Son     COPD Son     Depression Son     Diabetes Son     Hypertension Son     Stroke Son     Diabetes Maternal Uncle     Heart disease Maternal Uncle     Mental illness Paternal Aunt     Cancer Paternal Aunt     Heart disease Paternal Aunt     Hypertension Paternal Aunt     Heart disease Paternal Uncle     Hypertension Maternal Grandmother     Mental illness Maternal Grandmother     Aneurysm Maternal Grandfather     Mental illness Paternal Grandmother     Heart disease Paternal Grandfather     Melanoma Neg Hx     Psoriasis Neg Hx     Lupus Neg Hx     Eczema Neg Hx      Review of patient's allergies indicates:   Allergen Reactions    Metformin Diarrhea     Diarrhea      Corn Itching     Other reaction(s): Sneezing  Other reaction(s): Rhinorrhea    Potato starch Hives     Other reaction(s): Sneezing  Other reaction(s): Rhinorrhea    Pravastatin Other (See Comments)     Muscle pain    Statins-hmg-coa reductase inhibitors Other (See Comments)    Hydrochlorothiazide Other (See Comments)     weakness    Welchol [colesevelam] Other  "(See Comments)     Weakness        Performance Status:The patient's activity level is housebound activities.      Review of Systems:  a review of eleven systems covering constitutional, Eye, HEENT, Psych, Respiratory, Cardiac, GI, , Musculoskeletal, Endocrine, Dermatologic was negative except for pertinent findings as listed ABOVE and below:  pertinent positive as above, rest is good       Exam:Comprehensive exam done. Ht 5' 2" (1.575 m)   Wt 85.4 kg (188 lb 4.4 oz)   BMI 34.44 kg/m²   Exam included Vitals as listed, and patient's appearance and affect and alertness and mood, oral exam for yeast and hygiene and pharynx lesions and Mallapatti (M) score, neck with inspection for jvd and masses and thyroid abnormalities and lymph nodes (supraclavicular and infraclavicular nodes and axillary also examined and noted if abn), chest exam included symmetry and effort and fremitus and percussion and auscultation, cardiac exam included rhythm and gallops and murmur and rubs and jvd and edema, abdominal exam for mass and hepatosplenomegaly and tenderness and hernias and bowel sounds, Musculoskeletal exam with muscle tone and posture and mobility/gait and  strength, and skin for rashes and cyanosis and pallor and turgor, extremity for clubbing.  Findings were normal except for pertinent findings listed below:  M3,chest is symmetric, no distress, normal percussion, normal fremitus and good normal decreased breath sounds  No clubbing nor edema     Radiographs (ct chest and cxr) reviewed: cxr 1/10/2020- patchy left > right pneumonia.     cxr 1/29/20 nearly clear.      Labs reviewed         Results for SHAYY VELA (MRN [id number]) as of 11/21/2019 10:37   Ref. Range 8/28/2018 16:38   Eosinophil% Latest Ref Range: 0.0 - 8.0 % 5.2   Eos # Latest Ref Range: 0.0 - 0.5 K/uL 0.4     PFT will be done and results to be reviewed       Plan:  Clinical impression is apparently straight forward and impression with management as " below.     Tereza was seen today for follow-up.    Diagnoses and all orders for this visit:    Acute on chronic respiratory failure with hypoxia and hypercapnia    Asthma-COPD overlap syndrome  -     arformoteroL (BROVANA) 15 mcg/2 mL Nebu; Take 2 mLs (15 mcg total) by nebulization 2 (two) times a day. Controller  -     budesonide (PULMICORT) 0.5 mg/2 mL nebulizer solution; Take 2 mLs (0.5 mg total) by nebulization 2 (two) times daily. Controller  -     albuterol (PROVENTIL/VENTOLIN HFA) 90 mcg/actuation inhaler; Inhale 1-2 puffs into the lungs every 4 (four) hours as needed for Wheezing. Inhale 2 puffs by mouth into lungs every 4 hours as needed    Eosinophilic asthma  -     arformoteroL (BROVANA) 15 mcg/2 mL Nebu; Take 2 mLs (15 mcg total) by nebulization 2 (two) times a day. Controller  -     budesonide (PULMICORT) 0.5 mg/2 mL nebulizer solution; Take 2 mLs (0.5 mg total) by nebulization 2 (two) times daily. Controller  -     albuterol (PROVENTIL/VENTOLIN HFA) 90 mcg/actuation inhaler; Inhale 1-2 puffs into the lungs every 4 (four) hours as needed for Wheezing. Inhale 2 puffs by mouth into lungs every 4 hours as needed    Severe persistent asthma without complication  -     arformoteroL (BROVANA) 15 mcg/2 mL Nebu; Take 2 mLs (15 mcg total) by nebulization 2 (two) times a day. Controller  -     budesonide (PULMICORT) 0.5 mg/2 mL nebulizer solution; Take 2 mLs (0.5 mg total) by nebulization 2 (two) times daily. Controller  -     albuterol (PROVENTIL/VENTOLIN HFA) 90 mcg/actuation inhaler; Inhale 1-2 puffs into the lungs every 4 (four) hours as needed for Wheezing. Inhale 2 puffs by mouth into lungs every 4 hours as needed    COPD (chronic obstructive pulmonary disease)  -     albuterol (PROVENTIL/VENTOLIN HFA) 90 mcg/actuation inhaler; Inhale 1-2 puffs into the lungs every 4 (four) hours as needed for Wheezing. Inhale 2 puffs by mouth into lungs every 4 hours as needed    Essential hypertension  -     amLODIPine  (NORVASC) 10 MG tablet; TAKE 1 TABLET BY MOUTH EVERY DAY FOR SYSTOLIC BLOOD PRESSURE GREATER THAN 120    Coronary artery disease involving native coronary artery of native heart without angina pectoris  -     sacubitriL-valsartan (ENTRESTO) 24-26 mg per tablet; Take 1 tablet by mouth 2 (two) times daily.  -     clopidogreL (PLAVIX) 75 mg tablet; Take 1 tablet (75 mg total) by mouth once daily.    Coronary stent patent  -     sacubitriL-valsartan (ENTRESTO) 24-26 mg per tablet; Take 1 tablet by mouth 2 (two) times daily.  -     clopidogreL (PLAVIX) 75 mg tablet; Take 1 tablet (75 mg total) by mouth once daily.    Chronic sinus complaints  -     levocetirizine (XYZAL) 5 MG tablet; Take 1 tablet (5 mg total) by mouth every evening.          No follow-ups on file.    Discussed with patient above for education the following:      Patient Instructions   Pt having problem tolerating niv. Continue to try -- let us know if helps.     Meds renewed    Cxr good 9/9/2022       Use oxygen.     For allergies -- may use xyzal bedtime-- sent in to [facility].

## 2022-10-03 ENCOUNTER — TELEPHONE (OUTPATIENT)
Dept: FAMILY MEDICINE | Facility: CLINIC | Age: 72
End: 2022-10-03
Payer: MEDICARE

## 2022-10-03 DIAGNOSIS — Z71.89 COMPLEX CARE COORDINATION: ICD-10-CM

## 2022-10-03 NOTE — TELEPHONE ENCOUNTER
..I called the patient to confirm her appointment that she has with Dr. Sánchez on 10/04/2022 at 1:40pm, no answer left voicemail to return our call. I will send the patient a portal message as well.

## 2022-10-04 ENCOUNTER — LAB VISIT (OUTPATIENT)
Dept: LAB | Facility: HOSPITAL | Age: 72
End: 2022-10-04
Attending: FAMILY MEDICINE
Payer: MEDICARE

## 2022-10-04 ENCOUNTER — OFFICE VISIT (OUTPATIENT)
Dept: FAMILY MEDICINE | Facility: CLINIC | Age: 72
End: 2022-10-04
Attending: FAMILY MEDICINE
Payer: MEDICARE

## 2022-10-04 VITALS
TEMPERATURE: 98 F | HEART RATE: 83 BPM | DIASTOLIC BLOOD PRESSURE: 68 MMHG | HEIGHT: 62 IN | BODY MASS INDEX: 30.94 KG/M2 | WEIGHT: 168.13 LBS | OXYGEN SATURATION: 93 % | RESPIRATION RATE: 16 BRPM | SYSTOLIC BLOOD PRESSURE: 138 MMHG

## 2022-10-04 DIAGNOSIS — E78.5 HYPERLIPIDEMIA ASSOCIATED WITH TYPE 2 DIABETES MELLITUS: Chronic | ICD-10-CM

## 2022-10-04 DIAGNOSIS — I25.2 HISTORY OF NON-ST ELEVATION MYOCARDIAL INFARCTION (NSTEMI): ICD-10-CM

## 2022-10-04 DIAGNOSIS — E11.69 HYPERLIPIDEMIA ASSOCIATED WITH TYPE 2 DIABETES MELLITUS: Chronic | ICD-10-CM

## 2022-10-04 DIAGNOSIS — N17.9 AKI (ACUTE KIDNEY INJURY): ICD-10-CM

## 2022-10-04 DIAGNOSIS — I44.2 COMPLETE HEART BLOCK: Primary | ICD-10-CM

## 2022-10-04 DIAGNOSIS — E11.59 HYPERTENSION ASSOCIATED WITH DIABETES: ICD-10-CM

## 2022-10-04 DIAGNOSIS — I25.10 CORONARY ARTERY DISEASE INVOLVING NATIVE CORONARY ARTERY OF NATIVE HEART WITHOUT ANGINA PECTORIS: ICD-10-CM

## 2022-10-04 DIAGNOSIS — J44.89 ASTHMA-COPD OVERLAP SYNDROME: ICD-10-CM

## 2022-10-04 DIAGNOSIS — J45.50 SEVERE PERSISTENT ASTHMA WITHOUT COMPLICATION: ICD-10-CM

## 2022-10-04 DIAGNOSIS — I27.20 PULMONARY HYPERTENSION: ICD-10-CM

## 2022-10-04 DIAGNOSIS — E11.59 TYPE 2 DIABETES MELLITUS WITH OTHER CIRCULATORY COMPLICATION, WITHOUT LONG-TERM CURRENT USE OF INSULIN: ICD-10-CM

## 2022-10-04 DIAGNOSIS — J96.11 HYPOXEMIC RESPIRATORY FAILURE, CHRONIC: ICD-10-CM

## 2022-10-04 DIAGNOSIS — I15.2 HYPERTENSION ASSOCIATED WITH DIABETES: ICD-10-CM

## 2022-10-04 LAB
ANION GAP SERPL CALC-SCNC: 14 MMOL/L (ref 8–16)
BUN SERPL-MCNC: 15 MG/DL (ref 8–23)
CALCIUM SERPL-MCNC: 10.2 MG/DL (ref 8.7–10.5)
CHLORIDE SERPL-SCNC: 100 MMOL/L (ref 95–110)
CO2 SERPL-SCNC: 31 MMOL/L (ref 23–29)
CREAT SERPL-MCNC: 0.9 MG/DL (ref 0.5–1.4)
EST. GFR  (NO RACE VARIABLE): >60 ML/MIN/1.73 M^2
GLUCOSE SERPL-MCNC: 110 MG/DL (ref 70–110)
POTASSIUM SERPL-SCNC: 3.6 MMOL/L (ref 3.5–5.1)
SODIUM SERPL-SCNC: 145 MMOL/L (ref 136–145)

## 2022-10-04 PROCEDURE — 36415 COLL VENOUS BLD VENIPUNCTURE: CPT | Performed by: FAMILY MEDICINE

## 2022-10-04 PROCEDURE — 99999 PR PBB SHADOW E&M-EST. PATIENT-LVL V: ICD-10-PCS | Mod: PBBFAC,,, | Performed by: FAMILY MEDICINE

## 2022-10-04 PROCEDURE — 99215 OFFICE O/P EST HI 40 MIN: CPT | Mod: PBBFAC,PN | Performed by: FAMILY MEDICINE

## 2022-10-04 PROCEDURE — 80048 BASIC METABOLIC PNL TOTAL CA: CPT | Performed by: FAMILY MEDICINE

## 2022-10-04 PROCEDURE — 99214 OFFICE O/P EST MOD 30 MIN: CPT | Mod: S$GLB,,, | Performed by: FAMILY MEDICINE

## 2022-10-04 PROCEDURE — 99214 PR OFFICE/OUTPT VISIT, EST, LEVL IV, 30-39 MIN: ICD-10-PCS | Mod: S$GLB,,, | Performed by: FAMILY MEDICINE

## 2022-10-04 PROCEDURE — 99999 PR PBB SHADOW E&M-EST. PATIENT-LVL V: CPT | Mod: PBBFAC,,, | Performed by: FAMILY MEDICINE

## 2022-10-04 RX ORDER — REVEFENACIN 175 UG/3ML
SOLUTION RESPIRATORY (INHALATION)
COMMUNITY
End: 2023-07-20 | Stop reason: SDUPTHER

## 2022-10-04 NOTE — PROGRESS NOTES
Subjective:       Patient ID: Tereza Larose is a 72 y.o. female.    Chief Complaint: Hospital Follow Up (Pt states that she is here for her hospital follow up, pt states that she had passed out at home her  brought her to the er where they placed a stent, pt states that she has been feeling better since released from hospital just a bit sore)    72-year-old female coming in for a hospital follow-up at [facility] from September 9 through September 15, 2022.  The patient suffered a cardiac arrest apparently with torsades de pointe with CPR begun almost immediately.  She was brought to [facility] with lethargy and hypercapnia and was taken to the cath lab where a stent was placed in the right coronary artery.  She was found to have severe pulmonary hypertension and developed acute kidney injury.  During her hospital stay a number of medication changes were made many of which were due to poor kidney clearance including her Januvia 100 mg and her glyburide.  She has subsequently had an appointment with Dr. Vaca who has made some adjustments but she has not had any follow-up on kidney functions since leaving the hospital.  She is feeling much better, about back to her baseline.  History includes an intracranial hemorrhage, lumbar radiculopathy, major depression, asthma/COPD overlap, severe persistent asthma without complications, diabetes with circulatory disease but without insulin, hypertension, hyperlipidemia, coronary artery disease with non ST elevation MI, 3rd degree heart block, congestive heart failure, abdominal aortic atherosclerosis, anticoagulation, hypothyroidism, adrenal Cushing syndrome with adrenalectomy in 2017, fatty liver disease, colon polyps, osteoarthritis, chronic right back pain without sciatica, sleep apnea and statin intolerance.  The patient has blood sugars were climbing up into the upper 200s and she was started back on half her previous dose of Januvia using 50 mg daily.  The glyburide was not  renewed and her blood sugars have been satisfactory with no episodes of hypoglycemia.    Past Medical History:  9/26/2012: Abnormal EKG  No date: Allergy  No date: Arthritis  No date: Asthma  No date: Brain bleed  11/6/2020: Colon polyps  No date: COPD (chronic obstructive pulmonary disease)  No date: Depression  No date: Diabetes mellitus type II  No date: Diverticulitis  No date: Fatty liver  No date: GERD (gastroesophageal reflux disease)  No date: Goiter      Comment:  s/p thyroidectomy  No date: Heart murmur  7/26/2022: Hemiparesis affecting right side as late effect of   cerebrovascular accident (CVA)  No date: Hernia of abdominal wall  6/15/2013: History of intracranial hemorrhage  6/15/2013: History of non-ST elevation myocardial infarction (NSTEMI)  No date: Hyperlipidemia  No date: Hypertension  1/11/2020: Lactic acidosis  6/14/2012: Metabolic syndrome  No date: Myocardial infarction  No date: On home oxygen therapy      Comment:  states uses 2L at night or when sat < 90  No date: Pacemaker  11/6/2020: Positive FIT (fecal immunochemical test)  4/30/2020: Severe persistent asthma without complication  No date: Statin intolerance  2012: Stroke      Comment:  left hand shaky, loss of balance    Past Surgical History:  07/25/2017: adranel tumor removal       Comment:  Dr Griggs  No date: ADRENALECTOMY  12/09/2016: AORTIC VALVE REPLACEMENT  9/12/2022: ARTERIOGRAPHY OF AORTIC ROOT; N/A      Comment:  Procedure: ARTERIOGRAM, AORTIC ROOT;  Surgeon: Marko Batres MD;  Location: [facility] CATH/EP LAB;  Service:                Cardiology;  Laterality: N/A;  No date: arthroscopy lt knee; Right  No date: BLADDER REPAIR      Comment:  sling  No date: BREAST BIOPSY; Bilateral      Comment:  benign  2004: COLONOSCOPY      Comment:  10 year recheck  11/6/2020: COLONOSCOPY; N/A      Comment:  Procedure: COLONOSCOPY;  Surgeon: Yakelin Lowery MD;               Location: [facility];  Service: Endoscopy;   Laterality:                N/A;  9/12/2022: CORONARY ANGIOGRAPHY INCLUDING BYPASS GRAFTS WITH   CATHETERIZATION OF LEFT HEART; Left      Comment:  Procedure: Left heart cath including bypass graft, with                left heart catheterization;  Surgeon: Marko Batres MD;               Location: [facility] CATH/EP LAB;  Service: Cardiology;                 Laterality: Left;  12/09/2016: CORONARY ARTERY BYPASS GRAFT      Comment:  X3  7/27/2021: EPIDURAL STEROID INJECTION INTO LUMBAR SPINE; N/A      Comment:  Procedure: Injection-steroid-epidural-lumbar;  Surgeon:                Valerio Jay MD;  Location: [facility] OR;  Service: Pain                Management;  Laterality: N/A;  L5-S1   No date: HYSTERECTOMY  1/13/2020: INSERTION OF PACEMAKER; Left      Comment:  Procedure: INSERTION, PACEMAKER;  Surgeon: Marko Batres MD;  Location: [facility] CATH/EP LAB;  Service:                Cardiology;  Laterality: Left;  No date: OOPHORECTOMY  9/12/2022: RIGHT HEART CATHETERIZATION; Right      Comment:  Procedure: INSERTION, CATHETER, RIGHT HEART;  Surgeon:                Marko Batres MD;  Location: [facility] CATH/EP LAB;                 Service: Cardiology;  Laterality: Right;  No date: THYROIDECTOMY    Current Outpatient Medications on File Prior to Visit:  albuterol (PROVENTIL) 2.5 mg /3 mL (0.083 %) nebulizer solution, Take 3 mLs (2.5 mg total) by nebulization every 4 (four) hours as needed for Shortness of Breath or Wheezing., Disp: 30 each, Rfl: 11  albuterol (PROVENTIL/VENTOLIN HFA) 90 mcg/actuation inhaler, Inhale 1-2 puffs into the lungs every 4 (four) hours as needed for Wheezing. Inhale 2 puffs by mouth into lungs every 4 hours as needed, Disp: 108 g, Rfl: 3  amLODIPine (NORVASC) 10 MG tablet, TAKE 1 TABLET BY MOUTH EVERY DAY FOR SYSTOLIC BLOOD PRESSURE GREATER THAN 120, Disp: 90 tablet, Rfl: 3  arformoteroL (BROVANA) 15 mcg/2 mL Nebu, Take 2 mLs (15 mcg total) by nebulization 2 (two) times a day. Controller,  Disp: 60 each, Rfl: 11  aspirin (ECOTRIN) 81 MG EC tablet, Take 81 mg by mouth once daily., Disp: , Rfl:   cholecalciferol, vitamin D3, (VITAMIN D3) 25 mcg (1,000 unit) capsule, Take 1,000 Units by mouth once daily., Disp: , Rfl:   clopidogreL (PLAVIX) 75 mg tablet, Take 1 tablet (75 mg total) by mouth once daily., Disp: 90 tablet, Rfl: 3  cyanocobalamin (VITAMIN B-12) 1000 MCG tablet, Take 100 mcg by mouth once daily., Disp: , Rfl:   evolocumab (REPATHA SYRINGE) 140 mg/mL Syrg, Inject 140 mg into the skin every 14 (fourteen) days. , Disp: , Rfl:   lancets Misc, True track Check glucose once daily, Disp: 100 each, Rfl: 3  levocetirizine (XYZAL) 5 MG tablet, Take 1 tablet (5 mg total) by mouth every evening., Disp: 90 tablet, Rfl: 3  levothyroxine (SYNTHROID) 150 MCG tablet, Take 150 mcg by mouth once daily., Disp: , Rfl:   metoprolol succinate (TOPROL-XL) 50 MG 24 hr tablet, Take 1 tablet (50 mg total) by mouth once daily., Disp: 30 tablet, Rfl: 11  revefenacin (YUPELRI) 175 mcg/3 mL Nebu, Inhale into the lungs., Disp: , Rfl:   sacubitriL-valsartan (ENTRESTO) 24-26 mg per tablet, Take 1 tablet by mouth 2 (two) times daily., Disp: 180 tablet, Rfl: 1  SITagliptin (JANUVIA) 50 MG Tab, Take 1 tablet (50 mg total) by mouth once daily., Disp: 90 tablet, Rfl: 1  bempedoic acid (NEXLETOL) 180 mg Tab, Take 1 tablet by mouth once daily., Disp: , Rfl:   budesonide (PULMICORT) 0.5 mg/2 mL nebulizer solution, Take 2 mLs (0.5 mg total) by nebulization 2 (two) times daily. Controller (Patient not taking: Reported on 10/4/2022), Disp: 120 mL, Rfl: 11  vitamin E 400 UNIT capsule, Take 400 Units by mouth once daily., Disp: , Rfl:     No current facility-administered medications on file prior to visit.        Review of Systems   Constitutional:  Positive for fatigue. Negative for chills and fever.   Respiratory:  Negative for chest tightness and shortness of breath.    Cardiovascular:  Negative for chest pain and palpitations.      Objective:      Physical Exam  Vitals and nursing note reviewed.   Constitutional:       General: She is not in acute distress.     Appearance: Normal appearance. She is obese. She is not ill-appearing, toxic-appearing or diaphoretic.      Comments: Fair blood pressure control   Obese with a BMI of 30.8 she is down 17.1 lb from her last visit with me April 20, 2021   Cardiovascular:      Rate and Rhythm: Normal rate and regular rhythm.      Heart sounds: No murmur heard.    No friction rub. No gallop.   Pulmonary:      Effort: Pulmonary effort is normal. No respiratory distress.      Breath sounds: Normal breath sounds. No stridor. No wheezing, rhonchi or rales.   Musculoskeletal:      Right lower leg: No edema.      Left lower leg: No edema.   Neurological:      Mental Status: She is alert and oriented to person, place, and time.   Psychiatric:         Mood and Affect: Mood normal.         Behavior: Behavior normal.         Thought Content: Thought content normal.         Judgment: Judgment normal.       Assessment:       1. Complete heart block    2. Coronary artery disease involving native coronary artery of native heart without angina pectoris    3. History of non-ST elevation myocardial infarction (NSTEMI)    4. Pulmonary hypertension    5. Severe persistent asthma without complication    6. Hypertension associated with diabetes    7. Hyperlipidemia associated with type 2 diabetes mellitus    8. Type 2 diabetes mellitus with other circulatory complication, without long-term current use of insulin    9. VERONA (acute kidney injury)    10. Hypoxemic respiratory failure, chronic    11. Asthma-COPD overlap syndrome          Plan:       1. Complete heart block  Recovered    2. Coronary artery disease involving native coronary artery of native heart without angina pectoris  Status post stent right coronary    3. History of non-ST elevation myocardial infarction (NSTEMI)  Recovered    4. Pulmonary  hypertension  Followed by cardiology and Dr. Vaca    5. Severe persistent asthma without complication  Followed by Pulmonary    6. Hypertension associated with diabetes  Fair control    7. Hyperlipidemia associated with type 2 diabetes mellitus    8. Type 2 diabetes mellitus with other circulatory complication, without long-term current use of insulin  Lab Results   Component Value Date    HGBA1C 5.8 09/10/2022       9. VERONA (acute kidney injury)  Reassess kidney function and hopefully free up capacity for use of other medications  - Basic Metabolic Panel; Future    10. Hypoxemic respiratory failure, chronic    11. Asthma-COPD overlap syndrome      Plan to recheck in 2 to 3 months and assess new A1c.    I spent 36 minutes on this encounter.  This time includes face-to-face time, orders, chart review, test review, and documentation.

## 2022-10-05 DIAGNOSIS — J45.50 SEVERE PERSISTENT ASTHMA WITHOUT COMPLICATION: ICD-10-CM

## 2022-10-05 DIAGNOSIS — J82.83 EOSINOPHILIC ASTHMA: ICD-10-CM

## 2022-10-05 DIAGNOSIS — J44.89 ASTHMA-COPD OVERLAP SYNDROME: ICD-10-CM

## 2022-10-05 RX ORDER — BUDESONIDE 0.5 MG/2ML
0.5 INHALANT ORAL 2 TIMES DAILY
Qty: 120 ML | Refills: 11 | Status: SHIPPED | OUTPATIENT
Start: 2022-10-05 | End: 2023-07-20 | Stop reason: SDUPTHER

## 2022-10-05 NOTE — TELEPHONE ENCOUNTER
I spoke to patient regarding 3 neb treatments.     ----- Message from Pooja Barragan sent at 10/5/2022  4:42 PM CDT -----  Contact: PT  Type: Needs Medical Advice  Who Called:  PT  Best Call Back Number: [phone]  Additional Information: PT NEED TO DISCUSS  MEDICATION, PLEASE CALL PT TO ADVISE.

## 2022-10-11 NOTE — TELEPHONE ENCOUNTER
No new care gaps identified.  [facility] Embedded Care Gaps. Reference number: [id number]. 10/11/2022   9:33:49 AM AMADOT

## 2022-10-11 NOTE — TELEPHONE ENCOUNTER
Pharmacy is asking for a refill of januvia in 100 mg. I took a look in your notes to see if her medication had changed during her visit and it just stated that she was taking half her original dose which is 50 mg. In her medication list the januvia also showed 50mg please advise

## 2022-10-12 ENCOUNTER — TELEPHONE (OUTPATIENT)
Dept: FAMILY MEDICINE | Facility: CLINIC | Age: 72
End: 2022-10-12
Payer: MEDICARE

## 2022-10-12 DIAGNOSIS — I25.10 CORONARY ARTERY DISEASE INVOLVING NATIVE CORONARY ARTERY OF NATIVE HEART WITHOUT ANGINA PECTORIS: ICD-10-CM

## 2022-10-12 DIAGNOSIS — Z95.5 CORONARY STENT PATENT: ICD-10-CM

## 2022-10-12 DIAGNOSIS — E11.9 TYPE 2 DIABETES MELLITUS WITHOUT COMPLICATION: ICD-10-CM

## 2022-10-12 RX ORDER — LANCETS
EACH MISCELLANEOUS
Qty: 100 EACH | Refills: 3 | Status: SHIPPED | OUTPATIENT
Start: 2022-10-12 | End: 2022-10-14 | Stop reason: SDUPTHER

## 2022-10-12 NOTE — TELEPHONE ENCOUNTER
Medication requesting - [facility] true metrix , entresto 24-26mg tab   Last Rx-10/12/2022 quanity -1 refill-0   Last office visit -09/29/2022  Next Office Visit-01/04/2023

## 2022-10-12 NOTE — TELEPHONE ENCOUNTER
The 100 mg Januvia was discontinued at a recent hospitalization when she went in for congestive heart failure and had an acute kidney injury.  She cannot take the 100 mg with her current kidney function.  The 50 mg was subsequently sent in when her blood sugar started climbing and should be a safer dose with her current kidney function.  You will have to call the pharmacy and tell them to discontinue the 100 mg.  We just sent in a 90 day supply of the 50 mg with a refill sufficient for six months.  It was sent in three weeks ago with receipt confirmed by pharmacy

## 2022-10-12 NOTE — TELEPHONE ENCOUNTER
..I called the patient to confirm her appointment that she has with Dr. Sánchez on 10/13/2022 at 11:00am, no answer left voicemail to return our call. I will send the patient a portal message as well.

## 2022-10-14 ENCOUNTER — TELEPHONE (OUTPATIENT)
Dept: PULMONOLOGY | Facility: CLINIC | Age: 72
End: 2022-10-14
Payer: MEDICARE

## 2022-10-14 DIAGNOSIS — I25.10 CORONARY ARTERY DISEASE INVOLVING NATIVE CORONARY ARTERY OF NATIVE HEART WITHOUT ANGINA PECTORIS: ICD-10-CM

## 2022-10-14 DIAGNOSIS — E11.9 TYPE 2 DIABETES MELLITUS WITHOUT COMPLICATION: ICD-10-CM

## 2022-10-14 DIAGNOSIS — I10 ESSENTIAL HYPERTENSION: ICD-10-CM

## 2022-10-14 DIAGNOSIS — R09.89 CHRONIC SINUS COMPLAINTS: ICD-10-CM

## 2022-10-14 DIAGNOSIS — Z95.5 CORONARY STENT PATENT: ICD-10-CM

## 2022-10-14 RX ORDER — LANCETS
EACH MISCELLANEOUS
Qty: 100 EACH | Refills: 3 | Status: CANCELLED | OUTPATIENT
Start: 2022-10-14

## 2022-10-14 RX ORDER — LANCETS
EACH MISCELLANEOUS
Qty: 100 EACH | Refills: 3 | Status: SHIPPED | OUTPATIENT
Start: 2022-10-14 | End: 2022-10-20 | Stop reason: ALTCHOICE

## 2022-10-14 RX ORDER — LEVOCETIRIZINE DIHYDROCHLORIDE 5 MG/1
5 TABLET, FILM COATED ORAL NIGHTLY
Qty: 90 TABLET | Refills: 3 | Status: SHIPPED | OUTPATIENT
Start: 2022-10-14 | End: 2022-10-20 | Stop reason: SDUPTHER

## 2022-10-14 RX ORDER — LEVOCETIRIZINE DIHYDROCHLORIDE 5 MG/1
5 TABLET, FILM COATED ORAL NIGHTLY
Qty: 90 TABLET | Refills: 3 | Status: CANCELLED | OUTPATIENT
Start: 2022-10-14 | End: 2023-10-14

## 2022-10-14 RX ORDER — AMLODIPINE BESYLATE 10 MG/1
TABLET ORAL
Qty: 90 TABLET | Refills: 3 | Status: CANCELLED | OUTPATIENT
Start: 2022-10-14

## 2022-10-14 RX ORDER — AMLODIPINE BESYLATE 10 MG/1
TABLET ORAL
Qty: 90 TABLET | Refills: 3 | Status: SHIPPED | OUTPATIENT
Start: 2022-10-14 | End: 2022-10-20 | Stop reason: SDUPTHER

## 2022-10-14 NOTE — TELEPHONE ENCOUNTER
Spoke with pharmacy. Advised Dr. Vaca is not order provider of all medications, but we will refill current medications prescribed by him.     ----- Message from Lisa Saenz sent at 10/14/2022  2:22 PM CDT -----  Contact: Brenda  Type:  Patient Call          Who Called: Halima Montano         Does the patient know what this is regarding?: requesting a refill on Rx sacubitriL-valsartan (ENTRESTO) 24-26 mg per tablet , levocetirizine (XYZAL) 5 MG tablet ;amLODIPine (NORVASC) 10 MG tablet, clopidogreL (PLAVIX) 75 mg tablet ;  Trumetriux blood glucose meter; Trumetrix test strips , Trumetrix control solution , True plus 30 gauge  lancet and bd alcohol swabs   please advise               Additional Information: Halima Bustamante -[phone]

## 2022-10-14 NOTE — TELEPHONE ENCOUNTER
Medication requesting - Trueplus 30mg lancets   Last Rx-10/12/2022 quanity -1 refill-0   Last office visit -09/29/2022  Next Office Visit-01/04/2023

## 2022-10-14 NOTE — TELEPHONE ENCOUNTER
Spoke with [facility] pharmacy and was told that the Januvia 100 mg has already been removed from her profile.

## 2022-10-17 DIAGNOSIS — E11.59 TYPE 2 DIABETES MELLITUS WITH OTHER CIRCULATORY COMPLICATION, WITHOUT LONG-TERM CURRENT USE OF INSULIN: Primary | ICD-10-CM

## 2022-10-17 NOTE — TELEPHONE ENCOUNTER
No new care gaps identified.  [facility] Embedded Care Gaps. Reference number: [id number]. 10/17/2022   8:04:17 AM AMADOT

## 2022-10-18 ENCOUNTER — TELEPHONE (OUTPATIENT)
Dept: PULMONOLOGY | Facility: CLINIC | Age: 72
End: 2022-10-18
Payer: MEDICARE

## 2022-10-18 NOTE — TELEPHONE ENCOUNTER
Attempted to contact. No answer. Office closed for day.    ----- Message from Bryan Manjarrez sent at 10/18/2022  9:35 AM CDT -----  Contact: diane from [employer]  Type: Needs Medical Advice  Who Called:  diane from [facility]   Best Call Back Number: .[phone] ext [phone]   Additional Information: pt is not wearing her cepap and diane would like you all to call her back regarding the cepap. Please advise.

## 2022-10-20 DIAGNOSIS — E11.59 TYPE 2 DIABETES MELLITUS WITH OTHER CIRCULATORY COMPLICATION, WITHOUT LONG-TERM CURRENT USE OF INSULIN: ICD-10-CM

## 2022-10-20 DIAGNOSIS — I25.10 CORONARY ARTERY DISEASE INVOLVING NATIVE CORONARY ARTERY OF NATIVE HEART WITHOUT ANGINA PECTORIS: ICD-10-CM

## 2022-10-20 DIAGNOSIS — Z95.5 CORONARY STENT PATENT: ICD-10-CM

## 2022-10-20 DIAGNOSIS — R09.89 CHRONIC SINUS COMPLAINTS: ICD-10-CM

## 2022-10-20 DIAGNOSIS — E11.59 TYPE 2 DIABETES MELLITUS WITH OTHER CIRCULATORY COMPLICATION, WITHOUT LONG-TERM CURRENT USE OF INSULIN: Primary | ICD-10-CM

## 2022-10-20 DIAGNOSIS — I10 ESSENTIAL HYPERTENSION: ICD-10-CM

## 2022-10-20 RX ORDER — ISOPROPYL ALCOHOL 70 ML/100ML
1 SWAB TOPICAL
Qty: 100 EACH | Refills: 3 | Status: ON HOLD | OUTPATIENT
Start: 2022-10-20

## 2022-10-20 RX ORDER — ISOPROPYL ALCOHOL 70 ML/100ML
1 SWAB TOPICAL
Qty: 100 EACH | Refills: 3 | Status: SHIPPED | OUTPATIENT
Start: 2022-10-20 | End: 2022-10-20 | Stop reason: SDUPTHER

## 2022-10-20 RX ORDER — DEXTROSE 4 G
TABLET,CHEWABLE ORAL
Qty: 1 EACH | Refills: 0 | Status: ON HOLD | OUTPATIENT
Start: 2022-10-20

## 2022-10-20 RX ORDER — DEXTROSE 4 G
TABLET,CHEWABLE ORAL
Qty: 1 EACH | Refills: 0 | Status: SHIPPED | OUTPATIENT
Start: 2022-10-20 | End: 2022-10-20 | Stop reason: SDUPTHER

## 2022-10-20 RX ORDER — BLOOD-GLUCOSE METER
EACH MISCELLANEOUS
Qty: 1 EACH | Refills: 3 | Status: ON HOLD | OUTPATIENT
Start: 2022-10-20

## 2022-10-20 RX ORDER — LEVOCETIRIZINE DIHYDROCHLORIDE 5 MG/1
5 TABLET, FILM COATED ORAL NIGHTLY
Qty: 90 TABLET | Refills: 3 | Status: ON HOLD | OUTPATIENT
Start: 2022-10-20 | End: 2023-10-20

## 2022-10-20 RX ORDER — LANCETS 33 GAUGE
1 EACH MISCELLANEOUS DAILY
Qty: 100 EACH | Refills: 3 | Status: ON HOLD | OUTPATIENT
Start: 2022-10-20

## 2022-10-20 RX ORDER — BLOOD-GLUCOSE METER
EACH MISCELLANEOUS
Qty: 1 EACH | Refills: 3 | Status: SHIPPED | OUTPATIENT
Start: 2022-10-20 | End: 2022-10-20 | Stop reason: SDUPTHER

## 2022-10-20 RX ORDER — AMLODIPINE BESYLATE 10 MG/1
TABLET ORAL
Qty: 90 TABLET | Refills: 3 | Status: SHIPPED | OUTPATIENT
Start: 2022-10-20 | End: 2023-07-20 | Stop reason: SDUPTHER

## 2022-10-20 RX ORDER — CLOPIDOGREL BISULFATE 75 MG/1
75 TABLET ORAL DAILY
Qty: 90 TABLET | Refills: 3 | Status: SHIPPED | OUTPATIENT
Start: 2022-10-20 | End: 2023-07-20

## 2022-10-20 NOTE — TELEPHONE ENCOUNTER
No new care gaps identified.  [facility] Embedded Care Gaps. Reference number: [id number]. 10/20/2022   8:27:53 AM AMADOT

## 2022-10-20 NOTE — TELEPHONE ENCOUNTER
Medication requesting - Entresto 24-2gmg tab. True Metix Meter Kit, Clopidogrel 75mg Levocetirzine 5mg tab. Amlodipine 10mg tab.   Last Rx-10/14/2022 quanity -1 refill-0   Last office visit -09/29/2022  Next Office Visit-01/04/2023

## 2022-10-21 ENCOUNTER — DOCUMENT SCAN (OUTPATIENT)
Dept: HOME HEALTH SERVICES | Facility: HOSPITAL | Age: 72
End: 2022-10-21
Payer: MEDICARE

## 2022-10-26 ENCOUNTER — EXTERNAL HOME HEALTH (OUTPATIENT)
Dept: HOME HEALTH SERVICES | Facility: HOSPITAL | Age: 72
End: 2022-10-26
Payer: MEDICARE

## 2022-11-04 ENCOUNTER — DOCUMENT SCAN (OUTPATIENT)
Dept: HOME HEALTH SERVICES | Facility: HOSPITAL | Age: 72
End: 2022-11-04
Payer: MEDICARE

## 2022-11-04 LAB
ALT SERPL-CCNC: NORMAL U/L
AST SERPL-CCNC: NORMAL U/L
BUN BLD-MCNC: NORMAL MG/DL
CHOLEST SERPL-MSCNC: 161 MG/DL (ref 0–200)
CREAT SERPL-MCNC: NORMAL MG/DL
GLUCOSE 1H P 100 G GLC PO SERPL-MCNC: NORMAL MG/DL
GLUCOSE 2H P 100 G GLC PO SERPL-MCNC: NORMAL MG/DL
HBA1C MFR BLD: 6.7 %
HDLC SERPL-MCNC: 33 MG/DL
LDLC SERPL CALC-MCNC: 72 MG/DL (ref 0–160)
MICROALBUMIN URINE: NORMAL
MICROALBUMIN/CREATININE RATIO: NORMAL
PROTEIN/CREATININE RATIO: NORMAL
TRIGL SERPL-MCNC: 349 MG/DL

## 2022-11-10 ENCOUNTER — PATIENT OUTREACH (OUTPATIENT)
Dept: ADMINISTRATIVE | Facility: HOSPITAL | Age: 72
End: 2022-11-10
Payer: MEDICARE

## 2022-11-18 ENCOUNTER — DOCUMENT SCAN (OUTPATIENT)
Dept: HOME HEALTH SERVICES | Facility: HOSPITAL | Age: 72
End: 2022-11-18
Payer: MEDICARE

## 2022-11-18 ENCOUNTER — TELEPHONE (OUTPATIENT)
Dept: PULMONOLOGY | Facility: CLINIC | Age: 72
End: 2022-11-18
Payer: MEDICARE

## 2022-11-25 ENCOUNTER — DOCUMENT SCAN (OUTPATIENT)
Dept: HOME HEALTH SERVICES | Facility: HOSPITAL | Age: 72
End: 2022-11-25
Payer: MEDICARE

## 2022-12-12 PROBLEM — J96.11 HYPOXEMIC RESPIRATORY FAILURE, CHRONIC: Status: RESOLVED | Noted: 2019-03-08 | Resolved: 2022-12-12

## 2022-12-16 ENCOUNTER — TELEPHONE (OUTPATIENT)
Dept: PULMONOLOGY | Facility: CLINIC | Age: 72
End: 2022-12-16
Payer: MEDICARE

## 2022-12-16 NOTE — TELEPHONE ENCOUNTER
Spoke with Wesley. She is helping patient with appointments.     Advised patient would need an appointment since we have not seen patient sine 9/29/2022. Chart notes and testing needed for POC and to order Rolator.    Wesley will reach out to Dr. Sands's office to see if they can order since patient has recent visit.     She will reach back out to our office Monday if she needs to schedule an appointment.          ----- Message from Judy Good sent at 12/16/2022  2:52 PM CST -----  Contact: wesley  Type: Needs Medical Advice  Who Called:  Wesleyjean pierre  Best Call Back Number: [phone]  Additional Information: requesting a call back regarding patients equipment that she needs ordered--  portable oxygen concentrator and Rolator , Oscar, fax# [phone], seda Milian

## 2023-01-04 ENCOUNTER — TELEPHONE (OUTPATIENT)
Dept: FAMILY MEDICINE | Facility: CLINIC | Age: 73
End: 2023-01-04
Payer: MEDICARE

## 2023-01-04 NOTE — TELEPHONE ENCOUNTER
..I called the patient to confirm her appointment that she has with Dr. Sánchez on 01/05/2023 at 3:00pm, no answer left voicemail to return our call. I will send the patient a portal message as well.

## 2023-01-17 ENCOUNTER — HOSPITAL ENCOUNTER (OUTPATIENT)
Dept: PULMONOLOGY | Facility: HOSPITAL | Age: 73
Discharge: HOME | End: 2023-01-17
Attending: INTERNAL MEDICINE
Payer: MEDICARE

## 2023-01-17 ENCOUNTER — OFFICE VISIT (OUTPATIENT)
Dept: PULMONOLOGY | Facility: CLINIC | Age: 73
End: 2023-01-17
Payer: MEDICARE

## 2023-01-17 VITALS
WEIGHT: 177 LBS | HEART RATE: 60 BPM | DIASTOLIC BLOOD PRESSURE: 61 MMHG | OXYGEN SATURATION: 94 % | HEIGHT: 62 IN | BODY MASS INDEX: 32.57 KG/M2 | SYSTOLIC BLOOD PRESSURE: 171 MMHG

## 2023-01-17 DIAGNOSIS — J82.83 EOSINOPHILIC ASTHMA: ICD-10-CM

## 2023-01-17 DIAGNOSIS — J45.50 SEVERE PERSISTENT ASTHMA WITHOUT COMPLICATION: ICD-10-CM

## 2023-01-17 DIAGNOSIS — J44.9 CHRONIC OBSTRUCTIVE PULMONARY DISEASE, UNSPECIFIED COPD TYPE: ICD-10-CM

## 2023-01-17 DIAGNOSIS — J44.89 ASTHMA-COPD OVERLAP SYNDROME: Primary | ICD-10-CM

## 2023-01-17 DIAGNOSIS — J44.89 ASTHMA-COPD OVERLAP SYNDROME: ICD-10-CM

## 2023-01-17 DIAGNOSIS — I27.20 PULMONARY HYPERTENSION: ICD-10-CM

## 2023-01-17 LAB
BRPFT: NORMAL
DLCO ADJ PRE: 9.32 ML/(MIN*MMHG)
DLCO SINGLE BREATH LLN: 13.9
DLCO SINGLE BREATH PRE REF: 47.5 %
DLCO SINGLE BREATH REF: 19.63
DLCOC SBVA LLN: 2.73
DLCOC SBVA PRE REF: 79.1 %
DLCOC SBVA REF: 4.29
DLCOC SINGLE BREATH LLN: 13.9
DLCOC SINGLE BREATH PRE REF: 47.5 %
DLCOC SINGLE BREATH REF: 19.63
DLCOVA LLN: 2.73
DLCOVA PRE REF: 79.1 %
DLCOVA PRE: 3.4 ML/(MIN*MMHG*L)
DLCOVA REF: 4.29
DLVAADJ PRE: 3.4 ML/(MIN*MMHG*L)
ERVN2 LLN: -16449.4
ERVN2 PRE REF: 42.7 %
ERVN2 PRE: 0.25 L
ERVN2 REF: 0.6
FEF 25 75 CHG: 35 %
FEF 25 75 LLN: 0.77
FEF 25 75 POST REF: 25.6 %
FEF 25 75 PRE REF: 18.9 %
FEF 25 75 REF: 1.71
FET100 CHG: -7.2 %
FEV1 CHG: 10.3 %
FEV1 FVC CHG: 6.4 %
FEV1 FVC LLN: 65
FEV1 FVC POST REF: 78.1 %
FEV1 FVC PRE REF: 73.4 %
FEV1 FVC REF: 78
FEV1 LLN: 1.44
FEV1 POST REF: 49.4 %
FEV1 PRE REF: 44.8 %
FEV1 REF: 1.99
FRCN2 LLN: 1.77
FRCN2 PRE REF: 83.1 %
FRCN2 REF: 2.59
FVC CHG: 3.7 %
FVC LLN: 1.85
FVC POST REF: 62.8 %
FVC PRE REF: 60.5 %
FVC REF: 2.56
IVC PRE: 1.1 L
IVC SINGLE BREATH LLN: 1.85
IVC SINGLE BREATH PRE REF: 43 %
IVC SINGLE BREATH REF: 2.56
MVV LLN: 61
MVV PRE REF: 38.9 %
MVV REF: 72
PEF CHG: -7.3 %
PEF LLN: 3.57
PEF POST REF: 47.2 %
PEF PRE REF: 50.9 %
PEF REF: 5.16
POST FEF 25 75: 0.44 L/S
POST FET 100: 7.98 SEC
POST FEV1 FVC: 61.02 %
POST FEV1: 0.98 L
POST FVC: 1.61 L
POST PEF: 2.44 L/S
PRE DLCO: 9.32 ML/(MIN*MMHG)
PRE FEF 25 75: 0.32 L/S
PRE FET 100: 8.6 SEC
PRE FEV1 FVC: 57.37 %
PRE FEV1: 0.89 L
PRE FRC N2: 2.15 L
PRE FVC: 1.55 L
PRE MVV: 28 L/MIN
PRE PEF: 2.63 L/S
RVN2 LLN: 1.42
RVN2 PRE REF: 91.6 %
RVN2 PRE: 1.83 L
RVN2 REF: 1.99
RVN2TLCN2 LLN: 33.85
RVN2TLCN2 PRE REF: 124.4 %
RVN2TLCN2 PRE: 54.04 %
RVN2TLCN2 REF: 43.44
TLCN2 LLN: 3.58
TLCN2 PRE REF: 73.9 %
TLCN2 PRE: 3.38 L
TLCN2 REF: 4.57
VA PRE: 2.75 L
VA SINGLE BREATH LLN: 4.42
VA SINGLE BREATH PRE REF: 62.2 %
VA SINGLE BREATH REF: 4.42
VCMAXN2 LLN: 1.85
VCMAXN2 PRE REF: 60.5 %
VCMAXN2 PRE: 1.55 L
VCMAXN2 REF: 2.56

## 2023-01-17 PROCEDURE — 99213 PR OFFICE/OUTPT VISIT, EST, LEVL III, 20-29 MIN: ICD-10-PCS | Mod: 25,S$GLB,, | Performed by: INTERNAL MEDICINE

## 2023-01-17 PROCEDURE — 94727 GAS DIL/WSHOT DETER LNG VOL: CPT

## 2023-01-17 PROCEDURE — 94060 EVALUATION OF WHEEZING: CPT

## 2023-01-17 PROCEDURE — 1101F PR PT FALLS ASSESS DOC 0-1 FALLS W/OUT INJ PAST YR: ICD-10-PCS | Mod: CPTII,S$GLB,, | Performed by: INTERNAL MEDICINE

## 2023-01-17 PROCEDURE — 1101F PT FALLS ASSESS-DOCD LE1/YR: CPT | Mod: CPTII,S$GLB,, | Performed by: INTERNAL MEDICINE

## 2023-01-17 PROCEDURE — 3078F DIAST BP <80 MM HG: CPT | Mod: CPTII,S$GLB,, | Performed by: INTERNAL MEDICINE

## 2023-01-17 PROCEDURE — 3008F BODY MASS INDEX DOCD: CPT | Mod: CPTII,S$GLB,, | Performed by: INTERNAL MEDICINE

## 2023-01-17 PROCEDURE — 94727 PR PULM FUNCTION TEST BY GAS: ICD-10-PCS | Mod: 26,,, | Performed by: INTERNAL MEDICINE

## 2023-01-17 PROCEDURE — 94060 EVALUATION OF WHEEZING: CPT | Mod: 26,,, | Performed by: INTERNAL MEDICINE

## 2023-01-17 PROCEDURE — 99213 OFFICE O/P EST LOW 20 MIN: CPT | Mod: 25,S$GLB,, | Performed by: INTERNAL MEDICINE

## 2023-01-17 PROCEDURE — 94060 PR EVAL OF BRONCHOSPASM: ICD-10-PCS | Mod: 26,,, | Performed by: INTERNAL MEDICINE

## 2023-01-17 PROCEDURE — 3078F PR MOST RECENT DIASTOLIC BLOOD PRESSURE < 80 MM HG: ICD-10-PCS | Mod: CPTII,S$GLB,, | Performed by: INTERNAL MEDICINE

## 2023-01-17 PROCEDURE — 3077F PR MOST RECENT SYSTOLIC BLOOD PRESSURE >= 140 MM HG: ICD-10-PCS | Mod: CPTII,S$GLB,, | Performed by: INTERNAL MEDICINE

## 2023-01-17 PROCEDURE — 94727 GAS DIL/WSHOT DETER LNG VOL: CPT | Mod: 26,,, | Performed by: INTERNAL MEDICINE

## 2023-01-17 PROCEDURE — 1126F AMNT PAIN NOTED NONE PRSNT: CPT | Mod: CPTII,S$GLB,, | Performed by: INTERNAL MEDICINE

## 2023-01-17 PROCEDURE — 99999 PR PBB SHADOW E&M-EST. PATIENT-LVL III: ICD-10-PCS | Mod: PBBFAC,,, | Performed by: INTERNAL MEDICINE

## 2023-01-17 PROCEDURE — 3008F PR BODY MASS INDEX (BMI) DOCUMENTED: ICD-10-PCS | Mod: CPTII,S$GLB,, | Performed by: INTERNAL MEDICINE

## 2023-01-17 PROCEDURE — 99999 PR PBB SHADOW E&M-EST. PATIENT-LVL III: CPT | Mod: PBBFAC,,, | Performed by: INTERNAL MEDICINE

## 2023-01-17 PROCEDURE — 1126F PR PAIN SEVERITY QUANTIFIED, NO PAIN PRESENT: ICD-10-PCS | Mod: CPTII,S$GLB,, | Performed by: INTERNAL MEDICINE

## 2023-01-17 PROCEDURE — 94729 DIFFUSING CAPACITY: CPT | Mod: 26,,, | Performed by: INTERNAL MEDICINE

## 2023-01-17 PROCEDURE — 94729 DIFFUSING CAPACITY: CPT

## 2023-01-17 PROCEDURE — 1159F MED LIST DOCD IN RCRD: CPT | Mod: CPTII,S$GLB,, | Performed by: INTERNAL MEDICINE

## 2023-01-17 PROCEDURE — 1159F PR MEDICATION LIST DOCUMENTED IN MEDICAL RECORD: ICD-10-PCS | Mod: CPTII,S$GLB,, | Performed by: INTERNAL MEDICINE

## 2023-01-17 PROCEDURE — 3288F FALL RISK ASSESSMENT DOCD: CPT | Mod: CPTII,S$GLB,, | Performed by: INTERNAL MEDICINE

## 2023-01-17 PROCEDURE — 94729 PR C02/MEMBANE DIFFUSE CAPACITY: ICD-10-PCS | Mod: 26,,, | Performed by: INTERNAL MEDICINE

## 2023-01-17 PROCEDURE — 3288F PR FALLS RISK ASSESSMENT DOCUMENTED: ICD-10-PCS | Mod: CPTII,S$GLB,, | Performed by: INTERNAL MEDICINE

## 2023-01-17 PROCEDURE — 3077F SYST BP >= 140 MM HG: CPT | Mod: CPTII,S$GLB,, | Performed by: INTERNAL MEDICINE

## 2023-01-17 RX ORDER — TORSEMIDE 20 MG/1
20 TABLET ORAL
Status: ON HOLD | COMMUNITY
Start: 2022-12-18

## 2023-01-17 RX ORDER — EVOLOCUMAB 140 MG/ML
INJECTION, SOLUTION SUBCUTANEOUS
Status: ON HOLD | COMMUNITY
Start: 2022-12-26

## 2023-01-17 RX ORDER — POTASSIUM CHLORIDE 600 MG/1
8 CAPSULE, EXTENDED RELEASE ORAL
Status: ON HOLD | COMMUNITY
Start: 2022-12-18

## 2023-01-17 RX ORDER — GLYBURIDE 2.5 MG/1
2.5 TABLET ORAL 2 TIMES DAILY
Status: ON HOLD | COMMUNITY
Start: 2022-12-18

## 2023-01-17 RX ORDER — ALBUTEROL SULFATE 2.5 MG/.5ML
2.5 SOLUTION RESPIRATORY (INHALATION)
Status: COMPLETED | OUTPATIENT
Start: 2023-01-17 | End: 2023-01-17

## 2023-01-17 RX ORDER — SOTALOL HYDROCHLORIDE 80 MG/1
80 TABLET ORAL 2 TIMES DAILY
Status: ON HOLD | COMMUNITY
Start: 2022-12-18

## 2023-01-17 RX ADMIN — ALBUTEROL SULFATE 2.5 MG: 2.5 SOLUTION RESPIRATORY (INHALATION) at 08:01

## 2023-01-17 NOTE — PATIENT INSTRUCTIONS
Cxr sept 2022 with enlarged heart and clear lungs- viewed    Leaky mitral valve and pulmonary hypertension seen on echo last yr.     Breathing test was 45% today -- viewed.    Will stop non invasive ventilator    Continue nebulized therapy -- 3 meds    Recheck in 6 months.        Could try to get portable oxygen concentrator.

## 2023-01-17 NOTE — PROGRESS NOTES
1/17/2023    Tereza Larose  New Patient Consult    Chief Complaint   Patient presents with    3m f/u           HPI:    1/17/2023 -- niv not being used, cannot tolerate. Uses ox 24/7, problem with tanks when out of house.  No prednisone nor mucous produced.   Uses   Moderate mitral regurg and pulm htn on echo 9/2022.     Used prednisone in past with benefit, sees Dr Franco.     9/29/2022 pt had torsades at [facility] after being admitted on 9/9 with sob.  She had rhc with r coronary stent.  Pt dced on 9/15 after 6 days stay.     Pt had had 2 loc - one with fall. Presented to hosp.  Pt had poor recall.    On neb rx bid.  Home health arranged. Plavix and entresto now and levothyroxine adjusted.  Had high sugars.      Pt hosp  [facility] 9/9 to 9/15.  After reviewing hospital clinicals, patient needs home NIV and requires guaranteed target volume and battery backup for portability. Bilevel has been considered and will not prevent exacerbations due to patient's disease state.  Patient Instructions   Pt having problem tolerating niv. Continue to try -- let us know if helps.     Meds renewed    Cxr good 9/9/2022       Use oxygen.     For allergies -- may use xyzal bedtime-- sent in to [facility].   Patient Instructions   Pt having problem tolerating niv. Continue to try -- let us know if helps.     Meds renewed    Cxr good 9/9/2022       Use oxygen.     For allergies -- may use xyzal bedtime-- sent in to [facility].   9/7/2022 uses roller walker last 4-5 yrs, occ falls.  No hemoptysis. More sob. Uses ox chr.  Cough and min wheezes.  Cough white .  No recent prednisone -- used in past with benefit.  Uses ventolin now-- used breo in past but felt ventoloin helped more.   Still worsens cold weather.   Hoarse last 2 yrs- stable.   Patient Instructions   Need to be on preventive therapy -- use budesonide twice daily.   Brovnana twice daily and yupleri once daily  Would take prednisone 20 mg daily for 7 days -- repeat.  Would check sugar,  perhaps daily -- to assure sugars less than 200-250.   If on preventive therapy may be able to  use asthma shots as asthma seems severe?   Will resume theophylline -- if available.  Need to do breathing test with next visit.  Get blood work today or am.     4/30/2020 - no more blood streaks, min cough am mucous white.  Uses resp rx with breo only - prn ventolin also. Uses ox at home.  Patient Instructions   Chest xray nearly cleared completely by 1/29/2020    Would use celebrex 100 mg (stop for stomach upset) once or twice daily for severe right low back pain causing walking problems.  Also sent in tramadol 50 mg for as needed use.    Continue breo and as needed albuterol      Call if problem , may  Use prednisone if cough/wheezes   1/29/2020- had cold front pass with problems coughing up blood streaks.  Admitted to [facility] wbc 1/10 of 24, creat 2.  Got iv abx, none at [place].  Uses ox at home.      Uses brovana and budesonide , also breo, and singulair.  No asthma arousal.    Patient Instructions   Need to follow up chest xray as was not clear by 1/14.  Pneumonia not typical  For heart block  Use breo or brovana and budesonide - not both.  Use breo til out.  You have used and benefited from your home oxygen.  11/21/2019 asthma since age 19 with cough/wheezes/sob, weather change and noct worsening.  Uses 02 to sleep and sat walking 89 or so sat.  Uses ox daytime 1/2 day.    Sinuses problem- flonase prn, claritin.  No infection sinus.  Mucous from lungs white to clear.    Uses theophylline.  Takes albuterol 1-2/wk.  Awakens with asthma 4/month.  No emergency treatments.    Uses steroid shots once a yr.  No apneas suggested- on home ox 3 yrs.  Use cane to ambulate as had cva 3 yrs ago.  Falls occasionally- fell last month.    Patient Instructions   Asthma likely caused some copd.  Take prednisone 20mg  Daily for 3 days and note sugars, and breathing.  Would check lung capacity next wk, Check chest xray and breathing test and  "oxygen level walking.  .   May repeat prednisone if needed.  Prevention medications -Use Singulair daily&  Use breo once daily, or  consider continuing or use budesonide and brovana (similar medications but should be covered with old medicare benefit).  Use nebulizer as needed.  Control sinuses- use Claritin and Flonase.  singulair may help.    Could skip theophylline and stay off    The chief compliant  problem is new to me",   PFSH:  Past Medical History:   Diagnosis Date    Abnormal EKG 9/26/2012    Allergy     Arthritis     Asthma     Brain bleed     Colon polyps 11/6/2020    COPD (chronic obstructive pulmonary disease)     Depression     Diabetes mellitus type II     Diverticulitis     Fatty liver     GERD (gastroesophageal reflux disease)     Goiter     s/p thyroidectomy    Heart murmur     Hemiparesis affecting right side as late effect of cerebrovascular accident (CVA) 7/26/2022    Hernia of abdominal wall     History of intracranial hemorrhage 6/15/2013    History of non-ST elevation myocardial infarction (NSTEMI) 6/15/2013    Hyperlipidemia     Hypertension     Lactic acidosis 1/11/2020    Metabolic syndrome 6/14/2012    Myocardial infarction     On home oxygen therapy     states uses 2L at night or when sat < 90    Pacemaker     Positive FIT (fecal immunochemical test) 11/6/2020    Severe persistent asthma without complication 4/30/2020    Statin intolerance     Stroke 2012    left hand shaky, loss of balance         Past Surgical History:   Procedure Laterality Date    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/25/2017    Dr Griggs    ADRENALECTOMY      AORTIC VALVE REPLACEMENT  12/09/2016    ARTERIOGRAPHY OF AORTIC ROOT N/A 9/12/2022    Procedure: ARTERIOGRAM, AORTIC ROOT;  Surgeon: Marko Batres MD;  Location: [facility] CATH/EP LAB;  Service: Cardiology;  Laterality: N/A;    arthroscopy lt knee Right     BLADDER REPAIR      sling    BREAST BIOPSY Bilateral     benign    COLONOSCOPY  2004    10 year recheck    " COLONOSCOPY N/A 11/6/2020    Procedure: COLONOSCOPY;  Surgeon: Yakelin Lowery MD;  Location: [facility] ENDO;  Service: Endoscopy;  Laterality: N/A;    CORONARY ANGIOGRAPHY INCLUDING BYPASS GRAFTS WITH CATHETERIZATION OF LEFT HEART Left 9/12/2022    Procedure: Left heart cath including bypass graft, with left heart catheterization;  Surgeon: Marko Batres MD;  Location: [facility] CATH/EP LAB;  Service: Cardiology;  Laterality: Left;    CORONARY ARTERY BYPASS GRAFT  12/09/2016    X3    EPIDURAL STEROID INJECTION INTO LUMBAR SPINE N/A 7/27/2021    Procedure: Injection-steroid-epidural-lumbar;  Surgeon: Valerio Jay MD;  Location: [facility] OR;  Service: Pain Management;  Laterality: N/A;  L5-S1     HYSTERECTOMY      INSERTION OF PACEMAKER Left 1/13/2020    Procedure: INSERTION, PACEMAKER;  Surgeon: Marko Batres MD;  Location: [facility] CATH/EP LAB;  Service: Cardiology;  Laterality: Left;    OOPHORECTOMY      RIGHT HEART CATHETERIZATION Right 9/12/2022    Procedure: INSERTION, CATHETER, RIGHT HEART;  Surgeon: Marko Batres MD;  Location: [facility] CATH/EP LAB;  Service: Cardiology;  Laterality: Right;    THYROIDECTOMY       Social History     Tobacco Use    Smoking status: Never    Smokeless tobacco: Never   Substance Use Topics    Alcohol use: Not Currently     Comment: seldom    Drug use: No     Family History   Problem Relation Age of Onset    Arthritis Mother     Diabetes Mother     Heart disease Mother     Hypertension Mother     Hypertension Father     Heart disease Father     Mental illness Father     Asthma Sister     Diabetes Sister     Hypertension Sister     Asthma Son     COPD Son     Depression Son     Diabetes Son     Hypertension Son     Stroke Son     Diabetes Maternal Uncle     Heart disease Maternal Uncle     Mental illness Paternal Aunt     Cancer Paternal Aunt     Heart disease Paternal Aunt     Hypertension Paternal Aunt     Heart disease Paternal Uncle     Hypertension Maternal Grandmother     Mental illness  "Maternal Grandmother     Aneurysm Maternal Grandfather     Mental illness Paternal Grandmother     Heart disease Paternal Grandfather     Melanoma Neg Hx     Psoriasis Neg Hx     Lupus Neg Hx     Eczema Neg Hx      Review of patient's allergies indicates:   Allergen Reactions    Metformin Diarrhea     Diarrhea      Corn Itching     Other reaction(s): Sneezing  Other reaction(s): Rhinorrhea    Potato starch Hives     Other reaction(s): Sneezing  Other reaction(s): Rhinorrhea    Pravastatin Other (See Comments)     Muscle pain    Statins-hmg-coa reductase inhibitors Other (See Comments)    Hydrochlorothiazide Other (See Comments)     weakness    Welchol [colesevelam] Other (See Comments)     Weakness        Performance Status:The patient's activity level is housebound activities.      Review of Systems:  a review of eleven systems covering constitutional, Eye, HEENT, Psych, Respiratory, Cardiac, GI, , Musculoskeletal, Endocrine, Dermatologic was negative except for pertinent findings as listed ABOVE and below:  pertinent positive as above, rest is good       Exam:Comprehensive exam done. BP (!) 171/61 (BP Location: Right arm, Patient Position: Sitting, BP Method: Medium (Automatic))   Pulse 60   Ht 5' 2" (1.575 m)   Wt 80.3 kg (177 lb 0.5 oz)   SpO2 (!) 94% Comment: on room air at rest  BMI 32.38 kg/m²   Exam included Vitals as listed, and patient's appearance and affect and alertness and mood, oral exam for yeast and hygiene and pharynx lesions and Mallapatti (M) score, neck with inspection for jvd and masses and thyroid abnormalities and lymph nodes (supraclavicular and infraclavicular nodes and axillary also examined and noted if abn), chest exam included symmetry and effort and fremitus and percussion and auscultation, cardiac exam included rhythm and gallops and murmur and rubs and jvd and edema, abdominal exam for mass and hepatosplenomegaly and tenderness and hernias and bowel sounds, Musculoskeletal " exam with muscle tone and posture and mobility/gait and  strength, and skin for rashes and cyanosis and pallor and turgor, extremity for clubbing.  Findings were normal except for pertinent findings listed below:  M3,chest is symmetric, no distress, normal percussion, normal fremitus and good normal decreased breath sounds  No clubbing nor edema     Radiographs (ct chest and cxr) reviewed: cxr 1/10/2020- patchy left > right pneumonia.     cxr 1/29/20 nearly clear.      Labs reviewed         Results for TEREZA VELA (MRN [id number]) as of 11/21/2019 10:37   Ref. Range 8/28/2018 16:38   Eosinophil% Latest Ref Range: 0.0 - 8.0 % 5.2   Eos # Latest Ref Range: 0.0 - 0.5 K/uL 0.4     PFT - fev 45% or 900 cc 1/17/2023, discussed today      Plan:  Clinical impression is apparently straight forward and impression with management as below.     Tereza was seen today for 3m f/u.    Diagnoses and all orders for this visit:    Asthma-COPD overlap syndrome    Pulmonary hypertension    Severe persistent asthma without complication    Chronic obstructive pulmonary disease, unspecified COPD type              Follow up in about 6 months (around 7/17/2023), or if symptoms worsen or fail to improve.    Discussed with patient above for education the following:      Patient Instructions   Cxr sept 2022 with enlarged heart and clear lungs- viewed    Leaky mitral valve and pulmonary hypertension seen on echo last yr.     Breathing test was 45% today -- viewed.    Will stop non invasive ventilator    Continue nebulized therapy -- 3 meds    Recheck in 6 months.        Could try to get portable oxygen concentrator.

## 2023-01-20 ENCOUNTER — TELEPHONE (OUTPATIENT)
Dept: PULMONOLOGY | Facility: CLINIC | Age: 73
End: 2023-01-20
Payer: MEDICARE

## 2023-02-06 ENCOUNTER — PATIENT MESSAGE (OUTPATIENT)
Dept: FAMILY MEDICINE | Facility: CLINIC | Age: 73
End: 2023-02-06
Payer: MEDICARE

## 2023-04-04 ENCOUNTER — TELEPHONE (OUTPATIENT)
Dept: FAMILY MEDICINE | Facility: CLINIC | Age: 73
End: 2023-04-04
Payer: MEDICARE

## 2023-04-12 ENCOUNTER — PATIENT MESSAGE (OUTPATIENT)
Dept: ADMINISTRATIVE | Facility: HOSPITAL | Age: 73
End: 2023-04-12
Payer: MEDICARE

## 2023-04-21 ENCOUNTER — TELEPHONE (OUTPATIENT)
Dept: FAMILY MEDICINE | Facility: CLINIC | Age: 73
End: 2023-04-21
Payer: MEDICARE

## 2023-04-21 ENCOUNTER — PATIENT MESSAGE (OUTPATIENT)
Dept: FAMILY MEDICINE | Facility: CLINIC | Age: 73
End: 2023-04-21
Payer: MEDICARE

## 2023-04-21 NOTE — TELEPHONE ENCOUNTER
"Patient reports experiencing symptoms of overactive bladder. Patient states she took a trip from [place] to [place] with her daughter and soiled a pad, 2 diapers, and 2 pairs of shorts. Patient states "This has been going on for a while. My sister takes a little pill that is 10 mg in the morning and 5 mg at night. I wonder if Dr. Sánchez can prescribe me something like that."  Patient states preferred pharmacy is [facility] in [place]. Please advise. Thank you.   "

## 2023-04-21 NOTE — TELEPHONE ENCOUNTER
Please give me the symptoms and I will decide what the diagnosis is.  Urgency?  Pressure?  Pelvic pain?  Burning on urination?  Cloudy urine?  Odor to urine?  Loss of control with a cough or getting up from a chair but no sense of needing to go?

## 2023-04-21 NOTE — TELEPHONE ENCOUNTER
"Patient denies urgency, pressure, pelvic pain, burning upon urination, cloudy urine, odor to urine, or loss of control when coughing. Patient states when she stands up from sitting in a chair urine comes out but she does not feel a sensation of needing to urinate. Patient states "the urine just comes out whenever it wants to." Will send follow up message to Dr. Sánchez for notification.    "

## 2023-04-21 NOTE — TELEPHONE ENCOUNTER
Theresa LEVINE Staff  Caller: patient (Today, 11:36 AM)    ----- Message from Theresa Galan sent at 4/21/2023 11:36 AM CDT -----  Regarding: incorrect number  Contact: patient  Type: Needs Medical Advice  Who Called:  patient  Symptoms (please be specific):    How long has patient had these symptoms:    Pharmacy name and phone #:    Best Call Back Number: [phone]  Additional Information: Patient states she gave the incorrect phone number.  Please see corrected phone number. Thanks!

## 2023-04-22 NOTE — TELEPHONE ENCOUNTER
I suspected that.  She does not have overactive bladder she has stress incontinence, medications for overactive bladder would probably make it worse.  I sent her some information on exercises to improve stress incontinence

## 2023-05-03 DIAGNOSIS — Z71.89 COMPLEX CARE COORDINATION: ICD-10-CM

## 2023-05-04 ENCOUNTER — LAB VISIT (OUTPATIENT)
Dept: LAB | Facility: HOSPITAL | Age: 73
End: 2023-05-04
Attending: INTERNAL MEDICINE
Payer: MEDICARE

## 2023-05-04 DIAGNOSIS — E87.6 HYPOPOTASSEMIA: ICD-10-CM

## 2023-05-04 DIAGNOSIS — N17.9 ACUTE KIDNEY FAILURE, UNSPECIFIED: Primary | ICD-10-CM

## 2023-05-04 LAB
ALBUMIN SERPL BCP-MCNC: 4.1 G/DL (ref 3.5–5.2)
ANION GAP SERPL CALC-SCNC: 4 MMOL/L (ref 8–16)
BACTERIA #/AREA URNS HPF: NEGATIVE /HPF
BILIRUB UR QL STRIP: NEGATIVE
BILIRUB UR QL STRIP: NEGATIVE
BUN SERPL-MCNC: 20 MG/DL (ref 8–23)
CALCIUM SERPL-MCNC: 9.4 MG/DL (ref 8.7–10.5)
CHLORIDE SERPL-SCNC: 102 MMOL/L (ref 95–110)
CLARITY UR: CLEAR
CLARITY UR: CLEAR
CO2 SERPL-SCNC: 36 MMOL/L (ref 23–29)
COLOR UR: YELLOW
COLOR UR: YELLOW
CREAT SERPL-MCNC: 1.2 MG/DL (ref 0.5–1.4)
CREAT UR-MCNC: 352 MG/DL (ref 15–325)
EST. GFR  (NO RACE VARIABLE): 48.1 ML/MIN/1.73 M^2
GLUCOSE SERPL-MCNC: 87 MG/DL (ref 70–110)
GLUCOSE UR QL STRIP: NEGATIVE
GLUCOSE UR QL STRIP: NEGATIVE
HGB UR QL STRIP: NEGATIVE
HGB UR QL STRIP: NEGATIVE
HYALINE CASTS #/AREA URNS LPF: 25 /LPF
KETONES UR QL STRIP: ABNORMAL
KETONES UR QL STRIP: ABNORMAL
LEUKOCYTE ESTERASE UR QL STRIP: ABNORMAL
LEUKOCYTE ESTERASE UR QL STRIP: ABNORMAL
MAGNESIUM SERPL-MCNC: 2.2 MG/DL (ref 1.6–2.6)
MICROSCOPIC COMMENT: ABNORMAL
NITRITE UR QL STRIP: NEGATIVE
NITRITE UR QL STRIP: NEGATIVE
PH UR STRIP: 7 [PH] (ref 5–8)
PH UR STRIP: 7 [PH] (ref 5–8)
PHOSPHATE SERPL-MCNC: 3.2 MG/DL (ref 2.7–4.5)
POTASSIUM SERPL-SCNC: 4 MMOL/L (ref 3.5–5.1)
PROT UR QL STRIP: ABNORMAL
PROT UR QL STRIP: ABNORMAL
PROT UR-MCNC: 89 MG/DL (ref 6–15)
PROT/CREAT UR: 0.25 MG/G{CREAT} (ref 0–0.2)
RBC #/AREA URNS HPF: 3 /HPF (ref 0–4)
SODIUM SERPL-SCNC: 142 MMOL/L (ref 136–145)
SP GR UR STRIP: >1.03 (ref 1–1.03)
SP GR UR STRIP: >1.03 (ref 1–1.03)
SQUAMOUS #/AREA URNS HPF: 5 /HPF
URATE SERPL-MCNC: 7.4 MG/DL (ref 2.4–5.7)
URN SPEC COLLECT METH UR: ABNORMAL
URN SPEC COLLECT METH UR: ABNORMAL
UROBILINOGEN UR STRIP-ACNC: ABNORMAL EU/DL
UROBILINOGEN UR STRIP-ACNC: ABNORMAL EU/DL
WBC #/AREA URNS HPF: 12 /HPF (ref 0–5)

## 2023-05-04 PROCEDURE — 84550 ASSAY OF BLOOD/URIC ACID: CPT | Performed by: INTERNAL MEDICINE

## 2023-05-04 PROCEDURE — 84156 ASSAY OF PROTEIN URINE: CPT | Performed by: INTERNAL MEDICINE

## 2023-05-04 PROCEDURE — 83735 ASSAY OF MAGNESIUM: CPT | Performed by: INTERNAL MEDICINE

## 2023-05-04 PROCEDURE — 81001 URINALYSIS AUTO W/SCOPE: CPT | Performed by: INTERNAL MEDICINE

## 2023-05-04 PROCEDURE — 36415 COLL VENOUS BLD VENIPUNCTURE: CPT | Performed by: INTERNAL MEDICINE

## 2023-05-04 PROCEDURE — 80069 RENAL FUNCTION PANEL: CPT | Performed by: INTERNAL MEDICINE

## 2023-06-21 ENCOUNTER — TELEPHONE (OUTPATIENT)
Dept: PULMONOLOGY | Facility: CLINIC | Age: 73
End: 2023-06-21
Payer: MEDICARE

## 2023-06-21 NOTE — TELEPHONE ENCOUNTER
Attempted to contact again. Goes straight to full voicemail.     Need to verify which medication.     ----- Message from Leidyoscar Damian sent at 6/21/2023 11:26 AM CDT -----  Regarding: returning call  Contact: patient  Type:  Patient Returning Call    Who Called:  patient  Who Left Message for Patient:  osmel  Does the patient know what this is regarding?:  inhaler and breathing pills  Best Call Back Number:  [phone]    Additional Information:  Matias bolivar, I have aura mota MRN: [id number] returning your call are you available?

## 2023-06-21 NOTE — TELEPHONE ENCOUNTER
Attempted to contact. No answer. Voicemail full.     ----- Message from Estelle Kellogg sent at 6/21/2023 11:13 AM CDT -----  Type: Needs Medical Advice  Who Called:  pt  Symptoms (please be specific):  pt said she need to speak to the dr or his nurse--said she just need them to return her call--that's all the detail pt left--please call and advise  Best Call Back Number: [phone] or [phone] (home)     Additional Information: thank you

## 2023-07-03 ENCOUNTER — HOSPITAL ENCOUNTER (EMERGENCY)
Facility: HOSPITAL | Age: 73
Discharge: HOME | End: 2023-07-03
Attending: STUDENT IN AN ORGANIZED HEALTH CARE EDUCATION/TRAINING PROGRAM
Payer: MEDICARE

## 2023-07-03 VITALS
HEART RATE: 75 BPM | TEMPERATURE: 97 F | HEIGHT: 62 IN | BODY MASS INDEX: 30.91 KG/M2 | DIASTOLIC BLOOD PRESSURE: 76 MMHG | SYSTOLIC BLOOD PRESSURE: 186 MMHG | WEIGHT: 168 LBS | OXYGEN SATURATION: 95 % | RESPIRATION RATE: 18 BRPM

## 2023-07-03 DIAGNOSIS — R07.81 RIB PAIN: ICD-10-CM

## 2023-07-03 DIAGNOSIS — M54.9 MUSCULOSKELETAL BACK PAIN: Primary | ICD-10-CM
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
## 2023-07-03 PROCEDURE — 99284 EMERGENCY DEPT VISIT MOD MDM: CPT

## 2023-07-03 RX ORDER — LIDOCAINE 50 MG/G
1 PATCH TOPICAL DAILY
Qty: 5 PATCH | Refills: 0 | Status: SHIPPED | OUTPATIENT
Start: 2023-07-03 | End: 2023-07-08

## 2023-07-03 RX ORDER — METHOCARBAMOL 500 MG/1
500 TABLET, FILM COATED ORAL 3 TIMES DAILY
Qty: 9 TABLET | Refills: 0 | Status: SHIPPED | OUTPATIENT
Start: 2023-07-03 | End: 2023-07-06

## 2023-07-03 NOTE — ED PROVIDER NOTES
Encounter Date: 7/3/2023    See mid-level's note for details. I saw and evaluated the patient and agree with the mid-level's finding and plans as written.     In short patient with mechanical fall, no head strike, no loss of consciousness.  Not high-risk syncope.  Primary, secondary, tertiary within acceptable limits except for ecchymosis to the lower lateral right back with tenderness to palpation.  Neuro exam within acceptable limits.  Low suspicion emergent etiology.  Discussed symptomatic care for muscular pain.  Discussed strict return precautions.  Patient arrived to the ER without her oxygen.  Discussed with patient's importance of always using oxygen that she should not go without it.    brought oxygen tank to the ER so she will not transport without it.  Lourdes Yeager MD  [facility] HO-4  10:55 PM            History     Chief Complaint   Patient presents with    Fall     Patient was walking to the bathroom in the dark x2 nights ago and had a trip and fall hurting her right lower back and right hip. No deformity or rotation noted to the hip in triage      Patient is a 72 y.o. female with past medical history of COPD, diabetes, congestive heart failure, and pacemaker who presents to ED via self for concern for fall which began 2 day(s) ago.  Patient reports 2 days ago she was moving in the dark and pulling her oxygen tank.  Patient states she was in the bathroom when she pulled her oxygen tank and it was stuck and she fell backwards into the wall.  Patient did not.  Patient denies headaches or neck pain.  Patient reports she was having right-sided mid back pain.  Patient denies saddle anesthesia loss of bowel or bladder.  Patient denies fever.  Patient denies chest pain.  Patient reports she was always short of breath and she was supposed to wear oxygen during the day and at night.  Patient is awake and alert in no acute distress.       Review of patient's allergies indicates:   Allergen  Reactions    Metformin Diarrhea     Diarrhea      Corn Itching     Other reaction(s): Sneezing  Other reaction(s): Rhinorrhea    Potato starch Hives     Other reaction(s): Sneezing  Other reaction(s): Rhinorrhea    Pravastatin Other (See Comments)     Muscle pain    Statins-hmg-coa reductase inhibitors Other (See Comments)    Hydrochlorothiazide Other (See Comments)     weakness    Welchol [colesevelam] Other (See Comments)     Weakness      Past Medical History:   Diagnosis Date    Abnormal EKG 9/26/2012    Allergy     Arthritis     Asthma     Brain bleed     Colon polyps 11/6/2020    COPD (chronic obstructive pulmonary disease)     Depression     Diabetes mellitus type II     Diverticulitis     Fatty liver     GERD (gastroesophageal reflux disease)     Goiter     s/p thyroidectomy    Heart murmur     Hemiparesis affecting right side as late effect of cerebrovascular accident (CVA) 7/26/2022    Hernia of abdominal wall     History of intracranial hemorrhage 6/15/2013    History of non-ST elevation myocardial infarction (NSTEMI) 6/15/2013    Hyperlipidemia     Hypertension     Lactic acidosis 1/11/2020    Metabolic syndrome 6/14/2012    Myocardial infarction     On home oxygen therapy     states uses 2L at night or when sat < 90    Pacemaker     Positive FIT (fecal immunochemical test) 11/6/2020    Severe persistent asthma without complication 4/30/2020    Statin intolerance     Stroke 2012    left hand shaky, loss of balance     Past Surgical History:   Procedure Laterality Date    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/25/2017    Dr Griggs    ADRENALECTOMY      AORTIC VALVE REPLACEMENT  12/09/2016    ARTERIOGRAPHY OF AORTIC ROOT N/A 9/12/2022    Procedure: ARTERIOGRAM, AORTIC ROOT;  Surgeon: Marko Batres MD;  Location: [facility] CATH/EP LAB;  Service: Cardiology;  Laterality: N/A;    arthroscopy lt knee Right     BLADDER REPAIR      sling    BREAST BIOPSY Bilateral     benign    COLONOSCOPY  2004    10 year recheck     COLONOSCOPY N/A 11/6/2020    Procedure: COLONOSCOPY;  Surgeon: Yakeiln Lwoery MD;  Location: [facility] ENDO;  Service: Endoscopy;  Laterality: N/A;    CORONARY ANGIOGRAPHY INCLUDING BYPASS GRAFTS WITH CATHETERIZATION OF LEFT HEART Left 9/12/2022    Procedure: Left heart cath including bypass graft, with left heart catheterization;  Surgeon: Marko Batres MD;  Location: [facility] CATH/EP LAB;  Service: Cardiology;  Laterality: Left;    CORONARY ARTERY BYPASS GRAFT  12/09/2016    X3    EPIDURAL STEROID INJECTION INTO LUMBAR SPINE N/A 7/27/2021    Procedure: Injection-steroid-epidural-lumbar;  Surgeon: Valerio Jay MD;  Location: [facility] OR;  Service: Pain Management;  Laterality: N/A;  L5-S1     HYSTERECTOMY      INSERTION OF PACEMAKER Left 1/13/2020    Procedure: INSERTION, PACEMAKER;  Surgeon: Marko Batres MD;  Location: [facility] CATH/EP LAB;  Service: Cardiology;  Laterality: Left;    OOPHORECTOMY      RIGHT HEART CATHETERIZATION Right 9/12/2022    Procedure: INSERTION, CATHETER, RIGHT HEART;  Surgeon: Marko Batres MD;  Location: [facility] CATH/EP LAB;  Service: Cardiology;  Laterality: Right;    THYROIDECTOMY       Family History   Problem Relation Age of Onset    Arthritis Mother     Diabetes Mother     Heart disease Mother     Hypertension Mother     Hypertension Father     Heart disease Father     Mental illness Father     Asthma Sister     Diabetes Sister     Hypertension Sister     Asthma Son     COPD Son     Depression Son     Diabetes Son     Hypertension Son     Stroke Son     Diabetes Maternal Uncle     Heart disease Maternal Uncle     Mental illness Paternal Aunt     Cancer Paternal Aunt     Heart disease Paternal Aunt     Hypertension Paternal Aunt     Heart disease Paternal Uncle     Hypertension Maternal Grandmother     Mental illness Maternal Grandmother     Aneurysm Maternal Grandfather     Mental illness Paternal Grandmother     Heart disease Paternal Grandfather     Melanoma Neg Hx     Psoriasis Neg Hx      Lupus Neg Hx     Eczema Neg Hx      Social History     Tobacco Use    Smoking status: Never    Smokeless tobacco: Never   Substance Use Topics    Alcohol use: Not Currently     Comment: seldom    Drug use: No     Review of Systems   Constitutional:  Negative for fever.   HENT: Negative.     Respiratory:  Negative for cough.    Cardiovascular:  Negative for chest pain.   Gastrointestinal: Negative.    Genitourinary: Negative.    Musculoskeletal:  Positive for back pain. Negative for neck pain and neck stiffness.   Skin:  Negative for color change, pallor and rash.   Neurological: Negative.  Negative for weakness and headaches.   Hematological:  Does not bruise/bleed easily.   Psychiatric/Behavioral: Negative.       Physical Exam     Initial Vitals [07/03/23 1553]   BP Pulse Resp Temp SpO2   (!) 134/96 75 20 98.3 °F (36.8 °C) (!) 92 %      MAP       --         Physical Exam    Nursing note and vitals reviewed.  Constitutional: She appears well-developed and well-nourished. She is not diaphoretic. No distress.   HENT:   Head: Normocephalic and atraumatic.   Right Ear: External ear normal.   Left Ear: External ear normal.   Nose: Nose normal.   Eyes: EOM are normal.   Neck:   Normal range of motion.  Cardiovascular:  Normal rate, regular rhythm, normal heart sounds and intact distal pulses.     Exam reveals no gallop and no friction rub.       No murmur heard.  Pulmonary/Chest: Breath sounds normal. No tachypnea. No respiratory distress. She has no decreased breath sounds. She has no wheezes. She has no rhonchi. She has no rales. She exhibits no tenderness.   Musculoskeletal:      Cervical back: Normal and normal range of motion.      Thoracic back: Bony tenderness present. No swelling, edema, deformity or tenderness.      Lumbar back: Tenderness and bony tenderness present. No swelling, edema, deformity or lacerations. Normal range of motion.        Back:       Right hip: No deformity or crepitus. Normal range of  motion.      Right foot: Normal capillary refill. Normal pulse.     Neurological: She is alert and oriented to person, place, and time. She has normal strength. GCS score is 15. GCS eye subscore is 4. GCS verbal subscore is 5. GCS motor subscore is 6.   Skin: Skin is warm and dry. Capillary refill takes less than 2 seconds.   Psychiatric: She has a normal mood and affect. Her behavior is normal. Judgment and thought content normal.       ED Course   Procedures  Labs Reviewed - No data to display       Imaging Results              X-Ray Lumbar Spine 5 View (Final result)  Result time 07/03/23 18:38:03      Final result by Sabrina Sneed DO (07/03/23 18:38:03)                   Narrative:    LUMBAR SPINE: 7/3/2023 6:37 PM CDT    TECHNIQUE:  AP, lateral, coned-down views of the lumbar spine were obtained.    COMPARISON:  None available    HISTORY:  72 years  old Female with lower back pain.    FINDINGS:  Five non-rib bearing vertebrae are seen. The vertebral bodies appear well-aligned.  The vertebral body heights appear to be maintained.  Mild multilevel  intervertebral disc space narrowing is seen. There are multilevel anterior bridging osteophyte seen at the lumbar spine. There is mild multilevel facet hypertrophy. There is no evidence of acute fracture or subluxation. There are degenerative changes seen at the sacroiliac joints.    IMPRESSION:  There is no evidence of an acute fracture or subluxation is seen in the lumbar spine.    Electronically signed by:  Sabrina Sneed DO  7/3/2023 6:38 PM CDT Workstation: [id number]                                     XR Ribs Min 3 Views w/PA Chest Right (Final result)  Result time 07/03/23 18:37:34   Procedure changed from X-Ray Ribs 2 View Right     Final result by Sabrina Sneed DO (07/03/23 18:37:34)                   Narrative:    RIGHT RIB AND CHEST RADIOGRAPHS: 7/3/2023 5:44 PM CDT    HISTORY:  72 years  old Female with rib pain.    TECHNIQUE: AP and oblique images of  the right ribs are obtained. AP view of the chest was also obtained.    COMPARISON: Chest radiograph dated 9/11/2022.    FINDINGS: There are no findings to suggest a pneumothorax. There are no findings to suggest an acute, displaced right rib fracture or subluxation. There are several remote right-sided rib fractures present.    The cardiomediastinal silhouette is mildly enlarged.No pneumothorax is seen. No acute airspace opacities are seen. No discrete pleural effusion is apparent.    Midline sternotomy changes are seen.    A dual-lead left-sided pacemaker is seen.    IMPRESSION: There are no findings to suggest an acute, displaced fracture or subluxation within the right ribs.  No acute airspace opacities are seen.    Electronically signed by:  Sabrina Sneed DO  7/3/2023 6:37 PM CDT Workstation: [id number]                                     X-Ray Thoracic Spine AP Lateral (Final result)  Result time 07/03/23 18:36:11   Procedure changed from X-Ray Thoracic Spine 4 or more views     Final result by Sabrina Sneed DO (07/03/23 18:36:11)                   Narrative:    THORACIC SPINE: 7/3/2023 6:35 PM CDT    TECHNIQUE: AP and lateral  views of the thoracic spine were obtained.    COMPARISON:  None available    HISTORY:  72 years  old Female with back pain.    FINDINGS: The vertebral bodies appear well-aligned.  The vertebral body heights appear to be maintained.  Mild multilevel intervertebral disc space narrowing is seen. There is no evidence of acute fracture or subluxation. There is minimal anterior osteophytes present. There is midline sternotomy changes evidence of prior CABG.    IMPRESSION: There are no findings to suggest an acute fracture within the thoracic spine.      Electronically signed by:  Sabrina Sneed DO  7/3/2023 6:36 PM CDT Workstation: [id number]                                     Medications - No data to display  Medical Decision Making:   Initial Assessment:   Patient is a 72 y.o. female  with past medical history of COPD, diabetes, congestive heart failure, and pacemaker who presents to ED via self for concern for fall which began 2 day(s) ago.  Patient reports 2 days ago she was moving in the dark and pulling her oxygen tank.  Patient states she was in the bathroom when she pulled her oxygen tank and it was stuck and she fell backwards into the wall.  Patient did not.  Patient denies headaches or neck pain.  Patient reports she was having right-sided mid back pain.  Patient denies saddle anesthesia loss of bowel or bladder.  Patient denies fever.  Patient denies chest pain.  Patient reports she was always short of breath and she was supposed to wear oxygen during the day and at night.  Patient is awake and alert in no acute distress.       Differential Diagnosis:   Differential diagnosis include but not limited to fracture, dislocation, subluxation  ED Management:  MDM    Patient presents for emergent evaluation of acute back pain that poses a possible threat to life and/or bodily function.    In the ED patient found to have acute bruising to right mid back with pain to palpation.  Patient has pain to palpation of thoracic and lumbar spine.  Patient denies head injury or loss of consciousness.  I am able to passively range patient's right hip without difficulty and without pain.  Patient has normal cap refill pulses and sensation distally.  Patient reports she is been ambulatory since the fall 2 days ago without difficulty.  Patient denies saddle anesthesia or loss of bowel or bladder.  Patient is neuro intact.  Patient has clear lung sounds bilaterally with no significant increased work of breathing on exam.  Patient has some shortness of breath on conversation, which patient states is her baseline.  Patient states she wears oxygen all the time but she left her oxygen car.  Patient was placed on 3 L nasal cannula while in the ED to maintain her normal oxygenation.  I ordered X-rays and personally  reviewed them and reviewed the radiologist interpretation.  Xray chest ignificant for IMPRESSION: There are no findings to suggest an acute, displaced fracture or subluxation within the right ribs. No acute airspace opacities are seen.  X-ray lumbar spine significant for IMPRESSION:  There is no evidence of an acute fracture or subluxation is seen in the lumbar spine.  X-ray thoracic spine significant for IMPRESSION: There are no findings to suggest an acute fracture within the thoracic spine.    Discharge MDM  I discussed the patient presentation X-rays findings with my attending Dr. Yeager who individually evaluated the patient.  Patient was managed in the ED with evaluation and re-evaluation.    The response to treatment was good.    Patient given a prescription for low-dose muscle relaxer and topical lidocaine patches to help decrease pain.  Patient was discharged in stable condition with close follow up with primary care.  Detailed return precautions discussed to return to the ED for any new or worsening symptoms.  Patient states understanding.                        Clinical Impression:   Final diagnoses:  [W19.XXXA] Fall  [R07.81] Rib pain  [M54.9] Musculoskeletal back pain (Primary)        ED Disposition Condition    Discharge Stable          ED Prescriptions       Medication Sig Dispense Start Date End Date Auth. Provider    methocarbamoL (ROBAXIN) 500 MG Tab Take 1 tablet (500 mg total) by mouth 3 (three) times daily. for 3 days 9 tablet 7/3/2023 7/6/2023 Maria Guadalupe Brooks NP    LIDOcaine (LIDODERM) 5 % Place 1 patch onto the skin once daily. Remove & Discard patch within 12 hours, do not apply more than 1 patch in 24 hours. for 5 days 5 patch 7/3/2023 7/8/2023 Maria Guadalupe Brooks NP          Follow-up Information       Follow up With Specialties Details Why Contact Info Additional Information    Evan Sánchez MD Family Medicine Schedule an appointment as soon as possible for a visit  For  recheck/continuing care [address]  [address]  [facility] [address]  [phone]       [facility] - Emergency Dept Emergency Medicine  If symptoms worsen [address]  [facility] [address]  [phone] 1st floor             Maria Guadalupe Brooks, NP  07/03/23 1909       Maria Guadalupe Brooks, EUNICE  07/03/23 2212       Lourdes Yeager MD  Resident  07/03/23 2250       Lourdes Yeager MD  08/01/23 2326

## 2023-07-04 NOTE — DISCHARGE INSTRUCTIONS
For your pain please try 1000 mg of Tylenol every 6 hours.    You can also use lidocaine patches.  You can use 1-2 lidocaine patches for 12 hours but then for the next 12 hours you must be patch free.      For the next few days you can also try the muscle relaxant that was prescribed to you today, please take as prescribed.  Muscle relaxers can make you drowsy. You should not operate a motor vehicle or make important decisions and be cautious when walking as you will be at risk for falling when drowsy.     You can use these medications for 1-2 weeks but do not use for longer without speaking to your primary care physician.   Please note that many medications that are over the counter and prescribed have Tylenol in them. Another name for Tylenol is acetaminophen. For example a 5-325 Norco tablet has 5mg hydrocodone and 325 mg of Tylenol in each tablet. Do not take more than a total of 3000 mg of Tylenol per day from all medications as this can make you seriously ill and kill you.    Please return to the ED for worsening back pain, difficulty breathing, shortness of breath, worsening cough, fever, headaches, lightheaded dizziness, or any new or worsening concerns.

## 2023-07-19 ENCOUNTER — TELEPHONE (OUTPATIENT)
Dept: ADMINISTRATIVE | Facility: CLINIC | Age: 73
End: 2023-07-19
Payer: MEDICARE

## 2023-07-20 ENCOUNTER — TELEPHONE (OUTPATIENT)
Dept: PULMONOLOGY | Facility: CLINIC | Age: 73
End: 2023-07-20

## 2023-07-20 ENCOUNTER — OFFICE VISIT (OUTPATIENT)
Dept: PULMONOLOGY | Facility: CLINIC | Age: 73
End: 2023-07-20
Payer: MEDICARE

## 2023-07-20 VITALS
HEIGHT: 62 IN | WEIGHT: 178 LBS | OXYGEN SATURATION: 90 % | DIASTOLIC BLOOD PRESSURE: 78 MMHG | SYSTOLIC BLOOD PRESSURE: 212 MMHG | BODY MASS INDEX: 32.76 KG/M2 | HEART RATE: 71 BPM

## 2023-07-20 DIAGNOSIS — R39.15 URGENCY OF URINATION: Primary | ICD-10-CM

## 2023-07-20 DIAGNOSIS — J44.89 ASTHMA-COPD OVERLAP SYNDROME: ICD-10-CM

## 2023-07-20 DIAGNOSIS — I10 ESSENTIAL HYPERTENSION: ICD-10-CM

## 2023-07-20 DIAGNOSIS — J45.50 SEVERE PERSISTENT ASTHMA WITHOUT COMPLICATION: ICD-10-CM

## 2023-07-20 DIAGNOSIS — J82.83 EOSINOPHILIC ASTHMA: ICD-10-CM

## 2023-07-20 DIAGNOSIS — J44.9 COPD (CHRONIC OBSTRUCTIVE PULMONARY DISEASE): ICD-10-CM

## 2023-07-20 PROCEDURE — 3078F DIAST BP <80 MM HG: CPT | Mod: CPTII,S$GLB,, | Performed by: INTERNAL MEDICINE

## 2023-07-20 PROCEDURE — 3288F PR FALLS RISK ASSESSMENT DOCUMENTED: ICD-10-PCS | Mod: CPTII,S$GLB,, | Performed by: INTERNAL MEDICINE

## 2023-07-20 PROCEDURE — 1159F PR MEDICATION LIST DOCUMENTED IN MEDICAL RECORD: ICD-10-PCS | Mod: CPTII,S$GLB,, | Performed by: INTERNAL MEDICINE

## 2023-07-20 PROCEDURE — 4010F ACE/ARB THERAPY RXD/TAKEN: CPT | Mod: CPTII,S$GLB,, | Performed by: INTERNAL MEDICINE

## 2023-07-20 PROCEDURE — 3077F SYST BP >= 140 MM HG: CPT | Mod: CPTII,S$GLB,, | Performed by: INTERNAL MEDICINE

## 2023-07-20 PROCEDURE — 3008F BODY MASS INDEX DOCD: CPT | Mod: CPTII,S$GLB,, | Performed by: INTERNAL MEDICINE

## 2023-07-20 PROCEDURE — 3008F PR BODY MASS INDEX (BMI) DOCUMENTED: ICD-10-PCS | Mod: CPTII,S$GLB,, | Performed by: INTERNAL MEDICINE

## 2023-07-20 PROCEDURE — 99999 PR PBB SHADOW E&M-EST. PATIENT-LVL IV: ICD-10-PCS | Mod: PBBFAC,,, | Performed by: INTERNAL MEDICINE

## 2023-07-20 PROCEDURE — 99213 PR OFFICE/OUTPT VISIT, EST, LEVL III, 20-29 MIN: ICD-10-PCS | Mod: S$GLB,,, | Performed by: INTERNAL MEDICINE

## 2023-07-20 PROCEDURE — 1126F PR PAIN SEVERITY QUANTIFIED, NO PAIN PRESENT: ICD-10-PCS | Mod: CPTII,S$GLB,, | Performed by: INTERNAL MEDICINE

## 2023-07-20 PROCEDURE — 3288F FALL RISK ASSESSMENT DOCD: CPT | Mod: CPTII,S$GLB,, | Performed by: INTERNAL MEDICINE

## 2023-07-20 PROCEDURE — 4010F PR ACE/ARB THEARPY RXD/TAKEN: ICD-10-PCS | Mod: CPTII,S$GLB,, | Performed by: INTERNAL MEDICINE

## 2023-07-20 PROCEDURE — 1101F PR PT FALLS ASSESS DOC 0-1 FALLS W/OUT INJ PAST YR: ICD-10-PCS | Mod: CPTII,S$GLB,, | Performed by: INTERNAL MEDICINE

## 2023-07-20 PROCEDURE — 1159F MED LIST DOCD IN RCRD: CPT | Mod: CPTII,S$GLB,, | Performed by: INTERNAL MEDICINE

## 2023-07-20 PROCEDURE — 3078F PR MOST RECENT DIASTOLIC BLOOD PRESSURE < 80 MM HG: ICD-10-PCS | Mod: CPTII,S$GLB,, | Performed by: INTERNAL MEDICINE

## 2023-07-20 PROCEDURE — 1101F PT FALLS ASSESS-DOCD LE1/YR: CPT | Mod: CPTII,S$GLB,, | Performed by: INTERNAL MEDICINE

## 2023-07-20 PROCEDURE — 99999 PR PBB SHADOW E&M-EST. PATIENT-LVL IV: CPT | Mod: PBBFAC,,, | Performed by: INTERNAL MEDICINE

## 2023-07-20 PROCEDURE — 99213 OFFICE O/P EST LOW 20 MIN: CPT | Mod: S$GLB,,, | Performed by: INTERNAL MEDICINE

## 2023-07-20 PROCEDURE — 3077F PR MOST RECENT SYSTOLIC BLOOD PRESSURE >= 140 MM HG: ICD-10-PCS | Mod: CPTII,S$GLB,, | Performed by: INTERNAL MEDICINE

## 2023-07-20 PROCEDURE — 1126F AMNT PAIN NOTED NONE PRSNT: CPT | Mod: CPTII,S$GLB,, | Performed by: INTERNAL MEDICINE

## 2023-07-20 RX ORDER — ALBUTEROL SULFATE 90 UG/1
1-2 AEROSOL, METERED RESPIRATORY (INHALATION) EVERY 4 HOURS PRN
Qty: 108 G | Refills: 3 | Status: SHIPPED | OUTPATIENT
Start: 2023-07-20 | End: 2024-01-23 | Stop reason: SDUPTHER

## 2023-07-20 RX ORDER — AMLODIPINE BESYLATE 10 MG/1
TABLET ORAL
Qty: 90 TABLET | Refills: 3 | Status: SHIPPED | OUTPATIENT
Start: 2023-07-20 | End: 2023-11-10 | Stop reason: SDUPTHER

## 2023-07-20 RX ORDER — REVEFENACIN 175 UG/3ML
1 SOLUTION RESPIRATORY (INHALATION) DAILY
Qty: 30 EACH | Refills: 11 | Status: SHIPPED | OUTPATIENT
Start: 2023-07-20 | End: 2024-01-23 | Stop reason: SDUPTHER

## 2023-07-20 RX ORDER — BUDESONIDE 0.5 MG/2ML
0.5 INHALANT ORAL 2 TIMES DAILY
Qty: 120 ML | Refills: 11 | Status: SHIPPED | OUTPATIENT
Start: 2023-07-20 | End: 2024-01-23 | Stop reason: SDUPTHER

## 2023-07-20 RX ORDER — ARFORMOTEROL TARTRATE 15 UG/2ML
15 SOLUTION RESPIRATORY (INHALATION) 2 TIMES DAILY
Qty: 60 EACH | Refills: 11 | Status: SHIPPED | OUTPATIENT
Start: 2023-07-20 | End: 2024-01-23 | Stop reason: SDUPTHER

## 2023-07-20 RX ORDER — ALBUTEROL SULFATE 0.83 MG/ML
2.5 SOLUTION RESPIRATORY (INHALATION) EVERY 4 HOURS PRN
Qty: 30 EACH | Refills: 11 | Status: SHIPPED | OUTPATIENT
Start: 2023-07-20 | End: 2024-01-23 | Stop reason: SDUPTHER

## 2023-07-20 RX ORDER — PREDNISONE 20 MG/1
TABLET ORAL
Qty: 12 TABLET | Refills: 2 | Status: SHIPPED | OUTPATIENT
Start: 2023-07-20 | End: 2024-01-23 | Stop reason: SDUPTHER

## 2023-07-20 NOTE — PATIENT INSTRUCTIONS
Chest xray viewed from September 2022 -- looked ok.        Could check 6 minute walk to arrange portable oxygen concentrator.    Use prednisone if short breath    Call if you wish for therapy -- you fell recently but decline home health today.    You have severe pulmonary hypertension- you need to control blood pressure as best able.    You have urine urgency -- would check urine -- if need antibiotic will order.

## 2023-07-20 NOTE — PROGRESS NOTES
7/20/2023    Tereza Larose  New Patient Consult    Chief Complaint   Patient presents with    6wk f/u           HPI:  7/20/2023 out of bp meds - not filled since Jan 2023!!!, breathing same, uses ox most time.  Use neb rx bid and prn...  got rid of niv.   Had back pain 7/3/2023 with   neg lumbar and thoracic spine.  had fall 2 wks ago ppt back injury ppt er visit on July 3rd......  No smoke exposure.    Occ sugar checks but not lately.  Last hgb a1c 6.7 November last yr.  Creat 1.2 May 4th.   Used prednisone in past with good results- out now...  For f/u Dr Batres soon.    Severe pulmonary hypertension seen on echo 9/2022.  Has aortic heart valve placed in past -- and has severe copd.  Not needing ox 24/7 but close..      1/17/2023 -- niv not being used, cannot tolerate. Uses ox 24/7, problem with tanks when out of house.  No prednisone nor mucous produced.   Uses   Moderate mitral regurg and pulm htn on echo 9/2022.   Used prednisone in past with benefit, sees Dr Franco.   Patient Instructions   Cxr sept 2022 with enlarged heart and clear lungs- viewed  Leaky mitral valve and pulmonary hypertension seen on echo last yr.   Breathing test was 45% today -- viewed.  Will stop non invasive ventilator  Continue nebulized therapy -- 3 meds  Recheck in 6 months.  Could try to get portable oxygen concentrator.    9/29/2022 pt had torsades at [facility] after being admitted on 9/9 with sob.  She had rhc with r coronary stent.  Pt dced on 9/15 after 6 days stay.   Pt had had 2 loc - one with fall. Presented to hosp.  Pt had poor recall.  On neb rx bid.  Home health arranged. Plavix and entresto now and levothyroxine adjusted.  Had high sugars.  Pt hosp  [facility] 9/9 to 9/15.  After reviewing hospital clinicals, patient needs home NIV and requires guaranteed target volume and battery backup for portability. Bilevel has been considered and will not prevent exacerbations due to patient's disease state.  Patient Instructions   Pt  having problem tolerating niv. Continue to try -- let us know if helps.   Meds renewed  Cxr good 9/9/2022   Use oxygen.   For allergies -- may use xyzal bedtime-- sent in to [facility].   Patient Instructions   Pt having problem tolerating niv. Continue to try -- let us know if helps.   Meds renewed  Cxr good 9/9/2022   Use oxygen.     For allergies -- may use xyzal bedtime-- sent in to [facility].   9/7/2022 uses roller walker last 4-5 yrs, occ falls.  No hemoptysis. More sob. Uses ox chr.  Cough and min wheezes.  Cough white .  No recent prednisone -- used in past with benefit.  Uses ventolin now-- used breo in past but felt ventoloin helped more.   Still worsens cold weather.   Hoarse last 2 yrs- stable.   Patient Instructions   Need to be on preventive therapy -- use budesonide twice daily.   Brovnana twice daily and yupleri once daily  Would take prednisone 20 mg daily for 7 days -- repeat.  Would check sugar, perhaps daily -- to assure sugars less than 200-250.   If on preventive therapy may be able to  use asthma shots as asthma seems severe?   Will resume theophylline -- if available.  Need to do breathing test with next visit.  Get blood work today or am.     4/30/2020 - no more blood streaks, min cough am mucous white.  Uses resp rx with breo only - prn ventolin also. Uses ox at home.  Patient Instructions   Chest xray nearly cleared completely by 1/29/2020  Would use celebrex 100 mg (stop for stomach upset) once or twice daily for severe right low back pain causing walking problems.  Also sent in tramadol 50 mg for as needed use.  Continue breo and as needed albuterol  Call if problem , may  Use prednisone if cough/wheezes   1/29/2020- had cold front pass with problems coughing up blood streaks.  Admitted to [facility] wbc 1/10 of 24, creat 2.  Got iv abx, none at dc.  Uses ox at home.    Uses brovana and budesonide , also breo, and singulair.  No asthma arousal.    Patient Instructions   Need to follow up chest xray  "as was not clear by 1/14.  Pneumonia not typical  For heart block  Use breo or brovana and budesonide - not both.  Use breo til out.  You have used and benefited from your home oxygen.      11/21/2019 asthma since age 19 with cough/wheezes/sob, weather change and noct worsening.  Uses 02 to sleep and sat walking 89 or so sat.  Uses ox daytime 1/2 day.    Sinuses problem- flonase prn, claritin.  No infection sinus.  Mucous from lungs white to clear.    Uses theophylline.  Takes albuterol 1-2/wk.  Awakens with asthma 4/month.  No emergency treatments.    Uses steroid shots once a yr.  No apneas suggested- on home ox 3 yrs.  Use cane to ambulate as had cva 3 yrs ago.  Falls occasionally- fell last month.    Patient Instructions   Asthma likely caused some copd.  Take prednisone 20mg  Daily for 3 days and note sugars, and breathing.  Would check lung capacity next wk, Check chest xray and breathing test and oxygen level walking.  .   May repeat prednisone if needed.  Prevention medications -Use Singulair daily&  Use breo once daily, or  consider continuing or use budesonide and brovana (similar medications but should be covered with old medicare benefit).  Use nebulizer as needed.  Control sinuses- use Claritin and Flonase.  singulair may help.    Could skip theophylline and stay off    The chief compliant  problem is new to me",   [facility]:  Past Medical History:   Diagnosis Date    Abnormal EKG 9/26/2012    Allergy     Arthritis     Asthma     Brain bleed     Colon polyps 11/6/2020    COPD (chronic obstructive pulmonary disease)     Depression     Diabetes mellitus type II     Diverticulitis     Fatty liver     GERD (gastroesophageal reflux disease)     Goiter     s/p thyroidectomy    Heart murmur     Hemiparesis affecting right side as late effect of cerebrovascular accident (CVA) 7/26/2022    Hernia of abdominal wall     History of intracranial hemorrhage 6/15/2013    History of non-ST elevation myocardial infarction " (NSTEMI) 6/15/2013    Hyperlipidemia     Hypertension     Lactic acidosis 1/11/2020    Metabolic syndrome 6/14/2012    Myocardial infarction     On home oxygen therapy     states uses 2L at night or when sat < 90    Pacemaker     Positive FIT (fecal immunochemical test) 11/6/2020    Severe persistent asthma without complication 4/30/2020    Statin intolerance     Stroke 2012    left hand shaky, loss of balance         Past Surgical History:   Procedure Laterality Date    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/25/2017    Dr Griggs    ADRENALECTOMY      AORTIC VALVE REPLACEMENT  12/09/2016    ARTERIOGRAPHY OF AORTIC ROOT N/A 9/12/2022    Procedure: ARTERIOGRAM, AORTIC ROOT;  Surgeon: Marko Batres MD;  Location: [facility] CATH/EP LAB;  Service: Cardiology;  Laterality: N/A;    arthroscopy lt knee Right     BLADDER REPAIR      sling    BREAST BIOPSY Bilateral     benign    COLONOSCOPY  2004    10 year recheck    COLONOSCOPY N/A 11/6/2020    Procedure: COLONOSCOPY;  Surgeon: Yakelin Lowery MD;  Location: [facility];  Service: Endoscopy;  Laterality: N/A;    CORONARY ANGIOGRAPHY INCLUDING BYPASS GRAFTS WITH CATHETERIZATION OF LEFT HEART Left 9/12/2022    Procedure: Left heart cath including bypass graft, with left heart catheterization;  Surgeon: Marko Batres MD;  Location: [facility] CATH/EP LAB;  Service: Cardiology;  Laterality: Left;    CORONARY ARTERY BYPASS GRAFT  12/09/2016    X3    EPIDURAL STEROID INJECTION INTO LUMBAR SPINE N/A 7/27/2021    Procedure: Injection-steroid-epidural-lumbar;  Surgeon: Valerio Jay MD;  Location: [facility] OR;  Service: Pain Management;  Laterality: N/A;  L5-S1     HYSTERECTOMY      INSERTION OF PACEMAKER Left 1/13/2020    Procedure: INSERTION, PACEMAKER;  Surgeon: Marko Batres MD;  Location: [facility] CATH/EP LAB;  Service: Cardiology;  Laterality: Left;    OOPHORECTOMY      RIGHT HEART CATHETERIZATION Right 9/12/2022    Procedure: INSERTION, CATHETER, RIGHT HEART;  Surgeon: Marko Batres MD;  Location:  [facility] CATH/EP LAB;  Service: Cardiology;  Laterality: Right;    THYROIDECTOMY       Social History     Tobacco Use    Smoking status: Never    Smokeless tobacco: Never   Substance Use Topics    Alcohol use: Not Currently     Comment: seldom    Drug use: No     Family History   Problem Relation Age of Onset    Arthritis Mother     Diabetes Mother     Heart disease Mother     Hypertension Mother     Hypertension Father     Heart disease Father     Mental illness Father     Asthma Sister     Diabetes Sister     Hypertension Sister     Asthma Son     COPD Son     Depression Son     Diabetes Son     Hypertension Son     Stroke Son     Diabetes Maternal Uncle     Heart disease Maternal Uncle     Mental illness Paternal Aunt     Cancer Paternal Aunt     Heart disease Paternal Aunt     Hypertension Paternal Aunt     Heart disease Paternal Uncle     Hypertension Maternal Grandmother     Mental illness Maternal Grandmother     Aneurysm Maternal Grandfather     Mental illness Paternal Grandmother     Heart disease Paternal Grandfather     Melanoma Neg Hx     Psoriasis Neg Hx     Lupus Neg Hx     Eczema Neg Hx      Review of patient's allergies indicates:   Allergen Reactions    Metformin Diarrhea     Diarrhea      Corn Itching     Other reaction(s): Sneezing  Other reaction(s): Rhinorrhea    Potato starch Hives     Other reaction(s): Sneezing  Other reaction(s): Rhinorrhea    Pravastatin Other (See Comments)     Muscle pain    Statins-hmg-coa reductase inhibitors Other (See Comments)    Hydrochlorothiazide Other (See Comments)     weakness    Welchol [colesevelam] Other (See Comments)     Weakness        Performance Status:The patient's activity level is housebound activities.      Review of Systems:  a review of eleven systems covering constitutional, Eye, HEENT, Psych, Respiratory, Cardiac, GI, , Musculoskeletal, Endocrine, Dermatologic was negative except for pertinent findings as listed ABOVE and below:  pertinent  "positive as above, rest is good       Exam:Comprehensive exam done. BP (!) 212/78 (BP Location: Right arm, Patient Position: Sitting, BP Method: Medium (Automatic))   Pulse 71   Ht 5' 2" (1.575 m)   Wt 80.7 kg (178 lb 0.3 oz)   SpO2 (!) 90% Comment: on room air at rest  BMI 32.56 kg/m²   Exam included Vitals as listed, and patient's appearance and affect and alertness and mood, oral exam for yeast and hygiene and pharynx lesions and Mallapatti (M) score, neck with inspection for jvd and masses and thyroid abnormalities and lymph nodes (supraclavicular and infraclavicular nodes and axillary also examined and noted if abn), chest exam included symmetry and effort and fremitus and percussion and auscultation, cardiac exam included rhythm and gallops and murmur and rubs and jvd and edema, abdominal exam for mass and hepatosplenomegaly and tenderness and hernias and bowel sounds, Musculoskeletal exam with muscle tone and posture and mobility/gait and  strength, and skin for rashes and cyanosis and pallor and turgor, extremity for clubbing.  Findings were normal except for pertinent findings listed below:  M3,chest is symmetric, no distress, normal percussion, normal fremitus and good normal decreased breath sounds  No clubbing nor edema     Radiographs (ct chest and cxr) reviewed: cxr 1/10/2020- patchy left > right pneumonia.     cxr 1/29/20 nearly clear.      Labs reviewed         Results for TEREZA VELA (MRN [id number]) as of 11/21/2019 10:37   Ref. Range 8/28/2018 16:38   Eosinophil% Latest Ref Range: 0.0 - 8.0 % 5.2   Eos # Latest Ref Range: 0.0 - 0.5 K/uL 0.4     PFT - fev 45% or 900 cc 1/17/2023, discussed today      Plan:  Clinical impression is apparently straight forward and impression with management as below.     Tereza was seen today for 6wk f/u.    Diagnoses and all orders for this visit:    Urgency of urination  -     Urinalysis, Reflex to Urine Culture Urine, Clean Catch    Asthma-COPD overlap " syndrome  -     albuterol (PROVENTIL/VENTOLIN HFA) 90 mcg/actuation inhaler; Inhale 1-2 puffs into the lungs every 4 (four) hours as needed for Wheezing. Inhale 2 puffs by mouth into lungs every 4 hours as needed  -     albuterol (PROVENTIL) 2.5 mg /3 mL (0.083 %) nebulizer solution; Take 3 mLs (2.5 mg total) by nebulization every 4 (four) hours as needed for Shortness of Breath or Wheezing.  -     budesonide (PULMICORT) 0.5 mg/2 mL nebulizer solution; Take 2 mLs (0.5 mg total) by nebulization 2 (two) times daily. Controller  -     arformoteroL (BROVANA) 15 mcg/2 mL Nebu; Take 2 mLs (15 mcg total) by nebulization 2 (two) times a day. Controller  -     revefenacin (YUPELRI) 175 mcg/3 mL Nebu; Inhale 1 Dose into the lungs Daily.  -     predniSONE (DELTASONE) 20 MG tablet; Take one daily for 3 days and may repeat for shortness of breath.    Eosinophilic asthma  -     albuterol (PROVENTIL/VENTOLIN HFA) 90 mcg/actuation inhaler; Inhale 1-2 puffs into the lungs every 4 (four) hours as needed for Wheezing. Inhale 2 puffs by mouth into lungs every 4 hours as needed  -     albuterol (PROVENTIL) 2.5 mg /3 mL (0.083 %) nebulizer solution; Take 3 mLs (2.5 mg total) by nebulization every 4 (four) hours as needed for Shortness of Breath or Wheezing.  -     budesonide (PULMICORT) 0.5 mg/2 mL nebulizer solution; Take 2 mLs (0.5 mg total) by nebulization 2 (two) times daily. Controller  -     arformoteroL (BROVANA) 15 mcg/2 mL Nebu; Take 2 mLs (15 mcg total) by nebulization 2 (two) times a day. Controller  -     revefenacin (YUPELRI) 175 mcg/3 mL Nebu; Inhale 1 Dose into the lungs Daily.    Severe persistent asthma without complication  -     albuterol (PROVENTIL/VENTOLIN HFA) 90 mcg/actuation inhaler; Inhale 1-2 puffs into the lungs every 4 (four) hours as needed for Wheezing. Inhale 2 puffs by mouth into lungs every 4 hours as needed  -     albuterol (PROVENTIL) 2.5 mg /3 mL (0.083 %) nebulizer solution; Take 3 mLs (2.5 mg total)  by nebulization every 4 (four) hours as needed for Shortness of Breath or Wheezing.  -     budesonide (PULMICORT) 0.5 mg/2 mL nebulizer solution; Take 2 mLs (0.5 mg total) by nebulization 2 (two) times daily. Controller  -     arformoteroL (BROVANA) 15 mcg/2 mL Nebu; Take 2 mLs (15 mcg total) by nebulization 2 (two) times a day. Controller  -     revefenacin (YUPELRI) 175 mcg/3 mL Nebu; Inhale 1 Dose into the lungs Daily.    COPD (chronic obstructive pulmonary disease)  -     albuterol (PROVENTIL/VENTOLIN HFA) 90 mcg/actuation inhaler; Inhale 1-2 puffs into the lungs every 4 (four) hours as needed for Wheezing. Inhale 2 puffs by mouth into lungs every 4 hours as needed  -     Six Minute Walk Test to qualify for Home Oxygen; Future  -     predniSONE (DELTASONE) 20 MG tablet; Take one daily for 3 days and may repeat for shortness of breath.    Essential hypertension  -     amLODIPine (NORVASC) 10 MG tablet; TAKE 1 TABLET BY MOUTH EVERY DAY FOR SYSTOLIC BLOOD PRESSURE GREATER THAN 120                Follow up in about 6 months (around 1/20/2024), or if symptoms worsen or fail to improve.    Discussed with patient above for education the following:      Patient Instructions   Chest xray viewed from September 2022 -- looked ok.        Could check 6 minute walk to arrange portable oxygen concentrator.    Use prednisone if short breath    Call if you wish for therapy -- you fell recently but decline home health today.    You have severe pulmonary hypertension- you need to control blood pressure as best able.    You have urine urgency -- would check urine -- if need antibiotic will order.

## 2023-07-21 ENCOUNTER — LAB VISIT (OUTPATIENT)
Dept: LAB | Facility: HOSPITAL | Age: 73
End: 2023-07-21
Attending: PHYSICIAN ASSISTANT
Payer: MEDICARE

## 2023-07-21 DIAGNOSIS — R39.15 URGENCY OF URINATION: Primary | ICD-10-CM

## 2023-07-21 DIAGNOSIS — E11.59 TYPE 2 DIABETES MELLITUS WITH OTHER CIRCULATORY COMPLICATION, WITHOUT LONG-TERM CURRENT USE OF INSULIN: ICD-10-CM

## 2023-07-21 LAB
ALBUMIN/CREAT UR: 127.4 UG/MG (ref 0–30)
BACTERIA #/AREA URNS HPF: ABNORMAL /HPF
BILIRUB UR QL STRIP: NEGATIVE
CLARITY UR: CLEAR
COLOR UR: YELLOW
CREAT UR-MCNC: 138 MG/DL (ref 15–325)
GLUCOSE UR QL STRIP: NEGATIVE
HGB UR QL STRIP: NEGATIVE
HYALINE CASTS #/AREA URNS LPF: 2 /LPF
KETONES UR QL STRIP: NEGATIVE
LEUKOCYTE ESTERASE UR QL STRIP: NEGATIVE
MICROALBUMIN UR DL<=1MG/L-MCNC: 175.8 UG/ML
MICROSCOPIC COMMENT: ABNORMAL
NITRITE UR QL STRIP: NEGATIVE
PH UR STRIP: 5 [PH] (ref 5–8)
PROT UR QL STRIP: ABNORMAL
RBC #/AREA URNS HPF: 0 /HPF (ref 0–4)
SP GR UR STRIP: 1.02 (ref 1–1.03)
URN SPEC COLLECT METH UR: ABNORMAL
UROBILINOGEN UR STRIP-ACNC: NEGATIVE EU/DL
WBC #/AREA URNS HPF: 3 /HPF (ref 0–5)

## 2023-07-21 PROCEDURE — 81000 URINALYSIS NONAUTO W/SCOPE: CPT | Performed by: PHYSICIAN ASSISTANT

## 2023-07-21 PROCEDURE — 82570 ASSAY OF URINE CREATININE: CPT | Performed by: PHYSICIAN ASSISTANT

## 2023-08-02 ENCOUNTER — PATIENT MESSAGE (OUTPATIENT)
Dept: ADMINISTRATIVE | Facility: HOSPITAL | Age: 73
End: 2023-08-02
Payer: MEDICARE

## 2023-08-03 ENCOUNTER — TELEPHONE (OUTPATIENT)
Dept: PULMONOLOGY | Facility: CLINIC | Age: 73
End: 2023-08-03
Payer: MEDICARE

## 2023-08-03 NOTE — TELEPHONE ENCOUNTER
----- Message from Ashley Daniel sent at 8/2/2023  7:58 AM CDT -----  Contact: Pharmacy  Yes ma'am.   ----- Message -----  From: Dinah Moore MA  Sent: 8/1/2023   1:55 PM CDT  To: Aslhey Daniel    Do you thing I should just write her another [facility] , it is probably in [facility].     ----- Message -----  From: Ashley Daniel  Sent: 8/1/2023   9:50 AM CDT  To: Dinah Moore MA    I would refax if we still have the direct rx form    ----- Message -----  From: Dinah Moore MA  Sent: 8/1/2023   9:49 AM CDT  To: Ashley Daniel    Do I send this as a refill order to [facility], since Princess faxed it already?  ----- Message -----  From: Rukhsana Macias  Sent: 8/1/2023   8:48 AM CDT  To: Lio SOLANO Staff    Type:  RX Refill Request    Who Called:  pharmacy  Refill or New Rx:  [facility] for a refill, They have been having trouble with their fax and need another one sent over.  RX Name and Strength:  refill of albuterol (PROVENTIL) 2.5 mg /3 mL (0.083 %) nebulizer solution How is the patient currently taking it? (ex. 1XDay):  Take 3 mLs (2.5 mg total) by nebulization every 4 (four) hours as needed for Shortness of Breath or Wheezing. - Nebulization   Is this a 30 day or 90 day RX:   30 each 11 Preferred Pharmacy with phone number:    [facility]  Local or Mail Order:  Mail  Ordering Provider:  Lio Rene Call Back Number:  [phone]  Additional Information:  Please call the pharmacy back to advise. Thanks!

## 2023-08-30 ENCOUNTER — TELEPHONE (OUTPATIENT)
Dept: PULMONOLOGY | Facility: CLINIC | Age: 73
End: 2023-08-30
Payer: MEDICARE

## 2023-08-30 NOTE — TELEPHONE ENCOUNTER
Noted.   ----- Message from Gisel Deshaun sent at 8/30/2023  1:24 PM CDT -----  Regarding: Unable to Contact - ###  We have made several attempts to contact this patient to schedule their Six Minute Walk Test to qualify for Home Oxygen  and have been unable to reach them. We will be removing this order from our work queue but wanted to make you aware in case you wanted to reach out to the patient.     Thanks,   [facility] Centralized Scheduling

## 2023-09-26 DIAGNOSIS — E11.9 TYPE 2 DIABETES MELLITUS WITHOUT COMPLICATION: ICD-10-CM

## 2023-09-27 ENCOUNTER — TELEPHONE (OUTPATIENT)
Dept: FAMILY MEDICINE | Facility: CLINIC | Age: 73
End: 2023-09-27
Payer: MEDICARE

## 2023-09-27 NOTE — TELEPHONE ENCOUNTER
Left message on machine to call.     ----- Message from Judy Good sent at 9/27/2023  1:26 PM CDT -----  Contact: pt  Type: Needs Medical Advice  Who Called:  pt  Best Call Back Number: [phone]  Additional Information: requesting a call back regarding her feet- went to  yesterday - was given 2 medications for gout- indomethacin, colchicine 0.6      [id number] - [place], [place] - [address] [facility] [address]  [address] [address] [address]  [place] [address]  Phone: [phone] Fax: [phone]

## 2023-10-09 ENCOUNTER — TELEPHONE (OUTPATIENT)
Dept: FAMILY MEDICINE | Facility: CLINIC | Age: 73
End: 2023-10-09
Payer: MEDICARE

## 2023-10-09 NOTE — TELEPHONE ENCOUNTER
No urgent care records found in [facility] or Care everywhere.  No history of colchicine use found in medication list.  Patient has history of chronic kidney disease and can not use indomethacin.  It is one of the forbidden antiinflammatories.

## 2023-10-09 NOTE — TELEPHONE ENCOUNTER
Patient was seen at Urgent Care for gout on 9/26/2023   She is requesting refills of Colchicine 0.6mg  and Indocin  50 mg  [facility]  Please advise          Message from Theresa Galan sent at 10/9/2023 12:51 PM CDT -----  Regarding: refill  Contact: patient  Type:  RX Refill Request    Who Called:  patient  Refill or New Rx:  refill  RX Name and Strength:  gout medication  How is the patient currently taking it? (ex. 1XDay):  as directed  Is this a 30 day or 90 day RX:  30  Preferred Pharmacy with phone number:      [id number] - [place], [place] - [address]  [address]  [place] [address]  Phone: [phone] Fax: [phone]    Local or Mail Order:  local  Ordering Provider:  DR Princess Rene Call Back Number:  [phone] (home)     Additional Information:  please call patient to advise.  Thanks!

## 2023-10-10 NOTE — TELEPHONE ENCOUNTER
----- Message from Sonia Harmon sent at 10/10/2023  1:16 PM CDT -----  Regarding: Patient Returning Call  Contact: patient at [phone]  Type:  Patient Returning Call    Who Called:  patient at [phone]    Who Left Message for Patient:  Leslie Myers LPN   Does the patient know what this is regarding?:  yes    Additional Information:  Please call and advise. Patient can't check the voicemail. Thank you

## 2023-10-17 ENCOUNTER — OFFICE VISIT (OUTPATIENT)
Dept: FAMILY MEDICINE | Facility: CLINIC | Age: 73
End: 2023-10-17
Payer: MEDICARE

## 2023-10-17 VITALS
OXYGEN SATURATION: 94 % | TEMPERATURE: 98 F | BODY MASS INDEX: 31.85 KG/M2 | HEART RATE: 92 BPM | SYSTOLIC BLOOD PRESSURE: 152 MMHG | HEIGHT: 62 IN | WEIGHT: 173.06 LBS | DIASTOLIC BLOOD PRESSURE: 58 MMHG

## 2023-10-17 DIAGNOSIS — M10.9 GOUT, UNSPECIFIED CAUSE, UNSPECIFIED CHRONICITY, UNSPECIFIED SITE: Primary | ICD-10-CM

## 2023-10-17 PROCEDURE — 3078F DIAST BP <80 MM HG: CPT | Mod: CPTII,S$GLB,, | Performed by: NURSE PRACTITIONER

## 2023-10-17 PROCEDURE — 1160F PR REVIEW ALL MEDS BY PRESCRIBER/CLIN PHARMACIST DOCUMENTED: ICD-10-PCS | Mod: CPTII,S$GLB,, | Performed by: NURSE PRACTITIONER

## 2023-10-17 PROCEDURE — 3008F BODY MASS INDEX DOCD: CPT | Mod: CPTII,S$GLB,, | Performed by: NURSE PRACTITIONER

## 2023-10-17 PROCEDURE — 3288F PR FALLS RISK ASSESSMENT DOCUMENTED: ICD-10-PCS | Mod: CPTII,S$GLB,, | Performed by: NURSE PRACTITIONER

## 2023-10-17 PROCEDURE — 1101F PR PT FALLS ASSESS DOC 0-1 FALLS W/OUT INJ PAST YR: ICD-10-PCS | Mod: CPTII,S$GLB,, | Performed by: NURSE PRACTITIONER

## 2023-10-17 PROCEDURE — 4010F ACE/ARB THERAPY RXD/TAKEN: CPT | Mod: CPTII,S$GLB,, | Performed by: NURSE PRACTITIONER

## 2023-10-17 PROCEDURE — 1159F PR MEDICATION LIST DOCUMENTED IN MEDICAL RECORD: ICD-10-PCS | Mod: CPTII,S$GLB,, | Performed by: NURSE PRACTITIONER

## 2023-10-17 PROCEDURE — 3060F PR POS MICROALBUMINURIA RESULT DOCUMENTED/REVIEW: ICD-10-PCS | Mod: CPTII,S$GLB,, | Performed by: NURSE PRACTITIONER

## 2023-10-17 PROCEDURE — 1159F MED LIST DOCD IN RCRD: CPT | Mod: CPTII,S$GLB,, | Performed by: NURSE PRACTITIONER

## 2023-10-17 PROCEDURE — 3066F NEPHROPATHY DOC TX: CPT | Mod: CPTII,S$GLB,, | Performed by: NURSE PRACTITIONER

## 2023-10-17 PROCEDURE — 1101F PT FALLS ASSESS-DOCD LE1/YR: CPT | Mod: CPTII,S$GLB,, | Performed by: NURSE PRACTITIONER

## 2023-10-17 PROCEDURE — 3077F PR MOST RECENT SYSTOLIC BLOOD PRESSURE >= 140 MM HG: ICD-10-PCS | Mod: CPTII,S$GLB,, | Performed by: NURSE PRACTITIONER

## 2023-10-17 PROCEDURE — 99999 PR PBB SHADOW E&M-EST. PATIENT-LVL III: ICD-10-PCS | Mod: PBBFAC,,, | Performed by: NURSE PRACTITIONER

## 2023-10-17 PROCEDURE — 3288F FALL RISK ASSESSMENT DOCD: CPT | Mod: CPTII,S$GLB,, | Performed by: NURSE PRACTITIONER

## 2023-10-17 PROCEDURE — 3078F PR MOST RECENT DIASTOLIC BLOOD PRESSURE < 80 MM HG: ICD-10-PCS | Mod: CPTII,S$GLB,, | Performed by: NURSE PRACTITIONER

## 2023-10-17 PROCEDURE — 99213 PR OFFICE/OUTPT VISIT, EST, LEVL III, 20-29 MIN: ICD-10-PCS | Mod: S$GLB,,, | Performed by: NURSE PRACTITIONER

## 2023-10-17 PROCEDURE — 3060F POS MICROALBUMINURIA REV: CPT | Mod: CPTII,S$GLB,, | Performed by: NURSE PRACTITIONER

## 2023-10-17 PROCEDURE — 4010F PR ACE/ARB THEARPY RXD/TAKEN: ICD-10-PCS | Mod: CPTII,S$GLB,, | Performed by: NURSE PRACTITIONER

## 2023-10-17 PROCEDURE — 3066F PR DOCUMENTATION OF TREATMENT FOR NEPHROPATHY: ICD-10-PCS | Mod: CPTII,S$GLB,, | Performed by: NURSE PRACTITIONER

## 2023-10-17 PROCEDURE — 99999 PR PBB SHADOW E&M-EST. PATIENT-LVL III: CPT | Mod: PBBFAC,,, | Performed by: NURSE PRACTITIONER

## 2023-10-17 PROCEDURE — 1125F PR PAIN SEVERITY QUANTIFIED, PAIN PRESENT: ICD-10-PCS | Mod: CPTII,S$GLB,, | Performed by: NURSE PRACTITIONER

## 2023-10-17 PROCEDURE — 1125F AMNT PAIN NOTED PAIN PRSNT: CPT | Mod: CPTII,S$GLB,, | Performed by: NURSE PRACTITIONER

## 2023-10-17 PROCEDURE — 3008F PR BODY MASS INDEX (BMI) DOCUMENTED: ICD-10-PCS | Mod: CPTII,S$GLB,, | Performed by: NURSE PRACTITIONER

## 2023-10-17 PROCEDURE — 3077F SYST BP >= 140 MM HG: CPT | Mod: CPTII,S$GLB,, | Performed by: NURSE PRACTITIONER

## 2023-10-17 PROCEDURE — 99213 OFFICE O/P EST LOW 20 MIN: CPT | Mod: S$GLB,,, | Performed by: NURSE PRACTITIONER

## 2023-10-17 PROCEDURE — 1160F RVW MEDS BY RX/DR IN RCRD: CPT | Mod: CPTII,S$GLB,, | Performed by: NURSE PRACTITIONER

## 2023-10-17 RX ORDER — COLCHICINE 0.6 MG/1
0.6 TABLET ORAL DAILY
Qty: 30 TABLET | Refills: 0 | Status: ON HOLD | OUTPATIENT
Start: 2023-10-17

## 2023-10-17 RX ORDER — PREDNISONE 10 MG/1
TABLET ORAL DAILY
Qty: 12 TABLET | Refills: 0 | Status: ON HOLD | OUTPATIENT
Start: 2023-10-17

## 2023-10-17 NOTE — PROGRESS NOTES
Subjective:       Patient ID: Tereza Larose is a 73 y.o. female.    Chief Complaint: gout in left foot     HPI   72 y/o female patient with medical problems listed below presents for gout in left foot. She states presented to an urgent care 2 weeks ago and given colchicine and indocin which she requests the refill today. Do not see urgent care record in [facility]. States pain and swelling in left foot and ankle improved with the medications but the symptoms returned so came in to clinic. States did not have gout in the past. Denies fever, chills, generalized body ache. Denies recent trauma to the affected area. States hard to put on shoes for the pain.     Labs reviewed from 5/2023    Patient Active Problem List   Diagnosis    Asthma-COPD overlap syndrome    OA (osteoarthritis)    Fatty liver    Multiple allergies    Hyperuricemia, 3/19/2011    Hyperlipidemia associated with type 2 diabetes mellitus    Metabolic syndrome    Scoliosis    Abdominal obesity    BRIJESH (obstructive sleep apnea)    Arthritis of knee    Abnormality of gait    CAD (coronary artery disease)    History of intracranial hemorrhage    Hypertension associated with diabetes    History of non-ST elevation myocardial infarction (NSTEMI)    Type 2 diabetes mellitus with circulatory disorder, without long-term current use of insulin    Adrenal Cushing's syndrome    Statin intolerance    H/O prosthetic aortic valve replacement    Postoperative hypothyroidism    Primary insomnia    Major depressive disorder with single episode, in full remission    Anticoagulant long-term use    Complete heart block    Right-sided low back pain without sciatica    Severe persistent asthma without complication    Colon polyps    Lumbar radiculitis    Congestive heart failure, unspecified HF chronicity, unspecified heart failure type    Eosinophilic asthma    Pulmonary eosinophilia    Abdominal aortic atherosclerosis    Carotid atherosclerosis    BMI 32.0-32.9,adult    Acute on  chronic combined systolic and diastolic CHF (congestive heart failure)    Pulmonary hypertension    Elevated serum creatinine    COPD (chronic obstructive pulmonary disease)    Essential hypertension    Coronary stent patent    Chronic sinus complaints    Chronic obstructive pulmonary disease      Review of patient's allergies indicates:   Allergen Reactions    Metformin Diarrhea     Diarrhea      Corn Itching     Other reaction(s): Sneezing  Other reaction(s): Rhinorrhea    Potato starch Hives     Other reaction(s): Sneezing  Other reaction(s): Rhinorrhea    Pravastatin Other (See Comments)     Muscle pain    Statins-hmg-coa reductase inhibitors Other (See Comments)    Hydrochlorothiazide Other (See Comments)     weakness    Welchol [colesevelam] Other (See Comments)     Weakness      Past Surgical History:   Procedure Laterality Date    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/25/2017    Dr Griggs    ADRENALECTOMY      AORTIC VALVE REPLACEMENT  12/09/2016    ARTERIOGRAPHY OF AORTIC ROOT N/A 9/12/2022    Procedure: ARTERIOGRAM, AORTIC ROOT;  Surgeon: Marko Batres MD;  Location: [facility] CATH/EP LAB;  Service: Cardiology;  Laterality: N/A;    arthroscopy lt knee Right     BLADDER REPAIR      sling    BREAST BIOPSY Bilateral     benign    COLONOSCOPY  2004    10 year recheck    COLONOSCOPY N/A 11/6/2020    Procedure: COLONOSCOPY;  Surgeon: Yakelin Lowery MD;  Location: [facility] ENDO;  Service: Endoscopy;  Laterality: N/A;    CORONARY ANGIOGRAPHY INCLUDING BYPASS GRAFTS WITH CATHETERIZATION OF LEFT HEART Left 9/12/2022    Procedure: Left heart cath including bypass graft, with left heart catheterization;  Surgeon: Marko Batres MD;  Location: [facility] CATH/EP LAB;  Service: Cardiology;  Laterality: Left;    CORONARY ARTERY BYPASS GRAFT  12/09/2016    X3    EPIDURAL STEROID INJECTION INTO LUMBAR SPINE N/A 7/27/2021    Procedure: Injection-steroid-epidural-lumbar;  Surgeon: Valerio Jay MD;  Location: [facility] OR;  Service: Pain Management;   Laterality: N/A;  L5-S1     HYSTERECTOMY      INSERTION OF PACEMAKER Left 1/13/2020    Procedure: INSERTION, PACEMAKER;  Surgeon: Marko Batres MD;  Location: [facility] CATH/EP LAB;  Service: Cardiology;  Laterality: Left;    OOPHORECTOMY      RIGHT HEART CATHETERIZATION Right 9/12/2022    Procedure: INSERTION, CATHETER, RIGHT HEART;  Surgeon: Marko Batres MD;  Location: [facility] CATH/EP LAB;  Service: Cardiology;  Laterality: Right;    THYROIDECTOMY          Current Outpatient Medications:     albuterol (PROVENTIL) 2.5 mg /3 mL (0.083 %) nebulizer solution, Take 3 mLs (2.5 mg total) by nebulization every 4 (four) hours as needed for Shortness of Breath or Wheezing., Disp: 30 each, Rfl: 11    albuterol (PROVENTIL/VENTOLIN HFA) 90 mcg/actuation inhaler, Inhale 1-2 puffs into the lungs every 4 (four) hours as needed for Wheezing. Inhale 2 puffs by mouth into lungs every 4 hours as needed, Disp: 108 g, Rfl: 3    alcohol swabs (BD ALCOHOL SWABS) PadM, Apply 1 each topically as needed., Disp: 100 each, Rfl: 3    amLODIPine (NORVASC) 10 MG tablet, TAKE 1 TABLET BY MOUTH EVERY DAY FOR SYSTOLIC BLOOD PRESSURE GREATER THAN 120, Disp: 90 tablet, Rfl: 3    arformoteroL (BROVANA) 15 mcg/2 mL Nebu, Take 2 mLs (15 mcg total) by nebulization 2 (two) times a day. Controller, Disp: 60 each, Rfl: 11    aspirin (ECOTRIN) 81 MG EC tablet, Take 81 mg by mouth once daily., Disp: , Rfl:     bempedoic acid (NEXLETOL) 180 mg Tab, Take 1 tablet by mouth once daily., Disp: , Rfl:     blood glucose control, low (TRUE METRIX LEVEL 1) Soln, Check glucometer accuracy at least once a week, Disp: 1 each, Rfl: 3    blood sugar diagnostic Strp, True Metrix Test Strips test once a day, Disp: 100 strip, Rfl: 3    blood-glucose meter (TRUE METRIX GLUCOSE METER) Misc, True Metrix Glucose Meter, Disp: 1 each, Rfl: 0    budesonide (PULMICORT) 0.5 mg/2 mL nebulizer solution, Take 2 mLs (0.5 mg total) by nebulization 2 (two) times daily. Controller, Disp: 120  mL, Rfl: 11    cholecalciferol, vitamin D3, (VITAMIN D3) 25 mcg (1,000 unit) capsule, Take 1,000 Units by mouth once daily., Disp: , Rfl:     cyanocobalamin (VITAMIN B-12) 1000 MCG tablet, Take 100 mcg by mouth once daily., Disp: , Rfl:     evolocumab (REPATHA SYRINGE) 140 mg/mL Syrg, Inject 140 mg into the skin every 14 (fourteen) days. , Disp: , Rfl:     glyBURIDE (DIABETA) 2.5 MG tablet, Take 2.5 mg by mouth 2 (two) times daily., Disp: , Rfl:     lancets 33 gauge Misc, 1 lancet by Misc.(Non-Drug; Combo Route) route once daily., Disp: 100 each, Rfl: 3    levocetirizine (XYZAL) 5 MG tablet, Take 1 tablet (5 mg total) by mouth every evening., Disp: 90 tablet, Rfl: 3    levothyroxine (SYNTHROID) 150 MCG tablet, Take 150 mcg by mouth once daily., Disp: , Rfl:     potassium chloride (MICRO-K) 8 mEq CpSR, Take 8 mEq by mouth., Disp: , Rfl:     predniSONE (DELTASONE) 20 MG tablet, Take one daily for 3 days and may repeat for shortness of breath., Disp: 12 tablet, Rfl: 2    REPATHA SURECLICK 140 mg/mL PnIj, SMARTSI pre-filled pen syringe SUB-Q Every 2 Weeks, Disp: , Rfl:     revefenacin (YUPELRI) 175 mcg/3 mL Nebu, Inhale 1 Dose into the lungs Daily., Disp: 30 each, Rfl: 11    SITagliptin (JANUVIA) 50 MG Tab, Take 1 tablet (50 mg total) by mouth once daily., Disp: 90 tablet, Rfl: 1    sotaloL (BETAPACE) 80 MG tablet, Take 80 mg by mouth 2 (two) times daily., Disp: , Rfl:     torsemide (DEMADEX) 20 MG Tab, Take 20 mg by mouth., Disp: , Rfl:     vitamin E 400 UNIT capsule, Take 400 Units by mouth once daily., Disp: , Rfl:     colchicine, gout, (COLCRYS) 0.6 mg tablet, Take 1 tablet (0.6 mg total) by mouth once daily. Day 1: 1.2 mg at the first sign of flare, followed by 0.6 mg after 1 hour; maximum total dose: 1.8 mg/day on day Day 2 and thereafter: 0.6 mg once or twice daily until flare resolves, Disp: 30 tablet, Rfl: 0    metoprolol succinate (TOPROL-XL) 50 MG 24 hr tablet, Take 1 tablet (50 mg total) by mouth once  "daily., Disp: 30 tablet, Rfl: 11    predniSONE (DELTASONE) 10 mg tablet pack, Take by mouth once daily. Take 3 tablets daily on day #1-2, Take 2 tablets daily on days #3-4, Take 1 tablet daily on day #5-6, then stop taking  Dispense: 12 tablet; Refill: 0, Disp: 12 tablet, Rfl: 0    Review of Systems   Constitutional:  Negative for chills and fever.   Respiratory:  Negative for chest tightness and shortness of breath.    Cardiovascular:  Negative for chest pain and palpitations.   Gastrointestinal:  Negative for abdominal pain.   Musculoskeletal:  Positive for arthralgias and joint swelling.        Left foot and ankle pain   Skin:  Positive for color change.   Neurological:  Negative for dizziness and headaches.       Objective:   BP (!) 152/58 (BP Location: Right arm, Patient Position: Sitting, BP Method: Medium (Manual))   Pulse 92   Temp 98.2 °F (36.8 °C) (Oral)   Ht 5' 2" (1.575 m)   Wt 78.5 kg (173 lb 1 oz)   SpO2 (!) 94%   BMI 31.65 kg/m²         Physical Exam  Constitutional:       Appearance: Normal appearance.      Comments: Patient is sitting on wheelchair   HENT:      Head: Atraumatic.   Cardiovascular:      Rate and Rhythm: Normal rate and regular rhythm.      Pulses: Normal pulses.      Heart sounds: Normal heart sounds.   Pulmonary:      Effort: Pulmonary effort is normal.      Breath sounds: Normal breath sounds.   Musculoskeletal:      Comments: +tenderness in left 1st MTP of great toe and in left ankle   Skin:     General: Skin is warm and dry.      Findings: Erythema present.   Neurological:      General: No focal deficit present.      Mental Status: She is oriented to person, place, and time.   Psychiatric:         Mood and Affect: Mood normal.         Assessment:       1. Gout, unspecified cause, unspecified chronicity, unspecified site        Plan:       1. Gout, unspecified cause, unspecified chronicity, unspecified site  - colchicine, gout, (COLCRYS) 0.6 mg tablet; Take 1 tablet (0.6 mg " total) by mouth once daily. Day 1: 1.2 mg at the first sign of flare, followed by 0.6 mg after 1 hour; maximum total dose: 1.8 mg/day on day Day 2 and thereafter: 0.6 mg once or twice daily until flare resolves  Dispense: 30 tablet; Refill: 0  - X-Ray Foot 2 View Left; Future  - URIC ACID; Future  - CBC Auto Differential; Future  - Basic Metabolic Panel; Future  - predniSONE (DELTASONE) 10 mg tablet pack; Take by mouth once daily. Take 3 tablets daily on day #1-2, Take 2 tablets daily on days #3-4, Take 1 tablet daily on day #5-6, then stop taking  Dispense: 12 tablet; Refill: 0  Dispense: 12 tablet; Refill: 0  - X-Ray Ankle Complete Left; Future   - Advised to go to ED if develops increasing pain, marked redness, fever, chills, generalized body ache or weakness or any abnormal worsening symptoms    Theresa DAWSON

## 2023-10-24 ENCOUNTER — HOSPITAL ENCOUNTER (OUTPATIENT)
Dept: RADIOLOGY | Facility: HOSPITAL | Age: 73
Discharge: HOME | End: 2023-10-24
Attending: NURSE PRACTITIONER
Payer: MEDICARE

## 2023-10-24 DIAGNOSIS — M10.9 GOUT, UNSPECIFIED CAUSE, UNSPECIFIED CHRONICITY, UNSPECIFIED SITE: ICD-10-CM

## 2023-10-24 PROCEDURE — 73610 X-RAY EXAM OF ANKLE: CPT | Mod: TC,LT

## 2023-10-24 PROCEDURE — 73610 X-RAY EXAM OF ANKLE: CPT | Mod: 26,LT,, | Performed by: RADIOLOGY

## 2023-10-24 PROCEDURE — 73620 X-RAY EXAM OF FOOT: CPT | Mod: 26,LT,, | Performed by: RADIOLOGY

## 2023-10-24 PROCEDURE — 73610 XR ANKLE COMPLETE 3 VIEW LEFT: ICD-10-PCS | Mod: 26,LT,, | Performed by: RADIOLOGY

## 2023-10-24 PROCEDURE — 73620 XR FOOT 2 VIEW LEFT: ICD-10-PCS | Mod: 26,LT,, | Performed by: RADIOLOGY

## 2023-10-24 PROCEDURE — 73620 X-RAY EXAM OF FOOT: CPT | Mod: TC,LT

## 2023-11-01 ENCOUNTER — TELEPHONE (OUTPATIENT)
Dept: FAMILY MEDICINE | Facility: CLINIC | Age: 73
End: 2023-11-01
Payer: MEDICARE

## 2023-11-01 DIAGNOSIS — R32 URINARY INCONTINENCE IN FEMALE: ICD-10-CM

## 2023-11-01 DIAGNOSIS — R32 BLADDER LEAK: Primary | ICD-10-CM

## 2023-11-01 NOTE — TELEPHONE ENCOUNTER
Spoke with patient    Requesting referral for urinary incontinence   Referral pended      ----- Message from Mirta Hamilton sent at 11/1/2023 10:00 AM CDT -----  Contact: Patient  Type:  Patient Requesting Referral    Who Called:Patient    Does the patient already have the specialty appointment scheduled?:No    If yes, what is the date of that appointment?:NA    Referral to What Specialty:Urology     Reason for Referral:freq leakage     Does the patient want the referral with a specific physician?: Unknown    Is the specialist an [facility]?: Unknown    Patient Requesting a Response?:Yes    Would the patient rather a call back or a response via [facility]? Call    Best Call Back Number:[phone] (home)     Additional Information: Please send referral to the number below       [phone] Office [phone]  Fax

## 2023-11-02 ENCOUNTER — TELEPHONE (OUTPATIENT)
Dept: FAMILY MEDICINE | Facility: CLINIC | Age: 73
End: 2023-11-02
Payer: MEDICARE

## 2023-11-02 ENCOUNTER — PATIENT MESSAGE (OUTPATIENT)
Dept: FAMILY MEDICINE | Facility: CLINIC | Age: 73
End: 2023-11-02
Payer: MEDICARE

## 2023-11-10 DIAGNOSIS — I10 ESSENTIAL HYPERTENSION: ICD-10-CM

## 2023-11-13 RX ORDER — AMLODIPINE BESYLATE 10 MG/1
TABLET ORAL
Qty: 90 TABLET | Refills: 3 | Status: ON HOLD | OUTPATIENT
Start: 2023-11-13

## 2023-11-15 ENCOUNTER — PATIENT MESSAGE (OUTPATIENT)
Dept: FAMILY MEDICINE | Facility: CLINIC | Age: 73
End: 2023-11-15
Payer: MEDICARE

## 2023-11-15 DIAGNOSIS — E11.9 TYPE 2 DIABETES MELLITUS WITHOUT COMPLICATION: ICD-10-CM

## 2023-11-15 DIAGNOSIS — E11.69 HYPERLIPIDEMIA ASSOCIATED WITH TYPE 2 DIABETES MELLITUS: Primary | Chronic | ICD-10-CM

## 2023-11-15 DIAGNOSIS — E11.59 HYPERTENSION ASSOCIATED WITH DIABETES: ICD-10-CM

## 2023-11-15 DIAGNOSIS — E11.59 TYPE 2 DIABETES MELLITUS WITH OTHER CIRCULATORY COMPLICATION, WITHOUT LONG-TERM CURRENT USE OF INSULIN: ICD-10-CM

## 2023-11-15 DIAGNOSIS — I15.2 HYPERTENSION ASSOCIATED WITH DIABETES: ICD-10-CM

## 2023-11-15 DIAGNOSIS — E78.5 HYPERLIPIDEMIA ASSOCIATED WITH TYPE 2 DIABETES MELLITUS: Primary | Chronic | ICD-10-CM

## 2023-11-15 NOTE — LETTER
November 20, 2023    Tereza Larose  [address] [place] [address]             [facility]  [address] [facility] [place] [address]  Phone: [phone] Dear Mrs. Larose:          I have been trying to reach you to schedule fasting lab ordered by Dr. Evan Sánchez. Please call the clinic at [phone] to schedule lab.     Sincerely,    CRISTI Rico Robert W., MD

## 2023-12-03 DIAGNOSIS — Z71.89 COMPLEX CARE COORDINATION: ICD-10-CM

## 2023-12-04 ENCOUNTER — TELEPHONE (OUTPATIENT)
Dept: FAMILY MEDICINE | Facility: CLINIC | Age: 73
End: 2023-12-04
Payer: MEDICARE

## 2023-12-04 NOTE — TELEPHONE ENCOUNTER
No answer   No voice mail    Urology  has calling her to scheduled an appointment with a urologist        ----- Message from Joo Riley sent at 12/4/2023 12:58 PM CST -----  Name of Who is Calling:SHAYY VELA [[id number]]        What is the request in detail:Pt would like a call back from the office in regards to a few missed calls from the office and would like to discuss nature. Please advise thank you       Can the clinic reply by [facility]:NO         What Number to Call Back if not in [employer]NER:.Telephone Information:  Mobile          [phone]

## 2023-12-07 ENCOUNTER — PATIENT MESSAGE (OUTPATIENT)
Dept: ADMINISTRATIVE | Facility: CLINIC | Age: 73
End: 2023-12-07
Payer: MEDICARE

## 2023-12-14 ENCOUNTER — TELEPHONE (OUTPATIENT)
Dept: FAMILY MEDICINE | Facility: CLINIC | Age: 73
End: 2023-12-14
Payer: MEDICARE

## 2023-12-14 DIAGNOSIS — E11.9 TYPE 2 DIABETES MELLITUS WITHOUT COMPLICATION, UNSPECIFIED WHETHER LONG TERM INSULIN USE: ICD-10-CM

## 2023-12-14 NOTE — TELEPHONE ENCOUNTER
----- Message from Marley Lewis sent at 12/14/2023 11:02 AM CST -----  Type:  Sooner Appointment Request    Caller is requesting a sooner appointment.  Caller declined first available appointment listed below.  Caller will not accept being placed on the waitlist and is requesting a message be sent to doctor.    Name of Caller:  Pt    When is the first available appointment?  03/2024    Symptoms:  f/u test results/ hernia that she thinks is getting bigger    Would the patient rather a call back or a response via [facility]?  Call back    Best Call Back Number:  [phone]  if you can't get her call   [phone]    Additional Information:  Pt can only due Tuesday or Thursday due to  having dialysis rest of week.   Please call back to advise. Thanks!

## 2023-12-18 DIAGNOSIS — M10.9 GOUT, UNSPECIFIED CAUSE, UNSPECIFIED CHRONICITY, UNSPECIFIED SITE: ICD-10-CM

## 2023-12-19 RX ORDER — PREDNISONE 10 MG/1
TABLET ORAL
Qty: 12 TABLET | Refills: 0 | OUTPATIENT
Start: 2023-12-19

## 2024-01-23 ENCOUNTER — OFFICE VISIT (OUTPATIENT)
Dept: PULMONOLOGY | Facility: CLINIC | Age: 74
End: 2024-01-23
Payer: MEDICARE

## 2024-01-23 VITALS
OXYGEN SATURATION: 90 % | SYSTOLIC BLOOD PRESSURE: 172 MMHG | BODY MASS INDEX: 33.47 KG/M2 | WEIGHT: 181.88 LBS | HEART RATE: 77 BPM | DIASTOLIC BLOOD PRESSURE: 90 MMHG | HEIGHT: 62 IN

## 2024-01-23 DIAGNOSIS — J44.9 COPD (CHRONIC OBSTRUCTIVE PULMONARY DISEASE): ICD-10-CM

## 2024-01-23 DIAGNOSIS — J44.89 ASTHMA-COPD OVERLAP SYNDROME: ICD-10-CM

## 2024-01-23 DIAGNOSIS — J82.83 EOSINOPHILIC ASTHMA: ICD-10-CM

## 2024-01-23 DIAGNOSIS — J45.50 SEVERE PERSISTENT ASTHMA WITHOUT COMPLICATION: Primary | ICD-10-CM

## 2024-01-23 PROCEDURE — 3077F SYST BP >= 140 MM HG: CPT | Mod: CPTII,S$GLB,, | Performed by: INTERNAL MEDICINE

## 2024-01-23 PROCEDURE — 3288F FALL RISK ASSESSMENT DOCD: CPT | Mod: CPTII,S$GLB,, | Performed by: INTERNAL MEDICINE

## 2024-01-23 PROCEDURE — 3008F BODY MASS INDEX DOCD: CPT | Mod: CPTII,S$GLB,, | Performed by: INTERNAL MEDICINE

## 2024-01-23 PROCEDURE — 99999 PR PBB SHADOW E&M-EST. PATIENT-LVL V: CPT | Mod: PBBFAC,,, | Performed by: INTERNAL MEDICINE

## 2024-01-23 PROCEDURE — 1159F MED LIST DOCD IN RCRD: CPT | Mod: CPTII,S$GLB,, | Performed by: INTERNAL MEDICINE

## 2024-01-23 PROCEDURE — 3080F DIAST BP >= 90 MM HG: CPT | Mod: CPTII,S$GLB,, | Performed by: INTERNAL MEDICINE

## 2024-01-23 PROCEDURE — 1101F PT FALLS ASSESS-DOCD LE1/YR: CPT | Mod: CPTII,S$GLB,, | Performed by: INTERNAL MEDICINE

## 2024-01-23 PROCEDURE — 99213 OFFICE O/P EST LOW 20 MIN: CPT | Mod: S$GLB,,, | Performed by: INTERNAL MEDICINE

## 2024-01-23 RX ORDER — ALBUTEROL SULFATE 90 UG/1
1-2 AEROSOL, METERED RESPIRATORY (INHALATION) EVERY 4 HOURS PRN
Qty: 108 G | Refills: 3 | Status: SHIPPED | OUTPATIENT
Start: 2024-01-23

## 2024-01-23 RX ORDER — PREDNISONE 20 MG/1
TABLET ORAL
Qty: 12 TABLET | Refills: 2 | Status: ON HOLD | OUTPATIENT
Start: 2024-01-23 | End: 2024-04-05 | Stop reason: HOSPADM

## 2024-01-23 RX ORDER — REVEFENACIN 175 UG/3ML
1 SOLUTION RESPIRATORY (INHALATION) DAILY
Qty: 30 EACH | Refills: 11 | Status: SHIPPED | OUTPATIENT
Start: 2024-01-23

## 2024-01-23 RX ORDER — ARFORMOTEROL TARTRATE 15 UG/2ML
15 SOLUTION RESPIRATORY (INHALATION) 2 TIMES DAILY
Qty: 60 EACH | Refills: 11 | Status: SHIPPED | OUTPATIENT
Start: 2024-01-23 | End: 2025-01-22

## 2024-01-23 RX ORDER — ALBUTEROL SULFATE 0.83 MG/ML
2.5 SOLUTION RESPIRATORY (INHALATION) EVERY 4 HOURS PRN
Qty: 30 EACH | Refills: 11 | Status: ON HOLD | OUTPATIENT
Start: 2024-01-23 | End: 2024-04-05 | Stop reason: HOSPADM

## 2024-01-23 RX ORDER — BUDESONIDE 0.5 MG/2ML
0.5 INHALANT ORAL 2 TIMES DAILY
Qty: 120 ML | Refills: 11 | Status: SHIPPED | OUTPATIENT
Start: 2024-01-23 | End: 2025-01-22

## 2024-01-23 NOTE — PROGRESS NOTES
1/23/2024    Tereza Larose  New Patient Consult    Chief Complaint   Patient presents with    Follow-up           HPI:      1/23/2024 -- uses oxygen intermittently at home.....     uses budeonide and yupeleri and brovana daily,  no recent prednisonie nor abx..    Pt having lots achey with cold weather.     on dialysis now... pt doing well      7/20/2023 out of bp meds - not filled since Jan 2023!!!, breathing same, uses ox most time.  Use neb rx bid and prn...  got rid of niv.   Had back pain 7/3/2023 with   neg lumbar and thoracic spine.  had fall 2 wks ago ppt back injury ppt er visit on July 3rd......  No smoke exposure.    Occ sugar checks but not lately.  Last hgb a1c 6.7 November last yr.  Creat 1.2 May 4th.   Used prednisone in past with good results- out now...  For f/u Dr Batres soon.    Severe pulmonary hypertension seen on echo 9/2022.  Has aortic heart valve placed in past -- and has severe copd.  Not needing ox 24/7 but close..  Patient Instructions   Chest xray viewed from September 2022 -- looked ok.        Could check 6 minute walk to arrange portable oxygen concentrator.    Use prednisone if short breath    Call if you wish for therapy -- you fell recently but decline home health today.    You have severe pulmonary hypertension- you need to control blood pressure as best able.    You have urine urgency -- would check urine -- if need antibiotic will order.     1/17/2023 -- niv not being used, cannot tolerate. Uses ox 24/7, problem with tanks when out of house.  No prednisone nor mucous produced.   Uses   Moderate mitral regurg and pulm htn on echo 9/2022.   Used prednisone in past with benefit, sees Dr Franco.   Patient Instructions   Cxr sept 2022 with enlarged heart and clear lungs- viewed  Leaky mitral valve and pulmonary hypertension seen on echo last yr.   Breathing test was 45% today -- viewed.  Will stop non invasive ventilator  Continue nebulized therapy -- 3 meds  Recheck in  6 months.  Could try to get portable oxygen concentrator.    9/29/2022 pt had torsades at [facility] after being admitted on 9/9 with sob.  She had rhc with r coronary stent.  Pt dced on 9/15 after 6 days stay.   Pt had had 2 loc - one with fall. Presented to hosp.  Pt had poor recall.  On neb rx bid.  Home health arranged. Plavix and entresto now and levothyroxine adjusted.  Had high sugars.  Pt hosp  [facility] 9/9 to 9/15.  After reviewing hospital clinicals, patient needs home NIV and requires guaranteed target volume and battery backup for portability. Bilevel has been considered and will not prevent exacerbations due to patient's disease state.  Patient Instructions   Pt having problem tolerating niv. Continue to try -- let us know if helps.   Meds renewed  Cxr good 9/9/2022   Use oxygen.   For allergies -- may use xyzal bedtime-- sent in to [facility].   Patient Instructions   Pt having problem tolerating niv. Continue to try -- let us know if helps.   Meds renewed  Cxr good 9/9/2022   Use oxygen.     For allergies -- may use xyzal bedtime-- sent in to [facility].   9/7/2022 uses roller walker last 4-5 yrs, occ falls.  No hemoptysis. More sob. Uses ox chr.  Cough and min wheezes.  Cough white .  No recent prednisone -- used in past with benefit.  Uses ventolin now-- used breo in past but felt ventoloin helped more.   Still worsens cold weather.   Hoarse last 2 yrs- stable.   Patient Instructions   Need to be on preventive therapy -- use budesonide twice daily.   Brovnana twice daily and yupleri once daily  Would take prednisone 20 mg daily for 7 days -- repeat.  Would check sugar, perhaps daily -- to assure sugars less than 200-250.   If on preventive therapy may be able to  use asthma shots as asthma seems severe?   Will resume theophylline -- if available.  Need to do breathing test with next visit.  Get blood work today or am.     4/30/2020 - no more blood streaks, min cough am mucous white.  Uses resp rx with breo only - prn  ventolin also. Uses ox at home.  Patient Instructions   Chest xray nearly cleared completely by 1/29/2020  Would use celebrex 100 mg (stop for stomach upset) once or twice daily for severe right low back pain causing walking problems.  Also sent in tramadol 50 mg for as needed use.  Continue breo and as needed albuterol  Call if problem , may  Use prednisone if cough/wheezes   1/29/2020- had cold front pass with problems coughing up blood streaks.  Admitted to [facility] wbc 1/10 of 24, creat 2.  Got iv abx, none at [facility].  Uses ox at home.    Uses brovana and budesonide , also breo, and singulair.  No asthma arousal.    Patient Instructions   Need to follow up chest xray as was not clear by 1/14.  Pneumonia not typical  For heart block  Use breo or brovana and budesonide - not both.  Use breo til out.  You have used and benefited from your home oxygen.      11/21/2019 asthma since age 19 with cough/wheezes/sob, weather change and noct worsening.  Uses 02 to sleep and sat walking 89 or so sat.  Uses ox daytime 1/2 day.    Sinuses problem- flonase prn, claritin.  No infection sinus.  Mucous from lungs white to clear.    Uses theophylline.  Takes albuterol 1-2/wk.  Awakens with asthma 4/month.  No emergency treatments.    Uses steroid shots once a yr.  No apneas suggested- on home ox 3 yrs.  Use cane to ambulate as had cva 3 yrs ago.  Falls occasionally- fell last month.    Patient Instructions   Asthma likely caused some copd.  Take prednisone 20mg  Daily for 3 days and note sugars, and breathing.  Would check lung capacity next wk, Check chest xray and breathing test and oxygen level walking.  .   May repeat prednisone if needed.  Prevention medications -Use Singulair daily&  Use breo once daily, or  consider continuing or use budesonide and brovana (similar medications but should be covered with old medicare benefit).  Use nebulizer as needed.  Control sinuses- use Claritin and Flonase.  singulair may help.    Could skip  "theophylline and stay off    The chief compliant  problem is new to me",   PFSH:  Past Medical History:   Diagnosis Date    Abnormal EKG 9/26/2012    Allergy     Arthritis     Asthma     Brain bleed     Colon polyps 11/6/2020    COPD (chronic obstructive pulmonary disease)     Depression     Diabetes mellitus type II     Diverticulitis     Fatty liver     GERD (gastroesophageal reflux disease)     Goiter     s/p thyroidectomy    Heart murmur     Hemiparesis affecting right side as late effect of cerebrovascular accident (CVA) 7/26/2022    Hernia of abdominal wall     History of intracranial hemorrhage 6/15/2013    History of non-ST elevation myocardial infarction (NSTEMI) 6/15/2013    Hyperlipidemia     Hypertension     Lactic acidosis 1/11/2020    Metabolic syndrome 6/14/2012    Myocardial infarction     On home oxygen therapy     states uses 2L at night or when sat < 90    Pacemaker     Positive FIT (fecal immunochemical test) 11/6/2020    Severe persistent asthma without complication 4/30/2020    Statin intolerance     Stroke 2012    left hand shaky, loss of balance         Past Surgical History:   Procedure Laterality Date    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/25/2017    Dr Griggs    ADRENALECTOMY      AORTIC VALVE REPLACEMENT  12/09/2016    ARTERIOGRAPHY OF AORTIC ROOT N/A 9/12/2022    Procedure: ARTERIOGRAM, AORTIC ROOT;  Surgeon: Marko Batres MD;  Location: [facility] CATH/EP LAB;  Service: Cardiology;  Laterality: N/A;    arthroscopy lt knee Right     BLADDER REPAIR      sling    BREAST BIOPSY Bilateral     benign    COLONOSCOPY  2004    10 year recheck    COLONOSCOPY N/A 11/6/2020    Procedure: COLONOSCOPY;  Surgeon: Yakelin Lowery MD;  Location[facility];  Service: Endoscopy;  Laterality: N/A;    CORONARY ANGIOGRAPHY INCLUDING BYPASS GRAFTS WITH CATHETERIZATION OF LEFT HEART Left 9/12/2022    Procedure: Left heart cath including bypass graft, with left heart catheterization;  Surgeon: Marko Batres MD;  " Location: [facility] CATH/EP LAB;  Service: Cardiology;  Laterality: Left;    CORONARY ARTERY BYPASS GRAFT  12/09/2016    X3    EPIDURAL STEROID INJECTION INTO LUMBAR SPINE N/A 7/27/2021    Procedure: Injection-steroid-epidural-lumbar;  Surgeon: Valerio Jay MD;  Location: [facility] OR;  Service: Pain Management;  Laterality: N/A;  L5-S1     HYSTERECTOMY      INSERTION OF PACEMAKER Left 1/13/2020    Procedure: INSERTION, PACEMAKER;  Surgeon: Marko Batres MD;  Location: [facility] CATH/EP LAB;  Service: Cardiology;  Laterality: Left;    OOPHORECTOMY      RIGHT HEART CATHETERIZATION Right 9/12/2022    Procedure: INSERTION, CATHETER, RIGHT HEART;  Surgeon: Marko Batres MD;  Location: [facility] CATH/EP LAB;  Service: Cardiology;  Laterality: Right;    THYROIDECTOMY       Social History     Tobacco Use    Smoking status: Never    Smokeless tobacco: Never   Substance Use Topics    Alcohol use: Not Currently     Comment: seldom    Drug use: No     Family History   Problem Relation Age of Onset    Arthritis Mother     Diabetes Mother     Heart disease Mother     Hypertension Mother     Hypertension Father     Heart disease Father     Mental illness Father     Asthma Sister     Diabetes Sister     Hypertension Sister     Asthma Son     COPD Son     Depression Son     Diabetes Son     Hypertension Son     Stroke Son     Diabetes Maternal Uncle     Heart disease Maternal Uncle     Mental illness Paternal Aunt     Cancer Paternal Aunt     Heart disease Paternal Aunt     Hypertension Paternal Aunt     Heart disease Paternal Uncle     Hypertension Maternal Grandmother     Mental illness Maternal Grandmother     Aneurysm Maternal Grandfather     Mental illness Paternal Grandmother     Heart disease Paternal Grandfather     Melanoma Neg Hx     Psoriasis Neg Hx     Lupus Neg Hx     Eczema Neg Hx      Review of patient's allergies indicates:   Allergen Reactions    Metformin Diarrhea     Diarrhea      Corn Itching     Other reaction(s):  "Sneezing  Other reaction(s): Rhinorrhea    Potato starch Hives     Other reaction(s): Sneezing  Other reaction(s): Rhinorrhea    Pravastatin Other (See Comments)     Muscle pain    Statins-hmg-coa reductase inhibitors Other (See Comments)    Hydrochlorothiazide Other (See Comments)     weakness    Welchol [colesevelam] Other (See Comments)     Weakness        Performance Status:The patient's activity level is housebound activities.      Review of Systems:  a review of eleven systems covering constitutional, Eye, HEENT, Psych, Respiratory, Cardiac, GI, , Musculoskeletal, Endocrine, Dermatologic was negative except for pertinent findings as listed ABOVE and below:  pertinent positive as above, rest is good       Exam:Comprehensive exam done. BP (!) 172/90 (BP Location: Right arm, Patient Position: Sitting, BP Method: Small (Automatic)) Comment: patient verbalized didn't take her BP meds today  Pulse 77   Ht 5' 2" (1.575 m)   Wt 82.5 kg (181 lb 14.1 oz)   SpO2 (!) 90%   BMI 33.27 kg/m²   Exam included Vitals as listed, and patient's appearance and affect and alertness and mood, oral exam for yeast and hygiene and pharynx lesions and Mallapatti (M) score, neck with inspection for jvd and masses and thyroid abnormalities and lymph nodes (supraclavicular and infraclavicular nodes and axillary also examined and noted if abn), chest exam included symmetry and effort and fremitus and percussion and auscultation, cardiac exam included rhythm and gallops and murmur and rubs and jvd and edema, abdominal exam for mass and hepatosplenomegaly and tenderness and hernias and bowel sounds, Musculoskeletal exam with muscle tone and posture and mobility/gait and  strength, and skin for rashes and cyanosis and pallor and turgor, extremity for clubbing.  Findings were normal except for pertinent findings listed below:  M3,chest is symmetric, no distress, normal percussion, normal fremitus and good normal decreased breath " sounds  No clubbing nor edema     Radiographs (ct chest and cxr) reviewed: cxr 1/10/2020- patchy left > right pneumonia.     cxr 1/29/20 nearly clear.      Labs reviewed         Results for TEREZA VELA (MRN [id number]) as of 11/21/2019 10:37   Ref. Range 8/28/2018 16:38   Eosinophil% Latest Ref Range: 0.0 - 8.0 % 5.2   Eos # Latest Ref Range: 0.0 - 0.5 K/uL 0.4     PFT - fev 45% or 900 cc 1/17/2023, discussed today      Plan:  Clinical impression is apparently straight forward and impression with management as below.     Tereza was seen today for follow-up.    Diagnoses and all orders for this visit:    Severe persistent asthma without complication  -     albuterol (PROVENTIL/VENTOLIN HFA) 90 mcg/actuation inhaler; Inhale 1-2 puffs into the lungs every 4 (four) hours as needed for Wheezing. Inhale 2 puffs by mouth into lungs every 4 hours as needed  -     revefenacin (YUPELRI) 175 mcg/3 mL Nebu; Inhale 1 Dose into the lungs Daily.  -     arformoteroL (BROVANA) 15 mcg/2 mL Nebu; Take 2 mLs (15 mcg total) by nebulization 2 (two) times a day. Controller  -     budesonide (PULMICORT) 0.5 mg/2 mL nebulizer solution; Take 2 mLs (0.5 mg total) by nebulization 2 (two) times daily. Controller  -     albuterol (PROVENTIL) 2.5 mg /3 mL (0.083 %) nebulizer solution; Take 3 mLs (2.5 mg total) by nebulization every 4 (four) hours as needed for Shortness of Breath or Wheezing.    Asthma-COPD overlap syndrome  -     albuterol (PROVENTIL/VENTOLIN HFA) 90 mcg/actuation inhaler; Inhale 1-2 puffs into the lungs every 4 (four) hours as needed for Wheezing. Inhale 2 puffs by mouth into lungs every 4 hours as needed  -     revefenacin (YUPELRI) 175 mcg/3 mL Nebu; Inhale 1 Dose into the lungs Daily.  -     predniSONE (DELTASONE) 20 MG tablet; Take one daily for 3 days and may repeat for shortness of breath.  -     arformoteroL (BROVANA) 15 mcg/2 mL Nebu; Take 2 mLs (15 mcg total) by nebulization 2 (two) times a day. Controller  -      budesonide (PULMICORT) 0.5 mg/2 mL nebulizer solution; Take 2 mLs (0.5 mg total) by nebulization 2 (two) times daily. Controller  -     albuterol (PROVENTIL) 2.5 mg /3 mL (0.083 %) nebulizer solution; Take 3 mLs (2.5 mg total) by nebulization every 4 (four) hours as needed for Shortness of Breath or Wheezing.    Eosinophilic asthma  -     albuterol (PROVENTIL/VENTOLIN HFA) 90 mcg/actuation inhaler; Inhale 1-2 puffs into the lungs every 4 (four) hours as needed for Wheezing. Inhale 2 puffs by mouth into lungs every 4 hours as needed  -     revefenacin (YUPELRI) 175 mcg/3 mL Nebu; Inhale 1 Dose into the lungs Daily.  -     arformoteroL (BROVANA) 15 mcg/2 mL Nebu; Take 2 mLs (15 mcg total) by nebulization 2 (two) times a day. Controller  -     budesonide (PULMICORT) 0.5 mg/2 mL nebulizer solution; Take 2 mLs (0.5 mg total) by nebulization 2 (two) times daily. Controller  -     albuterol (PROVENTIL) 2.5 mg /3 mL (0.083 %) nebulizer solution; Take 3 mLs (2.5 mg total) by nebulization every 4 (four) hours as needed for Shortness of Breath or Wheezing.    COPD (chronic obstructive pulmonary disease)  -     albuterol (PROVENTIL/VENTOLIN HFA) 90 mcg/actuation inhaler; Inhale 1-2 puffs into the lungs every 4 (four) hours as needed for Wheezing. Inhale 2 puffs by mouth into lungs every 4 hours as needed  -     predniSONE (DELTASONE) 20 MG tablet; Take one daily for 3 days and may repeat for shortness of breath.                  Follow up in about 6 months (around 7/23/2024), or if symptoms worsen or fail to improve.    Discussed with patient above for education the following:      Patient Instructions   Continue nebulizer therapy    Continue oxygen     Lungs have been stable.

## 2024-02-02 ENCOUNTER — TELEPHONE (OUTPATIENT)
Dept: PULMONOLOGY | Facility: CLINIC | Age: 74
End: 2024-02-02
Payer: MEDICARE

## 2024-02-12 ENCOUNTER — TELEPHONE (OUTPATIENT)
Dept: FAMILY MEDICINE | Facility: CLINIC | Age: 74
End: 2024-02-12
Payer: MEDICARE

## 2024-02-21 ENCOUNTER — TELEPHONE (OUTPATIENT)
Dept: FAMILY MEDICINE | Facility: CLINIC | Age: 74
End: 2024-02-21
Payer: MEDICARE

## 2024-02-21 NOTE — TELEPHONE ENCOUNTER
----- Message from Wendy iCsneros sent at 2/21/2024  3:04 PM CST -----  Contact: self  Type:  Sooner Appointment Request    Name of Caller: pt   When is the first available appointment? Scheduled for 5/29  Symptoms: Hernia (has gotten bigger)  Best Call Back Number: [phone] or [phone]  Please call to advise... Thank you...

## 2024-02-21 NOTE — LETTER
February 22, 2024    Tereza Larose  [address]  [place] [address]             [facility]  [address]  [place] [address]  Phone: [phone]  Fax: [phone] Dear Mrs. Larose:        I have tried multiple times to return your call to make you an appointment to check the hernia.       Please call me back at Dr. Sánchez's office by calling [phone].     Sincerely,        Vickie Grewal LPN

## 2024-03-22 ENCOUNTER — HOSPITAL ENCOUNTER (INPATIENT)
Facility: HOSPITAL | Age: 74
LOS: 4 days | Discharge: ANOTHER HOSPITAL | DRG: 280 | End: 2024-03-26
Attending: INTERNAL MEDICINE | Admitting: INTERNAL MEDICINE
Payer: MEDICARE

## 2024-03-22 DIAGNOSIS — B96.89 BACTEREMIA DUE TO ENTEROBACTER SPECIES: ICD-10-CM

## 2024-03-22 DIAGNOSIS — Z95.5 CORONARY STENT PATENT: Primary | ICD-10-CM

## 2024-03-22 DIAGNOSIS — B95.2 BACTEREMIA DUE TO ENTEROCOCCUS: ICD-10-CM

## 2024-03-22 DIAGNOSIS — R78.81 BACTEREMIA DUE TO ENTEROCOCCUS: ICD-10-CM

## 2024-03-22 DIAGNOSIS — R07.9 CHEST PAIN: ICD-10-CM

## 2024-03-22 DIAGNOSIS — R78.81 BACTEREMIA DUE TO ENTEROBACTER SPECIES: ICD-10-CM

## 2024-03-22 DIAGNOSIS — I21.4 NSTEMI (NON-ST ELEVATED MYOCARDIAL INFARCTION): ICD-10-CM

## 2024-03-22 LAB
ALBUMIN SERPL BCP-MCNC: 3.7 G/DL (ref 3.5–5.2)
ALLENS TEST: ABNORMAL
ALP SERPL-CCNC: 69 U/L (ref 55–135)
ALT SERPL W/O P-5'-P-CCNC: 7 U/L (ref 10–44)
AMMONIA PLAS-SCNC: 41 UMOL/L (ref 10–50)
ANION GAP SERPL CALC-SCNC: 7 MMOL/L (ref 8–16)
APTT PPP: 33 SEC (ref 21–32)
AST SERPL-CCNC: 18 U/L (ref 10–40)
BASOPHILS # BLD AUTO: 0.04 K/UL (ref 0–0.2)
BASOPHILS NFR BLD: 0.3 % (ref 0–1.9)
BILIRUB SERPL-MCNC: 0.6 MG/DL (ref 0.1–1)
BUN SERPL-MCNC: 29 MG/DL (ref 8–23)
CALCIUM SERPL-MCNC: 8.7 MG/DL (ref 8.7–10.5)
CHLORIDE SERPL-SCNC: 93 MMOL/L (ref 95–110)
CHOLEST SERPL-MCNC: 116 MG/DL (ref 120–199)
CHOLEST/HDLC SERPL: 2.5 {RATIO} (ref 2–5)
CO2 SERPL-SCNC: 39 MMOL/L (ref 23–29)
CREAT SERPL-MCNC: 1.4 MG/DL (ref 0.5–1.4)
DELSYS: ABNORMAL
DIFFERENTIAL METHOD BLD: ABNORMAL
EOSINOPHIL # BLD AUTO: 0 K/UL (ref 0–0.5)
EOSINOPHIL NFR BLD: 0 % (ref 0–8)
ERYTHROCYTE [DISTWIDTH] IN BLOOD BY AUTOMATED COUNT: 14 % (ref 11.5–14.5)
ERYTHROCYTE [DISTWIDTH] IN BLOOD BY AUTOMATED COUNT: 14 % (ref 11.5–14.5)
EST. GFR  (NO RACE VARIABLE): 39.7 ML/MIN/1.73 M^2
FLOW: 2
FOLATE SERPL-MCNC: 19.9 NG/ML (ref 4–24)
GLUCOSE SERPL-MCNC: 161 MG/DL (ref 70–110)
GLUCOSE SERPL-MCNC: 161 MG/DL (ref 70–110)
GLUCOSE SERPL-MCNC: 163 MG/DL (ref 70–110)
GLUCOSE SERPL-MCNC: 167 MG/DL (ref 70–110)
HCO3 UR-SCNC: 41.9 MMOL/L (ref 24–28)
HCT VFR BLD AUTO: 42.4 % (ref 37–48.5)
HCT VFR BLD AUTO: 42.4 % (ref 37–48.5)
HCT VFR BLD CALC: 43 %PCV (ref 36–54)
HDLC SERPL-MCNC: 46 MG/DL (ref 40–75)
HDLC SERPL: 39.7 % (ref 20–50)
HGB BLD-MCNC: 13.6 G/DL (ref 12–16)
HGB BLD-MCNC: 13.6 G/DL (ref 12–16)
IMM GRANULOCYTES # BLD AUTO: 0.08 K/UL (ref 0–0.04)
IMM GRANULOCYTES NFR BLD AUTO: 0.6 % (ref 0–0.5)
INR PPP: 1 (ref 0.8–1.2)
LDLC SERPL CALC-MCNC: 46.8 MG/DL (ref 63–159)
LYMPHOCYTES # BLD AUTO: 0.6 K/UL (ref 1–4.8)
LYMPHOCYTES NFR BLD: 4.5 % (ref 18–48)
MAGNESIUM SERPL-MCNC: 1.6 MG/DL (ref 1.6–2.6)
MCH RBC QN AUTO: 30.6 PG (ref 27–31)
MCH RBC QN AUTO: 30.6 PG (ref 27–31)
MCHC RBC AUTO-ENTMCNC: 32.1 G/DL (ref 32–36)
MCHC RBC AUTO-ENTMCNC: 32.1 G/DL (ref 32–36)
MCV RBC AUTO: 96 FL (ref 82–98)
MCV RBC AUTO: 96 FL (ref 82–98)
MODE: ABNORMAL
MONOCYTES # BLD AUTO: 0.8 K/UL (ref 0.3–1)
MONOCYTES NFR BLD: 6.2 % (ref 4–15)
NEUTROPHILS # BLD AUTO: 10.9 K/UL (ref 1.8–7.7)
NEUTROPHILS NFR BLD: 88.4 % (ref 38–73)
NONHDLC SERPL-MCNC: 70 MG/DL
NRBC BLD-RTO: 0 /100 WBC
PCO2 BLDA: 70 MMHG (ref 35–45)
PH SMN: 7.38 [PH] (ref 7.35–7.45)
PHOSPHATE SERPL-MCNC: 3.7 MG/DL (ref 2.7–4.5)
PLATELET # BLD AUTO: 185 K/UL (ref 150–450)
PLATELET # BLD AUTO: 185 K/UL (ref 150–450)
PMV BLD AUTO: 9.5 FL (ref 9.2–12.9)
PMV BLD AUTO: 9.5 FL (ref 9.2–12.9)
PO2 BLDA: 69 MMHG (ref 80–100)
POC BE: 17 MMOL/L
POC IONIZED CALCIUM: 1.15 MMOL/L (ref 1.06–1.42)
POC SATURATED O2: 92 % (ref 95–100)
POC TCO2: 44 MMOL/L (ref 23–27)
POTASSIUM BLD-SCNC: 3.4 MMOL/L (ref 3.5–5.1)
POTASSIUM SERPL-SCNC: 3.6 MMOL/L (ref 3.5–5.1)
PROT SERPL-MCNC: 6.7 G/DL (ref 6–8.4)
PROTHROMBIN TIME: 11.6 SEC (ref 9–12.5)
RBC # BLD AUTO: 4.44 M/UL (ref 4–5.4)
RBC # BLD AUTO: 4.44 M/UL (ref 4–5.4)
SAMPLE: ABNORMAL
SITE: ABNORMAL
SODIUM BLD-SCNC: 135 MMOL/L (ref 136–145)
SODIUM SERPL-SCNC: 139 MMOL/L (ref 136–145)
TRIGL SERPL-MCNC: 116 MG/DL (ref 30–150)
TROPONIN I SERPL HS-MCNC: 163.6 PG/ML (ref 0–14.9)
VIT B12 SERPL-MCNC: 374 PG/ML (ref 210–950)
WBC # BLD AUTO: 12.33 K/UL (ref 3.9–12.7)
WBC # BLD AUTO: 12.33 K/UL (ref 3.9–12.7)

## 2024-03-22 PROCEDURE — 63600175 PHARM REV CODE 636 W HCPCS: Mod: UD | Performed by: INTERNAL MEDICINE

## 2024-03-22 PROCEDURE — 21400001 HC TELEMETRY ROOM

## 2024-03-22 PROCEDURE — 85610 PROTHROMBIN TIME: CPT | Performed by: INTERNAL MEDICINE

## 2024-03-22 PROCEDURE — 25000003 PHARM REV CODE 250: Performed by: INTERNAL MEDICINE

## 2024-03-22 PROCEDURE — 84132 ASSAY OF SERUM POTASSIUM: CPT

## 2024-03-22 PROCEDURE — 84295 ASSAY OF SERUM SODIUM: CPT

## 2024-03-22 PROCEDURE — 85730 THROMBOPLASTIN TIME PARTIAL: CPT | Performed by: INTERNAL MEDICINE

## 2024-03-22 PROCEDURE — 94761 N-INVAS EAR/PLS OXIMETRY MLT: CPT | Mod: XB

## 2024-03-22 PROCEDURE — 93010 ELECTROCARDIOGRAM REPORT: CPT | Mod: ,,, | Performed by: INTERNAL MEDICINE

## 2024-03-22 PROCEDURE — 80061 LIPID PANEL: CPT | Performed by: INTERNAL MEDICINE

## 2024-03-22 PROCEDURE — 36415 COLL VENOUS BLD VENIPUNCTURE: CPT | Performed by: INTERNAL MEDICINE

## 2024-03-22 PROCEDURE — 82803 BLOOD GASES ANY COMBINATION: CPT

## 2024-03-22 PROCEDURE — 99223 1ST HOSP IP/OBS HIGH 75: CPT | Mod: ,,, | Performed by: INTERNAL MEDICINE

## 2024-03-22 PROCEDURE — 83036 HEMOGLOBIN GLYCOSYLATED A1C: CPT | Performed by: INTERNAL MEDICINE

## 2024-03-22 PROCEDURE — 82330 ASSAY OF CALCIUM: CPT

## 2024-03-22 PROCEDURE — 36600 WITHDRAWAL OF ARTERIAL BLOOD: CPT

## 2024-03-22 PROCEDURE — 80053 COMPREHEN METABOLIC PANEL: CPT | Performed by: INTERNAL MEDICINE

## 2024-03-22 PROCEDURE — 82746 ASSAY OF FOLIC ACID SERUM: CPT | Performed by: INTERNAL MEDICINE

## 2024-03-22 PROCEDURE — 82607 VITAMIN B-12: CPT | Performed by: INTERNAL MEDICINE

## 2024-03-22 PROCEDURE — 84484 ASSAY OF TROPONIN QUANT: CPT | Performed by: INTERNAL MEDICINE

## 2024-03-22 PROCEDURE — 27000221 HC OXYGEN, UP TO 24 HOURS

## 2024-03-22 PROCEDURE — 83735 ASSAY OF MAGNESIUM: CPT | Performed by: INTERNAL MEDICINE

## 2024-03-22 PROCEDURE — 84100 ASSAY OF PHOSPHORUS: CPT | Performed by: INTERNAL MEDICINE

## 2024-03-22 PROCEDURE — 82140 ASSAY OF AMMONIA: CPT | Performed by: INTERNAL MEDICINE

## 2024-03-22 PROCEDURE — 94799 UNLISTED PULMONARY SVC/PX: CPT

## 2024-03-22 PROCEDURE — 85025 COMPLETE CBC W/AUTO DIFF WBC: CPT | Performed by: INTERNAL MEDICINE

## 2024-03-22 PROCEDURE — 85014 HEMATOCRIT: CPT

## 2024-03-22 PROCEDURE — 93005 ELECTROCARDIOGRAM TRACING: CPT | Performed by: INTERNAL MEDICINE

## 2024-03-22 PROCEDURE — 99900031 HC PATIENT EDUCATION (STAT)

## 2024-03-22 PROCEDURE — 99900035 HC TECH TIME PER 15 MIN (STAT)

## 2024-03-22 RX ORDER — IBUPROFEN 200 MG
16 TABLET ORAL
Status: DISCONTINUED | OUTPATIENT
Start: 2024-03-22 | End: 2024-03-26 | Stop reason: HOSPADM

## 2024-03-22 RX ORDER — TALC
6 POWDER (GRAM) TOPICAL NIGHTLY PRN
Status: DISCONTINUED | OUTPATIENT
Start: 2024-03-22 | End: 2024-03-26 | Stop reason: HOSPADM

## 2024-03-22 RX ORDER — ACETAMINOPHEN 325 MG/1
650 TABLET ORAL EVERY 4 HOURS PRN
Status: DISCONTINUED | OUTPATIENT
Start: 2024-03-22 | End: 2024-03-26 | Stop reason: HOSPADM

## 2024-03-22 RX ORDER — MORPHINE SULFATE 2 MG/ML
2 INJECTION, SOLUTION INTRAMUSCULAR; INTRAVENOUS EVERY 4 HOURS PRN
Status: DISCONTINUED | OUTPATIENT
Start: 2024-03-22 | End: 2024-03-26 | Stop reason: HOSPADM

## 2024-03-22 RX ORDER — NITROGLYCERIN 0.4 MG/1
0.4 TABLET SUBLINGUAL EVERY 5 MIN PRN
Status: DISCONTINUED | OUTPATIENT
Start: 2024-03-22 | End: 2024-03-26 | Stop reason: HOSPADM

## 2024-03-22 RX ORDER — IBUPROFEN 200 MG
24 TABLET ORAL
Status: DISCONTINUED | OUTPATIENT
Start: 2024-03-22 | End: 2024-03-26 | Stop reason: HOSPADM

## 2024-03-22 RX ORDER — GLUCAGON 1 MG
1 KIT INJECTION
Status: DISCONTINUED | OUTPATIENT
Start: 2024-03-22 | End: 2024-03-26 | Stop reason: HOSPADM

## 2024-03-22 RX ORDER — MUPIROCIN 20 MG/G
OINTMENT TOPICAL 2 TIMES DAILY
Status: DISCONTINUED | OUTPATIENT
Start: 2024-03-22 | End: 2024-03-26 | Stop reason: HOSPADM

## 2024-03-22 RX ORDER — FOLIC ACID 1 MG/1
1 TABLET ORAL DAILY
Status: DISCONTINUED | OUTPATIENT
Start: 2024-03-22 | End: 2024-03-26 | Stop reason: HOSPADM

## 2024-03-22 RX ORDER — INSULIN ASPART 100 [IU]/ML
0-5 INJECTION, SOLUTION INTRAVENOUS; SUBCUTANEOUS
Status: DISCONTINUED | OUTPATIENT
Start: 2024-03-22 | End: 2024-03-26 | Stop reason: HOSPADM

## 2024-03-22 RX ORDER — LORAZEPAM 1 MG/1
2 TABLET ORAL EVERY 4 HOURS PRN
Status: DISCONTINUED | OUTPATIENT
Start: 2024-03-22 | End: 2024-03-26 | Stop reason: HOSPADM

## 2024-03-22 RX ORDER — THIAMINE HCL 100 MG
100 TABLET ORAL DAILY
Status: DISCONTINUED | OUTPATIENT
Start: 2024-03-22 | End: 2024-03-26 | Stop reason: HOSPADM

## 2024-03-22 RX ORDER — ONDANSETRON HYDROCHLORIDE 2 MG/ML
4 INJECTION, SOLUTION INTRAVENOUS EVERY 8 HOURS PRN
Status: DISCONTINUED | OUTPATIENT
Start: 2024-03-22 | End: 2024-03-26 | Stop reason: HOSPADM

## 2024-03-22 RX ORDER — SODIUM CHLORIDE 0.9 % (FLUSH) 0.9 %
10 SYRINGE (ML) INJECTION EVERY 12 HOURS PRN
Status: DISCONTINUED | OUTPATIENT
Start: 2024-03-22 | End: 2024-03-26 | Stop reason: HOSPADM

## 2024-03-22 RX ORDER — NALOXONE HCL 0.4 MG/ML
0.02 VIAL (ML) INJECTION
Status: DISCONTINUED | OUTPATIENT
Start: 2024-03-22 | End: 2024-03-26 | Stop reason: HOSPADM

## 2024-03-22 RX ORDER — HEPARIN SODIUM,PORCINE/D5W 25000/250
0-40 INTRAVENOUS SOLUTION INTRAVENOUS CONTINUOUS
Status: DISCONTINUED | OUTPATIENT
Start: 2024-03-22 | End: 2024-03-23

## 2024-03-22 RX ADMIN — ACETAMINOPHEN 650 MG: 325 TABLET ORAL at 08:03

## 2024-03-22 RX ADMIN — HEPARIN SODIUM 12 UNITS/KG/HR: 10000 INJECTION, SOLUTION INTRAVENOUS at 05:03

## 2024-03-22 RX ADMIN — THIAMINE HCL TAB 100 MG 100 MG: 100 TAB at 05:03

## 2024-03-22 RX ADMIN — FOLIC ACID 1 MG: 1 TABLET ORAL at 05:03

## 2024-03-22 RX ADMIN — MULTIVITAMIN TABLET 1 TABLET: TABLET at 05:03

## 2024-03-22 RX ADMIN — MUPIROCIN 1 G: 20 OINTMENT TOPICAL at 08:03

## 2024-03-22 NOTE — NURSING
"Patient arrived via EMS from [facility]. Patient oriented to self and place but unable to state why she came to the hospital. Kept answering with "[facility], uh."  Dr. Freeman notified of patients arrival and disorientation. MD to place orders.   "

## 2024-03-22 NOTE — PROVIDER TRANSFER
"   Outside Transfer Acceptance Note / Regional Referral Center    Referring facility: [facility]   Referring provider: DANISH HENDERSON  Accepting facility: [facility]  Accepting provider: REYNA LENTZ ADAM M  Admitting provider: Dr. Maldonado  Reason for transfer: Established Provider   Transfer diagnosis: NSTEMI  Transfer specialty requested: Cardiology  Transfer specialty notified: No  Transfer level: NUMBER 1-5: 2  Bed type requested: med-tele  Isolation status: No active isolations   Admission class or status: IP- Inpatient      Narrative     73 year old female with PMHx of asthma and pulmonary disease, CHF fell at home and presented to the ED after. ED did CT which is neg, however found to have some more HUGO and elevated troponin 170.7. Referring facility reports that pt is established with Dr. Bocanegra at [facility] and requesting transfer for cardiology evaluation of NSTEMI given significant cardiac history. Vitals show:  Temp: 98.5  Pulse: 90  RR: 19  BP: 183/90  O2 sat: 93 3L NC   Meds given: NS, LOVENOX, LASIX, ASPIRIN , ZOFRAN     Objective     Vitals:    Recent Labs: All pertinent labs within the past 24 hours have been reviewed.  CBC: No results for input(s): "WBC", "HGB", "HCT", "PLT" in the last 48 hours.  CMP: No results for input(s): "NA", "K", "CL", "CO2", "GLU", "BUN", "CREATININE", "CALCIUM", "PROT", "ALBUMIN", "BILITOT", "ALKPHOS", "AST", "ALT", "ANIONGAP", "EGFRNONAA" in the last 48 hours.    Invalid input(s): "ESTGFAFRICA"  Cardiac Markers: No results for input(s): "CKMB", "MYOGLOBIN", "BNP", "TROPISTAT" in the last 48 hours.  Recent imaging:    Airway:     Vent settings:         IV access:    Infusions: N/A  Allergies:   Review of patient's allergies indicates:   Allergen Reactions    Metformin Diarrhea     Diarrhea      Corn Itching     Other reaction(s): Sneezing  Other reaction(s): Rhinorrhea    Potato starch Hives     Other reaction(s): " Sneezing  Other reaction(s): Rhinorrhea    Pravastatin Other (See Comments)     Muscle pain    Statins-hmg-coa reductase inhibitors Other (See Comments)    Hydrochlorothiazide Other (See Comments)     weakness    Welchol [colesevelam] Other (See Comments)     Weakness       NPO: No    Anticoagulation:   Anticoagulants       None             Instructions      [facility]  Admit to Hospital Medicine  Upon patient arrival to floor, please contact Hospital Medicine on call.   Consult cardiology

## 2024-03-22 NOTE — ASSESSMENT & PLAN NOTE
Patient's COPD is controlled currently.  Patient is currently off COPD Pathway.  Justice womack.  We will monitor respiratory status

## 2024-03-22 NOTE — ASSESSMENT & PLAN NOTE
Patient presented to outside hospital for chest pain dyspnea on exertion, troponins elevated x2.  We will repeat troponins here.  Labs pending.  Lipid panel pending.  A1c pending.  We will start on heparin drip.  Consulted cardiology.  We will perform echo.  Nitro and morphine as needed     Rotator Cuff Injury Exercises   AMBULATORY CARE:   What you need to know about rotator cuff injury exercises:  Exercises help improve shoulder movement and strength, and decrease pain  Your physical therapist or healthcare provider will tell you when to start doing the exercises  He or she will tell you how often to do them  You will need to start slowly to prevent more injury  You will move through several levels over time as you get stronger and more flexible  Safety guidelines:   · Always warm up before you do the exercises  Walk or ride a stationary bike for 5 to 10 minutes to help you warm up  · Do not put your arm over your head until directed  You will need to wait until your injury has healed  The movement of some exercises could continue until your arm is over your head  You will need to stop the movement where directed  · Stop if you feel pain  You may feel some tight or stiff areas when you start  This should get better as you continue the exercises  You should not feel pain  Pain means you are not ready to do the exercise yet  Stop and call your physical therapist or healthcare provider right away  · Always work both rotator cuffs  Even if your injury is only on 1 side, it is important to do each exercise on both sides  This helps prevent injury and maintain balance in your shoulders and back  · Posture is important  Your physical therapist or healthcare provider will show you the proper posture for each exercise  You will be shown how to pull your shoulders back and down to engage the correct muscles  Remember not to let your shoulders shrug during an exercise unless it is part of the movement  How to do stretching exercises: You will not feel every exercise in your shoulder area  You may feel some of the stretches in your back, side, or upper arm muscles  You need to work muscles in and around your rotator cuffs and down your arms  This helps stabilize your shoulders   Your physical therapist or healthcare provider will tell you how long to hold each stretch  He or she will also tell you how many times to repeat each stretch during an exercise session  You may be told to do only certain exercises, or to do them in a specific order  The following are general directions to help you remember the exercises you are taught:  · Pendulum swings:  Lean forward and rest your arm on a table  Do not round your back or lock your knees during the exercise  Let your other arm hang freely by your side  Gently swing your free arm forward and backward, side to side, and in circles  · Crossover arm stretch:  Relax your shoulders  Hold your upper arm with the opposite hand  Pull your arm across your chest until you feel a stretch  Hold the stretch  Return to the starting position  · Sleeper stretch:  Lie on your side on a firm, flat surface  Bend the elbow of your top arm 90°  Use your other hand to slowly push your arm down  Stop when you feel a stretch at the back of your shoulder  Hold the stretch  Slowly return to the starting position  · Shoulder movement, up and down: This exercise may also be called shoulder extension  Sit in a chair that has a back but no armrests  Raise your arm like you are reaching for the wall in front of you  Continue to raise the arm until it is over your head, or as high as directed  Bring your arm back down to your side  Bring it back as far as possible behind your body  Return your arm to the starting position  · Shoulder movement, side to side: These movements may be called flexion, internal rotation, and external rotation  Sit in a chair that has a back but no armrests  Raise your arm to the side and then up over your head as far as directed  Return your arm to your side  Bring your arm across the front of your body and reach for the opposite shoulder  Return your arm to the starting position           · Shoulder rolls:  Stand and raise both shoulders toward your ears  Lower them to the starting position  Relax your shoulders  Pull your shoulders back  Then relax them again  Roll your shoulders in a smooth [place]  Then roll your shoulders in a smooth [place] in the other direction  · Wall reach exercise: This may also be called a flexion stretch  Stand facing a wall  Slowly walk your fingers up the wall until you feel a stretch  Hold the stretch  Return to the starting position  · Arm reach exercise:  Lie on your back with your legs straight  Reach your arms like you are trying to touch the ceiling  Reach as high as you can so you feel a stretch in the back of your arms  Hold the stretch  Then lower your arms to your sides  · Elbow bends:  Stand with your arms down to your sides  Keep your palm facing forward  Bend your elbow and try to touch your shoulder with your fingertips  Return your arm to the starting position  · Up the back stretch:  Stand and put both arms behind your back  Put one hand under the other  Move the bottom hand and slowly push the upper hand up toward your head  You should feel a stretch in the front of your arm and shoulder  Be careful not to push too hard  Hold the stretch  Then return to the starting position  · Triceps stretch:  Stand and drop your forearm down your back so your hand is pointing to the ground behind you  Your elbow should be pointing at the ceiling  Take your other hand and place it on your elbow  Gently and slowly push on the elbow until you feel a stretch in the back of your arm  Hold the stretch  Let go of your elbow and relax your arm  You may be shown how to do this stretch with a towel  The towel can be pulled gently with a hand behind your back at waist level  How to do strengthening exercises:  Strengthening exercises may include handheld weights or resistance bands   Your physical therapist or healthcare provider will tell you how much weight or resistance to use  The general guide is to use light weights or low resistance and to do a high number of repetitions  You may be told to do only certain exercises, or to do them in a specific order  The following are general directions to help you remember the exercises you are taught:  · Scapular squeeze:  Stand with your arms at your sides  Squeeze your shoulder blades together and hold this position  Then relax the muscles  Keep your shoulder back during the entire exercise  · Wall pushups: This exercise is similar to a pushup done on the ground  The goal is to use your back and shoulder muscles to bring your upper body toward and away from the wall  Stand facing a wall  Put your hands on the wall  Bend your elbows to bring your upper body toward the wall  Straighten your arms to return to the starting position  Keep your feet close enough to the wall that you do not take a step when you bend your elbows  · Standing row with exercise band:  Wrap the exercise band around a heavy, stable object at waist level  Make loop in the end of the band to create a handle, if needed  Hold the handle or loop and pull the band straight back toward your hip  Keep your shoulder down  Squeeze your shoulder blade  Hold this position  Then slowly return to the starting position  · External rotation with arm abducted 90 degrees:  Wrap the exercise band around a heavy, stable object at waist level  Make loop in the end of the band to create a handle, if needed  Stand and hold the handle or loop  Bend your elbow and raise your arm to shoulder height  Keep your arm in this position  Raise your hand like you are pointing at the ceiling  Slowly return to the starting position  You may also be shown how to do this exercise lying down and with a weight  · Shoulder abduction with weight:  Stand and hold a weight in your hand with your palm facing your body   Slowly raise your arm to the side with your thumb pointing up  Then raise your arm as far as you can without pain  Hold this position  Then return to the starting position  · Shoulder abduction with exercise band:  Wrap the exercise band around a heavy, stable object near your foot  Grab the band  Keep your arm straight  Slowly raise your arm to the side with your thumb pointing up  Then, slowly pull the band as far as you can without pain  Slowly return to the starting position  · Shoulder adduction with weight:  Lie on your back on a firm surface  Hold a weight in your hand at your shoulder  Slowly raise your arm toward the ceiling and straighten your elbow  Hold this position  Then slowly return to the starting position  · Shoulder adduction with exercise band:  Wrap the exercise band around a heavy, stable object  Stand and face away from where the band is anchored  Hold each end of the band in both hands with your elbows bent  Your elbows should not be behind your body  Keep your arms parallel to the floor and slowly straighten your elbows  Hold this position  Slowly return to the starting position  Call your doctor or physical therapist if:   · You have sharp or worsening pain during exercise or at rest     · You have questions or concerns about your rotator cuff injury exercises  © Copyright [address] [place] [address] Information is for End User's use only and may not be sold, redistributed or otherwise used for commercial purposes  All illustrations and images included in CareNotes® are the copyrighted property of [employer]  or [facility] Clarissa Oneal   The above information is an  only  It is not intended as medical advice for individual conditions or treatments  Talk to your doctor, nurse or pharmacist before following any medical regimen to see if it is safe and effective for you    Exercises for Internal and External Shoulder Rotation   AMBULATORY CARE:   Muscles worked during internal and external shoulder exercises:  Exercises for internal shoulder rotation work the muscles in your chest and front of your shoulder  Exercises for external shoulder rotation work the muscles in the back of your shoulder and upper back  Contact your healthcare provider if:   · You have sharp or worsening pain during exercise or at rest     · You have questions or concerns about your shoulder exercises  Before you exercise:  Warm up and stretch before you exercise  Walk or ride a stationary bike for 5 to 10 minutes to help you warm up  Stretching helps increase range of motion  It may also decrease muscle soreness and help prevent another injury  Your healthcare provider will tell you which of the following stretches to do:  · Crossover arm stretch:  Relax your shoulders  Hold your upper arm with the opposite hand  Pull your arm across your chest until you feel a stretch  Hold the stretch for 30 seconds  Return to the starting position  · Shoulder flexion stretch:  Stand facing a wall  Slowly walk your fingers up the wall until you feel a stretch  Hold the stretch for 30 seconds  Return to the starting position  · Sleeper stretch:  Lie on your injured side on a firm, flat surface  Bend the elbow of your injured arm 90° with your hand facing up  Use your arm that is not injured to slowly push your injured arm down  Stop when you feel a stretch at the back of your injured shoulder  Hold the stretch for 30 seconds  Slowly return to the starting position  How to exercise with a weight:  Your healthcare provider will tell you how much weight to exercise with  · Shoulder internal rotation:  Sit in a chair  Place a rolled up towel between your elbow and your side  Bend your elbow to 90°  Gently squeeze the towel with your elbow to prevent it from falling out  Hold the weight with your thumb pointing up  Slowly move the weight across your chest  Stop when your hand reaches your opposite arm   Hold this position for as many seconds as directed  Slowly return to the starting position  · Shoulder external rotation:  Lie on your side with your injured shoulder facing up  Bend your elbow 90°  Place a rolled up towel between your elbow and your side  Hold a weight in your hand  Gently squeeze the towel with your elbow to prevent it from falling out  Slowly rotate your arm outward, but keep your elbow bent  Stop when you feel a stretch  Hold this position for 30 seconds or as directed  Slowly return to the starting position  How to exercise with an exercise band:   · Shoulder internal rotation:  Tie one end of the exercise band to a heavy, secure object  Sit in a chair  Place a rolled up towel between your elbow and your side  Bend your elbow to 90°  Gently squeeze the towel with your elbow to prevent it from falling out  Slowly pull the band across your chest  Stop when your hand reaches your opposite arm  Hold this position for as many seconds as directed  Slowly return to the starting position  · Shoulder external rotation:  Hold one end of the exercise band on the side that is not injured  Place a rolled up towel between your elbow and your side  Bend your elbow 90°  Squeeze the towel with your elbow  Grab the end of the band and slowly turn your arm outward, but keep your elbow bent  Stop when you feel a stretch  Hold this position for 30 seconds or as directed  Slowly return to the starting position  Follow up with your physical therapist as directed:  Write down your questions so you remember to ask them at your visits  © Copyright [address] Information is for End User's use only and may not be sold, redistributed or otherwise used for commercial purposes  All illustrations and images included in CareNotes® are the copyrighted property of [employer]  or [facility] Clarissa Oneal   The above information is an  only  It is not intended as medical advice for individual conditions or treatments  Talk to your doctor, nurse or pharmacist before following any medical regimen to see if it is safe and effective for you

## 2024-03-22 NOTE — HPI
Ms. Larose is a 73-year-old who presented to outside hospital for dyspnea and chest pain.  That time, patient had troponins drawn that were over 200 and 170.  She was sent here for further care for NSTEMI.  This time, patient is confused.  She is oriented to person and time.  She has not oriented to place and president.  She states she has a lot of medical problems.  She told the nurses that she came in for back pain.  She told me she came in for nausea.  This time, she denies any chest pain.  Patient then states that she did go to the ED for chest pain after we asked her that specifically.  Unsure what patient's baseline is at this time.  No family with her.  Per the ED note, she came in for dyspnea and had a recent fall.  Workup revealed elevated troponins.

## 2024-03-22 NOTE — H&P
Pending sale to [facility]  History & Physical    Patient Name: Tereza Larose  MRN: [id number]  Patient Class: IP- Inpatient  Admission Date: 3/22/2024  Attending Physician: Amador Freeman MD    Primary Care Provider: Evan Sánchez MD         Patient information was obtained from ER records.     Subjective:     Principal Problem:<principal problem not specified>    Chief Complaint:   Chief Complaint   Patient presents with    Chest Pain        HPI: Ms. Larose is a 73-year-old who presented to outside hospital for dyspnea and chest pain.  That time, patient had troponins drawn that were over 200 and 170.  She was sent here for further care for NSTEMI.  This time, patient is confused.  She is oriented to person and time.  She has not oriented to place and president.  She states she has a lot of medical problems.  She told the nurses that she came in for back pain.  She told me she came in for nausea.  This time, she denies any chest pain.  Patient then states that she did go to the ED for chest pain after we asked her that specifically.  Unsure what patient's baseline is at this time.  No family with her.  Per the ED note, she came in for dyspnea and had a recent fall.  Workup revealed elevated troponins.    Past Medical History:   Diagnosis Date    Abnormal EKG 9/26/2012    Allergy     Arthritis     Asthma     Brain bleed     Colon polyps 11/6/2020    COPD (chronic obstructive pulmonary disease)     Depression     Diabetes mellitus type II     Diverticulitis     Fatty liver     GERD (gastroesophageal reflux disease)     Goiter     s/p thyroidectomy    Heart murmur     Hemiparesis affecting right side as late effect of cerebrovascular accident (CVA) 7/26/2022    Hernia of abdominal wall     History of intracranial hemorrhage 6/15/2013    History of non-ST elevation myocardial infarction (NSTEMI) 6/15/2013    Hyperlipidemia     Hypertension     Lactic acidosis 1/11/2020    Metabolic syndrome  6/14/2012    Myocardial infarction     On home oxygen therapy     states uses 2L at night or when sat < 90    Pacemaker     Positive FIT (fecal immunochemical test) 11/6/2020    Severe persistent asthma without complication 4/30/2020    Statin intolerance     Stroke 2012    left hand shaky, loss of balance       Past Surgical History:   Procedure Laterality Date    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/25/2017    Dr Griggs    ADRENALECTOMY      AORTIC VALVE REPLACEMENT  12/09/2016    ARTERIOGRAPHY OF AORTIC ROOT N/A 9/12/2022    Procedure: ARTERIOGRAM, AORTIC ROOT;  Surgeon: Marko Batres MD;  Location: [facility] CATH/EP LAB;  Service: Cardiology;  Laterality: N/A;    arthroscopy lt knee Right     BLADDER REPAIR      sling    BREAST BIOPSY Bilateral     benign    COLONOSCOPY  2004    10 year recheck    COLONOSCOPY N/A 11/6/2020    Procedure: COLONOSCOPY;  Surgeon: Yakelin Lowery MD;  Location: [facility] ENDO;  Service: Endoscopy;  Laterality: N/A;    CORONARY ANGIOGRAPHY INCLUDING BYPASS GRAFTS WITH CATHETERIZATION OF LEFT HEART Left 9/12/2022    Procedure: Left heart cath including bypass graft, with left heart catheterization;  Surgeon: Marko Batres MD;  Location: [facility] CATH/EP LAB;  Service: Cardiology;  Laterality: Left;    CORONARY ARTERY BYPASS GRAFT  12/09/2016    X3    EPIDURAL STEROID INJECTION INTO LUMBAR SPINE N/A 7/27/2021    Procedure: Injection-steroid-epidural-lumbar;  Surgeon: Valerio Jay MD;  Location: [facility] OR;  Service: Pain Management;  Laterality: N/A;  L5-S1     HYSTERECTOMY      INSERTION OF PACEMAKER Left 1/13/2020    Procedure: INSERTION, PACEMAKER;  Surgeon: Marko Batres MD;  Location: [facility] CATH/EP LAB;  Service: Cardiology;  Laterality: Left;    OOPHORECTOMY      RIGHT HEART CATHETERIZATION Right 9/12/2022    Procedure: INSERTION, CATHETER, RIGHT HEART;  Surgeon: Marko Batres MD;  Location: [facility] CATH/EP LAB;  Service: Cardiology;  Laterality: Right;    THYROIDECTOMY         Review of patient's  allergies indicates:   Allergen Reactions    Metformin Diarrhea     Diarrhea      Corn Itching     Other reaction(s): Sneezing  Other reaction(s): Rhinorrhea    Potato starch Hives     Other reaction(s): Sneezing  Other reaction(s): Rhinorrhea    Pravastatin Other (See Comments)     Muscle pain    Statins-hmg-coa reductase inhibitors Other (See Comments)    Hydrochlorothiazide Other (See Comments)     weakness    Welchol [colesevelam] Other (See Comments)     Weakness        No current facility-administered medications on file prior to encounter.     Current Outpatient Medications on File Prior to Encounter   Medication Sig    albuterol (PROVENTIL) 2.5 mg /3 mL (0.083 %) nebulizer solution Take 3 mLs (2.5 mg total) by nebulization every 4 (four) hours as needed for Shortness of Breath or Wheezing.    albuterol (PROVENTIL/VENTOLIN HFA) 90 mcg/actuation inhaler Inhale 1-2 puffs into the lungs every 4 (four) hours as needed for Wheezing. Inhale 2 puffs by mouth into lungs every 4 hours as needed    alcohol swabs (BD ALCOHOL SWABS) PadM Apply 1 each topically as needed.    amLODIPine (NORVASC) 10 MG tablet TAKE 1 TABLET BY MOUTH EVERY DAY FOR SYSTOLIC BLOOD PRESSURE GREATER THAN 120    arformoteroL (BROVANA) 15 mcg/2 mL Nebu Take 2 mLs (15 mcg total) by nebulization 2 (two) times a day. Controller    aspirin (ECOTRIN) 81 MG EC tablet Take 81 mg by mouth once daily.    bempedoic acid (NEXLETOL) 180 mg Tab Take 1 tablet by mouth once daily.    blood glucose control, low (TRUE METRIX LEVEL 1) Soln Check glucometer accuracy at least once a week    blood sugar diagnostic Strp True Metrix Test Strips test once a day    blood-glucose meter (TRUE METRIX GLUCOSE METER) Misc True Metrix Glucose Meter    budesonide (PULMICORT) 0.5 mg/2 mL nebulizer solution Take 2 mLs (0.5 mg total) by nebulization 2 (two) times daily. Controller    cholecalciferol, vitamin D3, (VITAMIN D3) 25 mcg (1,000 unit) capsule Take 1,000 Units by mouth  once daily.    colchicine, gout, (COLCRYS) 0.6 mg tablet Take 1 tablet (0.6 mg total) by mouth once daily. Day 1: 1.2 mg at the first sign of flare, followed by 0.6 mg after 1 hour; maximum total dose: 1.8 mg/day on day Day 2 and thereafter: 0.6 mg once or twice daily until flare resolves    cyanocobalamin (VITAMIN B-12) 1000 MCG tablet Take 100 mcg by mouth once daily.    evolocumab (REPATHA SYRINGE) 140 mg/mL Syrg Inject 140 mg into the skin every 14 (fourteen) days.     glyBURIDE (DIABETA) 2.5 MG tablet Take 2.5 mg by mouth 2 (two) times daily.    lancets 33 gauge Misc 1 lancet by Misc.(Non-Drug; Combo Route) route once daily.    levocetirizine (XYZAL) 5 MG tablet Take 1 tablet (5 mg total) by mouth every evening.    levothyroxine (SYNTHROID) 150 MCG tablet Take 150 mcg by mouth once daily.    metoprolol succinate (TOPROL-XL) 50 MG 24 hr tablet Take 1 tablet (50 mg total) by mouth once daily.    potassium chloride (MICRO-K) 8 mEq CpSR Take 8 mEq by mouth.    predniSONE (DELTASONE) 10 mg tablet pack Take by mouth once daily. Take 3 tablets daily on day #1-2, Take 2 tablets daily on days #3-4, Take 1 tablet daily on day #5-6, then stop taking  Dispense: 12 tablet; Refill: 0    predniSONE (DELTASONE) 20 MG tablet Take one daily for 3 days and may repeat for shortness of breath.    REPATHA SURECLICK 140 mg/mL PnIj SMARTSI pre-filled pen syringe SUB-Q Every 2 Weeks    revefenacin (YUPELRI) 175 mcg/3 mL Nebu Inhale 1 Dose into the lungs Daily.    SITagliptin (JANUVIA) 50 MG Tab Take 1 tablet (50 mg total) by mouth once daily.    sotaloL (BETAPACE) 80 MG tablet Take 80 mg by mouth 2 (two) times daily.    torsemide (DEMADEX) 20 MG Tab Take 20 mg by mouth.    vitamin E 400 UNIT capsule Take 400 Units by mouth once daily.     Family History       Problem Relation (Age of Onset)    Aneurysm Maternal Grandfather    Arthritis Mother    Asthma Sister, Son    COPD Son    Cancer Paternal Aunt    Depression Son    Diabetes  Mother, Sister, Son, Maternal Uncle    Heart disease Mother, Father, Maternal Uncle, Paternal Aunt, Paternal Uncle, Paternal Grandfather    Hypertension Mother, Father, Sister, Son, Paternal Aunt, Maternal Grandmother    Mental illness Father, Paternal Aunt, Maternal Grandmother, Paternal Grandmother    Stroke Son          Tobacco Use    Smoking status: Never    Smokeless tobacco: Never   Substance and Sexual Activity    Alcohol use: Not Currently     Comment: seldom    Drug use: No    Sexual activity: Never     Review of Systems   Unable to perform ROS: Mental status change     Objective:     Vital Signs (Most Recent):    Vital Signs (24h Range):  Temp:  [98.1 °F (36.7 °C)] 98.1 °F (36.7 °C)  Pulse:  [94] 94  Resp:  [23] 23  SpO2:  [96 %] 96 %  BP: (166)/(86) 166/86        There is no height or weight on file to calculate BMI.     Physical Exam  Constitutional:       Appearance: Normal appearance.   HENT:      Head: Normocephalic and atraumatic.      Right Ear: External ear normal.      Left Ear: External ear normal.      Nose: Nose normal.      Mouth/Throat:      Mouth: Mucous membranes are moist.      Pharynx: Oropharynx is clear.   Eyes:      Extraocular Movements: Extraocular movements intact.      Conjunctiva/sclera: Conjunctivae normal.   Cardiovascular:      Rate and Rhythm: Normal rate and regular rhythm.      Pulses: Normal pulses.      Heart sounds: Normal heart sounds. No murmur heard.     No gallop.   Pulmonary:      Effort: Pulmonary effort is normal. No respiratory distress.      Breath sounds: Normal breath sounds. No wheezing, rhonchi or rales.   Abdominal:      General: Abdomen is flat. There is no distension.      Palpations: Abdomen is soft.      Tenderness: There is no abdominal tenderness.   Musculoskeletal:         General: No swelling. Normal range of motion.      Cervical back: Normal range of motion and neck supple.   Skin:     General: Skin is warm and dry.   Neurological:      General: No  focal deficit present.      Mental Status: She is alert.      Comments: Oriented to person and time                Significant Labs: All pertinent labs within the past 24 hours have been reviewed.    Significant Imaging: I have reviewed all pertinent imaging results/findings within the past 24 hours.  Assessment/Plan:     COPD (chronic obstructive pulmonary disease)  Patient's COPD is controlled currently.  Patient is currently off COPD Pathway.  DuoNebs p.r.n..  We will monitor respiratory status    Congestive heart failure, unspecified HF chronicity, unspecified heart failure type  Patient is identified as having Diastolic (HFpEF) heart failure that is Chronic. CHF is currently controlled. Latest ECHO performed and demonstrates- Results for orders placed during the hospital encounter of 09/09/22    Echo    Interpretation Summary  · The left ventricle is normal in size with normal systolic function.  · Grade II left ventricular diastolic dysfunction.  · The estimated PA systolic pressure is 75 mmHg.  · Normal right ventricular size with normal right ventricular systolic function.  · There is moderate to severe pulmonary hypertension.  · Normal central venous pressure (3 mmHg).  · Moderate left atrial enlargement.  · Moderate tricuspid regurgitation.  · Moderate-to-severe mitral regurgitation.  · The estimated ejection fraction is 60%.  · Mild-to-moderate aortic regurgitation.  · There is moderate aortic valve stenosis.  · Aortic valve area is 0.72 cm2; peak velocity is m/s; mean gradient is 20 mmHg.  .  We will restart home medications after med rec is performed.  No evidence of exacerbation at this time.  We will consult Cardiology for other reasons.    Type 2 diabetes mellitus with circulatory disorder, without long-term current use of insulin  Sliding scale insulin, adjust as needed.  A1c pending.      Hypertension associated with diabetes  Continue home medications when reconciliation complete      Other chest  pain  Patient presented to outside hospital for chest pain dyspnea on exertion, troponins elevated x2.  We will repeat troponins here.  Labs pending.  Lipid panel pending.  A1c pending.  We will start on heparin drip.  Consulted cardiology.  We will perform echo.  Nitro and morphine as needed      Hyperlipidemia associated with type 2 diabetes mellitus  Lipid panel pending, we will restart home medications once reconciliation complete      Acute encephalopathy  Unsure what baseline is, order B12, folic acid, TSH.  We will monitor at this time. CT brain      VTE Risk Mitigation (From admission, onward)           Ordered     heparin 25,000 units in dextrose 5% (100 units/ml) IV bolus from bag LOW INTENSITY nomogram - [facility]  As needed (PRN)        Question:  Heparin Infusion Adjustment (DO NOT MODIFY ANSWER)  Answer:  [email or web address]\Images\Pharmacy\HeparinInfusions\heparin LOW INTENSITY nomogram for [facility] [id number]    03/22/24 1612     heparin 25,000 units in dextrose 5% (100 units/ml) IV bolus from bag LOW INTENSITY nomogram - [facility]  As needed (PRN)        Question:  Heparin Infusion Adjustment (DO NOT MODIFY ANSWER)  Answer:  [email or web address]\Images\Pharmacy\HeparinInfusions\heparin LOW INTENSITY nomogram for [facility] [id number]    03/22/24 1612     heparin 25,000 units in dextrose 5% (100 units/ml) IV bolus from bag LOW INTENSITY nomogram - [facility]  Once        Question:  Heparin Infusion Adjustment (DO NOT MODIFY ANSWER)  Answer:  [email or web address]\epic\Images\Pharmacy\HeparinInfusions\heparin LOW INTENSITY nomogram for [facility] [id number]    03/22/24 1612     heparin 25,000 units in dextrose 5% 250 mL (100 units/mL) infusion LOW INTENSITY nomogram - [facility]  Continuous        Question:  Begin at (units/kg/hr)  Answer:  12    03/22/24 1612     IP VTE HIGH RISK PATIENT  Once         03/22/24 1556     Place sequential compression device  Until discontinued         03/22/24 1556                                    Amador Freeman Jr,  MD  Department of Hospital Medicine  [facility] hospital

## 2024-03-22 NOTE — NURSING
Nurses Note -- 4 Eyes      3/22/2024   4:23 PM      Skin assessed during: Admit      [x] No Altered Skin Integrity Present    [x]Prevention Measures Documented      [] Yes- Altered Skin Integrity Present or Discovered   [] LDA Added if Not in Epic (Describe Wound)   [] New Altered Skin Integrity was Present on Admit and Documented in LDA   [] Wound Image Taken    Wound Care Consulted? No    Attending Nurse:   Shreya COLIN      Second RN/Staff Member:   Reshma BRIONES RN

## 2024-03-22 NOTE — ASSESSMENT & PLAN NOTE
Patient with known CAD s/p CABG, which is uncontrolled Will continue  heparin gtt and Statin and monitor for S/Sx of angina/ACS. Continue to monitor on telemetry.   Troponins remained elevated but stable   Cardiology consult pending

## 2024-03-22 NOTE — NURSING
"Unable to complete patients med rec due to patient not aware of her medications.  at bedside and stated "they are in the computer, I do not know what she takes".  "

## 2024-03-22 NOTE — ASSESSMENT & PLAN NOTE
Patient is identified as having Diastolic (HFpEF) heart failure that is Chronic. CHF is currently controlled. Latest ECHO performed and demonstrates- Results for orders placed during the hospital encounter of 09/09/22    Echo    Interpretation Summary  · The left ventricle is normal in size with normal systolic function.  · Grade II left ventricular diastolic dysfunction.  · The estimated PA systolic pressure is 75 mmHg.  · Normal right ventricular size with normal right ventricular systolic function.  · There is moderate to severe pulmonary hypertension.  · Normal central venous pressure (3 mmHg).  · Moderate left atrial enlargement.  · Moderate tricuspid regurgitation.  · Moderate-to-severe mitral regurgitation.  · The estimated ejection fraction is 60%.  · Mild-to-moderate aortic regurgitation.  · There is moderate aortic valve stenosis.  · Aortic valve area is 0.72 cm2; peak velocity is m/s; mean gradient is 20 mmHg.  .  We will restart home medications after med rec is performed.  No evidence of exacerbation at this time.  We will consult Cardiology for other reasons.

## 2024-03-22 NOTE — SUBJECTIVE & OBJECTIVE
Past Medical History:   Diagnosis Date    Abnormal EKG 9/26/2012    Allergy     Arthritis     Asthma     Brain bleed     Colon polyps 11/6/2020    COPD (chronic obstructive pulmonary disease)     Depression     Diabetes mellitus type II     Diverticulitis     Fatty liver     GERD (gastroesophageal reflux disease)     Goiter     s/p thyroidectomy    Heart murmur     Hemiparesis affecting right side as late effect of cerebrovascular accident (CVA) 7/26/2022    Hernia of abdominal wall     History of intracranial hemorrhage 6/15/2013    History of non-ST elevation myocardial infarction (NSTEMI) 6/15/2013    Hyperlipidemia     Hypertension     Lactic acidosis 1/11/2020    Metabolic syndrome 6/14/2012    Myocardial infarction     On home oxygen therapy     states uses 2L at night or when sat < 90    Pacemaker     Positive FIT (fecal immunochemical test) 11/6/2020    Severe persistent asthma without complication 4/30/2020    Statin intolerance     Stroke 2012    left hand shaky, loss of balance       Past Surgical History:   Procedure Laterality Date    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/25/2017    Dr Griggs    ADRENALECTOMY      AORTIC VALVE REPLACEMENT  12/09/2016    ARTERIOGRAPHY OF AORTIC ROOT N/A 9/12/2022    Procedure: ARTERIOGRAM, AORTIC ROOT;  Surgeon: Marko Batres MD;  Location: [facility] CATH/EP LAB;  Service: Cardiology;  Laterality: N/A;    arthroscopy lt knee Right     BLADDER REPAIR      sling    BREAST BIOPSY Bilateral     benign    COLONOSCOPY  2004    10 year recheck    COLONOSCOPY N/A 11/6/2020    Procedure: COLONOSCOPY;  Surgeon: Yakelin Lowery MD;  Location: [facility];  Service: Endoscopy;  Laterality: N/A;    CORONARY ANGIOGRAPHY INCLUDING BYPASS GRAFTS WITH CATHETERIZATION OF LEFT HEART Left 9/12/2022    Procedure: Left heart cath including bypass graft, with left heart catheterization;  Surgeon: Marko Batres MD;  Location: [facility] CATH/EP LAB;  Service: Cardiology;  Laterality: Left;    CORONARY ARTERY  BYPASS GRAFT  12/09/2016    X3    EPIDURAL STEROID INJECTION INTO LUMBAR SPINE N/A 7/27/2021    Procedure: Injection-steroid-epidural-lumbar;  Surgeon: Valerio Jay MD;  Location: [facility] OR;  Service: Pain Management;  Laterality: N/A;  L5-S1     HYSTERECTOMY      INSERTION OF PACEMAKER Left 1/13/2020    Procedure: INSERTION, PACEMAKER;  Surgeon: Marko Batres MD;  Location: [facility] CATH/EP LAB;  Service: Cardiology;  Laterality: Left;    OOPHORECTOMY      RIGHT HEART CATHETERIZATION Right 9/12/2022    Procedure: INSERTION, CATHETER, RIGHT HEART;  Surgeon: Marko Batres MD;  Location: [facility] CATH/EP LAB;  Service: Cardiology;  Laterality: Right;    THYROIDECTOMY         Review of patient's allergies indicates:   Allergen Reactions    Metformin Diarrhea     Diarrhea      Corn Itching     Other reaction(s): Sneezing  Other reaction(s): Rhinorrhea    Potato starch Hives     Other reaction(s): Sneezing  Other reaction(s): Rhinorrhea    Pravastatin Other (See Comments)     Muscle pain    Statins-hmg-coa reductase inhibitors Other (See Comments)    Hydrochlorothiazide Other (See Comments)     weakness    Welchol [colesevelam] Other (See Comments)     Weakness        No current facility-administered medications on file prior to encounter.     Current Outpatient Medications on File Prior to Encounter   Medication Sig    albuterol (PROVENTIL) 2.5 mg /3 mL (0.083 %) nebulizer solution Take 3 mLs (2.5 mg total) by nebulization every 4 (four) hours as needed for Shortness of Breath or Wheezing.    albuterol (PROVENTIL/VENTOLIN HFA) 90 mcg/actuation inhaler Inhale 1-2 puffs into the lungs every 4 (four) hours as needed for Wheezing. Inhale 2 puffs by mouth into lungs every 4 hours as needed    alcohol swabs (BD ALCOHOL SWABS) PadM Apply 1 each topically as needed.    amLODIPine (NORVASC) 10 MG tablet TAKE 1 TABLET BY MOUTH EVERY DAY FOR SYSTOLIC BLOOD PRESSURE GREATER THAN 120    arformoteroL (BROVANA) 15 mcg/2 mL Nebu Take 2 mLs  (15 mcg total) by nebulization 2 (two) times a day. Controller    aspirin (ECOTRIN) 81 MG EC tablet Take 81 mg by mouth once daily.    bempedoic acid (NEXLETOL) 180 mg Tab Take 1 tablet by mouth once daily.    blood glucose control, low (TRUE METRIX LEVEL 1) Soln Check glucometer accuracy at least once a week    blood sugar diagnostic Strp True Metrix Test Strips test once a day    blood-glucose meter (TRUE METRIX GLUCOSE METER) Misc True Metrix Glucose Meter    budesonide (PULMICORT) 0.5 mg/2 mL nebulizer solution Take 2 mLs (0.5 mg total) by nebulization 2 (two) times daily. Controller    cholecalciferol, vitamin D3, (VITAMIN D3) 25 mcg (1,000 unit) capsule Take 1,000 Units by mouth once daily.    colchicine, gout, (COLCRYS) 0.6 mg tablet Take 1 tablet (0.6 mg total) by mouth once daily. Day 1: 1.2 mg at the first sign of flare, followed by 0.6 mg after 1 hour; maximum total dose: 1.8 mg/day on day Day 2 and thereafter: 0.6 mg once or twice daily until flare resolves    cyanocobalamin (VITAMIN B-12) 1000 MCG tablet Take 100 mcg by mouth once daily.    evolocumab (REPATHA SYRINGE) 140 mg/mL Syrg Inject 140 mg into the skin every 14 (fourteen) days.     glyBURIDE (DIABETA) 2.5 MG tablet Take 2.5 mg by mouth 2 (two) times daily.    lancets 33 gauge Misc 1 lancet by Misc.(Non-Drug; Combo Route) route once daily.    levocetirizine (XYZAL) 5 MG tablet Take 1 tablet (5 mg total) by mouth every evening.    levothyroxine (SYNTHROID) 150 MCG tablet Take 150 mcg by mouth once daily.    metoprolol succinate (TOPROL-XL) 50 MG 24 hr tablet Take 1 tablet (50 mg total) by mouth once daily.    potassium chloride (MICRO-K) 8 mEq CpSR Take 8 mEq by mouth.    predniSONE (DELTASONE) 10 mg tablet pack Take by mouth once daily. Take 3 tablets daily on day #1-2, Take 2 tablets daily on days #3-4, Take 1 tablet daily on day #5-6, then stop taking  Dispense: 12 tablet; Refill: 0    predniSONE (DELTASONE) 20 MG tablet Take one daily for 3  days and may repeat for shortness of breath.    REPATHA SURECLICK 140 mg/mL PnIj SMARTSI pre-filled pen syringe SUB-Q Every 2 Weeks    revefenacin (YUPELRI) 175 mcg/3 mL Nebu Inhale 1 Dose into the lungs Daily.    SITagliptin (JANUVIA) 50 MG Tab Take 1 tablet (50 mg total) by mouth once daily.    sotaloL (BETAPACE) 80 MG tablet Take 80 mg by mouth 2 (two) times daily.    torsemide (DEMADEX) 20 MG Tab Take 20 mg by mouth.    vitamin E 400 UNIT capsule Take 400 Units by mouth once daily.     Family History       Problem Relation (Age of Onset)    Aneurysm Maternal Grandfather    Arthritis Mother    Asthma Sister, Son    COPD Son    Cancer Paternal Aunt    Depression Son    Diabetes Mother, Sister, Son, Maternal Uncle    Heart disease Mother, Father, Maternal Uncle, Paternal Aunt, Paternal Uncle, Paternal Grandfather    Hypertension Mother, Father, Sister, Son, Paternal Aunt, Maternal Grandmother    Mental illness Father, Paternal Aunt, Maternal Grandmother, Paternal Grandmother    Stroke Son          Tobacco Use    Smoking status: Never    Smokeless tobacco: Never   Substance and Sexual Activity    Alcohol use: Not Currently     Comment: seldom    Drug use: No    Sexual activity: Never     Review of Systems   Unable to perform ROS: Mental status change     Objective:     Vital Signs (Most Recent):    Vital Signs (24h Range):  Temp:  [98.1 °F (36.7 °C)] 98.1 °F (36.7 °C)  Pulse:  [94] 94  Resp:  [23] 23  SpO2:  [96 %] 96 %  BP: (166)/(86) 166/86        There is no height or weight on file to calculate BMI.     Physical Exam  Constitutional:       Appearance: Normal appearance.   HENT:      Head: Normocephalic and atraumatic.      Right Ear: External ear normal.      Left Ear: External ear normal.      Nose: Nose normal.      Mouth/Throat:      Mouth: Mucous membranes are moist.      Pharynx: Oropharynx is clear.   Eyes:      Extraocular Movements: Extraocular movements intact.      Conjunctiva/sclera: Conjunctivae  normal.   Cardiovascular:      Rate and Rhythm: Normal rate and regular rhythm.      Pulses: Normal pulses.      Heart sounds: Normal heart sounds. No murmur heard.     No gallop.   Pulmonary:      Effort: Pulmonary effort is normal. No respiratory distress.      Breath sounds: Normal breath sounds. No wheezing, rhonchi or rales.   Abdominal:      General: Abdomen is flat. There is no distension.      Palpations: Abdomen is soft.      Tenderness: There is no abdominal tenderness.   Musculoskeletal:         General: No swelling. Normal range of motion.      Cervical back: Normal range of motion and neck supple.   Skin:     General: Skin is warm and dry.   Neurological:      General: No focal deficit present.      Mental Status: She is alert.      Comments: Oriented to person and time                Significant Labs: All pertinent labs within the past 24 hours have been reviewed.    Significant Imaging: I have reviewed all pertinent imaging results/findings within the past 24 hours.

## 2024-03-23 ENCOUNTER — CLINICAL SUPPORT (OUTPATIENT)
Dept: CARDIOLOGY | Facility: HOSPITAL | Age: 74
DRG: 280 | End: 2024-03-23
Attending: INTERNAL MEDICINE
Payer: MEDICARE

## 2024-03-23 VITALS — HEIGHT: 62 IN | BODY MASS INDEX: 35.83 KG/M2 | WEIGHT: 194.69 LBS

## 2024-03-23 LAB
ALBUMIN SERPL BCP-MCNC: 3.5 G/DL (ref 3.5–5.2)
ALP SERPL-CCNC: 59 U/L (ref 55–135)
ALT SERPL W/O P-5'-P-CCNC: 6 U/L (ref 10–44)
ANION GAP SERPL CALC-SCNC: 7 MMOL/L (ref 8–16)
APTT PPP: 52.9 SEC (ref 21–32)
APTT PPP: 55 SEC (ref 21–32)
APTT PPP: 73.2 SEC (ref 21–32)
AST SERPL-CCNC: 18 U/L (ref 10–40)
AV INDEX (PROSTH): 0.48
AV MEAN GRADIENT: 16 MMHG
AV PEAK GRADIENT: 29 MMHG
AV VALVE AREA BY VELOCITY RATIO: 0.98 CM²
AV VALVE AREA: 0.97 CM²
AV VELOCITY RATIO: 0.49
BASOPHILS # BLD AUTO: 0.04 K/UL (ref 0–0.2)
BASOPHILS # BLD AUTO: 0.04 K/UL (ref 0–0.2)
BASOPHILS NFR BLD: 0.3 % (ref 0–1.9)
BASOPHILS NFR BLD: 0.3 % (ref 0–1.9)
BILIRUB SERPL-MCNC: 0.9 MG/DL (ref 0.1–1)
BNP SERPL-MCNC: 1806 PG/ML (ref 0–99)
BSA FOR ECHO PROCEDURE: 1.97 M2
BUN SERPL-MCNC: 34 MG/DL (ref 8–23)
CALCIUM SERPL-MCNC: 8.7 MG/DL (ref 8.7–10.5)
CHLORIDE SERPL-SCNC: 91 MMOL/L (ref 95–110)
CO2 SERPL-SCNC: 40 MMOL/L (ref 23–29)
CREAT SERPL-MCNC: 1.6 MG/DL (ref 0.5–1.4)
CV ECHO LV RWT: 0.43 CM
DIFFERENTIAL METHOD BLD: ABNORMAL
DIFFERENTIAL METHOD BLD: ABNORMAL
DOP CALC AO PEAK VEL: 2.68 M/S
DOP CALC AO VTI: 43.7 CM
DOP CALC LVOT AREA: 2 CM2
DOP CALC LVOT DIAMETER: 1.6 CM
DOP CALC LVOT PEAK VEL: 1.31 M/S
DOP CALC LVOT STROKE VOLUME: 42.4 CM3
DOP CALC MV VTI: 43.1 CM
DOP CALCLVOT PEAK VEL VTI: 21.1 CM
E WAVE DECELERATION TIME: 250 MSEC
E/A RATIO: 0.78
E/E' RATIO: 27.4 M/S
ECHO LV POSTERIOR WALL: 1.1 CM (ref 0.6–1.1)
EOSINOPHIL # BLD AUTO: 0 K/UL (ref 0–0.5)
EOSINOPHIL # BLD AUTO: 0 K/UL (ref 0–0.5)
EOSINOPHIL NFR BLD: 0 % (ref 0–8)
EOSINOPHIL NFR BLD: 0 % (ref 0–8)
ERYTHROCYTE [DISTWIDTH] IN BLOOD BY AUTOMATED COUNT: 14.2 % (ref 11.5–14.5)
ERYTHROCYTE [DISTWIDTH] IN BLOOD BY AUTOMATED COUNT: 14.2 % (ref 11.5–14.5)
EST. GFR  (NO RACE VARIABLE): 33.8 ML/MIN/1.73 M^2
ESTIMATED AVG GLUCOSE: 180 MG/DL (ref 68–131)
FRACTIONAL SHORTENING: 38 % (ref 28–44)
GLUCOSE SERPL-MCNC: 150 MG/DL (ref 70–110)
GLUCOSE SERPL-MCNC: 161 MG/DL (ref 70–110)
GLUCOSE SERPL-MCNC: 199 MG/DL (ref 70–110)
GLUCOSE SERPL-MCNC: 211 MG/DL (ref 70–110)
HBA1C MFR BLD: 7.9 % (ref 4.5–6.2)
HCT VFR BLD AUTO: 43.4 % (ref 37–48.5)
HCT VFR BLD AUTO: 43.4 % (ref 37–48.5)
HGB BLD-MCNC: 13.6 G/DL (ref 12–16)
HGB BLD-MCNC: 13.6 G/DL (ref 12–16)
IMM GRANULOCYTES # BLD AUTO: 0.09 K/UL (ref 0–0.04)
IMM GRANULOCYTES # BLD AUTO: 0.09 K/UL (ref 0–0.04)
IMM GRANULOCYTES NFR BLD AUTO: 0.6 % (ref 0–0.5)
IMM GRANULOCYTES NFR BLD AUTO: 0.6 % (ref 0–0.5)
INTERVENTRICULAR SEPTUM: 1.1 CM (ref 0.6–1.1)
IVC DIAMETER: 2.39 CM
LEFT ATRIUM SIZE: 4 CM
LEFT INTERNAL DIMENSION IN SYSTOLE: 3.19 CM (ref 2.1–4)
LEFT VENTRICLE DIASTOLIC VOLUME INDEX: 67.72 ML/M2
LEFT VENTRICLE DIASTOLIC VOLUME: 128 ML
LEFT VENTRICLE MASS INDEX: 116 G/M2
LEFT VENTRICLE SYSTOLIC VOLUME INDEX: 21.5 ML/M2
LEFT VENTRICLE SYSTOLIC VOLUME: 40.6 ML
LEFT VENTRICULAR INTERNAL DIMENSION IN DIASTOLE: 5.17 CM (ref 3.5–6)
LEFT VENTRICULAR MASS: 218.69 G
LV LATERAL E/E' RATIO: 27.4 M/S
LV SEPTAL E/E' RATIO: 27.4 M/S
LVOT MG: 4 MMHG
LVOT MV: 0.85 CM/S
LYMPHOCYTES # BLD AUTO: 0.7 K/UL (ref 1–4.8)
LYMPHOCYTES # BLD AUTO: 0.7 K/UL (ref 1–4.8)
LYMPHOCYTES NFR BLD: 4.8 % (ref 18–48)
LYMPHOCYTES NFR BLD: 4.8 % (ref 18–48)
MAGNESIUM SERPL-MCNC: 1.6 MG/DL (ref 1.6–2.6)
MCH RBC QN AUTO: 29.8 PG (ref 27–31)
MCH RBC QN AUTO: 29.8 PG (ref 27–31)
MCHC RBC AUTO-ENTMCNC: 31.3 G/DL (ref 32–36)
MCHC RBC AUTO-ENTMCNC: 31.3 G/DL (ref 32–36)
MCV RBC AUTO: 95 FL (ref 82–98)
MCV RBC AUTO: 95 FL (ref 82–98)
MONOCYTES # BLD AUTO: 0.9 K/UL (ref 0.3–1)
MONOCYTES # BLD AUTO: 0.9 K/UL (ref 0.3–1)
MONOCYTES NFR BLD: 6 % (ref 4–15)
MONOCYTES NFR BLD: 6 % (ref 4–15)
MV MEAN GRADIENT: 11 MMHG
MV PEAK A VEL: 1.75 M/S
MV PEAK E VEL: 1.37 M/S
MV PEAK GRADIENT: 19 MMHG
MV STENOSIS PRESSURE HALF TIME: 39 MS
MV VALVE AREA BY CONTINUITY EQUATION: 0.98 CM2
MV VALVE AREA P 1/2 METHOD: 5.64 CM2
NEUTROPHILS # BLD AUTO: 12.9 K/UL (ref 1.8–7.7)
NEUTROPHILS # BLD AUTO: 12.9 K/UL (ref 1.8–7.7)
NEUTROPHILS NFR BLD: 88.3 % (ref 38–73)
NEUTROPHILS NFR BLD: 88.3 % (ref 38–73)
NRBC BLD-RTO: 0 /100 WBC
NRBC BLD-RTO: 0 /100 WBC
PISA MRMAX VEL: 5.47 M/S
PISA TR MAX VEL: 3.45 M/S
PLATELET # BLD AUTO: 170 K/UL (ref 150–450)
PLATELET # BLD AUTO: 170 K/UL (ref 150–450)
PMV BLD AUTO: 9.7 FL (ref 9.2–12.9)
PMV BLD AUTO: 9.7 FL (ref 9.2–12.9)
POTASSIUM SERPL-SCNC: 3.5 MMOL/L (ref 3.5–5.1)
PROT SERPL-MCNC: 6.4 G/DL (ref 6–8.4)
PV MV: 0.57 M/S
PV PEAK GRADIENT: 3 MMHG
PV PEAK VELOCITY: 0.86 M/S
RA PRESSURE ESTIMATED: 15 MMHG
RBC # BLD AUTO: 4.57 M/UL (ref 4–5.4)
RBC # BLD AUTO: 4.57 M/UL (ref 4–5.4)
RIGHT VENTRICULAR END-DIASTOLIC DIMENSION: 4.21 CM
RV TB RVSP: 18 MMHG
RV TISSUE DOPPLER FREE WALL SYSTOLIC VELOCITY 1 (APICAL 4 CHAMBER VIEW): 7.29 CM/S
SODIUM SERPL-SCNC: 138 MMOL/L (ref 136–145)
TDI LATERAL: 0.05 M/S
TDI SEPTAL: 0.05 M/S
TDI: 0.05 M/S
TR MAX PG: 48 MMHG
TRICUSPID ANNULAR PLANE SYSTOLIC EXCURSION: 1.56 CM
TROPONIN I SERPL HS-MCNC: 136.9 PG/ML (ref 0–14.9)
TROPONIN I SERPL HS-MCNC: 157.3 PG/ML (ref 0–14.9)
TV REST PULMONARY ARTERY PRESSURE: 63 MMHG
WBC # BLD AUTO: 14.65 K/UL (ref 3.9–12.7)
WBC # BLD AUTO: 14.65 K/UL (ref 3.9–12.7)
Z-SCORE OF LEFT VENTRICULAR DIMENSION IN END DIASTOLE: -0.23
Z-SCORE OF LEFT VENTRICULAR DIMENSION IN END SYSTOLE: -0.17

## 2024-03-23 PROCEDURE — 63600175 PHARM REV CODE 636 W HCPCS: Performed by: INTERNAL MEDICINE

## 2024-03-23 PROCEDURE — 93306 TTE W/DOPPLER COMPLETE: CPT | Mod: 26,,, | Performed by: INTERNAL MEDICINE

## 2024-03-23 PROCEDURE — 94640 AIRWAY INHALATION TREATMENT: CPT

## 2024-03-23 PROCEDURE — 93306 TTE W/DOPPLER COMPLETE: CPT

## 2024-03-23 PROCEDURE — 27000221 HC OXYGEN, UP TO 24 HOURS

## 2024-03-23 PROCEDURE — 99900035 HC TECH TIME PER 15 MIN (STAT)

## 2024-03-23 PROCEDURE — 83880 ASSAY OF NATRIURETIC PEPTIDE: CPT

## 2024-03-23 PROCEDURE — 97162 PT EVAL MOD COMPLEX 30 MIN: CPT

## 2024-03-23 PROCEDURE — 87186 SC STD MICRODIL/AGAR DIL: CPT | Performed by: NURSE PRACTITIONER

## 2024-03-23 PROCEDURE — 97530 THERAPEUTIC ACTIVITIES: CPT

## 2024-03-23 PROCEDURE — 85730 THROMBOPLASTIN TIME PARTIAL: CPT | Mod: 91 | Performed by: INTERNAL MEDICINE

## 2024-03-23 PROCEDURE — 25000003 PHARM REV CODE 250: Performed by: INTERNAL MEDICINE

## 2024-03-23 PROCEDURE — 97166 OT EVAL MOD COMPLEX 45 MIN: CPT

## 2024-03-23 PROCEDURE — 84484 ASSAY OF TROPONIN QUANT: CPT | Performed by: INTERNAL MEDICINE

## 2024-03-23 PROCEDURE — 99900031 HC PATIENT EDUCATION (STAT)

## 2024-03-23 PROCEDURE — 36415 COLL VENOUS BLD VENIPUNCTURE: CPT | Performed by: INTERNAL MEDICINE

## 2024-03-23 PROCEDURE — 87154 CUL TYP ID BLD PTHGN 6+ TRGT: CPT | Performed by: NURSE PRACTITIONER

## 2024-03-23 PROCEDURE — 85730 THROMBOPLASTIN TIME PARTIAL: CPT | Performed by: INTERNAL MEDICINE

## 2024-03-23 PROCEDURE — 94760 N-INVAS EAR/PLS OXIMETRY 1: CPT

## 2024-03-23 PROCEDURE — B24BZZ4 ULTRASONOGRAPHY OF HEART WITH AORTA, TRANSESOPHAGEAL: ICD-10-PCS | Performed by: INTERNAL MEDICINE

## 2024-03-23 PROCEDURE — 21400001 HC TELEMETRY ROOM

## 2024-03-23 PROCEDURE — 84484 ASSAY OF TROPONIN QUANT: CPT | Mod: 91 | Performed by: INTERNAL MEDICINE

## 2024-03-23 PROCEDURE — 94761 N-INVAS EAR/PLS OXIMETRY MLT: CPT

## 2024-03-23 PROCEDURE — 83735 ASSAY OF MAGNESIUM: CPT | Performed by: INTERNAL MEDICINE

## 2024-03-23 PROCEDURE — 63600175 PHARM REV CODE 636 W HCPCS: Mod: UD

## 2024-03-23 PROCEDURE — 87040 BLOOD CULTURE FOR BACTERIA: CPT | Mod: 59 | Performed by: NURSE PRACTITIONER

## 2024-03-23 PROCEDURE — 25000242 PHARM REV CODE 250 ALT 637 W/ HCPCS: Performed by: NURSE PRACTITIONER

## 2024-03-23 PROCEDURE — 87077 CULTURE AEROBIC IDENTIFY: CPT | Performed by: NURSE PRACTITIONER

## 2024-03-23 PROCEDURE — 99233 SBSQ HOSP IP/OBS HIGH 50: CPT | Mod: ,,, | Performed by: INTERNAL MEDICINE

## 2024-03-23 PROCEDURE — 80053 COMPREHEN METABOLIC PANEL: CPT | Performed by: INTERNAL MEDICINE

## 2024-03-23 PROCEDURE — 85025 COMPLETE CBC W/AUTO DIFF WBC: CPT | Performed by: INTERNAL MEDICINE

## 2024-03-23 RX ORDER — AMLODIPINE BESYLATE 5 MG/1
10 TABLET ORAL DAILY
Status: DISCONTINUED | OUTPATIENT
Start: 2024-03-23 | End: 2024-03-23

## 2024-03-23 RX ORDER — BUDESONIDE 0.5 MG/2ML
0.5 INHALANT ORAL 2 TIMES DAILY
Status: DISCONTINUED | OUTPATIENT
Start: 2024-03-23 | End: 2024-03-26 | Stop reason: HOSPADM

## 2024-03-23 RX ORDER — ENOXAPARIN SODIUM 100 MG/ML
1 INJECTION SUBCUTANEOUS EVERY 12 HOURS
Status: DISCONTINUED | OUTPATIENT
Start: 2024-03-23 | End: 2024-03-24

## 2024-03-23 RX ORDER — METOPROLOL SUCCINATE 25 MG/1
25 TABLET, EXTENDED RELEASE ORAL DAILY
Status: DISCONTINUED | OUTPATIENT
Start: 2024-03-23 | End: 2024-03-26 | Stop reason: HOSPADM

## 2024-03-23 RX ADMIN — THIAMINE HCL TAB 100 MG 100 MG: 100 TAB at 09:03

## 2024-03-23 RX ADMIN — MUPIROCIN 1 G: 20 OINTMENT TOPICAL at 09:03

## 2024-03-23 RX ADMIN — FOLIC ACID 1 MG: 1 TABLET ORAL at 09:03

## 2024-03-23 RX ADMIN — BUDESONIDE 0.5 MG: 0.5 INHALANT RESPIRATORY (INHALATION) at 09:03

## 2024-03-23 RX ADMIN — MUPIROCIN 1 G: 20 OINTMENT TOPICAL at 08:03

## 2024-03-23 RX ADMIN — ENOXAPARIN SODIUM 90 MG: 100 INJECTION SUBCUTANEOUS at 08:03

## 2024-03-23 RX ADMIN — INSULIN ASPART 0 UNITS: 100 INJECTION, SOLUTION INTRAVENOUS; SUBCUTANEOUS at 09:03

## 2024-03-23 RX ADMIN — MULTIVITAMIN TABLET 1 TABLET: TABLET at 09:03

## 2024-03-23 NOTE — CARE UPDATE
03/23/24 0755   PRE-TX-O2   Device (Oxygen Therapy) nasal cannula with humidification   $ Is the patient on Low Flow Oxygen? Yes   Flow (L/min) 2   SpO2 (!) 92 %   Pulse Oximetry Type Intermittent   $ Pulse Oximetry - Multiple Charge Pulse Oximetry - Multiple   Aerosol Therapy   $ Aerosol Therapy Charges PRN treatment not required   Education   $ Education DME Oxygen;15 min

## 2024-03-23 NOTE — PT/OT/SLP EVAL
Occupational Therapy   Evaluation    Name: Tereza Larose  MRN: [id number]  Admitting Diagnosis: NSTEMI (non-ST elevated myocardial infarction)  Recent Surgery: * No surgery found *      Recommendations:     Discharge Recommendations: Moderate Intensity Therapy  Discharge Equipment Recommendations:  wheelchair, 3-in-1 commode, shower chair, grab bar  Barriers to discharge:  Other (Comment) (Pateint has  at home but requires additional therapy services at this time for a safe transition)    Assessment:     Tereza Larose is a 73 y.o. female with a medical diagnosis of NSTEMI (non-ST elevated myocardial infarction).  She presents agreeable, and cooperative within tolerance to participation in initial evaluation. Performance deficits affecting function: weakness, impaired endurance, impaired self care skills, impaired functional mobility, gait instability, impaired balance, visual deficits, impaired cognition, decreased upper extremity function, decreased safety awareness (Patient forgot that she was in the hospital in [place], [place] during session.).      Rehab Prognosis: Fair; patient would benefit from acute skilled OT services to address these deficits and reach maximum level of function.       Plan:     Patient to be seen 5 x/week to address the above listed problems via self-care/home management, therapeutic activities, therapeutic exercises, cognitive retraining  Plan of Care Expires: 04/22/24  Plan of Care Reviewed with: patient    Subjective     Chief Complaint: Weakness  Patient/Family Comments/goals: To return home under the care of her     Occupational Profile:  Living Environment: Patient lives in a one story home with , that has 5 steps at entrance with no rails. Patient bathroom set-up includes a walk in shower, handheld shower head, and standard commode. Patient reports that  is usually home with her.   Previous level of function: Independent with ADLs, and her/ shared  responsibility of home IADLs.  Roles and Routines:   Equipment Used at Home: none (Per patients report she has no AE/DME)  Assistance upon Discharge: At this time patient will require extensive assistance with ADLs and IADLs, but has  to assist     Pain/Comfort:  Pain Rating 1: 0/10    Patients cultural, spiritual, Religion conflicts given the current situation:      Objective:     Communicated with: RN prior to session.  Patient found supine with peripheral IV upon OT entry to room.    General Precautions: Standard, fall, hearing impaired, pacemaker  Orthopedic Precautions: N/A  Braces: N/A  Respiratory Status: Nasal cannula, flow 2 L/min    Occupational Performance:    Bed Mobility:    Patient completed Rolling/Turning to Left with  maximal assistance  Patient completed Rolling/Turning to Right with maximal assistance  Patient completed Scooting/Bridging with total assistance  Patient completed Supine to Sit with maximal assistance  Patient completed Sit to Supine with maximal assistance    Functional Mobility/Transfers:  Patient unable to complete functional mobility or functional transfers at this time.     Activities of Daily Living:  Feeding:  stand by assistance supine  Grooming: moderate assistance supine  Upper Body Dressing: maximal assistance seated  Lower Body Dressing: total assistance supine    Cognitive/Visual Perceptual:  Cognitive/Psychosocial Skills:     -       Oriented to: Person and Year   -       Follows Commands/attention:Follows one-step commands  -       Memory: Impaired LTM and Poor immediate recall  -       Safety awareness/insight to disability: impaired     Physical Exam:  Balance: -       Patient presents with poor unsupported static sitting balance  Sensation:    -       Intact  Dominant hand: -       Right  Upper Extremity Range of Motion:     -       Right Upper Extremity: Deficits: AROM up to 90 degrees gravity eliminated, tolerated PROM WFL  -       Left Upper  Extremity: Deficits: AROM up to 90 degrees gravity eliminated, tolerated PROM WFL  Upper Extremity Strength:    -       Right Upper Extremity: WFL except 3-/5  -       Left Upper Extremity: WFL except 3-/5   Strength:    -       Right Upper Extremity: WFL  -       Left Upper Extremity: WFL  Fine Motor Coordination:    -       Intact  Left hand, finger to nose and Right hand, finger to nose  Gross motor coordination:   WFL  Neurological: -       Impaired    AMPAC 6 Click ADL:  AMPAC Total Score: 10    Treatment & Education:  Clinician provided treatment/education on sitting balance, bed mobility, joint mobility, safety awareness, energy conservation techniques, ADL compensatory strategies, and POC that she is agreeable to.     Patient left supine with call button in reach    GOALS:   Multidisciplinary Problems       Occupational Therapy Goals          Problem: Occupational Therapy    Goal Priority Disciplines Outcome Interventions   Occupational Therapy Goal     OT, PT/OT     Description: Goals to be met by: 4/22/2023     Patient will increase functional independence with ADLs by performing:    Feeding with Modified [place] seated.   UE Dressing with Stand-by Assistance seated.   Sitting at edge of bed x10 minutes with Stand-by Assistance for improved participation in ADLs.   Supine to sit with Minimal Assistance to improved participation in therapeutic activities OOB.   Increased functional strength to 4/5 for improved independence during bed mobility and ADLs.                         History:     Past Medical History:   Diagnosis Date    Abnormal EKG 9/26/2012    Allergy     Arthritis     Asthma     Brain bleed     Colon polyps 11/6/2020    COPD (chronic obstructive pulmonary disease)     Depression     Diabetes mellitus type II     Diverticulitis     Fatty liver     GERD (gastroesophageal reflux disease)     Goiter     s/p thyroidectomy    Heart murmur     Hemiparesis affecting right side as late effect  of cerebrovascular accident (CVA) 7/26/2022    Hernia of abdominal wall     History of intracranial hemorrhage 6/15/2013    History of non-ST elevation myocardial infarction (NSTEMI) 6/15/2013    Hyperlipidemia     Hypertension     Lactic acidosis 1/11/2020    Metabolic syndrome 6/14/2012    Myocardial infarction     On home oxygen therapy     states uses 2L at night or when sat < 90    Pacemaker     Positive FIT (fecal immunochemical test) 11/6/2020    Severe persistent asthma without complication 4/30/2020    Statin intolerance     Stroke 2012    left hand shaky, loss of balance         Past Surgical History:   Procedure Laterality Date    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/25/2017    Dr Griggs    ADRENALECTOMY      AORTIC VALVE REPLACEMENT  12/09/2016    ARTERIOGRAPHY OF AORTIC ROOT N/A 9/12/2022    Procedure: ARTERIOGRAM, AORTIC ROOT;  Surgeon: Marko Batres MD;  Location: [facility] CATH/EP LAB;  Service: Cardiology;  Laterality: N/A;    arthroscopy lt knee Right     BLADDER REPAIR      sling    BREAST BIOPSY Bilateral     benign    COLONOSCOPY  2004    10 year recheck    COLONOSCOPY N/A 11/6/2020    Procedure: COLONOSCOPY;  Surgeon: Yakelin Lowery MD;  Location: [facility] ENDO;  Service: Endoscopy;  Laterality: N/A;    CORONARY ANGIOGRAPHY INCLUDING BYPASS GRAFTS WITH CATHETERIZATION OF LEFT HEART Left 9/12/2022    Procedure: Left heart cath including bypass graft, with left heart catheterization;  Surgeon: Marko Batres MD;  Location: [facility] CATH/EP LAB;  Service: Cardiology;  Laterality: Left;    CORONARY ARTERY BYPASS GRAFT  12/09/2016    X3    EPIDURAL STEROID INJECTION INTO LUMBAR SPINE N/A 7/27/2021    Procedure: Injection-steroid-epidural-lumbar;  Surgeon: Valerio Jay MD;  Location: [facility] OR;  Service: Pain Management;  Laterality: N/A;  L5-S1     HYSTERECTOMY      INSERTION OF PACEMAKER Left 1/13/2020    Procedure: INSERTION, PACEMAKER;  Surgeon: Marko Batres MD;  Location: [facility] CATH/EP LAB;  Service:  Cardiology;  Laterality: Left;    OOPHORECTOMY      RIGHT HEART CATHETERIZATION Right 9/12/2022    Procedure: INSERTION, CATHETER, RIGHT HEART;  Surgeon: Marko Batres MD;  Location: [facility] CATH/EP LAB;  Service: Cardiology;  Laterality: Right;    THYROIDECTOMY         Time Tracking:     OT Date of Treatment: 03/23/24  OT Start Time: 1116  OT Stop Time: 1135  OT Total Time (min): 19 min    Billable Minutes:Evaluation 10  Therapeutic Activity 9    3/23/2024

## 2024-03-23 NOTE — PROGRESS NOTES
[facility] Medicine  Progress Note    Patient Name: Tereza Larose  MRN: [id number]  Patient Class: IP- Inpatient   Admission Date: 3/22/2024  Length of Stay: 1 days  Attending Physician: Amador Freeman Jr., MD  Primary Care Provider: Evan Sánchez MD        Subjective:     Principal Problem:NSTEMI (non-ST elevated myocardial infarction)        HPI:  Ms. Larose is a 73-year-old who presented to outside hospital for dyspnea and chest pain.  That time, patient had troponins drawn that were over 200 and 170.  She was sent here for further care for NSTEMI.  This time, patient is confused.  She is oriented to person and time.  She has not oriented to place and president.  She states she has a lot of medical problems.  She told the nurses that she came in for back pain.  She told me she came in for nausea.  This time, she denies any chest pain.  Patient then states that she did go to the ED for chest pain after we asked her that specifically.  Unsure what patient's baseline is at this time.  No family with her.  Per the ED note, she came in for dyspnea and had a recent fall.  Workup revealed elevated troponins.    Overview/Hospital Course:  No notes on file    Interval History:  Notes reviewed, no acute events overnight.  Vitals reviewed and patient had fever overnight.  Will obtain chest x-ray, UA and blood cultures this morning.  Patient states she feels much better today.  She is oriented x4.  She states she remembers feeling confused yesterday and just generally not well.  She denies urinary symptoms but does report lower back pain and suprapubic discomfort.  She also reports a productive cough for the past 2 weeks.    Review of Systems   Constitutional:  Negative for chills, fatigue and fever.   HENT:  Negative for congestion, sore throat and trouble swallowing.    Respiratory:  Negative for cough and shortness of breath.    Cardiovascular:  Positive for leg swelling. Negative for  chest pain.   Gastrointestinal:  Negative for abdominal pain, diarrhea, nausea and vomiting.   Genitourinary:  Negative for decreased urine volume, difficulty urinating, dysuria, flank pain and urgency.   Musculoskeletal:  Positive for back pain.   Skin:  Negative for color change, pallor, rash and wound.   Neurological:  Positive for weakness (generalized weakness). Negative for dizziness and light-headedness.   Psychiatric/Behavioral:  Negative for agitation, behavioral problems and confusion.    All other systems reviewed and are negative.    Objective:     Vital Signs (Most Recent):  Temp: 98.2 °F (36.8 °C) (03/23/24 0700)  Pulse: 88 (03/23/24 0700)  Resp: 18 (03/23/24 0300)  BP: (!) 144/72 (03/23/24 0700)  SpO2: (!) 92 % (03/23/24 0755) Vital Signs (24h Range):  Temp:  [98.1 °F (36.7 °C)-102.5 °F (39.2 °C)] 98.2 °F (36.8 °C)  Pulse:  [83-94] 88  Resp:  [18-23] 18  SpO2:  [92 %-98 %] 92 %  BP: (119-172)/(62-86) 144/72     Weight: 88.3 kg (194 lb 10.7 oz)  Body mass index is 35.6 kg/m².    Intake/Output Summary (Last 24 hours) at 3/23/2024 1136  Last data filed at 3/23/2024 0014  Gross per 24 hour   Intake 240 ml   Output --   Net 240 ml         Physical Exam  Vitals and nursing note reviewed.   Constitutional:       General: She is not in acute distress.     Appearance: Normal appearance. She is well-developed. She is not ill-appearing or diaphoretic.   HENT:      Head: Normocephalic and atraumatic.      Right Ear: External ear normal.      Left Ear: External ear normal.      Nose: Nose normal. No congestion or rhinorrhea.      Mouth/Throat:      Mouth: Mucous membranes are moist.      Pharynx: Oropharynx is clear. No oropharyngeal exudate or posterior oropharyngeal erythema.   Eyes:      General: No scleral icterus.     Conjunctiva/sclera: Conjunctivae normal.      Pupils: Pupils are equal, round, and reactive to light.   Neck:      Vascular: No JVD.   Cardiovascular:      Rate and Rhythm: Normal rate and  regular rhythm.      Pulses: Normal pulses.      Heart sounds: Normal heart sounds. No murmur heard.  Pulmonary:      Effort: Pulmonary effort is normal. No respiratory distress.      Breath sounds: Normal breath sounds. No stridor. No wheezing, rhonchi or rales.   Abdominal:      General: Bowel sounds are normal. There is no distension.      Palpations: Abdomen is soft.      Tenderness: There is no abdominal tenderness.   Genitourinary:     Comments: Suprapubic tenderness  Musculoskeletal:         General: No swelling or tenderness. Normal range of motion.      Cervical back: Normal range of motion and neck supple.   Skin:     General: Skin is warm and dry.      Capillary Refill: Capillary refill takes 2 to 3 seconds.      Coloration: Skin is not jaundiced or pale.      Findings: No erythema.   Neurological:      General: No focal deficit present.      Mental Status: She is alert and oriented to person, place, and time.      Cranial Nerves: No cranial nerve deficit.      Sensory: No sensory deficit.      Motor: Weakness (generalized weakness) present.   Psychiatric:         Mood and Affect: Mood normal.         Behavior: Behavior normal.         Thought Content: Thought content normal.             Significant Labs: All pertinent labs within the past 24 hours have been reviewed.  CBC:   Recent Labs   Lab 03/22/24  1619 03/22/24  1630 03/23/24  0606   WBC  --  12.33  12.33 14.65*  14.65*   HGB  --  13.6  13.6 13.6  13.6   HCT 43 42.4  42.4 43.4  43.4   PLT  --  185  185 170  170     CMP:   Recent Labs   Lab 03/22/24  1630 03/23/24  0606    138   K 3.6 3.5   CL 93* 91*   CO2 39* 40*   * 161*   BUN 29* 34*   CREATININE 1.4 1.6*   CALCIUM 8.7 8.7   PROT 6.7 6.4   ALBUMIN 3.7 3.5   BILITOT 0.6 0.9   ALKPHOS 69 59   AST 18 18   ALT 7* 6*   ANIONGAP 7* 7*       Significant Imaging: I have reviewed all pertinent imaging results/findings within the past 24 hours.    Assessment/Plan:      * NSTEMI (non-ST  "elevated myocardial infarction)  Patient presented to outside hospital for chest pain dyspnea on exertion, troponins elevated x2.    Troponins trended and remained elevated but stable  Cardiology consult pending-continue heparin drip for now   Echo pending  Telemetry monitoring      COPD (chronic obstructive pulmonary disease)  Patient's COPD is controlled currently.  Patient is currently off COPD Pathway.  DuoNebs p.r.n..  We will monitor respiratory status    Congestive heart failure, unspecified HF chronicity, unspecified heart failure type  Patient is identified as having Diastolic (HFpEF) heart failure that is Chronic. CHF is currently controlled. Latest ECHO performed and demonstrates- Results for orders placed during the hospital encounter of 09/09/22    Echo    Interpretation Summary  · The left ventricle is normal in size with normal systolic function.  · Grade II left ventricular diastolic dysfunction.  · The estimated PA systolic pressure is 75 mmHg.  · Normal right ventricular size with normal right ventricular systolic function.  · There is moderate to severe pulmonary hypertension.  · Normal central venous pressure (3 mmHg).  · Moderate left atrial enlargement.  · Moderate tricuspid regurgitation.  · Moderate-to-severe mitral regurgitation.  · The estimated ejection fraction is 60%.  · Mild-to-moderate aortic regurgitation.  · There is moderate aortic valve stenosis.  · Aortic valve area is 0.72 cm2; peak velocity is m/s; mean gradient is 20 mmHg.    No evidence of exacerbation at this time.  We will consult Cardiology for other reasons.    Type 2 diabetes mellitus with circulatory disorder, without long-term current use of insulin  Patient's FSGs are controlled on current medication regimen.  Last A1c reviewed-   Lab Results   Component Value Date    HGBA1C 7.9 (H) 03/22/2024     Most recent fingerstick glucose reviewed- No results for input(s): "POCTGLUCOSE" in the last 24 hours.  Current " correctional scale  Low  Maintain anti-hyperglycemic dose as follows-   Antihyperglycemics (From admission, onward)      Start     Stop Route Frequency Ordered    03/22/24 1655  insulin aspart U-100 pen 0-5 Units         -- SubQ Before meals & nightly PRN 03/22/24 1556          Hold Oral hypoglycemics while patient is in the hospital.        Hypertension associated with diabetes  Chronic, controlled.  Latest blood pressure and vitals reviewed-     Temp:  [98.1 °F (36.7 °C)-102.5 °F (39.2 °C)]   Pulse:  [83-94]   Resp:  [18-23]   BP: (119-172)/(62-86)   SpO2:  [92 %-98 %] .   Home meds for hypertension were reviewed and noted below-  Hypertension Medications               amLODIPine (NORVASC) 10 MG tablet TAKE 1 TABLET BY MOUTH EVERY DAY FOR SYSTOLIC BLOOD PRESSURE GREATER THAN 120    metoprolol succinate (TOPROL-XL) 50 MG 24 hr tablet Take 1 tablet (50 mg total) by mouth once daily.    sotaloL (BETAPACE) 80 MG tablet Take 80 mg by mouth 2 (two) times daily.    torsemide (DEMADEX) 20 MG Tab Take 20 mg by mouth.            While in the hospital, will manage blood pressure as follows; Continue home antihypertensive regimen    Will utilize p.r.n. blood pressure medication only if patient's blood pressure greater than 180/110 and she develops symptoms such as worsening chest pain or shortness of breath.        CAD (coronary artery disease)  Patient with known CAD s/p CABG, which is uncontrolled Will continue  heparin gtt and Statin and monitor for S/Sx of angina/ACS. Continue to monitor on telemetry.   Troponins remained elevated but stable   Cardiology consult pending       Hyperlipidemia associated with type 2 diabetes mellitus  Chronic, stable  Lipid panel reviewed  Cont statin        VTE Risk Mitigation (From admission, onward)           Ordered     heparin 25,000 units in dextrose 5% (100 units/ml) IV bolus from bag LOW INTENSITY nomogram - [facility]  As needed (PRN)        Question:  Heparin Infusion Adjustment (DO NOT  MODIFY ANSWER)  Answer:  \\[email or web address]\epic\Images\Pharmacy\HeparinInfusions\heparin LOW INTENSITY nomogram for [facility] [id number]    03/22/24 1612     heparin 25,000 units in dextrose 5% (100 units/ml) IV bolus from bag LOW INTENSITY nomogram - [facility]  As needed (PRN)        Question:  Heparin Infusion Adjustment (DO NOT MODIFY ANSWER)  Answer:  \\[email or web address]\epic\Images\Pharmacy\HeparinInfusions\heparin LOW INTENSITY nomogram for [facility] [id number].pdf    03/22/24 1612     heparin 25,000 units in dextrose 5% 250 mL (100 units/mL) infusion LOW INTENSITY nomogram - [facility]  Continuous        Question:  Begin at (units/kg/hr)  Answer:  12    03/22/24 1612     IP VTE HIGH RISK PATIENT  Once         03/22/24 1556     Place sequential compression device  Until discontinued         03/22/24 1556                    Discharge Planning   FAVIOLA:      Code Status: Full Code   Is the patient medically ready for discharge?:     Reason for patient still in hospital (select all that apply): Treatment and Consult recommendations  Discharge Plan A: Home with family                  Latha Varela NP  Department of Hospital Medicine   [facility]

## 2024-03-23 NOTE — PLAN OF CARE
[facility]  Initial Discharge Assessment       Primary Care Provider: Evan Sánchez MD    Admission Diagnosis: NSTEMI (non-ST elevated myocardial infarction) [I21.4]    Admission Date: 3/22/2024    DC assessment completed with patient at bedside.  Information verified as correct on facesheet.  Patient denies HPOA.  Denies HH/HD/Coumadin.  Uses 3L oxygen at home but does not know what company supplies it.  Also uses a walker. States she needs a transport wheelchair.  DC plan is home. Spouse, Pollo, to provide transport on discharge.        Transition of Care Barriers: None    Payor: [employer] MANAGED MEDICARE / Plan: [employer] MEDICARE PPO / Product Type: Medicare Advantage /     Extended Emergency Contact Information  Primary Emergency Contact: Pollo Larose  Address: [address] [place] [address] [place] [address] [facility]  Mobile Phone: [phone]  Relation: Spouse  Preferred language: English   needed? No    Discharge Plan A: Home with family  Discharge Plan B: Home Health      [id number] - [place] - [address] [facility] [address]  [address]  [place] [address]  Phone: [phone] Fax: [phone]    [id number] - [place] - [address]  [address]  [place] [address]  Phone: [phone] Fax: [phone]    [facility] - [facility] [address] [facility]  [address] [facility] [place] [address]  Phone: [phone] Fax: [phone]      Initial Assessment (most recent)       Adult Discharge Assessment - 03/23/24 1149          Discharge Assessment    Assessment Type Final Discharge Note     Confirmed/corrected address, phone number and insurance Yes     Confirmed Demographics Correct on Facesheet     Source of Information patient     People in Home spouse     Facility Arrived From: home     Do you expect to return to your current living situation? Yes     Do you have help at home or  someone to help you manage your care at home? Yes     Current cognitive status: Alert/Oriented     Equipment Currently Used at Home walker, rolling;oxygen     Readmission within 30 days? No     Do you currently have service(s) that help you manage your care at home? No     Do you take prescription medications? Yes     Do you have prescription coverage? Yes     Do you have any problems affording any of your prescribed medications? No     Is the patient taking medications as prescribed? yes     How do you get to doctors appointments? family or friend will provide     Are you on dialysis? No     Do you take coumadin? No     Discharge Plan A Home with family     Discharge Plan B Home Health     DME Needed Upon Discharge  wheelchair     Discharge Plan discussed with: Patient     Transition of Care Barriers None

## 2024-03-23 NOTE — PLAN OF CARE
CM received secure chat from PT stating they are recommending SNF at discharge.  Currently awaiting OT note.  SNF process started and referrals sent via careport.  Patient stated her spouse is at dialysis.  CM to follow up on patient choice once spouse is here.  CM following.        03/23/24 8545   Post-Acute Status   Post-Acute Authorization Placement   Post-Acute Placement Status Referrals Sent   Discharge Plan   Discharge Plan A Skilled Nursing Facility   Discharge Plan B Home Health

## 2024-03-23 NOTE — SUBJECTIVE & OBJECTIVE
Interval History:  Notes reviewed, no acute events overnight.  Vitals reviewed and patient had fever overnight.  Will obtain chest x-ray, UA and blood cultures this morning.  Patient states she feels much better today.  She is oriented x4.  She states she remembers feeling confused yesterday and just generally not well.  She denies urinary symptoms but does report lower back pain and suprapubic discomfort.  She also reports a productive cough for the past 2 weeks.    Review of Systems   Constitutional:  Negative for chills, fatigue and fever.   HENT:  Negative for congestion, sore throat and trouble swallowing.    Respiratory:  Negative for cough and shortness of breath.    Cardiovascular:  Positive for leg swelling. Negative for chest pain.   Gastrointestinal:  Negative for abdominal pain, diarrhea, nausea and vomiting.   Genitourinary:  Negative for decreased urine volume, difficulty urinating, dysuria, flank pain and urgency.   Musculoskeletal:  Positive for back pain.   Skin:  Negative for color change, pallor, rash and wound.   Neurological:  Positive for weakness (generalized weakness). Negative for dizziness and light-headedness.   Psychiatric/Behavioral:  Negative for agitation, behavioral problems and confusion.    All other systems reviewed and are negative.    Objective:     Vital Signs (Most Recent):  Temp: 98.2 °F (36.8 °C) (03/23/24 0700)  Pulse: 88 (03/23/24 0700)  Resp: 18 (03/23/24 0300)  BP: (!) 144/72 (03/23/24 0700)  SpO2: (!) 92 % (03/23/24 0755) Vital Signs (24h Range):  Temp:  [98.1 °F (36.7 °C)-102.5 °F (39.2 °C)] 98.2 °F (36.8 °C)  Pulse:  [83-94] 88  Resp:  [18-23] 18  SpO2:  [92 %-98 %] 92 %  BP: (119-172)/(62-86) 144/72     Weight: 88.3 kg (194 lb 10.7 oz)  Body mass index is 35.6 kg/m².    Intake/Output Summary (Last 24 hours) at 3/23/2024 1136  Last data filed at 3/23/2024 0014  Gross per 24 hour   Intake 240 ml   Output --   Net 240 ml         Physical Exam  Vitals and nursing note  reviewed.   Constitutional:       General: She is not in acute distress.     Appearance: Normal appearance. She is well-developed. She is not ill-appearing or diaphoretic.   HENT:      Head: Normocephalic and atraumatic.      Right Ear: External ear normal.      Left Ear: External ear normal.      Nose: Nose normal. No congestion or rhinorrhea.      Mouth/Throat:      Mouth: Mucous membranes are moist.      Pharynx: Oropharynx is clear. No oropharyngeal exudate or posterior oropharyngeal erythema.   Eyes:      General: No scleral icterus.     Conjunctiva/sclera: Conjunctivae normal.      Pupils: Pupils are equal, round, and reactive to light.   Neck:      Vascular: No JVD.   Cardiovascular:      Rate and Rhythm: Normal rate and regular rhythm.      Pulses: Normal pulses.      Heart sounds: Normal heart sounds. No murmur heard.  Pulmonary:      Effort: Pulmonary effort is normal. No respiratory distress.      Breath sounds: Normal breath sounds. No stridor. No wheezing, rhonchi or rales.   Abdominal:      General: Bowel sounds are normal. There is no distension.      Palpations: Abdomen is soft.      Tenderness: There is no abdominal tenderness.   Genitourinary:     Comments: Suprapubic tenderness  Musculoskeletal:         General: No swelling or tenderness. Normal range of motion.      Cervical back: Normal range of motion and neck supple.   Skin:     General: Skin is warm and dry.      Capillary Refill: Capillary refill takes 2 to 3 seconds.      Coloration: Skin is not jaundiced or pale.      Findings: No erythema.   Neurological:      General: No focal deficit present.      Mental Status: She is alert and oriented to person, place, and time.      Cranial Nerves: No cranial nerve deficit.      Sensory: No sensory deficit.      Motor: Weakness (generalized weakness) present.   Psychiatric:         Mood and Affect: Mood normal.         Behavior: Behavior normal.         Thought Content: Thought content normal.              Significant Labs: All pertinent labs within the past 24 hours have been reviewed.  CBC:   Recent Labs   Lab 03/22/24  1619 03/22/24  1630 03/23/24  0606   WBC  --  12.33  12.33 14.65*  14.65*   HGB  --  13.6  13.6 13.6  13.6   HCT 43 42.4  42.4 43.4  43.4   PLT  --  185  185 170  170     CMP:   Recent Labs   Lab 03/22/24  1630 03/23/24  0606    138   K 3.6 3.5   CL 93* 91*   CO2 39* 40*   * 161*   BUN 29* 34*   CREATININE 1.4 1.6*   CALCIUM 8.7 8.7   PROT 6.7 6.4   ALBUMIN 3.7 3.5   BILITOT 0.6 0.9   ALKPHOS 69 59   AST 18 18   ALT 7* 6*   ANIONGAP 7* 7*       Significant Imaging: I have reviewed all pertinent imaging results/findings within the past 24 hours.

## 2024-03-23 NOTE — ASSESSMENT & PLAN NOTE
"Patient's FSGs are controlled on current medication regimen.  Last A1c reviewed-   Lab Results   Component Value Date    HGBA1C 7.9 (H) 03/22/2024     Most recent fingerstick glucose reviewed- No results for input(s): "POCTGLUCOSE" in the last 24 hours.  Current correctional scale  Low  Maintain anti-hyperglycemic dose as follows-   Antihyperglycemics (From admission, onward)      Start     Stop Route Frequency Ordered    03/22/24 1655  insulin aspart U-100 pen 0-5 Units         -- SubQ Before meals & nightly PRN 03/22/24 1556          Hold Oral hypoglycemics while patient is in the hospital.      "

## 2024-03-23 NOTE — ASSESSMENT & PLAN NOTE
Patient is identified as having Diastolic (HFpEF) heart failure that is Chronic. CHF is currently controlled. Latest ECHO performed and demonstrates- Results for orders placed during the hospital encounter of 09/09/22    Echo    Interpretation Summary  · The left ventricle is normal in size with normal systolic function.  · Grade II left ventricular diastolic dysfunction.  · The estimated PA systolic pressure is 75 mmHg.  · Normal right ventricular size with normal right ventricular systolic function.  · There is moderate to severe pulmonary hypertension.  · Normal central venous pressure (3 mmHg).  · Moderate left atrial enlargement.  · Moderate tricuspid regurgitation.  · Moderate-to-severe mitral regurgitation.  · The estimated ejection fraction is 60%.  · Mild-to-moderate aortic regurgitation.  · There is moderate aortic valve stenosis.  · Aortic valve area is 0.72 cm2; peak velocity is m/s; mean gradient is 20 mmHg.    No evidence of exacerbation at this time.  We will consult Cardiology for other reasons.

## 2024-03-23 NOTE — ASSESSMENT & PLAN NOTE
Patient presented to outside hospital for chest pain dyspnea on exertion, troponins elevated x2.    Troponins trended and remained elevated but stable  Cardiology consult pending-continue heparin drip for now   Echo pending  Telemetry monitoring

## 2024-03-23 NOTE — ASSESSMENT & PLAN NOTE
Chronic, controlled.  Latest blood pressure and vitals reviewed-     Temp:  [98.1 °F (36.7 °C)-102.5 °F (39.2 °C)]   Pulse:  [83-94]   Resp:  [18-23]   BP: (119-172)/(62-86)   SpO2:  [92 %-98 %] .   Home meds for hypertension were reviewed and noted below-  Hypertension Medications               amLODIPine (NORVASC) 10 MG tablet TAKE 1 TABLET BY MOUTH EVERY DAY FOR SYSTOLIC BLOOD PRESSURE GREATER THAN 120    metoprolol succinate (TOPROL-XL) 50 MG 24 hr tablet Take 1 tablet (50 mg total) by mouth once daily.    sotaloL (BETAPACE) 80 MG tablet Take 80 mg by mouth 2 (two) times daily.    torsemide (DEMADEX) 20 MG Tab Take 20 mg by mouth.            While in the hospital, will manage blood pressure as follows; Continue home antihypertensive regimen    Will utilize p.r.n. blood pressure medication only if patient's blood pressure greater than 180/110 and she develops symptoms such as worsening chest pain or shortness of breath.

## 2024-03-23 NOTE — PT/OT/SLP EVAL
Physical Therapy Evaluation    Patient Name:  Tereza Larose   MRN:  [id number]    Recommendations:     Discharge Recommendations: Moderate Intensity Therapy   Discharge Equipment Recommendations: to be determined by next level of care   Barriers to discharge:  2 person assistance for transfers, unable to ambulate, 5 stairs to enter home    Assessment:     Tereza Larose is a 73 y.o. female admitted with a medical diagnosis of NSTEMI (non-ST elevated myocardial infarction).  She presents with the following impairments/functional limitations: weakness, impaired endurance, impaired self care skills, impaired functional mobility, impaired balance, impaired cognition, decreased upper extremity function, decreased lower extremity function, decreased safety awareness, pain, impaired cardiopulmonary response to activity. Patient is agreeable to participation with PT evaluation. She is oriented to person, place, and year. She states she lives with spouse and uses a SPC for ambulation at baseline. She requires MaxA x2 for supine to sit and Ger for sitting balance. She requires ModA x2 for sit to stand with RW. Patient unable to take side steps despite max encouragement. She reports fatigue and declines further participation and returned to bed with bed alarm on and all needs met.     Rehab Prognosis: Good; patient would benefit from acute skilled PT services to address these deficits and reach maximum level of function.    Recent Surgery: * No surgery found *      Plan:     During this hospitalization, patient to be seen 6 x/week to address the identified rehab impairments via gait training, therapeutic activities, therapeutic exercises and progress toward the following goals:    Plan of Care Expires:  04/23/24    Subjective     Chief Complaint: states her back hurts when standing   Patient/Family Comments/goals: states she wants a [employer], but also says she is a diabetic  Pain/Comfort:  Pain Rating 1: 0/10    Patients  cultural, spiritual, Church conflicts given the current situation:      Living Environment:  Patient lives with spouse in a H with 5 THAI and R rail  Prior to admission, patients level of function was Kai with SPC? She endorses a recent fall at home. She also endorses driving.  Equipment used at home: walker, rolling, rollator, bedside commode, cane, straight, wheelchair, oxygen.  DME owned (not currently used): none.  Upon discharge, patient will have assistance from family.    Objective:     Communicated with JEFFERY Foley prior to session.  Patient found HOB elevated with bed alarm, oxygen, PureWick, peripheral IV, telemetry  upon PT entry to room.    General Precautions: Standard, fall, respiratory  Orthopedic Precautions:N/A   Braces: N/A  Respiratory Status: Nasal cannula, flow 2 L/min    Exams:  RLE Strength: grossly WFL for transfers  LLE Strength: grossly WFL for transfers    Functional Mobility:  Bed Mobility:     Supine to Sit: maximal assistance and of 2 persons  Transfers:     Sit to Stand:  moderate assistance and of 2 persons with rolling walker      AM-PAC 6 CLICK MOBILITY  Total Score:9       Treatment & Education:  Patient was educated on the importance of OOB activity and functional mobility to negate negative effects of prolonged bed rest during hospitalization, safe transfers and ambulation, and D/C planning     Patient left HOB elevated with all lines intact, call button in reach, and bed alarm on.    GOALS:   Multidisciplinary Problems       Physical Therapy Goals          Problem: Physical Therapy    Goal Priority Disciplines Outcome Goal Variances Interventions   Physical Therapy Goal     PT, PT/OT      Description: Goals to be met by: 24    Patient will increase functional independence with mobility by performin. Supine to sit with Supervision  2. Sit to stand transfer with Supervision  3. Bed to chair transfer with Supervision using Rolling Walker  4. Gait  x 50 feet with  Supervision using Rolling Walker.   5. Ascend/descend 5 stairs with R rail with Supervision using no AD  6. Lower extremity exercise program x20 reps per handout, with supervision                       History:     Past Medical History:   Diagnosis Date    Abnormal EKG 9/26/2012    Allergy     Arthritis     Asthma     Brain bleed     Colon polyps 11/6/2020    COPD (chronic obstructive pulmonary disease)     Depression     Diabetes mellitus type II     Diverticulitis     Fatty liver     GERD (gastroesophageal reflux disease)     Goiter     s/p thyroidectomy    Heart murmur     Hemiparesis affecting right side as late effect of cerebrovascular accident (CVA) 7/26/2022    Hernia of abdominal wall     History of intracranial hemorrhage 6/15/2013    History of non-ST elevation myocardial infarction (NSTEMI) 6/15/2013    Hyperlipidemia     Hypertension     Lactic acidosis 1/11/2020    Metabolic syndrome 6/14/2012    Myocardial infarction     On home oxygen therapy     states uses 2L at night or when sat < 90    Pacemaker     Positive FIT (fecal immunochemical test) 11/6/2020    Severe persistent asthma without complication 4/30/2020    Statin intolerance     Stroke 2012    left hand shaky, loss of balance       Past Surgical History:   Procedure Laterality Date    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/25/2017    Dr Griggs    ADRENALECTOMY      AORTIC VALVE REPLACEMENT  12/09/2016    ARTERIOGRAPHY OF AORTIC ROOT N/A 9/12/2022    Procedure: ARTERIOGRAM, AORTIC ROOT;  Surgeon: Marko Batres MD;  Location: [facility] CATH/EP LAB;  Service: Cardiology;  Laterality: N/A;    arthroscopy lt knee Right     BLADDER REPAIR      sling    BREAST BIOPSY Bilateral     benign    COLONOSCOPY  2004    10 year recheck    COLONOSCOPY N/A 11/6/2020    Procedure: COLONOSCOPY;  Surgeon: Yakelin Lowery MD;  Location: [facility];  Service: Endoscopy;  Laterality: N/A;    CORONARY ANGIOGRAPHY INCLUDING BYPASS GRAFTS WITH CATHETERIZATION OF LEFT HEART Left  9/12/2022    Procedure: Left heart cath including bypass graft, with left heart catheterization;  Surgeon: Marko Batres MD;  Location: [facility] CATH/EP LAB;  Service: Cardiology;  Laterality: Left;    CORONARY ARTERY BYPASS GRAFT  12/09/2016    X3    EPIDURAL STEROID INJECTION INTO LUMBAR SPINE N/A 7/27/2021    Procedure: Injection-steroid-epidural-lumbar;  Surgeon: Valerio Jay MD;  Location: [facility] OR;  Service: Pain Management;  Laterality: N/A;  L5-S1     HYSTERECTOMY      INSERTION OF PACEMAKER Left 1/13/2020    Procedure: INSERTION, PACEMAKER;  Surgeon: Marko Batres MD;  Location: [facility] CATH/EP LAB;  Service: Cardiology;  Laterality: Left;    OOPHORECTOMY      RIGHT HEART CATHETERIZATION Right 9/12/2022    Procedure: INSERTION, CATHETER, RIGHT HEART;  Surgeon: Marko Batres MD;  Location: [facility] CATH/EP LAB;  Service: Cardiology;  Laterality: Right;    THYROIDECTOMY         Time Tracking:     PT Received On: 03/23/24  PT Start Time: 1028     PT Stop Time: 1041  PT Total Time (min): 13 min     Billable Minutes: Evaluation 13      03/23/2024

## 2024-03-23 NOTE — CONSULTS
[facility]   Cardiology Note    Consult Requested By: JOSE MIGUEL  Reason for Consult: elevated troponin    SUBJECTIVE:     History of Present Illness: The pt is 72 y/o female pt of . The pt was trasnferred to [facility] from [place] for elevated trop. Confusion. Pmh. CAD/CABG,2022-stent to SVG/RCA , Biot[employer] PPM , AVR, per ER report pt has had some HUGO and recent fall. WBC 14.86 cr 1.6 egfr 33 trop downtrending 136 cxr midl pulm congestion, temp last pm , tmax 102.5   Review of patient's allergies indicates:   Allergen Reactions    Metformin Diarrhea     Diarrhea      Corn Itching     Other reaction(s): Sneezing  Other reaction(s): Rhinorrhea    Potato starch Hives     Other reaction(s): Sneezing  Other reaction(s): Rhinorrhea    Pravastatin Other (See Comments)     Muscle pain    Statins-hmg-coa reductase inhibitors Other (See Comments)    Hydrochlorothiazide Other (See Comments)     weakness    Welchol [colesevelam] Other (See Comments)     Weakness        Past Medical History:   Diagnosis Date    Abnormal EKG 9/26/2012    Allergy     Arthritis     Asthma     Brain bleed     Colon polyps 11/6/2020    COPD (chronic obstructive pulmonary disease)     Depression     Diabetes mellitus type II     Diverticulitis     Fatty liver     GERD (gastroesophageal reflux disease)     Goiter     s/p thyroidectomy    Heart murmur     Hemiparesis affecting right side as late effect of cerebrovascular accident (CVA) 7/26/2022    Hernia of abdominal wall     History of intracranial hemorrhage 6/15/2013    History of non-ST elevation myocardial infarction (NSTEMI) 6/15/2013    Hyperlipidemia     Hypertension     Lactic acidosis 1/11/2020    Metabolic syndrome 6/14/2012    Myocardial infarction     On home oxygen therapy     states uses 2L at night or when sat < 90    Pacemaker     Positive FIT (fecal immunochemical test) 11/6/2020    Severe persistent asthma without complication 4/30/2020    Statin intolerance     Stroke  2012    left hand shaky, loss of balance     Past Surgical History:   Procedure Laterality Date    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/25/2017    Dr Griggs    ADRENALECTOMY      AORTIC VALVE REPLACEMENT  12/09/2016    ARTERIOGRAPHY OF AORTIC ROOT N/A 9/12/2022    Procedure: ARTERIOGRAM, AORTIC ROOT;  Surgeon: Marko Batres MD;  Location: [facility] CATH/EP LAB;  Service: Cardiology;  Laterality: N/A;    arthroscopy lt knee Right     BLADDER REPAIR      sling    BREAST BIOPSY Bilateral     benign    COLONOSCOPY  2004    10 year recheck    COLONOSCOPY N/A 11/6/2020    Procedure: COLONOSCOPY;  Surgeon: Yakelin Lowery MD;  Location: [facility] ENDO;  Service: Endoscopy;  Laterality: N/A;    CORONARY ANGIOGRAPHY INCLUDING BYPASS GRAFTS WITH CATHETERIZATION OF LEFT HEART Left 9/12/2022    Procedure: Left heart cath including bypass graft, with left heart catheterization;  Surgeon: Marko Batres MD;  Location: [facility] CATH/EP LAB;  Service: Cardiology;  Laterality: Left;    CORONARY ARTERY BYPASS GRAFT  12/09/2016    X3    EPIDURAL STEROID INJECTION INTO LUMBAR SPINE N/A 7/27/2021    Procedure: Injection-steroid-epidural-lumbar;  Surgeon: Valerio Jay MD;  Location: [facility] OR;  Service: Pain Management;  Laterality: N/A;  L5-S1     HYSTERECTOMY      INSERTION OF PACEMAKER Left 1/13/2020    Procedure: INSERTION, PACEMAKER;  Surgeon: Marko Batres MD;  Location: [facility] CATH/EP LAB;  Service: Cardiology;  Laterality: Left;    OOPHORECTOMY      RIGHT HEART CATHETERIZATION Right 9/12/2022    Procedure: INSERTION, CATHETER, RIGHT HEART;  Surgeon: Marko Batres MD;  Location: [facility] CATH/EP LAB;  Service: Cardiology;  Laterality: Right;    THYROIDECTOMY       Family History   Problem Relation Age of Onset    Arthritis Mother     Diabetes Mother     Heart disease Mother     Hypertension Mother     Hypertension Father     Heart disease Father     Mental illness Father     Asthma Sister     Diabetes Sister     Hypertension Sister     Asthma Son      pain, hand COPD Son     Depression Son     Diabetes Son     Hypertension Son     Stroke Son     Diabetes Maternal Uncle     Heart disease Maternal Uncle     Mental illness Paternal Aunt     Cancer Paternal Aunt     Heart disease Paternal Aunt     Hypertension Paternal Aunt     Heart disease Paternal Uncle     Hypertension Maternal Grandmother     Mental illness Maternal Grandmother     Aneurysm Maternal Grandfather     Mental illness Paternal Grandmother     Heart disease Paternal Grandfather     Melanoma Neg Hx     Psoriasis Neg Hx     Lupus Neg Hx     Eczema Neg Hx      Social History     Tobacco Use    Smoking status: Never    Smokeless tobacco: Never   Substance Use Topics    Alcohol use: Not Currently     Comment: seldom    Drug use: No       Review of Systems:  ROS    OBJECTIVE:     Vital Signs (Most Recent)  Temp: 98.2 °F (36.8 °C) (03/23/24 0700)  Pulse: 88 (03/23/24 0700)  Resp: 18 (03/23/24 0300)  BP: (!) 144/72 (03/23/24 0700)  SpO2: (!) 92 % (03/23/24 0755)    Vital Signs Range (Last 24H):  Temp:  [98.1 °F (36.7 °C)-102.5 °F (39.2 °C)]   Pulse:  [83-94]   Resp:  [18-23]   BP: (119-172)/(62-86)   SpO2:  [92 %-98 %]     I & O (Last 24H):    Intake/Output Summary (Last 24 hours) at 3/23/2024 1226  Last data filed at 3/23/2024 0014  Gross per 24 hour   Intake 240 ml   Output --   Net 240 ml       Current Diet:     Current Diet Order   Procedures    Diet Cardiac Standard Tray     Order Specific Question:   Tray type:     Answer:   Standard Tray        Allergies:  Review of patient's allergies indicates:   Allergen Reactions    Metformin Diarrhea     Diarrhea      Corn Itching     Other reaction(s): Sneezing  Other reaction(s): Rhinorrhea    Potato starch Hives     Other reaction(s): Sneezing  Other reaction(s): Rhinorrhea    Pravastatin Other (See Comments)     Muscle pain    Statins-hmg-coa reductase inhibitors Other (See Comments)    Hydrochlorothiazide Other (See Comments)     weakness    Welchol [colesevelam] Other  (See Comments)     Weakness        Meds:  Scheduled Meds:   amLODIPine  10 mg Oral Daily    budesonide  0.5 mg Nebulization BID    enoxparin  1 mg/kg Subcutaneous Q12H (prophylaxis, 0900/2100)    folic acid  1 mg Oral Daily    [START ON 3/24/2024] levothyroxine  150 mcg Oral Before breakfast    metoprolol succinate  25 mg Oral Daily    multivitamin  1 tablet Oral Daily    mupirocin   Nasal BID    thiamine  100 mg Oral Daily     Continuous Infusions:  PRN Meds:acetaminophen, dextrose 50%, dextrose 50%, glucagon (human recombinant), glucose, glucose, insulin aspart U-100, LORazepam, melatonin, morphine, naloxone, nitroGLYCERIN, ondansetron, sodium chloride 0.9%    Oxygen/Ventilator Data (Last 24H):  (if applicable)            Hemodynamic Parameters (Last 24H):   (if applicable)        Laboratory and Radiology Data:  Recent Results (from the past 24 hour(s))   ISTAT PROCEDURE    Collection Time: 03/22/24  4:19 PM   Result Value Ref Range    POC PH 7.385 7.35 - 7.45    POC PCO2 70.0 (HH) 35 - 45 mmHg    POC PO2 69 (L) 80 - 100 mmHg    POC HCO3 41.9 (H) 24 - 28 mmol/L    POC BE 17 (H) -2 to 2 mmol/L    POC SATURATED O2 92 95 - 100 %    POC Glucose 167 (H) 70 - 110 mg/dL    POC Sodium 135 (L) 136 - 145 mmol/L    POC Potassium 3.4 (L) 3.5 - 5.1 mmol/L    POC TCO2 44 (H) 23 - 27 mmol/L    POC Ionized Calcium 1.15 1.06 - 1.42 mmol/L    POC Hematocrit 43 36 - 54 %PCV    Sample ARTERIAL     Site RR     Allens Test Pass     DelSys Nasal Can     Mode SPONT     Flow 2    EKG 12-lead    Collection Time: 03/22/24  4:27 PM   Result Value Ref Range    QRS Duration 168 ms    [facility] QTC Calculation 528 ms   Troponin I High Sensitivity    Collection Time: 03/22/24  4:30 PM   Result Value Ref Range    Troponin I High Sensitivity 163.6 (HH) 0.0 - 14.9 pg/mL   Comprehensive metabolic panel    Collection Time: 03/22/24  4:30 PM   Result Value Ref Range    Sodium 139 136 - 145 mmol/L    Potassium 3.6 3.5 - 5.1 mmol/L    Chloride 93 (L) 95 - 110  mmol/L    CO2 39 (H) 23 - 29 mmol/L    Glucose 161 (H) 70 - 110 mg/dL    BUN 29 (H) 8 - 23 mg/dL    Creatinine 1.4 0.5 - 1.4 mg/dL    Calcium 8.7 8.7 - 10.5 mg/dL    Total Protein 6.7 6.0 - 8.4 g/dL    Albumin 3.7 3.5 - 5.2 g/dL    Total Bilirubin 0.6 0.1 - 1.0 mg/dL    Alkaline Phosphatase 69 55 - 135 U/L    AST 18 10 - 40 U/L    ALT 7 (L) 10 - 44 U/L    eGFR 39.7 (A) >60 mL/min/1.73 m^2    Anion Gap 7 (L) 8 - 16 mmol/L   CBC Without Differential    Collection Time: 03/22/24  4:30 PM   Result Value Ref Range    WBC 12.33 3.90 - 12.70 K/uL    RBC 4.44 4.00 - 5.40 M/uL    Hemoglobin 13.6 12.0 - 16.0 g/dL    Hematocrit 42.4 37.0 - 48.5 %    MCV 96 82 - 98 fL    MCH 30.6 27.0 - 31.0 pg    MCHC 32.1 32.0 - 36.0 g/dL    RDW 14.0 11.5 - 14.5 %    Platelets 185 150 - 450 K/uL    MPV 9.5 9.2 - 12.9 fL   Magnesium    Collection Time: 03/22/24  4:30 PM   Result Value Ref Range    Magnesium 1.6 1.6 - 2.6 mg/dL   Phosphorus    Collection Time: 03/22/24  4:30 PM   Result Value Ref Range    Phosphorus 3.7 2.7 - 4.5 mg/dL   Vitamin B12    Collection Time: 03/22/24  4:30 PM   Result Value Ref Range    Vitamin B-12 374 210 - 950 pg/mL   Ammonia    Collection Time: 03/22/24  4:30 PM   Result Value Ref Range    Ammonia 41 10 - 50 umol/L   Folate    Collection Time: 03/22/24  4:30 PM   Result Value Ref Range    Folate 19.9 4.0 - 24.0 ng/mL   APTT    Collection Time: 03/22/24  4:30 PM   Result Value Ref Range    aPTT 33.0 (H) 21.0 - 32.0 sec   Protime-INR    Collection Time: 03/22/24  4:30 PM   Result Value Ref Range    Prothrombin Time 11.6 9.0 - 12.5 sec    INR 1.0 0.8 - 1.2   CBC auto differential    Collection Time: 03/22/24  4:30 PM   Result Value Ref Range    WBC 12.33 3.90 - 12.70 K/uL    RBC 4.44 4.00 - 5.40 M/uL    Hemoglobin 13.6 12.0 - 16.0 g/dL    Hematocrit 42.4 37.0 - 48.5 %    MCV 96 82 - 98 fL    MCH 30.6 27.0 - 31.0 pg    MCHC 32.1 32.0 - 36.0 g/dL    RDW 14.0 11.5 - 14.5 %    Platelets 185 150 - 450 K/uL    MPV 9.5 9.2  - 12.9 fL    Immature Granulocytes 0.6 (H) 0.0 - 0.5 %    Gran # (ANC) 10.9 (H) 1.8 - 7.7 K/uL    Immature Grans (Abs) 0.08 (H) 0.00 - 0.04 K/uL    Lymph # 0.6 (L) 1.0 - 4.8 K/uL    Mono # 0.8 0.3 - 1.0 K/uL    Eos # 0.0 0.0 - 0.5 K/uL    Baso # 0.04 0.00 - 0.20 K/uL    nRBC 0 0 /100 WBC    Gran % 88.4 (H) 38.0 - 73.0 %    Lymph % 4.5 (L) 18.0 - 48.0 %    Mono % 6.2 4.0 - 15.0 %    Eosinophil % 0.0 0.0 - 8.0 %    Basophil % 0.3 0.0 - 1.9 %    Differential Method Automated    Lipid panel    Collection Time: 03/22/24  4:30 PM   Result Value Ref Range    Cholesterol 116 (L) 120 - 199 mg/dL    Triglycerides 116 30 - 150 mg/dL    HDL 46 40 - 75 mg/dL    LDL Cholesterol 46.8 (L) 63.0 - 159.0 mg/dL    HDL/Cholesterol Ratio 39.7 20.0 - 50.0 %    Total Cholesterol/HDL Ratio 2.5 2.0 - 5.0    Non-HDL Cholesterol 70 mg/dL   Hemoglobin A1c    Collection Time: 03/22/24  4:30 PM   Result Value Ref Range    Hemoglobin A1C 7.9 (H) 4.5 - 6.2 %    Estimated Avg Glucose 180 (H) 68 - 131 mg/dL   POCT glucose    Collection Time: 03/22/24  5:14 PM   Result Value Ref Range    POC Glucose 161 (H) 70 - 110   POCT glucose    Collection Time: 03/22/24  8:16 PM   Result Value Ref Range    POC Glucose 163 (H) 70 - 110   Troponin I High Sensitivity    Collection Time: 03/23/24 12:13 AM   Result Value Ref Range    Troponin I High Sensitivity 157.3 (HH) 0.0 - 14.9 pg/mL   APTT    Collection Time: 03/23/24 12:13 AM   Result Value Ref Range    aPTT 73.2 (H) 21.0 - 32.0 sec   Troponin I High Sensitivity    Collection Time: 03/23/24  6:06 AM   Result Value Ref Range    Troponin I High Sensitivity 136.9 (HH) 0.0 - 14.9 pg/mL   Comprehensive Metabolic Panel (CMP)    Collection Time: 03/23/24  6:06 AM   Result Value Ref Range    Sodium 138 136 - 145 mmol/L    Potassium 3.5 3.5 - 5.1 mmol/L    Chloride 91 (L) 95 - 110 mmol/L    CO2 40 (H) 23 - 29 mmol/L    Glucose 161 (H) 70 - 110 mg/dL    BUN 34 (H) 8 - 23 mg/dL    Creatinine 1.6 (H) 0.5 - 1.4 mg/dL     Calcium 8.7 8.7 - 10.5 mg/dL    Total Protein 6.4 6.0 - 8.4 g/dL    Albumin 3.5 3.5 - 5.2 g/dL    Total Bilirubin 0.9 0.1 - 1.0 mg/dL    Alkaline Phosphatase 59 55 - 135 U/L    AST 18 10 - 40 U/L    ALT 6 (L) 10 - 44 U/L    eGFR 33.8 (A) >60 mL/min/1.73 m^2    Anion Gap 7 (L) 8 - 16 mmol/L   Magnesium    Collection Time: 03/23/24  6:06 AM   Result Value Ref Range    Magnesium 1.6 1.6 - 2.6 mg/dL   CBC with Automated Differential    Collection Time: 03/23/24  6:06 AM   Result Value Ref Range    WBC 14.65 (H) 3.90 - 12.70 K/uL    RBC 4.57 4.00 - 5.40 M/uL    Hemoglobin 13.6 12.0 - 16.0 g/dL    Hematocrit 43.4 37.0 - 48.5 %    MCV 95 82 - 98 fL    MCH 29.8 27.0 - 31.0 pg    MCHC 31.3 (L) 32.0 - 36.0 g/dL    RDW 14.2 11.5 - 14.5 %    Platelets 170 150 - 450 K/uL    MPV 9.7 9.2 - 12.9 fL    Immature Granulocytes 0.6 (H) 0.0 - 0.5 %    Gran # (ANC) 12.9 (H) 1.8 - 7.7 K/uL    Immature Grans (Abs) 0.09 (H) 0.00 - 0.04 K/uL    Lymph # 0.7 (L) 1.0 - 4.8 K/uL    Mono # 0.9 0.3 - 1.0 K/uL    Eos # 0.0 0.0 - 0.5 K/uL    Baso # 0.04 0.00 - 0.20 K/uL    nRBC 0 0 /100 WBC    Gran % 88.3 (H) 38.0 - 73.0 %    Lymph % 4.8 (L) 18.0 - 48.0 %    Mono % 6.0 4.0 - 15.0 %    Eosinophil % 0.0 0.0 - 8.0 %    Basophil % 0.3 0.0 - 1.9 %    Differential Method Automated    APTT    Collection Time: 03/23/24  6:06 AM   Result Value Ref Range    aPTT 55.0 (H) 21.0 - 32.0 sec   CBC auto differential    Collection Time: 03/23/24  6:06 AM   Result Value Ref Range    WBC 14.65 (H) 3.90 - 12.70 K/uL    RBC 4.57 4.00 - 5.40 M/uL    Hemoglobin 13.6 12.0 - 16.0 g/dL    Hematocrit 43.4 37.0 - 48.5 %    MCV 95 82 - 98 fL    MCH 29.8 27.0 - 31.0 pg    MCHC 31.3 (L) 32.0 - 36.0 g/dL    RDW 14.2 11.5 - 14.5 %    Platelets 170 150 - 450 K/uL    MPV 9.7 9.2 - 12.9 fL    Immature Granulocytes 0.6 (H) 0.0 - 0.5 %    Gran # (ANC) 12.9 (H) 1.8 - 7.7 K/uL    Immature Grans (Abs) 0.09 (H) 0.00 - 0.04 K/uL    Lymph # 0.7 (L) 1.0 - 4.8 K/uL    Mono # 0.9 0.3 - 1.0 K/uL     Eos # 0.0 0.0 - 0.5 K/uL    Baso # 0.04 0.00 - 0.20 K/uL    nRBC 0 0 /100 WBC    Gran % 88.3 (H) 38.0 - 73.0 %    Lymph % 4.8 (L) 18.0 - 48.0 %    Mono % 6.0 4.0 - 15.0 %    Eosinophil % 0.0 0.0 - 8.0 %    Basophil % 0.3 0.0 - 1.9 %    Differential Method Automated    POCT glucose    Collection Time: 03/23/24  7:53 AM   Result Value Ref Range    POC Glucose 150 (H) 70 - 110   Echo    Collection Time: 03/23/24  8:31 AM   Result Value Ref Range    LVOT stroke volume 42.40 cm3    LVIDd 5.17 3.5 - 6.0 cm    LV Systolic Volume 40.65 mL    LV Systolic Volume Index 21.5 mL/m2    LVIDs 3.19 2.1 - 4.0 cm    LV Diastolic Volume 127.78 mL    LV Diastolic Volume Index 67.61 mL/m2    IVS 1.10 0.6 - 1.1 cm    LVOT diameter 1.60 cm    LVOT area 2.0 cm2    FS 38 28 - 44 %    Left Ventricle Relative Wall Thickness 0.43 cm    Posterior Wall 1.10 0.6 - 1.1 cm    LV mass 218.69 g    LV Mass Index 116 g/m2    MV Peak E Kristopher 1.37 m/s    TDI LATERAL 0.05 m/s    TDI SEPTAL 0.05 m/s    E/E' ratio 27.40 m/s    MV Peak A Kristopher 1.75 m/s    TR Max Kristopher 3.45 m/s    E/A ratio 0.78     E wave deceleration time 250.00 msec    LV SEPTAL E/E' RATIO 27.40 m/s    LV LATERAL E/E' RATIO 27.40 m/s    LVOT peak kristopher 1.31 m/s    Left Ventricular Outflow Tract Mean Velocity 0.85 cm/s    Left Ventricular Outflow Tract Mean Gradient 4.00 mmHg    RVDD 4.21 cm    RV S' 7.29 cm/s    TAPSE 1.56 cm    LA size 4.00 cm    AV mean gradient 16 mmHg    AV peak gradient 29 mmHg    Ao peak kristopher 2.68 m/s    Ao VTI 43.70 cm    LVOT peak VTI 21.10 cm    AV valve area 0.97 cm²    AV Velocity Ratio 0.49     AV index (prosthetic) 0.48     MINGO by Velocity Ratio 0.98 cm²    Mr max kristopher 5.47 m/s    MV mean gradient 11 mmHg    MV peak gradient 19 mmHg    MV stenosis pressure 1/2 time 39.00 ms    MV valve area p 1/2 method 5.64 cm2    MV valve area by continuity eq 0.98 cm2    MV VTI 43.1 cm    Triscuspid Valve Regurgitation Peak Gradient 48 mmHg    PV PEAK VELOCITY 0.86 m/s    PV peak  gradient 3 mmHg    Pulmonary Valve Mean Velocity 0.57 m/s    IVC diameter 2.39 cm    Mean e' 0.05 m/s    ZLVIDS -0.17     ZLVIDD -0.23     BSA 1.97 m2   POCT glucose    Collection Time: 03/23/24 11:59 AM   Result Value Ref Range    POC Glucose 211 (H) 70 - 110         12-lead EKG interpretation:  (if applicable)      Current Cardiac Rhythm:   (if applicable)    Physical Exam:   Physical Exam    ASSESSMENT/PLAN:   Assessment:   Elevated troponin  Altered mental status  CAD/CABG  Aortic valve replacement  Pulmonary hypertension  VERONA  Biotronic PPM in situ     Plan:   Full consult to follow  Last [facility] 2022--stent to origin of SVG to RCA , LAD occluded, normal LIMA to LAD  Normal Circ , nonfunctioning graft to OM PAP 80 mmhg on [facility] with ? Severe MR, Follow up [facility]ORIS at that time showed mod MR , severe pulmonary htn with elevated right sided pressures.   Last echo in office in October 2023--EF 55-60% functioning bioprosthetic Aortic valve mod MR PAP 57 mmhg  Repeat echo pending  Pt is on heparin drip   Troponin is trending down  No active chest pain per nursing  Mental status improving but unsure of baseline  Uncertain of home meds.   Last seen in office on 10/2023--  Cr is 1.6  Will stop heparin drip  Lovenox  Tmax 102.5, WBC elevated  ? Infection as source of elevated troponin  Add BB  Hold amlodipine for now , BP soft.   SR on tele, EKG with no St seg elevation  Will check BNP, CXR reviewed   Biotronic  PPM , last transmission dec 2023, interrogate device  Accurate I/O  Monitor electrolytes  Will follow

## 2024-03-24 PROBLEM — R53.1 GENERALIZED WEAKNESS: Status: ACTIVE | Noted: 2024-03-24

## 2024-03-24 PROBLEM — R78.81 BACTEREMIA DUE TO ENTEROCOCCUS: Status: ACTIVE | Noted: 2024-03-24

## 2024-03-24 PROBLEM — B95.2 BACTEREMIA DUE TO ENTEROCOCCUS: Status: ACTIVE | Noted: 2024-03-24

## 2024-03-24 PROBLEM — N17.9 AKI (ACUTE KIDNEY INJURY): Status: ACTIVE | Noted: 2024-03-24

## 2024-03-24 LAB
ACINETOBACTER CALCOACETICUS/BAUMANNII COMPLEX: NOT DETECTED
ALBUMIN SERPL BCP-MCNC: 3.2 G/DL (ref 3.5–5.2)
ALP SERPL-CCNC: 55 U/L (ref 55–135)
ALT SERPL W/O P-5'-P-CCNC: 6 U/L (ref 10–44)
ANION GAP SERPL CALC-SCNC: 8 MMOL/L (ref 8–16)
AST SERPL-CCNC: 18 U/L (ref 10–40)
BACTERIA #/AREA URNS HPF: ABNORMAL /HPF
BACTEROIDES FRAGILIS: NOT DETECTED
BASOPHILS # BLD AUTO: 0.05 K/UL (ref 0–0.2)
BASOPHILS # BLD AUTO: 0.05 K/UL (ref 0–0.2)
BASOPHILS NFR BLD: 0.3 % (ref 0–1.9)
BASOPHILS NFR BLD: 0.3 % (ref 0–1.9)
BILIRUB SERPL-MCNC: 0.9 MG/DL (ref 0.1–1)
BILIRUB UR QL STRIP: NEGATIVE
BUN SERPL-MCNC: 47 MG/DL (ref 8–23)
CALCIUM SERPL-MCNC: 9 MG/DL (ref 8.7–10.5)
CANDIDA ALBICANS: NOT DETECTED
CANDIDA AURIS: NOT DETECTED
CANDIDA GLABRATA: NOT DETECTED
CANDIDA KRUSEI: NOT DETECTED
CANDIDA PARAPSILOSIS: NOT DETECTED
CANDIDA TROPICALIS: NOT DETECTED
CHLORIDE SERPL-SCNC: 89 MMOL/L (ref 95–110)
CLARITY UR: ABNORMAL
CO2 SERPL-SCNC: 38 MMOL/L (ref 23–29)
COLOR UR: YELLOW
CREAT SERPL-MCNC: 1.8 MG/DL (ref 0.5–1.4)
CREAT UR-MCNC: 98.3 MG/DL (ref 15–325)
CRP SERPL-MCNC: 278.8 MG/L (ref 0–8.2)
CRYPTOCOCCUS NEOFORMANS/GATTII: NOT DETECTED
CTX-M GENE (ESBL PRODUCER): ABNORMAL
DIFFERENTIAL METHOD BLD: ABNORMAL
DIFFERENTIAL METHOD BLD: ABNORMAL
ENTEROBACTER CLOACAE COMPLEX: NOT DETECTED
ENTEROBACTERALES: NOT DETECTED
ENTEROCOCCUS FAECALIS: DETECTED
ENTEROCOCCUS FAECIUM: NOT DETECTED
EOSINOPHIL # BLD AUTO: 0 K/UL (ref 0–0.5)
EOSINOPHIL # BLD AUTO: 0 K/UL (ref 0–0.5)
EOSINOPHIL NFR BLD: 0.1 % (ref 0–8)
EOSINOPHIL NFR BLD: 0.1 % (ref 0–8)
ERYTHROCYTE [DISTWIDTH] IN BLOOD BY AUTOMATED COUNT: 14.2 % (ref 11.5–14.5)
ERYTHROCYTE [DISTWIDTH] IN BLOOD BY AUTOMATED COUNT: 14.2 % (ref 11.5–14.5)
ESCHERICHIA COLI: NOT DETECTED
EST. GFR  (NO RACE VARIABLE): 29.4 ML/MIN/1.73 M^2
GLUCOSE SERPL-MCNC: 128 MG/DL (ref 70–110)
GLUCOSE SERPL-MCNC: 131 MG/DL (ref 70–110)
GLUCOSE SERPL-MCNC: 152 MG/DL (ref 70–110)
GLUCOSE SERPL-MCNC: 188 MG/DL (ref 70–110)
GLUCOSE SERPL-MCNC: 194 MG/DL (ref 70–110)
GLUCOSE UR QL STRIP: NEGATIVE
HAEMOPHILUS INFLUENZAE: NOT DETECTED
HCT VFR BLD AUTO: 41.6 % (ref 37–48.5)
HCT VFR BLD AUTO: 41.6 % (ref 37–48.5)
HGB BLD-MCNC: 13.1 G/DL (ref 12–16)
HGB BLD-MCNC: 13.1 G/DL (ref 12–16)
HGB UR QL STRIP: ABNORMAL
HYALINE CASTS #/AREA URNS LPF: 0 /LPF
IMM GRANULOCYTES # BLD AUTO: 0.08 K/UL (ref 0–0.04)
IMM GRANULOCYTES # BLD AUTO: 0.08 K/UL (ref 0–0.04)
IMM GRANULOCYTES NFR BLD AUTO: 0.5 % (ref 0–0.5)
IMM GRANULOCYTES NFR BLD AUTO: 0.5 % (ref 0–0.5)
IMP GENE (CARBAPENEM RESISTANT): ABNORMAL
KETONES UR QL STRIP: NEGATIVE
KLEBSIELLA AEROGENES: NOT DETECTED
KLEBSIELLA OXYTOCA: NOT DETECTED
KLEBSIELLA PNEUMONIAE GROUP: NOT DETECTED
KPC RESISTANCE GENE (CARBAPENEM): ABNORMAL
LEUKOCYTE ESTERASE UR QL STRIP: NEGATIVE
LISTERIA MONOCYTOGENES: NOT DETECTED
LYMPHOCYTES # BLD AUTO: 1.7 K/UL (ref 1–4.8)
LYMPHOCYTES # BLD AUTO: 1.7 K/UL (ref 1–4.8)
LYMPHOCYTES NFR BLD: 11.4 % (ref 18–48)
LYMPHOCYTES NFR BLD: 11.4 % (ref 18–48)
MAGNESIUM SERPL-MCNC: 1.7 MG/DL (ref 1.6–2.6)
MCH RBC QN AUTO: 30.2 PG (ref 27–31)
MCH RBC QN AUTO: 30.2 PG (ref 27–31)
MCHC RBC AUTO-ENTMCNC: 31.5 G/DL (ref 32–36)
MCHC RBC AUTO-ENTMCNC: 31.5 G/DL (ref 32–36)
MCR-1: ABNORMAL
MCV RBC AUTO: 96 FL (ref 82–98)
MCV RBC AUTO: 96 FL (ref 82–98)
MEC A/C AND MREJ (MRSA): ABNORMAL
MEC A/C: ABNORMAL
MICROSCOPIC COMMENT: ABNORMAL
MONOCYTES # BLD AUTO: 1.8 K/UL (ref 0.3–1)
MONOCYTES # BLD AUTO: 1.8 K/UL (ref 0.3–1)
MONOCYTES NFR BLD: 12.4 % (ref 4–15)
MONOCYTES NFR BLD: 12.4 % (ref 4–15)
NDM GENE (CARBAPENEM RESISTANT): ABNORMAL
NEISSERIA MENINGITIDIS: NOT DETECTED
NEUTROPHILS # BLD AUTO: 11.1 K/UL (ref 1.8–7.7)
NEUTROPHILS # BLD AUTO: 11.1 K/UL (ref 1.8–7.7)
NEUTROPHILS NFR BLD: 75.3 % (ref 38–73)
NEUTROPHILS NFR BLD: 75.3 % (ref 38–73)
NITRITE UR QL STRIP: NEGATIVE
NRBC BLD-RTO: 0 /100 WBC
NRBC BLD-RTO: 0 /100 WBC
OXA-48-LIKE (CARBAPENEM RESISTANT): ABNORMAL
PH UR STRIP: 6 [PH] (ref 5–8)
PLATELET # BLD AUTO: 136 K/UL (ref 150–450)
PLATELET # BLD AUTO: 136 K/UL (ref 150–450)
PMV BLD AUTO: 10 FL (ref 9.2–12.9)
PMV BLD AUTO: 10 FL (ref 9.2–12.9)
POTASSIUM SERPL-SCNC: 4 MMOL/L (ref 3.5–5.1)
PROCALCITONIN SERPL IA-MCNC: 5.58 NG/ML (ref 0–0.5)
PROT SERPL-MCNC: 6.1 G/DL (ref 6–8.4)
PROT UR QL STRIP: ABNORMAL
PROT UR-MCNC: 90 MG/DL (ref 6–15)
PROTEUS SPECIES: NOT DETECTED
PSEUDOMONAS AERUGINOSA: NOT DETECTED
RBC # BLD AUTO: 4.34 M/UL (ref 4–5.4)
RBC # BLD AUTO: 4.34 M/UL (ref 4–5.4)
RBC #/AREA URNS HPF: 2 /HPF (ref 0–4)
SALMONELLA SP: NOT DETECTED
SERRATIA MARCESCENS: NOT DETECTED
SODIUM SERPL-SCNC: 135 MMOL/L (ref 136–145)
SODIUM UR-SCNC: 12 MMOL/L (ref 20–250)
SP GR UR STRIP: 1.02 (ref 1–1.03)
STAPHYLOCOCCUS AUREUS: NOT DETECTED
STAPHYLOCOCCUS EPIDERMIDIS: NOT DETECTED
STAPHYLOCOCCUS LUGDUNESIS: NOT DETECTED
STAPHYLOCOCCUS SPECIES: NOT DETECTED
STENOTROPHOMONAS MALTOPHILIA: NOT DETECTED
STREPTOCOCCUS AGALACTIAE: NOT DETECTED
STREPTOCOCCUS PNEUMONIAE: NOT DETECTED
STREPTOCOCCUS PYOGENES: NOT DETECTED
STREPTOCOCCUS SPECIES: NOT DETECTED
URN SPEC COLLECT METH UR: ABNORMAL
UROBILINOGEN UR STRIP-ACNC: NEGATIVE EU/DL
UUN UR-MCNC: 536 MG/DL (ref 140–1050)
VAN A/B (VRE GENE): NOT DETECTED
VIM GENE (CARBAPENEM RESISTANT): ABNORMAL
WBC # BLD AUTO: 14.79 K/UL (ref 3.9–12.7)
WBC # BLD AUTO: 14.79 K/UL (ref 3.9–12.7)
WBC #/AREA URNS HPF: 0 /HPF (ref 0–5)

## 2024-03-24 PROCEDURE — 99900035 HC TECH TIME PER 15 MIN (STAT)

## 2024-03-24 PROCEDURE — 63600175 PHARM REV CODE 636 W HCPCS: Mod: UD | Performed by: INTERNAL MEDICINE

## 2024-03-24 PROCEDURE — 25000003 PHARM REV CODE 250: Mod: UD | Performed by: INTERNAL MEDICINE

## 2024-03-24 PROCEDURE — 99223 1ST HOSP IP/OBS HIGH 75: CPT | Mod: ,,, | Performed by: STUDENT IN AN ORGANIZED HEALTH CARE EDUCATION/TRAINING PROGRAM

## 2024-03-24 PROCEDURE — 36415 COLL VENOUS BLD VENIPUNCTURE: CPT | Performed by: INTERNAL MEDICINE

## 2024-03-24 PROCEDURE — 85025 COMPLETE CBC W/AUTO DIFF WBC: CPT | Performed by: INTERNAL MEDICINE

## 2024-03-24 PROCEDURE — 25000003 PHARM REV CODE 250

## 2024-03-24 PROCEDURE — 63600175 PHARM REV CODE 636 W HCPCS: Performed by: STUDENT IN AN ORGANIZED HEALTH CARE EDUCATION/TRAINING PROGRAM

## 2024-03-24 PROCEDURE — 94640 AIRWAY INHALATION TREATMENT: CPT

## 2024-03-24 PROCEDURE — 86140 C-REACTIVE PROTEIN: CPT | Performed by: STUDENT IN AN ORGANIZED HEALTH CARE EDUCATION/TRAINING PROGRAM

## 2024-03-24 PROCEDURE — 84540 ASSAY OF URINE/UREA-N: CPT | Performed by: INTERNAL MEDICINE

## 2024-03-24 PROCEDURE — 21400001 HC TELEMETRY ROOM

## 2024-03-24 PROCEDURE — 25000242 PHARM REV CODE 250 ALT 637 W/ HCPCS: Performed by: NURSE PRACTITIONER

## 2024-03-24 PROCEDURE — 84145 PROCALCITONIN (PCT): CPT | Performed by: STUDENT IN AN ORGANIZED HEALTH CARE EDUCATION/TRAINING PROGRAM

## 2024-03-24 PROCEDURE — 84300 ASSAY OF URINE SODIUM: CPT | Performed by: INTERNAL MEDICINE

## 2024-03-24 PROCEDURE — 87040 BLOOD CULTURE FOR BACTERIA: CPT | Mod: 59 | Performed by: NURSE PRACTITIONER

## 2024-03-24 PROCEDURE — 82570 ASSAY OF URINE CREATININE: CPT | Performed by: INTERNAL MEDICINE

## 2024-03-24 PROCEDURE — 94761 N-INVAS EAR/PLS OXIMETRY MLT: CPT

## 2024-03-24 PROCEDURE — 87205 SMEAR GRAM STAIN: CPT | Performed by: INTERNAL MEDICINE

## 2024-03-24 PROCEDURE — 83735 ASSAY OF MAGNESIUM: CPT | Performed by: INTERNAL MEDICINE

## 2024-03-24 PROCEDURE — 63600175 PHARM REV CODE 636 W HCPCS: Mod: UD

## 2024-03-24 PROCEDURE — 25000003 PHARM REV CODE 250: Performed by: STUDENT IN AN ORGANIZED HEALTH CARE EDUCATION/TRAINING PROGRAM

## 2024-03-24 PROCEDURE — 25000003 PHARM REV CODE 250: Performed by: NURSE PRACTITIONER

## 2024-03-24 PROCEDURE — 80053 COMPREHEN METABOLIC PANEL: CPT | Performed by: INTERNAL MEDICINE

## 2024-03-24 PROCEDURE — 36415 COLL VENOUS BLD VENIPUNCTURE: CPT | Performed by: STUDENT IN AN ORGANIZED HEALTH CARE EDUCATION/TRAINING PROGRAM

## 2024-03-24 PROCEDURE — 25000003 PHARM REV CODE 250: Performed by: INTERNAL MEDICINE

## 2024-03-24 PROCEDURE — 27000221 HC OXYGEN, UP TO 24 HOURS

## 2024-03-24 PROCEDURE — 99900031 HC PATIENT EDUCATION (STAT)

## 2024-03-24 PROCEDURE — 84156 ASSAY OF PROTEIN URINE: CPT | Performed by: INTERNAL MEDICINE

## 2024-03-24 PROCEDURE — 99233 SBSQ HOSP IP/OBS HIGH 50: CPT | Mod: ,,, | Performed by: INTERNAL MEDICINE

## 2024-03-24 PROCEDURE — 81001 URINALYSIS AUTO W/SCOPE: CPT | Performed by: NURSE PRACTITIONER

## 2024-03-24 RX ORDER — FUROSEMIDE 10 MG/ML
40 INJECTION INTRAMUSCULAR; INTRAVENOUS DAILY
Status: DISCONTINUED | OUTPATIENT
Start: 2024-03-24 | End: 2024-03-26 | Stop reason: HOSPADM

## 2024-03-24 RX ORDER — VANCOMYCIN HCL IN 5 % DEXTROSE 1G/250ML
1000 PLASTIC BAG, INJECTION (ML) INTRAVENOUS ONCE
Status: DISCONTINUED | OUTPATIENT
Start: 2024-03-24 | End: 2024-03-24

## 2024-03-24 RX ORDER — POLYETHYLENE GLYCOL 3350 17 G/17G
17 POWDER, FOR SOLUTION ORAL DAILY
Status: DISCONTINUED | OUTPATIENT
Start: 2024-03-24 | End: 2024-03-26 | Stop reason: HOSPADM

## 2024-03-24 RX ORDER — ENOXAPARIN SODIUM 100 MG/ML
30 INJECTION SUBCUTANEOUS EVERY 24 HOURS
Status: DISCONTINUED | OUTPATIENT
Start: 2024-03-25 | End: 2024-03-24

## 2024-03-24 RX ORDER — HEPARIN SODIUM 5000 [USP'U]/ML
5000 INJECTION, SOLUTION INTRAVENOUS; SUBCUTANEOUS EVERY 8 HOURS
Status: DISCONTINUED | OUTPATIENT
Start: 2024-03-25 | End: 2024-03-26 | Stop reason: HOSPADM

## 2024-03-24 RX ADMIN — THIAMINE HCL TAB 100 MG 100 MG: 100 TAB at 08:03

## 2024-03-24 RX ADMIN — BUDESONIDE 0.5 MG: 0.5 INHALANT RESPIRATORY (INHALATION) at 08:03

## 2024-03-24 RX ADMIN — POLYETHYLENE GLYCOL 3350 17 G: 17 POWDER, FOR SOLUTION ORAL at 02:03

## 2024-03-24 RX ADMIN — AMPICILLIN SODIUM 2 G: 2 INJECTION, POWDER, FOR SOLUTION INTRAMUSCULAR; INTRAVENOUS at 03:03

## 2024-03-24 RX ADMIN — AMPICILLIN SODIUM 2 G: 2 INJECTION, POWDER, FOR SOLUTION INTRAMUSCULAR; INTRAVENOUS at 08:03

## 2024-03-24 RX ADMIN — METOPROLOL SUCCINATE 25 MG: 25 TABLET, FILM COATED, EXTENDED RELEASE ORAL at 08:03

## 2024-03-24 RX ADMIN — ACETAMINOPHEN 650 MG: 325 TABLET ORAL at 08:03

## 2024-03-24 RX ADMIN — FUROSEMIDE 40 MG: 10 INJECTION, SOLUTION INTRAMUSCULAR; INTRAVENOUS at 09:03

## 2024-03-24 RX ADMIN — MULTIVITAMIN TABLET 1 TABLET: TABLET at 08:03

## 2024-03-24 RX ADMIN — VANCOMYCIN HYDROCHLORIDE 1000 MG: 1 INJECTION, POWDER, LYOPHILIZED, FOR SOLUTION INTRAVENOUS at 11:03

## 2024-03-24 RX ADMIN — MUPIROCIN 1 G: 20 OINTMENT TOPICAL at 08:03

## 2024-03-24 RX ADMIN — LEVOTHYROXINE SODIUM 150 MCG: 0.1 TABLET ORAL at 05:03

## 2024-03-24 RX ADMIN — FOLIC ACID 1 MG: 1 TABLET ORAL at 08:03

## 2024-03-24 RX ADMIN — CEFTRIAXONE SODIUM 2 G: 2 INJECTION, POWDER, FOR SOLUTION INTRAMUSCULAR; INTRAVENOUS at 02:03

## 2024-03-24 RX ADMIN — ENOXAPARIN SODIUM 90 MG: 100 INJECTION SUBCUTANEOUS at 08:03

## 2024-03-24 NOTE — CONSULTS
[facility]   Department of Infectious Disease  Consult Note        PATIENT NAME: Tereza Larose  MRN: [id number]  TODAY'S DATE: 03/24/2024  ADMIT DATE: 3/22/2024    CHIEF COMPLAINT: Chest Pain    PRINCIPLE PROBLEM: NSTEMI (non-ST elevated myocardial infarction)    REASON FOR CONSULT: Bacteremia    ASSESSMENT and PLAN     Sepsis secondary to Enterococcus faecalis bacteremia 3/4 bottles, rule out infective endocarditis (IE) in the setting of pacemaker & aortic valve replacament  Procalcitonin 5.57, positive   CRP in process     VERONA  Nephrology following     PMHx: COPD on home O2, eosinophilic asthma, diabetes, CHF status post CABG     Recommendations:    Adequate source control   Repeat blood cultures x2 sets in process  Stop vancomycin IV   Start ceftriaxone 2 g IV q.12   Ampicillin 2 g IV q.6, dose as per crcl  Above for Enterococcus faecalis bacteremia, rule out endocarditis   Cardiology consult for DELORIS  Monitor O2 sats   Aspiration precautions   Incentive spirometry     Discussed with patient, family at bedside, nursing, hospitalist/NP, nephrology/Dr. Richards    Thank you for this consult. Please send [employer] secure chat with any questions.    HPI      Tereza Larose is a 73 y.o. female obese, BMI 35 kg/m2, with past medical history of COPD on home O2, eosinophilic asthma, diabetes, CHF status post aortic valve replacement, CABG and pacemaker after complete heart block placed in January of 2020 at [facility] who was sent from [facility] due to worsening shortness of breath and chest pain.  As per outside records, she presented with generalized weakness, fatigue, subjective chills at home.   at bedside reports for the last 3 days she has been experiencing increased fatigue and progressive worsening shortness of breath.  She had a mechanical fall about 2 days ago and hit her left head with a piece of furniture, no loss of consciousness.  Patient looks a little confused on exam, having a hard time  finding words. Patient denies headache or neck pain, no chest pain or cough.  No nausea or vomit, no abdominal pain, no dysuria or increased urinary frequency, she is constipated, last bowel movement last Thursday.  She also noticed gained weight, abdominal distention, and lower extremity edema.    In the ER there her chest x-ray revealed bilateral vascular congestion, elevated troponin levels.  Given aspirin, Lovenox and small dose of IV Lasix.  She was transferred to [facility] for higher level of care.      In the ER here, labile BP, T-max 102.5°  Labs with leukocytosis 14.6, left shift 88.3%, H&H 13.6/43.4, platelet count 170  Stable electrolytes  VERONA, creatinine 1.8 today, clearance 28.5 mL/min  AST 18/ALT 6, ammonia level 41  BNP 1800   Troponins trending down   Procalcitonin 5.57, positive  UA 2+ protein, no pyuria, few bacteria, negative for UTI  Chest x-ray with mild central vascular congestion  CT head done at outside facility negative for acute process.  Atrophy and chronic microvascular disease.  No images available to review     Hospital course complicated by Enterococcus faecalis 3/4 bottles bacteremia, started on vancomycin IV today.    Patient seen examined at bedside, family present.  Has been mentions her pacemaker has not been checked in a long time at least a year? .  She follows with Dr. Batres.     Outdoor activities:  Never smoker, no alcohol, lives with her .  Used to work as her 's  is a .  Travel:  None  Implants:  Left pacemaker  Antibiotic history:  Vancomycin IV    Past Medical History:   Diagnosis Date    Abnormal EKG 9/26/2012    Allergy     Arthritis     Asthma     Brain bleed     Colon polyps 11/6/2020    COPD (chronic obstructive pulmonary disease)     Depression     Diabetes mellitus type II     Diverticulitis     Fatty liver     GERD (gastroesophageal reflux disease)     Goiter     s/p thyroidectomy    Heart murmur     Hemiparesis  affecting right side as late effect of cerebrovascular accident (CVA) 7/26/2022    Hernia of abdominal wall     History of intracranial hemorrhage 6/15/2013    History of non-ST elevation myocardial infarction (NSTEMI) 6/15/2013    Hyperlipidemia     Hypertension     Lactic acidosis 1/11/2020    Metabolic syndrome 6/14/2012    Myocardial infarction     On home oxygen therapy     states uses 2L at night or when sat < 90    Pacemaker     Positive FIT (fecal immunochemical test) 11/6/2020    Severe persistent asthma without complication 4/30/2020    Statin intolerance     Stroke 2012    left hand shaky, loss of balance       Past Surgical History:   Procedure Laterality Date    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/25/2017    Dr Griggs    ADRENALECTOMY      AORTIC VALVE REPLACEMENT  12/09/2016    ARTERIOGRAPHY OF AORTIC ROOT N/A 9/12/2022    Procedure: ARTERIOGRAM, AORTIC ROOT;  Surgeon: Marko Batres MD;  Location: [facility] CATH/EP LAB;  Service: Cardiology;  Laterality: N/A;    arthroscopy lt knee Right     BLADDER REPAIR      sling    BREAST BIOPSY Bilateral     benign    COLONOSCOPY  2004    10 year recheck    COLONOSCOPY N/A 11/6/2020    Procedure: COLONOSCOPY;  Surgeon: Yakelin Lowery MD;  Location: [facility];  Service: Endoscopy;  Laterality: N/A;    CORONARY ANGIOGRAPHY INCLUDING BYPASS GRAFTS WITH CATHETERIZATION OF LEFT HEART Left 9/12/2022    Procedure: Left heart cath including bypass graft, with left heart catheterization;  Surgeon: Marko Batres MD;  Location: [facility] CATH/EP LAB;  Service: Cardiology;  Laterality: Left;    CORONARY ARTERY BYPASS GRAFT  12/09/2016    X3    EPIDURAL STEROID INJECTION INTO LUMBAR SPINE N/A 7/27/2021    Procedure: Injection-steroid-epidural-lumbar;  Surgeon: Valerio Jay MD;  Location: [facility] OR;  Service: Pain Management;  Laterality: N/A;  L5-S1     HYSTERECTOMY      INSERTION OF PACEMAKER Left 1/13/2020    Procedure: INSERTION, PACEMAKER;  Surgeon: Marko Batres MD;  Location: [facility]  CATH/EP LAB;  Service: Cardiology;  Laterality: Left;    OOPHORECTOMY      RIGHT HEART CATHETERIZATION Right 9/12/2022    Procedure: INSERTION, CATHETER, RIGHT HEART;  Surgeon: Marko Batres MD;  Location: [facility] CATH/EP LAB;  Service: Cardiology;  Laterality: Right;    THYROIDECTOMY         Family History   Problem Relation Age of Onset    Arthritis Mother     Diabetes Mother     Heart disease Mother     Hypertension Mother     Hypertension Father     Heart disease Father     Mental illness Father     Asthma Sister     Diabetes Sister     Hypertension Sister     Asthma Son     COPD Son     Depression Son     Diabetes Son     Hypertension Son     Stroke Son     Diabetes Maternal Uncle     Heart disease Maternal Uncle     Mental illness Paternal Aunt     Cancer Paternal Aunt     Heart disease Paternal Aunt     Hypertension Paternal Aunt     Heart disease Paternal Uncle     Hypertension Maternal Grandmother     Mental illness Maternal Grandmother     Aneurysm Maternal Grandfather     Mental illness Paternal Grandmother     Heart disease Paternal Grandfather     Melanoma Neg Hx     Psoriasis Neg Hx     Lupus Neg Hx     Eczema Neg Hx        Social History     Socioeconomic History    Marital status:    Tobacco Use    Smoking status: Never    Smokeless tobacco: Never   Substance and Sexual Activity    Alcohol use: Not Currently     Comment: seldom    Drug use: No    Sexual activity: Never     Social Determinants of Health     Financial Resource Strain: Low Risk  (9/11/2022)    Overall Financial Resource Strain (CARDIA)     Difficulty of Paying Living Expenses: Not hard at all   Food Insecurity: No Food Insecurity (9/11/2022)    Hunger Vital Sign     Worried About Running Out of Food in the Last Year: Never true     Ran Out of Food in the Last Year: Never true   Recent Concern: Food Insecurity - Food Insecurity Present (7/26/2022)    Hunger Vital Sign     Worried About Running Out of Food in the Last Year:  Sometimes true     Ran Out of Food in the Last Year: Sometimes true   Transportation Needs: No Transportation Needs (9/11/2022)    PRAPARE - Transportation     Lack of Transportation (Medical): No     Lack of Transportation (Non-Medical): No   Physical Activity: Inactive (9/11/2022)    Exercise Vital Sign     Days of Exercise per Week: 0 days     Minutes of Exercise per Session: 0 min   Stress: Stress Concern Present (9/11/2022)    [place] [employer] - Occupational Stress Questionnaire     Feeling of Stress : To some extent   Social Connections: Moderately Integrated (9/11/2022)    Social Connection and Isolation Panel [NHANES]     Frequency of Communication with Friends and Family: More than three times a week     Frequency of Social Gatherings with Friends and Family: More than three times a week     Attends [employer] Services: 1 to 4 times per year     Active Member of Clubs or Organizations: No     Attends Club or Organization Meetings: Never     Marital Status:    Recent Concern: Social Connections - Moderately Isolated (7/26/2022)    Social Connection and Isolation Panel [NHANES]     Frequency of Communication with Friends and Family: More than three times a week     Frequency of Social Gatherings with Friends and Family: Once a week     Attends Anglican Services: Never     Active Member of Clubs or Organizations: No     Attends Club or Organization Meetings: Never     Marital Status:    Housing Stability: Unknown (9/11/2022)    Housing Stability Vital Sign     Unable to Pay for Housing in the Last Year: No     Unstable Housing in the Last Year: No       Review of patient's allergies indicates:   Allergen Reactions    Metformin Diarrhea     Diarrhea      Corn Itching     Other reaction(s): Sneezing  Other reaction(s): Rhinorrhea    Potato starch Hives     Other reaction(s): Sneezing  Other reaction(s): Rhinorrhea    Pravastatin Other (See Comments)     Muscle pain     Statins-hmg-coa reductase inhibitors Other (See Comments)    Hydrochlorothiazide Other (See Comments)     weakness    Welchol [colesevelam] Other (See Comments)     Weakness        Current Outpatient Medications   Medication Instructions    albuterol (PROVENTIL) 2.5 mg, Nebulization, Every 4 hours PRN    albuterol (PROVENTIL/VENTOLIN HFA) 90 mcg/actuation inhaler 1-2 puffs, Inhalation, Every 4 hours PRN, Inhale 2 puffs by mouth into lungs every 4 hours as needed    alcohol swabs (BD ALCOHOL SWABS) PadM 1 each, Topical (Top), As needed (PRN)    amLODIPine (NORVASC) 10 MG tablet TAKE 1 TABLET BY MOUTH EVERY DAY FOR SYSTOLIC BLOOD PRESSURE GREATER THAN 120    arformoteroL (BROVANA) 15 mcg, Nebulization, 2 times daily, Controller    aspirin (ECOTRIN) 81 mg, Oral, Daily    bempedoic acid (NEXLETOL) 180 mg Tab 1 tablet, Oral, Daily    blood glucose control, low (TRUE METRIX LEVEL 1) Soln Check glucometer accuracy at least once a week    blood sugar diagnostic Strp True Metrix Test Strips test once a day    blood-glucose meter (TRUE METRIX GLUCOSE METER) Misc True Metrix Glucose Meter    budesonide (PULMICORT) 0.5 mg, Nebulization, 2 times daily, Controller    cholecalciferol (vitamin D3) (VITAMIN D3) 1,000 Units, Oral, Daily    colchicine (COLCRYS) 0.6 mg, Oral, Daily, Day 1: 1.2 mg at the first sign of flare, followed by 0.6 mg after 1 hour; maximum total dose: 1.8 mg/day on day Day 2 and thereafter: 0.6 mg once or twice daily until flare resolves    cyanocobalamin (VITAMIN B-12) 100 mcg, Oral, Daily    evolocumab (REPATHA SYRINGE) 140 mg, Subcutaneous, Every 14 days    glyBURIDE (DIABETA) 2.5 mg, Oral, 2 times daily    lancets 33 gauge Misc 1 lancet , Misc.(Non-Drug; Combo Route), Daily    levocetirizine (XYZAL) 5 mg, Oral, Nightly    levothyroxine (SYNTHROID) 150 mcg, Oral, Daily    metoprolol succinate (TOPROL-XL) 50 mg, Oral, Daily    potassium chloride (MICRO-K) 8 mEq CpSR 8 mEq, Oral    predniSONE (DELTASONE) 10 mg  tablet pack Oral, Daily, Take 3 tablets daily on day #1-2, Take 2 tablets daily on days #3-4, Take 1 tablet daily on day #5-6, then stop taking  Dispense: 12 tablet; Refill: 0    predniSONE (DELTASONE) 20 MG tablet Take one daily for 3 days and may repeat for shortness of breath.    REPATHA SURECLICK 140 mg/mL PnIj SMARTSI pre-filled pen syringe SUB-Q Every 2 Weeks    revefenacin (YUPELRI) 175 mcg/3 mL Nebu 1 Dose, Inhalation, Daily    SITagliptin phosphate (JANUVIA) 50 mg, Oral, Daily    sotaloL (BETAPACE) 80 mg, Oral, 2 times daily    torsemide (DEMADEX) 20 mg, Oral    vitamin E 400 Units, Oral, Daily         Review of Systems    Constitutional:  + fevers, +chills, no night sweats, loss of appetite.  HEENT: Denies visual changes, ear pain, sinus congestion, mouth pain or trouble swallowing, sore throat or dental pain.  Neck: Denies neck pain or lumps.  Respiratory: +shortness of breath, no coughing, no wheezing or hemoptysis.  Cardiovascular:  Denies chest pain, palpitations ++ LE edema.  Gastrointestinal: Denies  nausea, vomiting, +constipation  Genitourinary:  Denies dysuria, frequency, urgency or hematuria   Musculoskeletal:  Denies joint pain or swelling, difficulty walking.    Skin:  Denies rash or itching.  VAD:  PIV  Neurologic:  Denies motor or sensory loss, headaches or dizziness.    Psychiatric:  Denies changes in mood or behavior.       OBJECTIVE     Temp:  [97.6 °F (36.4 °C)-99.8 °F (37.7 °C)] 97.6 °F (36.4 °C)  Pulse:  [81-91] 82  Resp:  [17-18] 18  SpO2:  [91 %-95 %] 94 %  BP: (118-156)/(56-74) 118/66         Physical Exam    General:  Obese lady ill-appearing, lying in bed, with her face starting to the left side, on O2 by NC 3 L  Eyes: Eyes with no icterus or injection. Vision grossly normal  Ears: Hearing grossly normal.  Nose: Nares patent  Mouth: Moist mucous membranes, dentition is fair. No ulcerations, erythema or exudates.  Neck: Supple, no tenderness to palpation.  Cardiovascular: Regular  rate and rhythm, systolic murmur+. Trace LE edema  Respiratory:  Mostly clear to auscultation, trace bibasilar crackles  Gastrointestinal:  Slightly distended and obese with active bowel sounds, no tenderness to palpation, no distention.  Genitourinary:  No suprapubic tenderness.  Musculoskeletal:  Moves all extremities with good strength.    Skin:  Warm and dry, no obvious rashes.    Neuro:   Awake, alert, seems confused but able to answer most of the questions appropriately, oriented to self, time and place  Psych: calm     Wounds:   VAD: PIV  ISOLATION: None    CBC LAST 7  Recent Labs   Lab 03/22/24  1619 03/22/24  1630 03/23/24  0606 03/24/24  0543   WBC  --  12.33  12.33 14.65*  14.65* 14.79*  14.79*   RBC  --  4.44  4.44 4.57  4.57 4.34  4.34   HGB  --  13.6  13.6 13.6  13.6 13.1  13.1   HCT 43 42.4  42.4 43.4  43.4 41.6  41.6   MCV  --  96  96 95  95 96  96   MCH  --  30.6  30.6 29.8  29.8 30.2  30.2   MCHC  --  32.1  32.1 31.3*  31.3* 31.5*  31.5*   RDW  --  14.0  14.0 14.2  14.2 14.2  14.2   PLT  --  185  185 170  170 136*  136*   MPV  --  9.5  9.5 9.7  9.7 10.0  10.0   GRAN  --  88.4*  10.9* 88.3*  88.3*  12.9*  12.9* 75.3*  75.3*  11.1*  11.1*   LYMPH  --  4.5*  0.6* 4.8*  4.8*  0.7*  0.7* 11.4*  11.4*  1.7  1.7   MONO  --  6.2  0.8 6.0  6.0  0.9  0.9 12.4  12.4  1.8*  1.8*   BASO  --  0.04 0.04  0.04 0.05  0.05   NRBC  --  0 0  0 0  0       CHEMISTRY LAST 7  Recent Labs   Lab 03/22/24  1619 03/22/24  1630 03/23/24  0606 03/24/24  0543   NA  --  139 138 135*   K  --  3.6 3.5 4.0   CL  --  93* 91* 89*   CO2  --  39* 40* 38*   ANIONGAP  --  7* 7* 8   BUN  --  29* 34* 47*   CREATININE  --  1.4 1.6* 1.8*   GLU  --  161* 161* 131*   CALCIUM  --  8.7 8.7 9.0   PH 7.385  --   --   --    MG  --  1.6 1.6 1.7   ALBUMIN  --  3.7 3.5 3.2*   PROT  --  6.7 6.4 6.1   ALKPHOS  --  69 59 55   ALT  --  7* 6* 6*   AST  --  18 18 18   BILITOT  --  0.6 0.9 0.9  "      Estimated Creatinine Clearance: 28.5 mL/min (A) (based on SCr of 1.8 mg/dL (H)).    INFLAMMATORY/PROCAL  LAST 7  No results for input(s): "PROCAL", "ESR", "CRP" in the last 168 hours.  No results found for: "ESR"  CRP   Date Value Ref Range Status   05/04/2010 4.9 0.1 - 8.2 mg/L Final       PRIOR  MICROBIOLOGY:    No results found for the last 90 days.        LAST 7 DAYS MICROBIOLOGY   Microbiology Results (last 7 days)       Procedure Component Value Units Date/Time    Blood culture [[id number]] Collected: 03/24/24 1147    Order Status: Sent Specimen: Blood     Blood culture [[id number]] Collected: 03/24/24 1146    Order Status: Sent Specimen: Blood     Rapid Organism ID by PCR (from Blood culture) [[id number]]  (Abnormal) Collected: 03/23/24 1306    Order Status: Completed Updated: 03/24/24 0254     Enterococcus faecalis Detected     Enterococcus faecium Not Detected     Listeria monocytogenes Not Detected     Staphylococcus spp. Not Detected     Staphylococcus aureus Not Detected     Staphylococcus epidermidis Not Detected     Staphylococcus lugdunensis Not Detected     Streptococcus species Not Detected     Streptococcus agalactiae Not Detected     Streptococcus pneumoniae Not Detected     Streptococcus pyogenes Not Detected     Acinetobacter calcoaceticus/baumannii complex Not Detected     Bacteroides fragilis Not Detected     Enterobacterales Not Detected     Enterobacter cloacae complex Not Detected     Escherichia coli Not Detected     Klebsiella aerogenes Not Detected     Klebsiella oxytoca Not Detected     Klebsiella pneumoniae group Not Detected     Proteus Not Detected     Salmonella sp Not Detected     Serratia marcescens Not Detected     Haemophilus influenzae Not Detected     Neisseria meningtidis Not Detected     Pseudomonas aeruginosa Not Detected     Stenotrophomonas maltophilia Not Detected     Candida albicans Not Detected     Candida auris Not Detected     Candida glabrata Not Detected     " Hallie krusei Not Detected     Candida parapsilosis Not Detected     Candida tropicalis Not Detected     Cryptococcus neoformans/gattii Not Detected     CTX-M (ESBL ) Test not applicable     IMP (Carbapenem resistant) Test not applicable     KPC resistance gene (Carbapenem resistant) Test not applicable     mcr-1  Test not applicable     mec A/C  Test not applicable     mec A/C and MREJ (MRSA) gene Test not applicable     NDM (Carbapenem resistant) Test not applicable     OXA-48-like (Carbapenem resistant) Test not applicable     van A/B (VRE gene) Not Detected     VIM (Carbapenem resistant) Test not applicable    Blood culture [[id number]] Collected: 03/23/24 1306    Order Status: Completed Specimen: Blood Updated: 03/24/24 0232     Blood Culture, Routine Gram stain arlen bottle: Gram positive cocci      Results called to and read back by: Rylee Kelly RN on [id number]      03/24/2024  01:39 KS3      Gram stain aer bottle: Gram positive cocci      Positive results previously called 03/24/2024  02:32 KS3    Blood culture [[id number]] Collected: 03/23/24 1306    Order Status: Completed Specimen: Blood from Peripheral, Antecubital, Left Updated: 03/24/24 0222     Blood Culture, Routine Gram stain aer bottle: Gram positive cocci      Positive results previously called 03/24/2024  02:22 KS3              CURRENT/PREVIOUS VISIT EKG  Results for orders placed or performed during the hospital encounter of 03/22/24   EKG 12-lead    Collection Time: 03/22/24  4:27 PM   Result Value Ref Range    QRS Duration 168 ms    OHS QTC Calculation 528 ms    Narrative    Test Reason : R07.9,    Vent. Rate : 090 BPM     Atrial Rate : 090 BPM     P-R Int : 220 ms          QRS Dur : 168 ms      QT Int : 432 ms       P-R-T Axes : 015 -63 090 degrees     QTc Int : 528 ms    Atrial-sensed ventricular-paced rhythm with prolonged AV conduction  Abnormal ECG  When compared with ECG of 10-SEP-2022 23:53,  Vent. rate has increased BY  30  BPM    Referred By: DANISH HENDERSON           Confirmed By:        Significant Labs: All pertinent labs within the past 24 hours have been reviewed.     Significant Imaging: I have reviewed all relevant and available imaging results/findings within the past 24 hours.    CXR    CT head    ECHO:     Left Ventricle: The left ventricle is normal in size. Normal wall thickness. Normal wall motion. Septal motion is consistent with pacing. There is normal systolic function with a visually estimated ejection fraction of 60 - 65%. There is normal diastolic function.    Right Ventricle: Normal right ventricular cavity size. Wall thickness is normal. Right ventricle wall motion  is normal. Systolic function is normal. Pacemaker lead present in the ventricle.    Right Atrium: Multiple leads present in the right atrium.    Mitral Valve: There is severe bileaflet sclerosis. There is severe anterior mitral annular calcification present. There is normal leaflet mobility. There is mild to moderate regurgitation.    Tricuspid Valve: There is moderate regurgitation with a centrally directed jet.    Pulmonary Artery: There is moderate pulmonary hypertension. The estimated pulmonary artery systolic pressure is 63 mmHg.    IVC/SVC: Elevated venous pressure at 15 mmHg.      Lourdes Redding MD  Date of Service: 03/24/2024  12:23 PM

## 2024-03-24 NOTE — ASSESSMENT & PLAN NOTE
Patient presented to outside hospital for chest pain dyspnea on exertion, troponins elevated x2.    Troponins trended and remained elevated but stable  Cardiology consult appreciated  Echo reviewed  Telemetry monitoring  Stop heparin today - VTE prophylaxis lovenox

## 2024-03-24 NOTE — PROGRESS NOTES
Pharmacokinetic Initial Assessment: IV Vancomycin    Assessment/Plan:    Initiate intravenous vancomycin with loading dose of 200 mg once followed by a maintenance dose of vancomycin 750mg IV every 24 hours  Desired empiric serum trough concentration is 15 to 20 mcg/mL  Draw vancomycin trough level 60 min prior to third dose on 3-26 at approximately 1000  Pharmacy will continue to follow and monitor vancomycin.      Please contact pharmacy at extension [phone] with any questions regarding this assessment.     Thank you for the consult,   Valerio Mercedes       Patient brief summary:  Tereza Larose is a 73 y.o. female initiated on antimicrobial therapy with IV Vancomycin for treatment of suspected bacteremia    Drug Allergies:   Review of patient's allergies indicates:   Allergen Reactions    Metformin Diarrhea     Diarrhea      Corn Itching     Other reaction(s): Sneezing  Other reaction(s): Rhinorrhea    Potato starch Hives     Other reaction(s): Sneezing  Other reaction(s): Rhinorrhea    Pravastatin Other (See Comments)     Muscle pain    Statins-hmg-coa reductase inhibitors Other (See Comments)    Hydrochlorothiazide Other (See Comments)     weakness    Welchol [colesevelam] Other (See Comments)     Weakness        Actual Body Weight:   87 kg    Renal Function:   Estimated Creatinine Clearance: 28.5 mL/min (A) (based on SCr of 1.8 mg/dL (H)).,     Dialysis Method (if applicable):  N/A    CBC (last 72 hours):  Recent Labs   Lab Result Units 03/22/24  1630 03/23/24  0606 03/24/24  0543   WBC K/uL 12.33  12.33 14.65*  14.65* 14.79*  14.79*   Hemoglobin g/dL 13.6  13.6 13.6  13.6 13.1  13.1   Hemoglobin A1C % 7.9*  --   --    Hematocrit % 42.4  42.4 43.4  43.4 41.6  41.6   Platelets K/uL 185  185 170  170 136*  136*   Gran % % 88.4* 88.3*  88.3* 75.3*  75.3*   Lymph % % 4.5* 4.8*  4.8* 11.4*  11.4*   Mono % % 6.2 6.0  6.0 12.4  12.4   Eosinophil % % 0.0 0.0  0.0 0.1  0.1   Basophil % % 0.3 0.3  0.3  "0.3  0.3   Differential Method  Automated Automated  Automated Automated  Automated       Metabolic Panel (last 72 hours):  Recent Labs   Lab Result Units 03/22/24  1630 03/23/24  0606 03/24/24  0425 03/24/24  0543   Sodium mmol/L 139 138  --  135*   Potassium mmol/L 3.6 3.5  --  4.0   Chloride mmol/L 93* 91*  --  89*   CO2 mmol/L 39* 40*  --  38*   Glucose mg/dL 161* 161*  --  131*   Glucose, UA   --   --  Negative  --    BUN mg/dL 29* 34*  --  47*   Creatinine mg/dL 1.4 1.6*  --  1.8*   Albumin g/dL 3.7 3.5  --  3.2*   Total Bilirubin mg/dL 0.6 0.9  --  0.9   Alkaline Phosphatase U/L 69 59  --  55   AST U/L 18 18  --  18   ALT U/L 7* 6*  --  6*   Magnesium mg/dL 1.6 1.6  --  1.7   Phosphorus mg/dL 3.7  --   --   --        Drug levels (last 3 results):  No results for input(s): "VANCOMYCINRA", "VANCORANDOM", "VANCOMYCINPE", "VANCOPEAK", "VANCOMYCINTR", "VANCOTROUGH" in the last 72 hours.    Microbiologic Results:  Microbiology Results (last 7 days)       Procedure Component Value Units Date/Time    Blood culture [[id number]]     Order Status: Sent Specimen: Blood     Blood culture [[id number]]     Order Status: Sent Specimen: Blood     Rapid Organism ID by PCR (from Blood culture) [[id number]]  (Abnormal) Collected: 03/23/24 1306    Order Status: Completed Updated: 03/24/24 0254     Enterococcus faecalis Detected     Enterococcus faecium Not Detected     Listeria monocytogenes Not Detected     Staphylococcus spp. Not Detected     Staphylococcus aureus Not Detected     Staphylococcus epidermidis Not Detected     Staphylococcus lugdunensis Not Detected     Streptococcus species Not Detected     Streptococcus agalactiae Not Detected     Streptococcus pneumoniae Not Detected     Streptococcus pyogenes Not Detected     Acinetobacter calcoaceticus/baumannii complex Not Detected     Bacteroides fragilis Not Detected     Enterobacterales Not Detected     Enterobacter cloacae complex Not Detected     Escherichia coli Not " Detected     Klebsiella aerogenes Not Detected     Klebsiella oxytoca Not Detected     Klebsiella pneumoniae group Not Detected     Proteus Not Detected     Salmonella sp Not Detected     Serratia marcescens Not Detected     Haemophilus influenzae Not Detected     Neisseria meningtidis Not Detected     Pseudomonas aeruginosa Not Detected     Stenotrophomonas maltophilia Not Detected     Candida albicans Not Detected     Candida auris Not Detected     Candida glabrata Not Detected     Candida krusei Not Detected     Candida parapsilosis Not Detected     Candida tropicalis Not Detected     Cryptococcus neoformans/gattii Not Detected     CTX-M (ESBL ) Test not applicable     IMP (Carbapenem resistant) Test not applicable     KPC resistance gene (Carbapenem resistant) Test not applicable     mcr-1  Test not applicable     mec A/C  Test not applicable     mec A/C and MREJ (MRSA) gene Test not applicable     NDM (Carbapenem resistant) Test not applicable     OXA-48-like (Carbapenem resistant) Test not applicable     van A/B (VRE gene) Not Detected     VIM (Carbapenem resistant) Test not applicable    Blood culture [[id number]] Collected: 03/23/24 1306    Order Status: Completed Specimen: Blood Updated: 03/24/24 0232     Blood Culture, Routine Gram stain arlen bottle: Gram positive cocci      Results called to and read back by: Rylee Kelly RN on [id number]wing      03/24/2024  01:39 KS3      Gram stain aer bottle: Gram positive cocci      Positive results previously called 03/24/2024  02:32 KS3    Blood culture [[id number]] Collected: 03/23/24 1306    Order Status: Completed Specimen: Blood from Peripheral, Antecubital, Left Updated: 03/24/24 0222     Blood Culture, Routine Gram stain aer bottle: Gram positive cocci      Positive results previously called 03/24/2024  02:22 KS3

## 2024-03-24 NOTE — PROGRESS NOTES
Pharmacist Renal Dose Adjustment Note    Tereza Larose is a 73 y.o. female being treated with the medication Enoxaparin.    Patient Data:    Vital Signs (Most Recent):  Temp: 98.7 °F (37.1 °C) (03/24/24 0351)  Pulse: 89 (03/24/24 0837)  Resp: 18 (03/24/24 0837)  BP: 131/60 (03/24/24 0824)  SpO2: (!) 93 % (03/24/24 0837) Vital Signs (72h Range):  Temp:  [97.3 °F (36.3 °C)-102.5 °F (39.2 °C)]   Pulse:  [81-94]   Resp:  [17-23]   BP: (111-172)/(54-86)   SpO2:  [91 %-98 %]      Recent Labs   Lab 03/22/24  1630 03/23/24  0606 03/24/24  0543   CREATININE 1.4 1.6* 1.8*     Serum creatinine: 1.8 mg/dL (H) 03/24/24 0543  Estimated creatinine clearance: 28.5 mL/min (A)    Enoxaparin 40 mg Q24H will be changed to Enoxaparin 30 mg Q24H per renal dose adjustment policy.    Pharmacist's Name: Nva Pettit  Pharmacist's Extension: [phone]

## 2024-03-24 NOTE — CARE UPDATE
03/24/24 0837   Patient Assessment/Suction   Level of Consciousness (AVPU) alert   Respiratory Effort Normal;Unlabored   Expansion/Accessory Muscles/Retractions no use of accessory muscles   All Lung Fields Breath Sounds clear;diminished   Rhythm/Pattern, Respiratory unlabored   Cough Frequency no cough   Skin Integrity   $ Wound Care Tech Time 15 min   Area Observed Other (see comments)  (ear)   Skin Appearance other (see comments)  (scab)   Was wound care notified? 03/24/24   PRE-TX-O2   Device (Oxygen Therapy) nasal cannula   $ Is the patient on Low Flow Oxygen? Yes   Flow (L/min) 2   SpO2 (!) 93 %   Pulse Oximetry Type Intermittent   $ Pulse Oximetry - Multiple Charge Pulse Oximetry - Multiple   Pulse 89   Resp 18   Aerosol Therapy   $ Aerosol Therapy Charges Aerosol Treatment   Daily Review of Necessity (SVN) completed   Respiratory Treatment Status (SVN) given   Treatment Route (SVN) mask;oxygen   Patient Position (SVN) HOB elevated   Post Treatment Assessment (SVN) breath sounds unchanged   Signs of Intolerance (SVN) none   Breath Sounds Post-Respiratory Treatment   Throughout All Fields Post-Treatment All Fields   Throughout All Fields Post-Treatment no change   Post-treatment Heart Rate (beats/min) 82   Post-treatment Resp Rate (breaths/min) 17   Education   $ Education Bronchodilator;15 min

## 2024-03-24 NOTE — CONSULTS
INPATIENT NEPHROLOGY CONSULT   [facility] NEPHROLOGY    Tereza Larose  03/24/2024    Reason for consultation:    sari    Chief Complaint:   Chief Complaint   Patient presents with    Chest Pain          History of Present Illness:    Per H and P  Ms. Larose is a 73-year-old who presented to outside hospital for dyspnea and chest pain. That time, patient had troponins drawn that were over 200 and 170. She was sent here for further care for NSTEMI. This time, patient is confused. She is oriented to person and time. She has not oriented to place and president. She states she has a lot of medical problems. She told the nurses that she came in for back pain. She told me she came in for nausea. This time, she denies any chest pain. Patient then states that she did go to the ED for chest pain after we asked her that specifically. Unsure what patient's baseline is at this time. No family with her. Per the ED note, she came in for dyspnea and had a recent fall. Workup revealed elevated troponins.         Plan of Care:       Assessment:    Acute kidney injury possibly due to vancomycin toxicity  --hold lasix if creatinine keeps rising  --Avoid NSAIDS, Bey II inhibitors, and other non-essential nephrotoxic agents  --urine eosinophils  --switching Vancomycin  --renal dose medication  --renal us with bladder pvr  --at risk for contrast nephrothy  --quantify proteinuria     Hypothyroid  --pt stopped taking her levothyroxine months ago  --check tsh and free T4  --cpk      Enterococcus Faecalis bacteremia   --abx per ID  --significant valvular disease.  Consider DELORIS.  Defer to ID    Hypertension  --continue metoprolol  --low salt diet    Thank you for allowing us to participate in this patient's care. We will continue to follow.    Vital Signs:  Temp Readings from Last 3 Encounters:   03/24/24 97.6 °F (36.4 °C) (Oral)   03/22/24 98.1 °F (36.7 °C)   10/17/23 98.2 °F (36.8 °C) (Oral)       Pulse Readings from Last 3 Encounters:    03/24/24 82   03/22/24 94   01/23/24 77       BP Readings from Last 3 Encounters:   03/24/24 118/66   03/22/24 (!) 166/86   01/23/24 (!) 172/90       Weight:  Wt Readings from Last 3 Encounters:   03/24/24 87 kg (191 lb 12.8 oz)   03/23/24 88.3 kg (194 lb 10.7 oz)   01/23/24 82.5 kg (181 lb 14.1 oz)       Past Medical & Surgical History:  Past Medical History:   Diagnosis Date    Abnormal EKG 9/26/2012    Allergy     Arthritis     Asthma     Brain bleed     Colon polyps 11/6/2020    COPD (chronic obstructive pulmonary disease)     Depression     Diabetes mellitus type II     Diverticulitis     Fatty liver     GERD (gastroesophageal reflux disease)     Goiter     s/p thyroidectomy    Heart murmur     Hemiparesis affecting right side as late effect of cerebrovascular accident (CVA) 7/26/2022    Hernia of abdominal wall     History of intracranial hemorrhage 6/15/2013    History of non-ST elevation myocardial infarction (NSTEMI) 6/15/2013    Hyperlipidemia     Hypertension     Lactic acidosis 1/11/2020    Metabolic syndrome 6/14/2012    Myocardial infarction     On home oxygen therapy     states uses 2L at night or when sat < 90    Pacemaker     Positive FIT (fecal immunochemical test) 11/6/2020    Severe persistent asthma without complication 4/30/2020    Statin intolerance     Stroke 2012    left hand shaky, loss of balance       Past Surgical History:   Procedure Laterality Date    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/25/2017    Dr Griggs    ADRENALECTOMY      AORTIC VALVE REPLACEMENT  12/09/2016    ARTERIOGRAPHY OF AORTIC ROOT N/A 9/12/2022    Procedure: ARTERIOGRAM, AORTIC ROOT;  Surgeon: Marko Batres MD;  Location: [facility] CATH/EP LAB;  Service: Cardiology;  Laterality: N/A;    arthroscopy lt knee Right     BLADDER REPAIR      sling    BREAST BIOPSY Bilateral     benign    COLONOSCOPY  2004    10 year recheck    COLONOSCOPY N/A 11/6/2020    Procedure: COLONOSCOPY;  Surgeon: Yakelin Lowery MD;  Location: [facility];   Service: Endoscopy;  Laterality: N/A;    CORONARY ANGIOGRAPHY INCLUDING BYPASS GRAFTS WITH CATHETERIZATION OF LEFT HEART Left 9/12/2022    Procedure: Left heart cath including bypass graft, with left heart catheterization;  Surgeon: Marko Batres MD;  Location: [facility] CATH/EP LAB;  Service: Cardiology;  Laterality: Left;    CORONARY ARTERY BYPASS GRAFT  12/09/2016    X3    EPIDURAL STEROID INJECTION INTO LUMBAR SPINE N/A 7/27/2021    Procedure: Injection-steroid-epidural-lumbar;  Surgeon: Valerio Jay MD;  Location: [facility] OR;  Service: Pain Management;  Laterality: N/A;  L5-S1     HYSTERECTOMY      INSERTION OF PACEMAKER Left 1/13/2020    Procedure: INSERTION, PACEMAKER;  Surgeon: Marko Batres MD;  Location: [facility] CATH/EP LAB;  Service: Cardiology;  Laterality: Left;    OOPHORECTOMY      RIGHT HEART CATHETERIZATION Right 9/12/2022    Procedure: INSERTION, CATHETER, RIGHT HEART;  Surgeon: Marko Batres MD;  Location: [facility] CATH/EP LAB;  Service: Cardiology;  Laterality: Right;    THYROIDECTOMY         Past Social History:  Social History     Socioeconomic History    Marital status:    Tobacco Use    Smoking status: Never    Smokeless tobacco: Never   Substance and Sexual Activity    Alcohol use: Not Currently     Comment: seldom    Drug use: No    Sexual activity: Never     Social Determinants of Health     Financial Resource Strain: Low Risk  (9/11/2022)    Overall Financial Resource Strain (CARDIA)     Difficulty of Paying Living Expenses: Not hard at all   Food Insecurity: No Food Insecurity (9/11/2022)    Hunger Vital Sign     Worried About Running Out of Food in the Last Year: Never true     Ran Out of Food in the Last Year: Never true   Recent Concern: Food Insecurity - Food Insecurity Present (7/26/2022)    Hunger Vital Sign     Worried About Running Out of Food in the Last Year: Sometimes true     Ran Out of Food in the Last Year: Sometimes true   Transportation Needs: No Transportation Needs  (9/11/2022)    PRAPARE - Transportation     Lack of Transportation (Medical): No     Lack of Transportation (Non-Medical): No   Physical Activity: Inactive (9/11/2022)    Exercise Vital Sign     Days of Exercise per Week: 0 days     Minutes of Exercise per Session: 0 min   Stress: Stress Concern Present (9/11/2022)    [employer] - Occupational Stress Questionnaire     Feeling of Stress : To some extent   Social Connections: Moderately Integrated (9/11/2022)    Social Connection and Isolation Panel [NHANES]     Frequency of Communication with Friends and Family: More than three times a week     Frequency of Social Gatherings with Friends and Family: More than three times a week     Attends Zoroastrian Services: 1 to 4 times per year     Active Member of Clubs or Organizations: No     Attends Club or Organization Meetings: Never     Marital Status:    Recent Concern: Social Connections - Moderately Isolated (7/26/2022)    Social Connection and Isolation Panel [NHANES]     Frequency of Communication with Friends and Family: More than three times a week     Frequency of Social Gatherings with Friends and Family: Once a week     Attends Zoroastrian Services: Never     Active Member of Clubs or Organizations: No     Attends Club or Organization Meetings: Never     Marital Status:    Housing Stability: Unknown (9/11/2022)    Housing Stability Vital Sign     Unable to Pay for Housing in the Last Year: No     Unstable Housing in the Last Year: No       Medications:  No current facility-administered medications on file prior to encounter.     Current Outpatient Medications on File Prior to Encounter   Medication Sig Dispense Refill    albuterol (PROVENTIL) 2.5 mg /3 mL (0.083 %) nebulizer solution Take 3 mLs (2.5 mg total) by nebulization every 4 (four) hours as needed for Shortness of Breath or Wheezing. 30 each 11    albuterol (PROVENTIL/VENTOLIN HFA) 90 mcg/actuation inhaler Inhale  1-2 puffs into the lungs every 4 (four) hours as needed for Wheezing. Inhale 2 puffs by mouth into lungs every 4 hours as needed 108 g 3    alcohol swabs (BD ALCOHOL SWABS) PadM Apply 1 each topically as needed. 100 each 3    amLODIPine (NORVASC) 10 MG tablet TAKE 1 TABLET BY MOUTH EVERY DAY FOR SYSTOLIC BLOOD PRESSURE GREATER THAN 120 90 tablet 3    arformoteroL (BROVANA) 15 mcg/2 mL Nebu Take 2 mLs (15 mcg total) by nebulization 2 (two) times a day. Controller 60 each 11    aspirin (ECOTRIN) 81 MG EC tablet Take 81 mg by mouth once daily.      bempedoic acid (NEXLETOL) 180 mg Tab Take 1 tablet by mouth once daily.      blood glucose control, low (TRUE METRIX LEVEL 1) Soln Check glucometer accuracy at least once a week 1 each 3    blood sugar diagnostic Strp True Metrix Test Strips test once a day 100 strip 3    blood-glucose meter (TRUE METRIX GLUCOSE METER) Misc True Metrix Glucose Meter 1 each 0    budesonide (PULMICORT) 0.5 mg/2 mL nebulizer solution Take 2 mLs (0.5 mg total) by nebulization 2 (two) times daily. Controller 120 mL 11    cholecalciferol, vitamin D3, (VITAMIN D3) 25 mcg (1,000 unit) capsule Take 1,000 Units by mouth once daily.      colchicine, gout, (COLCRYS) 0.6 mg tablet Take 1 tablet (0.6 mg total) by mouth once daily. Day 1: 1.2 mg at the first sign of flare, followed by 0.6 mg after 1 hour; maximum total dose: 1.8 mg/day on day Day 2 and thereafter: 0.6 mg once or twice daily until flare resolves 30 tablet 0    cyanocobalamin (VITAMIN B-12) 1000 MCG tablet Take 100 mcg by mouth once daily.      evolocumab (REPATHA SYRINGE) 140 mg/mL Syrg Inject 140 mg into the skin every 14 (fourteen) days.       glyBURIDE (DIABETA) 2.5 MG tablet Take 2.5 mg by mouth 2 (two) times daily.      lancets 33 gauge Misc 1 lancet by Misc.(Non-Drug; Combo Route) route once daily. 100 each 3    levocetirizine (XYZAL) 5 MG tablet Take 1 tablet (5 mg total) by mouth every evening. 90 tablet 3    levothyroxine  (SYNTHROID) 150 MCG tablet Take 150 mcg by mouth once daily.      metoprolol succinate (TOPROL-XL) 50 MG 24 hr tablet Take 1 tablet (50 mg total) by mouth once daily. 30 tablet 11    potassium chloride (MICRO-K) 8 mEq CpSR Take 8 mEq by mouth.      predniSONE (DELTASONE) 10 mg tablet pack Take by mouth once daily. Take 3 tablets daily on day #1-2, Take 2 tablets daily on days #3-4, Take 1 tablet daily on day #5-6, then stop taking  Dispense: 12 tablet; Refill: 0 12 tablet 0    predniSONE (DELTASONE) 20 MG tablet Take one daily for 3 days and may repeat for shortness of breath. 12 tablet 2    REPATHA SURECLICK 140 mg/mL PnIj SMARTSI pre-filled pen syringe SUB-Q Every 2 Weeks      revefenacin (YUPELRI) 175 mcg/3 mL Nebu Inhale 1 Dose into the lungs Daily. 30 each 11    SITagliptin (JANUVIA) 50 MG Tab Take 1 tablet (50 mg total) by mouth once daily. 90 tablet 1    sotaloL (BETAPACE) 80 MG tablet Take 80 mg by mouth 2 (two) times daily.      torsemide (DEMADEX) 20 MG Tab Take 20 mg by mouth.      vitamin E 400 UNIT capsule Take 400 Units by mouth once daily.       Scheduled Meds:   ampicillin IV (PEDS and ADULTS)  2 g Intravenous Q6H    budesonide  0.5 mg Nebulization BID    cefTRIAXone (Rocephin) IV (PEDS and ADULTS)  2 g Intravenous Q12H    folic acid  1 mg Oral Daily    furosemide (LASIX) injection  40 mg Intravenous Daily    heparin (porcine)  5,000 Units Subcutaneous Q8H    levothyroxine  150 mcg Oral Before breakfast    metoprolol succinate  25 mg Oral Daily    multivitamin  1 tablet Oral Daily    mupirocin   Nasal BID    polyethylene glycol  17 g Oral Daily    thiamine  100 mg Oral Daily     Continuous Infusions:  PRN Meds:.acetaminophen, dextrose 50%, dextrose 50%, glucagon (human recombinant), glucose, glucose, insulin aspart U-100, LORazepam, melatonin, morphine, naloxone, nitroGLYCERIN, ondansetron, sodium chloride 0.9%, Pharmacy to dose Vancomycin consult **AND** vancomycin - pharmacy to  "dose    Allergies:  Metformin, Corn, Potato starch, Pravastatin, Statins-hmg-coa reductase inhibitors, Hydrochlorothiazide, and Welchol [colesevelam]    Past Family History:  Reviewed; refer to Hospitalist Admission Note    Review of Systems:  Review of Systems - All 14 systems reviewed and negative, except as noted in HPI    Physical Exam:    /66   Pulse 82   Temp 97.6 °F (36.4 °C) (Oral)   Resp 18   Ht 5' 2" (1.575 m)   Wt 87 kg (191 lb 12.8 oz)   SpO2 (!) 94%   BMI 35.08 kg/m²     General Appearance:    Alert, cooperative, no distress, appears stated age   Head:    Normocephalic, without obvious abnormality, atraumatic   Eyes:    PER, conjunctiva/corneas clear, EOM's intact in both eyes        Throat:   Lips, mucosa, and tongue normal; teeth and gums normal   Back:     Symmetric, no curvature, ROM normal, no CVA tenderness   Lungs:     Clear to auscultation bilaterally, respirations unlabored   Chest wall:    No tenderness or deformity   Heart:    Regular rate and rhythm, S1 and S2 normal, no murmur, rub   or gallop   Abdomen:     Soft, non-tender, bowel sounds active all four quadrants,     no masses, no organomegaly   Extremities:   Extremities normal, atraumatic, no cyanosis or edema   Pulses:   2+ and symmetric all extremities   MSK:   No joint or muscle swelling, tenderness or deformity   Skin:   Skin color, texture, turgor normal, no rashes or lesions   Neurologic:   CNII-XII intact, normal strength and sensation       Throughout.  No flap     Results:  Lab Results   Component Value Date     (L) 03/24/2024    K 4.0 03/24/2024    CL 89 (L) 03/24/2024    CO2 38 (H) 03/24/2024    BUN 47 (H) 03/24/2024    CREATININE 1.8 (H) 03/24/2024    CALCIUM 9.0 03/24/2024    ANIONGAP 8 03/24/2024    ESTGFRAFRICA >60.0 09/16/2020    EGFRNONAA >60.0 09/16/2020       Lab Results   Component Value Date    CALCIUM 9.0 03/24/2024    PHOS 3.7 03/22/2024       Recent Labs   Lab 03/24/24  0543   WBC 14.79*  " 14.79*   RBC 4.34  4.34   HGB 13.1  13.1   HCT 41.6  41.6   *  136*   MCV 96  96   MCH 30.2  30.2   MCHC 31.5*  31.5*          I have personally reviewed pertinent radiological imaging and reports.    Patient care was time spent personally by me on the following activities:   Obtaining a history  Examination of patient.  Providing medical care at the patients bedside.  Developing a treatment plan with patient or surrogate and bedside caregivers  Ordering and reviewing laboratory studies, radiographic studies, pulse oximetry.  Ordering and performing treatments and interventions.  Evaluation of patient's response to treatment.  Discussions with consultants while on the unit and immediately available to the patient.  Re-evaluation of the patient's condition.  Documentation in the medical record.     Suresh Richards MD  Nephrology  [facility] [place]  [phone]

## 2024-03-24 NOTE — PROGRESS NOTES
[facility]   Cardiology Note    Consult Requested By: JOSE MIGUEL  Reason for Consult: elevated troponin    SUBJECTIVE:     History of Present Illness: The pt is 72 y/o female pt of . The pt was trasnferred to [facility] from [place] for elevated trop. Confusion. Pmh. CAD/CABG,2022-stent to SVG/RCA , Biotronic PPM ,COPD AVR, per ER report pt has had some HUGO and recent fall. WBC 14.86 cr 1.6 egfr 33 trop downtrending 136 cxr midl pulm congestion, temp last pm , tmax 102.5     3/24--PT is oriented to self, calm, denies chest pain, LE slightly edematous, abdomen slightly distended, some JVD noted. BP stable no fever overnight TMAX since admit 102.5. Blood cultures pos. U/o net 240cc WBC 14.79 cr 1.8 albumen 3.2  BNP 1806 but 2 days ago was 900 in [place] ER   Review of patient's allergies indicates:   Allergen Reactions    Metformin Diarrhea     Diarrhea      Corn Itching     Other reaction(s): Sneezing  Other reaction(s): Rhinorrhea    Potato starch Hives     Other reaction(s): Sneezing  Other reaction(s): Rhinorrhea    Pravastatin Other (See Comments)     Muscle pain    Statins-hmg-coa reductase inhibitors Other (See Comments)    Hydrochlorothiazide Other (See Comments)     weakness    Welchol [colesevelam] Other (See Comments)     Weakness        Past Medical History:   Diagnosis Date    Abnormal EKG 9/26/2012    Allergy     Arthritis     Asthma     Brain bleed     Colon polyps 11/6/2020    COPD (chronic obstructive pulmonary disease)     Depression     Diabetes mellitus type II     Diverticulitis     Fatty liver     GERD (gastroesophageal reflux disease)     Goiter     s/p thyroidectomy    Heart murmur     Hemiparesis affecting right side as late effect of cerebrovascular accident (CVA) 7/26/2022    Hernia of abdominal wall     History of intracranial hemorrhage 6/15/2013    History of non-ST elevation myocardial infarction (NSTEMI) 6/15/2013    Hyperlipidemia     Hypertension     Lactic acidosis  1/11/2020    Metabolic syndrome 6/14/2012    Myocardial infarction     On home oxygen therapy     states uses 2L at night or when sat < 90    Pacemaker     Positive FIT (fecal immunochemical test) 11/6/2020    Severe persistent asthma without complication 4/30/2020    Statin intolerance     Stroke 2012    left hand shaky, loss of balance     Past Surgical History:   Procedure Laterality Date    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/25/2017    Dr Griggs    ADRENALECTOMY      AORTIC VALVE REPLACEMENT  12/09/2016    ARTERIOGRAPHY OF AORTIC ROOT N/A 9/12/2022    Procedure: ARTERIOGRAM, AORTIC ROOT;  Surgeon: Marko Batres MD;  Location: [facility] CATH/EP LAB;  Service: Cardiology;  Laterality: N/A;    arthroscopy lt knee Right     BLADDER REPAIR      sling    BREAST BIOPSY Bilateral     benign    COLONOSCOPY  2004    10 year recheck    COLONOSCOPY N/A 11/6/2020    Procedure: COLONOSCOPY;  Surgeon: Yakelin Lowery MD;  Location: [facility] ENDO;  Service: Endoscopy;  Laterality: N/A;    CORONARY ANGIOGRAPHY INCLUDING BYPASS GRAFTS WITH CATHETERIZATION OF LEFT HEART Left 9/12/2022    Procedure: Left heart cath including bypass graft, with left heart catheterization;  Surgeon: Marko Batres MD;  Location: [facility] CATH/EP LAB;  Service: Cardiology;  Laterality: Left;    CORONARY ARTERY BYPASS GRAFT  12/09/2016    X3    EPIDURAL STEROID INJECTION INTO LUMBAR SPINE N/A 7/27/2021    Procedure: Injection-steroid-epidural-lumbar;  Surgeon: Valerio Jay MD;  Location: [facility] OR;  Service: Pain Management;  Laterality: N/A;  L5-S1     HYSTERECTOMY      INSERTION OF PACEMAKER Left 1/13/2020    Procedure: INSERTION, PACEMAKER;  Surgeon: Marko Batres MD;  Location: [facility] CATH/EP LAB;  Service: Cardiology;  Laterality: Left;    OOPHORECTOMY      RIGHT HEART CATHETERIZATION Right 9/12/2022    Procedure: INSERTION, CATHETER, RIGHT HEART;  Surgeon: Marko Batres MD;  Location: [facility] CATH/EP LAB;  Service: Cardiology;  Laterality: Right;    THYROIDECTOMY        Family History   Problem Relation Age of Onset    Arthritis Mother     Diabetes Mother     Heart disease Mother     Hypertension Mother     Hypertension Father     Heart disease Father     Mental illness Father     Asthma Sister     Diabetes Sister     Hypertension Sister     Asthma Son     COPD Son     Depression Son     Diabetes Son     Hypertension Son     Stroke Son     Diabetes Maternal Uncle     Heart disease Maternal Uncle     Mental illness Paternal Aunt     Cancer Paternal Aunt     Heart disease Paternal Aunt     Hypertension Paternal Aunt     Heart disease Paternal Uncle     Hypertension Maternal Grandmother     Mental illness Maternal Grandmother     Aneurysm Maternal Grandfather     Mental illness Paternal Grandmother     Heart disease Paternal Grandfather     Melanoma Neg Hx     Psoriasis Neg Hx     Lupus Neg Hx     Eczema Neg Hx      Social History     Tobacco Use    Smoking status: Never    Smokeless tobacco: Never   Substance Use Topics    Alcohol use: Not Currently     Comment: seldom    Drug use: No       Review of Systems:  Review of Systems   Constitutional:  Positive for fever.   Respiratory:  Positive for shortness of breath. Negative for cough and hemoptysis.    Cardiovascular:  Positive for leg swelling. Negative for chest pain.   Gastrointestinal:  Negative for heartburn, nausea and vomiting.   Neurological:  Positive for weakness.   Psychiatric/Behavioral:  Positive for hallucinations.        OBJECTIVE:     Vital Signs (Most Recent)  Temp: 98.7 °F (37.1 °C) (03/24/24 0351)  Pulse: 86 (03/24/24 0824)  Resp: 17 (03/24/24 0824)  BP: 131/60 (03/24/24 0824)  SpO2: 95 % (03/24/24 0824)    Vital Signs Range (Last 24H):  Temp:  [97.3 °F (36.3 °C)-99.8 °F (37.7 °C)]   Pulse:  [81-91]   Resp:  [17-18]   BP: (111-156)/(54-74)   SpO2:  [91 %-96 %]     I & O (Last 24H):    Intake/Output Summary (Last 24 hours) at 3/24/2024 0832  Last data filed at 3/24/2024 0350  Gross per 24 hour   Intake 210 ml    Output 450 ml   Net -240 ml         Current Diet:     Current Diet Order   Procedures    Diet Cardiac Standard Tray     Order Specific Question:   Tray type:     Answer:   Standard Tray        Allergies:  Review of patient's allergies indicates:   Allergen Reactions    Metformin Diarrhea     Diarrhea      Corn Itching     Other reaction(s): Sneezing  Other reaction(s): Rhinorrhea    Potato starch Hives     Other reaction(s): Sneezing  Other reaction(s): Rhinorrhea    Pravastatin Other (See Comments)     Muscle pain    Statins-hmg-coa reductase inhibitors Other (See Comments)    Hydrochlorothiazide Other (See Comments)     weakness    Welchol [colesevelam] Other (See Comments)     Weakness        Meds:  Scheduled Meds:   budesonide  0.5 mg Nebulization BID    enoxparin  1 mg/kg Subcutaneous Q12H (prophylaxis, 0900/2100)    folic acid  1 mg Oral Daily    levothyroxine  150 mcg Oral Before breakfast    metoprolol succinate  25 mg Oral Daily    multivitamin  1 tablet Oral Daily    mupirocin   Nasal BID    thiamine  100 mg Oral Daily     Continuous Infusions:  PRN Meds:acetaminophen, dextrose 50%, dextrose 50%, glucagon (human recombinant), glucose, glucose, insulin aspart U-100, LORazepam, melatonin, morphine, naloxone, nitroGLYCERIN, ondansetron, sodium chloride 0.9%    Oxygen/Ventilator Data (Last 24H):  (if applicable)            Hemodynamic Parameters (Last 24H):   (if applicable)        Laboratory and Radiology Data:  Recent Results (from the past 24 hour(s))   POCT glucose    Collection Time: 03/23/24 11:59 AM   Result Value Ref Range    POC Glucose 211 (H) 70 - 110   Blood culture    Collection Time: 03/23/24  1:06 PM    Specimen: Blood   Result Value Ref Range    Blood Culture, Routine Gram stain arlen bottle: Gram positive cocci     Blood Culture, Routine       Results called to and read back by: Rylee Kelly RN on [id number]    Blood Culture, Routine 03/24/2024  01:39 KS3     Blood Culture, Routine Gram stain  aer bottle: Gram positive cocci     Blood Culture, Routine       Positive results previously called 03/24/2024  02:32 KS3   Blood culture    Collection Time: 03/23/24  1:06 PM    Specimen: Peripheral, Antecubital, Left; Blood   Result Value Ref Range    Blood Culture, Routine Gram stain aer bottle: Gram positive cocci     Blood Culture, Routine       Positive results previously called 03/24/2024  02:22 KS3   APTT    Collection Time: 03/23/24  1:06 PM   Result Value Ref Range    aPTT 52.9 (H) 21.0 - 32.0 sec   Brain natriuretic peptide    Collection Time: 03/23/24  1:06 PM   Result Value Ref Range    BNP 1,806 (H) 0 - 99 pg/mL   Rapid Organism ID by PCR (from Blood culture)    Collection Time: 03/23/24  1:06 PM   Result Value Ref Range    Enterococcus faecalis Detected (A) Not Detected    Enterococcus faecium Not Detected Not Detected    Listeria monocytogenes Not Detected Not Detected    Staphylococcus spp. Not Detected Not Detected    Staphylococcus aureus Not Detected Not Detected    Staphylococcus epidermidis Not Detected Not Detected    Staphylococcus lugdunensis Not Detected Not Detected    Streptococcus species Not Detected Not Detected    Streptococcus agalactiae Not Detected Not Detected    Streptococcus pneumoniae Not Detected Not Detected    Streptococcus pyogenes Not Detected Not Detected    Acinetobacter calcoaceticus/baumannii complex Not Detected Not Detected    Bacteroides fragilis Not Detected Not Detected    Enterobacterales Not Detected Not Detected    Enterobacter cloacae complex Not Detected Not Detected    Escherichia coli Not Detected Not Detected    Klebsiella aerogenes Not Detected Not Detected    Klebsiella oxytoca Not Detected Not Detected    Klebsiella pneumoniae group Not Detected Not Detected    Proteus Not Detected Not Detected    Salmonella sp Not Detected Not Detected    Serratia marcescens Not Detected Not Detected    Haemophilus influenzae Not Detected Not Detected    Neisseria  meningtidis Not Detected Not Detected    Pseudomonas aeruginosa Not Detected Not Detected    Stenotrophomonas maltophilia Not Detected Not Detected    Candida albicans Not Detected Not Detected    Candida auris Not Detected Not Detected    Candida glabrata Not Detected Not Detected    Candida krusei Not Detected Not Detected    Candida parapsilosis Not Detected Not Detected    Candida tropicalis Not Detected Not Detected    Cryptococcus neoformans/gattii Not Detected Not Detected    CTX-M (ESBL ) Test not applicable Not Detected    IMP (Carbapenem resistant) Test not applicable Not Detected    KPC resistance gene (Carbapenem resistant) Test not applicable Not Detected    mcr-1  Test not applicable Not Detected    mec A/C  Test not applicable Not Detected    mec A/C and MREJ (MRSA) gene Test not applicable Not Detected    NDM (Carbapenem resistant) Test not applicable Not Detected    OXA-48-like (Carbapenem resistant) Test not applicable Not Detected    van A/B (VRE gene) Not Detected Not Detected    VIM (Carbapenem resistant) Test not applicable Not Detected   POCT glucose    Collection Time: 03/23/24  8:26 PM   Result Value Ref Range    POC Glucose 199 (H) 70 - 110   Urinalysis, Reflex to Urine Culture Urine, Clean Catch    Collection Time: 03/24/24  4:25 AM    Specimen: Urine   Result Value Ref Range    Specimen UA Urine, Clean Catch     Color, UA Yellow Yellow, Straw, Debbie    Appearance, UA Hazy (A) Clear    pH, UA 6.0 5.0 - 8.0    Specific Gravity, UA 1.020 1.005 - 1.030    Protein, UA 2+ (A) Negative    Glucose, UA Negative Negative    Ketones, UA Negative Negative    Bilirubin (UA) Negative Negative    Occult Blood UA 1+ (A) Negative    Nitrite, UA Negative Negative    Urobilinogen, UA Negative Negative EU/dL    Leukocytes, UA Negative Negative   Urinalysis Microscopic    Collection Time: 03/24/24  4:25 AM   Result Value Ref Range    RBC, UA 2 0 - 4 /hpf    WBC, UA 0 0 - 5 /hpf    Bacteria Few (A)  None-Occ /hpf    Hyaline Casts, UA 0 0-1/lpf /lpf    Microscopic Comment SEE COMMENT    Comprehensive Metabolic Panel (CMP)    Collection Time: 03/24/24  5:43 AM   Result Value Ref Range    Sodium 135 (L) 136 - 145 mmol/L    Potassium 4.0 3.5 - 5.1 mmol/L    Chloride 89 (L) 95 - 110 mmol/L    CO2 38 (H) 23 - 29 mmol/L    Glucose 131 (H) 70 - 110 mg/dL    BUN 47 (H) 8 - 23 mg/dL    Creatinine 1.8 (H) 0.5 - 1.4 mg/dL    Calcium 9.0 8.7 - 10.5 mg/dL    Total Protein 6.1 6.0 - 8.4 g/dL    Albumin 3.2 (L) 3.5 - 5.2 g/dL    Total Bilirubin 0.9 0.1 - 1.0 mg/dL    Alkaline Phosphatase 55 55 - 135 U/L    AST 18 10 - 40 U/L    ALT 6 (L) 10 - 44 U/L    eGFR 29.4 (A) >60 mL/min/1.73 m^2    Anion Gap 8 8 - 16 mmol/L   Magnesium    Collection Time: 03/24/24  5:43 AM   Result Value Ref Range    Magnesium 1.7 1.6 - 2.6 mg/dL   CBC with Automated Differential    Collection Time: 03/24/24  5:43 AM   Result Value Ref Range    WBC 14.79 (H) 3.90 - 12.70 K/uL    RBC 4.34 4.00 - 5.40 M/uL    Hemoglobin 13.1 12.0 - 16.0 g/dL    Hematocrit 41.6 37.0 - 48.5 %    MCV 96 82 - 98 fL    MCH 30.2 27.0 - 31.0 pg    MCHC 31.5 (L) 32.0 - 36.0 g/dL    RDW 14.2 11.5 - 14.5 %    Platelets 136 (L) 150 - 450 K/uL    MPV 10.0 9.2 - 12.9 fL    Immature Granulocytes 0.5 0.0 - 0.5 %    Gran # (ANC) 11.1 (H) 1.8 - 7.7 K/uL    Immature Grans (Abs) 0.08 (H) 0.00 - 0.04 K/uL    Lymph # 1.7 1.0 - 4.8 K/uL    Mono # 1.8 (H) 0.3 - 1.0 K/uL    Eos # 0.0 0.0 - 0.5 K/uL    Baso # 0.05 0.00 - 0.20 K/uL    nRBC 0 0 /100 WBC    Gran % 75.3 (H) 38.0 - 73.0 %    Lymph % 11.4 (L) 18.0 - 48.0 %    Mono % 12.4 4.0 - 15.0 %    Eosinophil % 0.1 0.0 - 8.0 %    Basophil % 0.3 0.0 - 1.9 %    Differential Method Automated    CBC auto differential    Collection Time: 03/24/24  5:43 AM   Result Value Ref Range    WBC 14.79 (H) 3.90 - 12.70 K/uL    RBC 4.34 4.00 - 5.40 M/uL    Hemoglobin 13.1 12.0 - 16.0 g/dL    Hematocrit 41.6 37.0 - 48.5 %    MCV 96 82 - 98 fL    MCH 30.2 27.0 -  31.0 pg    MCHC 31.5 (L) 32.0 - 36.0 g/dL    RDW 14.2 11.5 - 14.5 %    Platelets 136 (L) 150 - 450 K/uL    MPV 10.0 9.2 - 12.9 fL    Immature Granulocytes 0.5 0.0 - 0.5 %    Gran # (ANC) 11.1 (H) 1.8 - 7.7 K/uL    Immature Grans (Abs) 0.08 (H) 0.00 - 0.04 K/uL    Lymph # 1.7 1.0 - 4.8 K/uL    Mono # 1.8 (H) 0.3 - 1.0 K/uL    Eos # 0.0 0.0 - 0.5 K/uL    Baso # 0.05 0.00 - 0.20 K/uL    nRBC 0 0 /100 WBC    Gran % 75.3 (H) 38.0 - 73.0 %    Lymph % 11.4 (L) 18.0 - 48.0 %    Mono % 12.4 4.0 - 15.0 %    Eosinophil % 0.1 0.0 - 8.0 %    Basophil % 0.3 0.0 - 1.9 %    Differential Method Automated          12-lead EKG interpretation:  (if applicable)      Current Cardiac Rhythm:   (if applicable)    Physical Exam:   Physical Exam  Cardiovascular:      Rate and Rhythm: Normal rate and regular rhythm.   Pulmonary:      Effort: Pulmonary effort is normal.      Comments: Diminished breathsounds bilateral bases   Abdominal:      General: There is distension.      Palpations: Abdomen is soft.   Skin:     General: Skin is warm and dry.   Neurological:      Mental Status: She is alert. She is disoriented.       ASSESSMENT/PLAN:   Assessment:   Elevated troponin  Altered mental status  Bacteremia  HFpEF   CAD/CABG  COPD   Hypomagnesemia   Aortic valve replacement  Pulmonary hypertension  VERONA  Biotronic PPM in situ     Last [facility] 2022--stent to origin of SVG to RCA , LAD occluded, normal LIMA to LAD  Normal Circ , nonfunctioning graft to OM PAP 80 mmhg on [facility] with ? Severe MR, Follow up [facility] at that time showed mod MR , severe pulmonary htn with elevated right sided pressures.   Last echo in office in October 2023--EF 55-60% functioning bioprosthetic Aortic valve mod MR PAP 57 mmhg  Repeat echo shows EF 60-65 % mod MR, mod TR, PAP 63 mhg--minimal change from previous echo   No c/o chest pain, although pt is not oriented to place or time.   Uncertain of mental status  baseline  Cr is 1.8  Troponin elevation appears related to infection    Tmax 102.5, WBC elevated  Pt is not SOB at rest but remains on oxygen  Sats 91-93 % on 2 liters    Plan:   Hold amlodipine for now , BP soft.   Discussed with primary team Bacteremia tx per primary team  Gentle diuresis  Biotronic  PPM , last transmission dec 2023, interrogate device  Accurate I/O  Monitor electrolytes  Mag 1.7 replace   + blood cultures with PPM, urine neg,discussed DELORIS with , will coordinate after pt is seen by ID.

## 2024-03-24 NOTE — PROGRESS NOTES
[facility]  Progress Note    Patient Name: Tereza Larose  MRN: [id number]  Patient Class: IP- Inpatient   Admission Date: 3/22/2024  Length of Stay: 2 days  Attending Physician: Amador Freeman Jr., MD  Primary Care Provider: Evan Sánchez MD        Subjective:     Principal Problem:NSTEMI (non-ST elevated myocardial infarction)        HPI:  Ms. Larose is a 73-year-old who presented to outside hospital for dyspnea and chest pain.  That time, patient had troponins drawn that were over 200 and 170.  She was sent here for further care for NSTEMI.  This time, patient is confused.  She is oriented to person and time.  She has not oriented to place and president.  She states she has a lot of medical problems.  She told the nurses that she came in for back pain.  She told me she came in for nausea.  This time, she denies any chest pain.  Patient then states that she did go to the ED for chest pain after we asked her that specifically.  Unsure what patient's baseline is at this time.  No family with her.  Per the ED note, she came in for dyspnea and had a recent fall.  Workup revealed elevated troponins.    Overview/Hospital Course:  No notes on file    Interval History:  Notes reviewed, no acute events overnight.  No further fevers however blood cultures are positive for Enterococcus.  Will start vancomycin.  Discussed with Cardiology and plan for DELORIS Monday or Tuesday.  ID consulted.  Patient denies acute complaints at this time and states she just feels fatigued. Will continue to monitor closely.    Review of Systems   Constitutional:  Negative for chills, fatigue and fever.   HENT:  Negative for congestion, sore throat and trouble swallowing.    Respiratory:  Negative for cough and shortness of breath.    Cardiovascular:  Positive for leg swelling. Negative for chest pain.   Gastrointestinal:  Positive for abdominal distention. Negative for abdominal pain, diarrhea, nausea and  vomiting.   Genitourinary:  Negative for decreased urine volume, difficulty urinating, dysuria, flank pain and urgency.   Musculoskeletal:  Negative for back pain.   Skin:  Negative for color change, pallor, rash and wound.   Neurological:  Positive for weakness (generalized weakness). Negative for dizziness and light-headedness.   Psychiatric/Behavioral:  Negative for agitation, behavioral problems and confusion.    All other systems reviewed and are negative.    Objective:     Vital Signs (Most Recent):  Temp: 97.6 °F (36.4 °C) (03/24/24 1137)  Pulse: 82 (03/24/24 1137)  Resp: 18 (03/24/24 1137)  BP: 118/66 (03/24/24 1137)  SpO2: (!) 94 % (03/24/24 1137) Vital Signs (24h Range):  Temp:  [97.6 °F (36.4 °C)-99.8 °F (37.7 °C)] 97.6 °F (36.4 °C)  Pulse:  [81-91] 82  Resp:  [17-18] 18  SpO2:  [91 %-95 %] 94 %  BP: (118-156)/(56-74) 118/66     Weight: 87 kg (191 lb 12.8 oz)  Body mass index is 35.08 kg/m².    Intake/Output Summary (Last 24 hours) at 3/24/2024 1323  Last data filed at 3/24/2024 0350  Gross per 24 hour   Intake 210 ml   Output 450 ml   Net -240 ml           Physical Exam  Vitals and nursing note reviewed.   Constitutional:       General: She is not in acute distress.     Appearance: Normal appearance. She is well-developed. She is not ill-appearing or diaphoretic.   HENT:      Head: Normocephalic and atraumatic.      Right Ear: External ear normal.      Left Ear: External ear normal.      Nose: Nose normal. No congestion or rhinorrhea.      Mouth/Throat:      Mouth: Mucous membranes are moist.      Pharynx: Oropharynx is clear. No oropharyngeal exudate or posterior oropharyngeal erythema.   Eyes:      General: No scleral icterus.     Conjunctiva/sclera: Conjunctivae normal.      Pupils: Pupils are equal, round, and reactive to light.   Neck:      Vascular: No JVD.   Cardiovascular:      Rate and Rhythm: Normal rate and regular rhythm.      Pulses: Normal pulses.      Heart sounds: Normal heart sounds. No  murmur heard.  Pulmonary:      Effort: Pulmonary effort is normal. No respiratory distress.      Breath sounds: Normal breath sounds. No stridor. No wheezing, rhonchi or rales.   Abdominal:      General: Bowel sounds are normal. There is distension (mild).      Palpations: Abdomen is soft.      Tenderness: There is no abdominal tenderness.   Genitourinary:     Comments: Suprapubic tenderness  Musculoskeletal:         General: No swelling or tenderness. Normal range of motion.      Cervical back: Normal range of motion and neck supple.      Right lower leg: Edema present.      Left lower leg: Edema present.   Skin:     General: Skin is warm and dry.      Capillary Refill: Capillary refill takes 2 to 3 seconds.      Coloration: Skin is not jaundiced or pale.      Findings: No erythema.   Neurological:      General: No focal deficit present.      Mental Status: She is alert and oriented to person, place, and time.      Cranial Nerves: No cranial nerve deficit.      Sensory: No sensory deficit.      Motor: Weakness (generalized weakness) present.   Psychiatric:         Mood and Affect: Mood normal.         Behavior: Behavior normal.         Thought Content: Thought content normal.             Significant Labs: All pertinent labs within the past 24 hours have been reviewed.  CBC:   Recent Labs   Lab 03/22/24  1630 03/23/24  0606 03/24/24  0543   WBC 12.33  12.33 14.65*  14.65* 14.79*  14.79*   HGB 13.6  13.6 13.6  13.6 13.1  13.1   HCT 42.4  42.4 43.4  43.4 41.6  41.6     185 170  170 136*  136*       CMP:   Recent Labs   Lab 03/22/24  1630 03/23/24  0606 03/24/24  0543    138 135*   K 3.6 3.5 4.0   CL 93* 91* 89*   CO2 39* 40* 38*   * 161* 131*   BUN 29* 34* 47*   CREATININE 1.4 1.6* 1.8*   CALCIUM 8.7 8.7 9.0   PROT 6.7 6.4 6.1   ALBUMIN 3.7 3.5 3.2*   BILITOT 0.6 0.9 0.9   ALKPHOS 69 59 55   AST 18 18 18   ALT 7* 6* 6*   ANIONGAP 7* 7* 8         Significant Imaging: I have reviewed all  pertinent imaging results/findings within the past 24 hours.    Assessment/Plan:      * NSTEMI (non-ST elevated myocardial infarction)  Patient presented to outside hospital for chest pain dyspnea on exertion, troponins elevated x2.    Troponins trended and remained elevated but stable  Cardiology consult appreciated  Echo reviewed  Telemetry monitoring  Stop heparin today - VTE prophylaxis lovenox      VERONA (acute kidney injury)  Patient with acute kidney injury/acute renal failure likely due to acute tubular necrosis caused by bacteremia, sepsis, CHF   VERONA is currently worsening. Baseline creatinine  0.9  - Labs reviewed- Renal function/electrolytes with Estimated Creatinine Clearance: 28.5 mL/min (A) (based on SCr of 1.8 mg/dL (H)). according to latest data. Monitor urine output and serial BMP and adjust therapy as needed. Avoid nephrotoxins and renally dose meds for GFR listed above.  Consult nephrology    Generalized weakness  With assoc deconditioning   PT/OT consulted and recommending moderate intensity therapy   Case management consulted and working on dispo planning  Fall precautions  Assist with ADLs      Bacteremia due to Enterococcus  Repeat blood cultures today  Start vancomycin  Consult ID  D/w cardiology and will plan for DELORIS Monday or Tuesday - pacemaker in situ  Cxr and UA neg        COPD (chronic obstructive pulmonary disease)  Patient's COPD is controlled currently.  Patient is currently off COPD Pathway.  Justice p.r.n..  We will monitor respiratory status    Congestive heart failure, unspecified HF chronicity, unspecified heart failure type  Patient is identified as having Diastolic (HFpEF) heart failure that is Chronic. CHF is currently controlled. Latest ECHO performed and demonstrates- Results for orders placed during the hospital encounter of 09/09/22    Echo    Interpretation Summary  · The left ventricle is normal in size with normal systolic function.  · Grade II left ventricular diastolic  "dysfunction.  · The estimated PA systolic pressure is 75 mmHg.  · Normal right ventricular size with normal right ventricular systolic function.  · There is moderate to severe pulmonary hypertension.  · Normal central venous pressure (3 mmHg).  · Moderate left atrial enlargement.  · Moderate tricuspid regurgitation.  · Moderate-to-severe mitral regurgitation.  · The estimated ejection fraction is 60%.  · Mild-to-moderate aortic regurgitation.  · There is moderate aortic valve stenosis.  · Aortic valve area is 0.72 cm2; peak velocity is m/s; mean gradient is 20 mmHg.    No evidence of exacerbation at this time.  We will consult Cardiology for other reasons.    Type 2 diabetes mellitus with circulatory disorder, without long-term current use of insulin  Patient's FSGs are controlled on current medication regimen.  Last A1c reviewed-   Lab Results   Component Value Date    HGBA1C 7.9 (H) 03/22/2024     Most recent fingerstick glucose reviewed- No results for input(s): "POCTGLUCOSE" in the last 24 hours.  Current correctional scale  Low  Maintain anti-hyperglycemic dose as follows-   Antihyperglycemics (From admission, onward)      Start     Stop Route Frequency Ordered    03/22/24 1655  insulin aspart U-100 pen 0-5 Units         -- SubQ Before meals & nightly PRN 03/22/24 1556          Hold Oral hypoglycemics while patient is in the hospital.        Hypertension associated with diabetes  Chronic, controlled.  Latest blood pressure and vitals reviewed-     Temp:  [98.1 °F (36.7 °C)-102.5 °F (39.2 °C)]   Pulse:  [83-94]   Resp:  [18-23]   BP: (119-172)/(62-86)   SpO2:  [92 %-98 %] .   Home meds for hypertension were reviewed and noted below-  Hypertension Medications               amLODIPine (NORVASC) 10 MG tablet TAKE 1 TABLET BY MOUTH EVERY DAY FOR SYSTOLIC BLOOD PRESSURE GREATER THAN 120    metoprolol succinate (TOPROL-XL) 50 MG 24 hr tablet Take 1 tablet (50 mg total) by mouth once daily.    sotaloL (BETAPACE) 80 MG " tablet Take 80 mg by mouth 2 (two) times daily.    torsemide (DEMADEX) 20 MG Tab Take 20 mg by mouth.            While in the hospital, will manage blood pressure as follows; Continue home antihypertensive regimen    Will utilize p.r.n. blood pressure medication only if patient's blood pressure greater than 180/110 and she develops symptoms such as worsening chest pain or shortness of breath.        CAD (coronary artery disease)  Patient with known CAD s/p CABG, which is uncontrolled Will continue  heparin gtt and Statin and monitor for S/Sx of angina/ACS. Continue to monitor on telemetry.   Troponins remained elevated but stable   Cardiology consult pending       Hyperlipidemia associated with type 2 diabetes mellitus  Chronic, stable  Lipid panel reviewed  Cont statin        VTE Risk Mitigation (From admission, onward)           Ordered     heparin (porcine) injection 5,000 Units  Every 8 hours         03/24/24 1332     IP VTE HIGH RISK PATIENT  Once         03/22/24 1556     Place sequential compression device  Until discontinued         03/22/24 1556                    Discharge Planning   FAVIOLA:      Code Status: Full Code   Is the patient medically ready for discharge?:     Reason for patient still in hospital (select all that apply): Patient trending condition, Treatment, and Consult recommendations  Discharge Plan A: Skilled Nursing Facility                  Latha Varela NP  Department of Hospital Medicine   [facility]

## 2024-03-24 NOTE — ASSESSMENT & PLAN NOTE
Patient with acute kidney injury/acute renal failure likely due to acute tubular necrosis caused by bacteremia, sepsis, CHF   VERONA is currently worsening. Baseline creatinine  0.9  - Labs reviewed- Renal function/electrolytes with Estimated Creatinine Clearance: 28.5 mL/min (A) (based on SCr of 1.8 mg/dL (H)). according to latest data. Monitor urine output and serial BMP and adjust therapy as needed. Avoid nephrotoxins and renally dose meds for GFR listed above.  Consult nephrology    3/25 - CMP still in process

## 2024-03-24 NOTE — ASSESSMENT & PLAN NOTE
Repeat blood cultures today  Start vancomycin  Consult ID  D/w cardiology and will plan for DELORIS Monday or Tuesday - pacemaker in situ  Cxr and UA neg

## 2024-03-24 NOTE — ASSESSMENT & PLAN NOTE
With assoc deconditioning   PT/OT consulted and recommending moderate intensity therapy   Case management consulted and working on dispo planning  Fall precautions  Assist with ADLs

## 2024-03-24 NOTE — CARE UPDATE
03/23/24 2109   Patient Assessment/Suction   Level of Consciousness (AVPU) alert   Respiratory Effort Normal;Unlabored   Expansion/Accessory Muscles/Retractions no use of accessory muscles;no retractions   All Lung Fields Breath Sounds clear;diminished   Rhythm/Pattern, Respiratory no shortness of breath reported;unlabored   Cough Frequency infrequent   Cough Type nonproductive   PRE-TX-O2   Device (Oxygen Therapy) nasal cannula   $ Is the patient on Low Flow Oxygen? Yes   Flow (L/min) 2   SpO2 (!) 94 %   Pulse Oximetry Type Intermittent   $ Pulse Oximetry - Single Charge Pulse Oximetry - Single   Pulse 90   Resp 18   Positioning HOB elevated 30 degrees   Positioning   Body Position position changed independently   Positioning/Transfer Devices pillows;in use   Aerosol Therapy   $ Aerosol Therapy Charges Aerosol Treatment   Daily Review of Necessity (SVN) completed   Respiratory Treatment Status (SVN) given   Treatment Route (SVN) mask;oxygen   Patient Position (SVN) HOB elevated   Post Treatment Assessment (SVN) patient reports breathing improved   Signs of Intolerance (SVN) none   Education   $ Education Bronchodilator;15 min

## 2024-03-25 ENCOUNTER — CLINICAL SUPPORT (OUTPATIENT)
Dept: CARDIOLOGY | Facility: HOSPITAL | Age: 74
DRG: 280 | End: 2024-03-25
Attending: INTERNAL MEDICINE
Payer: MEDICARE

## 2024-03-25 ENCOUNTER — ANESTHESIA (OUTPATIENT)
Dept: CARDIOLOGY | Facility: HOSPITAL | Age: 74
DRG: 280 | End: 2024-03-25
Payer: MEDICARE

## 2024-03-25 ENCOUNTER — ANESTHESIA EVENT (OUTPATIENT)
Dept: CARDIOLOGY | Facility: HOSPITAL | Age: 74
DRG: 280 | End: 2024-03-25
Payer: MEDICARE

## 2024-03-25 VITALS
RESPIRATION RATE: 17 BRPM | SYSTOLIC BLOOD PRESSURE: 153 MMHG | HEIGHT: 62 IN | HEART RATE: 71 BPM | BODY MASS INDEX: 37.4 KG/M2 | TEMPERATURE: 98 F | DIASTOLIC BLOOD PRESSURE: 68 MMHG | OXYGEN SATURATION: 96 % | WEIGHT: 203.25 LBS

## 2024-03-25 VITALS — HEART RATE: 44 BPM | SYSTOLIC BLOOD PRESSURE: 99 MMHG | OXYGEN SATURATION: 100 % | DIASTOLIC BLOOD PRESSURE: 50 MMHG

## 2024-03-25 VITALS — BODY MASS INDEX: 37.4 KG/M2 | HEIGHT: 62 IN | WEIGHT: 203.25 LBS

## 2024-03-25 LAB
ALBUMIN SERPL BCP-MCNC: 3.3 G/DL (ref 3.5–5.2)
ALBUMIN SERPL BCP-MCNC: 3.3 G/DL (ref 3.5–5.2)
ALP SERPL-CCNC: 58 U/L (ref 55–135)
ALT SERPL W/O P-5'-P-CCNC: 9 U/L (ref 10–44)
ANION GAP SERPL CALC-SCNC: 10 MMOL/L (ref 8–16)
ANION GAP SERPL CALC-SCNC: 10 MMOL/L (ref 8–16)
AST SERPL-CCNC: 26 U/L (ref 10–40)
BASOPHILS # BLD AUTO: 0.03 K/UL (ref 0–0.2)
BASOPHILS NFR BLD: 0.3 % (ref 0–1.9)
BILIRUB SERPL-MCNC: 0.5 MG/DL (ref 0.1–1)
BNP SERPL-MCNC: 745 PG/ML (ref 0–99)
BSA FOR ECHO PROCEDURE: 2.01 M2
BUN SERPL-MCNC: 57 MG/DL (ref 8–23)
BUN SERPL-MCNC: 57 MG/DL (ref 8–23)
CALCIUM SERPL-MCNC: 9.2 MG/DL (ref 8.7–10.5)
CALCIUM SERPL-MCNC: 9.2 MG/DL (ref 8.7–10.5)
CHLORIDE SERPL-SCNC: 88 MMOL/L (ref 95–110)
CHLORIDE SERPL-SCNC: 88 MMOL/L (ref 95–110)
CK SERPL-CCNC: 22 U/L (ref 20–180)
CO2 SERPL-SCNC: 36 MMOL/L (ref 23–29)
CO2 SERPL-SCNC: 36 MMOL/L (ref 23–29)
CREAT SERPL-MCNC: 1.6 MG/DL (ref 0.5–1.4)
CREAT SERPL-MCNC: 1.6 MG/DL (ref 0.5–1.4)
DIFFERENTIAL METHOD BLD: ABNORMAL
DOP CALC MV VTI: 58.4 CM
E WAVE DECELERATION TIME: 296 MSEC
E/A RATIO: 0.88
EOSINOPHIL # BLD AUTO: 0.1 K/UL (ref 0–0.5)
EOSINOPHIL NFR BLD: 0.6 % (ref 0–8)
EOSINOPHIL URNS QL WRIGHT STN: NORMAL
ERYTHROCYTE [DISTWIDTH] IN BLOOD BY AUTOMATED COUNT: 14.1 % (ref 11.5–14.5)
ERYTHROCYTE [SEDIMENTATION RATE] IN BLOOD BY WESTERGREN METHOD: 50 MM/HR (ref 0–20)
EST. GFR  (NO RACE VARIABLE): 33.8 ML/MIN/1.73 M^2
EST. GFR  (NO RACE VARIABLE): 33.8 ML/MIN/1.73 M^2
GLUCOSE SERPL-MCNC: 115 MG/DL (ref 70–110)
GLUCOSE SERPL-MCNC: 124 MG/DL (ref 70–110)
GLUCOSE SERPL-MCNC: 128 MG/DL (ref 70–110)
GLUCOSE SERPL-MCNC: 128 MG/DL (ref 70–110)
GLUCOSE SERPL-MCNC: 133 MG/DL (ref 70–110)
GLUCOSE SERPL-MCNC: 267 MG/DL (ref 70–110)
HCT VFR BLD AUTO: 38.8 % (ref 37–48.5)
HGB BLD-MCNC: 12.6 G/DL (ref 12–16)
IMM GRANULOCYTES # BLD AUTO: 0.05 K/UL (ref 0–0.04)
IMM GRANULOCYTES NFR BLD AUTO: 0.5 % (ref 0–0.5)
LYMPHOCYTES # BLD AUTO: 1.4 K/UL (ref 1–4.8)
LYMPHOCYTES NFR BLD: 13.7 % (ref 18–48)
MAGNESIUM SERPL-MCNC: 1.9 MG/DL (ref 1.6–2.6)
MCH RBC QN AUTO: 30.7 PG (ref 27–31)
MCHC RBC AUTO-ENTMCNC: 32.5 G/DL (ref 32–36)
MCV RBC AUTO: 94 FL (ref 82–98)
MONOCYTES # BLD AUTO: 1.5 K/UL (ref 0.3–1)
MONOCYTES NFR BLD: 14.9 % (ref 4–15)
MR PISA EROA: 0.05 CM2
MV MEAN GRADIENT: 5 MMHG
MV PEAK A VEL: 1.15 M/S
MV PEAK E VEL: 1.01 M/S
MV PEAK GRADIENT: 13 MMHG
NEUTROPHILS # BLD AUTO: 7.1 K/UL (ref 1.8–7.7)
NEUTROPHILS NFR BLD: 70 % (ref 38–73)
NRBC BLD-RTO: 0 /100 WBC
PHOSPHATE SERPL-MCNC: 3.7 MG/DL (ref 2.7–4.5)
PISA MRMAX VEL: 5.92 M/S
PISA RADIUS: 0.4 CM
PISA VN NYQUIST MS: 0.31 M/S
PISA VN NYQUIST: 0.31 M/S
PLATELET # BLD AUTO: 142 K/UL (ref 150–450)
PMV BLD AUTO: 10.6 FL (ref 9.2–12.9)
POTASSIUM SERPL-SCNC: 5 MMOL/L (ref 3.5–5.1)
POTASSIUM SERPL-SCNC: 5 MMOL/L (ref 3.5–5.1)
PROT SERPL-MCNC: 6.7 G/DL (ref 6–8.4)
RBC # BLD AUTO: 4.11 M/UL (ref 4–5.4)
SODIUM SERPL-SCNC: 134 MMOL/L (ref 136–145)
SODIUM SERPL-SCNC: 134 MMOL/L (ref 136–145)
T4 FREE SERPL-MCNC: 0.31 NG/DL (ref 0.71–1.51)
TSH SERPL DL<=0.005 MIU/L-ACNC: 40.97 UIU/ML (ref 0.34–5.6)
URATE SERPL-MCNC: 11.4 MG/DL (ref 2.4–5.7)
WBC # BLD AUTO: 10.13 K/UL (ref 3.9–12.7)

## 2024-03-25 PROCEDURE — 27000221 HC OXYGEN, UP TO 24 HOURS

## 2024-03-25 PROCEDURE — 85651 RBC SED RATE NONAUTOMATED: CPT | Performed by: INTERNAL MEDICINE

## 2024-03-25 PROCEDURE — 25000003 PHARM REV CODE 250: Performed by: INTERNAL MEDICINE

## 2024-03-25 PROCEDURE — 99233 SBSQ HOSP IP/OBS HIGH 50: CPT | Mod: ,,, | Performed by: INTERNAL MEDICINE

## 2024-03-25 PROCEDURE — 84439 ASSAY OF FREE THYROXINE: CPT | Performed by: INTERNAL MEDICINE

## 2024-03-25 PROCEDURE — 83735 ASSAY OF MAGNESIUM: CPT | Performed by: INTERNAL MEDICINE

## 2024-03-25 PROCEDURE — 63600175 PHARM REV CODE 636 W HCPCS: Performed by: NURSE ANESTHETIST, CERTIFIED REGISTERED

## 2024-03-25 PROCEDURE — 99221 1ST HOSP IP/OBS SF/LOW 40: CPT | Mod: ,,, | Performed by: FAMILY MEDICINE

## 2024-03-25 PROCEDURE — 99900031 HC PATIENT EDUCATION (STAT)

## 2024-03-25 PROCEDURE — 84550 ASSAY OF BLOOD/URIC ACID: CPT | Performed by: INTERNAL MEDICINE

## 2024-03-25 PROCEDURE — 25000003 PHARM REV CODE 250

## 2024-03-25 PROCEDURE — D9220A PRA ANESTHESIA: Mod: CRNA,,, | Performed by: NURSE ANESTHETIST, CERTIFIED REGISTERED

## 2024-03-25 PROCEDURE — 36415 COLL VENOUS BLD VENIPUNCTURE: CPT | Performed by: INTERNAL MEDICINE

## 2024-03-25 PROCEDURE — 84443 ASSAY THYROID STIM HORMONE: CPT | Performed by: INTERNAL MEDICINE

## 2024-03-25 PROCEDURE — 37000008 HC ANESTHESIA 1ST 15 MINUTES: Performed by: INTERNAL MEDICINE

## 2024-03-25 PROCEDURE — 99900035 HC TECH TIME PER 15 MIN (STAT)

## 2024-03-25 PROCEDURE — 63600175 PHARM REV CODE 636 W HCPCS: Mod: UD

## 2024-03-25 PROCEDURE — 25000242 PHARM REV CODE 250 ALT 637 W/ HCPCS: Performed by: NURSE PRACTITIONER

## 2024-03-25 PROCEDURE — 94640 AIRWAY INHALATION TREATMENT: CPT

## 2024-03-25 PROCEDURE — 93312 ECHO TRANSESOPHAGEAL: CPT

## 2024-03-25 PROCEDURE — 25000003 PHARM REV CODE 250: Performed by: NURSE PRACTITIONER

## 2024-03-25 PROCEDURE — 94761 N-INVAS EAR/PLS OXIMETRY MLT: CPT

## 2024-03-25 PROCEDURE — 37000009 HC ANESTHESIA EA ADD 15 MINS: Performed by: INTERNAL MEDICINE

## 2024-03-25 PROCEDURE — 87040 BLOOD CULTURE FOR BACTERIA: CPT | Performed by: STUDENT IN AN ORGANIZED HEALTH CARE EDUCATION/TRAINING PROGRAM

## 2024-03-25 PROCEDURE — 63600175 PHARM REV CODE 636 W HCPCS: Mod: UD | Performed by: NURSE PRACTITIONER

## 2024-03-25 PROCEDURE — 63600175 PHARM REV CODE 636 W HCPCS: Performed by: STUDENT IN AN ORGANIZED HEALTH CARE EDUCATION/TRAINING PROGRAM

## 2024-03-25 PROCEDURE — 97116 GAIT TRAINING THERAPY: CPT | Mod: CQ

## 2024-03-25 PROCEDURE — 82550 ASSAY OF CK (CPK): CPT | Performed by: INTERNAL MEDICINE

## 2024-03-25 PROCEDURE — D9220A PRA ANESTHESIA: Mod: ANES,,, | Performed by: ANESTHESIOLOGY

## 2024-03-25 PROCEDURE — 85025 COMPLETE CBC W/AUTO DIFF WBC: CPT | Performed by: INTERNAL MEDICINE

## 2024-03-25 PROCEDURE — 83880 ASSAY OF NATRIURETIC PEPTIDE: CPT | Performed by: INTERNAL MEDICINE

## 2024-03-25 PROCEDURE — 25000003 PHARM REV CODE 250: Performed by: NURSE ANESTHETIST, CERTIFIED REGISTERED

## 2024-03-25 PROCEDURE — 25000003 PHARM REV CODE 250: Performed by: STUDENT IN AN ORGANIZED HEALTH CARE EDUCATION/TRAINING PROGRAM

## 2024-03-25 PROCEDURE — 36415 COLL VENOUS BLD VENIPUNCTURE: CPT | Performed by: STUDENT IN AN ORGANIZED HEALTH CARE EDUCATION/TRAINING PROGRAM

## 2024-03-25 PROCEDURE — 80053 COMPREHEN METABOLIC PANEL: CPT | Performed by: INTERNAL MEDICINE

## 2024-03-25 PROCEDURE — 84100 ASSAY OF PHOSPHORUS: CPT | Performed by: INTERNAL MEDICINE

## 2024-03-25 PROCEDURE — 21400001 HC TELEMETRY ROOM

## 2024-03-25 RX ORDER — PROPOFOL 10 MG/ML
VIAL (ML) INTRAVENOUS
Status: DISCONTINUED | OUTPATIENT
Start: 2024-03-25 | End: 2024-03-25

## 2024-03-25 RX ORDER — ALLOPURINOL 100 MG/1
100 TABLET ORAL DAILY
Status: DISCONTINUED | OUTPATIENT
Start: 2024-03-25 | End: 2024-03-26 | Stop reason: HOSPADM

## 2024-03-25 RX ADMIN — THIAMINE HCL TAB 100 MG 100 MG: 100 TAB at 10:03

## 2024-03-25 RX ADMIN — HEPARIN SODIUM 5000 UNITS: 5000 INJECTION, SOLUTION INTRAVENOUS; SUBCUTANEOUS at 10:03

## 2024-03-25 RX ADMIN — BUDESONIDE 0.5 MG: 0.5 INHALANT RESPIRATORY (INHALATION) at 08:03

## 2024-03-25 RX ADMIN — CEFTRIAXONE SODIUM 2 G: 2 INJECTION, POWDER, FOR SOLUTION INTRAMUSCULAR; INTRAVENOUS at 01:03

## 2024-03-25 RX ADMIN — METOPROLOL SUCCINATE 25 MG: 25 TABLET, FILM COATED, EXTENDED RELEASE ORAL at 10:03

## 2024-03-25 RX ADMIN — PROPOFOL 50 MG: 10 INJECTION, EMULSION INTRAVENOUS at 02:03

## 2024-03-25 RX ADMIN — SODIUM CHLORIDE: 9 INJECTION, SOLUTION INTRAVENOUS at 02:03

## 2024-03-25 RX ADMIN — MULTIVITAMIN TABLET 1 TABLET: TABLET at 10:03

## 2024-03-25 RX ADMIN — AMPICILLIN SODIUM 2 G: 2 INJECTION, POWDER, FOR SOLUTION INTRAMUSCULAR; INTRAVENOUS at 09:03

## 2024-03-25 RX ADMIN — FUROSEMIDE 40 MG: 10 INJECTION, SOLUTION INTRAMUSCULAR; INTRAVENOUS at 11:03

## 2024-03-25 RX ADMIN — MUPIROCIN 1 G: 20 OINTMENT TOPICAL at 10:03

## 2024-03-25 RX ADMIN — AMPICILLIN SODIUM 2 G: 2 INJECTION, POWDER, FOR SOLUTION INTRAMUSCULAR; INTRAVENOUS at 01:03

## 2024-03-25 RX ADMIN — POLYETHYLENE GLYCOL 3350 17 G: 17 POWDER, FOR SOLUTION ORAL at 10:03

## 2024-03-25 RX ADMIN — HEPARIN SODIUM 5000 UNITS: 5000 INJECTION, SOLUTION INTRAVENOUS; SUBCUTANEOUS at 05:03

## 2024-03-25 RX ADMIN — AMPICILLIN SODIUM 2 G: 2 INJECTION, POWDER, FOR SOLUTION INTRAMUSCULAR; INTRAVENOUS at 05:03

## 2024-03-25 RX ADMIN — LEVOTHYROXINE SODIUM 150 MCG: 0.1 TABLET ORAL at 05:03

## 2024-03-25 RX ADMIN — FOLIC ACID 1 MG: 1 TABLET ORAL at 10:03

## 2024-03-25 RX ADMIN — AMPICILLIN SODIUM 2 G: 2 INJECTION, POWDER, FOR SOLUTION INTRAMUSCULAR; INTRAVENOUS at 10:03

## 2024-03-25 RX ADMIN — CEFTRIAXONE SODIUM 2 G: 2 INJECTION, POWDER, FOR SOLUTION INTRAMUSCULAR; INTRAVENOUS at 04:03

## 2024-03-25 RX ADMIN — ALLOPURINOL 100 MG: 100 TABLET ORAL at 04:03

## 2024-03-25 NOTE — PLAN OF CARE
SW met with pt and informed her of SNF recommendation.. Pt is ok with SNF and has no preference with facility but wants a SNF near home.  However, spouse has to agree because he has to be ok with being home alone.  Pt gave SW permission to call spouse.    Called spouse and he was in agreement with SNF and has no preference, ok with [facility] or [facility].     03/25/24 1607   Post-Acute Status   Post-Acute Authorization Placement   Post-Acute Placement Status Pending post-acute provider review/more information requested

## 2024-03-25 NOTE — PROGRESS NOTES
[facility] Medicine  Progress Note    Patient Name: Tereza Larose  MRN: [id number]  Patient Class: IP- Inpatient   Admission Date: 3/22/2024  Length of Stay: 3 days  Attending Physician: Amador Freeman Jr., MD  Primary Care Provider: Evan Sánchez MD        Subjective:     Principal Problem:NSTEMI (non-ST elevated myocardial infarction)        HPI:  Ms. Larose is a 73-year-old who presented to outside hospital for dyspnea and chest pain.  That time, patient had troponins drawn that were over 200 and 170.  She was sent here for further care for NSTEMI.  This time, patient is confused.  She is oriented to person and time.  She has not oriented to place and president.  She states she has a lot of medical problems.  She told the nurses that she came in for back pain.  She told me she came in for nausea.  This time, she denies any chest pain.  Patient then states that she did go to the ED for chest pain after we asked her that specifically.  Unsure what patient's baseline is at this time.  No family with her.  Per the ED note, she came in for dyspnea and had a recent fall.  Workup revealed elevated troponins.    Overview/Hospital Course:  No notes on file    Interval History:  Notes reviewed, no acute events overnight.  No further fevers however repeat blood cultures remain positive.   Discussed with Cardiology and plan for DELORIS tomorrow.  Patient denies acute complaints at this time. Will continue to monitor closely.    Review of Systems   Constitutional:  Negative for chills, fatigue and fever.   HENT:  Negative for congestion, sore throat and trouble swallowing.    Respiratory:  Negative for cough and shortness of breath.    Cardiovascular:  Positive for leg swelling. Negative for chest pain.   Gastrointestinal:  Negative for abdominal distention, abdominal pain, diarrhea, nausea and vomiting.   Genitourinary:  Negative for decreased urine volume, difficulty urinating, dysuria, flank  pain and urgency.   Musculoskeletal:  Negative for back pain.   Skin:  Negative for color change, pallor, rash and wound.   Neurological:  Positive for weakness (generalized weakness). Negative for dizziness and light-headedness.   Psychiatric/Behavioral:  Negative for agitation, behavioral problems and confusion.    All other systems reviewed and are negative.    Objective:     Vital Signs (Most Recent):  Temp: 97.5 °F (36.4 °C) (03/25/24 0700)  Pulse: 65 (03/25/24 0849)  Resp: 17 (03/25/24 0849)  BP: (!) 146/66 (03/25/24 0700)  SpO2: 98 % (03/25/24 0849) Vital Signs (24h Range):  Temp:  [97.2 °F (36.2 °C)-97.9 °F (36.6 °C)] 97.5 °F (36.4 °C)  Pulse:  [60-82] 65  Resp:  [17-18] 17  SpO2:  [94 %-99 %] 98 %  BP: (118-153)/(57-67) 146/66     Weight: 92.2 kg (203 lb 4.2 oz)  Body mass index is 37.18 kg/m².    Intake/Output Summary (Last 24 hours) at 3/25/2024 1024  Last data filed at 3/25/2024 0828  Gross per 24 hour   Intake --   Output 950 ml   Net -950 ml           Physical Exam  Vitals and nursing note reviewed.   Constitutional:       General: She is not in acute distress.     Appearance: Normal appearance. She is well-developed. She is not ill-appearing or diaphoretic.   HENT:      Head: Normocephalic and atraumatic.      Right Ear: External ear normal.      Left Ear: External ear normal.      Nose: Nose normal. No congestion or rhinorrhea.      Mouth/Throat:      Mouth: Mucous membranes are moist.      Pharynx: Oropharynx is clear. No oropharyngeal exudate or posterior oropharyngeal erythema.   Eyes:      General: No scleral icterus.     Conjunctiva/sclera: Conjunctivae normal.      Pupils: Pupils are equal, round, and reactive to light.   Neck:      Vascular: No JVD.   Cardiovascular:      Rate and Rhythm: Normal rate and regular rhythm.      Pulses: Normal pulses.      Heart sounds: Normal heart sounds. No murmur heard.  Pulmonary:      Effort: Pulmonary effort is normal. No respiratory distress.      Breath  sounds: Normal breath sounds. No stridor. No wheezing, rhonchi or rales.   Abdominal:      General: Bowel sounds are normal. There is distension (mild and improved today).      Palpations: Abdomen is soft.      Tenderness: There is no abdominal tenderness.   Genitourinary:     Comments: Suprapubic tenderness  Musculoskeletal:         General: No swelling or tenderness. Normal range of motion.      Cervical back: Normal range of motion and neck supple.      Right lower leg: Edema present.      Left lower leg: Edema present.      Comments: Trace bilat LE edema   Skin:     General: Skin is warm and dry.      Capillary Refill: Capillary refill takes 2 to 3 seconds.      Coloration: Skin is not jaundiced or pale.      Findings: No erythema.   Neurological:      General: No focal deficit present.      Mental Status: She is alert and oriented to person, place, and time.      Cranial Nerves: No cranial nerve deficit.      Sensory: No sensory deficit.      Motor: Weakness (generalized weakness) present.   Psychiatric:         Mood and Affect: Mood normal.         Behavior: Behavior normal.         Thought Content: Thought content normal.             Significant Labs: All pertinent labs within the past 24 hours have been reviewed.  CBC:   Recent Labs   Lab 03/24/24  0543 03/25/24  0533   WBC 14.79*  14.79* 10.13  10.13  10.13  10.13   HGB 13.1  13.1 12.6  12.6  12.6  12.6   HCT 41.6  41.6 38.8  38.8  38.8  38.8   *  136* 142*  142*  142*  142*       CMP:   Recent Labs   Lab 03/24/24  0543   *   K 4.0   CL 89*   CO2 38*   *   BUN 47*   CREATININE 1.8*   CALCIUM 9.0   PROT 6.1   ALBUMIN 3.2*   BILITOT 0.9   ALKPHOS 55   AST 18   ALT 6*   ANIONGAP 8         Significant Imaging: I have reviewed all pertinent imaging results/findings within the past 24 hours.    Assessment/Plan:      * NSTEMI (non-ST elevated myocardial infarction)  Patient presented to outside hospital for chest pain dyspnea on  exertion, troponins elevated x2.    Troponins trended and remained elevated but stable  Cardiology consult appreciated  Echo reviewed  Telemetry monitoring  Stop heparin today - VTE prophylaxis lovenox      VERONA (acute kidney injury)  Patient with acute kidney injury/acute renal failure likely due to acute tubular necrosis caused by bacteremia, sepsis, CHF   VERONA is currently worsening. Baseline creatinine  0.9  - Labs reviewed- Renal function/electrolytes with Estimated Creatinine Clearance: 28.5 mL/min (A) (based on SCr of 1.8 mg/dL (H)). according to latest data. Monitor urine output and serial BMP and adjust therapy as needed. Avoid nephrotoxins and renally dose meds for GFR listed above.  Consult nephrology    3/25 - CMP still in process    Generalized weakness  With assoc deconditioning   PT/OT consulted and recommending moderate intensity therapy   Case management consulted and working on dispo planning  Fall precautions  Assist with ADLs      Bacteremia due to Enterococcus  Repeat blood cultures remain postive  Cont IV abx per ID recs  D/w cardiology and will plan for DELORIS tomorrow - pacemaker in situ  Cxr and UA neg        COPD (chronic obstructive pulmonary disease)  Patient's COPD is controlled currently.  Patient is currently off COPD Pathway.  Justice p.r.n..  We will monitor respiratory status    Congestive heart failure, unspecified HF chronicity, unspecified heart failure type  Patient is identified as having Diastolic (HFpEF) heart failure that is Chronic. CHF is currently controlled. Latest ECHO performed and demonstrates- Results for orders placed during the hospital encounter of 09/09/22    Echo    Interpretation Summary  · The left ventricle is normal in size with normal systolic function.  · Grade II left ventricular diastolic dysfunction.  · The estimated PA systolic pressure is 75 mmHg.  · Normal right ventricular size with normal right ventricular systolic function.  · There is moderate to  "severe pulmonary hypertension.  · Normal central venous pressure (3 mmHg).  · Moderate left atrial enlargement.  · Moderate tricuspid regurgitation.  · Moderate-to-severe mitral regurgitation.  · The estimated ejection fraction is 60%.  · Mild-to-moderate aortic regurgitation.  · There is moderate aortic valve stenosis.  · Aortic valve area is 0.72 cm2; peak velocity is m/s; mean gradient is 20 mmHg.    3/25 - [facility] cardiology recs, pt diuresed well overnight    Type 2 diabetes mellitus with circulatory disorder, without long-term current use of insulin  Patient's FSGs are controlled on current medication regimen.  Last A1c reviewed-   Lab Results   Component Value Date    HGBA1C 7.9 (H) 03/22/2024     Most recent fingerstick glucose reviewed- No results for input(s): "POCTGLUCOSE" in the last 24 hours.  Current correctional scale  Low  Maintain anti-hyperglycemic dose as follows-   Antihyperglycemics (From admission, onward)      Start     Stop Route Frequency Ordered    03/22/24 1655  insulin aspart U-100 pen 0-5 Units         -- SubQ Before meals & nightly PRN 03/22/24 1556          Hold Oral hypoglycemics while patient is in the hospital.        Hypertension associated with diabetes  Chronic, controlled.  Latest blood pressure and vitals reviewed-     Temp:  [98.1 °F (36.7 °C)-102.5 °F (39.2 °C)]   Pulse:  [83-94]   Resp:  [18-23]   BP: (119-172)/(62-86)   SpO2:  [92 %-98 %] .   Home meds for hypertension were reviewed and noted below-  Hypertension Medications               amLODIPine (NORVASC) 10 MG tablet TAKE 1 TABLET BY MOUTH EVERY DAY FOR SYSTOLIC BLOOD PRESSURE GREATER THAN 120    metoprolol succinate (TOPROL-XL) 50 MG 24 hr tablet Take 1 tablet (50 mg total) by mouth once daily.    sotaloL (BETAPACE) 80 MG tablet Take 80 mg by mouth 2 (two) times daily.    torsemide (DEMADEX) 20 MG Tab Take 20 mg by mouth.            While in the hospital, will manage blood pressure as follows; Continue home " antihypertensive regimen    Will utilize p.r.n. blood pressure medication only if patient's blood pressure greater than 180/110 and she develops symptoms such as worsening chest pain or shortness of breath.        CAD (coronary artery disease)  Patient with known CAD s/p CABG, which is uncontrolled Will continue  heparin gtt and Statin and monitor for S/Sx of angina/ACS. Continue to monitor on telemetry.   Troponins remained elevated but stable   Cardiology consult pending       Hyperlipidemia associated with type 2 diabetes mellitus  Chronic, stable  Lipid panel reviewed  Cont statin        VTE Risk Mitigation (From admission, onward)           Ordered     heparin (porcine) injection 5,000 Units  Every 8 hours         03/24/24 1332     IP VTE HIGH RISK PATIENT  Once         03/22/24 1556     Place sequential compression device  Until discontinued         03/22/24 1556                    Discharge Planning   AFVIOLA: 3/29/2024     Code Status: Full Code   Is the patient medically ready for discharge?:     Reason for patient still in hospital (select all that apply): Laboratory test and Treatment  Discharge Plan A: Skilled Nursing Facility                  Latha Varela NP  Department of Hospital Medicine   [facility]

## 2024-03-25 NOTE — ANESTHESIA PREPROCEDURE EVALUATION
03/25/2024  Tereza Larose is a 73 y.o., female.    Patient Active Problem List   Diagnosis    Asthma-COPD overlap syndrome    OA (osteoarthritis)    Fatty liver    Multiple allergies    Hyperuricemia, 3/19/2011    Hyperlipidemia associated with type 2 diabetes mellitus    Metabolic syndrome    Scoliosis    Abdominal obesity    BRIJESH (obstructive sleep apnea)    Arthritis of knee    Abnormality of gait    CAD (coronary artery disease)    History of intracranial hemorrhage    NSTEMI (non-ST elevated myocardial infarction)    Hypertension associated with diabetes    History of non-ST elevation myocardial infarction (NSTEMI)    Type 2 diabetes mellitus with circulatory disorder, without long-term current use of insulin    Adrenal Cushing's syndrome    Statin intolerance    H/O prosthetic aortic valve replacement    Postoperative hypothyroidism    Primary insomnia    Major depressive disorder with single episode, in full remission    Anticoagulant long-term use    Complete heart block    Right-sided low back pain without sciatica    Severe persistent asthma without complication    Colon polyps    Lumbar radiculitis    Congestive heart failure, unspecified HF chronicity, unspecified heart failure type    Eosinophilic asthma    Pulmonary eosinophilia    Abdominal aortic atherosclerosis    Carotid atherosclerosis    BMI 32.0-32.9,adult    Acute on chronic combined systolic and diastolic CHF (congestive heart failure)    Pulmonary hypertension    Elevated serum creatinine    COPD (chronic obstructive pulmonary disease)    Essential hypertension    Coronary stent patent    Chronic sinus complaints    Chronic obstructive pulmonary disease    Bacteremia due to Enterococcus    Generalized weakness    VERONA (acute kidney injury)       Past Surgical History:   Procedure Laterality Date    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/25/2017     Dr Griggs    ADRENALECTOMY      AORTIC VALVE REPLACEMENT  12/09/2016    ARTERIOGRAPHY OF AORTIC ROOT N/A 9/12/2022    Procedure: ARTERIOGRAM, AORTIC ROOT;  Surgeon: Marko Batres MD;  Location: [facility] CATH/EP LAB;  Service: Cardiology;  Laterality: N/A;    arthroscopy lt knee Right     BLADDER REPAIR      sling    BREAST BIOPSY Bilateral     benign    COLONOSCOPY  2004    10 year recheck    COLONOSCOPY N/A 11/6/2020    Procedure: COLONOSCOPY;  Surgeon: Yakelin oLwery MD;  Location: [facility] ENDO;  Service: Endoscopy;  Laterality: N/A;    CORONARY ANGIOGRAPHY INCLUDING BYPASS GRAFTS WITH CATHETERIZATION OF LEFT HEART Left 9/12/2022    Procedure: Left heart cath including bypass graft, with left heart catheterization;  Surgeon: Marko Batres MD;  Location: [facility] CATH/EP LAB;  Service: Cardiology;  Laterality: Left;    CORONARY ARTERY BYPASS GRAFT  12/09/2016    X3    EPIDURAL STEROID INJECTION INTO LUMBAR SPINE N/A 7/27/2021    Procedure: Injection-steroid-epidural-lumbar;  Surgeon: Valerio Jay MD;  Location: [facility] OR;  Service: Pain Management;  Laterality: N/A;  L5-S1     HYSTERECTOMY      INSERTION OF PACEMAKER Left 1/13/2020    Procedure: INSERTION, PACEMAKER;  Surgeon: Marko Batres MD;  Location: [facility] CATH/EP LAB;  Service: Cardiology;  Laterality: Left;    OOPHORECTOMY      RIGHT HEART CATHETERIZATION Right 9/12/2022    Procedure: INSERTION, CATHETER, RIGHT HEART;  Surgeon: Marko Batres MD;  Location: [facility] CATH/EP LAB;  Service: Cardiology;  Laterality: Right;    THYROIDECTOMY          Tobacco Use:  The patient  reports that she has never smoked. She has never used smokeless tobacco.     Results for orders placed or performed during the hospital encounter of 03/22/24   EKG 12-lead    Collection Time: 03/22/24  4:27 PM   Result Value Ref Range    QRS Duration 168 ms    [facility] QTC Calculation 528 ms    Narrative    Test Reason : R07.9,    Vent. Rate : 090 BPM     Atrial Rate : 090 BPM     P-R Int : 220 ms           QRS Dur : 168 ms      QT Int : 432 ms       P-R-T Axes : 015 -63 090 degrees     QTc Int : 528 ms    Atrial-sensed ventricular-paced rhythm with prolonged AV conduction  Abnormal ECG  When compared with ECG of 10-SEP-2022 23:53,  Vent. rate has increased BY  30 BPM    Referred By: DANISH HENDERSON           Confirmed By:              Lab Results   Component Value Date    WBC 10.13 03/25/2024    WBC 10.13 03/25/2024    WBC 10.13 03/25/2024    WBC 10.13 03/25/2024    HGB 12.6 03/25/2024    HGB 12.6 03/25/2024    HGB 12.6 03/25/2024    HGB 12.6 03/25/2024    HCT 38.8 03/25/2024    HCT 38.8 03/25/2024    HCT 38.8 03/25/2024    HCT 38.8 03/25/2024    MCV 94 03/25/2024    MCV 94 03/25/2024    MCV 94 03/25/2024    MCV 94 03/25/2024     (L) 03/25/2024     (L) 03/25/2024     (L) 03/25/2024     (L) 03/25/2024     BMP  Lab Results   Component Value Date     (L) 03/25/2024     (L) 03/25/2024    K 5.0 03/25/2024    K 5.0 03/25/2024    CL 88 (L) 03/25/2024    CL 88 (L) 03/25/2024    CO2 36 (H) 03/25/2024    CO2 36 (H) 03/25/2024    BUN 57 (H) 03/25/2024    BUN 57 (H) 03/25/2024    CREATININE 1.6 (H) 03/25/2024    CREATININE 1.6 (H) 03/25/2024    CALCIUM 9.2 03/25/2024    CALCIUM 9.2 03/25/2024    ANIONGAP 10 03/25/2024    ANIONGAP 10 03/25/2024     (H) 03/25/2024     (H) 03/25/2024     (H) 03/24/2024       Results for orders placed during the hospital encounter of 03/22/24    Echo    Interpretation Summary    Left Ventricle: The left ventricle is normal in size. Normal wall thickness. Normal wall motion. Septal motion is consistent with pacing. There is normal systolic function with a visually estimated ejection fraction of 60 - 65%. There is normal diastolic function.    Right Ventricle: Normal right ventricular cavity size. Wall thickness is normal. Right ventricle wall motion  is normal. Systolic function is normal. Pacemaker lead present in the ventricle.    Right  Atrium: Multiple leads present in the right atrium.    Mitral Valve: There is severe bileaflet sclerosis. There is severe anterior mitral annular calcification present. There is normal leaflet mobility. There is mild to moderate regurgitation.    Tricuspid Valve: There is moderate regurgitation with a centrally directed jet.    Pulmonary Artery: There is moderate pulmonary hypertension. The estimated pulmonary artery systolic pressure is 63 mmHg.    IVC/SVC: Elevated venous pressure at 15 mmHg.           Pre-op Assessment    I have reviewed the Patient Summary Reports.     I have reviewed the Nursing Notes. I have reviewed the NPO Status.   I have reviewed the Medications.     Review of Systems  Anesthesia Hx:  No problems with previous Anesthesia   Neg history of prior surgery.          Denies Family Hx of Anesthesia complications.    Denies Personal Hx of Anesthesia complications.                    Social:  No Alcohol Use, Non-Smoker       Hematology/Oncology:  Hematology Normal   Oncology Normal                Hematology Comments: Current sepsis                    EENT/Dental:  EENT/Dental Normal           Cardiovascular:     Hypertension Valvular problems/Murmurs (s/p AVR) Past MI (NSTEMI) CAD    Dysrhythmias (Biotronik pacer)   CHF                                 Pulmonary:   COPD, severe Asthma    Sleep Apnea Pulmonary hypertension  Home O2 3L/min          Education provided regarding risk of obstructive sleep apnea            Renal/:  Chronic Renal Disease, ARF, CKD                Hepatic/GI:     GERD Liver Disease, (Fatty liver)            Musculoskeletal:  Arthritis (OA)          Spine Disorders: lumbar            Neurological:   CVA (Right hemiparesis), residual symptoms Neuromuscular disease: Lumbar radiculopathy.                                   Endocrine:  Diabetes, type 2 Hypothyroidism          Dermatological:  Skin Normal    Psych:    depression            Bacterimia (DELORIS to rule out  endocarditis)    Physical Exam  General: Well nourished and Alert    Airway:  Mallampati: II   Mouth Opening: Normal  TM Distance: Normal  Tongue: Normal  Neck ROM: Normal ROM    Dental:  Intact    Chest/Lungs:  Clear to auscultation, Normal Respiratory Rate    Heart:  Rate: Normal  Rhythm: Regular Rhythm  Sounds: Normal        Anesthesia Plan  Type of Anesthesia, risks & benefits discussed:    Anesthesia Type: Gen Natural Airway  Intra-op Monitoring Plan: Standard ASA Monitors  Post Op Pain Control Plan:   (medical reason for not using multimodal pain management)  Induction:  IV  Informed Consent: Informed consent signed with the Patient and all parties understand the risks and agree with anesthesia plan.  All questions answered. Patient consented to blood products? Yes  ASA Score: 3    Ready For Surgery From Anesthesia Perspective.     .

## 2024-03-25 NOTE — SUBJECTIVE & OBJECTIVE
Interval History:  Notes reviewed, no acute events overnight.  No further fevers however repeat blood cultures remain positive.   Discussed with Cardiology and plan for DELORIS tomorrow.  Patient denies acute complaints at this time. Will continue to monitor closely.    Review of Systems   Constitutional:  Negative for chills, fatigue and fever.   HENT:  Negative for congestion, sore throat and trouble swallowing.    Respiratory:  Negative for cough and shortness of breath.    Cardiovascular:  Positive for leg swelling. Negative for chest pain.   Gastrointestinal:  Negative for abdominal distention, abdominal pain, diarrhea, nausea and vomiting.   Genitourinary:  Negative for decreased urine volume, difficulty urinating, dysuria, flank pain and urgency.   Musculoskeletal:  Negative for back pain.   Skin:  Negative for color change, pallor, rash and wound.   Neurological:  Positive for weakness (generalized weakness). Negative for dizziness and light-headedness.   Psychiatric/Behavioral:  Negative for agitation, behavioral problems and confusion.    All other systems reviewed and are negative.    Objective:     Vital Signs (Most Recent):  Temp: 97.5 °F (36.4 °C) (03/25/24 0700)  Pulse: 65 (03/25/24 0849)  Resp: 17 (03/25/24 0849)  BP: (!) 146/66 (03/25/24 0700)  SpO2: 98 % (03/25/24 0849) Vital Signs (24h Range):  Temp:  [97.2 °F (36.2 °C)-97.9 °F (36.6 °C)] 97.5 °F (36.4 °C)  Pulse:  [60-82] 65  Resp:  [17-18] 17  SpO2:  [94 %-99 %] 98 %  BP: (118-153)/(57-67) 146/66     Weight: 92.2 kg (203 lb 4.2 oz)  Body mass index is 37.18 kg/m².    Intake/Output Summary (Last 24 hours) at 3/25/2024 1024  Last data filed at 3/25/2024 0828  Gross per 24 hour   Intake --   Output 950 ml   Net -950 ml           Physical Exam  Vitals and nursing note reviewed.   Constitutional:       General: She is not in acute distress.     Appearance: Normal appearance. She is well-developed. She is not ill-appearing or diaphoretic.   HENT:       Head: Normocephalic and atraumatic.      Right Ear: External ear normal.      Left Ear: External ear normal.      Nose: Nose normal. No congestion or rhinorrhea.      Mouth/Throat:      Mouth: Mucous membranes are moist.      Pharynx: Oropharynx is clear. No oropharyngeal exudate or posterior oropharyngeal erythema.   Eyes:      General: No scleral icterus.     Conjunctiva/sclera: Conjunctivae normal.      Pupils: Pupils are equal, round, and reactive to light.   Neck:      Vascular: No JVD.   Cardiovascular:      Rate and Rhythm: Normal rate and regular rhythm.      Pulses: Normal pulses.      Heart sounds: Normal heart sounds. No murmur heard.  Pulmonary:      Effort: Pulmonary effort is normal. No respiratory distress.      Breath sounds: Normal breath sounds. No stridor. No wheezing, rhonchi or rales.   Abdominal:      General: Bowel sounds are normal. There is distension (mild and improved today).      Palpations: Abdomen is soft.      Tenderness: There is no abdominal tenderness.   Genitourinary:     Comments: Suprapubic tenderness  Musculoskeletal:         General: No swelling or tenderness. Normal range of motion.      Cervical back: Normal range of motion and neck supple.      Right lower leg: Edema present.      Left lower leg: Edema present.      Comments: Trace bilat LE edema   Skin:     General: Skin is warm and dry.      Capillary Refill: Capillary refill takes 2 to 3 seconds.      Coloration: Skin is not jaundiced or pale.      Findings: No erythema.   Neurological:      General: No focal deficit present.      Mental Status: She is alert and oriented to person, place, and time.      Cranial Nerves: No cranial nerve deficit.      Sensory: No sensory deficit.      Motor: Weakness (generalized weakness) present.   Psychiatric:         Mood and Affect: Mood normal.         Behavior: Behavior normal.         Thought Content: Thought content normal.             Significant Labs: All pertinent labs within  the past 24 hours have been reviewed.  CBC:   Recent Labs   Lab 03/24/24  0543 03/25/24  0533   WBC 14.79*  14.79* 10.13  10.13  10.13  10.13   HGB 13.1  13.1 12.6  12.6  12.6  12.6   HCT 41.6  41.6 38.8  38.8  38.8  38.8   *  136* 142*  142*  142*  142*       CMP:   Recent Labs   Lab 03/24/24  0543   *   K 4.0   CL 89*   CO2 38*   *   BUN 47*   CREATININE 1.8*   CALCIUM 9.0   PROT 6.1   ALBUMIN 3.2*   BILITOT 0.9   ALKPHOS 55   AST 18   ALT 6*   ANIONGAP 8         Significant Imaging: I have reviewed all pertinent imaging results/findings within the past 24 hours.

## 2024-03-25 NOTE — PROGRESS NOTES
[facility]   Cardiology Note    Consult Requested By: JOSE MIGUEL  Reason for Consult: elevated troponin    SUBJECTIVE:     History of Present Illness: The pt is 74 y/o female pt of . The pt was trasnferred to [facility] from [place] for elevated trop. Confusion. Pmh. CAD/CABG,2022-stent to SVG/RCA , Biotronic PPM ,COPD AVR, per ER report pt has had some HUGO and recent fall. WBC 14.86 cr 1.6 egfr 33 trop downtrending 136 cxr midl pulm congestion, temp last pm , tmax 102.5     3/24--PT is oriented to self, calm, denies chest pain, LE slightly edematous, abdomen slightly distended, some JVD noted. BP stable no fever overnight TMAX since admit 102.5. Blood cultures pos. U/o net 240cc WBC 14.79 cr 1.8 albumen 3.2  BNP 1806 but 2 days ago was 900 in [facility]     3/25:Pt seen and examined this morning. She states her breathing is improving. LE edema present but much better. She denies any chest pain. Afebrile. VSS. Labs reviewed. Cr 1.8. WBC 10. Sodium 135. .     Review of patient's allergies indicates:   Allergen Reactions    Metformin Diarrhea     Diarrhea      Corn Itching     Other reaction(s): Sneezing  Other reaction(s): Rhinorrhea    Potato starch Hives     Other reaction(s): Sneezing  Other reaction(s): Rhinorrhea    Pravastatin Other (See Comments)     Muscle pain    Statins-hmg-coa reductase inhibitors Other (See Comments)    Hydrochlorothiazide Other (See Comments)     weakness    Welchol [colesevelam] Other (See Comments)     Weakness        Past Medical History:   Diagnosis Date    Abnormal EKG 9/26/2012    Allergy     Arthritis     Asthma     Brain bleed     Colon polyps 11/6/2020    COPD (chronic obstructive pulmonary disease)     Depression     Diabetes mellitus type II     Diverticulitis     Fatty liver     GERD (gastroesophageal reflux disease)     Goiter     s/p thyroidectomy    Heart murmur     Hemiparesis affecting right side as late effect of cerebrovascular accident (CVA)  7/26/2022    Hernia of abdominal wall     History of intracranial hemorrhage 6/15/2013    History of non-ST elevation myocardial infarction (NSTEMI) 6/15/2013    Hyperlipidemia     Hypertension     Lactic acidosis 1/11/2020    Metabolic syndrome 6/14/2012    Myocardial infarction     On home oxygen therapy     states uses 2L at night or when sat < 90    Pacemaker     Positive FIT (fecal immunochemical test) 11/6/2020    Severe persistent asthma without complication 4/30/2020    Statin intolerance     Stroke 2012    left hand shaky, loss of balance     Past Surgical History:   Procedure Laterality Date    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/25/2017    Dr Griggs    ADRENALECTOMY      AORTIC VALVE REPLACEMENT  12/09/2016    ARTERIOGRAPHY OF AORTIC ROOT N/A 9/12/2022    Procedure: ARTERIOGRAM, AORTIC ROOT;  Surgeon: Marko Batres MD;  Location: [facility] CATH/EP LAB;  Service: Cardiology;  Laterality: N/A;    arthroscopy lt knee Right     BLADDER REPAIR      sling    BREAST BIOPSY Bilateral     benign    COLONOSCOPY  2004    10 year recheck    COLONOSCOPY N/A 11/6/2020    Procedure: COLONOSCOPY;  Surgeon: Yakelin Lowery MD;  Location: [facility];  Service: Endoscopy;  Laterality: N/A;    CORONARY ANGIOGRAPHY INCLUDING BYPASS GRAFTS WITH CATHETERIZATION OF LEFT HEART Left 9/12/2022    Procedure: Left heart cath including bypass graft, with left heart catheterization;  Surgeon: Marko Batres MD;  Location: [facility] CATH/EP LAB;  Service: Cardiology;  Laterality: Left;    CORONARY ARTERY BYPASS GRAFT  12/09/2016    X3    EPIDURAL STEROID INJECTION INTO LUMBAR SPINE N/A 7/27/2021    Procedure: Injection-steroid-epidural-lumbar;  Surgeon: Valerio Jay MD;  Location: [facility] OR;  Service: Pain Management;  Laterality: N/A;  L5-S1     HYSTERECTOMY      INSERTION OF PACEMAKER Left 1/13/2020    Procedure: INSERTION, PACEMAKER;  Surgeon: Marko Batres MD;  Location: [facility] CATH/EP LAB;  Service: Cardiology;  Laterality: Left;    OOPHORECTOMY       RIGHT HEART CATHETERIZATION Right 9/12/2022    Procedure: INSERTION, CATHETER, RIGHT HEART;  Surgeon: Marko Batres MD;  Location: [facility] CATH/EP LAB;  Service: Cardiology;  Laterality: Right;    THYROIDECTOMY       Family History   Problem Relation Age of Onset    Arthritis Mother     Diabetes Mother     Heart disease Mother     Hypertension Mother     Hypertension Father     Heart disease Father     Mental illness Father     Asthma Sister     Diabetes Sister     Hypertension Sister     Asthma Son     COPD Son     Depression Son     Diabetes Son     Hypertension Son     Stroke Son     Diabetes Maternal Uncle     Heart disease Maternal Uncle     Mental illness Paternal Aunt     Cancer Paternal Aunt     Heart disease Paternal Aunt     Hypertension Paternal Aunt     Heart disease Paternal Uncle     Hypertension Maternal Grandmother     Mental illness Maternal Grandmother     Aneurysm Maternal Grandfather     Mental illness Paternal Grandmother     Heart disease Paternal Grandfather     Melanoma Neg Hx     Psoriasis Neg Hx     Lupus Neg Hx     Eczema Neg Hx      Social History     Tobacco Use    Smoking status: Never    Smokeless tobacco: Never   Substance Use Topics    Alcohol use: Not Currently     Comment: seldom    Drug use: No       Review of Systems:  Review of Systems   Constitutional:  Positive for fever.   Respiratory:  Positive for shortness of breath. Negative for cough and hemoptysis.    Cardiovascular:  Positive for leg swelling. Negative for chest pain.   Gastrointestinal:  Negative for heartburn, nausea and vomiting.   Neurological:  Positive for weakness.   Psychiatric/Behavioral:  Positive for hallucinations.        OBJECTIVE:     Vital Signs (Most Recent)  Temp: 97.5 °F (36.4 °C) (03/25/24 0700)  Pulse: 60 (03/25/24 0348)  Resp: 18 (03/25/24 0700)  BP: (!) 146/66 (03/25/24 0700)  SpO2: 99 % (03/25/24 0700)    Vital Signs Range (Last 24H):  Temp:  [97.2 °F (36.2 °C)-97.9 °F (36.6 °C)]   Pulse:   [60-82]   Resp:  [17-18]   BP: (118-153)/(57-67)   SpO2:  [94 %-99 %]     I & O (Last 24H):    Intake/Output Summary (Last 24 hours) at 3/25/2024 0840  Last data filed at 3/25/2024 0828  Gross per 24 hour   Intake --   Output 950 ml   Net -950 ml         Current Diet:     Current Diet Order   Procedures    Diet NPO        Allergies:  Review of patient's allergies indicates:   Allergen Reactions    Metformin Diarrhea     Diarrhea      Corn Itching     Other reaction(s): Sneezing  Other reaction(s): Rhinorrhea    Potato starch Hives     Other reaction(s): Sneezing  Other reaction(s): Rhinorrhea    Pravastatin Other (See Comments)     Muscle pain    Statins-hmg-coa reductase inhibitors Other (See Comments)    Hydrochlorothiazide Other (See Comments)     weakness    Welchol [colesevelam] Other (See Comments)     Weakness        Meds:  Scheduled Meds:   ampicillin IV (PEDS and ADULTS)  2 g Intravenous Q6H    budesonide  0.5 mg Nebulization BID    cefTRIAXone (Rocephin) IV (PEDS and ADULTS)  2 g Intravenous Q12H    folic acid  1 mg Oral Daily    furosemide (LASIX) injection  40 mg Intravenous Daily    heparin (porcine)  5,000 Units Subcutaneous Q8H    levothyroxine  150 mcg Oral Before breakfast    metoprolol succinate  25 mg Oral Daily    multivitamin  1 tablet Oral Daily    mupirocin   Nasal BID    polyethylene glycol  17 g Oral Daily    thiamine  100 mg Oral Daily     Continuous Infusions:  PRN Meds:acetaminophen, dextrose 50%, dextrose 50%, glucagon (human recombinant), glucose, glucose, insulin aspart U-100, LORazepam, melatonin, morphine, naloxone, nitroGLYCERIN, ondansetron, sodium chloride 0.9%    Oxygen/Ventilator Data (Last 24H):  (if applicable)            Hemodynamic Parameters (Last 24H):   (if applicable)        Laboratory and Radiology Data:  Recent Results (from the past 24 hour(s))   POCT glucose    Collection Time: 03/24/24 11:41 AM   Result Value Ref Range    POC Glucose 188 (H) 70 - 110   Blood culture     Collection Time: 03/24/24 11:46 AM    Specimen: Blood   Result Value Ref Range    Blood Culture, Routine Gram stain aer bottle: Gram positive cocci     Blood Culture, Routine       Positive results previously called 03/25/2024  04:09 KS3   Blood culture    Collection Time: 03/24/24 11:47 AM    Specimen: Blood   Result Value Ref Range    Blood Culture, Routine Gram stain aer bottle: Gram positive cocci     Blood Culture, Routine       Positive results previously called 03/25/2024  04:35 KS3   POCT glucose    Collection Time: 03/24/24  6:15 PM   Result Value Ref Range    POC Glucose 194 (H) 70 - 110   POCT glucose    Collection Time: 03/24/24  8:56 PM   Result Value Ref Range    POC Glucose 152 (H) 70 - 110   Sodium, Random Urine    Collection Time: 03/24/24 11:06 PM   Result Value Ref Range    Sodium, Urine 12 (L) 20 - 250 mmol/L   Protein, Random Urine    Collection Time: 03/24/24 11:06 PM   Result Value Ref Range    Protein, Urine Random 90 (H) 6 - 15 mg/dL   Creatinine, Random Urine    Collection Time: 03/24/24 11:06 PM   Result Value Ref Range    Creatinine, Urine 98.3 15.0 - 325.0 mg/dL   Urea Nitrogen, Random Urine    Collection Time: 03/24/24 11:06 PM   Result Value Ref Range    Urine Urea Nitrogen 536 140 - 1050 mg/dL   Arnold's Stain, Urine Random    Collection Time: 03/24/24 11:06 PM   Result Value Ref Range    Arnold's Stain, Ur No eosinophils seen No eosinophils seen   CBC with Automated Differential    Collection Time: 03/25/24  5:33 AM   Result Value Ref Range    WBC 10.13 3.90 - 12.70 K/uL    RBC 4.11 4.00 - 5.40 M/uL    Hemoglobin 12.6 12.0 - 16.0 g/dL    Hematocrit 38.8 37.0 - 48.5 %    MCV 94 82 - 98 fL    MCH 30.7 27.0 - 31.0 pg    MCHC 32.5 32.0 - 36.0 g/dL    RDW 14.1 11.5 - 14.5 %    Platelets 142 (L) 150 - 450 K/uL    MPV 10.6 9.2 - 12.9 fL    Immature Granulocytes 0.5 0.0 - 0.5 %    Gran # (ANC) 7.1 1.8 - 7.7 K/uL    Immature Grans (Abs) 0.05 (H) 0.00 - 0.04 K/uL    Lymph # 1.4 1.0 - 4.8  K/uL    Mono # 1.5 (H) 0.3 - 1.0 K/uL    Eos # 0.1 0.0 - 0.5 K/uL    Baso # 0.03 0.00 - 0.20 K/uL    nRBC 0 0 /100 WBC    Gran % 70.0 38.0 - 73.0 %    Lymph % 13.7 (L) 18.0 - 48.0 %    Mono % 14.9 4.0 - 15.0 %    Eosinophil % 0.6 0.0 - 8.0 %    Basophil % 0.3 0.0 - 1.9 %    Differential Method Automated    CBC auto differential    Collection Time: 03/25/24  5:33 AM   Result Value Ref Range    WBC 10.13 3.90 - 12.70 K/uL    RBC 4.11 4.00 - 5.40 M/uL    Hemoglobin 12.6 12.0 - 16.0 g/dL    Hematocrit 38.8 37.0 - 48.5 %    MCV 94 82 - 98 fL    MCH 30.7 27.0 - 31.0 pg    MCHC 32.5 32.0 - 36.0 g/dL    RDW 14.1 11.5 - 14.5 %    Platelets 142 (L) 150 - 450 K/uL    MPV 10.6 9.2 - 12.9 fL    Immature Granulocytes 0.5 0.0 - 0.5 %    Gran # (ANC) 7.1 1.8 - 7.7 K/uL    Immature Grans (Abs) 0.05 (H) 0.00 - 0.04 K/uL    Lymph # 1.4 1.0 - 4.8 K/uL    Mono # 1.5 (H) 0.3 - 1.0 K/uL    Eos # 0.1 0.0 - 0.5 K/uL    Baso # 0.03 0.00 - 0.20 K/uL    nRBC 0 0 /100 WBC    Gran % 70.0 38.0 - 73.0 %    Lymph % 13.7 (L) 18.0 - 48.0 %    Mono % 14.9 4.0 - 15.0 %    Eosinophil % 0.6 0.0 - 8.0 %    Basophil % 0.3 0.0 - 1.9 %    Differential Method Automated    CBC Auto Differential    Collection Time: 03/25/24  5:33 AM   Result Value Ref Range    WBC 10.13 3.90 - 12.70 K/uL    RBC 4.11 4.00 - 5.40 M/uL    Hemoglobin 12.6 12.0 - 16.0 g/dL    Hematocrit 38.8 37.0 - 48.5 %    MCV 94 82 - 98 fL    MCH 30.7 27.0 - 31.0 pg    MCHC 32.5 32.0 - 36.0 g/dL    RDW 14.1 11.5 - 14.5 %    Platelets 142 (L) 150 - 450 K/uL    MPV 10.6 9.2 - 12.9 fL    Immature Granulocytes 0.5 0.0 - 0.5 %    Gran # (ANC) 7.1 1.8 - 7.7 K/uL    Immature Grans (Abs) 0.05 (H) 0.00 - 0.04 K/uL    Lymph # 1.4 1.0 - 4.8 K/uL    Mono # 1.5 (H) 0.3 - 1.0 K/uL    Eos # 0.1 0.0 - 0.5 K/uL    Baso # 0.03 0.00 - 0.20 K/uL    nRBC 0 0 /100 WBC    Gran % 70.0 38.0 - 73.0 %    Lymph % 13.7 (L) 18.0 - 48.0 %    Mono % 14.9 4.0 - 15.0 %    Eosinophil % 0.6 0.0 - 8.0 %    Basophil % 0.3 0.0 - 1.9 %     Differential Method Automated    BNP    Collection Time: 03/25/24  5:33 AM   Result Value Ref Range     (H) 0 - 99 pg/mL   TSH    Collection Time: 03/25/24  5:33 AM   Result Value Ref Range    TSH 40.967 (H) 0.340 - 5.600 uIU/mL   CBC Auto Differential    Collection Time: 03/25/24  5:33 AM   Result Value Ref Range    WBC 10.13 3.90 - 12.70 K/uL    RBC 4.11 4.00 - 5.40 M/uL    Hemoglobin 12.6 12.0 - 16.0 g/dL    Hematocrit 38.8 37.0 - 48.5 %    MCV 94 82 - 98 fL    MCH 30.7 27.0 - 31.0 pg    MCHC 32.5 32.0 - 36.0 g/dL    RDW 14.1 11.5 - 14.5 %    Platelets 142 (L) 150 - 450 K/uL    MPV 10.6 9.2 - 12.9 fL    Immature Granulocytes 0.5 0.0 - 0.5 %    Gran # (ANC) 7.1 1.8 - 7.7 K/uL    Immature Grans (Abs) 0.05 (H) 0.00 - 0.04 K/uL    Lymph # 1.4 1.0 - 4.8 K/uL    Mono # 1.5 (H) 0.3 - 1.0 K/uL    Eos # 0.1 0.0 - 0.5 K/uL    Baso # 0.03 0.00 - 0.20 K/uL    nRBC 0 0 /100 WBC    Gran % 70.0 38.0 - 73.0 %    Lymph % 13.7 (L) 18.0 - 48.0 %    Mono % 14.9 4.0 - 15.0 %    Eosinophil % 0.6 0.0 - 8.0 %    Basophil % 0.3 0.0 - 1.9 %    Differential Method Automated    T4, Free    Collection Time: 03/25/24  5:33 AM   Result Value Ref Range    Free T4 0.31 (L) 0.71 - 1.51 ng/dL         12-lead EKG interpretation:  (if applicable)      Current Cardiac Rhythm:   (if applicable)    Physical Exam:   Physical Exam  Cardiovascular:      Rate and Rhythm: Normal rate and regular rhythm.   Pulmonary:      Effort: Pulmonary effort is normal.      Comments: Diminished breathsounds bilateral bases   Abdominal:      General: There is distension.      Palpations: Abdomen is soft.   Skin:     General: Skin is warm and dry.   Neurological:      Mental Status: She is alert. She is disoriented.         ASSESSMENT/PLAN:   Assessment:   Elevated troponin  Altered mental status  Bacteremia  HFpEF   CAD/CABG  COPD   Hypomagnesemia   Aortic valve replacement  Pulmonary hypertension  VERONA  Biotronic PPM in situ     Last [facility] 2022--stent to origin  of SVG to RCA , LAD occluded, normal LIMA to LAD  Normal Circ , nonfunctioning graft to OM PAP 80 mmhg on [facility] with ? Severe MR, Follow up DELORIS at that time showed mod MR , severe pulmonary htn with elevated right sided pressures.   Last echo in office in October 2023--EF 55-60% functioning bioprosthetic Aortic valve mod MR PAP 57 mmhg  Repeat echo shows EF 60-65 % mod MR, mod TR, PAP 63 mhg--minimal change from previous echo   No c/o chest pain, although pt is not oriented to place or time.   Uncertain of mental status  baseline  Cr is 1.8  Troponin elevation appears related to infection   Tmax 102.5, WBC elevated  Pt is not SOB at rest but remains on oxygen  Sats 91-93 % on 2 liters    Plan:   Will plan for DELORIS tomorrow morning.    Discussed with primary team Bacteremia tx per primary team  Gentle diuresis, appreciate Nephrology's recommendations.   Biotronic  PPM , last transmission dec 2023, interrogate device.  Accurate I/O  Monitor electrolytes

## 2024-03-25 NOTE — TRANSFER OF CARE
"Anesthesia Transfer of Care Note    Patient: Tereza Larose    Procedure(s) Performed: Procedure(s) (LRB):  Transesophageal echo (DELORIS) intra-procedure log documentation (N/A)    Patient location: Other: cardiology    Anesthesia Type: general    Transport from OR: Transported from OR on 6-10 L/min O2 by face mask with adequate spontaneous ventilation    Post pain: adequate analgesia    Post assessment: no apparent anesthetic complications and tolerated procedure well    Post vital signs: stable    Level of consciousness: awake    Nausea/Vomiting: no nausea/vomiting    Complications: none    Transfer of care protocol was followed      Last vitals: Visit Vitals  BP (!) 143/69 (BP Location: Left arm, Patient Position: Lying)   Pulse 66   Temp 37.1 °C (98.7 °F) (Oral)   Resp 18   Ht 5' 2" (1.575 m)   Wt 92.2 kg (203 lb 4.2 oz)   SpO2 99%   BMI 37.18 kg/m²     "

## 2024-03-25 NOTE — PROGRESS NOTES
[facility]  Department of Infectious Disease  Progress Note        PATIENT NAME: Tereza Larose  MRN: [id number]  TODAY'S DATE: 03/25/2024  ADMIT DATE: 3/22/2024    PRINCIPLE PROBLEM: NSTEMI (non-ST elevated myocardial infarction)    INTERVAL HISTORY      3/25/24: No acute issues. Repeat blood cultures so far negative     Antibiotics (From admission, onward)      Start     Stop Route Frequency Ordered    03/24/24 1330  cefTRIAXone (ROCEPHIN) 2 g in dextrose 5 % 100 mL IVPB (ready to mix)         -- IV Every 12 hours (non-standard times) 03/24/24 1221    03/24/24 1330  ampicillin 2 g in sodium chloride 0.9 % 100 mL IVPB (ready to mix)         -- IV Every 6 hours (non-standard times) 03/24/24 1223    03/22/24 2100  mupirocin 2 % ointment         03/27/24 2059 Nasl 2 times daily 03/22/24 1557            Antifungals (From admission, onward)      None             Antivirals (From admission, onward)      None            ASSESSMENT and PLAN      1. E fecalis Bacteremia. Has a pacemaker. For DELORIS. Follow repeat blood cultures. On Ampicillin and Ceftriaxone adjusted for GFR    2. NSTEMI. As per Cardiologist    3. VERONA. Seen by nephrologist. Work up in progress    4. HTN. On medication. As per hospitalist.    Recommendations:  Follow cultures. Await DELORIS. Check ESR.    ID will follow with you. Please call with questions. Please send Epic secure chat with any questions.      SUBJECTIVE    Admitted with dyspnea and malaise and managed for NSTEMI. Blood cutures grew E fecalis and is now on antibiotics.    Review of Systems  Constitutional:  Denies fevers, chills, night sweats, loss of appetite.  HEENT: Denies visual changes, ear pain, sinus congestion, mouth pain or trouble swallowing, sore throat or dental pain.  Neck: Denies neck pain or lumps.  Respiratory: Denies shortness of breath, coughing, wheezing or hemoptysis.  Cardiovascular:  Denies chest pain, palpitations or edema.  Gastrointestinal: Denies  nausea,  vomiting, constipation or diarrhea.  Genitourinary:  Denies dysuria, frequency, urgency or hematuria   Musculoskeletal:  Denies joint pain or swelling, difficulty walking.    Skin:  Denies rash or itching.     OBJECTIVE     Temp:  [97.2 °F (36.2 °C)-97.9 °F (36.6 °C)] 97.5 °F (36.4 °C)  Pulse:  [60-82] 65  Resp:  [17-18] 17  SpO2:  [94 %-99 %] 98 %  BP: (118-153)/(57-67) 146/66    Physical Exam  General: In no acute distress  Eyes: Eyes with no icterus or injection. Vision grossly normal  Ears: Hearing grossly normal.  Nose: Nares patent  Neck: Supple, no tenderness to palpation.  Cardiovascular: Regular rate and rhythm, 2/6 systolic murmurs, no edema.    Respiratory:  Clear to auscultation bilaterally, no tachypnea or increased work of breathing.  Gastrointestinal:  Soft with active bowel sounds, no tenderness to palpation, no distention.  Genitourinary:  No suprapubic tenderness.  Musculoskeletal:  Moves all extremities with good strength.    Skin:  Warm and dry, no obvious rashes.    Neuro:   Oriented, conversant, follows commands.  Psych: Good mood, normal affect.     WOUNDS: None  VAD: None  ISOLATION: None    CBC LAST 7 DAYS  Recent Labs   Lab 03/22/24  1619 03/22/24  1630 03/23/24  0606 03/24/24  0543 03/25/24  0533   WBC  --  12.33  12.33 14.65*  14.65* 14.79*  14.79* 10.13  10.13  10.13  10.13   RBC  --  4.44  4.44 4.57  4.57 4.34  4.34 4.11  4.11  4.11  4.11   HGB  --  13.6  13.6 13.6  13.6 13.1  13.1 12.6  12.6  12.6  12.6   HCT 43 42.4  42.4 43.4  43.4 41.6  41.6 38.8  38.8  38.8  38.8   MCV  --  96  96 95  95 96  96 94  94  94  94   MCH  --  30.6  30.6 29.8  29.8 30.2  30.2 30.7  30.7  30.7  30.7   MCHC  --  32.1  32.1 31.3*  31.3* 31.5*  31.5* 32.5  32.5  32.5  32.5   RDW  --  14.0  14.0 14.2  14.2 14.2  14.2 14.1  14.1  14.1  14.1   PLT  --  185  185 170  170 136*  136* 142*  142*  142*  142*   MPV  --  9.5  9.5 9.7  9.7 10.0  10.0 10.6  10.6   "10.6  10.6   GRAN  --  88.4*  10.9* 88.3*  88.3*  12.9*  12.9* 75.3*  75.3*  11.1*  11.1* 70.0  70.0  70.0  70.0  7.1  7.1  7.1  7.1   LYMPH  --  4.5*  0.6* 4.8*  4.8*  0.7*  0.7* 11.4*  11.4*  1.7  1.7 13.7*  13.7*  13.7*  13.7*  1.4  1.4  1.4  1.4   MONO  --  6.2  0.8 6.0  6.0  0.9  0.9 12.4  12.4  1.8*  1.8* 14.9  14.9  14.9  14.9  1.5*  1.5*  1.5*  1.5*   BASO  --  0.04 0.04  0.04 0.05  0.05 0.03  0.03  0.03  0.03   NRBC  --  0 0  0 0  0 0  0  0  0       CHEMISTRY LAST 7 DAYS  Recent Labs   Lab 03/22/24  1619 03/22/24  1630 03/23/24  0606 03/24/24  0543 03/25/24  0954   NA  --  139 138 135* 134*  134*   K  --  3.6 3.5 4.0 5.0  5.0   CL  --  93* 91* 89* 88*  88*   CO2  --  39* 40* 38* 36*  36*   ANIONGAP  --  7* 7* 8 10  10   BUN  --  29* 34* 47* 57*  57*   CREATININE  --  1.4 1.6* 1.8* 1.6*  1.6*   GLU  --  161* 161* 131* 128*  128*   CALCIUM  --  8.7 8.7 9.0 9.2  9.2   PH 7.385  --   --   --   --    MG  --  1.6 1.6 1.7 1.9   ALBUMIN  --  3.7 3.5 3.2* 3.3*  3.3*   PROT  --  6.7 6.4 6.1 6.7   ALKPHOS  --  69 59 55 58   ALT  --  7* 6* 6* 9*   AST  --  18 18 18 26   BILITOT  --  0.6 0.9 0.9 0.5       Estimated Creatinine Clearance: 33.1 mL/min (A) (based on SCr of 1.6 mg/dL (H)).    INFLAMMATORY/PROCAL  LAST 7 DAYS  Recent Labs   Lab 03/24/24  0543   PROCAL 5.579*   .8*     No results found for: "ESR"  CRP   Date Value Ref Range Status   03/24/2024 278.8 (H) 0.0 - 8.2 mg/L Final   05/04/2010 4.9 0.1 - 8.2 mg/L Final       PRIOR  MICROBIOLOGY:  Reviewed    LAST 7 DAYS MICROBIOLOGY   Microbiology Results (last 7 days)       Procedure Component Value Units Date/Time    Blood culture [[id number]] Collected: 03/25/24 0954    Order Status: Sent Specimen: Blood from Peripheral, Hand, Left Updated: 03/25/24 1003    Narrative:      Collection has been rescheduled by JOSE at 03/25/2024 08:16 Reason:   Patient unavailable pt getting clean  Collection has been " rescheduled by DW at 03/25/2024 08:49 Reason:   Patient unavailable respiratory care dw  Collection has been rescheduled by DW at 03/25/2024 08:16 Reason:   Patient unavailable pt getting clean  Collection has been rescheduled by DW at 03/25/2024 08:49 Reason:   Patient unavailable respiratory care dw    Blood culture [[id number]] Collected: 03/25/24 0954    Order Status: Sent Specimen: Blood from Peripheral, Hand, Right Updated: 03/25/24 1003    Narrative:      Collection has been rescheduled by DW at 03/25/2024 08:16 Reason:   Patient unavailable pt getting clean  Collection has been rescheduled by DW at 03/25/2024 08:49 Reason:   Patient unavailable respiratory care dw  Collection has been rescheduled by DW at 03/25/2024 08:16 Reason:   Patient unavailable pt getting clean  Collection has been rescheduled by DW at 03/25/2024 08:49 Reason:   Patient unavailable respiratory care dw    Blood culture [[id number]]  (Abnormal) Collected: 03/23/24 1306    Order Status: Completed Specimen: Blood from Peripheral, Antecubital, Left Updated: 03/25/24 0625     Blood Culture, Routine Gram stain aer bottle: Gram positive cocci      Positive results previously called 03/24/2024  02:22 KS3      ENTEROCOCCUS FAECALIS  For susceptibility see order #[id number]      Blood culture [[id number]]  (Abnormal) Collected: 03/23/24 1306    Order Status: Completed Specimen: Blood Updated: 03/25/24 0625     Blood Culture, Routine Gram stain arlen bottle: Gram positive cocci      Results called to and read back by: Rylee Kelly RN on [id number]      03/24/2024  01:39 KS3      Gram stain aer bottle: Gram positive cocci      Positive results previously called 03/24/2024  02:32 KS3      ENTEROCOCCUS FAECALIS  susceptibility pending      Blood culture [[id number]] Collected: 03/24/24 1147    Order Status: Completed Specimen: Blood Updated: 03/25/24 0436     Blood Culture, Routine Gram stain aer bottle: Gram positive cocci      Positive results  previously called 03/25/2024  04:35 KS3    Blood culture [[id number]] Collected: 03/24/24 1146    Order Status: Completed Specimen: Blood Updated: 03/25/24 0410     Blood Culture, Routine Gram stain aer bottle: Gram positive cocci      Positive results previously called 03/25/2024  04:09 KS3    Rapid Organism ID by PCR (from Blood culture) [[id number]]  (Abnormal) Collected: 03/23/24 1306    Order Status: Completed Updated: 03/24/24 0254     Enterococcus faecalis Detected     Enterococcus faecium Not Detected     Listeria monocytogenes Not Detected     Staphylococcus spp. Not Detected     Staphylococcus aureus Not Detected     Staphylococcus epidermidis Not Detected     Staphylococcus lugdunensis Not Detected     Streptococcus species Not Detected     Streptococcus agalactiae Not Detected     Streptococcus pneumoniae Not Detected     Streptococcus pyogenes Not Detected     Acinetobacter calcoaceticus/baumannii complex Not Detected     Bacteroides fragilis Not Detected     Enterobacterales Not Detected     Enterobacter cloacae complex Not Detected     Escherichia coli Not Detected     Klebsiella aerogenes Not Detected     Klebsiella oxytoca Not Detected     Klebsiella pneumoniae group Not Detected     Proteus Not Detected     Salmonella sp Not Detected     Serratia marcescens Not Detected     Haemophilus influenzae Not Detected     Neisseria meningtidis Not Detected     Pseudomonas aeruginosa Not Detected     Stenotrophomonas maltophilia Not Detected     Candida albicans Not Detected     Candida auris Not Detected     Candida glabrata Not Detected     Candida krusei Not Detected     Candida parapsilosis Not Detected     Candida tropicalis Not Detected     Cryptococcus neoformans/gattii Not Detected     CTX-M (ESBL ) Test not applicable     IMP (Carbapenem resistant) Test not applicable     KPC resistance gene (Carbapenem resistant) Test not applicable     mcr-1  Test not applicable     mec A/C  Test not  applicable     mec A/C and MREJ (MRSA) gene Test not applicable     NDM (Carbapenem resistant) Test not applicable     OXA-48-like (Carbapenem resistant) Test not applicable     van A/B (VRE gene) Not Detected     VIM (Carbapenem resistant) Test not applicable            SUSCEPTIBILITY  Susceptibility data from last 90 days.  Collected Specimen Info Organism   03/23/24 Blood Enterococcus faecalis   03/23/24 Blood from Peripheral, Antecubital, Left Enterococcus faecalis       CURRENT/PREVIOUS VISIT EKG  Results for orders placed or performed during the hospital encounter of 03/22/24   EKG 12-lead    Collection Time: 03/22/24  4:27 PM   Result Value Ref Range    QRS Duration 168 ms    [facility]HS QTC Calculation 528 ms    Narrative    Test Reason : R07.9,    Vent. Rate : 090 BPM     Atrial Rate : 090 BPM     P-R Int : 220 ms          QRS Dur : 168 ms      QT Int : 432 ms       P-R-T Axes : 015 -63 090 degrees     QTc Int : 528 ms    Atrial-sensed ventricular-paced rhythm with prolonged AV conduction  Abnormal ECG  When compared with ECG of 10-SEP-2022 23:53,  Vent. rate has increased BY  30 BPM    Referred By: DANISH HENDERSON           Confirmed By:        Significant Labs: All pertinent labs within the past 24 hours have been reviewed.     Significant Imaging: I have reviewed all relevant and available imaging results/findings within the past 24 hours.      Olivier Espitia MD  Date of Service: 03/25/2024  11:17 AM

## 2024-03-25 NOTE — ASSESSMENT & PLAN NOTE
Patient is identified as having Diastolic (HFpEF) heart failure that is Chronic. CHF is currently controlled. Latest ECHO performed and demonstrates- Results for orders placed during the hospital encounter of 09/09/22    Echo    Interpretation Summary  · The left ventricle is normal in size with normal systolic function.  · Grade II left ventricular diastolic dysfunction.  · The estimated PA systolic pressure is 75 mmHg.  · Normal right ventricular size with normal right ventricular systolic function.  · There is moderate to severe pulmonary hypertension.  · Normal central venous pressure (3 mmHg).  · Moderate left atrial enlargement.  · Moderate tricuspid regurgitation.  · Moderate-to-severe mitral regurgitation.  · The estimated ejection fraction is 60%.  · Mild-to-moderate aortic regurgitation.  · There is moderate aortic valve stenosis.  · Aortic valve area is 0.72 cm2; peak velocity is m/s; mean gradient is 20 mmHg.    3/25 - appreciate cardiology recs, pt diuresed well overnight

## 2024-03-25 NOTE — PT/OT/SLP PROGRESS
Physical Therapy Treatment    Patient Name:  Tereza Larose   MRN:  [id number]    Recommendations:     Discharge Recommendations: Moderate Intensity Therapy  Discharge Equipment Recommendations: to be determined by next level of care  Barriers to discharge:  decreased activity tolerance, poor command follow, decreased safety awareness, high fall risk    Assessment:     Tereza Larose is a 73 y.o. female admitted with a medical diagnosis of NSTEMI (non-ST elevated myocardial infarction).  She presents with the following impairments/functional limitations: weakness, impaired endurance, impaired self care skills, impaired functional mobility, gait instability, impaired balance, visual deficits, impaired cognition, decreased upper extremity function, decreased safety awareness.    Pt agreeable to visit. Pt performed supine to sit transfer with mod assist and verbal cuing for sequencing. Pt able to scoot hips out with mod assist.    Pt performed sit to stand transfer with mod assist x 2 and RW. Pt instructed to take side step along EOB but instead turned parallel to the bed and tried to take steps. Even with demonstration pt unable to comprehend request for side steps.    Pt ambulated 5' forwards and backwards for two trials with RW and min to mod assist. Mod to max verbal cuing for sequencing and RW management.    Pt returned to bed at end of session.    Rehab Prognosis: Fair; patient would benefit from acute skilled PT services to address these deficits and reach maximum level of function.    Recent Surgery: Procedure(s) (LRB):  Transesophageal echo (DELORIS) intra-procedure log documentation (N/A)      Plan:     During this hospitalization, patient to be seen 6 x/week to address the identified rehab impairments via gait training, therapeutic activities, therapeutic exercises and progress toward the following goals:    Plan of Care Expires:  04/23/24    Subjective     Chief Complaint: none verbalized  Patient/Family  Comments/goals: to get better  Pain/Comfort:  Pain Rating 1: 0/10      Objective:     Communicated with RN prior to session.  Patient found HOB elevated with peripheral IV, oxygen, bed alarm, PureWick upon PT entry to room.     General Precautions: Standard, fall, respiratory  Orthopedic Precautions: N/A  Braces: N/A  Respiratory Status: Nasal cannula, flow 2 L/min     Functional Mobility:  Bed Mobility:     Supine to Sit: moderate assistance  Sit to Supine: moderate assistance and of 2 persons  Transfers:     Sit to Stand:  moderate assistance and of 2 persons with rolling walker  Gait: 5' forwards and backwards for two trials with RW and min-modA      AM-PAC 6 CLICK MOBILITY          Treatment & Education:  Pt educated on importance of time OOB, importance of intermittent mobility, safe techniques for transfers/ambulation, discharge recommendations/options, and use of call light for assistance and fall prevention.      Patient left HOB elevated with all lines intact, call button in reach, and bed alarm on..    GOALS:   Multidisciplinary Problems       Physical Therapy Goals          Problem: Physical Therapy    Goal Priority Disciplines Outcome Goal Variances Interventions   Physical Therapy Goal     PT, PT/OT      Description: Goals to be met by: 24    Patient will increase functional independence with mobility by performin. Supine to sit with Supervision  2. Sit to stand transfer with Supervision  3. Bed to chair transfer with Supervision using Rolling Walker  4. Gait  x 50 feet with Supervision using Rolling Walker.   5. Ascend/descend 5 stairs with R rail with Supervision using no AD  6. Lower extremity exercise program x20 reps per handout, with supervision                       Time Tracking:     PT Received On: 24  PT Start Time: 925     PT Stop Time: 939  PT Total Time (min): 14 min     Billable Minutes: Gait Training 14    Treatment Type: Treatment  PT/PTA: PTA     Number of PTA visits  since last PT visit: 1 03/25/2024

## 2024-03-25 NOTE — PLAN OF CARE
CM received call from Isi with [facility] stating this patient is accepted for SNF. [facility] submitting for auth.  This is the only facility who has accepted at this time.  SW updated and will speak to the patient.        03/25/24 1230   Post-Acute Status   Post-Acute Authorization Placement   Post-Acute Placement Status Pending payor review/awaiting authorization (if required)   Discharge Plan   Discharge Plan A Skilled Nursing Facility   Discharge Plan B Home Health

## 2024-03-25 NOTE — ASSESSMENT & PLAN NOTE
Repeat blood cultures remain postive  Cont IV abx per ID recs  D/w cardiology and will plan for DELORIS tomorrow - pacemaker in situ  Cxr and UA neg

## 2024-03-25 NOTE — BRIEF OP NOTE
3/25/2024  2:26 PM    Procedure:  Transesophageal echocardiogram    Indications:  Sepsis, septicemia, status post bioprosthetic aortic valve replacement, status post dual-chamber permanent pacemaker.    Pre-procedure diagnosis:  Septicemia    Postprocedure diagnosis:  Septicemia, small mass on right atrial lead with independent motion.    Description:  Patient was taken to the [facility] for transesophageal echocardiogram.  Consent was signed and is in the paper chart.  The risks, benefits, and alternatives were explained to the patient.  She agreed with going through with the procedure.  Sedation was provided by anesthesia.  Findings are enlarged RV an enlarged right atrium.  There was a small mobile mass located on a right atrial lead.  Proximal measurements are 1 cm x 0.5 cm.  See report for more precise details.  It is seen in multiple views.  The aortic valve is a bioprosthetic aortic valve was not well visualized in this study.  However, what was able to be visualized no mass or vegetation was appreciated.  The pulmonic valve was very poorly seen in the study.  Unable to exclude any vegetations on the pulmonic valve.  Given the finding of mobile mass on right atrial lead this could represent either vegetation versus fibrin stranding.  Given the clinical context of known positive blood cultures and septicemia, a vegetation in the right atrial lead would be favored.  Overall, the patient tolerated the procedure well.  There were no immediate complications.    Complications:  None  Blood loss:  None  Specimen:  None      Paulino Resendiz Jr, MD   none

## 2024-03-25 NOTE — PLAN OF CARE
LOC[facility] call was made, PASRR filled out and faxed. Waiting for 142.    Scanned PASRR and 142 into EPIC

## 2024-03-25 NOTE — NURSING
The patient was picked up by richardson nettles, left via wheelchair, patient was awake and alert, no complaints

## 2024-03-25 NOTE — PROGRESS NOTES
INPATIENT NEPHROLOGY CONSULT   [facility] NEPHROLOGY    Tereza Larose  03/25/2024    Reason for consultation:  sari    Chief Complaint:   Chief Complaint   Patient presents with    Chest Pain     History of Present Illness:    Per H and P  Ms. Larose is a 73-year-old who presented to outside hospital for dyspnea and chest pain. That time, patient had troponins drawn that were over 200 and 170. She was sent here for further care for NSTEMI. This time, patient is confused. She is oriented to person and time. She has not oriented to place and president. She states she has a lot of medical problems. She told the nurses that she came in for back pain. She told me she came in for nausea. This time, she denies any chest pain. Patient then states that she did go to the ED for chest pain after we asked her that specifically. Unsure what patient's baseline is at this time. No family with her. Per the ED note, she came in for dyspnea and had a recent fall. Workup revealed elevated troponins.     3/25 VSS, no new complains.    Plan of Care:    Acute kidney injury due to Enterococcal sepsis and ?vancomycin toxicity  Hyponatremia  DM  --hold lasix if creatinine keeps rising  --avoid NSAIDS, Bey II inhibitors, and other non-essential nephrotoxic agents  --urine eosinophils negative  --switching Vancomycin  --renal dose medication for eGFR < 60 ml/min  --renal us with bladder pvr is negative  --at risk for contrast nephrothy  --quantify proteinuria  --control DM    High uric acid levels  --start allopurinol low dose.     Hypothyroidism, uncontrolled  --pt stopped taking her levothyroxine months ago  --tsh is high and free T4 is low  --resume oral supplement    Enterococcus Faecalis bacteremia   --abx per ID  --significant valvular disease.  Consider DELORIS.  Defer to ID    Hypertension  CHF  --continue metoprolol  --low salt diet    Thank you for allowing us to participate in this patient's care. We will continue to follow.    Vital  Signs:  Temp Readings from Last 3 Encounters:   03/25/24 97.5 °F (36.4 °C) (Oral)   03/22/24 98.1 °F (36.7 °C)   10/17/23 98.2 °F (36.8 °C) (Oral)       Pulse Readings from Last 3 Encounters:   03/25/24 65   03/22/24 94   01/23/24 77       BP Readings from Last 3 Encounters:   03/25/24 (!) 146/66   03/22/24 (!) 166/86   01/23/24 (!) 172/90       Weight:  Wt Readings from Last 3 Encounters:   03/25/24 92.2 kg (203 lb 4.2 oz)   03/23/24 88.3 kg (194 lb 10.7 oz)   01/23/24 82.5 kg (181 lb 14.1 oz)       Past Medical & Surgical History:  Past Medical History:   Diagnosis Date    Abnormal EKG 9/26/2012    Allergy     Arthritis     Asthma     Brain bleed     Colon polyps 11/6/2020    COPD (chronic obstructive pulmonary disease)     Depression     Diabetes mellitus type II     Diverticulitis     Fatty liver     GERD (gastroesophageal reflux disease)     Goiter     s/p thyroidectomy    Heart murmur     Hemiparesis affecting right side as late effect of cerebrovascular accident (CVA) 7/26/2022    Hernia of abdominal wall     History of intracranial hemorrhage 6/15/2013    History of non-ST elevation myocardial infarction (NSTEMI) 6/15/2013    Hyperlipidemia     Hypertension     Lactic acidosis 1/11/2020    Metabolic syndrome 6/14/2012    Myocardial infarction     On home oxygen therapy     states uses 2L at night or when sat < 90    Pacemaker     Positive FIT (fecal immunochemical test) 11/6/2020    Severe persistent asthma without complication 4/30/2020    Statin intolerance     Stroke 2012    left hand shaky, loss of balance       Past Surgical History:   Procedure Laterality Date    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/25/2017    Dr Griggs    ADRENALECTOMY      AORTIC VALVE REPLACEMENT  12/09/2016    ARTERIOGRAPHY OF AORTIC ROOT N/A 9/12/2022    Procedure: ARTERIOGRAM, AORTIC ROOT;  Surgeon: Marko Batres MD;  Location: [facility] CATH/EP LAB;  Service: Cardiology;  Laterality: N/A;    arthroscopy lt knee Right     BLADDER REPAIR       sling    BREAST BIOPSY Bilateral     benign    COLONOSCOPY  2004    10 year recheck    COLONOSCOPY N/A 11/6/2020    Procedure: COLONOSCOPY;  Surgeon: Yakelin Lowery MD;  Location: [facility] ENDO;  Service: Endoscopy;  Laterality: N/A;    CORONARY ANGIOGRAPHY INCLUDING BYPASS GRAFTS WITH CATHETERIZATION OF LEFT HEART Left 9/12/2022    Procedure: Left heart cath including bypass graft, with left heart catheterization;  Surgeon: Marko Batres MD;  Location: [facility] CATH/EP LAB;  Service: Cardiology;  Laterality: Left;    CORONARY ARTERY BYPASS GRAFT  12/09/2016    X3    EPIDURAL STEROID INJECTION INTO LUMBAR SPINE N/A 7/27/2021    Procedure: Injection-steroid-epidural-lumbar;  Surgeon: Valerio Jay MD;  Location: [facility] OR;  Service: Pain Management;  Laterality: N/A;  L5-S1     HYSTERECTOMY      INSERTION OF PACEMAKER Left 1/13/2020    Procedure: INSERTION, PACEMAKER;  Surgeon: Marko Batres MD;  Location: [facility] CATH/EP LAB;  Service: Cardiology;  Laterality: Left;    OOPHORECTOMY      RIGHT HEART CATHETERIZATION Right 9/12/2022    Procedure: INSERTION, CATHETER, RIGHT HEART;  Surgeon: Marko Batres MD;  Location: [facility] CATH/EP LAB;  Service: Cardiology;  Laterality: Right;    THYROIDECTOMY         Past Social History:  Social History     Socioeconomic History    Marital status:    Tobacco Use    Smoking status: Never    Smokeless tobacco: Never   Substance and Sexual Activity    Alcohol use: Not Currently     Comment: seldom    Drug use: No    Sexual activity: Never     Social Determinants of Health     Financial Resource Strain: Low Risk  (9/11/2022)    Overall Financial Resource Strain (CARDIA)     Difficulty of Paying Living Expenses: Not hard at all   Food Insecurity: No Food Insecurity (9/11/2022)    Hunger Vital Sign     Worried About Running Out of Food in the Last Year: Never true     Ran Out of Food in the Last Year: Never true   Recent Concern: Food Insecurity - Food Insecurity Present (7/26/2022)     Hunger Vital Sign     Worried About Running Out of Food in the Last Year: Sometimes true     Ran Out of Food in the Last Year: Sometimes true   Transportation Needs: No Transportation Needs (9/11/2022)    PRAPARE - Transportation     Lack of Transportation (Medical): No     Lack of Transportation (Non-Medical): No   Physical Activity: Inactive (9/11/2022)    Exercise Vital Sign     Days of Exercise per Week: 0 days     Minutes of Exercise per Session: 0 min   Stress: Stress Concern Present (9/11/2022)    [employer] - Occupational Stress Questionnaire     Feeling of Stress : To some extent   Social Connections: Moderately Integrated (9/11/2022)    Social Connection and Isolation Panel [NHANES]     Frequency of Communication with Friends and Family: More than three times a week     Frequency of Social Gatherings with Friends and Family: More than three times a week     Attends [employer] Services: 1 to 4 times per year     Active Member of Clubs or Organizations: No     Attends Club or Organization Meetings: Never     Marital Status:    Recent Concern: Social Connections - Moderately Isolated (7/26/2022)    Social Connection and Isolation Panel [NHANES]     Frequency of Communication with Friends and Family: More than three times a week     Frequency of Social Gatherings with Friends and Family: Once a week     Attends [employer] Services: Never     Active Member of Clubs or Organizations: No     Attends Club or Organization Meetings: Never     Marital Status:    Housing Stability: Unknown (9/11/2022)    Housing Stability Vital Sign     Unable to Pay for Housing in the Last Year: No     Unstable Housing in the Last Year: No       Medications:  No current facility-administered medications on file prior to encounter.     Current Outpatient Medications on File Prior to Encounter   Medication Sig Dispense Refill    albuterol (PROVENTIL) 2.5 mg /3 mL (0.083 %) nebulizer solution  Take 3 mLs (2.5 mg total) by nebulization every 4 (four) hours as needed for Shortness of Breath or Wheezing. 30 each 11    albuterol (PROVENTIL/VENTOLIN HFA) 90 mcg/actuation inhaler Inhale 1-2 puffs into the lungs every 4 (four) hours as needed for Wheezing. Inhale 2 puffs by mouth into lungs every 4 hours as needed 108 g 3    alcohol swabs (BD ALCOHOL SWABS) PadM Apply 1 each topically as needed. 100 each 3    amLODIPine (NORVASC) 10 MG tablet TAKE 1 TABLET BY MOUTH EVERY DAY FOR SYSTOLIC BLOOD PRESSURE GREATER THAN 120 90 tablet 3    arformoteroL (BROVANA) 15 mcg/2 mL Nebu Take 2 mLs (15 mcg total) by nebulization 2 (two) times a day. Controller 60 each 11    aspirin (ECOTRIN) 81 MG EC tablet Take 81 mg by mouth once daily.      bempedoic acid (NEXLETOL) 180 mg Tab Take 1 tablet by mouth once daily.      blood glucose control, low (TRUE METRIX LEVEL 1) Soln Check glucometer accuracy at least once a week 1 each 3    blood sugar diagnostic Strp True Metrix Test Strips test once a day 100 strip 3    blood-glucose meter (TRUE METRIX GLUCOSE METER) Misc True Metrix Glucose Meter 1 each 0    budesonide (PULMICORT) 0.5 mg/2 mL nebulizer solution Take 2 mLs (0.5 mg total) by nebulization 2 (two) times daily. Controller 120 mL 11    cholecalciferol, vitamin D3, (VITAMIN D3) 25 mcg (1,000 unit) capsule Take 1,000 Units by mouth once daily.      colchicine, gout, (COLCRYS) 0.6 mg tablet Take 1 tablet (0.6 mg total) by mouth once daily. Day 1: 1.2 mg at the first sign of flare, followed by 0.6 mg after 1 hour; maximum total dose: 1.8 mg/day on day Day 2 and thereafter: 0.6 mg once or twice daily until flare resolves 30 tablet 0    cyanocobalamin (VITAMIN B-12) 1000 MCG tablet Take 100 mcg by mouth once daily.      evolocumab (REPATHA SYRINGE) 140 mg/mL Syrg Inject 140 mg into the skin every 14 (fourteen) days.       glyBURIDE (DIABETA) 2.5 MG tablet Take 2.5 mg by mouth 2 (two) times daily.      lancets 33 gauge Misc 1  lancet by Misc.(Non-Drug; Combo Route) route once daily. 100 each 3    levocetirizine (XYZAL) 5 MG tablet Take 1 tablet (5 mg total) by mouth every evening. 90 tablet 3    levothyroxine (SYNTHROID) 150 MCG tablet Take 150 mcg by mouth once daily.      metoprolol succinate (TOPROL-XL) 50 MG 24 hr tablet Take 1 tablet (50 mg total) by mouth once daily. 30 tablet 11    potassium chloride (MICRO-K) 8 mEq CpSR Take 8 mEq by mouth.      predniSONE (DELTASONE) 10 mg tablet pack Take by mouth once daily. Take 3 tablets daily on day #1-2, Take 2 tablets daily on days #3-4, Take 1 tablet daily on day #5-6, then stop taking  Dispense: 12 tablet; Refill: 0 12 tablet 0    predniSONE (DELTASONE) 20 MG tablet Take one daily for 3 days and may repeat for shortness of breath. 12 tablet 2    REPATHA SURECLICK 140 mg/mL PnIj SMARTSI pre-filled pen syringe SUB-Q Every 2 Weeks      revefenacin (YUPELRI) 175 mcg/3 mL Nebu Inhale 1 Dose into the lungs Daily. 30 each 11    SITagliptin (JANUVIA) 50 MG Tab Take 1 tablet (50 mg total) by mouth once daily. 90 tablet 1    sotaloL (BETAPACE) 80 MG tablet Take 80 mg by mouth 2 (two) times daily.      torsemide (DEMADEX) 20 MG Tab Take 20 mg by mouth.      vitamin E 400 UNIT capsule Take 400 Units by mouth once daily.       Scheduled Meds:   ampicillin IV (PEDS and ADULTS)  2 g Intravenous Q6H    budesonide  0.5 mg Nebulization BID    cefTRIAXone (Rocephin) IV (PEDS and ADULTS)  2 g Intravenous Q12H    folic acid  1 mg Oral Daily    furosemide (LASIX) injection  40 mg Intravenous Daily    heparin (porcine)  5,000 Units Subcutaneous Q8H    levothyroxine  150 mcg Oral Before breakfast    metoprolol succinate  25 mg Oral Daily    multivitamin  1 tablet Oral Daily    mupirocin   Nasal BID    polyethylene glycol  17 g Oral Daily    thiamine  100 mg Oral Daily     Continuous Infusions:  PRN Meds:.acetaminophen, dextrose 50%, dextrose 50%, glucagon (human recombinant), glucose, glucose, insulin aspart  "U-100, LORazepam, melatonin, morphine, naloxone, nitroGLYCERIN, ondansetron, sodium chloride 0.9%    Allergies:  Metformin, Corn, Potato starch, Pravastatin, Statins-hmg-coa reductase inhibitors, Hydrochlorothiazide, and Welchol [colesevelam]    Past Family History:  Reviewed; refer to Hospitalist Admission Note    Review of Systems:  Review of Systems - All 14 systems reviewed and negative, except as noted in HPI    Physical Exam:    BP (!) 146/66 (BP Location: Left arm, Patient Position: Lying)   Pulse 65   Temp 97.5 °F (36.4 °C) (Oral)   Resp 17   Ht 5' 2" (1.575 m)   Wt 92.2 kg (203 lb 4.2 oz)   SpO2 98%   BMI 37.18 kg/m²     General Appearance:    Alert, cooperative, no distress, appears stated age   Head:    Normocephalic, without obvious abnormality, atraumatic   Eyes:    PER, conjunctiva/corneas clear, EOM's intact in both eyes        Throat:   Lips, mucosa, and tongue normal; teeth and gums normal   Back:     Symmetric, no curvature, ROM normal, no CVA tenderness   Lungs:     Clear to auscultation bilaterally, respirations unlabored   Chest wall:    No tenderness or deformity   Heart:    Regular rate and rhythm, S1 and S2 normal, no murmur, rub   or gallop   Abdomen:     Soft, non-tender, bowel sounds active all four quadrants,     no masses, no organomegaly   Extremities:   Extremities normal, atraumatic, no cyanosis or edema   Pulses:   2+ and symmetric all extremities   MSK:   No joint or muscle swelling, tenderness or deformity   Skin:   Skin color, texture, turgor normal, no rashes or lesions   Neurologic:   CNII-XII intact, normal strength and sensation       Throughout.  No flap     Results:  Lab Results   Component Value Date     (L) 03/25/2024     (L) 03/25/2024    K 5.0 03/25/2024    K 5.0 03/25/2024    CL 88 (L) 03/25/2024    CL 88 (L) 03/25/2024    CO2 36 (H) 03/25/2024    CO2 36 (H) 03/25/2024    BUN 57 (H) 03/25/2024    BUN 57 (H) 03/25/2024    CREATININE 1.6 (H) 03/25/2024 "    CREATININE 1.6 (H) 03/25/2024    CALCIUM 9.2 03/25/2024    CALCIUM 9.2 03/25/2024    ANIONGAP 10 03/25/2024    ANIONGAP 10 03/25/2024    ESTGFRAFRICA >60.0 09/16/2020    EGFRNONAA >60.0 09/16/2020       Lab Results   Component Value Date    CALCIUM 9.2 03/25/2024    CALCIUM 9.2 03/25/2024    PHOS 3.7 03/25/2024       Recent Labs   Lab 03/25/24  0533   WBC 10.13  10.13  10.13  10.13   RBC 4.11  4.11  4.11  4.11   HGB 12.6  12.6  12.6  12.6   HCT 38.8  38.8  38.8  38.8   *  142*  142*  142*   MCV 94  94  94  94   MCH 30.7  30.7  30.7  30.7   MCHC 32.5  32.5  32.5  32.5       Patient care was time spent personally by me on the following activities:     Obtaining a history  Examination of patient.  Providing medical care at the patients bedside.  Developing a treatment plan with patient or surrogate and bedside caregivers  Ordering and reviewing laboratory studies, radiographic studies, pulse oximetry.  Ordering and performing treatments and interventions.  Evaluation of patient's response to treatment.  Discussions with consultants while on the unit and immediately available to the patient.  Re-evaluation of the patient's condition.  Documentation in the medical record.     Brian Andrew MD  Nephrology  [facility] [place]  [phone]

## 2024-03-25 NOTE — RESPIRATORY THERAPY
03/24/24 2021   Patient Assessment/Suction   Level of Consciousness (AVPU) alert   Respiratory Effort Unlabored;Normal   Expansion/Accessory Muscles/Retractions no retractions;no use of accessory muscles   All Lung Fields Breath Sounds Anterior:;diminished   Rhythm/Pattern, Respiratory no shortness of breath reported;unlabored   Cough Frequency no cough   Skin Integrity   $ Wound Care Tech Time 15 min   Area Observed Left;Right;Nares;Behind ear   Skin Appearance without discoloration   PRE-TX-O2   Device (Oxygen Therapy) nasal cannula   Flow (L/min) 2   SpO2 97 %   Pulse Oximetry Type Intermittent   $ Pulse Oximetry - Multiple Charge Pulse Oximetry - Multiple   Pulse 70   Resp 17   Positioning HOB elevated 30 degrees   Aerosol Therapy   $ Aerosol Therapy Charges Aerosol Treatment   Daily Review of Necessity (SVN) completed   Respiratory Treatment Status (SVN) given   Treatment Route (SVN) mask;oxygen   Patient Position (SVN) HOB elevated   Post Treatment Assessment (SVN) breath sounds unchanged   Signs of Intolerance (SVN) none   Education   $ Education Bronchodilator;15 min

## 2024-03-25 NOTE — CARE UPDATE
03/25/24 0849   Patient Assessment/Suction   Level of Consciousness (AVPU) alert   Respiratory Effort Normal;Unlabored   Expansion/Accessory Muscles/Retractions no use of accessory muscles;no retractions;expansion symmetric   All Lung Fields Breath Sounds diminished   Rhythm/Pattern, Respiratory unlabored;depth regular   Cough Frequency no cough   PRE-TX-O2   Device (Oxygen Therapy) nasal cannula   $ Is the patient on Low Flow Oxygen? Yes   Flow (L/min) 2   SpO2 98 %   Pulse Oximetry Type Intermittent   $ Pulse Oximetry - Multiple Charge Pulse Oximetry - Multiple   Pulse 65   Resp 17   Aerosol Therapy   $ Aerosol Therapy Charges Aerosol Treatment   Daily Review of Necessity (SVN) completed   Respiratory Treatment Status (SVN) given   Treatment Route (SVN) mask;oxygen   Patient Position (SVN) HOB elevated   Post Treatment Assessment (SVN) breath sounds unchanged   Signs of Intolerance (SVN) none   Breath Sounds Post-Respiratory Treatment   Throughout All Fields Post-Treatment All Fields   Throughout All Fields Post-Treatment no change   Post-treatment Heart Rate (beats/min) 67   Post-treatment Resp Rate (breaths/min) 17   Education   $ Education Bronchodilator;15 min

## 2024-03-26 ENCOUNTER — DOCUMENTATION ONLY (OUTPATIENT)
Dept: ELECTROPHYSIOLOGY | Facility: CLINIC | Age: 74
End: 2024-03-26
Payer: MEDICARE

## 2024-03-26 ENCOUNTER — DOCUMENTATION ONLY (OUTPATIENT)
Dept: CARDIOLOGY | Facility: HOSPITAL | Age: 74
End: 2024-03-26
Payer: MEDICARE

## 2024-03-26 ENCOUNTER — HOSPITAL ENCOUNTER (INPATIENT)
Facility: HOSPITAL | Age: 74
LOS: 3 days | Discharge: ANOTHER HOSPITAL | DRG: 280 | End: 2024-03-29
Attending: STUDENT IN AN ORGANIZED HEALTH CARE EDUCATION/TRAINING PROGRAM | Admitting: STUDENT IN AN ORGANIZED HEALTH CARE EDUCATION/TRAINING PROGRAM
Payer: MEDICARE

## 2024-03-26 VITALS — HEART RATE: 65 BPM

## 2024-03-26 DIAGNOSIS — T82.7XXA PACEMAKER INFECTION: ICD-10-CM

## 2024-03-26 DIAGNOSIS — T82.7XXA INFECTED PACEMAKER: ICD-10-CM

## 2024-03-26 DIAGNOSIS — I38 ENDOCARDITIS: ICD-10-CM

## 2024-03-26 DIAGNOSIS — I33.0 ACUTE BACTERIAL ENDOCARDITIS: Primary | ICD-10-CM

## 2024-03-26 DIAGNOSIS — R07.9 CHEST PAIN: ICD-10-CM

## 2024-03-26 PROBLEM — R78.81 BACTEREMIA: Status: ACTIVE | Noted: 2024-03-26

## 2024-03-26 PROBLEM — E87.1 HYPONATREMIA: Status: ACTIVE | Noted: 2024-03-26

## 2024-03-26 PROBLEM — Z95.0 PACEMAKER: Status: ACTIVE | Noted: 2024-03-26

## 2024-03-26 PROBLEM — E03.9 HYPOTHYROIDISM: Status: ACTIVE | Noted: 2024-03-26

## 2024-03-26 PROBLEM — R53.81 DEBILITY: Status: ACTIVE | Noted: 2024-03-24

## 2024-03-26 PROBLEM — I48.0 PAROXYSMAL ATRIAL FIBRILLATION: Status: ACTIVE | Noted: 2024-03-26

## 2024-03-26 PROBLEM — E66.9 OBESITY (BMI 30-39.9): Status: ACTIVE | Noted: 2022-07-26

## 2024-03-26 LAB
ABO + RH BLD: NORMAL
ALBUMIN SERPL BCP-MCNC: 2.5 G/DL (ref 3.5–5.2)
ALP SERPL-CCNC: 68 U/L (ref 55–135)
ALT SERPL W/O P-5'-P-CCNC: 17 U/L (ref 10–44)
ANION GAP SERPL CALC-SCNC: 14 MMOL/L (ref 8–16)
AST SERPL-CCNC: 36 U/L (ref 10–40)
BACTERIA BLD CULT: ABNORMAL
BASOPHILS # BLD AUTO: 0.03 K/UL (ref 0–0.2)
BASOPHILS NFR BLD: 0.3 % (ref 0–1.9)
BILIRUB SERPL-MCNC: 0.6 MG/DL (ref 0.1–1)
BLD GP AB SCN CELLS X3 SERPL QL: NORMAL
BUN SERPL-MCNC: 45 MG/DL (ref 8–23)
CALCIUM SERPL-MCNC: 9.1 MG/DL (ref 8.7–10.5)
CHLORIDE SERPL-SCNC: 91 MMOL/L (ref 95–110)
CO2 SERPL-SCNC: 33 MMOL/L (ref 23–29)
CREAT SERPL-MCNC: 1 MG/DL (ref 0.5–1.4)
DIFFERENTIAL METHOD BLD: ABNORMAL
EOSINOPHIL # BLD AUTO: 0.1 K/UL (ref 0–0.5)
EOSINOPHIL NFR BLD: 1 % (ref 0–8)
ERYTHROCYTE [DISTWIDTH] IN BLOOD BY AUTOMATED COUNT: 13.6 % (ref 11.5–14.5)
EST. GFR  (NO RACE VARIABLE): 59.5 ML/MIN/1.73 M^2
GLUCOSE SERPL-MCNC: 154 MG/DL (ref 70–110)
HCT VFR BLD AUTO: 41.2 % (ref 37–48.5)
HGB BLD-MCNC: 13.2 G/DL (ref 12–16)
IMM GRANULOCYTES # BLD AUTO: 0.08 K/UL (ref 0–0.04)
IMM GRANULOCYTES NFR BLD AUTO: 0.9 % (ref 0–0.5)
LYMPHOCYTES # BLD AUTO: 1 K/UL (ref 1–4.8)
LYMPHOCYTES NFR BLD: 11 % (ref 18–48)
MAGNESIUM SERPL-MCNC: 1.9 MG/DL (ref 1.6–2.6)
MCH RBC QN AUTO: 30.2 PG (ref 27–31)
MCHC RBC AUTO-ENTMCNC: 32 G/DL (ref 32–36)
MCV RBC AUTO: 94 FL (ref 82–98)
MONOCYTES # BLD AUTO: 1.2 K/UL (ref 0.3–1)
MONOCYTES NFR BLD: 13.1 % (ref 4–15)
NEUTROPHILS # BLD AUTO: 6.9 K/UL (ref 1.8–7.7)
NEUTROPHILS NFR BLD: 73.7 % (ref 38–73)
NRBC BLD-RTO: 0 /100 WBC
PHOSPHATE SERPL-MCNC: 2.2 MG/DL (ref 2.7–4.5)
PLATELET # BLD AUTO: 192 K/UL (ref 150–450)
PMV BLD AUTO: 11.1 FL (ref 9.2–12.9)
POCT GLUCOSE: 154 MG/DL (ref 70–110)
POCT GLUCOSE: 160 MG/DL (ref 70–110)
POTASSIUM SERPL-SCNC: 4.1 MMOL/L (ref 3.5–5.1)
PROT SERPL-MCNC: 6.1 G/DL (ref 6–8.4)
RBC # BLD AUTO: 4.37 M/UL (ref 4–5.4)
SODIUM SERPL-SCNC: 138 MMOL/L (ref 136–145)
SPECIMEN OUTDATE: NORMAL
WBC # BLD AUTO: 9.37 K/UL (ref 3.9–12.7)

## 2024-03-26 PROCEDURE — 25000003 PHARM REV CODE 250

## 2024-03-26 PROCEDURE — 36415 COLL VENOUS BLD VENIPUNCTURE: CPT | Performed by: STUDENT IN AN ORGANIZED HEALTH CARE EDUCATION/TRAINING PROGRAM

## 2024-03-26 PROCEDURE — 63600175 PHARM REV CODE 636 W HCPCS

## 2024-03-26 PROCEDURE — 86920 COMPATIBILITY TEST SPIN: CPT | Performed by: STUDENT IN AN ORGANIZED HEALTH CARE EDUCATION/TRAINING PROGRAM

## 2024-03-26 PROCEDURE — 87040 BLOOD CULTURE FOR BACTERIA: CPT

## 2024-03-26 PROCEDURE — 99223 1ST HOSP IP/OBS HIGH 75: CPT | Mod: GC,,, | Performed by: INTERNAL MEDICINE

## 2024-03-26 PROCEDURE — 85025 COMPLETE CBC W/AUTO DIFF WBC: CPT

## 2024-03-26 PROCEDURE — 5A0945A ASSISTANCE WITH RESPIRATORY VENTILATION, 24-96 CONSECUTIVE HOURS, HIGH NASAL FLOW/VELOCITY: ICD-10-PCS | Performed by: STUDENT IN AN ORGANIZED HEALTH CARE EDUCATION/TRAINING PROGRAM

## 2024-03-26 PROCEDURE — 83735 ASSAY OF MAGNESIUM: CPT

## 2024-03-26 PROCEDURE — 99233 SBSQ HOSP IP/OBS HIGH 50: CPT | Mod: GC,,, | Performed by: INTERNAL MEDICINE

## 2024-03-26 PROCEDURE — 1111F DSCHRG MED/CURRENT MED MERGE: CPT | Mod: CPTII,,, | Performed by: INTERNAL MEDICINE

## 2024-03-26 PROCEDURE — 82962 GLUCOSE BLOOD TEST: CPT

## 2024-03-26 PROCEDURE — 80053 COMPREHEN METABOLIC PANEL: CPT

## 2024-03-26 PROCEDURE — 20600001 HC STEP DOWN PRIVATE ROOM

## 2024-03-26 PROCEDURE — 84100 ASSAY OF PHOSPHORUS: CPT

## 2024-03-26 PROCEDURE — 86850 RBC ANTIBODY SCREEN: CPT | Performed by: STUDENT IN AN ORGANIZED HEALTH CARE EDUCATION/TRAINING PROGRAM

## 2024-03-26 RX ORDER — GLUCAGON 1 MG
1 KIT INJECTION
Status: DISCONTINUED | OUTPATIENT
Start: 2024-03-26 | End: 2024-03-26

## 2024-03-26 RX ORDER — IBUPROFEN 200 MG
16 TABLET ORAL
Status: DISCONTINUED | OUTPATIENT
Start: 2024-03-26 | End: 2024-03-26

## 2024-03-26 RX ORDER — INSULIN ASPART 100 [IU]/ML
0-5 INJECTION, SOLUTION INTRAVENOUS; SUBCUTANEOUS
Status: DISCONTINUED | OUTPATIENT
Start: 2024-03-26 | End: 2024-03-29 | Stop reason: HOSPADM

## 2024-03-26 RX ORDER — LEVOTHYROXINE SODIUM 150 UG/1
150 TABLET ORAL
Status: DISCONTINUED | OUTPATIENT
Start: 2024-03-26 | End: 2024-03-29 | Stop reason: HOSPADM

## 2024-03-26 RX ORDER — GLUCAGON 1 MG
1 KIT INJECTION
Status: DISCONTINUED | OUTPATIENT
Start: 2024-03-26 | End: 2024-03-29 | Stop reason: HOSPADM

## 2024-03-26 RX ORDER — HYDROCODONE BITARTRATE AND ACETAMINOPHEN 500; 5 MG/1; MG/1
TABLET ORAL
Status: DISCONTINUED | OUTPATIENT
Start: 2024-03-26 | End: 2024-03-29 | Stop reason: HOSPADM

## 2024-03-26 RX ORDER — HEPARIN SODIUM 5000 [USP'U]/ML
5000 INJECTION, SOLUTION INTRAVENOUS; SUBCUTANEOUS EVERY 8 HOURS
Status: DISCONTINUED | OUTPATIENT
Start: 2024-03-26 | End: 2024-03-26

## 2024-03-26 RX ORDER — NALOXONE HCL 0.4 MG/ML
0.02 VIAL (ML) INJECTION
Status: DISCONTINUED | OUTPATIENT
Start: 2024-03-26 | End: 2024-03-29 | Stop reason: HOSPADM

## 2024-03-26 RX ORDER — SODIUM CHLORIDE 0.9 % (FLUSH) 0.9 %
10 SYRINGE (ML) INJECTION EVERY 12 HOURS PRN
Status: DISCONTINUED | OUTPATIENT
Start: 2024-03-26 | End: 2024-03-29 | Stop reason: HOSPADM

## 2024-03-26 RX ORDER — IBUPROFEN 200 MG
24 TABLET ORAL
Status: DISCONTINUED | OUTPATIENT
Start: 2024-03-26 | End: 2024-03-26

## 2024-03-26 RX ORDER — IBUPROFEN 200 MG
16 TABLET ORAL
Status: DISCONTINUED | OUTPATIENT
Start: 2024-03-26 | End: 2024-03-29 | Stop reason: HOSPADM

## 2024-03-26 RX ORDER — IBUPROFEN 200 MG
24 TABLET ORAL
Status: DISCONTINUED | OUTPATIENT
Start: 2024-03-26 | End: 2024-03-29 | Stop reason: HOSPADM

## 2024-03-26 RX ORDER — AMLODIPINE BESYLATE 10 MG/1
10 TABLET ORAL DAILY
Status: DISCONTINUED | OUTPATIENT
Start: 2024-03-26 | End: 2024-03-29 | Stop reason: HOSPADM

## 2024-03-26 RX ADMIN — LEVOTHYROXINE SODIUM 150 MCG: 150 TABLET ORAL at 08:03

## 2024-03-26 RX ADMIN — CEFTRIAXONE 2 G: 2 INJECTION, POWDER, FOR SOLUTION INTRAMUSCULAR; INTRAVENOUS at 09:03

## 2024-03-26 RX ADMIN — ALLOPURINOL 50 MG: 300 TABLET ORAL at 09:03

## 2024-03-26 RX ADMIN — AMPICILLIN 2 G: 2 INJECTION, POWDER, FOR SOLUTION INTRAMUSCULAR; INTRAVENOUS at 08:03

## 2024-03-26 RX ADMIN — AMPICILLIN 2 G: 2 INJECTION, POWDER, FOR SOLUTION INTRAMUSCULAR; INTRAVENOUS at 07:03

## 2024-03-26 RX ADMIN — AMPICILLIN 2 G: 2 INJECTION, POWDER, FOR SOLUTION INTRAMUSCULAR; INTRAVENOUS at 01:03

## 2024-03-26 RX ADMIN — CEFTRIAXONE 2 G: 2 INJECTION, POWDER, FOR SOLUTION INTRAMUSCULAR; INTRAVENOUS at 07:03

## 2024-03-26 RX ADMIN — AMLODIPINE BESYLATE 10 MG: 10 TABLET ORAL at 08:03

## 2024-03-26 NOTE — PROGRESS NOTES
Cardiac device interrogation and/or reprogramming completed by industry representative, Sonia with [email or web address]; please refer to report located in the Media tab.

## 2024-03-26 NOTE — CONSULTS
"[facility] - [facility]iology Stepdown  Infectious Disease  Consult Note    Patient Name: eTreza Larose  MRN: [id number]  Admission Date: 3/26/2024  Hospital Length of Stay: 0 days  Attending Physician: Christopher Williamson DO  Primary Care Provider: Evan Sánchez MD     Isolation Status: No active isolations    Patient information was obtained from patient and ER records.      Inpatient consult to Infectious Diseases  Consult performed by: Jackie Bonner DO  Consult ordered by: Willie Pearl MD        Assessment/Plan:     Cardiac/Vascular  * Acute bacterial endocarditis  Patient with pacemaker in place and previous bioprosthetic AVR found to have E. Faecalis bacteremia and mobile mass on R atrial lead on outside DELORIS, now transferred to [facility]. Currently on ampicillin and ceftriaxone. Blood cultures not yet cleared. Pending cardiology eval here.     Recommendations  Continue ampicillin and ceftriaxone  Recommend device/lead removal - follow up recs        Thank you for your consult. I will follow-up with patient. Please contact us if you have any additional questions.    Jackie Bonner DO  Infectious Disease  [facility]    Subjective:     Principal Problem: Acute bacterial endocarditis    HPI: Ms. Larose is a 73F with PMH of CAD, HFpEF, pacemaker, previous bioprosthetic AVR, HTN, HLD, DM2, COPD, initally admitted to OSH for NSTEMI, subsequently developed VERONA and also found to have E. Faecalis bacteremia, transferred to [facility] after finding of mobile mass on R atrial lead on DELORIS. Infectious disease consulted for "Pt presented as a transfer from OSH. Blood cultures growing enterococcus faecalis. Was started on CTX and ampicillin in the outside hospital. DELORIS showed small mobile mass located on a right atrial lead".     Currently on ampicillin and ceftriaxone. 3/23 and 3/24 BCX positive for amp sensitive E faecalis. 3/25 BCX NGTD. Patient is afebrile, hemodynamically stable, on 3L, no " leukocytosis.         Past Medical History:   Diagnosis Date    Abnormal EKG 9/26/2012    Allergy     Arthritis     Asthma     Brain bleed     Colon polyps 11/6/2020    COPD (chronic obstructive pulmonary disease)     Depression     Diabetes mellitus type II     Diverticulitis     Fatty liver     GERD (gastroesophageal reflux disease)     Goiter     s/p thyroidectomy    Heart murmur     Hemiparesis affecting right side as late effect of cerebrovascular accident (CVA) 7/26/2022    Hernia of abdominal wall     History of intracranial hemorrhage 6/15/2013    History of non-ST elevation myocardial infarction (NSTEMI) 6/15/2013    Hyperlipidemia     Hypertension     Lactic acidosis 1/11/2020    Metabolic syndrome 6/14/2012    Myocardial infarction     On home oxygen therapy     states uses 2L at night or when sat < 90    Pacemaker     Positive FIT (fecal immunochemical test) 11/6/2020    Severe persistent asthma without complication 4/30/2020    Statin intolerance     Stroke 2012    left hand shaky, loss of balance       Past Surgical History:   Procedure Laterality Date    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/25/2017    Dr Griggs    ADRENALECTOMY      AORTIC VALVE REPLACEMENT  12/09/2016    ARTERIOGRAPHY OF AORTIC ROOT N/A 9/12/2022    Procedure: ARTERIOGRAM, AORTIC ROOT;  Surgeon: Marko Batres MD;  Location: [facility] CATH/EP LAB;  Service: Cardiology;  Laterality: N/A;    arthroscopy lt knee Right     BLADDER REPAIR      sling    BREAST BIOPSY Bilateral     benign    COLONOSCOPY  2004    10 year recheck    COLONOSCOPY N/A 11/6/2020    Procedure: COLONOSCOPY;  Surgeon: Yakelin Lowery MD;  Location: [facility];  Service: Endoscopy;  Laterality: N/A;    CORONARY ANGIOGRAPHY INCLUDING BYPASS GRAFTS WITH CATHETERIZATION OF LEFT HEART Left 9/12/2022    Procedure: Left heart cath including bypass graft, with left heart catheterization;  Surgeon: Marko Batres MD;  Location: [facility] CATH/EP LAB;  Service: Cardiology;  Laterality:  Left;    CORONARY ARTERY BYPASS GRAFT  12/09/2016    X3    EPIDURAL STEROID INJECTION INTO LUMBAR SPINE N/A 7/27/2021    Procedure: Injection-steroid-epidural-lumbar;  Surgeon: Valerio Jay MD;  Location: [facility] OR;  Service: Pain Management;  Laterality: N/A;  L5-S1     HYSTERECTOMY      INSERTION OF PACEMAKER Left 1/13/2020    Procedure: INSERTION, PACEMAKER;  Surgeon: Marko Batres MD;  Location: [facility] CATH/EP LAB;  Service: Cardiology;  Laterality: Left;    OOPHORECTOMY      RIGHT HEART CATHETERIZATION Right 9/12/2022    Procedure: INSERTION, CATHETER, RIGHT HEART;  Surgeon: Marko Batres MD;  Location: [facility] CATH/EP LAB;  Service: Cardiology;  Laterality: Right;    THYROIDECTOMY         Review of patient's allergies indicates:   Allergen Reactions    Metformin Diarrhea     Diarrhea      Corn Itching     Other reaction(s): Sneezing  Other reaction(s): Rhinorrhea    Potato starch Hives     Other reaction(s): Sneezing  Other reaction(s): Rhinorrhea    Pravastatin Other (See Comments)     Muscle pain    Statins-hmg-coa reductase inhibitors Other (See Comments)    Hydrochlorothiazide Other (See Comments)     weakness    Welchol [colesevelam] Other (See Comments)     Weakness        Medications:  Medications Prior to Admission   Medication Sig    albuterol (PROVENTIL) 2.5 mg /3 mL (0.083 %) nebulizer solution Take 3 mLs (2.5 mg total) by nebulization every 4 (four) hours as needed for Shortness of Breath or Wheezing.    albuterol (PROVENTIL/VENTOLIN HFA) 90 mcg/actuation inhaler Inhale 1-2 puffs into the lungs every 4 (four) hours as needed for Wheezing. Inhale 2 puffs by mouth into lungs every 4 hours as needed    alcohol swabs (BD ALCOHOL SWABS) PadM Apply 1 each topically as needed.    amLODIPine (NORVASC) 10 MG tablet TAKE 1 TABLET BY MOUTH EVERY DAY FOR SYSTOLIC BLOOD PRESSURE GREATER THAN 120    arformoteroL (BROVANA) 15 mcg/2 mL Nebu Take 2 mLs (15 mcg total) by nebulization 2 (two) times a day. Controller     aspirin (ECOTRIN) 81 MG EC tablet Take 81 mg by mouth once daily.    bempedoic acid (NEXLETOL) 180 mg Tab Take 1 tablet by mouth once daily.    blood glucose control, low (TRUE METRIX LEVEL 1) Soln Check glucometer accuracy at least once a week    blood sugar diagnostic Strp True Metrix Test Strips test once a day    blood-glucose meter (TRUE METRIX GLUCOSE METER) Misc True Metrix Glucose Meter    budesonide (PULMICORT) 0.5 mg/2 mL nebulizer solution Take 2 mLs (0.5 mg total) by nebulization 2 (two) times daily. Controller    cholecalciferol, vitamin D3, (VITAMIN D3) 25 mcg (1,000 unit) capsule Take 1,000 Units by mouth once daily.    colchicine, gout, (COLCRYS) 0.6 mg tablet Take 1 tablet (0.6 mg total) by mouth once daily. Day 1: 1.2 mg at the first sign of flare, followed by 0.6 mg after 1 hour; maximum total dose: 1.8 mg/day on day Day 2 and thereafter: 0.6 mg once or twice daily until flare resolves    cyanocobalamin (VITAMIN B-12) 1000 MCG tablet Take 100 mcg by mouth once daily.    evolocumab (REPATHA SYRINGE) 140 mg/mL Syrg Inject 140 mg into the skin every 14 (fourteen) days.     glyBURIDE (DIABETA) 2.5 MG tablet Take 2.5 mg by mouth 2 (two) times daily.    lancets 33 gauge Misc 1 lancet by Misc.(Non-Drug; Combo Route) route once daily.    levocetirizine (XYZAL) 5 MG tablet Take 1 tablet (5 mg total) by mouth every evening.    levothyroxine (SYNTHROID) 150 MCG tablet Take 150 mcg by mouth once daily.    metoprolol succinate (TOPROL-XL) 50 MG 24 hr tablet Take 1 tablet (50 mg total) by mouth once daily.    potassium chloride (MICRO-K) 8 mEq CpSR Take 8 mEq by mouth.    predniSONE (DELTASONE) 10 mg tablet pack Take by mouth once daily. Take 3 tablets daily on day #1-2, Take 2 tablets daily on days #3-4, Take 1 tablet daily on day #5-6, then stop taking  Dispense: 12 tablet; Refill: 0    predniSONE (DELTASONE) 20 MG tablet Take one daily for 3 days and may repeat for shortness of breath.    REPATHA SURECLICK  140 mg/mL PnIj SMARTSI pre-filled pen syringe SUB-Q Every 2 Weeks    revefenacin (YUPELRI) 175 mcg/3 mL Nebu Inhale 1 Dose into the lungs Daily.    SITagliptin (JANUVIA) 50 MG Tab Take 1 tablet (50 mg total) by mouth once daily.    sotaloL (BETAPACE) 80 MG tablet Take 80 mg by mouth 2 (two) times daily.    torsemide (DEMADEX) 20 MG Tab Take 20 mg by mouth.    vitamin E 400 UNIT capsule Take 400 Units by mouth once daily.     Antibiotics (From admission, onward)      Start     Stop Route Frequency Ordered    24 0745  cefTRIAXone (ROCEPHIN) 2 g in dextrose 5 % in water (D5W) 100 mL IVPB (MB+)         -- IV Every 12 hours (non-standard times) 24 0631    24 0745  ampicillin (OMNIPEN) 2 g in sodium chloride 0.9 % 100 mL IVPB (MB+)         -- IV Every 6 hours (non-standard times) 24 0631          Antifungals (From admission, onward)      None          Antivirals (From admission, onward)      None             Immunization History   Administered Date(s) Administered    COVID-19, MRNA, LN-S, PF (Pfizer) (Purple Cap) 2021, 2021    Influenza 10/28/2009, 2012, 2013, 2018    Influenza - High Dose - PF (65 years and older) 10/23/2016, 2019, 2019    Influenza - Quadrivalent 2014    Influenza - Trivalent (ADULT) 10/28/2009    Influenza Split 10/28/2009, 2012    Pneumococcal Conjugate - 13 Valent 2019    Pneumococcal Polysaccharide - 23 Valent 2015    Tdap 2016       Family History       Problem Relation (Age of Onset)    Aneurysm Maternal Grandfather    Arthritis Mother    Asthma Sister, Son    COPD Son    Cancer Paternal Aunt    Depression Son    Diabetes Mother, Sister, Son, Maternal Uncle    Heart disease Mother, Father, Maternal Uncle, Paternal Aunt, Paternal Uncle, Paternal Grandfather    Hypertension Mother, Father, Sister, Son, Paternal Aunt, Maternal Grandmother    Mental illness Father, Paternal Aunt, Maternal Grandmother,  Paternal Grandmother    Stroke Son          Social History     Socioeconomic History    Marital status:    Tobacco Use    Smoking status: Never    Smokeless tobacco: Never   Substance and Sexual Activity    Alcohol use: Not Currently     Comment: seldom    Drug use: No    Sexual activity: Never     Social Determinants of Health     Financial Resource Strain: Low Risk  (9/11/2022)    Overall Financial Resource Strain (CARDIA)     Difficulty of Paying Living Expenses: Not hard at all   Food Insecurity: No Food Insecurity (9/11/2022)    Hunger Vital Sign     Worried About Running Out of Food in the Last Year: Never true     Ran Out of Food in the Last Year: Never true   Recent Concern: Food Insecurity - Food Insecurity Present (7/26/2022)    Hunger Vital Sign     Worried About Running Out of Food in the Last Year: Sometimes true     Ran Out of Food in the Last Year: Sometimes true   Transportation Needs: No Transportation Needs (9/11/2022)    PRAPARE - Transportation     Lack of Transportation (Medical): No     Lack of Transportation (Non-Medical): No   Physical Activity: Inactive (9/11/2022)    Exercise Vital Sign     Days of Exercise per Week: 0 days     Minutes of Exercise per Session: 0 min   Stress: Stress Concern Present (9/11/2022)    [employer] [place] [employer] - Occupational Stress Questionnaire     Feeling of Stress : To some extent   Social Connections: Moderately Integrated (9/11/2022)    Social Connection and Isolation Panel [NHANES]     Frequency of Communication with Friends and Family: More than three times a week     Frequency of Social Gatherings with Friends and Family: More than three times a week     Attends Denominational Services: 1 to 4 times per year     Active Member of Clubs or Organizations: No     Attends Club or Organization Meetings: Never     Marital Status:    Recent Concern: Social Connections - Moderately Isolated (7/26/2022)    Social Connection and Isolation  Panel [NHANES]     Frequency of Communication with Friends and Family: More than three times a week     Frequency of Social Gatherings with Friends and Family: Once a week     Attends Alevism Services: Never     Active Member of Clubs or Organizations: No     Attends Club or Organization Meetings: Never     Marital Status:    Housing Stability: Unknown (9/11/2022)    Housing Stability Vital Sign     Unable to Pay for Housing in the Last Year: No     Unstable Housing in the Last Year: No     Review of Systems   Constitutional:  Negative for chills and fever.   Respiratory:  Negative for cough and shortness of breath.    Cardiovascular:  Negative for chest pain.   Gastrointestinal:  Negative for abdominal pain, diarrhea, nausea and vomiting.   Genitourinary:  Negative for dysuria and hematuria.   Musculoskeletal:  Negative for arthralgias, joint swelling and myalgias.   Skin:  Negative for rash.   Neurological:  Negative for weakness and headaches.     Objective:     Vital Signs (Most Recent):  Temp: 97.7 °F (36.5 °C) (03/26/24 0757)  Pulse: 64 (03/26/24 1111)  Resp: 16 (03/26/24 0757)  BP: (!) 143/64 (03/26/24 0757)  SpO2: 97 % (03/26/24 0757) Vital Signs (24h Range):  Temp:  [97.5 °F (36.4 °C)-98.1 °F (36.7 °C)] 97.7 °F (36.5 °C)  Pulse:  [41-80] 64  Resp:  [16-18] 16  SpO2:  [92 %-100 %] 97 %  BP: ()/(49-78) 143/64     Weight: 87 kg (191 lb 12.8 oz)  Body mass index is 35.08 kg/m².    Estimated Creatinine Clearance: 51.3 mL/min (based on SCr of 1 mg/dL).     Physical Exam  Vitals reviewed.   Constitutional:       General: She is not in acute distress.     Appearance: Normal appearance. She is not ill-appearing.   HENT:      Head: Normocephalic and atraumatic.   Eyes:      Extraocular Movements: Extraocular movements intact.      Conjunctiva/sclera: Conjunctivae normal.   Cardiovascular:      Rate and Rhythm: Normal rate and regular rhythm.      Heart sounds: No murmur heard.  Pulmonary:      Effort:  Pulmonary effort is normal. No respiratory distress.      Breath sounds: Normal breath sounds. No wheezing.   Abdominal:      General: Abdomen is flat. Bowel sounds are normal.      Palpations: Abdomen is soft.      Tenderness: There is no abdominal tenderness.   Musculoskeletal:      Cervical back: Normal range of motion.   Skin:     General: Skin is warm and dry.   Neurological:      General: No focal deficit present.      Mental Status: She is alert.   Psychiatric:         Mood and Affect: Mood normal.         Behavior: Behavior normal.         Thought Content: Thought content normal.          Significant Labs: All pertinent labs within the past 24 hours have been reviewed.  Recent Lab Results  (Last 5 results in the past 24 hours)        03/26/24  0904   03/26/24  0833   03/25/24  2059   03/25/24  1619   03/25/24  1430        Albumin 2.5               ALP 68               ALT 17               Anion Gap 14               AST 36               Baso # 0.03               Basophil % 0.3               BILIRUBIN TOTAL 0.6  Comment: For infants and newborns, interpretation of results should be based  on gestational age, weight and in agreement with clinical  observations.    Premature Infant recommended reference ranges:  Up to 24 hours.............<8.0 mg/dL  Up to 48 hours............<12.0 mg/dL  3-5 days..................<15.0 mg/dL  6-29 days.................<15.0 mg/dL                 BSA         2.01       BUN 45               Calcium 9.1               Chloride 91               CO2 33               Creatinine 1.0               Differential Method Automated               E/A ratio         0.88       eGFR 59.5               Eos # 0.1               Eos % 1.0               E wave deceleration time         296.00       Glucose 154               Gran # (ANC) 6.9               Gran % 73.7               Hematocrit 41.2               Hemoglobin 13.2               Immature Grans (Abs) 0.08  Comment: Mild elevation in immature  granulocytes is non specific and   can be seen in a variety of conditions including stress response,   acute inflammation, trauma and pregnancy. Correlation with other   laboratory and clinical findings is essential.                 Immature Granulocytes 0.9               Lymph # 1.0               Lymph % 11.0               Magnesium  1.9               MCH 30.2               MCHC 32.0               MCV 94               Mono # 1.2               Mono % 13.1               MPV 11.1               Mr max kristopher         5.92       MR PISA EROA         0.05       MV mean gradient         5       MV peak gradient         13       MV Peak A Kristopher         1.15       MV Peak E Kristopher         1.01       MV VTI         58.4       nRBC 0               Phosphorus Level 2.2               Radius         0.40       Platelet Count 192               POC Glucose     267   133         POCT Glucose   160             Potassium 4.1               PROTEIN TOTAL 6.1               RBC 4.37               RDW 13.6               Sodium 138               Vn Nyquist         0.31       Vn Nyquist MS         0.31       WBC 9.37                                      Significant Imaging: I have reviewed all pertinent imaging results/findings within the past 24 hours.

## 2024-03-26 NOTE — ASSESSMENT & PLAN NOTE
Ms. Larose is a 72 yo F with CAD s/p CABG LIMA-LAD, SVG-RCA, HFpEF, pacemaker in place, HTN, HLD, DMII, COPD who originally presented to OSH after a fall, complained of chest pain and later was found to have NSTEMI. She is benign treated for pacemaker device infection given vegetation on atrial leads. She had LHC in 2022 that showed patent LIMA-LAD, normal Lcx, PCI was performed at the origin of SVG-RCA. She denies current chest pain.     Recommendations  - Continue with treatment of bacteremia and pacemaker related infection  - Will plan for medical management for now.   - Once infection has been controlled and device extracted, please call us back as we may perform an angiogram for NSTEMI.

## 2024-03-26 NOTE — PT/OT/SLP PROGRESS
Occupational Therapy      Patient Name:  Tereza Larose   MRN:  [id number]    Patient not seen today secondary to Off the floor for procedure/surgery. Will follow-up 3/26/24.    3/25/2024   Yes

## 2024-03-26 NOTE — HOSPITAL COURSE
Patient was transferred from outside facility for further evaluation and management of NSTEMI.  Troponins were trended and remained flat.  Patient was placed on heparin drip and Cardiology was consulted.  Patient developed fever overnight of 3/22.  UA and chest x-ray were negative.  Blood cultures were drawn and grew Enterococcus.  Patient was initiated on IV vancomycin and ID was consulted.  Patient developed VERONA and Nephrology was consulted.  Her labs were trended and creatinine remained stable.  There was concern for source of bacteremia secondary to cardiac device and ID adjusted antibiotics and stopped vancomycin and initiate IV Rocephin and ampicillin. Patient underwent DELORIS with findings of small mobile mass located on a right atrial lead.  Cardiology recommended transfer to [facility] for pacemaker removal.  Case discussed with EP and Interventional Cardiology who will consult on the patient.  Patient was accepted and transfer to [facility] [address] via ambulance in stable condition.

## 2024-03-26 NOTE — CONSULTS
[facility] - Cardiology Stepdown  Heart Transplant  Consult Note    Patient Name: Tereza Larose  MRN: [id number]  Admission Date: 3/26/2024  Hospital Length of Stay: 0 days  Attending Physician: Christopher Williamson DO  Primary Care Provider: Evan Sánchez MD   Principal Problem:Acute bacterial endocarditis    Inpatient consult to Heart Transplant  Consult performed by: Torito Neely MD  Consult ordered by: Gillies, Connor M, DO  Reason for consult: pulmonary hypertension        Subjective:     History of Present Illness:  73 F with hx of CAD s/p CABG (2022) s/p PCI to SBG/RCA, COPD/asthma, HTN, CVA and DM who presented as a transfer from an OSH after a fall, later was found to have elevated troponin and subsequently had VERONA and Enterococcus bacteremia. Cardiology and ID were consulted at the outside facility and she was started on IV CTX and ampicillin. DELORIS showed small mobile mass located on a right atrial lead. She was sent to [facility] for EP consultation for possible PPM lead extraction.     Pt also has a hx of pulmonry hypertension that was originally diagnosed on a RHC in 2022 when she had a cardiac arrest. RHC at that time RA: 8, PA 28/30/49,PCWP 12 and no reported CO/CI. She has a long standing hx of obstructive lung disease with a FEV1 FVC ratio was 57%-airflow obstruction is measured. The FEV1 measures 45% of predicted. She has been on home O2 (3L) for years. She states that she is only able to walk around the house and if she pushes herself she is able to walk approx 100 feet before becoming winded. Since her transfer she has remained hemodynamically stable on 3L NC. AT [facility] the pt was restarted on CTX and ampicillin, ID consulted. HTS was consulted to weight in on her overall risk for a procedure given her hx of pulmonary hypertension.        Past Medical History:   Diagnosis Date    Abnormal EKG 9/26/2012    Allergy     Arthritis     Asthma     Brain bleed     Colon polyps 11/6/2020    COPD (chronic  obstructive pulmonary disease)     Depression     Diabetes mellitus type II     Diverticulitis     Fatty liver     GERD (gastroesophageal reflux disease)     Goiter     s/p thyroidectomy    Heart murmur     Hemiparesis affecting right side as late effect of cerebrovascular accident (CVA) 7/26/2022    Hernia of abdominal wall     History of intracranial hemorrhage 6/15/2013    History of non-ST elevation myocardial infarction (NSTEMI) 6/15/2013    Hyperlipidemia     Hypertension     Lactic acidosis 1/11/2020    Metabolic syndrome 6/14/2012    Myocardial infarction     On home oxygen therapy     states uses 2L at night or when sat < 90    Pacemaker     Positive FIT (fecal immunochemical test) 11/6/2020    Severe persistent asthma without complication 4/30/2020    Statin intolerance     Stroke 2012    left hand shaky, loss of balance       Past Surgical History:   Procedure Laterality Date    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/25/2017    Dr Griggs    ADRENALECTOMY      AORTIC VALVE REPLACEMENT  12/09/2016    ARTERIOGRAPHY OF AORTIC ROOT N/A 9/12/2022    Procedure: ARTERIOGRAM, AORTIC ROOT;  Surgeon: Marko Batres MD;  Location: [facility] CATH/EP LAB;  Service: Cardiology;  Laterality: N/A;    arthroscopy lt knee Right     BLADDER REPAIR      sling    BREAST BIOPSY Bilateral     benign    COLONOSCOPY  2004    10 year recheck    COLONOSCOPY N/A 11/6/2020    Procedure: COLONOSCOPY;  Surgeon: Yakelin Lowery MD;  Location: [facility] ENDO;  Service: Endoscopy;  Laterality: N/A;    CORONARY ANGIOGRAPHY INCLUDING BYPASS GRAFTS WITH CATHETERIZATION OF LEFT HEART Left 9/12/2022    Procedure: Left heart cath including bypass graft, with left heart catheterization;  Surgeon: Marko Batres MD;  Location: [facility] CATH/EP LAB;  Service: Cardiology;  Laterality: Left;    CORONARY ARTERY BYPASS GRAFT  12/09/2016    X3    ECHOCARDIOGRAM,TRANSESOPHAGEAL N/A 3/25/2024    Procedure: Transesophageal echo (DELORIS) intra-procedure log documentation;   Surgeon: Paulino Resendiz MD;  Location: [facility] CATH/EP LAB;  Service: Cardiology;  Laterality: N/A;    EPIDURAL STEROID INJECTION INTO LUMBAR SPINE N/A 7/27/2021    Procedure: Injection-steroid-epidural-lumbar;  Surgeon: Valerio Jay MD;  Location: [facility] OR;  Service: Pain Management;  Laterality: N/A;  L5-S1     HYSTERECTOMY      INSERTION OF PACEMAKER Left 1/13/2020    Procedure: INSERTION, PACEMAKER;  Surgeon: Marko Batres MD;  Location: [facility] CATH/EP LAB;  Service: Cardiology;  Laterality: Left;    OOPHORECTOMY      RIGHT HEART CATHETERIZATION Right 9/12/2022    Procedure: INSERTION, CATHETER, RIGHT HEART;  Surgeon: Marko Batres MD;  Location: [facility] CATH/EP LAB;  Service: Cardiology;  Laterality: Right;    THYROIDECTOMY         Review of patient's allergies indicates:   Allergen Reactions    Metformin Diarrhea     Diarrhea      Corn Itching     Other reaction(s): Sneezing  Other reaction(s): Rhinorrhea    Potato starch Hives     Other reaction(s): Sneezing  Other reaction(s): Rhinorrhea    Pravastatin Other (See Comments)     Muscle pain    Statins-hmg-coa reductase inhibitors Other (See Comments)    Hydrochlorothiazide Other (See Comments)     weakness    Welchol [colesevelam] Other (See Comments)     Weakness        Current Facility-Administered Medications   Medication    0.9%  NaCl infusion (for blood administration)    allopurinol split tablet 50 mg    amLODIPine tablet 10 mg    ampicillin (OMNIPEN) 2 g in sodium chloride 0.9 % 100 mL IVPB (MB+)    cefTRIAXone (ROCEPHIN) 2 g in dextrose 5 % in water (D5W) 100 mL IVPB (MB+)    dextrose 10% bolus 125 mL 125 mL    dextrose 10% bolus 250 mL 250 mL    glucagon (human recombinant) injection 1 mg    glucose chewable tablet 16 g    glucose chewable tablet 24 g    insulin aspart U-100 pen 0-5 Units    levothyroxine tablet 150 mcg    naloxone 0.4 mg/mL injection 0.02 mg    sodium chloride 0.9% flush 10 mL     Family History       Problem Relation (Age of Onset)     Aneurysm Maternal Grandfather    Arthritis Mother    Asthma Sister, Son    COPD Son    Cancer Paternal Aunt    Depression Son    Diabetes Mother, Sister, Son, Maternal Uncle    Heart disease Mother, Father, Maternal Uncle, Paternal Aunt, Paternal Uncle, Paternal Grandfather    Hypertension Mother, Father, Sister, Son, Paternal Aunt, Maternal Grandmother    Mental illness Father, Paternal Aunt, Maternal Grandmother, Paternal Grandmother    Stroke Son          Tobacco Use    Smoking status: Never    Smokeless tobacco: Never   Substance and Sexual Activity    Alcohol use: Not Currently     Comment: seldom    Drug use: No    Sexual activity: Never     Review of Systems   Constitutional:  Positive for activity change and fatigue. Negative for appetite change, chills and fever.   HENT: Negative.     Eyes: Negative.    Respiratory:  Positive for shortness of breath and wheezing. Negative for cough, choking, chest tightness and stridor.    Cardiovascular:  Positive for leg swelling. Negative for chest pain and palpitations.   Gastrointestinal: Negative.    Endocrine: Negative.    Genitourinary: Negative.    Musculoskeletal: Negative.    Allergic/Immunologic: Negative.    Neurological:  Positive for weakness.   Hematological: Negative.    Psychiatric/Behavioral: Negative.       Objective:     Vital Signs (Most Recent):  Temp: 98.4 °F (36.9 °C) (03/26/24 1233)  Pulse: 68 (03/26/24 1233)  Resp: 18 (03/26/24 1233)  BP: (!) 176/71 (03/26/24 1233)  SpO2: 95 % (03/26/24 1233) Vital Signs (24h Range):  Temp:  [97.5 °F (36.4 °C)-98.4 °F (36.9 °C)] 98.4 °F (36.9 °C)  Pulse:  [64-75] 68  Resp:  [16-18] 18  SpO2:  [92 %-97 %] 95 %  BP: (141-176)/(60-71) 176/71     Patient Vitals for the past 72 hrs (Last 3 readings):   Weight   03/26/24 0900 87 kg (191 lb 12.8 oz)     Body mass index is 35.08 kg/m².      Intake/Output Summary (Last 24 hours) at 3/26/2024 1527  Last data filed at 3/26/2024 1147  Gross per 24 hour   Intake --   Output  500 ml   Net -500 ml          Physical Exam  Constitutional:       General: She is not in acute distress.     Appearance: Normal appearance. She is ill-appearing.   HENT:      Head: Normocephalic.      Nose: Nose normal.      Mouth/Throat:      Mouth: Mucous membranes are moist.   Eyes:      Extraocular Movements: Extraocular movements intact.      Conjunctiva/sclera: Conjunctivae normal.      Pupils: Pupils are equal, round, and reactive to light.   Cardiovascular:      Rate and Rhythm: Normal rate and regular rhythm.      Heart sounds: Normal heart sounds. No murmur heard.     No friction rub. No gallop.   Pulmonary:      Effort: No respiratory distress.      Breath sounds: No stridor. Wheezing present. No rhonchi or rales.   Chest:      Chest wall: No tenderness.   Abdominal:      General: Abdomen is flat. Bowel sounds are normal. There is no distension.      Palpations: Abdomen is soft.      Tenderness: There is no abdominal tenderness.   Musculoskeletal:      Cervical back: Normal range of motion.      Right lower leg: No edema.      Left lower leg: No edema.   Skin:     General: Skin is warm.   Neurological:      General: No focal deficit present.      Mental Status: She is alert and oriented to person, place, and time.            Significant Labs:  CBC:  Recent Labs   Lab 03/24/24  0543 03/25/24  0533 03/26/24  0904   WBC 14.79*  14.79* 10.13  10.13  10.13  10.13 9.37   RBC 4.34  4.34 4.11  4.11  4.11  4.11 4.37   HGB 13.1  13.1 12.6  12.6  12.6  12.6 13.2   HCT 41.6  41.6 38.8  38.8  38.8  38.8 41.2   *  136* 142*  142*  142*  142* 192   MCV 96  96 94  94  94  94 94   MCH 30.2  30.2 30.7  30.7  30.7  30.7 30.2   MCHC 31.5*  31.5* 32.5  32.5  32.5  32.5 32.0     BNP:  Recent Labs   Lab 03/23/24  1306 03/25/24  0533   BNP 1,806* 745*     CMP:  Recent Labs   Lab 03/24/24  0543 03/25/24  0954 03/26/24  0904   * 128*  128* 154*   CALCIUM 9.0 9.2  9.2 9.1   ALBUMIN  "3.2* 3.3*  3.3* 2.5*   PROT 6.1 6.7 6.1   * 134*  134* 138   K 4.0 5.0  5.0 4.1   CO2 38* 36*  36* 33*   CL 89* 88*  88* 91*   BUN 47* 57*  57* 45*   CREATININE 1.8* 1.6*  1.6* 1.0   ALKPHOS 55 58 68   ALT 6* 9* 17   AST 18 26 36   BILITOT 0.9 0.5 0.6      Coagulation:   Recent Labs   Lab 03/22/24  1630 03/23/24  0013 03/23/24  0606 03/23/24  1306   INR 1.0  --   --   --    APTT 33.0* 73.2* 55.0* 52.9*     LDH:  No results for input(s): "LDH" in the last 72 hours.  Microbiology:  Microbiology Results (last 7 days)       Procedure Component Value Units Date/Time    Blood culture [[id number]] Collected: 03/26/24 0904    Order Status: Sent Specimen: Blood Updated: 03/26/24 0910    Blood culture [[id number]] Collected: 03/26/24 0904    Order Status: Sent Specimen: Blood Updated: 03/26/24 0910            I have reviewed all pertinent labs within the past 24 hours.    Diagnostic Results:  I have reviewed all pertinent imaging results/findings within the past 24 hours.    Assessment/Plan:     Severe pulmonary hypertension  73 F with hx as stated above with severe obstructive lung disease; however, has never smoked, pulmonary hypertension likely WHO group 3 given the severity of her lung diease. She was transferred here for possible lead extraction given her enterococcus bacteremia with endocarditis. Her functional status is extremely poor and is home O2. Discussed her overall prognosis and risk with the patient and family who were present in the room.    - given the severity if her pulm HTN and lung disease along with poor functional status the patient is a very pulmonary and cardia risk for any procedure. Would recommend against procedure at this time and would recommend extended antibiotic treatment as guided by ID    -if the a procedure does occur then recommend that a Bath be placed prior to sedation/intubation and that cardiac anesthesia be used for the procedure, Would also recommend using nitric oxide " as a bridge prior to the procedure    -would also recommend a high resolution lung CT and pulmonary consult given the severity of her obstructive lung disease and based on chart review there has not been any work up completed at this time.     - rest care per primary cardiology and EP teams along with the help of ID        Thank you for your consult. I will sign off. Please contact us if you have any additional questions.    Torito Neely MD  Heart Transplant  [facility] - Cardiology Stepdown

## 2024-03-26 NOTE — ANESTHESIA POSTPROCEDURE EVALUATION
Anesthesia Post Evaluation    Patient: Tereza Larose    Procedure(s) Performed: Procedure(s) (LRB):  Transesophageal echo (DELORIS) intra-procedure log documentation (N/A)    Final Anesthesia Type: general      Patient location during evaluation: GI PACU  Patient participation: Yes- Able to Participate  Level of consciousness: awake and alert  Post-procedure vital signs: reviewed and stable  Pain management: adequate  Airway patency: patent    PONV status at discharge: No PONV  Anesthetic complications: no      Cardiovascular status: blood pressure returned to baseline and stable  Respiratory status: unassisted and room air  Hydration status: euvolemic  Follow-up not needed.              Vitals Value Taken Time   /70 03/25/24 1515   Temp 97.8 03/26/24 1308   Pulse 68 03/25/24 1515   Resp 18 03/25/24 1515   SpO2 99 % 03/25/24 1515         No case tracking events are documented in the log.      Pain/Davi Score: Davi Score: 9 (3/25/2024  3:07 PM)

## 2024-03-26 NOTE — PLAN OF CARE
Patient vitals stable, elevated BP with systolic in 170's, receiving IV ABX, father and son at bedside, consulted with ID and EP today regarding PM lead extraction and ABX therapy. No complaints of pain.

## 2024-03-26 NOTE — ASSESSMENT & PLAN NOTE
Patient's COPD is controlled currently. Patient is currently off COPD Pathway. Justice p.r.n. On 3 L NC on baseline with SpO2 96%. We will monitor respiratory status

## 2024-03-26 NOTE — NURSING
Patient being transferred to [facility] to room 331. Transport here for . Tele box removed and returned. IV remains in place to right AC. Report given to nurse Joy. Attempted to call spouse (Pollo) x3 with no answer or option to leave voicemail. Also called another number that was on file that was not a working number. Personal belongings sent with patient. No acute distress noted

## 2024-03-26 NOTE — RESPIRATORY THERAPY
03/25/24 2037   Patient Assessment/Suction   Level of Consciousness (AVPU) alert   Respiratory Effort Normal;Unlabored   Expansion/Accessory Muscles/Retractions no use of accessory muscles;no retractions   All Lung Fields Breath Sounds Anterior:;diminished   Rhythm/Pattern, Respiratory no shortness of breath reported;unlabored   Cough Frequency no cough   Skin Integrity   $ Wound Care Tech Time 15 min   Area Observed Left;Right;Nares;Behind ear   Skin Appearance without discoloration   PRE-TX-O2   Device (Oxygen Therapy) nasal cannula   $ Is the patient on Low Flow Oxygen? Yes   Flow (L/min) 3   SpO2 (!) 94 %   Pulse Oximetry Type Intermittent   $ Pulse Oximetry - Multiple Charge Pulse Oximetry - Multiple   Pulse 71   Resp 17   Positioning HOB elevated 30 degrees   Aerosol Therapy   $ Aerosol Therapy Charges Aerosol Treatment   Daily Review of Necessity (SVN) completed   Respiratory Treatment Status (SVN) given   Treatment Route (SVN) mask;oxygen   Patient Position (SVN) HOB elevated   Post Treatment Assessment (SVN) breath sounds unchanged   Signs of Intolerance (SVN) none   Education   $ Education Bronchodilator;15 min

## 2024-03-26 NOTE — ASSESSMENT & PLAN NOTE
Patient is identified as having Combined Systolic and Diastolic heart failure that is Chronic. CHF is currently controlled. Latest ECHO performed and demonstrates- Results for orders placed during the hospital encounter of 03/22/24    Echo    Interpretation Summary    Left Ventricle: The left ventricle is normal in size. Normal wall thickness. Normal wall motion. Septal motion is consistent with pacing. There is normal systolic function with a visually estimated ejection fraction of 60 - 65%. There is normal diastolic function.    Right Ventricle: Normal right ventricular cavity size. Wall thickness is normal. Right ventricle wall motion  is normal. Systolic function is normal. Pacemaker lead present in the ventricle.    Right Atrium: Multiple leads present in the right atrium.    Mitral Valve: There is severe bileaflet sclerosis. There is severe anterior mitral annular calcification present. There is normal leaflet mobility. There is mild to moderate regurgitation.    Tricuspid Valve: There is moderate regurgitation with a centrally directed jet.    Pulmonary Artery: There is moderate pulmonary hypertension. The estimated pulmonary artery systolic pressure is 63 mmHg.    IVC/SVC: Elevated venous pressure at 15 mmHg.  . Continue Beta Blocker and monitor clinical status closely. Monitor on telemetry. Patient is off CHF pathway.  Monitor strict Is&Os and daily weights.  Place on fluid restriction of 2 L. Cardiology has been consulted. Continue to stress to patient importance of self efficacy and  on diet for CHF. Last BNP reviewed- and noted below   Recent Labs   Lab 03/25/24  0533   *

## 2024-03-26 NOTE — HPI
"Ms. Larose is a 73F with PMH of CAD, HFpEF, pacemaker, previous bioprosthetic AVR, HTN, HLD, DM2, COPD, initally admitted to OSH for NSTEMI, subsequently developed VERONA and also found to have E. Faecalis bacteremia, transferred to [facility] after finding of mobile mass on R atrial lead on DELORIS. Infectious disease consulted for "Pt presented as a transfer from OSH. Blood cultures growing enterococcus faecalis. Was started on CTX and ampicillin in the outside hospital. DELORIS showed small mobile mass located on a right atrial lead".     Currently on ampicillin and ceftriaxone. 3/23 and 3/24 BCX positive for amp sensitive E faecalis. 3/25 BCX NGTD. Patient is afebrile, hemodynamically stable, on 3L, no leukocytosis.       "

## 2024-03-26 NOTE — ASSESSMENT & PLAN NOTE
Pt presenting from OSH for higher level of care after she was found to have enterococcus faecalis bacteremia. DELORIS remarkable for Right Atrium: Right atrium is dilated. Multiple leads viewed in the right atrium. There is a 1 x 0.6 cm mass attached to a lead in the right atrium that demonstrates independent motion. Concerning for possible vegetations.     Plan  - ID consulted, appreciate recs  - Continue ceftriaxone and ampicillin   - EP and Interventional cardiology, planning intervention tomorrow 03/27/24  - Follow up CBC  - Follow up cultures

## 2024-03-26 NOTE — HPI
Ms. Larose is a 72 yo F with CAD s/p CABG LIMA-LAD, SVG-RCA, HFpEF, pacemaker in place, HTN, HLD, DMII, COPD who originally presented to OSH after a fall, complained of chest pain and later was found to have NSTEMI and subsequently had VERONA and Enterococcus bacteremia, currently on IV abx. DELORIS showed small mobile mass located on a right atrial lead. She was transferred for EP evaluation for device extraction and Intervetnional cardiology eval for angiogram. HS Troponin during admission was flat, peaked at 163. ECG without any ischemic changes. She had LHC in 2022 that showed patent LIMA-LAD, normal Lcx, PCI was performed at the origin of SVG-RCA. She denies current chest pain.

## 2024-03-26 NOTE — CARE UPDATE
I have reviewed the chart of Tereza Larose who is hospitalized for the following:    Active Hospital Problems    Diagnosis    *NSTEMI (non-ST elevated myocardial infarction)    Hyponatremia    Hypothyroidism    Acute bacterial endocarditis     PPM in place  DELORIS shows mobile mass attached to lead in right atrium  Interventional Cardiology and EP consulted  ID consulted  IV antibioics      Pacemaker    VERONA (acute kidney injury)    Bacteremia due to Enterococcus    Debility    Chronic obstructive pulmonary disease    COPD (chronic obstructive pulmonary disease)    Essential hypertension    Pulmonary hypertension    Acute on chronic combined systolic and diastolic CHF (congestive heart failure)    Obesity (BMI 30-39.9)    T2DM (type 2 diabetes mellitus)    HTN (hypertension)    CAD (coronary artery disease)    Hyperlipidemia associated with type 2 diabetes mellitus      Sierra Nguyen NP  Unit Based [facility]

## 2024-03-26 NOTE — SUBJECTIVE & OBJECTIVE
Past Medical History:   Diagnosis Date    Abnormal EKG 9/26/2012    Allergy     Arthritis     Asthma     Brain bleed     Colon polyps 11/6/2020    COPD (chronic obstructive pulmonary disease)     Depression     Diabetes mellitus type II     Diverticulitis     Fatty liver     GERD (gastroesophageal reflux disease)     Goiter     s/p thyroidectomy    Heart murmur     Hemiparesis affecting right side as late effect of cerebrovascular accident (CVA) 7/26/2022    Hernia of abdominal wall     History of intracranial hemorrhage 6/15/2013    History of non-ST elevation myocardial infarction (NSTEMI) 6/15/2013    Hyperlipidemia     Hypertension     Lactic acidosis 1/11/2020    Metabolic syndrome 6/14/2012    Myocardial infarction     On home oxygen therapy     states uses 2L at night or when sat < 90    Pacemaker     Positive FIT (fecal immunochemical test) 11/6/2020    Severe persistent asthma without complication 4/30/2020    Statin intolerance     Stroke 2012    left hand shaky, loss of balance       Past Surgical History:   Procedure Laterality Date    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/25/2017    Dr Griggs    ADRENALECTOMY      AORTIC VALVE REPLACEMENT  12/09/2016    ARTERIOGRAPHY OF AORTIC ROOT N/A 9/12/2022    Procedure: ARTERIOGRAM, AORTIC ROOT;  Surgeon: Marko Batres MD;  Location: [facility] CATH/EP LAB;  Service: Cardiology;  Laterality: N/A;    arthroscopy lt knee Right     BLADDER REPAIR      sling    BREAST BIOPSY Bilateral     benign    COLONOSCOPY  2004    10 year recheck    COLONOSCOPY N/A 11/6/2020    Procedure: COLONOSCOPY;  Surgeon: Yakelin Lowery MD;  Location: [facility];  Service: Endoscopy;  Laterality: N/A;    CORONARY ANGIOGRAPHY INCLUDING BYPASS GRAFTS WITH CATHETERIZATION OF LEFT HEART Left 9/12/2022    Procedure: Left heart cath including bypass graft, with left heart catheterization;  Surgeon: Marko Batres MD;  Location: [facility] CATH/EP LAB;  Service: Cardiology;  Laterality: Left;    CORONARY ARTERY  BYPASS GRAFT  12/09/2016    X3    ECHOCARDIOGRAM,TRANSESOPHAGEAL N/A 3/25/2024    Procedure: Transesophageal echo (DELORIS) intra-procedure log documentation;  Surgeon: Paulino Resendiz MD;  Location: [facility] CATH/EP LAB;  Service: Cardiology;  Laterality: N/A;    EPIDURAL STEROID INJECTION INTO LUMBAR SPINE N/A 7/27/2021    Procedure: Injection-steroid-epidural-lumbar;  Surgeon: Valerio Jay MD;  Location: [facility] OR;  Service: Pain Management;  Laterality: N/A;  L5-S1     HYSTERECTOMY      INSERTION OF PACEMAKER Left 1/13/2020    Procedure: INSERTION, PACEMAKER;  Surgeon: Marko Batres MD;  Location: [facility] CATH/EP LAB;  Service: Cardiology;  Laterality: Left;    OOPHORECTOMY      RIGHT HEART CATHETERIZATION Right 9/12/2022    Procedure: INSERTION, CATHETER, RIGHT HEART;  Surgeon: Marko Batres MD;  Location: [facility] CATH/EP LAB;  Service: Cardiology;  Laterality: Right;    THYROIDECTOMY         Review of patient's allergies indicates:   Allergen Reactions    Metformin Diarrhea     Diarrhea      Corn Itching     Other reaction(s): Sneezing  Other reaction(s): Rhinorrhea    Potato starch Hives     Other reaction(s): Sneezing  Other reaction(s): Rhinorrhea    Pravastatin Other (See Comments)     Muscle pain    Statins-hmg-coa reductase inhibitors Other (See Comments)    Hydrochlorothiazide Other (See Comments)     weakness    Welchol [colesevelam] Other (See Comments)     Weakness        Current Facility-Administered Medications   Medication    0.9%  NaCl infusion (for blood administration)    allopurinol split tablet 50 mg    amLODIPine tablet 10 mg    ampicillin (OMNIPEN) 2 g in sodium chloride 0.9 % 100 mL IVPB (MB+)    cefTRIAXone (ROCEPHIN) 2 g in dextrose 5 % in water (D5W) 100 mL IVPB (MB+)    dextrose 10% bolus 125 mL 125 mL    dextrose 10% bolus 250 mL 250 mL    glucagon (human recombinant) injection 1 mg    glucose chewable tablet 16 g    glucose chewable tablet 24 g    insulin aspart U-100 pen 0-5 Units     levothyroxine tablet 150 mcg    naloxone 0.4 mg/mL injection 0.02 mg    sodium chloride 0.9% flush 10 mL     Family History       Problem Relation (Age of Onset)    Aneurysm Maternal Grandfather    Arthritis Mother    Asthma Sister, Son    COPD Son    Cancer Paternal Aunt    Depression Son    Diabetes Mother, Sister, Son, Maternal Uncle    Heart disease Mother, Father, Maternal Uncle, Paternal Aunt, Paternal Uncle, Paternal Grandfather    Hypertension Mother, Father, Sister, Son, Paternal Aunt, Maternal Grandmother    Mental illness Father, Paternal Aunt, Maternal Grandmother, Paternal Grandmother    Stroke Son          Tobacco Use    Smoking status: Never    Smokeless tobacco: Never   Substance and Sexual Activity    Alcohol use: Not Currently     Comment: seldom    Drug use: No    Sexual activity: Never     Review of Systems   Constitutional:  Positive for activity change and fatigue. Negative for appetite change, chills and fever.   HENT: Negative.     Eyes: Negative.    Respiratory:  Positive for shortness of breath and wheezing. Negative for cough, choking, chest tightness and stridor.    Cardiovascular:  Positive for leg swelling. Negative for chest pain and palpitations.   Gastrointestinal: Negative.    Endocrine: Negative.    Genitourinary: Negative.    Musculoskeletal: Negative.    Allergic/Immunologic: Negative.    Neurological:  Positive for weakness.   Hematological: Negative.    Psychiatric/Behavioral: Negative.       Objective:     Vital Signs (Most Recent):  Temp: 98.4 °F (36.9 °C) (03/26/24 1233)  Pulse: 68 (03/26/24 1233)  Resp: 18 (03/26/24 1233)  BP: (!) 176/71 (03/26/24 1233)  SpO2: 95 % (03/26/24 1233) Vital Signs (24h Range):  Temp:  [97.5 °F (36.4 °C)-98.4 °F (36.9 °C)] 98.4 °F (36.9 °C)  Pulse:  [64-75] 68  Resp:  [16-18] 18  SpO2:  [92 %-97 %] 95 %  BP: (141-176)/(60-71) 176/71     Patient Vitals for the past 72 hrs (Last 3 readings):   Weight   03/26/24 0900 87 kg (191 lb 12.8 oz)     Body  mass index is 35.08 kg/m².      Intake/Output Summary (Last 24 hours) at 3/26/2024 1527  Last data filed at 3/26/2024 1147  Gross per 24 hour   Intake --   Output 500 ml   Net -500 ml          Physical Exam  Constitutional:       General: She is not in acute distress.     Appearance: Normal appearance. She is ill-appearing.   HENT:      Head: Normocephalic.      Nose: Nose normal.      Mouth/Throat:      Mouth: Mucous membranes are moist.   Eyes:      Extraocular Movements: Extraocular movements intact.      Conjunctiva/sclera: Conjunctivae normal.      Pupils: Pupils are equal, round, and reactive to light.   Cardiovascular:      Rate and Rhythm: Normal rate and regular rhythm.      Heart sounds: Normal heart sounds. No murmur heard.     No friction rub. No gallop.   Pulmonary:      Effort: No respiratory distress.      Breath sounds: No stridor. Wheezing present. No rhonchi or rales.   Chest:      Chest wall: No tenderness.   Abdominal:      General: Abdomen is flat. Bowel sounds are normal. There is no distension.      Palpations: Abdomen is soft.      Tenderness: There is no abdominal tenderness.   Musculoskeletal:      Cervical back: Normal range of motion.      Right lower leg: No edema.      Left lower leg: No edema.   Skin:     General: Skin is warm.   Neurological:      General: No focal deficit present.      Mental Status: She is alert and oriented to person, place, and time.            Significant Labs:  CBC:  Recent Labs   Lab 03/24/24  0543 03/25/24  0533 03/26/24  0904   WBC 14.79*  14.79* 10.13  10.13  10.13  10.13 9.37   RBC 4.34  4.34 4.11  4.11  4.11  4.11 4.37   HGB 13.1  13.1 12.6  12.6  12.6  12.6 13.2   HCT 41.6  41.6 38.8  38.8  38.8  38.8 41.2   *  136* 142*  142*  142*  142* 192   MCV 96  96 94  94  94  94 94   MCH 30.2  30.2 30.7  30.7  30.7  30.7 30.2   MCHC 31.5*  31.5* 32.5  32.5  32.5  32.5 32.0     BNP:  Recent Labs   Lab 03/23/24  1306  "03/25/24  0533   BNP 1,806* 745*     CMP:  Recent Labs   Lab 03/24/24  0543 03/25/24  0954 03/26/24  0904   * 128*  128* 154*   CALCIUM 9.0 9.2  9.2 9.1   ALBUMIN 3.2* 3.3*  3.3* 2.5*   PROT 6.1 6.7 6.1   * 134*  134* 138   K 4.0 5.0  5.0 4.1   CO2 38* 36*  36* 33*   CL 89* 88*  88* 91*   BUN 47* 57*  57* 45*   CREATININE 1.8* 1.6*  1.6* 1.0   ALKPHOS 55 58 68   ALT 6* 9* 17   AST 18 26 36   BILITOT 0.9 0.5 0.6      Coagulation:   Recent Labs   Lab 03/22/24  1630 03/23/24  0013 03/23/24  0606 03/23/24  1306   INR 1.0  --   --   --    APTT 33.0* 73.2* 55.0* 52.9*     LDH:  No results for input(s): "LDH" in the last 72 hours.  Microbiology:  Microbiology Results (last 7 days)       Procedure Component Value Units Date/Time    Blood culture [[id number]] Collected: 03/26/24 0904    Order Status: Sent Specimen: Blood Updated: 03/26/24 0910    Blood culture [[id number]] Collected: 03/26/24 0904    Order Status: Sent Specimen: Blood Updated: 03/26/24 0910            I have reviewed all pertinent labs within the past 24 hours.    Diagnostic Results:  I have reviewed all pertinent imaging results/findings within the past 24 hours.    "

## 2024-03-26 NOTE — H&P
[facility] Medicine  History & Physical    Patient Name: Tereza Larose  MRN: [id number]  Patient Class: IP- Inpatient  Admission Date: 3/26/2024  Attending Physician: Christopher Williamson DO   Primary Care Provider: Evan Sánchez MD         Patient information was obtained from patient and ER records.     Subjective:     Principal Problem:Acute bacterial endocarditis    Chief Complaint: No chief complaint on file.       HPI: Ms. Larose is a 72 yo F with CAD, HFpEF, pacemaker in place, HTN, HLD, DMII, COPD who originally presented to OSH after a fall, later was found to have NSTEMI and subsequently had VERONA and Enterococcus bacteremia. Cardiology and ID were consulted, she was started on IV CTX and ampicillin. DELORIS showed small mobile mass located on a right atrial lead. Proximal measurements are 1 cm x 0.5 cm. Case discussed with EP and Interventional Cardiology who will consult on the patient. Currently hemodynamically stable on 3L NC. Denies headaches, visual changes, SOB, cough, chest pain, palpitation, nausea, vomiting, abdominal pain, diarrhea, constipation, urinary frequency or any weakness. AT C the pt was restarted on CTX and ampicillin, ID consulted.       Past Medical History:   Diagnosis Date    Abnormal EKG 9/26/2012    Allergy     Arthritis     Asthma     Brain bleed     Colon polyps 11/6/2020    COPD (chronic obstructive pulmonary disease)     Depression     Diabetes mellitus type II     Diverticulitis     Fatty liver     GERD (gastroesophageal reflux disease)     Goiter     s/p thyroidectomy    Heart murmur     Hemiparesis affecting right side as late effect of cerebrovascular accident (CVA) 7/26/2022    Hernia of abdominal wall     History of intracranial hemorrhage 6/15/2013    History of non-ST elevation myocardial infarction (NSTEMI) 6/15/2013    Hyperlipidemia     Hypertension     Lactic acidosis 1/11/2020    Metabolic syndrome 6/14/2012    Myocardial infarction      On home oxygen therapy     states uses 2L at night or when sat < 90    Pacemaker     Positive FIT (fecal immunochemical test) 11/6/2020    Severe persistent asthma without complication 4/30/2020    Statin intolerance     Stroke 2012    left hand shaky, loss of balance       Past Surgical History:   Procedure Laterality Date    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/25/2017    Dr Griggs    ADRENALECTOMY      AORTIC VALVE REPLACEMENT  12/09/2016    ARTERIOGRAPHY OF AORTIC ROOT N/A 9/12/2022    Procedure: ARTERIOGRAM, AORTIC ROOT;  Surgeon: Marko Batres MD;  Location: [facility] CATH/EP LAB;  Service: Cardiology;  Laterality: N/A;    arthroscopy lt knee Right     BLADDER REPAIR      sling    BREAST BIOPSY Bilateral     benign    COLONOSCOPY  2004    10 year recheck    COLONOSCOPY N/A 11/6/2020    Procedure: COLONOSCOPY;  Surgeon: Yakelin Lowery MD;  Location: [facility] ENDO;  Service: Endoscopy;  Laterality: N/A;    CORONARY ANGIOGRAPHY INCLUDING BYPASS GRAFTS WITH CATHETERIZATION OF LEFT HEART Left 9/12/2022    Procedure: Left heart cath including bypass graft, with left heart catheterization;  Surgeon: Marko Batres MD;  Location: [facility] CATH/EP LAB;  Service: Cardiology;  Laterality: Left;    CORONARY ARTERY BYPASS GRAFT  12/09/2016    X3    EPIDURAL STEROID INJECTION INTO LUMBAR SPINE N/A 7/27/2021    Procedure: Injection-steroid-epidural-lumbar;  Surgeon: Valerio Jay MD;  Location: [facility] OR;  Service: Pain Management;  Laterality: N/A;  L5-S1     HYSTERECTOMY      INSERTION OF PACEMAKER Left 1/13/2020    Procedure: INSERTION, PACEMAKER;  Surgeon: Marko Batres MD;  Location: [facility] CATH/EP LAB;  Service: Cardiology;  Laterality: Left;    OOPHORECTOMY      RIGHT HEART CATHETERIZATION Right 9/12/2022    Procedure: INSERTION, CATHETER, RIGHT HEART;  Surgeon: Marko Batres MD;  Location: [facility] CATH/EP LAB;  Service: Cardiology;  Laterality: Right;    THYROIDECTOMY         Review of patient's allergies indicates:   Allergen  Reactions    Metformin Diarrhea     Diarrhea      Corn Itching     Other reaction(s): Sneezing  Other reaction(s): Rhinorrhea    Potato starch Hives     Other reaction(s): Sneezing  Other reaction(s): Rhinorrhea    Pravastatin Other (See Comments)     Muscle pain    Statins-hmg-coa reductase inhibitors Other (See Comments)    Hydrochlorothiazide Other (See Comments)     weakness    Welchol [colesevelam] Other (See Comments)     Weakness        Current Facility-Administered Medications on File Prior to Encounter   Medication    [DISCONTINUED] acetaminophen tablet 650 mg    [DISCONTINUED] allopurinoL tablet 100 mg    [DISCONTINUED] ampicillin 2 g in sodium chloride 0.9 % 100 mL IVPB (ready to mix)    [DISCONTINUED] budesonide nebulizer solution 0.5 mg    [DISCONTINUED] cefTRIAXone (ROCEPHIN) 2 g in dextrose 5 % 100 mL IVPB (ready to mix)    [DISCONTINUED] dextrose 50% injection 12.5 g    [DISCONTINUED] dextrose 50% injection 25 g    [DISCONTINUED] folic acid tablet 1 mg    [DISCONTINUED] furosemide injection 40 mg    [DISCONTINUED] glucagon (human recombinant) injection 1 mg    [DISCONTINUED] glucose chewable tablet 16 g    [DISCONTINUED] glucose chewable tablet 24 g    [DISCONTINUED] heparin (porcine) injection 5,000 Units    [DISCONTINUED] insulin aspart U-100 pen 0-5 Units    [DISCONTINUED] levothyroxine tablet 150 mcg    [DISCONTINUED] LORazepam tablet 2 mg    [DISCONTINUED] melatonin tablet 6 mg    [DISCONTINUED] metoprolol succinate (TOPROL-XL) 24 hr tablet 25 mg    [DISCONTINUED] morphine injection 2 mg    [DISCONTINUED] multivitamin tablet    [DISCONTINUED] mupirocin 2 % ointment    [DISCONTINUED] naloxone 0.4 mg/mL injection 0.02 mg    [DISCONTINUED] nitroGLYCERIN SL tablet 0.4 mg    [DISCONTINUED] ondansetron injection 4 mg    [DISCONTINUED] polyethylene glycol packet 17 g    [DISCONTINUED] propofol (DIPRIVAN) 10 mg/mL infusion    [DISCONTINUED] sodium chloride 0.9% flush 10 mL    [DISCONTINUED] sodium  chloride 0.9% infusion    [DISCONTINUED] thiamine tablet 100 mg     Current Outpatient Medications on File Prior to Encounter   Medication Sig    albuterol (PROVENTIL) 2.5 mg /3 mL (0.083 %) nebulizer solution Take 3 mLs (2.5 mg total) by nebulization every 4 (four) hours as needed for Shortness of Breath or Wheezing.    albuterol (PROVENTIL/VENTOLIN HFA) 90 mcg/actuation inhaler Inhale 1-2 puffs into the lungs every 4 (four) hours as needed for Wheezing. Inhale 2 puffs by mouth into lungs every 4 hours as needed    alcohol swabs (BD ALCOHOL SWABS) PadM Apply 1 each topically as needed.    amLODIPine (NORVASC) 10 MG tablet TAKE 1 TABLET BY MOUTH EVERY DAY FOR SYSTOLIC BLOOD PRESSURE GREATER THAN 120    arformoteroL (BROVANA) 15 mcg/2 mL Nebu Take 2 mLs (15 mcg total) by nebulization 2 (two) times a day. Controller    aspirin (ECOTRIN) 81 MG EC tablet Take 81 mg by mouth once daily.    bempedoic acid (NEXLETOL) 180 mg Tab Take 1 tablet by mouth once daily.    blood glucose control, low (TRUE METRIX LEVEL 1) Soln Check glucometer accuracy at least once a week    blood sugar diagnostic Strp True Metrix Test Strips test once a day    blood-glucose meter (TRUE METRIX GLUCOSE METER) Misc True Metrix Glucose Meter    budesonide (PULMICORT) 0.5 mg/2 mL nebulizer solution Take 2 mLs (0.5 mg total) by nebulization 2 (two) times daily. Controller    cholecalciferol, vitamin D3, (VITAMIN D3) 25 mcg (1,000 unit) capsule Take 1,000 Units by mouth once daily.    colchicine, gout, (COLCRYS) 0.6 mg tablet Take 1 tablet (0.6 mg total) by mouth once daily. Day 1: 1.2 mg at the first sign of flare, followed by 0.6 mg after 1 hour; maximum total dose: 1.8 mg/day on day Day 2 and thereafter: 0.6 mg once or twice daily until flare resolves    cyanocobalamin (VITAMIN B-12) 1000 MCG tablet Take 100 mcg by mouth once daily.    evolocumab (REPATHA SYRINGE) 140 mg/mL Syrg Inject 140 mg into the skin every 14 (fourteen) days.     glyBURIDE  (DIABETA) 2.5 MG tablet Take 2.5 mg by mouth 2 (two) times daily.    lancets 33 gauge Misc 1 lancet by Misc.(Non-Drug; Combo Route) route once daily.    levocetirizine (XYZAL) 5 MG tablet Take 1 tablet (5 mg total) by mouth every evening.    levothyroxine (SYNTHROID) 150 MCG tablet Take 150 mcg by mouth once daily.    metoprolol succinate (TOPROL-XL) 50 MG 24 hr tablet Take 1 tablet (50 mg total) by mouth once daily.    potassium chloride (MICRO-K) 8 mEq CpSR Take 8 mEq by mouth.    predniSONE (DELTASONE) 10 mg tablet pack Take by mouth once daily. Take 3 tablets daily on day #1-2, Take 2 tablets daily on days #3-4, Take 1 tablet daily on day #5-6, then stop taking  Dispense: 12 tablet; Refill: 0    predniSONE (DELTASONE) 20 MG tablet Take one daily for 3 days and may repeat for shortness of breath.    REPATHA SURECLICK 140 mg/mL PnIj SMARTSI pre-filled pen syringe SUB-Q Every 2 Weeks    revefenacin (YUPELRI) 175 mcg/3 mL Nebu Inhale 1 Dose into the lungs Daily.    SITagliptin (JANUVIA) 50 MG Tab Take 1 tablet (50 mg total) by mouth once daily.    sotaloL (BETAPACE) 80 MG tablet Take 80 mg by mouth 2 (two) times daily.    torsemide (DEMADEX) 20 MG Tab Take 20 mg by mouth.    vitamin E 400 UNIT capsule Take 400 Units by mouth once daily.     Family History       Problem Relation (Age of Onset)    Aneurysm Maternal Grandfather    Arthritis Mother    Asthma Sister, Son    COPD Son    Cancer Paternal Aunt    Depression Son    Diabetes Mother, Sister, Son, Maternal Uncle    Heart disease Mother, Father, Maternal Uncle, Paternal Aunt, Paternal Uncle, Paternal Grandfather    Hypertension Mother, Father, Sister, Son, Paternal Aunt, Maternal Grandmother    Mental illness Father, Paternal Aunt, Maternal Grandmother, Paternal Grandmother    Stroke Son          Tobacco Use    Smoking status: Never    Smokeless tobacco: Never   Substance and Sexual Activity    Alcohol use: Not Currently     Comment: seldom    Drug use: No     Sexual activity: Never     Review of Systems   Constitutional:  Negative for appetite change, chills, diaphoresis, fatigue and fever.   HENT:  Negative for congestion.    Respiratory:  Negative for cough, shortness of breath and wheezing.    Cardiovascular:  Negative for chest pain, palpitations and leg swelling.   Gastrointestinal:  Negative for abdominal pain, blood in stool, constipation, diarrhea, nausea and vomiting.   Genitourinary:  Negative for dysuria, flank pain and frequency.   Musculoskeletal:  Negative for arthralgias, joint swelling and myalgias.   Skin:  Negative for pallor and rash.   Neurological:  Negative for dizziness, light-headedness and headaches.   Psychiatric/Behavioral:  Negative for agitation. The patient is not nervous/anxious.      Objective:     Vital Signs (Most Recent):  Temp: 97.7 °F (36.5 °C) (03/26/24 0757)  Pulse: 69 (03/26/24 0757)  Resp: 16 (03/26/24 0757)  BP: (!) 143/64 (03/26/24 0757)  SpO2: 97 % (03/26/24 0757) Vital Signs (24h Range):  Temp:  [97.5 °F (36.4 °C)-98.7 °F (37.1 °C)] 97.7 °F (36.5 °C)  Pulse:  [41-80] 69  Resp:  [16-18] 16  SpO2:  [92 %-100 %] 97 %  BP: ()/(49-78) 143/64        There is no height or weight on file to calculate BMI.     Physical Exam  Constitutional:       General: She is not in acute distress.  HENT:      Head: Normocephalic and atraumatic.   Eyes:      Extraocular Movements: Extraocular movements intact.      Pupils: Pupils are equal, round, and reactive to light.   Cardiovascular:      Rate and Rhythm: Normal rate and regular rhythm.      Heart sounds: No murmur heard.  Pulmonary:      Effort: Pulmonary effort is normal. No respiratory distress.      Breath sounds: No wheezing or rales.      Comments: On 3L NC   Abdominal:      General: Abdomen is flat. Bowel sounds are normal. There is no distension.      Palpations: Abdomen is soft.      Tenderness: There is no abdominal tenderness.   Musculoskeletal:         General: No swelling or  tenderness. Normal range of motion.      Cervical back: Normal range of motion.      Right lower leg: No edema.      Left lower leg: No edema.   Lymphadenopathy:      Cervical: No cervical adenopathy.   Skin:     General: Skin is warm and dry.      Coloration: Skin is not jaundiced.      Findings: No bruising.   Neurological:      General: No focal deficit present.      Mental Status: She is alert and oriented to person, place, and time.              CRANIAL NERVES     CN III, IV, VI   Pupils are equal, round, and reactive to light.       Significant Labs: All pertinent labs within the past 24 hours have been reviewed.    Significant Imaging: I have reviewed all pertinent imaging results/findings within the past 24 hours.  Assessment/Plan:     * Acute bacterial endocarditis  Pt presenting from OSH for higher level of care after she was found to have enterococcus faecalis bacteremia. DELORIS remarkable for Right Atrium: Right atrium is dilated. Multiple leads viewed in the right atrium. There is a 1 x 0.6 cm mass attached to a lead in the right atrium that demonstrates independent motion. Concerning for possible vegetations.     Plan  - ID consulted, appreciate recs  - Continue ceftriaxone and ampicillin   - EP and Interventional cardiology, planning intervention tomorrow 03/27/24  - Follow up CBC  - Follow up cultures    Pacemaker    Please see Acute bacterial endocarditis     Hypothyroidism  TSH elevated to 40.97.     Will adjust levothyroxine       VERONA (acute kidney injury)  Patient with acute kidney injury/acute renal failure likely due to acute tubular necrosis caused by sepsis  VERONA is currently improving. Baseline creatinine unknown - Labs reviewed- Renal function/electrolytes with Estimated Creatinine Clearance: 51.3 mL/min (based on SCr of 1 mg/dL). according to latest data. Monitor urine output and serial BMP and adjust therapy as needed. Avoid nephrotoxins and renally dose meds for GFR listed above.    COPD  (chronic obstructive pulmonary disease)  Patient's COPD is controlled currently. Patient is currently off COPD Pathway. DuoNebs p.r.n. On 3 L NC on baseline with SpO2 96%. We will monitor respiratory status     Pulmonary hypertension        Acute on chronic combined systolic and diastolic CHF (congestive heart failure)  Patient is identified as having Combined Systolic and Diastolic heart failure that is Chronic. CHF is currently controlled. Latest ECHO performed and demonstrates- Results for orders placed during the hospital encounter of 03/22/24    Echo    Interpretation Summary    Left Ventricle: The left ventricle is normal in size. Normal wall thickness. Normal wall motion. Septal motion is consistent with pacing. There is normal systolic function with a visually estimated ejection fraction of 60 - 65%. There is normal diastolic function.    Right Ventricle: Normal right ventricular cavity size. Wall thickness is normal. Right ventricle wall motion  is normal. Systolic function is normal. Pacemaker lead present in the ventricle.    Right Atrium: Multiple leads present in the right atrium.    Mitral Valve: There is severe bileaflet sclerosis. There is severe anterior mitral annular calcification present. There is normal leaflet mobility. There is mild to moderate regurgitation.    Tricuspid Valve: There is moderate regurgitation with a centrally directed jet.    Pulmonary Artery: There is moderate pulmonary hypertension. The estimated pulmonary artery systolic pressure is 63 mmHg.    IVC/SVC: Elevated venous pressure at 15 mmHg.  . Continue Beta Blocker and monitor clinical status closely. Monitor on telemetry. Patient is off CHF pathway.  Monitor strict Is&Os and daily weights.  Place on fluid restriction of 2 L. Cardiology has been consulted. Continue to stress to patient importance of self efficacy and  on diet for CHF. Last BNP reviewed- and noted below   Recent Labs   Lab 03/25/24  0533   *        T2DM (type 2 diabetes mellitus)  Patient's FSGs are controlled on current medication regimen.  Last A1c reviewed-   Lab Results   Component Value Date    HGBA1C 7.9 (H) 03/22/2024     Most recent fingerstick glucose reviewed-   Recent Labs   Lab 03/26/24  0833   POCTGLUCOSE 160*     Current correctional scale  Low  Maintain anti-hyperglycemic dose as follows-   Antihyperglycemics (From admission, onward)      Start     Stop Route Frequency Ordered    03/26/24 0809  insulin aspart U-100 pen 0-5 Units         -- SubQ Before meals & nightly PRN 03/26/24 0710          Hold Oral hypoglycemics while patient is in the hospital.    HTN (hypertension)  Chronic, controlled. Latest blood pressure and vitals reviewed-     Temp:  [97.5 °F (36.4 °C)-98.1 °F (36.7 °C)]   Pulse:  [41-80]   Resp:  [16-18]   BP: ()/(49-78)   SpO2:  [92 %-100 %] .   Home meds for hypertension were reviewed and noted below.   Hypertension Medications               amLODIPine (NORVASC) 10 MG tablet TAKE 1 TABLET BY MOUTH EVERY DAY FOR SYSTOLIC BLOOD PRESSURE GREATER THAN 120    metoprolol succinate (TOPROL-XL) 50 MG 24 hr tablet Take 1 tablet (50 mg total) by mouth once daily.    sotaloL (BETAPACE) 80 MG tablet Take 80 mg by mouth 2 (two) times daily.    torsemide (DEMADEX) 20 MG Tab Take 20 mg by mouth.            While in the hospital, will manage blood pressure as follows; Continue home antihypertensive regimen continue amlodipine, hold metoprolol.     Will utilize p.r.n. blood pressure medication only if patient's blood pressure greater than 180/110 and she develops symptoms such as worsening chest pain or shortness of breath.    NSTEMI (non-ST elevated myocardial infarction)        CAD (coronary artery disease)  Patient with known CAD s/p CABG, which is uncontrolled Will continue  heparin gtt and Statin and monitor for S/Sx of angina/ACS. Continue to monitor on telemetry.   Troponins trending down.      VTE Risk Mitigation (From  admission, onward)           Ordered     IP VTE HIGH RISK PATIENT  Once         03/26/24 0616     Place sequential compression device  Until discontinued         03/26/24 0616                                    Willie Davey MD  Department of Hospital Medicine  [facility] - Cardiology Stepdown

## 2024-03-26 NOTE — SUBJECTIVE & OBJECTIVE
Past Medical History:   Diagnosis Date    Abnormal EKG 9/26/2012    Allergy     Arthritis     Asthma     Brain bleed     Colon polyps 11/6/2020    COPD (chronic obstructive pulmonary disease)     Depression     Diabetes mellitus type II     Diverticulitis     Fatty liver     GERD (gastroesophageal reflux disease)     Goiter     s/p thyroidectomy    Heart murmur     Hemiparesis affecting right side as late effect of cerebrovascular accident (CVA) 7/26/2022    Hernia of abdominal wall     History of intracranial hemorrhage 6/15/2013    History of non-ST elevation myocardial infarction (NSTEMI) 6/15/2013    Hyperlipidemia     Hypertension     Lactic acidosis 1/11/2020    Metabolic syndrome 6/14/2012    Myocardial infarction     On home oxygen therapy     states uses 2L at night or when sat < 90    Pacemaker     Positive FIT (fecal immunochemical test) 11/6/2020    Severe persistent asthma without complication 4/30/2020    Statin intolerance     Stroke 2012    left hand shaky, loss of balance       Past Surgical History:   Procedure Laterality Date    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/25/2017    Dr Griggs    ADRENALECTOMY      AORTIC VALVE REPLACEMENT  12/09/2016    ARTERIOGRAPHY OF AORTIC ROOT N/A 9/12/2022    Procedure: ARTERIOGRAM, AORTIC ROOT;  Surgeon: Marko Batres MD;  Location: [facility] CATH/EP LAB;  Service: Cardiology;  Laterality: N/A;    arthroscopy lt knee Right     BLADDER REPAIR      sling    BREAST BIOPSY Bilateral     benign    COLONOSCOPY  2004    10 year recheck    COLONOSCOPY N/A 11/6/2020    Procedure: COLONOSCOPY;  Surgeon: Yakelin Lowery MD;  Location: [facility];  Service: Endoscopy;  Laterality: N/A;    CORONARY ANGIOGRAPHY INCLUDING BYPASS GRAFTS WITH CATHETERIZATION OF LEFT HEART Left 9/12/2022    Procedure: Left heart cath including bypass graft, with left heart catheterization;  Surgeon: Marko Batres MD;  Location: [facility] CATH/EP LAB;  Service: Cardiology;  Laterality: Left;    CORONARY ARTERY  BYPASS GRAFT  12/09/2016    X3    ECHOCARDIOGRAM,TRANSESOPHAGEAL N/A 3/25/2024    Procedure: Transesophageal echo (DELORIS) intra-procedure log documentation;  Surgeon: Paulino Resendiz MD;  Location: [facility] CATH/EP LAB;  Service: Cardiology;  Laterality: N/A;    EPIDURAL STEROID INJECTION INTO LUMBAR SPINE N/A 7/27/2021    Procedure: Injection-steroid-epidural-lumbar;  Surgeon: Valerio Jay MD;  Location: [facility] OR;  Service: Pain Management;  Laterality: N/A;  L5-S1     HYSTERECTOMY      INSERTION OF PACEMAKER Left 1/13/2020    Procedure: INSERTION, PACEMAKER;  Surgeon: Marko Batres MD;  Location: [facility] CATH/EP LAB;  Service: Cardiology;  Laterality: Left;    OOPHORECTOMY      RIGHT HEART CATHETERIZATION Right 9/12/2022    Procedure: INSERTION, CATHETER, RIGHT HEART;  Surgeon: Marko Batres MD;  Location: [facility] CATH/EP LAB;  Service: Cardiology;  Laterality: Right;    THYROIDECTOMY         Review of patient's allergies indicates:   Allergen Reactions    Metformin Diarrhea     Diarrhea      Corn Itching     Other reaction(s): Sneezing  Other reaction(s): Rhinorrhea    Potato starch Hives     Other reaction(s): Sneezing  Other reaction(s): Rhinorrhea    Pravastatin Other (See Comments)     Muscle pain    Statins-hmg-coa reductase inhibitors Other (See Comments)    Hydrochlorothiazide Other (See Comments)     weakness    Welchol [colesevelam] Other (See Comments)     Weakness        Current Facility-Administered Medications on File Prior to Encounter   Medication    [DISCONTINUED] acetaminophen tablet 650 mg    [DISCONTINUED] allopurinoL tablet 100 mg    [DISCONTINUED] ampicillin 2 g in sodium chloride 0.9 % 100 mL IVPB (ready to mix)    [DISCONTINUED] budesonide nebulizer solution 0.5 mg    [DISCONTINUED] cefTRIAXone (ROCEPHIN) 2 g in dextrose 5 % 100 mL IVPB (ready to mix)    [DISCONTINUED] dextrose 50% injection 12.5 g    [DISCONTINUED] dextrose 50% injection 25 g    [DISCONTINUED] folic acid tablet 1 mg     [DISCONTINUED] furosemide injection 40 mg    [DISCONTINUED] glucagon (human recombinant) injection 1 mg    [DISCONTINUED] glucose chewable tablet 16 g    [DISCONTINUED] glucose chewable tablet 24 g    [DISCONTINUED] heparin (porcine) injection 5,000 Units    [DISCONTINUED] insulin aspart U-100 pen 0-5 Units    [DISCONTINUED] levothyroxine tablet 150 mcg    [DISCONTINUED] LORazepam tablet 2 mg    [DISCONTINUED] melatonin tablet 6 mg    [DISCONTINUED] metoprolol succinate (TOPROL-XL) 24 hr tablet 25 mg    [DISCONTINUED] morphine injection 2 mg    [DISCONTINUED] multivitamin tablet    [DISCONTINUED] mupirocin 2 % ointment    [DISCONTINUED] naloxone 0.4 mg/mL injection 0.02 mg    [DISCONTINUED] nitroGLYCERIN SL tablet 0.4 mg    [DISCONTINUED] ondansetron injection 4 mg    [DISCONTINUED] polyethylene glycol packet 17 g    [DISCONTINUED] sodium chloride 0.9% flush 10 mL    [DISCONTINUED] thiamine tablet 100 mg     Current Outpatient Medications on File Prior to Encounter   Medication Sig    albuterol (PROVENTIL) 2.5 mg /3 mL (0.083 %) nebulizer solution Take 3 mLs (2.5 mg total) by nebulization every 4 (four) hours as needed for Shortness of Breath or Wheezing.    albuterol (PROVENTIL/VENTOLIN HFA) 90 mcg/actuation inhaler Inhale 1-2 puffs into the lungs every 4 (four) hours as needed for Wheezing. Inhale 2 puffs by mouth into lungs every 4 hours as needed    alcohol swabs (BD ALCOHOL SWABS) PadM Apply 1 each topically as needed.    amLODIPine (NORVASC) 10 MG tablet TAKE 1 TABLET BY MOUTH EVERY DAY FOR SYSTOLIC BLOOD PRESSURE GREATER THAN 120    arformoteroL (BROVANA) 15 mcg/2 mL Nebu Take 2 mLs (15 mcg total) by nebulization 2 (two) times a day. Controller    aspirin (ECOTRIN) 81 MG EC tablet Take 81 mg by mouth once daily.    bempedoic acid (NEXLETOL) 180 mg Tab Take 1 tablet by mouth once daily.    blood glucose control, low (TRUE METRIX LEVEL 1) Soln Check glucometer accuracy at least once a week    blood sugar  diagnostic Strp True Metrix Test Strips test once a day    blood-glucose meter (TRUE METRIX GLUCOSE METER) Misc True Metrix Glucose Meter    budesonide (PULMICORT) 0.5 mg/2 mL nebulizer solution Take 2 mLs (0.5 mg total) by nebulization 2 (two) times daily. Controller    cholecalciferol, vitamin D3, (VITAMIN D3) 25 mcg (1,000 unit) capsule Take 1,000 Units by mouth once daily.    colchicine, gout, (COLCRYS) 0.6 mg tablet Take 1 tablet (0.6 mg total) by mouth once daily. Day 1: 1.2 mg at the first sign of flare, followed by 0.6 mg after 1 hour; maximum total dose: 1.8 mg/day on day Day 2 and thereafter: 0.6 mg once or twice daily until flare resolves    cyanocobalamin (VITAMIN B-12) 1000 MCG tablet Take 100 mcg by mouth once daily.    evolocumab (REPATHA SYRINGE) 140 mg/mL Syrg Inject 140 mg into the skin every 14 (fourteen) days.     glyBURIDE (DIABETA) 2.5 MG tablet Take 2.5 mg by mouth 2 (two) times daily.    lancets 33 gauge Misc 1 lancet by Misc.(Non-Drug; Combo Route) route once daily.    levocetirizine (XYZAL) 5 MG tablet Take 1 tablet (5 mg total) by mouth every evening.    levothyroxine (SYNTHROID) 150 MCG tablet Take 150 mcg by mouth once daily.    metoprolol succinate (TOPROL-XL) 50 MG 24 hr tablet Take 1 tablet (50 mg total) by mouth once daily.    potassium chloride (MICRO-K) 8 mEq CpSR Take 8 mEq by mouth.    predniSONE (DELTASONE) 10 mg tablet pack Take by mouth once daily. Take 3 tablets daily on day #1-2, Take 2 tablets daily on days #3-4, Take 1 tablet daily on day #5-6, then stop taking  Dispense: 12 tablet; Refill: 0    predniSONE (DELTASONE) 20 MG tablet Take one daily for 3 days and may repeat for shortness of breath.    REPATHA SURECLICK 140 mg/mL PnIj SMARTSI pre-filled pen syringe SUB-Q Every 2 Weeks    revefenacin (YUPELRI) 175 mcg/3 mL Nebu Inhale 1 Dose into the lungs Daily.    SITagliptin (JANUVIA) 50 MG Tab Take 1 tablet (50 mg total) by mouth once daily.    sotaloL (BETAPACE) 80 MG  tablet Take 80 mg by mouth 2 (two) times daily.    torsemide (DEMADEX) 20 MG Tab Take 20 mg by mouth.    vitamin E 400 UNIT capsule Take 400 Units by mouth once daily.     Family History       Problem Relation (Age of Onset)    Aneurysm Maternal Grandfather    Arthritis Mother    Asthma Sister, Son    COPD Son    Cancer Paternal Aunt    Depression Son    Diabetes Mother, Sister, Son, Maternal Uncle    Heart disease Mother, Father, Maternal Uncle, Paternal Aunt, Paternal Uncle, Paternal Grandfather    Hypertension Mother, Father, Sister, Son, Paternal Aunt, Maternal Grandmother    Mental illness Father, Paternal Aunt, Maternal Grandmother, Paternal Grandmother    Stroke Son          Tobacco Use    Smoking status: Never    Smokeless tobacco: Never   Substance and Sexual Activity    Alcohol use: Not Currently     Comment: seldom    Drug use: No    Sexual activity: Never     Review of Systems   Constitutional: Negative for diaphoresis and fever.   Cardiovascular:  Negative for chest pain, dyspnea on exertion, leg swelling, near-syncope, orthopnea, palpitations, paroxysmal nocturnal dyspnea and syncope.   Respiratory:  Negative for cough and shortness of breath.    Gastrointestinal:  Negative for abdominal pain, diarrhea, nausea and vomiting.   Neurological:  Negative for light-headedness.   Psychiatric/Behavioral:  Negative for altered mental status and substance abuse.      Objective:     Vital Signs (Most Recent):  Temp: 98.4 °F (36.9 °C) (03/26/24 1233)  Pulse: 68 (03/26/24 1233)  Resp: 18 (03/26/24 1233)  BP: (!) 176/71 (03/26/24 1233)  SpO2: 95 % (03/26/24 1233) Vital Signs (24h Range):  Temp:  [97.5 °F (36.4 °C)-98.4 °F (36.9 °C)] 98.4 °F (36.9 °C)  Pulse:  [64-75] 68  Resp:  [16-18] 18  SpO2:  [92 %-97 %] 95 %  BP: (141-176)/(60-71) 176/71       Weight: 87 kg (191 lb 12.8 oz)  Body mass index is 35.08 kg/m².    SpO2: 95 %        Physical Exam  Constitutional:       Appearance: Normal appearance.   Cardiovascular:       Rate and Rhythm: Normal rate and regular rhythm.      Pulses: Normal pulses.      Heart sounds: Normal heart sounds.   Pulmonary:      Effort: Pulmonary effort is normal.      Breath sounds: Normal breath sounds. No wheezing or rales.   Abdominal:      General: Abdomen is flat. There is no distension.      Palpations: Abdomen is soft.      Tenderness: There is no abdominal tenderness.   Musculoskeletal:      Right lower leg: No edema.      Left lower leg: No edema.   Skin:     General: Skin is warm and dry.   Neurological:      Mental Status: She is alert and oriented to person, place, and time. Mental status is at baseline.   Psychiatric:         Mood and Affect: Mood normal.         Behavior: Behavior normal.            Significant Labs: All pertinent lab results from the last 24 hours have been reviewed.    Significant Imaging:  Reviewed

## 2024-03-26 NOTE — SUBJECTIVE & OBJECTIVE
Past Medical History:   Diagnosis Date    Abnormal EKG 9/26/2012    Allergy     Arthritis     Asthma     Brain bleed     Colon polyps 11/6/2020    COPD (chronic obstructive pulmonary disease)     Depression     Diabetes mellitus type II     Diverticulitis     Fatty liver     GERD (gastroesophageal reflux disease)     Goiter     s/p thyroidectomy    Heart murmur     Hemiparesis affecting right side as late effect of cerebrovascular accident (CVA) 7/26/2022    Hernia of abdominal wall     History of intracranial hemorrhage 6/15/2013    History of non-ST elevation myocardial infarction (NSTEMI) 6/15/2013    Hyperlipidemia     Hypertension     Lactic acidosis 1/11/2020    Metabolic syndrome 6/14/2012    Myocardial infarction     On home oxygen therapy     states uses 2L at night or when sat < 90    Pacemaker     Positive FIT (fecal immunochemical test) 11/6/2020    Severe persistent asthma without complication 4/30/2020    Statin intolerance     Stroke 2012    left hand shaky, loss of balance       Past Surgical History:   Procedure Laterality Date    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/25/2017    Dr Griggs    ADRENALECTOMY      AORTIC VALVE REPLACEMENT  12/09/2016    ARTERIOGRAPHY OF AORTIC ROOT N/A 9/12/2022    Procedure: ARTERIOGRAM, AORTIC ROOT;  Surgeon: Marko Batres MD;  Location: [facility] CATH/EP LAB;  Service: Cardiology;  Laterality: N/A;    arthroscopy lt knee Right     BLADDER REPAIR      sling    BREAST BIOPSY Bilateral     benign    COLONOSCOPY  2004    10 year recheck    COLONOSCOPY N/A 11/6/2020    Procedure: COLONOSCOPY;  Surgeon: Yakelin Lowery MD;  Location: [facility];  Service: Endoscopy;  Laterality: N/A;    CORONARY ANGIOGRAPHY INCLUDING BYPASS GRAFTS WITH CATHETERIZATION OF LEFT HEART Left 9/12/2022    Procedure: Left heart cath including bypass graft, with left heart catheterization;  Surgeon: Marko Batres MD;  Location: [facility] CATH/EP LAB;  Service: Cardiology;  Laterality: Left;    CORONARY ARTERY  BYPASS GRAFT  12/09/2016    X3    ECHOCARDIOGRAM,TRANSESOPHAGEAL N/A 3/25/2024    Procedure: Transesophageal echo (DELORIS) intra-procedure log documentation;  Surgeon: Paulino Resendiz MD;  Location: [facility] CATH/EP LAB;  Service: Cardiology;  Laterality: N/A;    EPIDURAL STEROID INJECTION INTO LUMBAR SPINE N/A 7/27/2021    Procedure: Injection-steroid-epidural-lumbar;  Surgeon: Valerio Jay MD;  Location: [facility] OR;  Service: Pain Management;  Laterality: N/A;  L5-S1     HYSTERECTOMY      INSERTION OF PACEMAKER Left 1/13/2020    Procedure: INSERTION, PACEMAKER;  Surgeon: Marko Batres MD;  Location: [facility] CATH/EP LAB;  Service: Cardiology;  Laterality: Left;    OOPHORECTOMY      RIGHT HEART CATHETERIZATION Right 9/12/2022    Procedure: INSERTION, CATHETER, RIGHT HEART;  Surgeon: Marko Batres MD;  Location: [facility] CATH/EP LAB;  Service: Cardiology;  Laterality: Right;    THYROIDECTOMY         Review of patient's allergies indicates:   Allergen Reactions    Metformin Diarrhea     Diarrhea      Corn Itching     Other reaction(s): Sneezing  Other reaction(s): Rhinorrhea    Potato starch Hives     Other reaction(s): Sneezing  Other reaction(s): Rhinorrhea    Pravastatin Other (See Comments)     Muscle pain    Statins-hmg-coa reductase inhibitors Other (See Comments)    Hydrochlorothiazide Other (See Comments)     weakness    Welchol [colesevelam] Other (See Comments)     Weakness        PTA Medications   Medication Sig    albuterol (PROVENTIL) 2.5 mg /3 mL (0.083 %) nebulizer solution Take 3 mLs (2.5 mg total) by nebulization every 4 (four) hours as needed for Shortness of Breath or Wheezing.    albuterol (PROVENTIL/VENTOLIN HFA) 90 mcg/actuation inhaler Inhale 1-2 puffs into the lungs every 4 (four) hours as needed for Wheezing. Inhale 2 puffs by mouth into lungs every 4 hours as needed    alcohol swabs (BD ALCOHOL SWABS) PadM Apply 1 each topically as needed.    amLODIPine (NORVASC) 10 MG tablet TAKE 1 TABLET BY MOUTH EVERY  DAY FOR SYSTOLIC BLOOD PRESSURE GREATER THAN 120    arformoteroL (BROVANA) 15 mcg/2 mL Nebu Take 2 mLs (15 mcg total) by nebulization 2 (two) times a day. Controller    aspirin (ECOTRIN) 81 MG EC tablet Take 81 mg by mouth once daily.    bempedoic acid (NEXLETOL) 180 mg Tab Take 1 tablet by mouth once daily.    blood glucose control, low (TRUE METRIX LEVEL 1) Soln Check glucometer accuracy at least once a week    blood sugar diagnostic Strp True Metrix Test Strips test once a day    blood-glucose meter (TRUE METRIX GLUCOSE METER) Misc True Metrix Glucose Meter    budesonide (PULMICORT) 0.5 mg/2 mL nebulizer solution Take 2 mLs (0.5 mg total) by nebulization 2 (two) times daily. Controller    cholecalciferol, vitamin D3, (VITAMIN D3) 25 mcg (1,000 unit) capsule Take 1,000 Units by mouth once daily.    colchicine, gout, (COLCRYS) 0.6 mg tablet Take 1 tablet (0.6 mg total) by mouth once daily. Day 1: 1.2 mg at the first sign of flare, followed by 0.6 mg after 1 hour; maximum total dose: 1.8 mg/day on day Day 2 and thereafter: 0.6 mg once or twice daily until flare resolves    cyanocobalamin (VITAMIN B-12) 1000 MCG tablet Take 100 mcg by mouth once daily.    evolocumab (REPATHA SYRINGE) 140 mg/mL Syrg Inject 140 mg into the skin every 14 (fourteen) days.     glyBURIDE (DIABETA) 2.5 MG tablet Take 2.5 mg by mouth 2 (two) times daily.    lancets 33 gauge Misc 1 lancet by Misc.(Non-Drug; Combo Route) route once daily.    levocetirizine (XYZAL) 5 MG tablet Take 1 tablet (5 mg total) by mouth every evening.    levothyroxine (SYNTHROID) 150 MCG tablet Take 150 mcg by mouth once daily.    metoprolol succinate (TOPROL-XL) 50 MG 24 hr tablet Take 1 tablet (50 mg total) by mouth once daily.    potassium chloride (MICRO-K) 8 mEq CpSR Take 8 mEq by mouth.    predniSONE (DELTASONE) 10 mg tablet pack Take by mouth once daily. Take 3 tablets daily on day #1-2, Take 2 tablets daily on days #3-4, Take 1 tablet daily on day #5-6, then  stop taking  Dispense: 12 tablet; Refill: 0    predniSONE (DELTASONE) 20 MG tablet Take one daily for 3 days and may repeat for shortness of breath.    REPATHA SURECLICK 140 mg/mL PnIj SMARTSI pre-filled pen syringe SUB-Q Every 2 Weeks    revefenacin (YUPELRI) 175 mcg/3 mL Nebu Inhale 1 Dose into the lungs Daily.    SITagliptin (JANUVIA) 50 MG Tab Take 1 tablet (50 mg total) by mouth once daily.    sotaloL (BETAPACE) 80 MG tablet Take 80 mg by mouth 2 (two) times daily.    torsemide (DEMADEX) 20 MG Tab Take 20 mg by mouth.    vitamin E 400 UNIT capsule Take 400 Units by mouth once daily.     Family History       Problem Relation (Age of Onset)    Aneurysm Maternal Grandfather    Arthritis Mother    Asthma Sister, Son    COPD Son    Cancer Paternal Aunt    Depression Son    Diabetes Mother, Sister, Son, Maternal Uncle    Heart disease Mother, Father, Maternal Uncle, Paternal Aunt, Paternal Uncle, Paternal Grandfather    Hypertension Mother, Father, Sister, Son, Paternal Aunt, Maternal Grandmother    Mental illness Father, Paternal Aunt, Maternal Grandmother, Paternal Grandmother    Stroke Son          Tobacco Use    Smoking status: Never    Smokeless tobacco: Never   Substance and Sexual Activity    Alcohol use: Not Currently     Comment: seldom    Drug use: No    Sexual activity: Never     Review of Systems   Constitutional: Negative for chills and fever.   HENT: Negative.     Cardiovascular:  Negative for chest pain and dyspnea on exertion.   Respiratory:  Negative for shortness of breath.    Endocrine: Negative.      Objective:     Vital Signs (Most Recent):  Temp: 98.4 °F (36.9 °C) (24 1233)  Pulse: 68 (24 1233)  Resp: 18 (24 1233)  BP: (!) 176/71 (24 1233)  SpO2: 95 % (24 1233) Vital Signs (24h Range):  Temp:  [97.5 °F (36.4 °C)-98.4 °F (36.9 °C)] 98.4 °F (36.9 °C)  Pulse:  [64-75] 68  Resp:  [16-18] 18  SpO2:  [92 %-97 %] 95 %  BP: (141-176)/(60-71) 176/71     Weight: 87 kg (191  lb 12.8 oz)  Body mass index is 35.08 kg/m².    SpO2: 95 %         Intake/Output Summary (Last 24 hours) at 3/26/2024 1537  Last data filed at 3/26/2024 1147  Gross per 24 hour   Intake --   Output 500 ml   Net -500 ml       Lines/Drains/Airways       Drain  Duration             Female External Urinary Catheter w/ Suction 03/26/24 1147 <1 day              Peripheral Intravenous Line  Duration                  Peripheral IV - Single Lumen 03/22/24 1742 Right Antecubital 3 days                     Physical Exam  Constitutional:       General: She is not in acute distress.     Appearance: Normal appearance. She is not ill-appearing, toxic-appearing or diaphoretic.   HENT:      Head: Normocephalic.      Nose: Nose normal.      Mouth/Throat:      Mouth: Mucous membranes are moist.   Cardiovascular:      Rate and Rhythm: Normal rate and regular rhythm.      Pulses: Normal pulses.   Pulmonary:      Effort: Pulmonary effort is normal. No respiratory distress.   Abdominal:      General: Abdomen is flat. There is no distension.   Musculoskeletal:         General: No swelling or tenderness. Normal range of motion.   Skin:     General: Skin is warm.   Neurological:      Mental Status: She is alert and oriented to person, place, and time. Mental status is at baseline.   Psychiatric:         Mood and Affect: Mood normal.         Behavior: Behavior normal.            Significant Labs: Blood Culture:   Recent Labs   Lab 03/25/24  0954   LABBLOO Gram stain aer bottle: Gram positive cocci  Positive results previously called  No Growth to date  No Growth to date   , BMP:   Recent Labs   Lab 03/25/24  0954 03/26/24  0904   *  128* 154*   *  134* 138   K 5.0  5.0 4.1   CL 88*  88* 91*   CO2 36*  36* 33*   BUN 57*  57* 45*   CREATININE 1.6*  1.6* 1.0   CALCIUM 9.2  9.2 9.1   MG 1.9 1.9   , CMP   Recent Labs   Lab 03/25/24  0954 03/26/24  0904   *  134* 138   K 5.0  5.0 4.1   CL 88*  88* 91*   CO2 36*   36* 33*   *  128* 154*   BUN 57*  57* 45*   CREATININE 1.6*  1.6* 1.0   CALCIUM 9.2  9.2 9.1   PROT 6.7 6.1   ALBUMIN 3.3*  3.3* 2.5*   BILITOT 0.5 0.6   ALKPHOS 58 68   AST 26 36   ALT 9* 17   ANIONGAP 10  10 14   , and CBC   Recent Labs   Lab 03/25/24  0533 03/26/24  0904   WBC 10.13  10.13  10.13  10.13 9.37   HGB 12.6  12.6  12.6  12.6 13.2   HCT 38.8  38.8  38.8  38.8 41.2   *  142*  142*  142* 192       Significant Imaging:     Reviewed

## 2024-03-26 NOTE — CONSULTS
[facility] - [facility]iology Stepdown  Interventional Cardiology  Consult Note    Patient Name: Tereza Larose  MRN: [id number]  Admission Date: 3/26/2024  Hospital Length of Stay: 0 days  Code Status: Full Code   Attending Provider: Christopher Williamson DO  Consulting Provider: Ahmet Green MD  Primary Care Physician: Evan Sánchez MD  Principal Problem:Acute bacterial endocarditis    Patient information was obtained from patient, past medical records, and ER records.     Inpatient consult to Interventional Cardiology  Consult performed by: Ahmet Green MD  Consult ordered by: Willie Pearl MD        Subjective:     Chief Complaint:  Fall     HPI:  Ms. Larose is a 74 yo F with CAD s/p CABG LIMA-LAD, SVG-RCA, HFpEF, pacemaker in place, HTN, HLD, DMII, COPD who originally presented to OSH after a fall, complained of chest pain and later was found to have NSTEMI and subsequently had VERONA and Enterococcus bacteremia, currently on IV abx. DELORIS showed small mobile mass located on a right atrial lead. She was transferred for EP evaluation for device extraction and Intervetnional cardiology eval for angiogram. HS Troponin during admission was flat, peaked at 163. ECG without any ischemic changes. She had LHC in 2022 that showed patent LIMA-LAD, normal Lcx, PCI was performed at the origin of SVG-RCA. She denies current chest pain.     Past Medical History:   Diagnosis Date    Abnormal EKG 9/26/2012    Allergy     Arthritis     Asthma     Brain bleed     Colon polyps 11/6/2020    COPD (chronic obstructive pulmonary disease)     Depression     Diabetes mellitus type II     Diverticulitis     Fatty liver     GERD (gastroesophageal reflux disease)     Goiter     s/p thyroidectomy    Heart murmur     Hemiparesis affecting right side as late effect of cerebrovascular accident (CVA) 7/26/2022    Hernia of abdominal wall     History of intracranial hemorrhage 6/15/2013    History of non-ST elevation myocardial infarction  (NSTEMI) 6/15/2013    Hyperlipidemia     Hypertension     Lactic acidosis 1/11/2020    Metabolic syndrome 6/14/2012    Myocardial infarction     On home oxygen therapy     states uses 2L at night or when sat < 90    Pacemaker     Positive FIT (fecal immunochemical test) 11/6/2020    Severe persistent asthma without complication 4/30/2020    Statin intolerance     Stroke 2012    left hand shaky, loss of balance       Past Surgical History:   Procedure Laterality Date    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/25/2017    Dr Griggs    ADRENALECTOMY      AORTIC VALVE REPLACEMENT  12/09/2016    ARTERIOGRAPHY OF AORTIC ROOT N/A 9/12/2022    Procedure: ARTERIOGRAM, AORTIC ROOT;  Surgeon: Marko Batres MD;  Location: [facility] CATH/EP LAB;  Service: Cardiology;  Laterality: N/A;    arthroscopy lt knee Right     BLADDER REPAIR      sling    BREAST BIOPSY Bilateral     benign    COLONOSCOPY  2004    10 year recheck    COLONOSCOPY N/A 11/6/2020    Procedure: COLONOSCOPY;  Surgeon: Yakelin Lowery MD;  Location: [facility] ENDO;  Service: Endoscopy;  Laterality: N/A;    CORONARY ANGIOGRAPHY INCLUDING BYPASS GRAFTS WITH CATHETERIZATION OF LEFT HEART Left 9/12/2022    Procedure: Left heart cath including bypass graft, with left heart catheterization;  Surgeon: Marko Batres MD;  Location: [facility] CATH/EP LAB;  Service: Cardiology;  Laterality: Left;    CORONARY ARTERY BYPASS GRAFT  12/09/2016    X3    ECHOCARDIOGRAM,TRANSESOPHAGEAL N/A 3/25/2024    Procedure: Transesophageal echo (DELORIS) intra-procedure log documentation;  Surgeon: Paulino Resendiz MD;  Location: [facility] CATH/EP LAB;  Service: Cardiology;  Laterality: N/A;    EPIDURAL STEROID INJECTION INTO LUMBAR SPINE N/A 7/27/2021    Procedure: Injection-steroid-epidural-lumbar;  Surgeon: Valerio Jay MD;  Location: [facility] OR;  Service: Pain Management;  Laterality: N/A;  L5-S1     HYSTERECTOMY      INSERTION OF PACEMAKER Left 1/13/2020    Procedure: INSERTION, PACEMAKER;  Surgeon: Marko Batres MD;   Location: [facility] CATH/EP LAB;  Service: Cardiology;  Laterality: Left;    OOPHORECTOMY      RIGHT HEART CATHETERIZATION Right 9/12/2022    Procedure: INSERTION, CATHETER, RIGHT HEART;  Surgeon: Marko Batres MD;  Location: [facility] CATH/EP LAB;  Service: Cardiology;  Laterality: Right;    THYROIDECTOMY         Review of patient's allergies indicates:   Allergen Reactions    Metformin Diarrhea     Diarrhea      Corn Itching     Other reaction(s): Sneezing  Other reaction(s): Rhinorrhea    Potato starch Hives     Other reaction(s): Sneezing  Other reaction(s): Rhinorrhea    Pravastatin Other (See Comments)     Muscle pain    Statins-hmg-coa reductase inhibitors Other (See Comments)    Hydrochlorothiazide Other (See Comments)     weakness    Welchol [colesevelam] Other (See Comments)     Weakness        PTA Medications   Medication Sig    albuterol (PROVENTIL) 2.5 mg /3 mL (0.083 %) nebulizer solution Take 3 mLs (2.5 mg total) by nebulization every 4 (four) hours as needed for Shortness of Breath or Wheezing.    albuterol (PROVENTIL/VENTOLIN HFA) 90 mcg/actuation inhaler Inhale 1-2 puffs into the lungs every 4 (four) hours as needed for Wheezing. Inhale 2 puffs by mouth into lungs every 4 hours as needed    alcohol swabs (BD ALCOHOL SWABS) PadM Apply 1 each topically as needed.    amLODIPine (NORVASC) 10 MG tablet TAKE 1 TABLET BY MOUTH EVERY DAY FOR SYSTOLIC BLOOD PRESSURE GREATER THAN 120    arformoteroL (BROVANA) 15 mcg/2 mL Nebu Take 2 mLs (15 mcg total) by nebulization 2 (two) times a day. Controller    aspirin (ECOTRIN) 81 MG EC tablet Take 81 mg by mouth once daily.    bempedoic acid (NEXLETOL) 180 mg Tab Take 1 tablet by mouth once daily.    blood glucose control, low (TRUE METRIX LEVEL 1) Soln Check glucometer accuracy at least once a week    blood sugar diagnostic Strp True Metrix Test Strips test once a day    blood-glucose meter (TRUE METRIX GLUCOSE METER) Misc True Metrix Glucose Meter    budesonide  (PULMICORT) 0.5 mg/2 mL nebulizer solution Take 2 mLs (0.5 mg total) by nebulization 2 (two) times daily. Controller    cholecalciferol, vitamin D3, (VITAMIN D3) 25 mcg (1,000 unit) capsule Take 1,000 Units by mouth once daily.    colchicine, gout, (COLCRYS) 0.6 mg tablet Take 1 tablet (0.6 mg total) by mouth once daily. Day 1: 1.2 mg at the first sign of flare, followed by 0.6 mg after 1 hour; maximum total dose: 1.8 mg/day on day Day 2 and thereafter: 0.6 mg once or twice daily until flare resolves    cyanocobalamin (VITAMIN B-12) 1000 MCG tablet Take 100 mcg by mouth once daily.    evolocumab (REPATHA SYRINGE) 140 mg/mL Syrg Inject 140 mg into the skin every 14 (fourteen) days.     glyBURIDE (DIABETA) 2.5 MG tablet Take 2.5 mg by mouth 2 (two) times daily.    lancets 33 gauge Misc 1 lancet by Misc.(Non-Drug; Combo Route) route once daily.    levocetirizine (XYZAL) 5 MG tablet Take 1 tablet (5 mg total) by mouth every evening.    levothyroxine (SYNTHROID) 150 MCG tablet Take 150 mcg by mouth once daily.    metoprolol succinate (TOPROL-XL) 50 MG 24 hr tablet Take 1 tablet (50 mg total) by mouth once daily.    potassium chloride (MICRO-K) 8 mEq CpSR Take 8 mEq by mouth.    predniSONE (DELTASONE) 10 mg tablet pack Take by mouth once daily. Take 3 tablets daily on day #1-2, Take 2 tablets daily on days #3-4, Take 1 tablet daily on day #5-6, then stop taking  Dispense: 12 tablet; Refill: 0    predniSONE (DELTASONE) 20 MG tablet Take one daily for 3 days and may repeat for shortness of breath.    REPATHA SURECLICK 140 mg/mL PnIj SMARTSI pre-filled pen syringe SUB-Q Every 2 Weeks    revefenacin (YUPELRI) 175 mcg/3 mL Nebu Inhale 1 Dose into the lungs Daily.    SITagliptin (JANUVIA) 50 MG Tab Take 1 tablet (50 mg total) by mouth once daily.    sotaloL (BETAPACE) 80 MG tablet Take 80 mg by mouth 2 (two) times daily.    torsemide (DEMADEX) 20 MG Tab Take 20 mg by mouth.    vitamin E 400 UNIT capsule Take 400 Units by  mouth once daily.     Family History       Problem Relation (Age of Onset)    Aneurysm Maternal Grandfather    Arthritis Mother    Asthma Sister, Son    COPD Son    Cancer Paternal Aunt    Depression Son    Diabetes Mother, Sister, Son, Maternal Uncle    Heart disease Mother, Father, Maternal Uncle, Paternal Aunt, Paternal Uncle, Paternal Grandfather    Hypertension Mother, Father, Sister, Son, Paternal Aunt, Maternal Grandmother    Mental illness Father, Paternal Aunt, Maternal Grandmother, Paternal Grandmother    Stroke Son          Tobacco Use    Smoking status: Never    Smokeless tobacco: Never   Substance and Sexual Activity    Alcohol use: Not Currently     Comment: seldom    Drug use: No    Sexual activity: Never     Review of Systems   Constitutional: Negative for chills and fever.   HENT: Negative.     Cardiovascular:  Negative for chest pain and dyspnea on exertion.   Respiratory:  Negative for shortness of breath.    Endocrine: Negative.      Objective:     Vital Signs (Most Recent):  Temp: 98.4 °F (36.9 °C) (03/26/24 1233)  Pulse: 68 (03/26/24 1233)  Resp: 18 (03/26/24 1233)  BP: (!) 176/71 (03/26/24 1233)  SpO2: 95 % (03/26/24 1233) Vital Signs (24h Range):  Temp:  [97.5 °F (36.4 °C)-98.4 °F (36.9 °C)] 98.4 °F (36.9 °C)  Pulse:  [64-75] 68  Resp:  [16-18] 18  SpO2:  [92 %-97 %] 95 %  BP: (141-176)/(60-71) 176/71     Weight: 87 kg (191 lb 12.8 oz)  Body mass index is 35.08 kg/m².    SpO2: 95 %         Intake/Output Summary (Last 24 hours) at 3/26/2024 1537  Last data filed at 3/26/2024 1147  Gross per 24 hour   Intake --   Output 500 ml   Net -500 ml       Lines/Drains/Airways       Drain  Duration             Female External Urinary Catheter w/ Suction 03/26/24 1147 <1 day              Peripheral Intravenous Line  Duration                  Peripheral IV - Single Lumen 03/22/24 1742 Right Antecubital 3 days                     Physical Exam  Constitutional:       General: She is not in acute distress.      Appearance: Normal appearance. She is not ill-appearing, toxic-appearing or diaphoretic.   HENT:      Head: Normocephalic.      Nose: Nose normal.      Mouth/Throat:      Mouth: Mucous membranes are moist.   Cardiovascular:      Rate and Rhythm: Normal rate and regular rhythm.      Pulses: Normal pulses.   Pulmonary:      Effort: Pulmonary effort is normal. No respiratory distress.   Abdominal:      General: Abdomen is flat. There is no distension.   Musculoskeletal:         General: No swelling or tenderness. Normal range of motion.   Skin:     General: Skin is warm.   Neurological:      Mental Status: She is alert and oriented to person, place, and time. Mental status is at baseline.   Psychiatric:         Mood and Affect: Mood normal.         Behavior: Behavior normal.            Significant Labs: Blood Culture:   Recent Labs   Lab 03/25/24  0954   LABBLOO Gram stain aer bottle: Gram positive cocci  Positive results previously called  No Growth to date  No Growth to date   , BMP:   Recent Labs   Lab 03/25/24  0954 03/26/24  0904   *  128* 154*   *  134* 138   K 5.0  5.0 4.1   CL 88*  88* 91*   CO2 36*  36* 33*   BUN 57*  57* 45*   CREATININE 1.6*  1.6* 1.0   CALCIUM 9.2  9.2 9.1   MG 1.9 1.9   , CMP   Recent Labs   Lab 03/25/24  0954 03/26/24  0904   *  134* 138   K 5.0  5.0 4.1   CL 88*  88* 91*   CO2 36*  36* 33*   *  128* 154*   BUN 57*  57* 45*   CREATININE 1.6*  1.6* 1.0   CALCIUM 9.2  9.2 9.1   PROT 6.7 6.1   ALBUMIN 3.3*  3.3* 2.5*   BILITOT 0.5 0.6   ALKPHOS 58 68   AST 26 36   ALT 9* 17   ANIONGAP 10  10 14   , and CBC   Recent Labs   Lab 03/25/24  0533 03/26/24  0904   WBC 10.13  10.13  10.13  10.13 9.37   HGB 12.6  12.6  12.6  12.6 13.2   HCT 38.8  38.8  38.8  38.8 41.2   *  142*  142*  142* 192       Significant Imaging:     Reviewed  Assessment and Plan:     Cardiac/Vascular  NSTEMI (non-ST elevated myocardial infarction)  Ms. Estaves  is a 74 yo F with CAD s/p CABG LIMA-LAD, SVG-RCA, HFpEF, pacemaker in place, HTN, HLD, DMII, COPD who originally presented to OSH after a fall, complained of chest pain and later was found to have NSTEMI. She is benign treated for pacemaker device infection given vegetation on atrial leads. She had LHC in 2022 that showed patent LIMA-LAD, normal Lcx, PCI was performed at the origin of SVG-RCA. She denies current chest pain.     Recommendations  - Continue with treatment of bacteremia and pacemaker related infection  - Will plan for medical management for now.   - Once infection has been controlled and device extracted, please call us back as we may perform an angiogram for NSTEMI.        VTE Risk Mitigation (From admission, onward)           Ordered     IP VTE HIGH RISK PATIENT  Once         03/26/24 0616     Place sequential compression device  Until discontinued         03/26/24 0616                    Thank you for your consult. I will sign off. Please contact us if you have any additional questions.    Ahmet Green MD  Interventional Cardiology   [facility] Step[facility]

## 2024-03-26 NOTE — ASSESSMENT & PLAN NOTE
#Enterococcus bacteremia  #Severe COPD, asthma  #Severe PH  73yoF with CAD s/p CABG in 2016 and PCI in 2022, HFpEF, CVA, bioprosthetic AVR, HTN, HLD, DM2, severe PH (prior left/right heart cath with PA pressures 80/30) with prior pulmonary arrest, COPD on home prn O2 (prior PFTs with severe mixed obstructive COPD/asthma picture), and complete heart block s/p dcPPM in 2020 here with persistent E faecalis bacteremia and RA PM lead vegetation. With her hx of severe PH, we discussed with HTS and she would be high risk for this procedure. She would need a swan blaise catheterization, Arminda bridging onto and off of the ventilator, and pulmonary evaluation with further lung imaging pre-procedurally. I discussed with the family and they would prefer medical management at this point.    Recommendations:  - Pursue medical management strategy  - Abx per ID  - HTS consulted and will comment on perioperative risk given her severe likely WHO group III PH  - Can consider pulm consultation for management of her severe obstructive disease

## 2024-03-26 NOTE — ASSESSMENT & PLAN NOTE
Over 500 AMS on device interrogation since PM placement in 2020  [id number] Score: 4  Would anticoagulate with an OAC prior to discharge

## 2024-03-26 NOTE — ASSESSMENT & PLAN NOTE
Patient's FSGs are controlled on current medication regimen.  Last A1c reviewed-   Lab Results   Component Value Date    HGBA1C 7.9 (H) 03/22/2024     Most recent fingerstick glucose reviewed-   Recent Labs   Lab 03/26/24  0833   POCTGLUCOSE 160*     Current correctional scale  Low  Maintain anti-hyperglycemic dose as follows-   Antihyperglycemics (From admission, onward)      Start     Stop Route Frequency Ordered    03/26/24 0809  insulin aspart U-100 pen 0-5 Units         -- SubQ Before meals & nightly PRN 03/26/24 0710          Hold Oral hypoglycemics while patient is in the hospital.

## 2024-03-26 NOTE — NURSING
Pt arrived via stretcher, AxOx2 (orientated to person and time). On 3L nasal cannula. Vitals obtained, bed alarm set, external catheter in place. On call provider notified, stated that day team will be notified of arrival. No orders at this time. Per nurse at previous facility family unaware of transfer. Called both numbers on file x2, no answer and unable to leave vm.

## 2024-03-26 NOTE — DISCHARGE SUMMARY
Critical access hospital Medicine  Discharge Summary      Patient Name: Tereza Larose  MRN: [id number]  SHAMIR: [id number]  Patient Class: IP- Inpatient  Admission Date: 3/22/2024  Hospital Length of Stay: 4 days  Discharge Date and Time: 3/26/2024  5:09 AM  Attending Physician: No att. providers found   Discharging Provider: Latha Varela NP  Primary Care Provider: Evan Sánchez MD    Primary Care Team: Networked reference to record PCT     HPI:   Ms. Larose is a 73-year-old who presented to outside hospital for dyspnea and chest pain.  That time, patient had troponins drawn that were over 200 and 170.  She was sent here for further care for NSTEMI.  This time, patient is confused.  She is oriented to person and time.  She has not oriented to place and president.  She states she has a lot of medical problems.  She told the nurses that she came in for back pain.  She told me she came in for nausea.  This time, she denies any chest pain.  Patient then states that she did go to the ED for chest pain after we asked her that specifically.  Unsure what patient's baseline is at this time.  No family with her.  Per the ED note, she came in for dyspnea and had a recent fall.  Workup revealed elevated troponins.    Procedure(s) (LRB):  Transesophageal echo (DELORIS) intra-procedure log documentation (N/A)      Hospital Course:   Patient was transferred from outside facility for further evaluation and management of NSTEMI.  Troponins were trended and remained flat.  Patient was placed on heparin drip and Cardiology was consulted.  Patient developed fever overnight of 3/22.  UA and chest x-ray were negative.  Blood cultures were drawn and grew Enterococcus.  Patient was initiated on IV vancomycin and ID was consulted.  Patient developed VERONA and Nephrology was consulted.  Her labs were trended and creatinine remained stable.  There was concern for source of bacteremia secondary to cardiac device and ID adjusted  antibiotics and stopped vancomycin and initiate IV Rocephin and ampicillin. Patient underwent DELORIS with findings of small mobile mass located on a right atrial lead.  Cardiology recommended transfer to [facility] for pacemaker removal.  Case discussed with EP and Interventional Cardiology who will consult on the patient.  Patient was accepted and transfer to [facility] [address] via ambulance in stable condition.       Goals of Care Treatment Preferences:  Code Status: Full Code      Consults:   Consults (From admission, onward)          Status Ordering Provider     Inpatient consult to Infectious Diseases  Once        Provider:  Lourdes Conteh MD    Completed TONIA HURD     Inpatient consult to Nephrology  Once        Provider:  Suresh Richards MD    Completed TONIA HURD     Inpatient consult to Cardiology  Once        Provider:  Suresh Arriaga MD    Completed GUICHO JIMENEZ JR            No new Assessment & Plan notes have been filed under this hospital service since the last note was generated.  Service: Hospital Medicine    Final Active Diagnoses:    Diagnosis Date Noted POA    PRINCIPAL PROBLEM:  NSTEMI (non-ST elevated myocardial infarction) [I21.4] 06/15/2013 Yes    Bacteremia due to Enterococcus [R78.81, B95.2] 03/24/2024 Yes    Debility [R53.81] 03/24/2024 Yes    VERONA (acute kidney injury) [N17.9] 03/24/2024 No    COPD (chronic obstructive pulmonary disease) [J44.9] 09/29/2022 Yes    Congestive heart failure, unspecified HF chronicity, unspecified heart failure type [I50.9] 07/26/2022 Yes    T2DM (type 2 diabetes mellitus) [E11.9] 05/09/2014 Yes    HTN (hypertension) [I10] 06/15/2013 Yes    CAD (coronary artery disease) [I25.10] 10/03/2012 Yes    Hyperlipidemia associated with type 2 diabetes mellitus [E11.69, E78.5] 06/14/2012 Yes     Chronic      Problems Resolved During this Admission:       Discharged Condition: stable    Disposition: Short Term  "Hospital    Follow Up:    Patient Instructions:   No discharge procedures on file.    Significant Diagnostic Studies: Labs: CMP   Recent Labs   Lab 03/25/24  0954 03/26/24  0904   *  134* 138   K 5.0  5.0 4.1   CL 88*  88* 91*   CO2 36*  36* 33*   *  128* 154*   BUN 57*  57* 45*   CREATININE 1.6*  1.6* 1.0   CALCIUM 9.2  9.2 9.1   PROT 6.7 6.1   ALBUMIN 3.3*  3.3* 2.5*   BILITOT 0.5 0.6   ALKPHOS 58 68   AST 26 36   ALT 9* 17   ANIONGAP 10  10 14   , CBC   Recent Labs   Lab 03/25/24  0533 03/26/24  0904   WBC 10.13  10.13  10.13  10.13 9.37   HGB 12.6  12.6  12.6  12.6 13.2   HCT 38.8  38.8  38.8  38.8 41.2   *  142*  142*  142* 192   , Troponin No results for input(s): "TROPONINI" in the last 168 hours., and All labs within the past 24 hours have been reviewed    Pending Diagnostic Studies:       Procedure Component Value Units Date/Time    EKG 12-lead [[id number]] Collected: 03/22/24 1627    Order Status: Sent Lab Status: In process Updated: 03/22/24 1651     QRS Duration 168 ms      [facility] QTC Calculation 528 ms     Narrative:      Test Reason : R07.9,    Vent. Rate : 090 BPM     Atrial Rate : 090 BPM     P-R Int : 220 ms          QRS Dur : 168 ms      QT Int : 432 ms       P-R-T Axes : 015 -63 090 degrees     QTc Int : 528 ms    Atrial-sensed ventricular-paced rhythm with prolonged AV conduction  Abnormal ECG  When compared with ECG of 10-SEP-2022 23:53,  Vent. rate has increased BY  30 BPM    Referred By: DANISH HENDERSON           Confirmed By:            Medications:  Transfer Medications (for Discharge Readmit only):   No current facility-administered medications for this encounter.     No current outpatient medications on file.     Facility-Administered Medications Ordered in Other Encounters   Medication Dose Route Frequency Provider Last Rate Last Admin    allopurinol split tablet 50 mg  50 mg Oral Daily Willie Pearl MD   50 mg at 03/26/24 0921    " amLODIPine tablet 10 mg  10 mg Oral Daily Willie Pearl MD   10 mg at 03/26/24 0841    ampicillin (OMNIPEN) 2 g in sodium chloride 0.9 % 100 mL IVPB (MB+)  2 g Intravenous Q6H Willie Pearl MD   Stopped at 03/26/24 0940    cefTRIAXone (ROCEPHIN) 2 g in dextrose 5 % in water (D5W) 100 mL IVPB (MB+)  2 g Intravenous Q12H Willie Pearl MD   Stopped at 03/26/24 1019    dextrose 10% bolus 125 mL 125 mL  12.5 g Intravenous PRN Willie Pearl MD        dextrose 10% bolus 250 mL 250 mL  25 g Intravenous PRN Willie Pearl MD        glucagon (human recombinant) injection 1 mg  1 mg Intramuscular PRN Willie Pearl MD        glucose chewable tablet 16 g  16 g Oral PRN Willie Pearl MD        glucose chewable tablet 24 g  24 g Oral PRN Willie Pearl MD        insulin aspart U-100 pen 0-5 Units  0-5 Units Subcutaneous QID (AC + HS) PRN Willie Pearl MD        levothyroxine tablet 150 mcg  150 mcg Oral Before breakfast Willie Pearl MD   150 mcg at 03/26/24 0841    naloxone 0.4 mg/mL injection 0.02 mg  0.02 mg Intravenous PRN Willie Pearl MD        sodium chloride 0.9% flush 10 mL  10 mL Intravenous Q12H PRN Willie Pearl MD           Indwelling Lines/Drains at time of discharge:   Lines/Drains/Airways       None                   Time spent on the discharge of patient: 40 minutes         Latha Varela NP  Department of Hospital Medicine  [facility]

## 2024-03-26 NOTE — SUBJECTIVE & OBJECTIVE
Past Medical History:   Diagnosis Date    Abnormal EKG 9/26/2012    Allergy     Arthritis     Asthma     Brain bleed     Colon polyps 11/6/2020    COPD (chronic obstructive pulmonary disease)     Depression     Diabetes mellitus type II     Diverticulitis     Fatty liver     GERD (gastroesophageal reflux disease)     Goiter     s/p thyroidectomy    Heart murmur     Hemiparesis affecting right side as late effect of cerebrovascular accident (CVA) 7/26/2022    Hernia of abdominal wall     History of intracranial hemorrhage 6/15/2013    History of non-ST elevation myocardial infarction (NSTEMI) 6/15/2013    Hyperlipidemia     Hypertension     Lactic acidosis 1/11/2020    Metabolic syndrome 6/14/2012    Myocardial infarction     On home oxygen therapy     states uses 2L at night or when sat < 90    Pacemaker     Positive FIT (fecal immunochemical test) 11/6/2020    Severe persistent asthma without complication 4/30/2020    Statin intolerance     Stroke 2012    left hand shaky, loss of balance       Past Surgical History:   Procedure Laterality Date    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/25/2017    Dr Griggs    ADRENALECTOMY      AORTIC VALVE REPLACEMENT  12/09/2016    ARTERIOGRAPHY OF AORTIC ROOT N/A 9/12/2022    Procedure: ARTERIOGRAM, AORTIC ROOT;  Surgeon: Marko Batres MD;  Location: [facility] [place] CATH/EP LAB;  Service: Cardiology;  Laterality: N/A;    arthroscopy lt knee Right     BLADDER REPAIR      sling    BREAST BIOPSY Bilateral     benign    COLONOSCOPY  2004    10 year recheck    COLONOSCOPY N/A 11/6/2020    Procedure: COLONOSCOPY;  Surgeon: Yakelin Lowery MD;  Location: [facility];  Service: Endoscopy;  Laterality: N/A;    CORONARY ANGIOGRAPHY INCLUDING BYPASS GRAFTS WITH CATHETERIZATION OF LEFT HEART Left 9/12/2022    Procedure: Left heart cath including bypass graft, with left heart catheterization;  Surgeon: Marko Batres MD;  Location: [facility] [place] CATH/EP LAB;  Service: Cardiology;  Laterality: Left;    CORONARY ARTERY  BYPASS GRAFT  12/09/2016    X3    EPIDURAL STEROID INJECTION INTO LUMBAR SPINE N/A 7/27/2021    Procedure: Injection-steroid-epidural-lumbar;  Surgeon: Valerio Jay MD;  Location: [facility] OR;  Service: Pain Management;  Laterality: N/A;  L5-S1     HYSTERECTOMY      INSERTION OF PACEMAKER Left 1/13/2020    Procedure: INSERTION, PACEMAKER;  Surgeon: Marko Batres MD;  Location: [facility] CATH/EP LAB;  Service: Cardiology;  Laterality: Left;    OOPHORECTOMY      RIGHT HEART CATHETERIZATION Right 9/12/2022    Procedure: INSERTION, CATHETER, RIGHT HEART;  Surgeon: Marko Batres MD;  Location: [facility] CATH/EP LAB;  Service: Cardiology;  Laterality: Right;    THYROIDECTOMY         Review of patient's allergies indicates:   Allergen Reactions    Metformin Diarrhea     Diarrhea      Corn Itching     Other reaction(s): Sneezing  Other reaction(s): Rhinorrhea    Potato starch Hives     Other reaction(s): Sneezing  Other reaction(s): Rhinorrhea    Pravastatin Other (See Comments)     Muscle pain    Statins-hmg-coa reductase inhibitors Other (See Comments)    Hydrochlorothiazide Other (See Comments)     weakness    Welchol [colesevelam] Other (See Comments)     Weakness        Current Facility-Administered Medications on File Prior to Encounter   Medication    [DISCONTINUED] acetaminophen tablet 650 mg    [DISCONTINUED] allopurinoL tablet 100 mg    [DISCONTINUED] ampicillin 2 g in sodium chloride 0.9 % 100 mL IVPB (ready to mix)    [DISCONTINUED] budesonide nebulizer solution 0.5 mg    [DISCONTINUED] cefTRIAXone (ROCEPHIN) 2 g in dextrose 5 % 100 mL IVPB (ready to mix)    [DISCONTINUED] dextrose 50% injection 12.5 g    [DISCONTINUED] dextrose 50% injection 25 g    [DISCONTINUED] folic acid tablet 1 mg    [DISCONTINUED] furosemide injection 40 mg    [DISCONTINUED] glucagon (human recombinant) injection 1 mg    [DISCONTINUED] glucose chewable tablet 16 g    [DISCONTINUED] glucose chewable tablet 24 g    [DISCONTINUED] heparin (porcine)  injection 5,000 Units    [DISCONTINUED] insulin aspart U-100 pen 0-5 Units    [DISCONTINUED] levothyroxine tablet 150 mcg    [DISCONTINUED] LORazepam tablet 2 mg    [DISCONTINUED] melatonin tablet 6 mg    [DISCONTINUED] metoprolol succinate (TOPROL-XL) 24 hr tablet 25 mg    [DISCONTINUED] morphine injection 2 mg    [DISCONTINUED] multivitamin tablet    [DISCONTINUED] mupirocin 2 % ointment    [DISCONTINUED] naloxone 0.4 mg/mL injection 0.02 mg    [DISCONTINUED] nitroGLYCERIN SL tablet 0.4 mg    [DISCONTINUED] ondansetron injection 4 mg    [DISCONTINUED] polyethylene glycol packet 17 g    [DISCONTINUED] propofol (DIPRIVAN) 10 mg/mL infusion    [DISCONTINUED] sodium chloride 0.9% flush 10 mL    [DISCONTINUED] sodium chloride 0.9% infusion    [DISCONTINUED] thiamine tablet 100 mg     Current Outpatient Medications on File Prior to Encounter   Medication Sig    albuterol (PROVENTIL) 2.5 mg /3 mL (0.083 %) nebulizer solution Take 3 mLs (2.5 mg total) by nebulization every 4 (four) hours as needed for Shortness of Breath or Wheezing.    albuterol (PROVENTIL/VENTOLIN HFA) 90 mcg/actuation inhaler Inhale 1-2 puffs into the lungs every 4 (four) hours as needed for Wheezing. Inhale 2 puffs by mouth into lungs every 4 hours as needed    alcohol swabs (BD ALCOHOL SWABS) PadM Apply 1 each topically as needed.    amLODIPine (NORVASC) 10 MG tablet TAKE 1 TABLET BY MOUTH EVERY DAY FOR SYSTOLIC BLOOD PRESSURE GREATER THAN 120    arformoteroL (BROVANA) 15 mcg/2 mL Nebu Take 2 mLs (15 mcg total) by nebulization 2 (two) times a day. Controller    aspirin (ECOTRIN) 81 MG EC tablet Take 81 mg by mouth once daily.    bempedoic acid (NEXLETOL) 180 mg Tab Take 1 tablet by mouth once daily.    blood glucose control, low (TRUE METRIX LEVEL 1) Soln Check glucometer accuracy at least once a week    blood sugar diagnostic Strp True Metrix Test Strips test once a day    blood-glucose meter (TRUE METRIX GLUCOSE METER) Misc True Metrix Glucose  Meter    budesonide (PULMICORT) 0.5 mg/2 mL nebulizer solution Take 2 mLs (0.5 mg total) by nebulization 2 (two) times daily. Controller    cholecalciferol, vitamin D3, (VITAMIN D3) 25 mcg (1,000 unit) capsule Take 1,000 Units by mouth once daily.    colchicine, gout, (COLCRYS) 0.6 mg tablet Take 1 tablet (0.6 mg total) by mouth once daily. Day 1: 1.2 mg at the first sign of flare, followed by 0.6 mg after 1 hour; maximum total dose: 1.8 mg/day on day Day 2 and thereafter: 0.6 mg once or twice daily until flare resolves    cyanocobalamin (VITAMIN B-12) 1000 MCG tablet Take 100 mcg by mouth once daily.    evolocumab (REPATHA SYRINGE) 140 mg/mL Syrg Inject 140 mg into the skin every 14 (fourteen) days.     glyBURIDE (DIABETA) 2.5 MG tablet Take 2.5 mg by mouth 2 (two) times daily.    lancets 33 gauge Misc 1 lancet by Misc.(Non-Drug; Combo Route) route once daily.    levocetirizine (XYZAL) 5 MG tablet Take 1 tablet (5 mg total) by mouth every evening.    levothyroxine (SYNTHROID) 150 MCG tablet Take 150 mcg by mouth once daily.    metoprolol succinate (TOPROL-XL) 50 MG 24 hr tablet Take 1 tablet (50 mg total) by mouth once daily.    potassium chloride (MICRO-K) 8 mEq CpSR Take 8 mEq by mouth.    predniSONE (DELTASONE) 10 mg tablet pack Take by mouth once daily. Take 3 tablets daily on day #1-2, Take 2 tablets daily on days #3-4, Take 1 tablet daily on day #5-6, then stop taking  Dispense: 12 tablet; Refill: 0    predniSONE (DELTASONE) 20 MG tablet Take one daily for 3 days and may repeat for shortness of breath.    REPATHA SURECLICK 140 mg/mL PnIj SMARTSI pre-filled pen syringe SUB-Q Every 2 Weeks    revefenacin (YUPELRI) 175 mcg/3 mL Nebu Inhale 1 Dose into the lungs Daily.    SITagliptin (JANUVIA) 50 MG Tab Take 1 tablet (50 mg total) by mouth once daily.    sotaloL (BETAPACE) 80 MG tablet Take 80 mg by mouth 2 (two) times daily.    torsemide (DEMADEX) 20 MG Tab Take 20 mg by mouth.    vitamin E 400 UNIT capsule  Take 400 Units by mouth once daily.     Family History       Problem Relation (Age of Onset)    Aneurysm Maternal Grandfather    Arthritis Mother    Asthma Sister, Son    COPD Son    Cancer Paternal Aunt    Depression Son    Diabetes Mother, Sister, Son, Maternal Uncle    Heart disease Mother, Father, Maternal Uncle, Paternal Aunt, Paternal Uncle, Paternal Grandfather    Hypertension Mother, Father, Sister, Son, Paternal Aunt, Maternal Grandmother    Mental illness Father, Paternal Aunt, Maternal Grandmother, Paternal Grandmother    Stroke Son          Tobacco Use    Smoking status: Never    Smokeless tobacco: Never   Substance and Sexual Activity    Alcohol use: Not Currently     Comment: seldom    Drug use: No    Sexual activity: Never     Review of Systems   Constitutional:  Negative for appetite change, chills, diaphoresis, fatigue and fever.   HENT:  Negative for congestion.    Respiratory:  Negative for cough, shortness of breath and wheezing.    Cardiovascular:  Negative for chest pain, palpitations and leg swelling.   Gastrointestinal:  Negative for abdominal pain, blood in stool, constipation, diarrhea, nausea and vomiting.   Genitourinary:  Negative for dysuria, flank pain and frequency.   Musculoskeletal:  Negative for arthralgias, joint swelling and myalgias.   Skin:  Negative for pallor and rash.   Neurological:  Negative for dizziness, light-headedness and headaches.   Psychiatric/Behavioral:  Negative for agitation. The patient is not nervous/anxious.      Objective:     Vital Signs (Most Recent):  Temp: 97.7 °F (36.5 °C) (03/26/24 0757)  Pulse: 69 (03/26/24 0757)  Resp: 16 (03/26/24 0757)  BP: (!) 143/64 (03/26/24 0757)  SpO2: 97 % (03/26/24 0757) Vital Signs (24h Range):  Temp:  [97.5 °F (36.4 °C)-98.7 °F (37.1 °C)] 97.7 °F (36.5 °C)  Pulse:  [41-80] 69  Resp:  [16-18] 16  SpO2:  [92 %-100 %] 97 %  BP: ()/(49-78) 143/64        There is no height or weight on file to calculate BMI.     Physical  Exam  Constitutional:       General: She is not in acute distress.  HENT:      Head: Normocephalic and atraumatic.   Eyes:      Extraocular Movements: Extraocular movements intact.      Pupils: Pupils are equal, round, and reactive to light.   Cardiovascular:      Rate and Rhythm: Normal rate and regular rhythm.      Heart sounds: No murmur heard.  Pulmonary:      Effort: Pulmonary effort is normal. No respiratory distress.      Breath sounds: No wheezing or rales.      Comments: On 3L NC   Abdominal:      General: Abdomen is flat. Bowel sounds are normal. There is no distension.      Palpations: Abdomen is soft.      Tenderness: There is no abdominal tenderness.   Musculoskeletal:         General: No swelling or tenderness. Normal range of motion.      Cervical back: Normal range of motion.      Right lower leg: No edema.      Left lower leg: No edema.   Lymphadenopathy:      Cervical: No cervical adenopathy.   Skin:     General: Skin is warm and dry.      Coloration: Skin is not jaundiced.      Findings: No bruising.   Neurological:      General: No focal deficit present.      Mental Status: She is alert and oriented to person, place, and time.              CRANIAL NERVES     CN III, IV, VI   Pupils are equal, round, and reactive to light.       Significant Labs: All pertinent labs within the past 24 hours have been reviewed.    Significant Imaging: I have reviewed all pertinent imaging results/findings within the past 24 hours.

## 2024-03-26 NOTE — CONSULTS
Chief complaint:  Chest Pain      HPI:  Tereza Larose is a 73 y.o. female presenting with an open wound of the left ear with a dried eschar. Pt had a fall at home about a week ago and she cut her ear open. Wound is since eschared over, and is stable. No other complaints today.    PMH:  As per HPI and below:  Past Medical History:   Diagnosis Date    Abnormal EKG 9/26/2012    Allergy     Arthritis     Asthma     Brain bleed     Colon polyps 11/6/2020    COPD (chronic obstructive pulmonary disease)     Depression     Diabetes mellitus type II     Diverticulitis     Fatty liver     GERD (gastroesophageal reflux disease)     Goiter     s/p thyroidectomy    Heart murmur     Hemiparesis affecting right side as late effect of cerebrovascular accident (CVA) 7/26/2022    Hernia of abdominal wall     History of intracranial hemorrhage 6/15/2013    History of non-ST elevation myocardial infarction (NSTEMI) 6/15/2013    Hyperlipidemia     Hypertension     Lactic acidosis 1/11/2020    Metabolic syndrome 6/14/2012    Myocardial infarction     On home oxygen therapy     states uses 2L at night or when sat < 90    Pacemaker     Positive FIT (fecal immunochemical test) 11/6/2020    Severe persistent asthma without complication 4/30/2020    Statin intolerance     Stroke 2012    left hand shaky, loss of balance       Social History     Socioeconomic History    Marital status:    Tobacco Use    Smoking status: Never    Smokeless tobacco: Never   Substance and Sexual Activity    Alcohol use: Not Currently     Comment: seldom    Drug use: No    Sexual activity: Never     Social Determinants of Health     Financial Resource Strain: Low Risk  (9/11/2022)    Overall Financial Resource Strain (CARDIA)     Difficulty of Paying Living Expenses: Not hard at all   Food Insecurity: No Food Insecurity (9/11/2022)    Hunger Vital Sign     Worried About Running Out of Food in the Last Year: Never true     Ran Out of Food in the Last Year:  Never true   Recent Concern: Food Insecurity - Food Insecurity Present (7/26/2022)    Hunger Vital Sign     Worried About Running Out of Food in the Last Year: Sometimes true     Ran Out of Food in the Last Year: Sometimes true   Transportation Needs: No Transportation Needs (9/11/2022)    PRAPARE - Transportation     Lack of Transportation (Medical): No     Lack of Transportation (Non-Medical): No   Physical Activity: Inactive (9/11/2022)    Exercise Vital Sign     Days of Exercise per Week: 0 days     Minutes of Exercise per Session: 0 min   Stress: Stress Concern Present (9/11/2022)    [facility] of Occupational Health - Occupational Stress Questionnaire     Feeling of Stress : To some extent   Social Connections: Moderately Integrated (9/11/2022)    Social Connection and Isolation Panel [NHANES]     Frequency of Communication with Friends and Family: More than three times a week     Frequency of Social Gatherings with Friends and Family: More than three times a week     Attends Taoist Services: 1 to 4 times per year     Active Member of Clubs or Organizations: No     Attends Club or Organization Meetings: Never     Marital Status:    Recent Concern: Social Connections - Moderately Isolated (7/26/2022)    Social Connection and Isolation Panel [NHANES]     Frequency of Communication with Friends and Family: More than three times a week     Frequency of Social Gatherings with Friends and Family: Once a week     Attends Taoist Services: Never     Active Member of Clubs or Organizations: No     Attends Club or Organization Meetings: Never     Marital Status:    Housing Stability: Unknown (9/11/2022)    Housing Stability Vital Sign     Unable to Pay for Housing in the Last Year: No     Unstable Housing in the Last Year: No       Past Surgical History:   Procedure Laterality Date    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/25/2017    Dr Griggs    ADRENALECTOMY      AORTIC VALVE REPLACEMENT  12/09/2016     ARTERIOGRAPHY OF AORTIC ROOT N/A 9/12/2022    Procedure: ARTERIOGRAM, AORTIC ROOT;  Surgeon: Marko Batres MD;  Location: [facility] CATH/EP LAB;  Service: Cardiology;  Laterality: N/A;    arthroscopy lt knee Right     BLADDER REPAIR      sling    BREAST BIOPSY Bilateral     benign    COLONOSCOPY  2004    10 year recheck    COLONOSCOPY N/A 11/6/2020    Procedure: COLONOSCOPY;  Surgeon: Yakelin Lowery MD;  Location: [facility] ENDO;  Service: Endoscopy;  Laterality: N/A;    CORONARY ANGIOGRAPHY INCLUDING BYPASS GRAFTS WITH CATHETERIZATION OF LEFT HEART Left 9/12/2022    Procedure: Left heart cath including bypass graft, with left heart catheterization;  Surgeon: Marko Batres MD;  Location: [facility] CATH/EP LAB;  Service: Cardiology;  Laterality: Left;    CORONARY ARTERY BYPASS GRAFT  12/09/2016    X3    EPIDURAL STEROID INJECTION INTO LUMBAR SPINE N/A 7/27/2021    Procedure: Injection-steroid-epidural-lumbar;  Surgeon: Valerio Jay MD;  Location: [facility] OR;  Service: Pain Management;  Laterality: N/A;  L5-S1     HYSTERECTOMY      INSERTION OF PACEMAKER Left 1/13/2020    Procedure: INSERTION, PACEMAKER;  Surgeon: Marko Batres MD;  Location: [facility] CATH/EP LAB;  Service: Cardiology;  Laterality: Left;    OOPHORECTOMY      RIGHT HEART CATHETERIZATION Right 9/12/2022    Procedure: INSERTION, CATHETER, RIGHT HEART;  Surgeon: Marko Batres MD;  Location: [facility] CATH/EP LAB;  Service: Cardiology;  Laterality: Right;    THYROIDECTOMY         Family History   Problem Relation Age of Onset    Arthritis Mother     Diabetes Mother     Heart disease Mother     Hypertension Mother     Hypertension Father     Heart disease Father     Mental illness Father     Asthma Sister     Diabetes Sister     Hypertension Sister     Asthma Son     COPD Son     Depression Son     Diabetes Son     Hypertension Son     Stroke Son     Diabetes Maternal Uncle     Heart disease Maternal Uncle     Mental illness Paternal Aunt     Cancer Paternal Aunt     Heart  disease Paternal Aunt     Hypertension Paternal Aunt     Heart disease Paternal Uncle     Hypertension Maternal Grandmother     Mental illness Maternal Grandmother     Aneurysm Maternal Grandfather     Mental illness Paternal Grandmother     Heart disease Paternal Grandfather     Melanoma Neg Hx     Psoriasis Neg Hx     Lupus Neg Hx     Eczema Neg Hx        Review of patient's allergies indicates:   Allergen Reactions    Metformin Diarrhea     Diarrhea      Corn Itching     Other reaction(s): Sneezing  Other reaction(s): Rhinorrhea    Potato starch Hives     Other reaction(s): Sneezing  Other reaction(s): Rhinorrhea    Pravastatin Other (See Comments)     Muscle pain    Statins-hmg-coa reductase inhibitors Other (See Comments)    Hydrochlorothiazide Other (See Comments)     weakness    Welchol [colesevelam] Other (See Comments)     Weakness        No current facility-administered medications on file prior to encounter.     Current Outpatient Medications on File Prior to Encounter   Medication Sig Dispense Refill    albuterol (PROVENTIL) 2.5 mg /3 mL (0.083 %) nebulizer solution Take 3 mLs (2.5 mg total) by nebulization every 4 (four) hours as needed for Shortness of Breath or Wheezing. 30 each 11    albuterol (PROVENTIL/VENTOLIN HFA) 90 mcg/actuation inhaler Inhale 1-2 puffs into the lungs every 4 (four) hours as needed for Wheezing. Inhale 2 puffs by mouth into lungs every 4 hours as needed 108 g 3    alcohol swabs (BD ALCOHOL SWABS) PadM Apply 1 each topically as needed. 100 each 3    amLODIPine (NORVASC) 10 MG tablet TAKE 1 TABLET BY MOUTH EVERY DAY FOR SYSTOLIC BLOOD PRESSURE GREATER THAN 120 90 tablet 3    arformoteroL (BROVANA) 15 mcg/2 mL Nebu Take 2 mLs (15 mcg total) by nebulization 2 (two) times a day. Controller 60 each 11    aspirin (ECOTRIN) 81 MG EC tablet Take 81 mg by mouth once daily.      bempedoic acid (NEXLETOL) 180 mg Tab Take 1 tablet by mouth once daily.      blood glucose control, low (TRUE  METRIX LEVEL 1) Soln Check glucometer accuracy at least once a week 1 each 3    blood sugar diagnostic Strp True Metrix Test Strips test once a day 100 strip 3    blood-glucose meter (TRUE METRIX GLUCOSE METER) Misc True Metrix Glucose Meter 1 each 0    budesonide (PULMICORT) 0.5 mg/2 mL nebulizer solution Take 2 mLs (0.5 mg total) by nebulization 2 (two) times daily. Controller 120 mL 11    cholecalciferol, vitamin D3, (VITAMIN D3) 25 mcg (1,000 unit) capsule Take 1,000 Units by mouth once daily.      colchicine, gout, (COLCRYS) 0.6 mg tablet Take 1 tablet (0.6 mg total) by mouth once daily. Day 1: 1.2 mg at the first sign of flare, followed by 0.6 mg after 1 hour; maximum total dose: 1.8 mg/day on day Day 2 and thereafter: 0.6 mg once or twice daily until flare resolves 30 tablet 0    cyanocobalamin (VITAMIN B-12) 1000 MCG tablet Take 100 mcg by mouth once daily.      evolocumab (REPATHA SYRINGE) 140 mg/mL Syrg Inject 140 mg into the skin every 14 (fourteen) days.       glyBURIDE (DIABETA) 2.5 MG tablet Take 2.5 mg by mouth 2 (two) times daily.      lancets 33 gauge Misc 1 lancet by Misc.(Non-Drug; Combo Route) route once daily. 100 each 3    levocetirizine (XYZAL) 5 MG tablet Take 1 tablet (5 mg total) by mouth every evening. 90 tablet 3    levothyroxine (SYNTHROID) 150 MCG tablet Take 150 mcg by mouth once daily.      metoprolol succinate (TOPROL-XL) 50 MG 24 hr tablet Take 1 tablet (50 mg total) by mouth once daily. 30 tablet 11    potassium chloride (MICRO-K) 8 mEq CpSR Take 8 mEq by mouth.      predniSONE (DELTASONE) 10 mg tablet pack Take by mouth once daily. Take 3 tablets daily on day #1-2, Take 2 tablets daily on days #3-4, Take 1 tablet daily on day #5-6, then stop taking  Dispense: 12 tablet; Refill: 0 12 tablet 0    predniSONE (DELTASONE) 20 MG tablet Take one daily for 3 days and may repeat for shortness of breath. 12 tablet 2    REPATHA SURECLICK 140 mg/mL PnIj SMARTSI pre-filled pen syringe SUB-Q  "Every 2 Weeks      revefenacin (YUPELRI) 175 mcg/3 mL Nebu Inhale 1 Dose into the lungs Daily. 30 each 11    SITagliptin (JANUVIA) 50 MG Tab Take 1 tablet (50 mg total) by mouth once daily. 90 tablet 1    sotaloL (BETAPACE) 80 MG tablet Take 80 mg by mouth 2 (two) times daily.      torsemide (DEMADEX) 20 MG Tab Take 20 mg by mouth.      vitamin E 400 UNIT capsule Take 400 Units by mouth once daily.         ROS: As per HPI and below:  Pertinent items are noted in HPI.      Physical Exam:     Vitals:    24 1515 24 1613 24   BP: (!) 154/70 (!) 146/60 (!) 141/60    BP Location:       Patient Position:       Pulse: 68 73 70 71   Resp: 18 18 18 17   Temp:  97.6 °F (36.4 °C) 97.5 °F (36.4 °C)    TempSrc:  Oral Oral    SpO2: 99% (!) 92% (!) 93% (!) 94%   Weight:       Height:           BP  Min: 98/50  Max: 172/77  Temp  Av.6 °F (37 °C)  Min: 97.2 °F (36.2 °C)  Max: 102.5 °F (39.2 °C)  Pulse  Av.1  Min: 41  Max: 94  Resp  Av.9  Min: 17  Max: 23  SpO2  Av.5 %  Min: 91 %  Max: 100 %  Height  Av' 2" (157.5 cm)  Min: 5' 2" (157.5 cm)  Max: 5' 2" (157.5 cm)  Weight  Av.6 kg (197 lb 8.5 oz)  Min: 87 kg (191 lb 12.8 oz)  Max: 92.2 kg (203 lb 4.2 oz)    Body mass index is 37.18 kg/m².          General:             Well developed, well nourished, no apparent distress  HEENT:              NCAT, no JVD, mucous membranes moist, EOM intact  Cardiovascular:  Regular rate and rhythm, normal S1, normal S2, No murmurs, rubs, or gallops  Respiratory:        Normal breath sounds, no wheezes, no rales, no rhonchi  Abdomen:           Bowel sounds present, non tender, non distended, no masses, no hepatojugular reflux  Extremities:        No clubbing, no cyanosis, no edema  Vascular:            2+ b/l radial.  Peripheral pulses intact.  No carotid bruits.  Neurological:      No focal deficits  Skin:                   No obvious rashes or erythema, open wound of the left ear with dry " eschar    No Photos available    Lab Results   Component Value Date    WBC 10.13 03/25/2024    WBC 10.13 03/25/2024    WBC 10.13 03/25/2024    WBC 10.13 03/25/2024    HGB 12.6 03/25/2024    HGB 12.6 03/25/2024    HGB 12.6 03/25/2024    HGB 12.6 03/25/2024    HCT 38.8 03/25/2024    HCT 38.8 03/25/2024    HCT 38.8 03/25/2024    HCT 38.8 03/25/2024    MCV 94 03/25/2024    MCV 94 03/25/2024    MCV 94 03/25/2024    MCV 94 03/25/2024     (L) 03/25/2024     (L) 03/25/2024     (L) 03/25/2024     (L) 03/25/2024     Lab Results   Component Value Date    CHOL 116 (L) 03/22/2024    CHOL 161 11/04/2022    CHOL 217 (A) 11/17/2021     Lab Results   Component Value Date    HDL 46 03/22/2024    HDL 33 11/04/2022    HDL 42 11/17/2021     Lab Results   Component Value Date    LDLCALC 46.8 (L) 03/22/2024    LDLCALC 72 11/04/2022    LDLCALC 68 11/17/2021     Lab Results   Component Value Date    TRIG 116 03/22/2024    TRIG 349 11/04/2022    TRIG 237 11/17/2021     Lab Results   Component Value Date    CHOLHDL 39.7 03/22/2024    CHOLHDL 21.1 09/16/2020    CHOLHDL 17.6 (L) 06/10/2020     CMP  Recent Labs   Lab 03/25/24  0954   *  128*   CALCIUM 9.2  9.2   ALBUMIN 3.3*  3.3*   PROT 6.7   *  134*   K 5.0  5.0   CO2 36*  36*   CL 88*  88*   BUN 57*  57*   CREATININE 1.6*  1.6*   ALKPHOS 58   ALT 9*   AST 26   BILITOT 0.5      Lab Results   Component Value Date    TSH 40.967 (H) 03/25/2024       Assessment and Recommendations       Diagnoses:    1. Left ear open wound with dry eschar    Plan:  1. No dressing needed at this time    Complexity:    moderate

## 2024-03-26 NOTE — ASSESSMENT & PLAN NOTE
Patient with known CAD s/p CABG, which is uncontrolled Will continue  heparin gtt and Statin and monitor for S/Sx of angina/ACS. Continue to monitor on telemetry.   Troponins trending down.

## 2024-03-26 NOTE — CONSULTS
[facility] - Cardiology Stepdown  Cardiac Electrophysiology  Consult Note    Admission Date: 3/26/2024  Code Status: Full Code   Attending Provider: Christopher Williamson DO  Consulting Provider: Connor M Gillies, DO  Principal Problem:Acute bacterial endocarditis    Inpatient consult to Electrophysiology  Consult performed by: Gillies, Connor M, DO  Consult ordered by: Willie Pearl MD        Subjective:     Chief Complaint:  SOB     HPI:   Tereza Larose is a 73yoF with CAD s/p CABG in 2016 and PCI in 2022, HFpEF, CVA, bioprosthetic AVR, HTN, HLD, DM2, severe PH (prior left/right heart cath with PA pressures 80/30) with prior pulmonary arrest, COPD on home prn O2 (prior PFTs with severe mixed obstructive COPD/asthma picture), and complete heart block s/p dcPPM in 2020 who is admitted to hospital medicine with Enterococcus faecalis bacteremia. At the outside hospital, DELORIS showed a lead vegetation in the RA, measured at 1cm x 0.6cm. On device interrogation she was noted to have never had a device check since the device was placed in 2016, and she is device-dependent with underlying sinus rhythm, complete heart block, and ventricular escape less than 30 bpm (VVI 30 with 100% pacing). Otherwise, she has had lots of paroxysmal atrial fibrillation and NSVT (cumulative since device placed). Her blood cultures are positive through 3/25 without clearance. EP was consulted for evaluation for device extraction.     Past Medical History:   Diagnosis Date    Abnormal EKG 9/26/2012    Allergy     Arthritis     Asthma     Brain bleed     Colon polyps 11/6/2020    COPD (chronic obstructive pulmonary disease)     Depression     Diabetes mellitus type II     Diverticulitis     Fatty liver     GERD (gastroesophageal reflux disease)     Goiter     s/p thyroidectomy    Heart murmur     Hemiparesis affecting right side as late effect of cerebrovascular accident (CVA) 7/26/2022    Hernia of abdominal wall     History of  intracranial hemorrhage 6/15/2013    History of non-ST elevation myocardial infarction (NSTEMI) 6/15/2013    Hyperlipidemia     Hypertension     Lactic acidosis 1/11/2020    Metabolic syndrome 6/14/2012    Myocardial infarction     On home oxygen therapy     states uses 2L at night or when sat < 90    Pacemaker     Positive FIT (fecal immunochemical test) 11/6/2020    Severe persistent asthma without complication 4/30/2020    Statin intolerance     Stroke 2012    left hand shaky, loss of balance       Past Surgical History:   Procedure Laterality Date    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/25/2017    Dr Griggs    ADRENALECTOMY      AORTIC VALVE REPLACEMENT  12/09/2016    ARTERIOGRAPHY OF AORTIC ROOT N/A 9/12/2022    Procedure: ARTERIOGRAM, AORTIC ROOT;  Surgeon: Marko Batres MD;  Location: [facility] CATH/EP LAB;  Service: Cardiology;  Laterality: N/A;    arthroscopy lt knee Right     BLADDER REPAIR      sling    BREAST BIOPSY Bilateral     benign    COLONOSCOPY  2004    10 year recheck    COLONOSCOPY N/A 11/6/2020    Procedure: COLONOSCOPY;  Surgeon: Yakelin Lowery MD;  Location: [facility] ENDO;  Service: Endoscopy;  Laterality: N/A;    CORONARY ANGIOGRAPHY INCLUDING BYPASS GRAFTS WITH CATHETERIZATION OF LEFT HEART Left 9/12/2022    Procedure: Left heart cath including bypass graft, with left heart catheterization;  Surgeon: Marko Batres MD;  Location: [facility] CATH/EP LAB;  Service: Cardiology;  Laterality: Left;    CORONARY ARTERY BYPASS GRAFT  12/09/2016    X3    ECHOCARDIOGRAM,TRANSESOPHAGEAL N/A 3/25/2024    Procedure: Transesophageal echo (DELORIS) intra-procedure log documentation;  Surgeon: Paulino Resendiz MD;  Location: [facility] CATH/EP LAB;  Service: Cardiology;  Laterality: N/A;    EPIDURAL STEROID INJECTION INTO LUMBAR SPINE N/A 7/27/2021    Procedure: Injection-steroid-epidural-lumbar;  Surgeon: Valerio Jay MD;  [facility];  Service: Pain Management;  Laterality: N/A;  L5-S1     HYSTERECTOMY      INSERTION OF  PACEMAKER Left 1/13/2020    Procedure: INSERTION, PACEMAKER;  Surgeon: Marko Batres MD;  Location: [facility] CATH/EP LAB;  Service: Cardiology;  Laterality: Left;    OOPHORECTOMY      RIGHT HEART CATHETERIZATION Right 9/12/2022    Procedure: INSERTION, CATHETER, RIGHT HEART;  Surgeon: Marko Batres MD;  Location: [facility] CATH/EP LAB;  Service: Cardiology;  Laterality: Right;    THYROIDECTOMY         Review of patient's allergies indicates:   Allergen Reactions    Metformin Diarrhea     Diarrhea      Corn Itching     Other reaction(s): Sneezing  Other reaction(s): Rhinorrhea    Potato starch Hives     Other reaction(s): Sneezing  Other reaction(s): Rhinorrhea    Pravastatin Other (See Comments)     Muscle pain    Statins-hmg-coa reductase inhibitors Other (See Comments)    Hydrochlorothiazide Other (See Comments)     weakness    Welchol [colesevelam] Other (See Comments)     Weakness        Current Facility-Administered Medications on File Prior to Encounter   Medication    [DISCONTINUED] acetaminophen tablet 650 mg    [DISCONTINUED] allopurinoL tablet 100 mg    [DISCONTINUED] ampicillin 2 g in sodium chloride 0.9 % 100 mL IVPB (ready to mix)    [DISCONTINUED] budesonide nebulizer solution 0.5 mg    [DISCONTINUED] cefTRIAXone (ROCEPHIN) 2 g in dextrose 5 % 100 mL IVPB (ready to mix)    [DISCONTINUED] dextrose 50% injection 12.5 g    [DISCONTINUED] dextrose 50% injection 25 g    [DISCONTINUED] folic acid tablet 1 mg    [DISCONTINUED] furosemide injection 40 mg    [DISCONTINUED] glucagon (human recombinant) injection 1 mg    [DISCONTINUED] glucose chewable tablet 16 g    [DISCONTINUED] glucose chewable tablet 24 g    [DISCONTINUED] heparin (porcine) injection 5,000 Units    [DISCONTINUED] insulin aspart U-100 pen 0-5 Units    [DISCONTINUED] levothyroxine tablet 150 mcg    [DISCONTINUED] LORazepam tablet 2 mg    [DISCONTINUED] melatonin tablet 6 mg    [DISCONTINUED] metoprolol succinate (TOPROL-XL) 24 hr tablet 25 mg     [DISCONTINUED] morphine injection 2 mg    [DISCONTINUED] multivitamin tablet    [DISCONTINUED] mupirocin 2 % ointment    [DISCONTINUED] naloxone 0.4 mg/mL injection 0.02 mg    [DISCONTINUED] nitroGLYCERIN SL tablet 0.4 mg    [DISCONTINUED] ondansetron injection 4 mg    [DISCONTINUED] polyethylene glycol packet 17 g    [DISCONTINUED] sodium chloride 0.9% flush 10 mL    [DISCONTINUED] thiamine tablet 100 mg     Current Outpatient Medications on File Prior to Encounter   Medication Sig    albuterol (PROVENTIL) 2.5 mg /3 mL (0.083 %) nebulizer solution Take 3 mLs (2.5 mg total) by nebulization every 4 (four) hours as needed for Shortness of Breath or Wheezing.    albuterol (PROVENTIL/VENTOLIN HFA) 90 mcg/actuation inhaler Inhale 1-2 puffs into the lungs every 4 (four) hours as needed for Wheezing. Inhale 2 puffs by mouth into lungs every 4 hours as needed    alcohol swabs (BD ALCOHOL SWABS) PadM Apply 1 each topically as needed.    amLODIPine (NORVASC) 10 MG tablet TAKE 1 TABLET BY MOUTH EVERY DAY FOR SYSTOLIC BLOOD PRESSURE GREATER THAN 120    arformoteroL (BROVANA) 15 mcg/2 mL Nebu Take 2 mLs (15 mcg total) by nebulization 2 (two) times a day. Controller    aspirin (ECOTRIN) 81 MG EC tablet Take 81 mg by mouth once daily.    bempedoic acid (NEXLETOL) 180 mg Tab Take 1 tablet by mouth once daily.    blood glucose control, low (TRUE METRIX LEVEL 1) Soln Check glucometer accuracy at least once a week    blood sugar diagnostic Strp True Metrix Test Strips test once a day    blood-glucose meter (TRUE METRIX GLUCOSE METER) Misc True Metrix Glucose Meter    budesonide (PULMICORT) 0.5 mg/2 mL nebulizer solution Take 2 mLs (0.5 mg total) by nebulization 2 (two) times daily. Controller    cholecalciferol, vitamin D3, (VITAMIN D3) 25 mcg (1,000 unit) capsule Take 1,000 Units by mouth once daily.    colchicine, gout, (COLCRYS) 0.6 mg tablet Take 1 tablet (0.6 mg total) by mouth once daily. Day 1: 1.2 mg at the first sign of  flare, followed by 0.6 mg after 1 hour; maximum total dose: 1.8 mg/day on day Day 2 and thereafter: 0.6 mg once or twice daily until flare resolves    cyanocobalamin (VITAMIN B-12) 1000 MCG tablet Take 100 mcg by mouth once daily.    evolocumab (REPATHA SYRINGE) 140 mg/mL Syrg Inject 140 mg into the skin every 14 (fourteen) days.     glyBURIDE (DIABETA) 2.5 MG tablet Take 2.5 mg by mouth 2 (two) times daily.    lancets 33 gauge Misc 1 lancet by Misc.(Non-Drug; Combo Route) route once daily.    levocetirizine (XYZAL) 5 MG tablet Take 1 tablet (5 mg total) by mouth every evening.    levothyroxine (SYNTHROID) 150 MCG tablet Take 150 mcg by mouth once daily.    metoprolol succinate (TOPROL-XL) 50 MG 24 hr tablet Take 1 tablet (50 mg total) by mouth once daily.    potassium chloride (MICRO-K) 8 mEq CpSR Take 8 mEq by mouth.    predniSONE (DELTASONE) 10 mg tablet pack Take by mouth once daily. Take 3 tablets daily on day #1-2, Take 2 tablets daily on days #3-4, Take 1 tablet daily on day #5-6, then stop taking  Dispense: 12 tablet; Refill: 0    predniSONE (DELTASONE) 20 MG tablet Take one daily for 3 days and may repeat for shortness of breath.    REPATHA SURECLICK 140 mg/mL PnIj SMARTSI pre-filled pen syringe SUB-Q Every 2 Weeks    revefenacin (YUPELRI) 175 mcg/3 mL Nebu Inhale 1 Dose into the lungs Daily.    SITagliptin (JANUVIA) 50 MG Tab Take 1 tablet (50 mg total) by mouth once daily.    sotaloL (BETAPACE) 80 MG tablet Take 80 mg by mouth 2 (two) times daily.    torsemide (DEMADEX) 20 MG Tab Take 20 mg by mouth.    vitamin E 400 UNIT capsule Take 400 Units by mouth once daily.     Family History       Problem Relation (Age of Onset)    Aneurysm Maternal Grandfather    Arthritis Mother    Asthma Sister, Son    COPD Son    Cancer Paternal Aunt    Depression Son    Diabetes Mother, Sister, Son, Maternal Uncle    Heart disease Mother, Father, Maternal Uncle, Paternal Aunt, Paternal Uncle, Paternal Grandfather     Hypertension Mother, Father, Sister, Son, Paternal Aunt, Maternal Grandmother    Mental illness Father, Paternal Aunt, Maternal Grandmother, Paternal Grandmother    Stroke Son          Tobacco Use    Smoking status: Never    Smokeless tobacco: Never   Substance and Sexual Activity    Alcohol use: Not Currently     Comment: seldom    Drug use: No    Sexual activity: Never     Review of Systems   Constitutional: Negative for diaphoresis and fever.   Cardiovascular:  Negative for chest pain, dyspnea on exertion, leg swelling, near-syncope, orthopnea, palpitations, paroxysmal nocturnal dyspnea and syncope.   Respiratory:  Negative for cough and shortness of breath.    Gastrointestinal:  Negative for abdominal pain, diarrhea, nausea and vomiting.   Neurological:  Negative for light-headedness.   Psychiatric/Behavioral:  Negative for altered mental status and substance abuse.      Objective:     Vital Signs (Most Recent):  Temp: 98.4 °F (36.9 °C) (03/26/24 1233)  Pulse: 68 (03/26/24 1233)  Resp: 18 (03/26/24 1233)  BP: (!) 176/71 (03/26/24 1233)  SpO2: 95 % (03/26/24 1233) Vital Signs (24h Range):  Temp:  [97.5 °F (36.4 °C)-98.4 °F (36.9 °C)] 98.4 °F (36.9 °C)  Pulse:  [64-75] 68  Resp:  [16-18] 18  SpO2:  [92 %-97 %] 95 %  BP: (141-176)/(60-71) 176/71       Weight: 87 kg (191 lb 12.8 oz)  Body mass index is 35.08 kg/m².    SpO2: 95 %        Physical Exam  Constitutional:       Appearance: Normal appearance.   Cardiovascular:      Rate and Rhythm: Normal rate and regular rhythm.      Pulses: Normal pulses.      Heart sounds: Normal heart sounds.   Pulmonary:      Effort: Pulmonary effort is normal.      Breath sounds: Normal breath sounds. No wheezing or rales.   Abdominal:      General: Abdomen is flat. There is no distension.      Palpations: Abdomen is soft.      Tenderness: There is no abdominal tenderness.   Musculoskeletal:      Right lower leg: No edema.      Left lower leg: No edema.   Skin:     General: Skin is  warm and dry.   Neurological:      Mental Status: She is alert and oriented to person, place, and time. Mental status is at baseline.   Psychiatric:         Mood and Affect: Mood normal.         Behavior: Behavior normal.            Significant Labs: All pertinent lab results from the last 24 hours have been reviewed.    Significant Imaging:  Reviewed            Assessment and Plan:     Pacemaker infection  #Enterococcus bacteremia  #Severe COPD, asthma  #Severe PH  73yoF with CAD s/p CABG in 2016 and PCI in 2022, HFpEF, CVA, bioprosthetic AVR, HTN, HLD, DM2, severe PH (prior left/right heart cath with PA pressures 80/30) with prior pulmonary arrest, COPD on home prn O2 (prior PFTs with severe mixed obstructive COPD/asthma picture), and complete heart block s/p dcPPM in 2020 here with persistent E faecalis bacteremia and RA PM lead vegetation. With her hx of severe PH, we discussed with HTS and she would be high risk for this procedure. She would need a swan blaise catheterization, Arminda bridging onto and off of the ventilator, and pulmonary evaluation with further lung imaging pre-procedurally. I discussed with the family and they would prefer medical management at this point.    Recommendations:  - Pursue medical management strategy  - Abx per ID  - HTS consulted and will comment on perioperative risk given her severe likely WHO group III PH  - Can consider pulm consultation for management of her severe obstructive disease    Paroxysmal atrial fibrillation  Over 500 AMS on device interrogation since PM placement in 2020  [id number] Score: 4  Would anticoagulate with an OAC prior to discharge        Thank you for your consult. I will follow-up with patient. Please contact us if you have any additional questions.    Connor M Gillies, DO  Cardiac Electrophysiology  [facility] - Cardiology Stepdown

## 2024-03-26 NOTE — PROVIDER TRANSFER
Outside Transfer Acceptance Note / Regional Referral Center    Referring facility: [facility]   Referring provider: DANISH HENDERSON JENNIFER S.  Accepting facility: [facility]  Accepting provider: REYNA LENTZ ADAM M REDDY, MADHUMATI G.  Admitting provider: Dr. Reyna Lentz  Reason for transfer: Established Provider   Transfer diagnosis: NSTEMI  Transfer specialty requested: Cardiology  Cardiology - EP and Interventional Cardiology  Transfer specialty notified: Yes  Transfer level: NUMBER 1-5: 2  Bed type requested: med-tele  Isolation status: No active isolations   Admission class or status: IP- Inpatient  IP- Inpatient      Narrative     Ms. Larose is a 73-year-old with PMHx of CAD, HFpEF, pacemaker in place, HTN, HLD, DMII, COPD who was admitted for NSTEMI and subsequently had VERONA and Enterococcus bacteremia. Cardiology and ID were consulted, she was started on IV CTX and ampicillin. DELORIS showed small mobile mass located on a right atrial lead. Proximal measurements are 1 cm x 0.5 cm. Case discussed with EP and Interventional Cardiology who will consult on the patient. Currently hemodynamically stable on 3L NC.     Objective     Vitals: Temp: 97.7 °F (36.5 °C) (03/25/24 2351)  Pulse: 71 (03/25/24 2351)  Resp: 17 (03/25/24 2351)  BP: (!) 153/68 (98) (03/25/24 2351)  SpO2: 96 % (03/25/24 2351)  Recent Labs: All pertinent labs within the past 24 hours have been reviewed.  Blood Culture:   Recent Labs   Lab 03/24/24  1146 03/24/24  1147 03/25/24  0954   LABBLOO Gram stain aer bottle: Gram positive cocci  Positive results previously called 03/25/2024  04:09 KS3  ENTEROCOCCUS FAECALIS  For susceptibility see order #[id number]  * Gram stain aer bottle: Gram positive cocci  Positive results previously called 03/25/2024  04:35 KS3  ENTEROCOCCUS FAECALIS  For susceptibility see order #[id number]  * No Growth to  "date  No Growth to date     CBC:   Recent Labs   Lab 03/24/24  0543 03/25/24  0533   WBC 14.79*  14.79* 10.13  10.13  10.13  10.13   HGB 13.1  13.1 12.6  12.6  12.6  12.6   HCT 41.6  41.6 38.8  38.8  38.8  38.8   *  136* 142*  142*  142*  142*     CMP:   Recent Labs   Lab 03/24/24  0543 03/25/24  0954   * 134*  134*   K 4.0 5.0  5.0   CL 89* 88*  88*   CO2 38* 36*  36*   * 128*  128*   BUN 47* 57*  57*   CREATININE 1.8* 1.6*  1.6*   CALCIUM 9.0 9.2  9.2   PROT 6.1 6.7   ALBUMIN 3.2* 3.3*  3.3*   BILITOT 0.9 0.5   ALKPHOS 55 58   AST 18 26   ALT 6* 9*   ANIONGAP 8 10  10     Cardiac Markers:   Recent Labs   Lab 03/25/24  0533   *     Lactic Acid: No results for input(s): "LACTATE" in the last 48 hours.  Recent imaging:   Transesophageal echo (DELORIS)    Result Date: 3/25/2024    Left Ventricle: The left ventricle is normal in size. Mildly increased   wall thickness. There is normal systolic function with a visually   estimated ejection fraction of 60 - 65%.    Right Ventricle: Right ventricular enlargement. Systolic function is   normal.    Left Atrium: There is no thrombus in the left atrial appendage.    Right Atrium: Right atrium is dilated. Multiple leads viewed in the   right atrium. There is a 1 x 0.6 cm mass attached to a lead in the right   atrium that demonstrates independent motion. The differential diagnosis   includes a vegetation verses fibrous stranding. Given the clinical   presentation of speticemia, a vegetation is favored.    Aortic Valve: There is a bioprosthetic valve in the aortic position   that is well-seated. There is mild aortic regurgitation.    Mitral Valve: Mildly thickened leaflets. There is moderate mitral   annular calcification present. Mildly calcified subvalvular apparatus.   There is mild to moderate regurgitation.    Tricuspid Valve: There is mild to moderate regurgitation.    Aorta: Calcified atherosclerosis.        Echo    Result " Date: 3/23/2024    Left Ventricle: The left ventricle is normal in size. Normal wall   thickness. Normal wall motion. Septal motion is consistent with pacing.   There is normal systolic function with a visually estimated ejection   fraction of 60 - 65%. There is normal diastolic function.    Right Ventricle: Normal right ventricular cavity size. Wall thickness   is normal. Right ventricle wall motion  is normal. Systolic function is   normal. Pacemaker lead present in the ventricle.    Right Atrium: Multiple leads present in the right atrium.    Mitral Valve: There is severe bileaflet sclerosis. There is severe   anterior mitral annular calcification present. There is normal leaflet   mobility. There is mild to moderate regurgitation.    Tricuspid Valve: There is moderate regurgitation with a centrally   directed jet.    Pulmonary Artery: There is moderate pulmonary hypertension. The   estimated pulmonary artery systolic pressure is 63 mmHg.    IVC/SVC: Elevated venous pressure at 15 mmHg.         Airway:     Vent settings:         IV access:        Peripheral IV - Single Lumen 03/22/24 8574 Right Antecubital (Active)   Site Assessment Clean;Dry;Intact;No redness;No swelling 03/25/24 2300   Extremity Assessment Distal to IV No abnormal discoloration;No redness;No swelling;No warmth 03/25/24 2300   Line Status Saline locked 03/25/24 2300   Dressing Status Clean;Dry;Intact 03/25/24 2300   Dressing Intervention Integrity maintained 03/25/24 2300     Infusions: None  Allergies:   Review of patient's allergies indicates:   Allergen Reactions    Metformin Diarrhea     Diarrhea      Corn Itching     Other reaction(s): Sneezing  Other reaction(s): Rhinorrhea    Potato starch Hives     Other reaction(s): Sneezing  Other reaction(s): Rhinorrhea    Pravastatin Other (See Comments)     Muscle pain    Statins-hmg-coa reductase inhibitors Other (See Comments)    Hydrochlorothiazide Other (See Comments)     weakness    Welchol  [colesevelam] Other (See Comments)     Weakness       NPO: No    Anticoagulation:   Anticoagulants       Ordered     Route Frequency Start Stop    03/24/24 1332  heparin (porcine)         SubQ Every 8 hours 03/25/24 0600 --             Instructions      [facility]-  Admit to Hospital Medicine  Upon patient arrival to floor, please send SecureChat to [facility] or call extension [phone] (if no answer, do NOT leave a callback number after the beep, rather please send a SecureChat to [facility]), for Hospital Medicine admit team assignment and for additional admit orders for the patient.  Do not page the attending physician associated with the patient on arrival (this physician may not be on duty at the time of arrival).  Rather, always send a SecureChat to [facility] or call [phone] to reach the triage physician for orders and team assignment.    Consult EP and Interventional Cardiology  Consult ID

## 2024-03-26 NOTE — ASSESSMENT & PLAN NOTE
Patient with pacemaker in place and previous bioprosthetic AVR found to have E. Faecalis bacteremia and mobile mass on R atrial lead on outside DELORIS, now transferred to [place]. Currently on ampicillin and ceftriaxone. Blood cultures not yet cleared. Pending cardiology eval here.     Recommendations  Continue ampicillin and ceftriaxone  Recommend device/lead removal - follow up recs

## 2024-03-26 NOTE — HPI
Ms. Larose is a 72 yo F with CAD, HFpEF, pacemaker in place, HTN, HLD, DMII, COPD who originally presented to OSH after a fall, later was found to have NSTEMI and subsequently had VERONA and Enterococcus bacteremia. Cardiology and ID were consulted, she was started on IV CTX and ampicillin. DELORIS showed small mobile mass located on a right atrial lead. Proximal measurements are 1 cm x 0.5 cm. Case discussed with EP and Interventional Cardiology who will consult on the patient. Currently hemodynamically stable on 3L NC. Denies headaches, visual changes, SOB, cough, chest pain, palpitation, nausea, vomiting, abdominal pain, diarrhea, constipation, urinary frequency or any weakness. AT C the pt was restarted on CTX and ampicillin, ID consulted.

## 2024-03-26 NOTE — HPI
Tereza Larose is a 73yoF with CAD s/p CABG in 2016 and PCI in 2022, HFpEF, CVA, bioprosthetic AVR, HTN, HLD, DM2, severe PH (prior left/right heart cath with PA pressures 80/30) with prior pulmonary arrest, COPD on home prn O2 (prior PFTs with severe mixed obstructive COPD/asthma picture), and complete heart block s/p dcPPM in 2020 who is admitted to hospital medicine with Enterococcus faecalis bacteremia. At the outside hospital, DELORIS showed a lead vegetation in the RA, measured at 1cm x 0.6cm. On device interrogation she was noted to have never had a device check since the device was placed in 2016, and she is device-dependent with underlying sinus rhythm, complete heart block, and ventricular escape less than 30 bpm (VVI 30 with 100% pacing). Otherwise, she has had lots of paroxysmal atrial fibrillation and NSVT (cumulative since device placed). Her blood cultures are positive through 3/25 without clearance. EP was consulted for evaluation for device extraction.

## 2024-03-26 NOTE — PLAN OF CARE
[facility] - Cardiology Stepdown  Initial Discharge Assessment       Primary Care Provider: Evan Sánchez MD    Admission Diagnosis: Infected pacemaker [T82.7XXA]    Admission Date: 3/26/2024  Expected Discharge Date: 3/29/2024    Transition of Care Barriers: None    Payor: HUMANA MANAGED MEDICARE / Plan: HUMANA MEDICARE PPO / Product Type: Medicare Advantage /     Extended Emergency Contact Information  Primary Emergency Contact: Pollo Larose  Address: [address] [place] [address] [place] [address] [facility]  Mobile Phone: [phone]  Relation: Spouse  Preferred language: English   needed? No  Secondary Emergency Contact: Brayden Larose  Mobile Phone: [phone]  Relation: Son    Discharge Plan A: Skilled Nursing Facility  Discharge Plan B: Home Health      [id number] - [place], [place] - [address] AT [facility] [address]  [address]  [place] [address]  Phone: [phone] Fax: [phone]    [facility] - [place] - [address]  [address]  [place] [address]  Phone: [phone] Fax: [phone]    [facility] - [facility] [address] [facility]  [address] [facility] [address]  Phone: [phone] Fax: [phone]      Initial Assessment (most recent)       Adult Discharge Assessment - 03/26/24 1334          Discharge Assessment    Assessment Type Discharge Planning Assessment     Confirmed/corrected address, phone number and insurance Yes     Confirmed Demographics Correct on Facesheet     Source of Information patient;family;health record     Communicated FAVIOLA with patient/caregiver Date not available/Unable to determine     Reason For Admission Infected pacemaker     People in Home spouse     Facility Arrived From: [facility]     Do you expect to return to your current living situation? Other (see comments)     Do you have help at home or someone to help you manage your care at home? Yes      Who are your caregiver(s) and their phone number(s)? Pollo Larose () [phone], Brayden Larose (son) [phone]     Prior to hospitilization cognitive status: Alert/Oriented     Current cognitive status: Alert/Oriented     Walking or Climbing Stairs Difficulty yes     Walking or Climbing Stairs ambulation difficulty, requires equipment     Mobility Management Rolling walker, cane     Dressing/Bathing Difficulty no     Equipment Currently Used at Home cane, straight;walker, rolling;oxygen     Readmission within 30 days? No     Patient currently being followed by outpatient case management? No     Do you currently have service(s) that help you manage your care at home? No     Do you take prescription medications? Yes     Do you have prescription coverage? Yes     Do you have any problems affording any of your prescribed medications? No     Is the patient taking medications as prescribed? yes     Who is going to help you get home at discharge? Pollo Larose () [phone]     How do you get to doctors appointments? family or friend will provide     Are you on dialysis? No     Do you take coumadin? No     Discharge Plan A Skilled Nursing Facility     Discharge Plan B Home Health     Discharge Plan discussed with: Patient;Spouse/sig other;Adult children     Name(s) and Number(s) Pollo Larose () [phone], Brayden Larose (son) [phone]     Transition of Care Barriers None        Physical Activity    On average, how many days per week do you engage in moderate to strenuous exercise (like a brisk walk)? 0 days     On average, how many minutes do you engage in exercise at this level? 0 min        Financial Resource Strain    How hard is it for you to pay for the very basics like food, housing, medical care, and heating? Not hard at all        Housing Stability    In the last 12 months, was there a time when you were not able to pay the mortgage or rent on time? No     In the last 12 months,  how many places have you lived? 1     In the last 12 months, was there a time when you did not have a steady place to sleep or slept in a shelter (including now)? No        Transportation Needs    In the past 12 months, has lack of transportation kept you from medical appointments or from getting medications? No     In the past 12 months, has lack of transportation kept you from meetings, work, or from getting things needed for daily living? No        Food Insecurity    Within the past 12 months, you worried that your food would run out before you got the money to buy more. Never true     Within the past 12 months, the food you bought just didn't last and you didn't have money to get more. Never true        Stress    Do you feel stress - tense, restless, nervous, or anxious, or unable to sleep at night because your mind is troubled all the time - these days? Not at all        Social Connections    In a typical week, how many times do you talk on the phone with family, friends, or neighbors? More than three times a week     How often do you get together with friends or relatives? More than three times a week     How often do you attend Quaker or Buddhist services? Never     Do you belong to any clubs or organizations such as Quaker groups, unions, fraternal or athletic groups, or school groups? Yes     Are you , , , , never , or living with a partner?         Alcohol Use    Q1: How often do you have a drink containing alcohol? Never     Q2: How many drinks containing alcohol do you have on a typical day when you are drinking? Patient does not drink     Q3: How often do you have six or more drinks on one occasion? Never        OTHER    Name(s) of People in Home Pollo Larose () [phone]                 Pt transferred from [facility] for higher level of care.  SW met with pt,  Pollo, and son Brayden at bedside to discuss discharge planning.  Pt lives  with her  and uses a cane and rolling walker for ambulation but is independent with ADLs.  Discussed discharge recommendation of SNF from [facility] of which pt and  they would be agreeable with preference for facility out in [place].  Discharge Plan A and Plan B have been determined by review of patient's clinical status, future medical and therapeutic needs, and coverage/benefits for post-acute care in coordination with multidisciplinary team members although final discharge disposition is pending medical clearance.  SW name and ext on whiteboard; discharge planning booklet provided.  Will continue to follow.      Latha Wang, SINCERE  [facility]  [id number]

## 2024-03-26 NOTE — SUBJECTIVE & OBJECTIVE
Past Medical History:   Diagnosis Date    Abnormal EKG 9/26/2012    Allergy     Arthritis     Asthma     Brain bleed     Colon polyps 11/6/2020    COPD (chronic obstructive pulmonary disease)     Depression     Diabetes mellitus type II     Diverticulitis     Fatty liver     GERD (gastroesophageal reflux disease)     Goiter     s/p thyroidectomy    Heart murmur     Hemiparesis affecting right side as late effect of cerebrovascular accident (CVA) 7/26/2022    Hernia of abdominal wall     History of intracranial hemorrhage 6/15/2013    History of non-ST elevation myocardial infarction (NSTEMI) 6/15/2013    Hyperlipidemia     Hypertension     Lactic acidosis 1/11/2020    Metabolic syndrome 6/14/2012    Myocardial infarction     On home oxygen therapy     states uses 2L at night or when sat < 90    Pacemaker     Positive FIT (fecal immunochemical test) 11/6/2020    Severe persistent asthma without complication 4/30/2020    Statin intolerance     Stroke 2012    left hand shaky, loss of balance       Past Surgical History:   Procedure Laterality Date    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/25/2017    Dr Griggs    ADRENALECTOMY      AORTIC VALVE REPLACEMENT  12/09/2016    ARTERIOGRAPHY OF AORTIC ROOT N/A 9/12/2022    Procedure: ARTERIOGRAM, AORTIC ROOT;  Surgeon: Marko Batres MD;  Location: [facility] CATH/EP LAB;  Service: Cardiology;  Laterality: N/A;    arthroscopy lt knee Right     BLADDER REPAIR      sling    BREAST BIOPSY Bilateral     benign    COLONOSCOPY  2004    10 year recheck    COLONOSCOPY N/A 11/6/2020    Procedure: COLONOSCOPY;  Surgeon: Yakelin Lowery MD;  Location: [facility];  Service: Endoscopy;  Laterality: N/A;    CORONARY ANGIOGRAPHY INCLUDING BYPASS GRAFTS WITH CATHETERIZATION OF LEFT HEART Left 9/12/2022    Procedure: Left heart cath including bypass graft, with left heart catheterization;  Surgeon: Marko Batres MD;  Location: [facility] CATH/EP LAB;  Service: Cardiology;  Laterality: Left;    CORONARY ARTERY  BYPASS GRAFT  12/09/2016    X3    EPIDURAL STEROID INJECTION INTO LUMBAR SPINE N/A 7/27/2021    Procedure: Injection-steroid-epidural-lumbar;  Surgeon: Valerio Jay MD;  [facility]  Service: Pain Management;  Laterality: N/A;  L5-S1     HYSTERECTOMY      INSERTION OF PACEMAKER Left 1/13/2020    Procedure: INSERTION, PACEMAKER;  Surgeon: Marko Batres MD;  Location: [facility] CATH/EP LAB;  Service: Cardiology;  Laterality: Left;    OOPHORECTOMY      RIGHT HEART CATHETERIZATION Right 9/12/2022    Procedure: INSERTION, CATHETER, RIGHT HEART;  Surgeon: Mrako Batres MD;  Location: [facility] CATH/EP LAB;  Service: Cardiology;  Laterality: Right;    THYROIDECTOMY         Review of patient's allergies indicates:   Allergen Reactions    Metformin Diarrhea     Diarrhea      Corn Itching     Other reaction(s): Sneezing  Other reaction(s): Rhinorrhea    Potato starch Hives     Other reaction(s): Sneezing  Other reaction(s): Rhinorrhea    Pravastatin Other (See Comments)     Muscle pain    Statins-hmg-coa reductase inhibitors Other (See Comments)    Hydrochlorothiazide Other (See Comments)     weakness    Welchol [colesevelam] Other (See Comments)     Weakness        Medications:  Medications Prior to Admission   Medication Sig    albuterol (PROVENTIL) 2.5 mg /3 mL (0.083 %) nebulizer solution Take 3 mLs (2.5 mg total) by nebulization every 4 (four) hours as needed for Shortness of Breath or Wheezing.    albuterol (PROVENTIL/VENTOLIN HFA) 90 mcg/actuation inhaler Inhale 1-2 puffs into the lungs every 4 (four) hours as needed for Wheezing. Inhale 2 puffs by mouth into lungs every 4 hours as needed    alcohol swabs (BD ALCOHOL SWABS) PadM Apply 1 each topically as needed.    amLODIPine (NORVASC) 10 MG tablet TAKE 1 TABLET BY MOUTH EVERY DAY FOR SYSTOLIC BLOOD PRESSURE GREATER THAN 120    arformoteroL (BROVANA) 15 mcg/2 mL Nebu Take 2 mLs (15 mcg total) by nebulization 2 (two) times a day. Controller    aspirin (ECOTRIN) 81 MG EC  tablet Take 81 mg by mouth once daily.    bempedoic acid (NEXLETOL) 180 mg Tab Take 1 tablet by mouth once daily.    blood glucose control, low (TRUE METRIX LEVEL 1) Soln Check glucometer accuracy at least once a week    blood sugar diagnostic Strp True Metrix Test Strips test once a day    blood-glucose meter (TRUE METRIX GLUCOSE METER) Misc True Metrix Glucose Meter    budesonide (PULMICORT) 0.5 mg/2 mL nebulizer solution Take 2 mLs (0.5 mg total) by nebulization 2 (two) times daily. Controller    cholecalciferol, vitamin D3, (VITAMIN D3) 25 mcg (1,000 unit) capsule Take 1,000 Units by mouth once daily.    colchicine, gout, (COLCRYS) 0.6 mg tablet Take 1 tablet (0.6 mg total) by mouth once daily. Day 1: 1.2 mg at the first sign of flare, followed by 0.6 mg after 1 hour; maximum total dose: 1.8 mg/day on day Day 2 and thereafter: 0.6 mg once or twice daily until flare resolves    cyanocobalamin (VITAMIN B-12) 1000 MCG tablet Take 100 mcg by mouth once daily.    evolocumab (REPATHA SYRINGE) 140 mg/mL Syrg Inject 140 mg into the skin every 14 (fourteen) days.     glyBURIDE (DIABETA) 2.5 MG tablet Take 2.5 mg by mouth 2 (two) times daily.    lancets 33 gauge Misc 1 lancet by Misc.(Non-Drug; Combo Route) route once daily.    levocetirizine (XYZAL) 5 MG tablet Take 1 tablet (5 mg total) by mouth every evening.    levothyroxine (SYNTHROID) 150 MCG tablet Take 150 mcg by mouth once daily.    metoprolol succinate (TOPROL-XL) 50 MG 24 hr tablet Take 1 tablet (50 mg total) by mouth once daily.    potassium chloride (MICRO-K) 8 mEq CpSR Take 8 mEq by mouth.    predniSONE (DELTASONE) 10 mg tablet pack Take by mouth once daily. Take 3 tablets daily on day #1-2, Take 2 tablets daily on days #3-4, Take 1 tablet daily on day #5-6, then stop taking  Dispense: 12 tablet; Refill: 0    predniSONE (DELTASONE) 20 MG tablet Take one daily for 3 days and may repeat for shortness of breath.    REPATHA SURECLICK 140 mg/mL PnMiguel A SMARTSI  pre-filled pen syringe SUB-Q Every 2 Weeks    revefenacin (YUPELRI) 175 mcg/3 mL Nebu Inhale 1 Dose into the lungs Daily.    SITagliptin (JANUVIA) 50 MG Tab Take 1 tablet (50 mg total) by mouth once daily.    sotaloL (BETAPACE) 80 MG tablet Take 80 mg by mouth 2 (two) times daily.    torsemide (DEMADEX) 20 MG Tab Take 20 mg by mouth.    vitamin E 400 UNIT capsule Take 400 Units by mouth once daily.     Antibiotics (From admission, onward)      Start     Stop Route Frequency Ordered    03/26/24 0745  cefTRIAXone (ROCEPHIN) 2 g in dextrose 5 % in water (D5W) 100 mL IVPB (MB+)         -- IV Every 12 hours (non-standard times) 03/26/24 0631    03/26/24 0745  ampicillin (OMNIPEN) 2 g in sodium chloride 0.9 % 100 mL IVPB (MB+)         -- IV Every 6 hours (non-standard times) 03/26/24 0631          Antifungals (From admission, onward)      None          Antivirals (From admission, onward)      None             Immunization History   Administered Date(s) Administered    COVID-19, MRNA, LN-S, PF (Pfizer) (Purple Cap) 02/19/2021, 03/12/2021    Influenza 10/28/2009, 11/30/2012, 11/04/2013, 07/01/2018    Influenza - High Dose - PF (65 years and older) 10/23/2016, 03/08/2019, 11/29/2019    Influenza - Quadrivalent 12/02/2014    Influenza - Trivalent (ADULT) 10/28/2009    Influenza Split 10/28/2009, 11/30/2012    Pneumococcal Conjugate - 13 Valent 03/08/2019    Pneumococcal Polysaccharide - 23 Valent 09/30/2015    Tdap 05/09/2016       Family History       Problem Relation (Age of Onset)    Aneurysm Maternal Grandfather    Arthritis Mother    Asthma Sister, Son    COPD Son    Cancer Paternal Aunt    Depression Son    Diabetes Mother, Sister, Son, Maternal Uncle    Heart disease Mother, Father, Maternal Uncle, Paternal Aunt, Paternal Uncle, Paternal Grandfather    Hypertension Mother, Father, Sister, Son, Paternal Aunt, Maternal Grandmother    Mental illness Father, Paternal Aunt, Maternal Grandmother, Paternal Grandmother     Stroke Son          Social History     Socioeconomic History    Marital status:    Tobacco Use    Smoking status: Never    Smokeless tobacco: Never   Substance and Sexual Activity    Alcohol use: Not Currently     Comment: seldom    Drug use: No    Sexual activity: Never     Social Determinants of Health     Financial Resource Strain: Low Risk  (9/11/2022)    Overall Financial Resource Strain (CARDIA)     Difficulty of Paying Living Expenses: Not hard at all   Food Insecurity: No Food Insecurity (9/11/2022)    Hunger Vital Sign     Worried About Running Out of Food in the Last Year: Never true     Ran Out of Food in the Last Year: Never true   Recent Concern: Food Insecurity - Food Insecurity Present (7/26/2022)    Hunger Vital Sign     Worried About Running Out of Food in the Last Year: Sometimes true     Ran Out of Food in the Last Year: Sometimes true   Transportation Needs: No Transportation Needs (9/11/2022)    PRAPARE - Transportation     Lack of Transportation (Medical): No     Lack of Transportation (Non-Medical): No   Physical Activity: Inactive (9/11/2022)    Exercise Vital Sign     Days of Exercise per Week: 0 days     Minutes of Exercise per Session: 0 min   Stress: Stress Concern Present (9/11/2022)    [place] [employer] - Occupational Stress Questionnaire     Feeling of Stress : To some extent   Social Connections: Moderately Integrated (9/11/2022)    Social Connection and Isolation Panel [NHANES]     Frequency of Communication with Friends and Family: More than three times a week     Frequency of Social Gatherings with Friends and Family: More than three times a week     Attends Judaism Services: 1 to 4 times per year     Active Member of Clubs or Organizations: No     Attends Club or Organization Meetings: Never     Marital Status:    Recent Concern: Social Connections - Moderately Isolated (7/26/2022)    Social Connection and Isolation Panel [NHANES]      Frequency of Communication with Friends and Family: More than three times a week     Frequency of Social Gatherings with Friends and Family: Once a week     Attends Holiness Services: Never     Active Member of Clubs or Organizations: No     Attends Club or Organization Meetings: Never     Marital Status:    Housing Stability: Unknown (9/11/2022)    Housing Stability Vital Sign     Unable to Pay for Housing in the Last Year: No     Unstable Housing in the Last Year: No     Review of Systems   Constitutional:  Negative for chills and fever.   Respiratory:  Negative for cough and shortness of breath.    Cardiovascular:  Negative for chest pain.   Gastrointestinal:  Negative for abdominal pain, diarrhea, nausea and vomiting.   Genitourinary:  Negative for dysuria and hematuria.   Musculoskeletal:  Negative for arthralgias, joint swelling and myalgias.   Skin:  Negative for rash.   Neurological:  Negative for weakness and headaches.     Objective:     Vital Signs (Most Recent):  Temp: 97.7 °F (36.5 °C) (03/26/24 0757)  Pulse: 64 (03/26/24 1111)  Resp: 16 (03/26/24 0757)  BP: (!) 143/64 (03/26/24 0757)  SpO2: 97 % (03/26/24 0757) Vital Signs (24h Range):  Temp:  [97.5 °F (36.4 °C)-98.1 °F (36.7 °C)] 97.7 °F (36.5 °C)  Pulse:  [41-80] 64  Resp:  [16-18] 16  SpO2:  [92 %-100 %] 97 %  BP: ()/(49-78) 143/64     Weight: 87 kg (191 lb 12.8 oz)  Body mass index is 35.08 kg/m².    Estimated Creatinine Clearance: 51.3 mL/min (based on SCr of 1 mg/dL).     Physical Exam  Vitals reviewed.   Constitutional:       General: She is not in acute distress.     Appearance: Normal appearance. She is not ill-appearing.   HENT:      Head: Normocephalic and atraumatic.   Eyes:      Extraocular Movements: Extraocular movements intact.      Conjunctiva/sclera: Conjunctivae normal.   Cardiovascular:      Rate and Rhythm: Normal rate and regular rhythm.      Heart sounds: No murmur heard.  Pulmonary:      Effort: Pulmonary effort is  normal. No respiratory distress.      Breath sounds: Normal breath sounds. No wheezing.   Abdominal:      General: Abdomen is flat. Bowel sounds are normal.      Palpations: Abdomen is soft.      Tenderness: There is no abdominal tenderness.   Musculoskeletal:      Cervical back: Normal range of motion.   Skin:     General: Skin is warm and dry.   Neurological:      General: No focal deficit present.      Mental Status: She is alert.   Psychiatric:         Mood and Affect: Mood normal.         Behavior: Behavior normal.         Thought Content: Thought content normal.          Significant Labs: All pertinent labs within the past 24 hours have been reviewed.  Recent Lab Results  (Last 5 results in the past 24 hours)        03/26/24  0904   03/26/24  0833   03/25/24  2059   03/25/24  1619   03/25/24  1430        Albumin 2.5               ALP 68               ALT 17               Anion Gap 14               AST 36               Baso # 0.03               Basophil % 0.3               BILIRUBIN TOTAL 0.6  Comment: For infants and newborns, interpretation of results should be based  on gestational age, weight and in agreement with clinical  observations.    Premature Infant recommended reference ranges:  Up to 24 hours.............<8.0 mg/dL  Up to 48 hours............<12.0 mg/dL  3-5 days..................<15.0 mg/dL  6-29 days.................<15.0 mg/dL                 BSA         2.01       BUN 45               Calcium 9.1               Chloride 91               CO2 33               Creatinine 1.0               Differential Method Automated               E/A ratio         0.88       eGFR 59.5               Eos # 0.1               Eos % 1.0               E wave deceleration time         296.00       Glucose 154               Gran # (ANC) 6.9               Gran % 73.7               Hematocrit 41.2               Hemoglobin 13.2               Immature Grans (Abs) 0.08  Comment: Mild elevation in immature granulocytes is non  specific and   can be seen in a variety of conditions including stress response,   acute inflammation, trauma and pregnancy. Correlation with other   laboratory and clinical findings is essential.                 Immature Granulocytes 0.9               Lymph # 1.0               Lymph % 11.0               Magnesium  1.9               MCH 30.2               MCHC 32.0               MCV 94               Mono # 1.2               Mono % 13.1               MPV 11.1               Mr max kristopher         5.92       MR PISA EROA         0.05       MV mean gradient         5       MV peak gradient         13       MV Peak A Kristopher         1.15       MV Peak E Kristopher         1.01       MV VTI         58.4       nRBC 0               Phosphorus Level 2.2               Radius         0.40       Platelet Count 192               POC Glucose     267   133         POCT Glucose   160             Potassium 4.1               PROTEIN TOTAL 6.1               RBC 4.37               RDW 13.6               Sodium 138               Vn Nyquist         0.31       Vn Nyquist MS         0.31       WBC 9.37                                      Significant Imaging: I have reviewed all pertinent imaging results/findings within the past 24 hours.

## 2024-03-26 NOTE — ASSESSMENT & PLAN NOTE
Patient with acute kidney injury/acute renal failure likely due to acute tubular necrosis caused by sepsis  VERONA is currently improving. Baseline creatinine unknown - Labs reviewed- Renal function/electrolytes with Estimated Creatinine Clearance: 51.3 mL/min (based on SCr of 1 mg/dL). according to latest data. Monitor urine output and serial BMP and adjust therapy as needed. Avoid nephrotoxins and renally dose meds for GFR listed above.

## 2024-03-26 NOTE — ASSESSMENT & PLAN NOTE
73 F with hx as stated above with severe obstructive lung disease; however, has never smoked, pulmonary hypertension likely WHO group 3 given the severity of her lung diease. She was transferred here for possible lead extraction given her enterococcus bacteremia with endocarditis. Her functional status is extremely poor and is home O2. Discussed her overall prognosis and risk with the patient and family who were present in the room.    - given the severity if her pulm HTN and lung disease along with poor functional status the patient is a very pulmonary and cardia risk for any procedure. Would recommend against procedure at this time and would recommend extended antibiotic treatment as guided by ID    -if the a procedure does occur then recommend that a Gold Bar be placed prior to sedation/intubation and that cardiac anesthesia be used for the procedure, Would also recommend using nitric oxide as a bridge prior to the procedure    -would also recommend a high resolution lung CT and pulmonary consult given the severity of her obstructive lung disease and based on chart review there has not been any work up completed at this time.     - rest care per primary cardiology and EP teams along with the help of ID

## 2024-03-26 NOTE — HPI
73 F with hx of CAD s/p CABG (2022) s/p PCI to SBG/RCA, COPD/asthma, HTN, CVA and DM who presented as a transfer from an OSH after a fall, later was found to have elevated troponin and subsequently had VERONA and Enterococcus bacteremia. Cardiology and ID were consulted at the outside facility and she was started on IV CTX and ampicillin. DELORIS showed small mobile mass located on a right atrial lead. She was sent to [facility] for EP consultation for possible PPM lead extraction.     Pt also has a hx of pulmonry hypertension that was originally diagnosed on a RHC in 2022 when she had a cardiac arrest. RHC at that time RA: 8, PA 28/30/49,PCWP 12 and no reported CO/CI. She has a long standing hx of obstructive lung disease with a FEV1 FVC ratio was 57%-airflow obstruction is measured. The FEV1 measures 45% of predicted. She has been on home O2 (3L) for years. She states that she is only able to walk around the house and if she pushes herself she is able to walk approx 100 feet before becoming winded. Since her transfer she has remained hemodynamically stable on 3L NC. AT [facility] the pt was restarted on CTX and ampicillin, ID consulted. HTS was consulted to weight in on her overall risk for a procedure given her hx of pulmonary hypertension.

## 2024-03-27 PROBLEM — Z51.5 ENCOUNTER FOR PALLIATIVE CARE: Status: ACTIVE | Noted: 2024-03-27

## 2024-03-27 LAB
ALBUMIN SERPL BCP-MCNC: 2.2 G/DL (ref 3.5–5.2)
ALP SERPL-CCNC: 70 U/L (ref 55–135)
ALT SERPL W/O P-5'-P-CCNC: 18 U/L (ref 10–44)
ANION GAP SERPL CALC-SCNC: 8 MMOL/L (ref 8–16)
AST SERPL-CCNC: 31 U/L (ref 10–40)
BACTERIA BLD CULT: ABNORMAL
BASOPHILS # BLD AUTO: 0.04 K/UL (ref 0–0.2)
BASOPHILS NFR BLD: 0.4 % (ref 0–1.9)
BILIRUB SERPL-MCNC: 0.5 MG/DL (ref 0.1–1)
BUN SERPL-MCNC: 35 MG/DL (ref 8–23)
CALCIUM SERPL-MCNC: 9.6 MG/DL (ref 8.7–10.5)
CHLORIDE SERPL-SCNC: 94 MMOL/L (ref 95–110)
CO2 SERPL-SCNC: 38 MMOL/L (ref 23–29)
CREAT SERPL-MCNC: 0.9 MG/DL (ref 0.5–1.4)
DIFFERENTIAL METHOD BLD: ABNORMAL
EOSINOPHIL # BLD AUTO: 0.1 K/UL (ref 0–0.5)
EOSINOPHIL NFR BLD: 1 % (ref 0–8)
ERYTHROCYTE [DISTWIDTH] IN BLOOD BY AUTOMATED COUNT: 13.2 % (ref 11.5–14.5)
EST. GFR  (NO RACE VARIABLE): >60 ML/MIN/1.73 M^2
GLUCOSE SERPL-MCNC: 200 MG/DL (ref 70–110)
HCT VFR BLD AUTO: 40.1 % (ref 37–48.5)
HGB BLD-MCNC: 13 G/DL (ref 12–16)
IMM GRANULOCYTES # BLD AUTO: 0.12 K/UL (ref 0–0.04)
IMM GRANULOCYTES NFR BLD AUTO: 1.2 % (ref 0–0.5)
LYMPHOCYTES # BLD AUTO: 1 K/UL (ref 1–4.8)
LYMPHOCYTES NFR BLD: 9.4 % (ref 18–48)
MAGNESIUM SERPL-MCNC: 1.7 MG/DL (ref 1.6–2.6)
MCH RBC QN AUTO: 30 PG (ref 27–31)
MCHC RBC AUTO-ENTMCNC: 32.4 G/DL (ref 32–36)
MCV RBC AUTO: 92 FL (ref 82–98)
MONOCYTES # BLD AUTO: 1.1 K/UL (ref 0.3–1)
MONOCYTES NFR BLD: 11.1 % (ref 4–15)
NEUTROPHILS # BLD AUTO: 7.8 K/UL (ref 1.8–7.7)
NEUTROPHILS NFR BLD: 76.9 % (ref 38–73)
NRBC BLD-RTO: 0 /100 WBC
PHOSPHATE SERPL-MCNC: 1.5 MG/DL (ref 2.7–4.5)
PLATELET # BLD AUTO: 239 K/UL (ref 150–450)
PMV BLD AUTO: 10.6 FL (ref 9.2–12.9)
POCT GLUCOSE: 176 MG/DL (ref 70–110)
POCT GLUCOSE: 228 MG/DL (ref 70–110)
POCT GLUCOSE: 228 MG/DL (ref 70–110)
POCT GLUCOSE: 247 MG/DL (ref 70–110)
POTASSIUM SERPL-SCNC: 3.6 MMOL/L (ref 3.5–5.1)
PROT SERPL-MCNC: 6.2 G/DL (ref 6–8.4)
RBC # BLD AUTO: 4.34 M/UL (ref 4–5.4)
SODIUM SERPL-SCNC: 140 MMOL/L (ref 136–145)
WBC # BLD AUTO: 10.11 K/UL (ref 3.9–12.7)

## 2024-03-27 PROCEDURE — 99223 1ST HOSP IP/OBS HIGH 75: CPT | Mod: ,,, | Performed by: STUDENT IN AN ORGANIZED HEALTH CARE EDUCATION/TRAINING PROGRAM

## 2024-03-27 PROCEDURE — 99900035 HC TECH TIME PER 15 MIN (STAT)

## 2024-03-27 PROCEDURE — 97162 PT EVAL MOD COMPLEX 30 MIN: CPT

## 2024-03-27 PROCEDURE — 99497 ADVNCD CARE PLAN 30 MIN: CPT | Mod: 25,,, | Performed by: STUDENT IN AN ORGANIZED HEALTH CARE EDUCATION/TRAINING PROGRAM

## 2024-03-27 PROCEDURE — 36415 COLL VENOUS BLD VENIPUNCTURE: CPT

## 2024-03-27 PROCEDURE — 94760 N-INVAS EAR/PLS OXIMETRY 1: CPT

## 2024-03-27 PROCEDURE — 27000221 HC OXYGEN, UP TO 24 HOURS

## 2024-03-27 PROCEDURE — 83735 ASSAY OF MAGNESIUM: CPT

## 2024-03-27 PROCEDURE — 85025 COMPLETE CBC W/AUTO DIFF WBC: CPT

## 2024-03-27 PROCEDURE — 25000003 PHARM REV CODE 250: Performed by: STUDENT IN AN ORGANIZED HEALTH CARE EDUCATION/TRAINING PROGRAM

## 2024-03-27 PROCEDURE — 20600001 HC STEP DOWN PRIVATE ROOM

## 2024-03-27 PROCEDURE — 63600175 PHARM REV CODE 636 W HCPCS: Mod: UD

## 2024-03-27 PROCEDURE — 97535 SELF CARE MNGMENT TRAINING: CPT

## 2024-03-27 PROCEDURE — 80053 COMPREHEN METABOLIC PANEL: CPT

## 2024-03-27 PROCEDURE — 97530 THERAPEUTIC ACTIVITIES: CPT

## 2024-03-27 PROCEDURE — 25000003 PHARM REV CODE 250

## 2024-03-27 PROCEDURE — 99233 SBSQ HOSP IP/OBS HIGH 50: CPT | Mod: GC,,, | Performed by: INTERNAL MEDICINE

## 2024-03-27 PROCEDURE — 84100 ASSAY OF PHOSPHORUS: CPT

## 2024-03-27 PROCEDURE — 97165 OT EVAL LOW COMPLEX 30 MIN: CPT

## 2024-03-27 RX ORDER — TORSEMIDE 20 MG/1
20 TABLET ORAL DAILY
Status: DISCONTINUED | OUTPATIENT
Start: 2024-03-27 | End: 2024-03-28

## 2024-03-27 RX ORDER — ENOXAPARIN SODIUM 100 MG/ML
40 INJECTION SUBCUTANEOUS EVERY 24 HOURS
Status: DISCONTINUED | OUTPATIENT
Start: 2024-03-28 | End: 2024-03-29 | Stop reason: HOSPADM

## 2024-03-27 RX ADMIN — INSULIN ASPART 2 UNITS: 100 INJECTION, SOLUTION INTRAVENOUS; SUBCUTANEOUS at 01:03

## 2024-03-27 RX ADMIN — AMPICILLIN 2 G: 2 INJECTION, POWDER, FOR SOLUTION INTRAMUSCULAR; INTRAVENOUS at 03:03

## 2024-03-27 RX ADMIN — CEFTRIAXONE 2 G: 2 INJECTION, POWDER, FOR SOLUTION INTRAMUSCULAR; INTRAVENOUS at 08:03

## 2024-03-27 RX ADMIN — CEFTRIAXONE 2 G: 2 INJECTION, POWDER, FOR SOLUTION INTRAMUSCULAR; INTRAVENOUS at 09:03

## 2024-03-27 RX ADMIN — INSULIN ASPART 2 UNITS: 100 INJECTION, SOLUTION INTRAVENOUS; SUBCUTANEOUS at 06:03

## 2024-03-27 RX ADMIN — INSULIN DETEMIR 3 UNITS: 100 INJECTION, SOLUTION SUBCUTANEOUS at 01:03

## 2024-03-27 RX ADMIN — INSULIN ASPART 1 UNITS: 100 INJECTION, SOLUTION INTRAVENOUS; SUBCUTANEOUS at 08:03

## 2024-03-27 RX ADMIN — AMPICILLIN 2 G: 2 INJECTION, POWDER, FOR SOLUTION INTRAMUSCULAR; INTRAVENOUS at 08:03

## 2024-03-27 RX ADMIN — AMLODIPINE BESYLATE 10 MG: 10 TABLET ORAL at 08:03

## 2024-03-27 RX ADMIN — TORSEMIDE 20 MG: 20 TABLET ORAL at 06:03

## 2024-03-27 RX ADMIN — AMPICILLIN 2 G: 2 INJECTION, POWDER, FOR SOLUTION INTRAMUSCULAR; INTRAVENOUS at 01:03

## 2024-03-27 RX ADMIN — ALLOPURINOL 50 MG: 300 TABLET ORAL at 08:03

## 2024-03-27 NOTE — PLAN OF CARE
Problem: Adult Inpatient Plan of Care  Goal: Plan of Care Review  Outcome: Ongoing, Progressing     Problem: Skin Injury Risk Increased  Goal: Skin Health and Integrity  Outcome: Ongoing, Progressing     Problem: Adult Inpatient Plan of Care  Goal: Optimal Comfort and Wellbeing  Outcome: Ongoing, Progressing

## 2024-03-27 NOTE — HOSPITAL COURSE
Pt admitted to hospital medicine for VERONA and enterococcus bacteremia. Pt started on ceftriaxone and ampicillin. ID consulted.Blood cultures growing enterococcus faecalis.  EP and interventional cardiology consulted for pacemaker removal, however patient is not a candidate due to her severe pulmonary hypertension. PICC placed. Patient to have 6 weeks of Ampicillin 2g IV q 6 hours Ceftriaxone 2g IV q 12 hours  followed by PO antibiotic suppression. Concern for gout flare and will start treatment. On allopurinol at home. Patient transferred to [facility] to continue treatment.

## 2024-03-27 NOTE — SUBJECTIVE & OBJECTIVE
Interval History:     No plan for device extraction at this time due to patient's high surgical risk. Reports she is doing ok today, no new symptoms.      Review of Systems   Constitutional:  Negative for chills and fever.   Respiratory:  Negative for cough and shortness of breath.    Cardiovascular:  Negative for chest pain.   Gastrointestinal:  Negative for abdominal pain, constipation, diarrhea, nausea and vomiting.   Musculoskeletal:  Negative for arthralgias, back pain, joint swelling and myalgias.   Skin:  Negative for rash.   Neurological:  Negative for weakness.     Objective:     Vital Signs (Most Recent):  Temp: 97.8 °F (36.6 °C) (03/27/24 1154)  Pulse: 86 (03/27/24 1154)  Resp: 17 (03/27/24 1154)  BP: (!) 142/65 (03/27/24 1154)  SpO2: (!) 94 % (03/27/24 1154) Vital Signs (24h Range):  Temp:  [97.8 °F (36.6 °C)-99 °F (37.2 °C)] 97.8 °F (36.6 °C)  Pulse:  [65-86] 86  Resp:  [17-18] 17  SpO2:  [94 %-97 %] 94 %  BP: (135-176)/(63-74) 142/65     Weight: 87 kg (191 lb 12.8 oz)  Body mass index is 35.08 kg/m².    Estimated Creatinine Clearance: 57 mL/min (based on SCr of 0.9 mg/dL).     Physical Exam  Vitals reviewed.   Constitutional:       General: She is not in acute distress.     Appearance: Normal appearance. She is not ill-appearing.   HENT:      Head: Normocephalic and atraumatic.   Eyes:      Extraocular Movements: Extraocular movements intact.      Conjunctiva/sclera: Conjunctivae normal.   Cardiovascular:      Rate and Rhythm: Normal rate and regular rhythm.      Heart sounds: No murmur heard.  Pulmonary:      Effort: Pulmonary effort is normal. No respiratory distress.      Breath sounds: Normal breath sounds. No wheezing.   Abdominal:      General: Abdomen is flat. Bowel sounds are normal.      Palpations: Abdomen is soft.      Tenderness: There is no abdominal tenderness.   Musculoskeletal:      Cervical back: Normal range of motion.   Skin:     General: Skin is warm and dry.   Neurological:       General: No focal deficit present.      Mental Status: She is alert and oriented to person, place, and time.   Psychiatric:         Mood and Affect: Mood normal.         Behavior: Behavior normal.         Thought Content: Thought content normal.          Significant Labs: All pertinent labs within the past 24 hours have been reviewed.  Recent Lab Results  (Last 5 results in the past 24 hours)        03/27/24  1153   03/27/24  0716   03/27/24  0529   03/26/24  1345   03/26/24  1232        Unit Blood Type Code       5100  [P]                5100  [P]                5100  [P]                5100  [P]         Unit Expiration       [id number]  [P]                [id number]  [P]                [id number]  [P]                [id number]  [P]         Unit Blood Type       O POS  [P]                O POS  [P]                O POS  [P]                O POS  [P]         Albumin     2.2           ALP     70           ALT     18           Anion Gap     8           AST     31           Baso #     0.04           Basophil %     0.4           BILIRUBIN TOTAL     0.5  Comment: For infants and newborns, interpretation of results should be based  on gestational age, weight and in agreement with clinical  observations.    Premature Infant recommended reference ranges:  Up to 24 hours.............<8.0 mg/dL  Up to 48 hours............<12.0 mg/dL  3-5 days..................<15.0 mg/dL  6-29 days.................<15.0 mg/dL             BUN     35           Calcium     9.6           Chloride     94           CO2     38           CODING SYSTEM       XBTP150  [P]                [id number]  [P]                [id number]  [P]                [id number]  [P]         Creatinine     0.9           Crossmatch Interpretation       Compatible  [P]                Compatible  [P]                Compatible  [P]                Compatible  [P]         Differential Method     Automated           DISPENSE STATUS       CROSSMATCHED  [P]                 CROSSMATCHED  [P]                CROSSMATCHED  [P]                CROSSMATCHED  [P]         eGFR     >60.0           Eos #     0.1           Eos %     1.0           Glucose     200           Gran # (ANC)     7.8           Gran %     76.9           Group & Rh       O POS         Hematocrit     40.1           Hemoglobin     13.0           Immature Grans (Abs)     0.12  Comment: Mild elevation in immature granulocytes is non specific and   can be seen in a variety of conditions including stress response,   acute inflammation, trauma and pregnancy. Correlation with other   laboratory and clinical findings is essential.             Immature Granulocytes     1.2           INDIRECT WILLOW       NEG         Lymph #     1.0           Lymph %     9.4           Magnesium      1.7           MCH     30.0           MCHC     32.4           MCV     92           Mono #     1.1           Mono %     11.1           MPV     10.6           nRBC     0           Phosphorus Level     1.5           Platelet Count     239           POCT Glucose 247   176       154       Potassium     3.6           Product Code       R1728Y04  [P]                [id number]  [P]                [id number]  [P]                [id number]  [P]         PROTEIN TOTAL     6.2           RBC     4.34           RDW     13.2           Sodium     140           Specimen Outdate       03/29/2024 23:59         UNIT NUMBER       [id number]  [P]                [id number]  [P]                [id number]  [P]                [id number]  [P]         WBC     10.11                                   [P] - Preliminary Result               Significant Imaging: I have reviewed all pertinent imaging results/findings within the past 24 hours.

## 2024-03-27 NOTE — PT/OT/SLP EVAL
Physical Therapy Co-Evaluation and Co-Treatment    Patient Name:  Tereza Larose   MRN:  [id number]  Admit Date: 3/26/2024  Admitting Diagnosis:  Acute bacterial endocarditis   Length of Stay: 1 days  Recent Surgery: Procedure(s) (LRB):  EXTRACTION, ELECTRODE LEAD (Left)  ECHOCARDIOGRAM, TRANSESOPHAGEAL (N/A)      Recommendations:     Discharge Recommendations: Moderate Intensity Therapy      Discharge Equipment Recommendations: wheelchair, hospital bed, bedside commode   Barriers to discharge: Inaccessible home, Decreased caregiver support, and increased need for caregiver assistance, impaired sitting balance    Plan:     During this hospitalization, patient to be seen 3 x/week to address the identified rehab impairments via gait training, therapeutic activities, therapeutic exercises, neuromuscular re-education and progress towards the established goals.  Plan of Care Expires:  04/27/24  Plan of Care Reviewed with: patient, family    Assessment:     Tereza Larose is a 73 y.o. female admitted with a medical diagnosis of Acute bacterial endocarditis. Pt presents as transfer from [facility]. Pt found with HOB elevated agreeable to therapy session, family in room. Pt reports mod Ind for mobility and ADLs prior to initial hospitalization but son reports  assists pt with most mobility and ADLs. Pt presents today with reports of B foot and radiating R sided leg pain from hip down to toes which pt reports is new. Pt requiring increased assistance with bed mobility and sitting balance this date as pt with posterior and right lateral lean sitting EOB. Patient with ROM and significant BLE strength deficits at this time limiting pt's mobility. At this time, pt is not functioning at her baseline PLOF and is a fall risk. Patient has 5 THAI her home and is unsafe to navigate them at this time. Patient would benefit from skilled therapy services to maximize safety and independence, increase activity  tolerance, decrease fall risk, decrease caregiver burden, improve QOL, improve patient's functional mobility, and decrease risk of contractures and pressure sores. Patient currently demonstrates a need for moderate intensity therapy on a daily basis post acute secondary to a decline in functional status due to illness     DME Justification:   Patient has a mobility limitation that significantly impairs their ability to participate in one or more mobility related activities of daily living in customary locations in the home. The mobility limitation cannot be sufficiently resolved by the use of a cane or walker. The use of a manual wheelchair will greatly improve the patient's ability to participate in MRADLs. The patient will use the wheelchair on a regular basis at home. They have expressed their willingness to use a manual wheelchair in the home, and have a caregiver who is available and willing to assist with the wheelchair if needed.  Patient has a mobility limitation that significantly impairs their ability to participate in one or more mobility related activities of daily living, including toileting. This deficit can be resolved by using a bedside commode. Patient demonstrates mobility limitations that will cause them to be confined to one room at home without bathroom access for up to 30 days. Using a bedside commode will greatly improve the patient's ability to participate in MRADLs.  Patient requires a hospital bed for positioning of the body in ways that are not feasible with an ordinary bed. The patient requires special positioning for pain relief, limited mobility, and/or being unable to independently make changes in body position without the use of a hospital bed. Pillows and wedges will not be adequate for resolving these positional issues.       Problem List: weakness, impaired endurance, impaired self care skills, impaired functional mobility, impaired balance, decreased coordination, decreased upper  extremity function, decreased lower extremity function, impaired sensation, decreased safety awareness, pain, impaired cardiopulmonary response to activity, edema, decreased ROM.  Rehab Prognosis: Good; patient would benefit from acute skilled PT services to address these deficits and reach maximum level of function.      Goals:   Multidisciplinary Problems       Physical Therapy Goals          Problem: Physical Therapy    Goal Priority Disciplines Outcome Goal Variances Interventions   Physical Therapy Goal     PT, PT/OT Ongoing, Progressing     Description: Goals to be met by: 24     Patient will increase functional independence with mobility by performin. Supine to sit with Moderate Assistance  2. Sit to stand transfer with Moderate Assistance  3. Bed to chair transfer with Moderate Assistance using LRAD  4. Gait  x 25 feet with Moderate Assistance using LRAD.   5. Ascend/descend 5 stair with left Handrails Moderate Assistance using LRAD.   5. Sitting at edge of bed x10 minutes with Contact Guard Assistance  6. Stand for 5 minutes with Moderate Assistance using LRAD  7. Lower extremity exercise program x15 reps per handout, with assistance as needed                         Subjective     RN notified prior to session. Son, granddaughter and great-grandson present upon PT entrance into room. Patient agreeable to PT evaluation.    Chief Complaint: R leg pain; B foot pain  Patient/Family Comments/goals: go home  Pain/Comfort:  Pain Rating 1: other (see comments) (unrated)  Location - Side 1: Right  Location - Orientation 1: generalized  Location 1: leg (form hip down to toes)  Pain Addressed 1: Reposition, Nurse notified, Distraction    Social History:  Residence: lives with their spouse 1-story house with 5 THAI and L HR. Pt's bathroom has a WIS with built in bench and grab bars.  Support available:    Equipment Owned (not using): cane, straight  Equipment Used: rolling walker, hurrycane, oxygen,  "bath bench, grab bar  Prior level of function: Pt reports mod Ind for mobility with RW use in the home and hurrycane use in the community. She reports mod Ind with ADLs. Son reports  assist with pt with all ADLs and mobility. Pt uses 2 L NC at all times.    Patient reports they will have assistance from  upon discharge.    Objective:     Additional staff present: OT; OT for co-evaluation due to suspected patient need for two skilled therapists to appropriately assess patient's functional deficits as well as ensure patient safety, accommodate for limited activity tolerance, and provide appropriate, skilled assistance to maximize functional potential during evaluation.    Patient found HOB elevated with: peripheral IV, oxygen, telemetry, PureWick     General Precautions: Standard, Cardiac fall   Orthopedic Precautions:N/A   Braces: N/A   Body mass index is 35.08 kg/m².  Oxygen Device: Nasal Cannula 3L  Vitals: BP (!) 142/65 (BP Location: Left arm, Patient Position: Lying)   Pulse 86   Temp 97.8 °F (36.6 °C) (Tympanic)   Resp 17   Ht 5' 2" (1.575 m)   Wt 87 kg (191 lb 12.8 oz)   SpO2 (!) 94%   BMI 35.08 kg/m²       Exams:    Cognition:  Oriented X 4, Alert, Cooperative, and Confused  Command following: Follows two-step verbal commands  Communication: clear/fluent    Sensation:   Light touch sensation: Pt reported generalized numbness/tingling of B feet and R leg    Gross Motor Coordination: Not formerly tested 2/2 strength deficits    Edema/Skin Integrity: None noted; Visible skin intact, Thin, and Dry    Postural examination/scapula alignment: Rounded shoulder, Head forward, Slouched posture, and Trunk deviated right    Lower Extremity Range of Motion:  Right Lower Extremity: WFL except hip flexion AROM limited  Left Lower Extremity: WFL    Lower Extremity Strength:  Right Lower Extremity:  Deficits: hip flexion 2/5; knee extension 3/5; ankle DF 3/5 *pain reported with all motion so could be " limiting factor  Left Lower Extremity: Deficits: hip flexoin 3+/5; knee extension 3+/5; ankle DF 3+/5      Functional Mobility:    Bed Mobility:  Scooting with maximal assistance  Supine > Sit with maximal assistance and 2 persons  Sit > Supine with total assistance and 2 persons    Transfers:   Sit <> Stand Transfer: Not performed 2/2 pt safety             Balance:  Sitting Balance: 8 minutes EOB  Static: minimum assistance to maximal assistance  Pt with R lateral and posterior lean that improved with RUE support on bedrail but pt required frequent verbal and tactile cueing for orientation to midline throughout session    Outcome Measures:  AM-PAC 6 CLICK MOBILITY  Turning over in bed (including adjusting bedclothes, sheets and blankets)?: 2  Sitting down on and standing up from a chair with arms (e.g., wheelchair, bedside commode, etc.): 2  Moving from lying on back to sitting on the side of the bed?: 2  Moving to and from a bed to a chair (including a wheelchair)?: 2  Need to walk in hospital room?: 1  Climbing 3-5 steps with a railing?: 1  Basic Mobility Total Score: 10     Education:  Time provided for education, counseling and discussion of health disposition in regards to patient's current status  All questions answered within PT scope of practice and to patient's satisfaction  Pt educated on proper body mechanics, safety techniques, and energy conservation with PT facilitation and cueing throughout session    Patient left HOB elevated with all lines intact, call button in reach, bed alarm on, RN notified, and family present.      History:     Past Medical History:   Diagnosis Date    Abnormal EKG 9/26/2012    Allergy     Arthritis     Asthma     Brain bleed     Colon polyps 11/6/2020    COPD (chronic obstructive pulmonary disease)     Depression     Diabetes mellitus type II     Diverticulitis     Fatty liver     GERD (gastroesophageal reflux disease)     Goiter     s/p thyroidectomy    Heart murmur      Hemiparesis affecting right side as late effect of cerebrovascular accident (CVA) 7/26/2022    Hernia of abdominal wall     History of intracranial hemorrhage 6/15/2013    History of non-ST elevation myocardial infarction (NSTEMI) 6/15/2013    Hyperlipidemia     Hypertension     Lactic acidosis 1/11/2020    Metabolic syndrome 6/14/2012    Myocardial infarction     On home oxygen therapy     states uses 2L at night or when sat < 90    Pacemaker     Positive FIT (fecal immunochemical test) 11/6/2020    Severe persistent asthma without complication 4/30/2020    Statin intolerance     Stroke 2012    left hand shaky, loss of balance       Past Surgical History:   Procedure Laterality Date    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/25/2017    Dr Griggs    ADRENALECTOMY      AORTIC VALVE REPLACEMENT  12/09/2016    ARTERIOGRAPHY OF AORTIC ROOT N/A 9/12/2022    Procedure: ARTERIOGRAM, AORTIC ROOT;  Surgeon: Marko Batres MD;  Location: [facility] CATH/EP LAB;  Service: Cardiology;  Laterality: N/A;    arthroscopy lt knee Right     BLADDER REPAIR      sling    BREAST BIOPSY Bilateral     benign    COLONOSCOPY  2004    10 year recheck    COLONOSCOPY N/A 11/6/2020    Procedure: COLONOSCOPY;  Surgeon: Yakelin Lowery MD;  Location: [facility] ENDO;  Service: Endoscopy;  Laterality: N/A;    CORONARY ANGIOGRAPHY INCLUDING BYPASS GRAFTS WITH CATHETERIZATION OF LEFT HEART Left 9/12/2022    Procedure: Left heart cath including bypass graft, with left heart catheterization;  Surgeon: Marko Batres MD;  Location: [facility] CATH/EP LAB;  Service: Cardiology;  Laterality: Left;    CORONARY ARTERY BYPASS GRAFT  12/09/2016    X3    ECHOCARDIOGRAM,TRANSESOPHAGEAL N/A 3/25/2024    Procedure: Transesophageal echo (DELORIS) intra-procedure log documentation;  Surgeon: Paulino Resendiz MD;  Location: [facility] CATH/EP LAB;  Service: Cardiology;  Laterality: N/A;    EPIDURAL STEROID INJECTION INTO LUMBAR SPINE N/A 7/27/2021    Procedure: Injection-steroid-epidural-lumbar;   Surgeon: Valerio Jay MD;  Location: [facility] OR;  Service: Pain Management;  Laterality: N/A;  L5-S1     HYSTERECTOMY      INSERTION OF PACEMAKER Left 1/13/2020    Procedure: INSERTION, PACEMAKER;  Surgeon: Marko Batres MD;  Location: [facility] CATH/EP LAB;  Service: Cardiology;  Laterality: Left;    OOPHORECTOMY      RIGHT HEART CATHETERIZATION Right 9/12/2022    Procedure: INSERTION, CATHETER, RIGHT HEART;  Surgeon: Marko Batres MD;  Location: [facility] CATH/EP LAB;  Service: Cardiology;  Laterality: Right;    THYROIDECTOMY         Family History   Problem Relation Age of Onset    Arthritis Mother     Diabetes Mother     Heart disease Mother     Hypertension Mother     Hypertension Father     Heart disease Father     Mental illness Father     Asthma Sister     Diabetes Sister     Hypertension Sister     Asthma Son     COPD Son     Depression Son     Diabetes Son     Hypertension Son     Stroke Son     Diabetes Maternal Uncle     Heart disease Maternal Uncle     Mental illness Paternal Aunt     Cancer Paternal Aunt     Heart disease Paternal Aunt     Hypertension Paternal Aunt     Heart disease Paternal Uncle     Hypertension Maternal Grandmother     Mental illness Maternal Grandmother     Aneurysm Maternal Grandfather     Mental illness Paternal Grandmother     Heart disease Paternal Grandfather     Melanoma Neg Hx     Psoriasis Neg Hx     Lupus Neg Hx     Eczema Neg Hx        Social History     Socioeconomic History    Marital status:    Tobacco Use    Smoking status: Never    Smokeless tobacco: Never   Substance and Sexual Activity    Alcohol use: Not Currently     Comment: seldom    Drug use: No    Sexual activity: Never     Social Determinants of Health     Financial Resource Strain: Low Risk  (3/26/2024)    Overall Financial Resource Strain (CARDIA)     Difficulty of Paying Living Expenses: Not hard at all   Food Insecurity: No Food Insecurity (3/26/2024)    Hunger Vital Sign     Worried About Running Out of  Food in the Last Year: Never true     Ran Out of Food in the Last Year: Never true   Transportation Needs: No Transportation Needs (3/26/2024)    PRAPARE - Transportation     Lack of Transportation (Medical): No     Lack of Transportation (Non-Medical): No   Physical Activity: Inactive (3/26/2024)    Exercise Vital Sign     Days of Exercise per Week: 0 days     Minutes of Exercise per Session: 0 min   Stress: No Stress Concern Present (3/26/2024)    [employer] - Occupational Stress Questionnaire     Feeling of Stress : Not at all   Social Connections: Moderately Integrated (3/26/2024)    Social Connection and Isolation Panel [NHANES]     Frequency of Communication with Friends and Family: More than three times a week     Frequency of Social Gatherings with Friends and Family: More than three times a week     Attends [employer] Services: Never     Active Member of Clubs or Organizations: Yes     Marital Status:    Housing Stability: Low Risk  (3/26/2024)    Housing Stability Vital Sign     Unable to Pay for Housing in the Last Year: No     Number of Places Lived in the Last Year: 1     Unstable Housing in the Last Year: No       Time Tracking:     PT Received On: 03/27/24  PT Start Time: 1355     PT Stop Time: 1415  PT Total Time (min): 20 min     Billable Minutes: Evaluation 10 minutes and Therapeutic Activity 10 minutes    3/27/2024

## 2024-03-27 NOTE — PLAN OF CARE
03/27/24 1459   Post-Acute Status   Post-Acute Authorization Placement   Post-Acute Placement Status Pending medical clearance/testing     SW confirmed with Wences[facility] in [facility] that they are still interested in accepting pt and discussed that they will need to resubmit for auth when pt is closer to discharge.  Will continue to follow.      Latha Wang LMSW  [facility]  [id number]

## 2024-03-27 NOTE — ASSESSMENT & PLAN NOTE
Patient with pacemaker in place and previous bioprosthetic AVR found to have E. Faecalis bacteremia and mobile mass on R atrial lead on outside DELORIS, now transferred [place] [facility] [place]. Currently on ampicillin and ceftriaxone. No plan for device extraction due to her high surgical risk. Blood cultures not yet cleared. No new joint or back pain today.     Recommendations  Continue ampicillin and ceftriaxone  Repeat blood cultures until cleared  Palliative consult  Will need chronic suppressive abx after IV therapy is complete, if device/lead is not removed

## 2024-03-27 NOTE — PROGRESS NOTES
[facility] - Cardiology [facility] Medicine  Progress Note    Patient Name: Tereza Larose  MRN: [id number]  Patient Class: IP- Inpatient   Admission Date: 3/26/2024  Length of Stay: 1 days  Attending Physician: Christopher Williamson DO  Primary Care Provider: Evan Sánchez MD        Subjective:     Principal Problem:Acute bacterial endocarditis        HPI:  Ms. Larose is a 72 yo F with CAD, HFpEF, pacemaker in place, HTN, HLD, DMII, COPD who originally presented to OSH after a fall, later was found to have NSTEMI and subsequently had VERONA and Enterococcus bacteremia. Cardiology and ID were consulted, she was started on IV CTX and ampicillin. DELORIS showed small mobile mass located on a right atrial lead. Proximal measurements are 1 cm x 0.5 cm. Case discussed with EP and Interventional Cardiology who will consult on the patient. Currently hemodynamically stable on 3L NC. Denies headaches, visual changes, SOB, cough, chest pain, palpitation, nausea, vomiting, abdominal pain, diarrhea, constipation, urinary frequency or any weakness. [place] [facility] [place] on CTX and ampicillin, ID consulted.       Overview/Hospital Course:  Pt admitted to hospital medicine for VERONA and enterococcus bacteremia. Pt started on ceftriaxone and ampicillin. ID consulted.Blood cultures growing enterococcus faecalis.  EP and interventional cardiology consulted for pacemaker removal, however patient is not a candidate due to her severe portal hypertension. Pt to be transfer back to [facility].     Interval History: NAOE, pt not a candidate for pacemaker removal given her severe pulmonary hypertension. Pt able to tolerate diet well, denies chest pain or SOB.     Review of Systems   Constitutional:  Negative for appetite change, chills, diaphoresis, fatigue and fever.   HENT:  Negative for congestion.    Respiratory:  Negative for cough, shortness of breath and wheezing.    Cardiovascular:  Negative for chest pain, palpitations and  leg swelling.   Gastrointestinal:  Negative for abdominal pain, blood in stool, constipation, diarrhea, nausea and vomiting.   Genitourinary:  Negative for dysuria, flank pain and frequency.   Musculoskeletal:  Negative for arthralgias, joint swelling and myalgias.   Skin:  Negative for pallor and rash.   Neurological:  Negative for dizziness, light-headedness and headaches.   Psychiatric/Behavioral:  Negative for agitation. The patient is not nervous/anxious.      Objective:     Vital Signs (Most Recent):  Temp: 97.8 °F (36.6 °C) (03/27/24 1154)  Pulse: 86 (03/27/24 1154)  Resp: 17 (03/27/24 1154)  BP: (!) 142/65 (03/27/24 1154)  SpO2: (!) 94 % (03/27/24 1154) Vital Signs (24h Range):  Temp:  [97.8 °F (36.6 °C)-99 °F (37.2 °C)] 97.8 °F (36.6 °C)  Pulse:  [69-86] 86  Resp:  [17-18] 17  SpO2:  [94 %-97 %] 94 %  BP: (135-153)/(63-74) 142/65     Weight: 87 kg (191 lb 12.8 oz)  Body mass index is 35.08 kg/m².    Intake/Output Summary (Last 24 hours) at 3/27/2024 1312  Last data filed at 3/27/2024 0512  Gross per 24 hour   Intake --   Output 1250 ml   Net -1250 ml         Physical Exam  Constitutional:       General: She is not in acute distress.  HENT:      Head: Normocephalic and atraumatic.   Eyes:      Extraocular Movements: Extraocular movements intact.      Pupils: Pupils are equal, round, and reactive to light.   Cardiovascular:      Rate and Rhythm: Normal rate and regular rhythm.      Heart sounds: No murmur heard.  Pulmonary:      Effort: Pulmonary effort is normal. No respiratory distress.      Breath sounds: No wheezing or rales.      Comments: On 3L NC   Abdominal:      General: Abdomen is flat. Bowel sounds are normal. There is no distension.      Palpations: Abdomen is soft.      Tenderness: There is no abdominal tenderness.   Musculoskeletal:         General: No swelling or tenderness. Normal range of motion.      Cervical back: Normal range of motion.      Right lower leg: No edema.      Left lower leg:  No edema.   Lymphadenopathy:      Cervical: No cervical adenopathy.   Skin:     General: Skin is warm and dry.      Coloration: Skin is not jaundiced.      Findings: No bruising.   Neurological:      General: No focal deficit present.      Mental Status: She is alert and oriented to person, place, and time.             Significant Labs: All pertinent labs within the past 24 hours have been reviewed.    Significant Imaging: I have reviewed all pertinent imaging results/findings within the past 24 hours.    Assessment/Plan:      * Acute bacterial endocarditis  Pt presenting from OSH for higher level of care after she was found to have enterococcus faecalis bacteremia. DELORIS remarkable for Right Atrium: Right atrium is dilated. Multiple leads viewed in the right atrium. There is a 1 x 0.6 cm mass attached to a lead in the right atrium that demonstrates independent motion. Concerning for possible vegetations.     Plan  - ID consulted, appreciate recs  - Continue ceftriaxone and ampicillin   - EP and Interventional cardiology, planning intervention tomorrow 03/27/24  - Follow up CBC  - Follow up cultures    Pacemaker    Please see Acute bacterial endocarditis     Hypothyroidism  TSH elevated to 40.97.     Will adjust levothyroxine       VERONA (acute kidney injury)  Patient with acute kidney injury/acute renal failure likely due to acute tubular necrosis caused by sepsis  VERONA is currently improving. Baseline creatinine unknown - Labs reviewed- Renal function/electrolytes with Estimated Creatinine Clearance: 51.3 mL/min (based on SCr of 1 mg/dL). according to latest data. Monitor urine output and serial BMP and adjust therapy as needed. Avoid nephrotoxins and renally dose meds for GFR listed above.    COPD (chronic obstructive pulmonary disease)  Patient's COPD is controlled currently. Patient is currently off COPD Pathway. Justice p.r.n. On 3 L NC on baseline with SpO2 96%. We will monitor respiratory status     Severe  pulmonary hypertension  Pt presenting from OSH for enterococcus bacteremia with endocarditis. Echo showing Pulmonary Artery: There is moderate pulmonary hypertension. The estimated pulmonary artery systolic pressure is 63 mmHg. This makes the patient not a candidate for pacemaker removal.  Palliative care consulted for GOC discussion.     Acute on chronic combined systolic and diastolic CHF (congestive heart failure)  Patient is identified as having Combined Systolic and Diastolic heart failure that is Chronic. CHF is currently controlled. Latest ECHO performed and demonstrates- Results for orders placed during the hospital encounter of 03/22/24    Echo    Interpretation Summary    Left Ventricle: The left ventricle is normal in size. Normal wall thickness. Normal wall motion. Septal motion is consistent with pacing. There is normal systolic function with a visually estimated ejection fraction of 60 - 65%. There is normal diastolic function.    Right Ventricle: Normal right ventricular cavity size. Wall thickness is normal. Right ventricle wall motion  is normal. Systolic function is normal. Pacemaker lead present in the ventricle.    Right Atrium: Multiple leads present in the right atrium.    Mitral Valve: There is severe bileaflet sclerosis. There is severe anterior mitral annular calcification present. There is normal leaflet mobility. There is mild to moderate regurgitation.    Tricuspid Valve: There is moderate regurgitation with a centrally directed jet.    Pulmonary Artery: There is moderate pulmonary hypertension. The estimated pulmonary artery systolic pressure is 63 mmHg.    IVC/SVC: Elevated venous pressure at 15 mmHg.  . Continue Beta Blocker and monitor clinical status closely. Monitor on telemetry. Patient is off CHF pathway.  Monitor strict Is&Os and daily weights.  Place on fluid restriction of 2 L. Cardiology has been consulted. Continue to stress to patient importance of self efficacy and   on diet for CHF. Last BNP reviewed- and noted below   Recent Labs   Lab 03/25/24  0533   *       T2DM (type 2 diabetes mellitus)  Patient's FSGs are controlled on current medication regimen.  Last A1c reviewed-   Lab Results   Component Value Date    HGBA1C 7.9 (H) 03/22/2024     Most recent fingerstick glucose reviewed-   Recent Labs   Lab 03/26/24  0833   POCTGLUCOSE 160*     Current correctional scale  Low  Maintain anti-hyperglycemic dose as follows-   Antihyperglycemics (From admission, onward)      Start     Stop Route Frequency Ordered    03/26/24 0809  insulin aspart U-100 pen 0-5 Units         -- SubQ Before meals & nightly PRN 03/26/24 0710          Hold Oral hypoglycemics while patient is in the hospital.    HTN (hypertension)  Chronic, controlled. Latest blood pressure and vitals reviewed-     Temp:  [97.5 °F (36.4 °C)-98.1 °F (36.7 °C)]   Pulse:  [41-80]   Resp:  [16-18]   BP: ()/(49-78)   SpO2:  [92 %-100 %] .   Home meds for hypertension were reviewed and noted below.   Hypertension Medications               amLODIPine (NORVASC) 10 MG tablet TAKE 1 TABLET BY MOUTH EVERY DAY FOR SYSTOLIC BLOOD PRESSURE GREATER THAN 120    metoprolol succinate (TOPROL-XL) 50 MG 24 hr tablet Take 1 tablet (50 mg total) by mouth once daily.    sotaloL (BETAPACE) 80 MG tablet Take 80 mg by mouth 2 (two) times daily.    torsemide (DEMADEX) 20 MG Tab Take 20 mg by mouth.            While in the hospital, will manage blood pressure as follows; Continue home antihypertensive regimen continue amlodipine, hold metoprolol.     Will utilize p.r.n. blood pressure medication only if patient's blood pressure greater than 180/110 and she develops symptoms such as worsening chest pain or shortness of breath.    NSTEMI (non-ST elevated myocardial infarction)        CAD (coronary artery disease)  Patient with known CAD s/p CABG, which is uncontrolled Will continue  heparin gtt and Statin and monitor for S/Sx of angina/ACS.  Continue to monitor on telemetry.   Troponins trending down.      VTE Risk Mitigation (From admission, onward)           Ordered     IP VTE HIGH RISK PATIENT  Once         03/26/24 0616     Place sequential compression device  Until discontinued         03/26/24 0616                    Discharge Planning   FAVIOLA: 4/1/2024     Code Status: Full Code   Is the patient medically ready for discharge?: No    Reason for patient still in hospital (select all that apply): Pending disposition  Discharge Plan A: Skilled Nursing Facility                  Willie Davey MD  Department of Hospital Medicine   [facility] - Cardiology Stepdown

## 2024-03-27 NOTE — SUBJECTIVE & OBJECTIVE
Interval History: Long supportive conversation with Mrs. Larose alone. Please see assessment/recommendations for details.    Past Medical History:   Diagnosis Date    Abnormal EKG 9/26/2012    Allergy     Arthritis     Asthma     Brain bleed     Colon polyps 11/6/2020    COPD (chronic obstructive pulmonary disease)     Depression     Diabetes mellitus type II     Diverticulitis     Fatty liver     GERD (gastroesophageal reflux disease)     Goiter     s/p thyroidectomy    Heart murmur     Hemiparesis affecting right side as late effect of cerebrovascular accident (CVA) 7/26/2022    Hernia of abdominal wall     History of intracranial hemorrhage 6/15/2013    History of non-ST elevation myocardial infarction (NSTEMI) 6/15/2013    Hyperlipidemia     Hypertension     Lactic acidosis 1/11/2020    Metabolic syndrome 6/14/2012    Myocardial infarction     On home oxygen therapy     states uses 2L at night or when sat < 90    Pacemaker     Positive FIT (fecal immunochemical test) 11/6/2020    Severe persistent asthma without complication 4/30/2020    Statin intolerance     Stroke 2012    left hand shaky, loss of balance       Past Surgical History:   Procedure Laterality Date    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/25/2017    Dr Griggs    ADRENALECTOMY      AORTIC VALVE REPLACEMENT  12/09/2016    ARTERIOGRAPHY OF AORTIC ROOT N/A 9/12/2022    Procedure: ARTERIOGRAM, AORTIC ROOT;  Surgeon: Marko Batres MD;  [facility] LAB;  Service: Cardiology;  Laterality: N/A;    arthroscopy lt knee Right     BLADDER REPAIR      sling    BREAST BIOPSY Bilateral     benign    COLONOSCOPY  2004    10 year recheck    COLONOSCOPY N/A 11/6/2020    Procedure: COLONOSCOPY;  Surgeon: Yakelin Lowery MD;  Location: [facility];  Service: Endoscopy;  Laterality: N/A;    CORONARY ANGIOGRAPHY INCLUDING BYPASS GRAFTS WITH CATHETERIZATION OF LEFT HEART Left 9/12/2022    Procedure: Left heart cath including bypass graft, with left heart  catheterization;  Surgeon: Marko Batres MD;  Location: [place] CATH/EP LAB;  Service: Cardiology;  Laterality: Left;    CORONARY ARTERY BYPASS GRAFT  12/09/2016    X3    ECHOCARDIOGRAM,TRANSESOPHAGEAL N/A 3/25/2024    Procedure: Transesophageal echo (DELORIS) intra-procedure log documentation;  Surgeon: Paulino Resendiz MD;  Location: [place] CATH/EP LAB;  Service: Cardiology;  Laterality: N/A;    EPIDURAL STEROID INJECTION INTO LUMBAR SPINE N/A 7/27/2021    Procedure: Injection-steroid-epidural-lumbar;  Surgeon: Valerio Jay MD;  Location: [facility] OR;  Service: Pain Management;  Laterality: N/A;  L5-S1     HYSTERECTOMY      INSERTION OF PACEMAKER Left 1/13/2020    Procedure: INSERTION, PACEMAKER;  Surgeon: Marko Batres MD;  Location: [place] CATH/EP LAB;  Service: Cardiology;  Laterality: Left;    OOPHORECTOMY      RIGHT HEART CATHETERIZATION Right 9/12/2022    Procedure: INSERTION, CATHETER, RIGHT HEART;  Surgeon: Marko Batres MD;  Location: [place] CATH/EP LAB;  Service: Cardiology;  Laterality: Right;    THYROIDECTOMY         Review of patient's allergies indicates:   Allergen Reactions    Metformin Diarrhea     Diarrhea      Corn Itching     Other reaction(s): Sneezing  Other reaction(s): Rhinorrhea    Potato starch Hives     Other reaction(s): Sneezing  Other reaction(s): Rhinorrhea    Pravastatin Other (See Comments)     Muscle pain    Statins-hmg-coa reductase inhibitors Other (See Comments)    Hydrochlorothiazide Other (See Comments)     weakness    Welchol [colesevelam] Other (See Comments)     Weakness        Medications:  Continuous Infusions:  Scheduled Meds:   allopurinoL  50 mg Oral Daily    amLODIPine  10 mg Oral Daily    ampicillin IV (PEDS and ADULTS)  2 g Intravenous Q6H    cefTRIAXone (Rocephin) IV (PEDS and ADULTS)  2 g Intravenous Q12H    insulin detemir U-100  3 Units Subcutaneous Daily    levothyroxine  150 mcg Oral Before breakfast     PRN Meds:0.9%  NaCl infusion (for blood administration), dextrose  10%, dextrose 10%, dextrose 10%, dextrose 10%, glucagon (human recombinant), glucose, glucose, insulin aspart U-100, naloxone, sodium chloride 0.9%    Family History       Problem Relation (Age of Onset)    Aneurysm Maternal Grandfather    Arthritis Mother    Asthma Sister, Son    COPD Son    Cancer Paternal Aunt    Depression Son    Diabetes Mother, Sister, Son, Maternal Uncle    Heart disease Mother, Father, Maternal Uncle, Paternal Aunt, Paternal Uncle, Paternal Grandfather    Hypertension Mother, Father, Sister, Son, Paternal Aunt, Maternal Grandmother    Mental illness Father, Paternal Aunt, Maternal Grandmother, Paternal Grandmother    Stroke Son          Tobacco Use    Smoking status: Never    Smokeless tobacco: Never   Substance and Sexual Activity    Alcohol use: Not Currently     Comment: seldom    Drug use: No    Sexual activity: Never       Review of Systems   Constitutional:  Positive for activity change.   Respiratory:  Positive for shortness of breath.      Objective:     Vital Signs (Most Recent):  Temp: 97.8 °F (36.6 °C) (03/27/24 1154)  Pulse: 86 (03/27/24 1154)  Resp: 17 (03/27/24 1154)  BP: (!) 142/65 (03/27/24 1154)  SpO2: (!) 94 % (03/27/24 1154) Vital Signs (24h Range):  Temp:  [97.8 °F (36.6 °C)-99 °F (37.2 °C)] 97.8 °F (36.6 °C)  Pulse:  [69-86] 86  Resp:  [17-18] 17  SpO2:  [94 %-97 %] 94 %  BP: (135-153)/(63-74) 142/65     Weight: 87 kg (191 lb 12.8 oz)  Body mass index is 35.08 kg/m².       Physical Exam  Constitutional:       General: She is not in acute distress.     Appearance: She is ill-appearing.   HENT:      Head: Normocephalic and atraumatic.      Right Ear: External ear normal.      Left Ear: External ear normal.      Nose: Nose normal.      Mouth/Throat:      Mouth: Mucous membranes are moist.   Eyes:      Extraocular Movements: Extraocular movements intact.      Conjunctiva/sclera: Conjunctivae normal.   Cardiovascular:      Rate and Rhythm: Normal rate.   Pulmonary:       Effort: Pulmonary effort is normal.      Comments: Mild wheezing, on supplemental oxygen  Abdominal:      General: Bowel sounds are normal.      Palpations: Abdomen is soft.   Musculoskeletal:         General: Swelling present.   Lymphadenopathy:      Cervical: No cervical adenopathy.   Skin:     General: Skin is dry.      Coloration: Skin is pale.      Findings: Bruising present.   Neurological:      Mental Status: She is alert and oriented to person, place, and time. Mental status is at baseline.   Psychiatric:         Mood and Affect: Mood normal.         Behavior: Behavior normal.            Review of Symptoms      Symptom Assessment (ESAS 0-10 Scale)  Pain:  0  Dyspnea:  0  Anxiety:  0  Nausea:  0  Depression:  0  Anorexia:  0  Fatigue:  0  Insomnia:  0  Restlessness:  0  Agitation:  0         Living Arrangements:  Lives with spouse    Psychosocial/Cultural:   See Palliative Psychosocial Note: No   to  Pollo, has one son Brayden.  is dialysis dependent (M/W/F). 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 Her father was in this industry which is how she got into this career as well.  **Primary  to Follow**  Palliative Care  Consult: No    Spiritual:  F - Maureen and Belief:  Mormon  I - Importance:  Important  C - Community:  Hasn't attended [facility] recently  A - Address in Care:  Consider engaging  support        Advance Care Planning   Advance Directives:   Living Will: No    LaPOST: No    Do Not Resuscitate Status: No    Medical Power of : No      Decision Making:  Patient answered questions  Goals of Care: The patient endorses that what is most important right now is to focus on improvement in condition but with limits to invasive therapies    Accordingly, we have decided that the best plan to meet the patient's goals includes continuing with treatment         Significant Labs: CBC:   Recent Labs   Lab 03/26/24  0904  03/27/24  0529   WBC 9.37 10.11   HGB 13.2 13.0   HCT 41.2 40.1    239     CMP:   Recent Labs   Lab 03/26/24  0904 03/27/24  0529    140   K 4.1 3.6   CL 91* 94*   CO2 33* 38*   * 200*   BUN 45* 35*   CREATININE 1.0 0.9   CALCIUM 9.1 9.6   PROT 6.1 6.2   ALBUMIN 2.5* 2.2*   BILITOT 0.6 0.5   ALKPHOS 68 70   AST 36 31   ALT 17 18   ANIONGAP 14 8     CBC:   Recent Labs   Lab 03/27/24  0529   WBC 10.11   HGB 13.0   HCT 40.1   MCV 92        BMP:  Recent Labs   Lab 03/27/24  0529   *      K 3.6   CL 94*   CO2 38*   BUN 35*   CREATININE 0.9   CALCIUM 9.6   MG 1.7     LFT:  Lab Results   Component Value Date    AST 31 03/27/2024    ALKPHOS 70 03/27/2024    BILITOT 0.5 03/27/2024     Albumin:   Albumin   Date Value Ref Range Status   03/27/2024 2.2 (L) 3.5 - 5.2 g/dL Final   10/21/2016 4.1 3.6 - 5.1 g/dL Final     Comment:     @ Test Performed By:  [employer] [facility] Jaleesa Blair M.D., FCKELLY,   [id number] [place] [id number]  [facility]  [id number]       Protein:   Total Protein   Date Value Ref Range Status   03/27/2024 6.2 6.0 - 8.4 g/dL Final     Lactic acid:   Lab Results   Component Value Date    LACTATE 2.3 (HH) 09/10/2022    LACTATE 2.3 () 09/10/2022       Significant Imaging: I have reviewed all pertinent imaging results/findings within the past 24 hours.

## 2024-03-27 NOTE — CONSULTS
[facility] - Cardiology Stepdown  Palliative Medicine  Consult Note    Patient Name: Tereza Larose  MRN: [id number]  Admission Date: 3/26/2024  Hospital Length of Stay: 1 days  Code Status: Full Code   Attending Provider: Christopher Williamson DO  Consulting Provider: Lilibeth Henderson MD  Primary Care Physician: Evan Sánchez MD  Principal Problem:Acute bacterial endocarditis    Patient information was obtained from patient and primary team.      Inpatient consult to Palliative Care  Consult performed by: Lilibeth Henderson MD  Consult ordered by: Willie Pearl MD  Reason for consult: goals of care        Assessment/Plan:     Palliative Care  Encounter for palliative care  Tereza Larose is a 73-year-old woman with a history of CAD s/p CABG, s/p pacemaker, pulmonary HTN, chronic pulmonary disease dependent on supplemental oxygen via nasal cannula who was admitted to OSH for Enterococcus bacteremia, NSTEMI and VERONA, found to have a mobile mass attached to the right atrial lead that was confirmed on transesophageal echocardiogram and transferred to [facility] for evaluation by interventional Cardiology for source control. Unfortunately device extraction will cause much more harm than benefit and patient has agreed to focus on medical management (IV antibiotics), knowing that this will not provide cure from infection. Palliative and Supportive Care was consulted to explore goals of care.    Advance Care Planning  Goals of Care  - Code status: Full code, recommended DNAR  - Next of kin:  Pollo Larose [phone]  - ACP documents: none, offered to complete but expressed desire to not complete living will or HCPOA  - Patient has decision making capacity  - Prior functional status: mostly bed bound, PPS 30-40%  - Prognosis: poor  - Patient's understanding of prognosis: limited, would benefit from continued counseling  - Goals: improvement in condition with limitations of invasive/harmful  "interventions  - Plans: continue full supportive care    Goals of Care Conversation:  - 3/27/24: I met Mrs. Larose who was eating her lunch. Her  is not here today because he is getting dialysis M/W/F. They are from [facility] and initially she told me that they've been together for 75 years. Gently reminded her that she is only 73-years-old. They have one son together (Brayden) and her  has a grandchild from a prior marriage/relationship, who she endearingly considers her own grandchild (named Marko). 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 Her favorite memory involves bringing Marko to the boat and watching him have fun. She said she would do this career all over again if she could. She had to stop because she suffered a stroke, which is something she doesn't like to think often about. She shared that yesterday her friend  from a stroke, and shared about another loved one who also  from a stroke.  - She confirms for me that she has never completed a living will or power of . Her  and herself feel very strongly against completing it. She feels that when it's her time to die, God will take her (hopefully to heaven). She shared about a near death experience (she couldn't recall when) and she came back to life after receiving CPR. Later in our conversation, she shares that she doesn't think she would be able to do well if she got so sick to the point where she got CPR again. Praised her for her wisdom and asked her how she came to this conclusion. She shrugged and shared that it seemed logical that CPR wouldn't be able to help her the way it did when she was younger.  - She shared her understanding about her condition, and specifically told me that she has a "blood disorder behind my heart and lungs" for which there is no cure. The plan is just to complete IV antibiotics in hopes that this may help eliminate the infection. " I helped clarify that rather than a blood disorder, the cardiologists found a clot/mass attached to a lead in her heart, for which they hoped to be able to remove but aren't able to do so because this may cause much more harm and shorten her time than intended. In hopes to protect her flor time, they have recommended against such interventions and to utilize whatever tools we have to help her. Unfortunately, her heart and lungs will continue to progress and decline. I asked her about her end of life preferences. She tells me that she doesn't believe we get a choice when we get to our end of life because it's all in God's hands. Aligned with her, and reframed that indeed, when God is ready to take her to [facility] beds at the [facility] and stops her heart and lungs, it sounded like she would be accepting of this. She said yes, that in the past, she was very scared of death, but now is more accepting of it. She confirmed that she didn't want her dying process to be artificially prolonged. I asked her if God were to declare that the medications we use will no longer buy her meaningful time on earth, would she want to have a natural, peaceful death. She said yes. I told her that this is consistent with a Do Not Resuscitate code status, which means we would still continue these important therapies in hopes to buy her more time, but that when we face the limitations of medical interventions/therapies, that we can protect her from prolonged suffering/dying by allowing her a natural death. She nodded and also expressed a little bit of hesitancy related to this. I encouraged her to think about this and to talk about it with her . I asked her if it was ok that I shared this conversation with her medical team and she was agreeable. She shared that she hopes to return home sooner than later (told me she's going home today) so that she can be closer to her ill .         Spoke with Dr. Williamson about above.    Thank you for your consult.  I will sign off. Please contact us if you have any additional questions.    Subjective:     HPI:   Tereza Larose is a 73-year-old woman with a history of CAD s/p CABG, s/p pacemaker, pulmonary HTN, chronic pulmonary disease dependent on supplemental oxygen via nasal cannula who was admitted to OSH for Enterococcus bacteremia, NSTEMI and VERONA, found to have a mobile mass attached to the right atrial lead that was confirmed on transesophageal echocardiogram and transferred to [facility] for evaluation by interventional Cardiology for source control. Unfortunately device extraction will cause much more harm than benefit and patient has agreed to focus on medical management (IV antibiotics), knowing that this will not provide cure from infection. Palliative and Supportive Care was consulted to explore goals of care.    Hospital Course:  No notes on file    Interval History: Long supportive conversation with Mrs. Larose alone. Please see assessment/recommendations for details.    Past Medical History:   Diagnosis Date    Abnormal EKG 9/26/2012    Allergy     Arthritis     Asthma     Brain bleed     Colon polyps 11/6/2020    COPD (chronic obstructive pulmonary disease)     Depression     Diabetes mellitus type II     Diverticulitis     Fatty liver     GERD (gastroesophageal reflux disease)     Goiter     s/p thyroidectomy    Heart murmur     Hemiparesis affecting right side as late effect of cerebrovascular accident (CVA) 7/26/2022    Hernia of abdominal wall     History of intracranial hemorrhage 6/15/2013    History of non-ST elevation myocardial infarction (NSTEMI) 6/15/2013    Hyperlipidemia     Hypertension     Lactic acidosis 1/11/2020    Metabolic syndrome 6/14/2012    Myocardial infarction     On home oxygen therapy     states uses 2L at night or when sat < 90    Pacemaker     Positive FIT (fecal immunochemical test) 11/6/2020    Severe persistent asthma without complication 4/30/2020    Statin  intolerance     Stroke 2012    left hand shaky, loss of balance       Past Surgical History:   Procedure Laterality Date    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/25/2017    Dr Griggs    ADRENALECTOMY      AORTIC VALVE REPLACEMENT  12/09/2016    ARTERIOGRAPHY OF AORTIC ROOT N/A 9/12/2022    Procedure: ARTERIOGRAM, AORTIC ROOT;  Surgeon: Marko Batres MD;  Location: [facility] CATH/EP LAB;  Service: Cardiology;  Laterality: N/A;    arthroscopy lt knee Right     BLADDER REPAIR      sling    BREAST BIOPSY Bilateral     benign    COLONOSCOPY  2004    10 year recheck    COLONOSCOPY N/A 11/6/2020    Procedure: COLONOSCOPY;  Surgeon: Yakelin Lowery MD;  Location: [facility] ENDO;  Service: Endoscopy;  Laterality: N/A;    CORONARY ANGIOGRAPHY INCLUDING BYPASS GRAFTS WITH CATHETERIZATION OF LEFT HEART Left 9/12/2022    Procedure: Left heart cath including bypass graft, with left heart catheterization;  Surgeon: Marko Batres MD;  Location: [facility] CATH/EP LAB;  Service: Cardiology;  Laterality: Left;    CORONARY ARTERY BYPASS GRAFT  12/09/2016    X3    ECHOCARDIOGRAM,TRANSESOPHAGEAL N/A 3/25/2024    Procedure: Transesophageal echo (DELORIS) intra-procedure log documentation;  Surgeon: Paulino Resendiz MD;  Location: [facility] CATH/EP LAB;  Service: Cardiology;  Laterality: N/A;    EPIDURAL STEROID INJECTION INTO LUMBAR SPINE N/A 7/27/2021    Procedure: Injection-steroid-epidural-lumbar;  Surgeon: Valerio Jay MD;  Location: [facility] OR;  Service: Pain Management;  Laterality: N/A;  L5-S1     HYSTERECTOMY      INSERTION OF PACEMAKER Left 1/13/2020    Procedure: INSERTION, PACEMAKER;  Surgeon: Marko Batres MD;  Location: [facility] CATH/EP LAB;  Service: Cardiology;  Laterality: Left;    OOPHORECTOMY      RIGHT HEART CATHETERIZATION Right 9/12/2022    Procedure: INSERTION, CATHETER, RIGHT HEART;  Surgeon: Marko Batres MD;  Location: [facility] CATH/EP LAB;  Service: Cardiology;  Laterality: Right;    THYROIDECTOMY         Review of patient's allergies indicates:    Allergen Reactions    Metformin Diarrhea     Diarrhea      Corn Itching     Other reaction(s): Sneezing  Other reaction(s): Rhinorrhea    Potato starch Hives     Other reaction(s): Sneezing  Other reaction(s): Rhinorrhea    Pravastatin Other (See Comments)     Muscle pain    Statins-hmg-coa reductase inhibitors Other (See Comments)    Hydrochlorothiazide Other (See Comments)     weakness    Welchol [colesevelam] Other (See Comments)     Weakness        Medications:  Continuous Infusions:  Scheduled Meds:   allopurinoL  50 mg Oral Daily    amLODIPine  10 mg Oral Daily    ampicillin IV (PEDS and ADULTS)  2 g Intravenous Q6H    cefTRIAXone (Rocephin) IV (PEDS and ADULTS)  2 g Intravenous Q12H    insulin detemir U-100  3 Units Subcutaneous Daily    levothyroxine  150 mcg Oral Before breakfast     PRN Meds:0.9%  NaCl infusion (for blood administration), dextrose 10%, dextrose 10%, dextrose 10%, dextrose 10%, glucagon (human recombinant), glucose, glucose, insulin aspart U-100, naloxone, sodium chloride 0.9%    Family History       Problem Relation (Age of Onset)    Aneurysm Maternal Grandfather    Arthritis Mother    Asthma Sister, Son    COPD Son    Cancer Paternal Aunt    Depression Son    Diabetes Mother, Sister, Son, Maternal Uncle    Heart disease Mother, Father, Maternal Uncle, Paternal Aunt, Paternal Uncle, Paternal Grandfather    Hypertension Mother, Father, Sister, Son, Paternal Aunt, Maternal Grandmother    Mental illness Father, Paternal Aunt, Maternal Grandmother, Paternal Grandmother    Stroke Son          Tobacco Use    Smoking status: Never    Smokeless tobacco: Never   Substance and Sexual Activity    Alcohol use: Not Currently     Comment: seldom    Drug use: No    Sexual activity: Never       Review of Systems   Constitutional:  Positive for activity change.   Respiratory:  Positive for shortness of breath.      Objective:     Vital Signs (Most Recent):  Temp: 97.8 °F (36.6 °C) (03/27/24 1154)  Pulse:  86 (03/27/24 1154)  Resp: 17 (03/27/24 1154)  BP: (!) 142/65 (03/27/24 1154)  SpO2: (!) 94 % (03/27/24 1154) Vital Signs (24h Range):  Temp:  [97.8 °F (36.6 °C)-99 °F (37.2 °C)] 97.8 °F (36.6 °C)  Pulse:  [69-86] 86  Resp:  [17-18] 17  SpO2:  [94 %-97 %] 94 %  BP: (135-153)/(63-74) 142/65     Weight: 87 kg (191 lb 12.8 oz)  Body mass index is 35.08 kg/m².       Physical Exam  Constitutional:       General: She is not in acute distress.     Appearance: She is ill-appearing.   HENT:      Head: Normocephalic and atraumatic.      Right Ear: External ear normal.      Left Ear: External ear normal.      Nose: Nose normal.      Mouth/Throat:      Mouth: Mucous membranes are moist.   Eyes:      Extraocular Movements: Extraocular movements intact.      Conjunctiva/sclera: Conjunctivae normal.   Cardiovascular:      Rate and Rhythm: Normal rate.   Pulmonary:      Effort: Pulmonary effort is normal.      Comments: Mild wheezing, on supplemental oxygen  Abdominal:      General: Bowel sounds are normal.      Palpations: Abdomen is soft.   Musculoskeletal:         General: Swelling present.   Lymphadenopathy:      Cervical: No cervical adenopathy.   Skin:     General: Skin is dry.      Coloration: Skin is pale.      Findings: Bruising present.   Neurological:      Mental Status: She is alert and oriented to person, place, and time. Mental status is at baseline.   Psychiatric:         Mood and Affect: Mood normal.         Behavior: Behavior normal.            Review of Symptoms      Symptom Assessment (ESAS 0-10 Scale)  Pain:  0  Dyspnea:  0  Anxiety:  0  Nausea:  0  Depression:  0  Anorexia:  0  Fatigue:  0  Insomnia:  0  Restlessness:  0  Agitation:  0         Living Arrangements:  Lives with spouse    Psychosocial/Cultural:   See Palliative Psychosocial Note: No   to  Pollo, has one son Brayden.  is dialysis dependent (M/W/F). 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 Her  father was in this industry which is how she got into this career as well.  **Primary  to Follow**  Palliative Care  Consult: No    Spiritual:  F - Maureen and Belief:  Pentecostalism  I - Importance:  Important  C - Community:  Hasn't attended [employer] recently  A - Address in Care:  Consider engaging  support        Advance Care Planning  Advance Directives:   Living Will: No    LaPOST: No    Do Not Resuscitate Status: No    Medical Power of : No      Decision Making:  Patient answered questions  Goals of Care: The patient endorses that what is most important right now is to focus on improvement in condition but with limits to invasive therapies    Accordingly, we have decided that the best plan to meet the patient's goals includes continuing with treatment         Significant Labs: CBC:   Recent Labs   Lab 03/26/24  0904 03/27/24  0529   WBC 9.37 10.11   HGB 13.2 13.0   HCT 41.2 40.1    239     CMP:   Recent Labs   Lab 03/26/24  0904 03/27/24  0529    140   K 4.1 3.6   CL 91* 94*   CO2 33* 38*   * 200*   BUN 45* 35*   CREATININE 1.0 0.9   CALCIUM 9.1 9.6   PROT 6.1 6.2   ALBUMIN 2.5* 2.2*   BILITOT 0.6 0.5   ALKPHOS 68 70   AST 36 31   ALT 17 18   ANIONGAP 14 8     CBC:   Recent Labs   Lab 03/27/24  0529   WBC 10.11   HGB 13.0   HCT 40.1   MCV 92        BMP:  Recent Labs   Lab 03/27/24  0529   *      K 3.6   CL 94*   CO2 38*   BUN 35*   CREATININE 0.9   CALCIUM 9.6   MG 1.7     LFT:  Lab Results   Component Value Date    AST 31 03/27/2024    ALKPHOS 70 03/27/2024    BILITOT 0.5 03/27/2024     Albumin:   Albumin   Date Value Ref Range Status   03/27/2024 2.2 (L) 3.5 - 5.2 g/dL Final   10/21/2016 4.1 3.6 - 5.1 g/dL Final     Comment:     @ Test Performed By:  [email or web address] Lizeth Blair M.D., TOÑO,   [address] [place] [id number]  [facility]  [id number]       Protein:   Total Protein   Date  Value Ref Range Status   03/27/2024 6.2 6.0 - 8.4 g/dL Final     Lactic acid:   Lab Results   Component Value Date    LACTATE 2.3 () 09/10/2022    LACTATE 2.3 () 09/10/2022       Significant Imaging: I have reviewed all pertinent imaging results/findings within the past 24 hours.      I spent a total of 75 minutes on the day of the visit. This includes face to face time in discussion of goals of care, symptom assessment, coordination of care and emotional support. This also includes non-face to face time preparing to see the patient (eg, review of tests/imaging), obtaining and/or reviewing separately obtained history, documenting clinical information in the electronic or other health record, independently interpreting results and communicating results to the patient/family/caregiver, or care coordinator. A total of 16 minutes was spent on advance care planning, goals of care discussion, emotional support, formulating and communicating prognosis and goals of care, exploring burden/benefit of various approaches of treatment. This discussion occurred on a fully voluntary basis with the verbal consent of the patient and/or family.      Lilibeth Henderson MD  Palliative Medicine  [facility] - Cardiology [facility]

## 2024-03-27 NOTE — PLAN OF CARE
OT eval complete. OT POC and goals established.   Problem: Occupational Therapy  Goal: Occupational Therapy Goal  Description: Goals to be met by: 4/26/24     Patient will increase functional independence with ADLs by performing:    UE Dressing with Minimal Assistance.  LE Dressing with Moderate Assistance and AE.  Grooming while seated with Set-up Assistance.  Toileting from bedside commode with Moderate Assistance for hygiene and clothing management.   Supine to sit with Minimal Assistance.  Toilet transfer to bedside commode with Moderate Assistance.    Outcome: Ongoing, Progressing

## 2024-03-27 NOTE — PLAN OF CARE
PT evaluation completed- see note for details. PT POC and goals established.    Problem: Physical Therapy  Goal: Physical Therapy Goal  Description: Goals to be met by: 24     Patient will increase functional independence with mobility by performin. Supine to sit with Moderate Assistance  2. Sit to stand transfer with Moderate Assistance  3. Bed to chair transfer with Moderate Assistance using LRAD  4. Gait  x 25 feet with Moderate Assistance using LRAD.   5. Ascend/descend 5 stair with left Handrails Moderate Assistance using LRAD.   5. Sitting at edge of bed x10 minutes with Contact Guard Assistance  6. Stand for 5 minutes with Moderate Assistance using LRAD  7. Lower extremity exercise program x15 reps per handout, with assistance as needed    Outcome: Ongoing, Progressing

## 2024-03-27 NOTE — PT/OT/SLP EVAL
Occupational Therapy   Co-Evaluation  Co-treatment performed due to patient's multiple deficits requiring two skilled therapists to appropriately and safely assess patient's strength and endurance while facilitating functional tasks in addition to accommodating for patient's activity tolerance.      Name: Tereza Larose  MRN: [id number]  Admitting Diagnosis: Acute bacterial endocarditis  Recent Surgery: Procedure(s) (LRB):  EXTRACTION, ELECTRODE LEAD (Left)  ECHOCARDIOGRAM, TRANSESOPHAGEAL (N/A)      Recommendations:     Discharge Recommendations: Moderate Intensity Therapy  Discharge Equipment Recommendations:  hospital bed, lift device, wheelchair, bedside commode  Barriers to discharge:  Other (Comment) (increased skilled (A) required)    Assessment:     Tereza Larose is a 73 y.o. female with a medical diagnosis of Acute bacterial endocarditis.  She presents with the following performance deficits affecting function: weakness, impaired endurance, impaired self care skills, impaired functional mobility, gait instability, impaired balance, decreased coordination, decreased upper extremity function, decreased lower extremity function, decreased safety awareness, pain, impaired cardiopulmonary response to activity, impaired coordination, decreased ROM.  Pt AAOx4 however confused and providing inconsistent PLOF. Per son, pt required (A) for dressing, bathing, toileting, and functional transfers PTA. Pt remains limited in ADLs, functional mobility, and functional transfers and is currently not performing tasks at PLOF. Pt would continue to benefit from skilled OT services to maximize functional independence with ADLs and functional mobility, reduce caregiver burden, and facilitate safe discharge in the least restrictive environment. Patient continues to demonstrate the need for moderate intensity therapy on a daily basis post acute exhibited by decreased independence with self-care and functional mobility    Patient  requires a hospital bed for positioning of the body in ways that are not feasible with an ordinary bed. The patient requires special positioning for pain relief, limited mobility, and/or being unable to independently make changes in body position without the use of a hospital bed. Pillows and wedges will not be adequate for resolving these positional issues.      Patient has a mobility limitation that significantly impairs their ability to participate in one or more mobility related activities of daily living in customary locations in the home. The mobility limitation cannot be sufficiently resolved by the use of a cane or walker. The use of a manual wheelchair will greatly improve the patient's ability to participate in MRADLs. The patient will use the wheelchair on a regular basis at home. They have expressed their willingness to use a manual wheelchair in the home, and have a caregiver who is available and willing to assist with the wheelchair if needed.     Patient has a mobility limitation that significantly impairs their ability to participate in one or more mobility related activities of daily living, including toileting. This deficit can be resolved by using a bedside commode. Patient demonstrates mobility limitations that will cause them to be confined to one room at home without bathroom access for up to 30 days. Using a bedside commode will greatly improve the patient's ability to participate in MRADLs.      Rehab Prognosis: Good; patient would benefit from acute skilled OT services to address these deficits and reach maximum level of function.       Plan:     Patient to be seen 3 x/week to address the above listed problems via self-care/home management, therapeutic activities, therapeutic exercises, neuromuscular re-education  Plan of Care Expires: 04/26/24  Plan of Care Reviewed with: patient, family    Subjective     Chief Complaint: (R) LE pain  Patient/Family Comments/goals: to get  better    Occupational Profile:  Living Environment: Pt lives with her spouse in a SSH with 5 THAI and (L) HR. W-I-S with bath bench and grab bar  Previous level of function: Per pt, she is Mod (I) with ADLs and functional mobility using a RW. However, her son reports she required (A) for dressing, bathing, toileting, bed mobility, and transfers   Roles and Routines: pt transfer from [facility]  Equipment Used at Home: cane, straight, oxygen, walker, rolling, bath bench, grab bar, other (see comments) (hurrycane)  Assistance upon Discharge: family    Pain/Comfort:  Pain Rating 1: other (see comments) (unrated)  Location - Side 1: Right  Location - Orientation 1: generalized  Location 1:  (hips to toes)  Pain Addressed 1: Reposition, Distraction, Cessation of Activity  Pain Rating Post-Intervention 1: other (see comments) (unrated)    Patients cultural, spiritual, Christianity conflicts given the current situation: no    Objective:     Communicated with: RN prior to session.  Patient found HOB elevated with bed alarm, oxygen, PureWick, peripheral IV, telemetry upon OT entry to room.    General Precautions: Standard, fall  Orthopedic Precautions: N/A  Braces: N/A  Respiratory Status: Nasal cannula, flow 2 L/min    Occupational Performance:    Bed Mobility:    Patient completed Scooting/Bridging with total assistance and 2 persons  Patient completed Supine to Sit with maximal assistance and 2 persons  Patient completed Sit to Supine with total assistance and 2 persons    Functional Mobility/Transfers:  Pt tolerated sitting EOB for 8 minutes with maximal progressing to minimum (A) and (B) UE support on bed rail  (R) lateral lean noted  Verbal and tactile cueing to promote midline orientation and upright posture    Activities of Daily Living:  Feeding:  stand by assistance to bring sprite can and straw to mouth  with (R) UE sitting upright in bed  Lower Body Dressing: total assistance to don b/l socks at bed  level    Cognitive/Visual Perceptual:  Cognitive/Psychosocial Skills:     -       Oriented to: Person, Place, Time, and Situation   -       Follows Commands/attention:Follows one-step commands  -       Safety awareness/insight to disability: impaired   -       Mood/Affect/Coping skills/emotional control: Cooperative and confused    Physical Exam:  Upper Extremity Range of Motion:     -       Right Upper Extremity: WFL except shoulder <full range  -       Left Upper Extremity: WFL except shoulder <full range  Upper Extremity Strength:    -       Right Upper Extremity: grossly 3+/5  -       Left Upper Extremity: grossly 3+/5    AMPAC 6 Click ADL:  AMPAC Total Score: 11    Treatment & Education:  Provided education regarding role of OT, POC, & discharge recommendations with pt and family verbalizing understanding.  Pt had no further questions & when asked whether there were any concerns pt reported none.     Patient left HOB elevated with all lines intact, call button in reach, RN notified, and family present    GOALS:   Multidisciplinary Problems       Occupational Therapy Goals          Problem: Occupational Therapy    Goal Priority Disciplines Outcome Interventions   Occupational Therapy Goal     OT, PT/OT Ongoing, Progressing    Description: Goals to be met by: 4/26/24     Patient will increase functional independence with ADLs by performing:    UE Dressing with Minimal Assistance.  LE Dressing with Moderate Assistance and AE.  Grooming while seated with Set-up Assistance.  Toileting from bedside commode with Moderate Assistance for hygiene and clothing management.   Supine to sit with Minimal Assistance.  Toilet transfer to bedside commode with Moderate Assistance.                         History:     Past Medical History:   Diagnosis Date    Abnormal EKG 9/26/2012    Allergy     Arthritis     Asthma     Brain bleed     Colon polyps 11/6/2020    COPD (chronic obstructive pulmonary disease)     Depression      Diabetes mellitus type II     Diverticulitis     Fatty liver     GERD (gastroesophageal reflux disease)     Goiter     s/p thyroidectomy    Heart murmur     Hemiparesis affecting right side as late effect of cerebrovascular accident (CVA) 7/26/2022    Hernia of abdominal wall     History of intracranial hemorrhage 6/15/2013    History of non-ST elevation myocardial infarction (NSTEMI) 6/15/2013    Hyperlipidemia     Hypertension     Lactic acidosis 1/11/2020    Metabolic syndrome 6/14/2012    Myocardial infarction     On home oxygen therapy     states uses 2L at night or when sat < 90    Pacemaker     Positive FIT (fecal immunochemical test) 11/6/2020    Severe persistent asthma without complication 4/30/2020    Statin intolerance     Stroke 2012    left hand shaky, loss of balance         Past Surgical History:   Procedure Laterality Date    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/25/2017    Dr Griggs    ADRENALECTOMY      AORTIC VALVE REPLACEMENT  12/09/2016    ARTERIOGRAPHY OF AORTIC ROOT N/A 9/12/2022    Procedure: ARTERIOGRAM, AORTIC ROOT;  Surgeon: Marko Batres MD;  Location: [facility] CATH/EP LAB;  Service: Cardiology;  Laterality: N/A;    arthroscopy lt knee Right     BLADDER REPAIR      sling    BREAST BIOPSY Bilateral     benign    COLONOSCOPY  2004    10 year recheck    COLONOSCOPY N/A 11/6/2020    Procedure: COLONOSCOPY;  Surgeon: Yakelin Lowery MD;  Location: [facility] ENDO;  Service: Endoscopy;  Laterality: N/A;    CORONARY ANGIOGRAPHY INCLUDING BYPASS GRAFTS WITH CATHETERIZATION OF LEFT HEART Left 9/12/2022    Procedure: Left heart cath including bypass graft, with left heart catheterization;  Surgeon: Marko Batres MD;  Location: [facility] CATH/EP LAB;  Service: Cardiology;  Laterality: Left;    CORONARY ARTERY BYPASS GRAFT  12/09/2016    X3    ECHOCARDIOGRAM,TRANSESOPHAGEAL N/A 3/25/2024    Procedure: Transesophageal echo (DELORIS) intra-procedure log documentation;  Surgeon: Paulino Resendiz MD;  Location: [facility] CATH/EP  LAB;  Service: Cardiology;  Laterality: N/A;    EPIDURAL STEROID INJECTION INTO LUMBAR SPINE N/A 7/27/2021    Procedure: Injection-steroid-epidural-lumbar;  Surgeon: Valerio Jay MD;  Location: [facility] OR;  Service: Pain Management;  Laterality: N/A;  L5-S1     HYSTERECTOMY      INSERTION OF PACEMAKER Left 1/13/2020    Procedure: INSERTION, PACEMAKER;  Surgeon: Marko Batres MD;  Location: [facility] CATH/EP LAB;  Service: Cardiology;  Laterality: Left;    OOPHORECTOMY      RIGHT HEART CATHETERIZATION Right 9/12/2022    Procedure: INSERTION, CATHETER, RIGHT HEART;  Surgeon: Marko Batres MD;  Location: [facility] CATH/EP LAB;  Service: Cardiology;  Laterality: Right;    THYROIDECTOMY         Time Tracking:     OT Date of Treatment: 03/27/24  OT Start Time: 1355  OT Stop Time: 1416  OT Total Time (min): 21 min    Billable Minutes:Evaluation 11  Self Care/Home Management 10    3/27/2024

## 2024-03-27 NOTE — HPI
Tereza Larose is a 73-year-old woman with a history of CAD s/p CABG, s/p pacemaker, pulmonary HTN, chronic pulmonary disease dependent on supplemental oxygen via nasal cannula who was admitted to OSH for Enterococcus bacteremia, NSTEMI and VERONA, found to have a mobile mass attached to the right atrial lead that was confirmed on transesophageal echocardiogram and transferred to [facility] for evaluation by interventional Cardiology for source control. Unfortunately device extraction will cause much more harm than benefit and patient has agreed to focus on medical management (IV antibiotics), knowing that this will not provide cure from infection. Palliative and Supportive Care was consulted to explore goals of care.

## 2024-03-27 NOTE — PROGRESS NOTES
"[facility] - Cardiology Stepdown  Infectious Disease  Progress Note    Patient Name: Tereza Larose  MRN: [id number]  Admission Date: 3/26/2024  Length of Stay: 1 days  Attending Physician: Christopher Williamson DO  Primary Care Provider: Evan Sánchez MD    Isolation Status: No active isolations  Assessment/Plan:      Cardiac/Vascular  * Acute bacterial endocarditis  Patient with pacemaker in place and previous bioprosthetic AVR found to have E. Faecalis bacteremia and mobile mass on R atrial lead on outside DELORIS, now transferred to OM. Currently on ampicillin and ceftriaxone. No plan for device extraction due to her high surgical risk. Blood cultures not yet cleared. No new joint or back pain today.     Recommendations  Continue ampicillin and ceftriaxone  Repeat blood cultures until cleared  Palliative consult  Will need chronic suppressive abx after IV therapy is complete, if device/lead is not removed        Anticipated Disposition: TBD    Thank you for your consult. I will follow-up with patient. Please contact us if you have any additional questions.    Jackie Bonner DO  Infectious Disease  [facility]    Subjective:     Principal Problem:Acute bacterial endocarditis    HPI: Ms. Larose is a 73F with PMH of CAD, HFpEF, pacemaker, previous bioprosthetic AVR, HTN, HLD, DM2, COPD, initally admitted to OSH for NSTEMI, subsequently developed VERONA and also found to have E. Faecalis bacteremia, transferred to [facility] after finding of mobile mass on R atrial lead on DELORIS. Infectious disease consulted for "Pt presented as a transfer from OSH. Blood cultures growing enterococcus faecalis. Was started on CTX and ampicillin in the outside hospital. DELORIS showed small mobile mass located on a right atrial lead".     Currently on ampicillin and ceftriaxone. 3/23 and 3/24 BCX positive for amp sensitive E faecalis. 3/25 BCX NGTD. Patient is afebrile, hemodynamically stable, on 3L, no leukocytosis.       Interval " History:     No plan for device extraction at this time due to patient's high surgical risk. Reports she is doing ok today, no new symptoms.      Review of Systems   Constitutional:  Negative for chills and fever.   Respiratory:  Negative for cough and shortness of breath.    Cardiovascular:  Negative for chest pain.   Gastrointestinal:  Negative for abdominal pain, constipation, diarrhea, nausea and vomiting.   Musculoskeletal:  Negative for arthralgias, back pain, joint swelling and myalgias.   Skin:  Negative for rash.   Neurological:  Negative for weakness.     Objective:     Vital Signs (Most Recent):  Temp: 97.8 °F (36.6 °C) (03/27/24 1154)  Pulse: 86 (03/27/24 1154)  Resp: 17 (03/27/24 1154)  BP: (!) 142/65 (03/27/24 1154)  SpO2: (!) 94 % (03/27/24 1154) Vital Signs (24h Range):  Temp:  [97.8 °F (36.6 °C)-99 °F (37.2 °C)] 97.8 °F (36.6 °C)  Pulse:  [65-86] 86  Resp:  [17-18] 17  SpO2:  [94 %-97 %] 94 %  BP: (135-176)/(63-74) 142/65     Weight: 87 kg (191 lb 12.8 oz)  Body mass index is 35.08 kg/m².    Estimated Creatinine Clearance: 57 mL/min (based on SCr of 0.9 mg/dL).     Physical Exam  Vitals reviewed.   Constitutional:       General: She is not in acute distress.     Appearance: Normal appearance. She is not ill-appearing.   HENT:      Head: Normocephalic and atraumatic.   Eyes:      Extraocular Movements: Extraocular movements intact.      Conjunctiva/sclera: Conjunctivae normal.   Cardiovascular:      Rate and Rhythm: Normal rate and regular rhythm.      Heart sounds: No murmur heard.  Pulmonary:      Effort: Pulmonary effort is normal. No respiratory distress.      Breath sounds: Normal breath sounds. No wheezing.   Abdominal:      General: Abdomen is flat. Bowel sounds are normal.      Palpations: Abdomen is soft.      Tenderness: There is no abdominal tenderness.   Musculoskeletal:      Cervical back: Normal range of motion.   Skin:     General: Skin is warm and dry.   Neurological:      General: No  focal deficit present.      Mental Status: She is alert and oriented to person, place, and time.   Psychiatric:         Mood and Affect: Mood normal.         Behavior: Behavior normal.         Thought Content: Thought content normal.          Significant Labs: All pertinent labs within the past 24 hours have been reviewed.  Recent Lab Results  (Last 5 results in the past 24 hours)        03/27/24  1153   03/27/24  0716   03/27/24  0529   03/26/24  1345   03/26/24  1232        Unit Blood Type Code       5100  [P]                5100  [P]                5100  [P]                5100  [P]         Unit Expiration       [id number]  [P]                [id number]  [P]                [id number]  [P]                [id number]  [P]         Unit Blood Type       O POS  [P]                O POS  [P]                O POS  [P]                O POS  [P]         Albumin     2.2           ALP     70           ALT     18           Anion Gap     8           AST     31           Baso #     0.04           Basophil %     0.4           BILIRUBIN TOTAL     0.5  Comment: For infants and newborns, interpretation of results should be based  on gestational age, weight and in agreement with clinical  observations.    Premature Infant recommended reference ranges:  Up to 24 hours.............<8.0 mg/dL  Up to 48 hours............<12.0 mg/dL  3-5 days..................<15.0 mg/dL  6-29 days.................<15.0 mg/dL             BUN     35           Calcium     9.6           Chloride     94           CO2     38           CODING SYSTEM       [id number]  [P]                [id number]  [P]                [id number]  [P]                [id number]  [P]         Creatinine     0.9           Crossmatch Interpretation       Compatible  [P]                Compatible  [P]                Compatible  [P]                Compatible  [P]         Differential Method     Automated           DISPENSE STATUS       CROSSMATCHED  [P]                CROSSMATCHED  [P]                 CROSSMATCHED  [P]                CROSSMATCHED  [P]         eGFR     >60.0           Eos #     0.1           Eos %     1.0           Glucose     200           Gran # (ANC)     7.8           Gran %     76.9           Group & Rh       O POS         Hematocrit     40.1           Hemoglobin     13.0           Immature Grans (Abs)     0.12  Comment: Mild elevation in immature granulocytes is non specific and   can be seen in a variety of conditions including stress response,   acute inflammation, trauma and pregnancy. Correlation with other   laboratory and clinical findings is essential.             Immature Granulocytes     1.2           INDIRECT WILLOW       NEG         Lymph #     1.0           Lymph %     9.4           Magnesium      1.7           MCH     30.0           MCHC     32.4           MCV     92           Mono #     1.1           Mono %     11.1           MPV     10.6           nRBC     0           Phosphorus Level     1.5           Platelet Count     239           POCT Glucose 247   176       154       Potassium     3.6           Product Code       [id number]  [P]                [id number]  [P]                [id number]  [P]                [id number]  [P]         PROTEIN TOTAL     6.2           RBC     4.34           RDW     13.2           Sodium     140           Specimen Outdate       03/29/2024 23:59         UNIT NUMBER       [id number]  [P]                [id number]  [P]                [id number]  [P]                [id number]  [P]         WBC     10.11                                   [P] - Preliminary Result               Significant Imaging: I have reviewed all pertinent imaging results/findings within the past 24 hours.

## 2024-03-27 NOTE — ASSESSMENT & PLAN NOTE
Pt presenting from OSH for enterococcus bacteremia with endocarditis. Echo showing Pulmonary Artery: There is moderate pulmonary hypertension. The estimated pulmonary artery systolic pressure is 63 mmHg. This makes the patient not a candidate for pacemaker removal.  Palliative care consulted for GOC discussion.

## 2024-03-27 NOTE — ASSESSMENT & PLAN NOTE
Tereza Larose is a 73-year-old woman with a history of CAD s/p CABG, s/p pacemaker, pulmonary HTN, chronic pulmonary disease dependent on supplemental oxygen via nasal cannula who was admitted to OSH for Enterococcus bacteremia, NSTEMI and VERONA, found to have a mobile mass attached to the right atrial lead that was confirmed on transesophageal echocardiogram and transferred to [facility] for evaluation by interventional Cardiology for source control. Unfortunately device extraction will cause much more harm than benefit and patient has agreed to focus on medical management (IV antibiotics), knowing that this will not provide cure from infection. Palliative and Supportive Care was consulted to explore goals of care.    Advance Care Planning   Goals of Care  - Code status: Full code, recommended DNAR  - Next of kin:  Pollo Larose [phone]  - ACP documents: none, offered to complete but expressed desire to not complete living will or HCPOA  - Patient has decision making capacity  - Prior functional status: mostly bed bound, PPS 30-40%  - Prognosis: poor  - Patient's understanding of prognosis: limited, would benefit from continued counseling  - Goals: improvement in condition with limitations of invasive/harmful interventions  - Plans: continue full supportive care    Goals of Care Conversation:  - 3/27/24: I met Mrs. Larose who was eating her lunch. Her  is not here today because he is getting dialysis M/W/F. They are from [facility] and initially she told me that they've been together for 75 years. Gently reminded her that she is only 73-years-old. They have one son together (Brayden) and her  has a grandchild from a prior marriage/relationship, who she endearingly considers her own grandchild (named Marko). 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 Her favorite memory involves bringing Marko to the boat and watching him  "have fun. She said she would do this career all over again if she could. She had to stop because she suffered a stroke, which is something she doesn't like to think often about. She shared that yesterday her friend  from a stroke, and shared about another loved one who also  from a stroke.  - She confirms for me that she has never completed a living will or power of . Her  and herself feel very strongly against completing it. She feels that when it's her time to die, God will take her (hopefully to heaven). She shared about a near death experience (she couldn't recall when) and she came back to life after receiving CPR. Later in our conversation, she shares that she doesn't think she would be able to do well if she got so sick to the point where she got CPR again. Praised her for her wisdom and asked her how she came to this conclusion. She shrugged and shared that it seemed logical that CPR wouldn't be able to help her the way it did when she was younger.  - She shared her understanding about her condition, and specifically told me that she has a "blood disorder behind my heart and lungs" for which there is no cure. The plan is just to complete IV antibiotics in hopes that this may help eliminate the infection. I helped clarify that rather than a blood disorder, the cardiologists found a clot/mass attached to a lead in her heart, for which they hoped to be able to remove but aren't able to do so because this may cause much more harm and shorten her time than intended. In hopes to protect her flor time, they have recommended against such interventions and to utilize whatever tools we have to help her. Unfortunately, her heart and lungs will continue to progress and decline. I asked her about her end of life preferences. She tells me that she doesn't believe we get a choice when we get to our end of life because it's all in God's hands. Aligned with her, and reframed that indeed, when God " is ready to take her to [facility] and stops her heart and lungs, it sounded like she would be accepting of this. She said yes, that in the past, she was very scared of death, but now is more accepting of it. She confirmed that she didn't want her dying process to be artificially prolonged. I asked her if God were to declare that the medications we use will no longer buy her meaningful time on earth, would she want to have a natural, peaceful death. She said yes. I told her that this is consistent with a Do Not Resuscitate code status, which means we would still continue these important therapies in hopes to buy her more time, but that when we face the limitations of medical interventions/therapies, that we can protect her from prolonged suffering/dying by allowing her a natural death. She nodded and also expressed a little bit of hesitancy related to this. I encouraged her to think about this and to talk about it with her . I asked her if it was ok that I shared this conversation with her medical team and she was agreeable. She shared that she hopes to return home sooner than later (told me she's going home today) so that she can be closer to her ill .

## 2024-03-27 NOTE — SUBJECTIVE & OBJECTIVE
Interval History: NAOE, pt not a candidate for pacemaker removal given her severe pulmonary hypertension. Pt able to tolerate diet well, denies chest pain or SOB.     Review of Systems   Constitutional:  Negative for appetite change, chills, diaphoresis, fatigue and fever.   HENT:  Negative for congestion.    Respiratory:  Negative for cough, shortness of breath and wheezing.    Cardiovascular:  Negative for chest pain, palpitations and leg swelling.   Gastrointestinal:  Negative for abdominal pain, blood in stool, constipation, diarrhea, nausea and vomiting.   Genitourinary:  Negative for dysuria, flank pain and frequency.   Musculoskeletal:  Negative for arthralgias, joint swelling and myalgias.   Skin:  Negative for pallor and rash.   Neurological:  Negative for dizziness, light-headedness and headaches.   Psychiatric/Behavioral:  Negative for agitation. The patient is not nervous/anxious.      Objective:     Vital Signs (Most Recent):  Temp: 97.8 °F (36.6 °C) (03/27/24 1154)  Pulse: 86 (03/27/24 1154)  Resp: 17 (03/27/24 1154)  BP: (!) 142/65 (03/27/24 1154)  SpO2: (!) 94 % (03/27/24 1154) Vital Signs (24h Range):  Temp:  [97.8 °F (36.6 °C)-99 °F (37.2 °C)] 97.8 °F (36.6 °C)  Pulse:  [69-86] 86  Resp:  [17-18] 17  SpO2:  [94 %-97 %] 94 %  BP: (135-153)/(63-74) 142/65     Weight: 87 kg (191 lb 12.8 oz)  Body mass index is 35.08 kg/m².    Intake/Output Summary (Last 24 hours) at 3/27/2024 1312  Last data filed at 3/27/2024 0512  Gross per 24 hour   Intake --   Output 1250 ml   Net -1250 ml         Physical Exam  Constitutional:       General: She is not in acute distress.  HENT:      Head: Normocephalic and atraumatic.   Eyes:      Extraocular Movements: Extraocular movements intact.      Pupils: Pupils are equal, round, and reactive to light.   Cardiovascular:      Rate and Rhythm: Normal rate and regular rhythm.      Heart sounds: No murmur heard.  Pulmonary:      Effort: Pulmonary effort is normal. No  respiratory distress.      Breath sounds: No wheezing or rales.      Comments: On 3L NC   Abdominal:      General: Abdomen is flat. Bowel sounds are normal. There is no distension.      Palpations: Abdomen is soft.      Tenderness: There is no abdominal tenderness.   Musculoskeletal:         General: No swelling or tenderness. Normal range of motion.      Cervical back: Normal range of motion.      Right lower leg: No edema.      Left lower leg: No edema.   Lymphadenopathy:      Cervical: No cervical adenopathy.   Skin:     General: Skin is warm and dry.      Coloration: Skin is not jaundiced.      Findings: No bruising.   Neurological:      General: No focal deficit present.      Mental Status: She is alert and oriented to person, place, and time.             Significant Labs: All pertinent labs within the past 24 hours have been reviewed.    Significant Imaging: I have reviewed all pertinent imaging results/findings within the past 24 hours.

## 2024-03-28 LAB
ALBUMIN SERPL BCP-MCNC: 2.1 G/DL (ref 3.5–5.2)
ALP SERPL-CCNC: 115 U/L (ref 55–135)
ALT SERPL W/O P-5'-P-CCNC: 21 U/L (ref 10–44)
ANION GAP SERPL CALC-SCNC: 12 MMOL/L (ref 8–16)
AST SERPL-CCNC: 42 U/L (ref 10–40)
BASOPHILS # BLD AUTO: 0.11 K/UL (ref 0–0.2)
BASOPHILS NFR BLD: 0.7 % (ref 0–1.9)
BILIRUB SERPL-MCNC: 0.7 MG/DL (ref 0.1–1)
BUN SERPL-MCNC: 23 MG/DL (ref 8–23)
CALCIUM SERPL-MCNC: 9.8 MG/DL (ref 8.7–10.5)
CHLORIDE SERPL-SCNC: 91 MMOL/L (ref 95–110)
CO2 SERPL-SCNC: 36 MMOL/L (ref 23–29)
CREAT SERPL-MCNC: 0.9 MG/DL (ref 0.5–1.4)
DIFFERENTIAL METHOD BLD: ABNORMAL
EOSINOPHIL # BLD AUTO: 0 K/UL (ref 0–0.5)
EOSINOPHIL NFR BLD: 0.3 % (ref 0–8)
ERYTHROCYTE [DISTWIDTH] IN BLOOD BY AUTOMATED COUNT: 13.2 % (ref 11.5–14.5)
EST. GFR  (NO RACE VARIABLE): >60 ML/MIN/1.73 M^2
GLUCOSE SERPL-MCNC: 182 MG/DL (ref 70–110)
HCT VFR BLD AUTO: 40.1 % (ref 37–48.5)
HGB BLD-MCNC: 13.1 G/DL (ref 12–16)
IMM GRANULOCYTES # BLD AUTO: 0.21 K/UL (ref 0–0.04)
IMM GRANULOCYTES NFR BLD AUTO: 1.4 % (ref 0–0.5)
LYMPHOCYTES # BLD AUTO: 1.2 K/UL (ref 1–4.8)
LYMPHOCYTES NFR BLD: 7.9 % (ref 18–48)
MAGNESIUM SERPL-MCNC: 1.5 MG/DL (ref 1.6–2.6)
MCH RBC QN AUTO: 30.3 PG (ref 27–31)
MCHC RBC AUTO-ENTMCNC: 32.7 G/DL (ref 32–36)
MCV RBC AUTO: 93 FL (ref 82–98)
MONOCYTES # BLD AUTO: 1.5 K/UL (ref 0.3–1)
MONOCYTES NFR BLD: 10 % (ref 4–15)
NEUTROPHILS # BLD AUTO: 12 K/UL (ref 1.8–7.7)
NEUTROPHILS NFR BLD: 79.7 % (ref 38–73)
NRBC BLD-RTO: 0 /100 WBC
PHOSPHATE SERPL-MCNC: 1.9 MG/DL (ref 2.7–4.5)
PLATELET # BLD AUTO: 274 K/UL (ref 150–450)
PMV BLD AUTO: 10.2 FL (ref 9.2–12.9)
POCT GLUCOSE: 184 MG/DL (ref 70–110)
POCT GLUCOSE: 186 MG/DL (ref 70–110)
POCT GLUCOSE: 202 MG/DL (ref 70–110)
POCT GLUCOSE: 202 MG/DL (ref 70–110)
POTASSIUM SERPL-SCNC: 4 MMOL/L (ref 3.5–5.1)
PROT SERPL-MCNC: 6.5 G/DL (ref 6–8.4)
RBC # BLD AUTO: 4.32 M/UL (ref 4–5.4)
SODIUM SERPL-SCNC: 139 MMOL/L (ref 136–145)
WBC # BLD AUTO: 15.05 K/UL (ref 3.9–12.7)

## 2024-03-28 PROCEDURE — 83735 ASSAY OF MAGNESIUM: CPT

## 2024-03-28 PROCEDURE — 99233 SBSQ HOSP IP/OBS HIGH 50: CPT | Mod: GC,,, | Performed by: INTERNAL MEDICINE

## 2024-03-28 PROCEDURE — 36415 COLL VENOUS BLD VENIPUNCTURE: CPT

## 2024-03-28 PROCEDURE — 99900035 HC TECH TIME PER 15 MIN (STAT)

## 2024-03-28 PROCEDURE — 80053 COMPREHEN METABOLIC PANEL: CPT

## 2024-03-28 PROCEDURE — 25000003 PHARM REV CODE 250

## 2024-03-28 PROCEDURE — 84100 ASSAY OF PHOSPHORUS: CPT

## 2024-03-28 PROCEDURE — 85025 COMPLETE CBC W/AUTO DIFF WBC: CPT

## 2024-03-28 PROCEDURE — 25000003 PHARM REV CODE 250: Performed by: STUDENT IN AN ORGANIZED HEALTH CARE EDUCATION/TRAINING PROGRAM

## 2024-03-28 PROCEDURE — 63600175 PHARM REV CODE 636 W HCPCS: Mod: UD | Performed by: STUDENT IN AN ORGANIZED HEALTH CARE EDUCATION/TRAINING PROGRAM

## 2024-03-28 PROCEDURE — 27000221 HC OXYGEN, UP TO 24 HOURS

## 2024-03-28 PROCEDURE — 94761 N-INVAS EAR/PLS OXIMETRY MLT: CPT

## 2024-03-28 PROCEDURE — 63600175 PHARM REV CODE 636 W HCPCS: Mod: UD

## 2024-03-28 PROCEDURE — 20600001 HC STEP DOWN PRIVATE ROOM

## 2024-03-28 RX ORDER — MAGNESIUM SULFATE HEPTAHYDRATE 40 MG/ML
2 INJECTION, SOLUTION INTRAVENOUS ONCE
Status: COMPLETED | OUTPATIENT
Start: 2024-03-28 | End: 2024-03-28

## 2024-03-28 RX ORDER — QUETIAPINE FUMARATE 25 MG/1
25 TABLET, FILM COATED ORAL DAILY
Status: DISCONTINUED | OUTPATIENT
Start: 2024-03-29 | End: 2024-03-29 | Stop reason: HOSPADM

## 2024-03-28 RX ORDER — TORSEMIDE 20 MG/1
20 TABLET ORAL DAILY
Status: DISCONTINUED | OUTPATIENT
Start: 2024-03-28 | End: 2024-03-28

## 2024-03-28 RX ORDER — TORSEMIDE 20 MG/1
20 TABLET ORAL DAILY
Status: DISCONTINUED | OUTPATIENT
Start: 2024-03-29 | End: 2024-03-29

## 2024-03-28 RX ADMIN — MAGNESIUM SULFATE HEPTAHYDRATE 2 G: 40 INJECTION, SOLUTION INTRAVENOUS at 11:03

## 2024-03-28 RX ADMIN — AMPICILLIN 2 G: 2 INJECTION, POWDER, FOR SOLUTION INTRAMUSCULAR; INTRAVENOUS at 02:03

## 2024-03-28 RX ADMIN — AMLODIPINE BESYLATE 10 MG: 10 TABLET ORAL at 08:03

## 2024-03-28 RX ADMIN — INSULIN DETEMIR 6 UNITS: 100 INJECTION, SOLUTION SUBCUTANEOUS at 08:03

## 2024-03-28 RX ADMIN — AMPICILLIN 2 G: 2 INJECTION, POWDER, FOR SOLUTION INTRAMUSCULAR; INTRAVENOUS at 01:03

## 2024-03-28 RX ADMIN — TORSEMIDE 20 MG: 20 TABLET ORAL at 08:03

## 2024-03-28 RX ADMIN — CEFTRIAXONE 2 G: 2 INJECTION, POWDER, FOR SOLUTION INTRAMUSCULAR; INTRAVENOUS at 10:03

## 2024-03-28 RX ADMIN — INSULIN ASPART 2 UNITS: 100 INJECTION, SOLUTION INTRAVENOUS; SUBCUTANEOUS at 05:03

## 2024-03-28 RX ADMIN — ALLOPURINOL 50 MG: 300 TABLET ORAL at 08:03

## 2024-03-28 RX ADMIN — AMPICILLIN 2 G: 2 INJECTION, POWDER, FOR SOLUTION INTRAMUSCULAR; INTRAVENOUS at 08:03

## 2024-03-28 RX ADMIN — ENOXAPARIN SODIUM 40 MG: 40 INJECTION SUBCUTANEOUS at 05:03

## 2024-03-28 RX ADMIN — CEFTRIAXONE 2 G: 2 INJECTION, POWDER, FOR SOLUTION INTRAMUSCULAR; INTRAVENOUS at 09:03

## 2024-03-28 RX ADMIN — LEVOTHYROXINE SODIUM 150 MCG: 150 TABLET ORAL at 05:03

## 2024-03-28 NOTE — PHYSICIAN QUERY
PT Name: Tereza Larose  MR #: [id number]     DOCUMENTATION CLARIFICATION      CDS/: Emma Izaguirre               Contact information: [phone]  This form is a permanent document in the medical record.    Query Date: March 28, 2024    By submitting this query, we are merely seeking further clarification of documentation. Please utilize your independent clinical judgment when addressing the question(s) below.     The Medical Record contains the following:   Indicators   Supporting Clinical Findings Location in Medical Record    Chest Pain, Angina Chest pain ER Phys Note, H&P, PN, DS    Coronary Artery Disease CAD ER Phys Note, H&P, PN, DS    EKG Atrial-sensed ventricular-paced rhythm with prolonged AV conduction   Abnormal ECG   Vent. rate has increased BY  30 BPM  EKG 03/22    Troponin 163.6 ~ 157.3 ~ 136.9 MAR 03/22 - 03/23    Echo Results   Left Ventricle: The left ventricle is normal in size. Normal wall   thickness. Normal wall motion. Septal motion is consistent with pacing.   There is normal systolic function with a visually estimated ejection   fraction of 60 - 65%. There is normal diastolic function.     Right Ventricle: Normal right ventricular cavity size. Wall thickness   is normal. Right ventricle wall motion  is normal. Systolic function is   normal. Pacemaker lead present in the ventricle.     Right Atrium: Multiple leads present in the right atrium.     Mitral Valve: There is severe bileaflet sclerosis. There is severe   anterior mitral annular calcification present. There is normal leaflet   mobility. There is mild to moderate regurgitation.     Tricuspid Valve: There is moderate regurgitation with a centrally   directed jet.     Pulmonary Artery: There is moderate pulmonary hypertension. The   estimated pulmonary artery systolic pressure is 63 mmHg.     IVC/SVC: Elevated venous pressure at 15 mmHg.  Echo 03/23    Documentation of acute cardiac condition NSTEMI      Infection as source of  elevated troponin     Troponins were trended and remained flat  H&P, IM PN, Infectious Disease consult, DS    Cardiology Consult and PN    DS      Provider, please clarify the diagnosis related to the above documentation:  [ x  ] NSTEMI     [   ] NSTEMI/Myocardial Infarction Type 2 due to (please specify):      [   ] Demand Ischemia   [   ] Elevated troponin only (insignificant finding)   [   ] Other Cardiac Diagnosis (please specify):   chest         Form No. [id number]

## 2024-03-28 NOTE — ACP (ADVANCE CARE PLANNING)
Advance Care Planning     Date: 03/28/2024    Code Status  In light of the patients advanced and life limiting illness,I engaged the the patient and family in a voluntary conversation about the patient's preferences for care  at the very end of life. The patient wishes to have a natural, peaceful death.  Along those lines, the patient does not wish to have CPR or other invasive treatments performed when her heart and/or breathing stops. I communicated to the patient and healthcare power of   that a [facility]NR [facility] would [facility].  I spent a total of 30 minutes engaging the patient in this advance care planning discussion.         Rhianna Calixto MD PGY3  [facility]

## 2024-03-28 NOTE — ASSESSMENT & PLAN NOTE
Pt presenting from OSH for higher level of care after she was found to have enterococcus faecalis bacteremia. DELORIS remarkable for Right Atrium: Right atrium is dilated. Multiple leads viewed in the right atrium. There is a 1 x 0.6 cm mass attached to a lead in the right atrium that demonstrates independent motion. Concerning for possible vegetations.     Plan  - ID consulted, appreciate recs  -Will need 6 weeks of IV Ceftriaxone and Ampicillin from negative blood cultures  - Lifelong PO suppressive amoxicillin after 6 weeks of IV antibiotic therapy completed  - PICC consult  - Follow up CBC  - Follow up cultures

## 2024-03-28 NOTE — SUBJECTIVE & OBJECTIVE
Interval History:     Reports she is feeling ok today, no new symptoms. No acute events overnight.     Review of Systems   Constitutional:  Negative for chills and fever.   Respiratory:  Negative for cough and shortness of breath.    Cardiovascular:  Negative for chest pain.   Gastrointestinal:  Negative for abdominal pain, constipation, diarrhea, nausea and vomiting.   Musculoskeletal:  Negative for arthralgias, back pain and myalgias.   Skin:  Negative for rash.   Neurological:  Negative for headaches.     Objective:     Vital Signs (Most Recent):  Temp: 97.8 °F (36.6 °C) (03/28/24 1117)  Pulse: 73 (03/28/24 1120)  Resp: 16 (03/28/24 1117)  BP: (!) 149/66 (03/28/24 1117)  SpO2: (!) 92 % (03/28/24 1117) Vital Signs (24h Range):  Temp:  [97.5 °F (36.4 °C)-99.1 °F (37.3 °C)] 97.8 °F (36.6 °C)  Pulse:  [73-95] 73  Resp:  [16-18] 16  SpO2:  [92 %-98 %] 92 %  BP: (141-167)/(65-72) 149/66     Weight: 87 kg (191 lb 12.8 oz)  Body mass index is 35.08 kg/m².    Estimated Creatinine Clearance: 57 mL/min (based on SCr of 0.9 mg/dL).     Physical Exam  Vitals reviewed.   Constitutional:       General: She is not in acute distress.     Appearance: Normal appearance. She is not ill-appearing.   HENT:      Head: Normocephalic and atraumatic.   Eyes:      Extraocular Movements: Extraocular movements intact.      Conjunctiva/sclera: Conjunctivae normal.   Cardiovascular:      Rate and Rhythm: Normal rate and regular rhythm.      Heart sounds: No murmur heard.  Pulmonary:      Effort: Pulmonary effort is normal. No respiratory distress.      Breath sounds: Normal breath sounds. No wheezing.   Abdominal:      General: Abdomen is flat. Bowel sounds are normal.      Palpations: Abdomen is soft.      Tenderness: There is no abdominal tenderness.   Musculoskeletal:      Cervical back: Normal range of motion.   Skin:     General: Skin is warm and dry.   Neurological:      General: No focal deficit present.      Mental Status: She is alert  and oriented to person, place, and time.   Psychiatric:         Mood and Affect: Mood normal.         Behavior: Behavior normal.         Thought Content: Thought content normal.          Significant Labs: All pertinent labs within the past 24 hours have been reviewed.  Recent Lab Results         03/28/24  0728   03/28/24  0413   03/27/24 2009 03/27/24  1644        Albumin   2.1           ALP   115           ALT   21           Anion Gap   12           AST   42  Comment: *Result may be interfered by visible hemolysis           Baso #   0.11           Basophil %   0.7           BILIRUBIN TOTAL   0.7  Comment: For infants and newborns, interpretation of results should be based  on gestational age, weight and in agreement with clinical  observations.    Premature Infant recommended reference ranges:  Up to 24 hours.............<8.0 mg/dL  Up to 48 hours............<12.0 mg/dL  3-5 days..................<15.0 mg/dL  6-29 days.................<15.0 mg/dL             BUN   23           Calcium   9.8           Chloride   91           CO2   36           Creatinine   0.9           Differential Method   Automated           eGFR   >60.0           Eos #   0.0           Eos %   0.3           Glucose   182           Gran # (ANC)   12.0           Gran %   79.7           Hematocrit   40.1           Hemoglobin   13.1           Immature Grans (Abs)   0.21  Comment: Mild elevation in immature granulocytes is non specific and   can be seen in a variety of conditions including stress response,   acute inflammation, trauma and pregnancy. Correlation with other   laboratory and clinical findings is essential.             Immature Granulocytes   1.4           Lymph #   1.2           Lymph %   7.9           Magnesium    1.5           MCH   30.3           MCHC   32.7           MCV   93           Mono #   1.5           Mono %   10.0           MPV   10.2           nRBC   0           Phosphorus Level   1.9           Platelet Count   274            POCT Glucose 184     228   228       Potassium   4.0           PROTEIN TOTAL   6.5           RBC   4.32           RDW   13.2           Sodium   139           WBC   15.05                   Significant Imaging: I have reviewed all pertinent imaging results/findings within the past 24 hours.

## 2024-03-28 NOTE — SUBJECTIVE & OBJECTIVE
Interval History: Magnesium 1.5. Continuing IV antibiotics and will    Review of Systems   Constitutional:  Negative for appetite change, chills, diaphoresis, fatigue and fever.   HENT:  Negative for congestion.    Respiratory:  Negative for cough, shortness of breath and wheezing.    Cardiovascular:  Negative for chest pain, palpitations and leg swelling.   Gastrointestinal:  Negative for abdominal pain, blood in stool, constipation, diarrhea, nausea and vomiting.   Genitourinary:  Negative for dysuria, flank pain and frequency.   Musculoskeletal:  Negative for arthralgias, joint swelling and myalgias.   Skin:  Negative for pallor and rash.   Neurological:  Negative for dizziness, light-headedness and headaches.   Psychiatric/Behavioral:  Negative for agitation. The patient is not nervous/anxious.      Objective:     Vital Signs (Most Recent):  Temp: 97.8 °F (36.6 °C) (03/28/24 1117)  Pulse: 73 (03/28/24 1120)  Resp: 16 (03/28/24 1117)  BP: (!) 149/66 (03/28/24 1117)  SpO2: (!) 92 % (03/28/24 1117) Vital Signs (24h Range):  Temp:  [97.5 °F (36.4 °C)-99.1 °F (37.3 °C)] 97.8 °F (36.6 °C)  Pulse:  [73-95] 73  Resp:  [16-18] 16  SpO2:  [92 %-98 %] 92 %  BP: (141-167)/(65-72) 149/66     Weight: 87 kg (191 lb 12.8 oz)  Body mass index is 35.08 kg/m².    Intake/Output Summary (Last 24 hours) at 3/28/2024 1514  Last data filed at 3/28/2024 0613  Gross per 24 hour   Intake 240 ml   Output 1100 ml   Net -860 ml         Physical Exam  Constitutional:       General: She is not in acute distress.  HENT:      Head: Normocephalic and atraumatic.   Eyes:      Extraocular Movements: Extraocular movements intact.      Pupils: Pupils are equal, round, and reactive to light.   Cardiovascular:      Rate and Rhythm: Normal rate and regular rhythm.      Heart sounds: No murmur heard.  Pulmonary:      Effort: Pulmonary effort is normal. No respiratory distress.      Breath sounds: No wheezing or rales.      Comments: On 3L NC   Abdominal:       General: Abdomen is flat. Bowel sounds are normal. There is no distension.      Palpations: Abdomen is soft.      Tenderness: There is no abdominal tenderness.   Musculoskeletal:         General: No swelling or tenderness. Normal range of motion.      Cervical back: Normal range of motion.      Right lower leg: No edema.      Left lower leg: No edema.   Lymphadenopathy:      Cervical: No cervical adenopathy.   Skin:     General: Skin is warm and dry.      Coloration: Skin is not jaundiced.      Findings: No bruising.   Neurological:      General: No focal deficit present.      Mental Status: She is alert and oriented to person, place, and time.             Significant Labs: All pertinent labs within the past 24 hours have been reviewed.    Significant Imaging: I have reviewed all pertinent imaging results/findings within the past 24 hours.

## 2024-03-28 NOTE — ASSESSMENT & PLAN NOTE
Patient is identified as having Combined Systolic and Diastolic heart failure that is Chronic. CHF is currently controlled. Latest ECHO performed and demonstrates- Results for orders placed during the hospital encounter of 03/22/24    Echo    Interpretation Summary    Left Ventricle: The left ventricle is normal in size. Normal wall thickness. Normal wall motion. Septal motion is consistent with pacing. There is normal systolic function with a visually estimated ejection fraction of 60 - 65%. There is normal diastolic function.    Right Ventricle: Normal right ventricular cavity size. Wall thickness is normal. Right ventricle wall motion  is normal. Systolic function is normal. Pacemaker lead present in the ventricle.    Right Atrium: Multiple leads present in the right atrium.    Mitral Valve: There is severe bileaflet sclerosis. There is severe anterior mitral annular calcification present. There is normal leaflet mobility. There is mild to moderate regurgitation.    Tricuspid Valve: There is moderate regurgitation with a centrally directed jet.    Pulmonary Artery: There is moderate pulmonary hypertension. The estimated pulmonary artery systolic pressure is 63 mmHg.    IVC/SVC: Elevated venous pressure at 15 mmHg.  . Continue Beta Blocker and monitor clinical status closely. Monitor on telemetry. Patient is off CHF pathway.  Monitor strict Is&Os and daily weights.  Place on fluid restriction of 2 L. Cardiology has been consulted. Continue to stress to patient importance of self efficacy and  on diet for CHF. Last BNP reviewed- and noted below   Recent Labs   Lab 03/25/24  0533   *

## 2024-03-28 NOTE — ASSESSMENT & PLAN NOTE
Patient with pacemaker in place and previous bioprosthetic AVR found to have E. Faecalis bacteremia and mobile mass on R atrial lead on outside DELORIS, now transferred to [facility]. Currently on ampicillin and ceftriaxone. No plan for device extraction due to her high surgical risk. So far the 3/26 blood cultures are NGTD. No new joint or back pains today.     Recommendations  Continue ampicillin and ceftriaxone, duration 6 weeks from negative blood cultures  Follow up 3/26 blood cultures. If remain negative, OK for PICC on 3/29  Will need chronic suppressive abx (amoxicillin) after IV therapy is complete, if device/lead is not removed by then

## 2024-03-28 NOTE — PROVIDER TRANSFER
(Physician in Lead of Transfers)  Outside Transfer Acceptance Note / Regional Referral Center    Upon patient arrival, please contact Hospital Medicine on call.    Referring facility: [facility]   Referring provider: YASMANY LEE  Accepting facility: [facility]  Accepting provider: MARIO HORTA  Admitting provider: GUICHO JIMENEZ  Reason for transfer: Return transfer  Transfer diagnosis: Endocarditis  Transfer specialty requested: Hospital Medicine  Transfer specialty notified: Yes  Transfer level: NUMBER 1-5: 2  Bed type requested: tele  Isolation status: No active isolations   Admission class or status: IP- Inpatient    Narrative     73-year-old female with a history of COPD, diabetes, gastroesophageal reflux disease, thyroid disease, stroke, hypertension, hyperlipidemia, pulmonary hypertension, pacemaker placement, aortic stenosis with aortic valve replacement, and coronary artery disease with prior CABG initially transferred from [facility] to [facility] on March 22 with diagnoses of COPD, congestive heart failure, and NSTEMI.  She was treated with heparin infusion and seen by Cardiology.  She developed fever overnight on March 22.  Blood cultures grew Enterococcus and she was treated with IV antibiotics.  Course was complicated by VERONA.  She underwent DELORIS which showed small mobile mass in the right atrial lead.      She was subsequently transferred to Hospital Medicine at [facility] for treatment of bacterial endocarditis, VERONA, and acute on chronic systolic and diastolic heart failure.  At [facility] she was seen by Interventional Cardiology who recommended medical management of NSTEMI until infection is controlled.  She was also seen by the Heart Transplant team who noted with the severity of her pulmonary hypertension and poor functional status she would be high risk for any procedures including lead extraction.  She was seen by  the Electrophysiology service.  After discussion with the involved consultants and with patient and family, decision was made not to extract her leads.  They also recommended initiation of anticoagulation with her paroxysmal atrial fibrillation on device interrogation. Blood cultures from March 23, March 24, and March 25 grew Enterococcus faecalis.  Blood cultures from March 26 have no growth to date.  Infectious Diseases recommended continuing ampicillin and ceftriaxone, repeat blood cultures until cleared, and plan for chronic suppressive antibiotics after IV therapy is complete. Current antibiotics include ampicillin and ceftriaxone.  Hospital Medicine at [facility] is planning to speak with the patient about the risk/benefit of anticoagulation with her history of paroxysmal atrial fibrillation.      Patient and family are requesting transfer back to [facility] for further treatment to be closer to home.  Hemodynamics and oxygenation remain fairly stable at present.    March 28: Sodium 139, potassium 4, chloride 91, CO2 36, BUN 23, creatinine 0.9, glucose 182, AST 42, ALT 21, calcium 9.8, magnesium 1.5, phosphorus 1 9, white blood cells 15.05, hemoglobin 13.1, hematocrit 40.1, platelets 274    March 26: Blood cultures with no growth to date    VS:  Temperature 97.8°, pulse 73, respirations 16, blood pressure 149/66, O2 sats 92% on 3 L nasal cannula    Objective     Vitals: Temp: 97.8 °F (36.6 °C) (03/28/24 1117)  Pulse: 73 (03/28/24 1120)  Resp: 16 (03/28/24 1117)  BP: (!) 149/66 (03/28/24 1117)  SpO2: (!) 92 % (03/28/24 1117)  Recent Labs: CBC:   Recent Labs   Lab 03/27/24  0529 03/28/24  0413   WBC 10.11 15.05*   HGB 13.0 13.1   HCT 40.1 40.1    274     CMP:   Recent Labs   Lab 03/27/24  0529 03/28/24  0413    139   K 3.6 4.0   CL 94* 91*   CO2 38* 36*   * 182*   BUN 35* 23   CREATININE 0.9 0.9   CALCIUM 9.6 9.8   PROT 6.2 6.5   ALBUMIN 2.2* 2.1*   BILITOT 0.5 0.7    ALKPHOS 70 115   AST 31 42*   ALT 18 21   ANIONGAP 8 12         Instructions    Admit to Hospital Medicine      ENMA Humphries MD  Hospital Medicine Staff  Cell: [phone]

## 2024-03-28 NOTE — ASSESSMENT & PLAN NOTE
Chronic, controlled. Latest blood pressure and vitals reviewed-     Temp:  [97.5 °F (36.4 °C)-99.1 °F (37.3 °C)]   Pulse:  [73-95]   Resp:  [16-18]   BP: (141-167)/(65-72)   SpO2:  [92 %-98 %] .   Home meds for hypertension were reviewed and noted below.   Hypertension Medications               amLODIPine (NORVASC) 10 MG tablet TAKE 1 TABLET BY MOUTH EVERY DAY FOR SYSTOLIC BLOOD PRESSURE GREATER THAN 120    metoprolol succinate (TOPROL-XL) 50 MG 24 hr tablet Take 1 tablet (50 mg total) by mouth once daily.    sotaloL (BETAPACE) 80 MG tablet Take 80 mg by mouth 2 (two) times daily.    torsemide (DEMADEX) 20 MG Tab Take 20 mg by mouth.            While in the hospital, will manage blood pressure as follows; Continue home antihypertensive regimen continue amlodipine, hold metoprolol.     Will utilize p.r.n. blood pressure medication only if patient's blood pressure greater than 180/110 and she develops symptoms such as worsening chest pain or shortness of breath.

## 2024-03-28 NOTE — PROGRESS NOTES
"[facility] Stepdown  Infectious Disease  Progress Note    Patient Name: Tereza Larose  MRN: [id number]  Admission Date: 3/26/2024  Length of Stay: 2 days  Attending Physician: Christopher Williamson DO  Primary Care Provider: Evan Sánchez MD    Isolation Status: No active isolations  Assessment/Plan:      Cardiac/Vascular  * Acute bacterial endocarditis  Patient with pacemaker in place and previous bioprosthetic AVR found to have E. Faecalis bacteremia and mobile mass on R atrial lead on outside DELORIS, now transferred [facility]. Currently on ampicillin and ceftriaxone. No plan for device extraction due to her high surgical risk. So far the 3/26 blood cultures are NGTD. No new joint or back pains today.     Recommendations  Continue ampicillin and ceftriaxone, duration 6 weeks from negative blood cultures  Follow up 3/26 blood cultures. If remain negative, OK for PICC on 3/29  Will need chronic suppressive abx (amoxicillin) after IV therapy is complete, if device/lead is not removed by then        Anticipated Disposition: TBD    Thank you for your consult. I will sign off. Please contact us if you have any additional questions.    Jackie Bonner DO  Infectious Disease  [facility] - Cardiology Stepdown    Subjective:     Principal Problem:Acute bacterial endocarditis    HPI: Ms. Larose is a 73F with PMH of CAD, HFpEF, pacemaker, previous bioprosthetic AVR, HTN, HLD, DM2, COPD, initally admitted to OSH for NSTEMI, subsequently developed VERONA and also found to have E. Faecalis bacteremia, transferred to [facility] after finding of mobile mass on R atrial lead on DELORIS. Infectious disease consulted for "Pt presented as a transfer from OSH. Blood cultures growing enterococcus faecalis. Was started on CTX and ampicillin in the outside hospital. DELORIS showed small mobile mass located on a right atrial lead".     Currently on ampicillin and ceftriaxone. 3/23 and 3/24 BCX positive for amp sensitive E faecalis. 3/25 BCX NGTD. Patient " is afebrile, hemodynamically stable, on 3L, no leukocytosis.       Interval History:     Reports she is feeling ok today, no new symptoms. No acute events overnight.     Review of Systems   Constitutional:  Negative for chills and fever.   Respiratory:  Negative for cough and shortness of breath.    Cardiovascular:  Negative for chest pain.   Gastrointestinal:  Negative for abdominal pain, constipation, diarrhea, nausea and vomiting.   Musculoskeletal:  Negative for arthralgias, back pain and myalgias.   Skin:  Negative for rash.   Neurological:  Negative for headaches.     Objective:     Vital Signs (Most Recent):  Temp: 97.8 °F (36.6 °C) (03/28/24 1117)  Pulse: 73 (03/28/24 1120)  Resp: 16 (03/28/24 1117)  BP: (!) 149/66 (03/28/24 1117)  SpO2: (!) 92 % (03/28/24 1117) Vital Signs (24h Range):  Temp:  [97.5 °F (36.4 °C)-99.1 °F (37.3 °C)] 97.8 °F (36.6 °C)  Pulse:  [73-95] 73  Resp:  [16-18] 16  SpO2:  [92 %-98 %] 92 %  BP: (141-167)/(65-72) 149/66     Weight: 87 kg (191 lb 12.8 oz)  Body mass index is 35.08 kg/m².    Estimated Creatinine Clearance: 57 mL/min (based on SCr of 0.9 mg/dL).     Physical Exam  Vitals reviewed.   Constitutional:       General: She is not in acute distress.     Appearance: Normal appearance. She is not ill-appearing.   HENT:      Head: Normocephalic and atraumatic.   Eyes:      Extraocular Movements: Extraocular movements intact.      Conjunctiva/sclera: Conjunctivae normal.   Cardiovascular:      Rate and Rhythm: Normal rate and regular rhythm.      Heart sounds: No murmur heard.  Pulmonary:      Effort: Pulmonary effort is normal. No respiratory distress.      Breath sounds: Normal breath sounds. No wheezing.   Abdominal:      General: Abdomen is flat. Bowel sounds are normal.      Palpations: Abdomen is soft.      Tenderness: There is no abdominal tenderness.   Musculoskeletal:      Cervical back: Normal range of motion.   Skin:     General: Skin is warm and dry.   Neurological:       General: No focal deficit present.      Mental Status: She is alert and oriented to person, place, and time.   Psychiatric:         Mood and Affect: Mood normal.         Behavior: Behavior normal.         Thought Content: Thought content normal.          Significant Labs: All pertinent labs within the past 24 hours have been reviewed.  Recent Lab Results         03/28/24  0728   03/28/24  0413   03/27/24 2009 03/27/24  1644        Albumin   2.1           ALP   115           ALT   21           Anion Gap   12           AST   42  Comment: *Result may be interfered by visible hemolysis           Baso #   0.11           Basophil %   0.7           BILIRUBIN TOTAL   0.7  Comment: For infants and newborns, interpretation of results should be based  on gestational age, weight and in agreement with clinical  observations.    Premature Infant recommended reference ranges:  Up to 24 hours.............<8.0 mg/dL  Up to 48 hours............<12.0 mg/dL  3-5 days..................<15.0 mg/dL  6-29 days.................<15.0 mg/dL             BUN   23           Calcium   9.8           Chloride   91           CO2   36           Creatinine   0.9           Differential Method   Automated           eGFR   >60.0           Eos #   0.0           Eos %   0.3           Glucose   182           Gran # (ANC)   12.0           Gran %   79.7           Hematocrit   40.1           Hemoglobin   13.1           Immature Grans (Abs)   0.21  Comment: Mild elevation in immature granulocytes is non specific and   can be seen in a variety of conditions including stress response,   acute inflammation, trauma and pregnancy. Correlation with other   laboratory and clinical findings is essential.             Immature Granulocytes   1.4           Lymph #   1.2           Lymph %   7.9           Magnesium    1.5           MCH   30.3           MCHC   32.7           MCV   93           Mono #   1.5           Mono %   10.0           MPV   10.2           nRBC   0            Phosphorus Level   1.9           Platelet Count   274           POCT Glucose 184     228   228       Potassium   4.0           PROTEIN TOTAL   6.5           RBC   4.32           RDW   13.2           Sodium   139           WBC   15.05                   Significant Imaging: I have reviewed all pertinent imaging results/findings within the past 24 hours.

## 2024-03-28 NOTE — ASSESSMENT & PLAN NOTE
Pt presenting from OSH for enterococcus bacteremia with endocarditis. Echo showing Pulmonary Artery: There is moderate pulmonary hypertension. The estimated pulmonary artery systolic pressure is 63 mmHg. This makes the patient not a candidate for pacemaker removal as she is a very high risk for intubation.  Palliative care consulted for GOC discussion.

## 2024-03-28 NOTE — PROGRESS NOTES
[facility] - Cardiology [facility] Medicine  Progress Note    Patient Name: Tereza Larose  MRN: [id number]  Patient Class: IP- Inpatient   Admission Date: 3/26/2024  Length of Stay: 2 days  Attending Physician: Christopher Williamson DO  Primary Care Provider: Evan Sánchez MD        Subjective:     Principal Problem:Acute bacterial endocarditis        HPI:  Ms. Larose is a 72 yo F with CAD, HFpEF, pacemaker in place, HTN, HLD, DMII, COPD who originally presented to OSH after a fall, later was found to have NSTEMI and subsequently had VERONA and Enterococcus bacteremia. Cardiology and ID were consulted, she was started on IV CTX and ampicillin. DELORIS showed small mobile mass located on a right atrial lead. Proximal measurements are 1 cm x 0.5 cm. Case discussed with EP and Interventional Cardiology who will consult on the patient. Currently hemodynamically stable on 3L NC. Denies headaches, visual changes, SOB, cough, chest pain, palpitation, nausea, vomiting, abdominal pain, diarrhea, constipation, urinary frequency or any weakness. AT C the pt was restarted on CTX and ampicillin, ID consulted.       Overview/Hospital Course:  Pt admitted to hospital medicine for VERONA and enterococcus bacteremia. Pt started on ceftriaxone and ampicillin. ID consulted.Blood cultures growing enterococcus faecalis.  EP and interventional cardiology consulted for pacemaker removal, however patient is not a candidate due to her severe portal hypertension. Patient to have 6 weeks of IV antibiotics followed by PO antibiotic suppression.     Interval History: Magnesium 1.5. Continuing IV antibiotics and will place PICC line tomorrow for 6 weeks of IV antibiotics    Review of Systems   Constitutional:  Negative for appetite change, chills, diaphoresis, fatigue and fever.   HENT:  Negative for congestion.    Respiratory:  Negative for cough, shortness of breath and wheezing.    Cardiovascular:  Negative for chest pain, palpitations and leg  swelling.   Gastrointestinal:  Negative for abdominal pain, blood in stool, constipation, diarrhea, nausea and vomiting.   Genitourinary:  Negative for dysuria, flank pain and frequency.   Musculoskeletal:  Negative for arthralgias, joint swelling and myalgias.   Skin:  Negative for pallor and rash.   Neurological:  Negative for dizziness, light-headedness and headaches.   Psychiatric/Behavioral:  Negative for agitation. The patient is not nervous/anxious.      Objective:     Vital Signs (Most Recent):  Temp: 97.8 °F (36.6 °C) (03/28/24 1117)  Pulse: 73 (03/28/24 1120)  Resp: 16 (03/28/24 1117)  BP: (!) 149/66 (03/28/24 1117)  SpO2: (!) 92 % (03/28/24 1117) Vital Signs (24h Range):  Temp:  [97.5 °F (36.4 °C)-99.1 °F (37.3 °C)] 97.8 °F (36.6 °C)  Pulse:  [73-95] 73  Resp:  [16-18] 16  SpO2:  [92 %-98 %] 92 %  BP: (141-167)/(65-72) 149/66     Weight: 87 kg (191 lb 12.8 oz)  Body mass index is 35.08 kg/m².    Intake/Output Summary (Last 24 hours) at 3/28/2024 1514  Last data filed at 3/28/2024 0613  Gross per 24 hour   Intake 240 ml   Output 1100 ml   Net -860 ml         Physical Exam  Constitutional:       General: She is not in acute distress.  HENT:      Head: Normocephalic and atraumatic.   Eyes:      Extraocular Movements: Extraocular movements intact.      Pupils: Pupils are equal, round, and reactive to light.   Cardiovascular:      Rate and Rhythm: Normal rate and regular rhythm.      Heart sounds: No murmur heard.  Pulmonary:      Effort: Pulmonary effort is normal. No respiratory distress.      Breath sounds: No wheezing or rales.      Comments: On 3L NC   Abdominal:      General: Abdomen is flat. Bowel sounds are normal. There is no distension.      Palpations: Abdomen is soft.      Tenderness: There is no abdominal tenderness.   Musculoskeletal:         General: No swelling or tenderness. Normal range of motion.      Cervical back: Normal range of motion.      Right lower leg: No edema.      Left lower leg:  No edema.   Lymphadenopathy:      Cervical: No cervical adenopathy.   Skin:     General: Skin is warm and dry.      Coloration: Skin is not jaundiced.      Findings: No bruising.   Neurological:      General: No focal deficit present.      Mental Status: She is alert and oriented to person, place, and time.             Significant Labs: All pertinent labs within the past 24 hours have been reviewed.    Significant Imaging: I have reviewed all pertinent imaging results/findings within the past 24 hours.    Assessment/Plan:      * Acute bacterial endocarditis  Pt presenting from OSH for higher level of care after she was found to have enterococcus faecalis bacteremia. DELORIS remarkable for Right Atrium: Right atrium is dilated. Multiple leads viewed in the right atrium. There is a 1 x 0.6 cm mass attached to a lead in the right atrium that demonstrates independent motion. Concerning for possible vegetations.     Plan  - ID consulted, appreciate recs  -Will need 6 weeks of IV Ceftriaxone and Ampicillin from negative blood cultures  - Lifelong PO suppressive amoxicillin after 6 weeks of IV antibiotic therapy completed  - PICC consult  - Follow up CBC  - Follow up cultures    Pacemaker infection  Patient is not a candidate for removal of pacemaker due to her severe pulmonary hypertension being a severe risk for decompensation during intubation/anesthesia.   Will have 6 weeks of IV antibiotic therapy      Pacemaker    Please see Acute bacterial endocarditis     Hypothyroidism  TSH elevated to 40.97.     Will adjust levothyroxine       VERONA (acute kidney injury)  Patient with acute kidney injury/acute renal failure likely due to acute tubular necrosis caused by sepsis  VERONA is currently improving. Baseline creatinine unknown - Labs reviewed- Renal function/electrolytes with Estimated Creatinine Clearance: 51.3 mL/min (based on SCr of 1 mg/dL). according to latest data. Monitor urine output and serial BMP and adjust therapy as  needed. Avoid nephrotoxins and renally dose meds for GFR listed above.    COPD (chronic obstructive pulmonary disease)  Patient's COPD is controlled currently. Patient is currently off COPD Pathway. DuLary p.r.n. On 3 L NC on baseline with SpO2 96%. We will monitor respiratory status     Severe pulmonary hypertension  Pt presenting from OSH for enterococcus bacteremia with endocarditis. Echo showing Pulmonary Artery: There is moderate pulmonary hypertension. The estimated pulmonary artery systolic pressure is 63 mmHg. This makes the patient not a candidate for pacemaker removal as she is a very high risk for intubation.  Palliative care consulted for GOC discussion.     Acute on chronic combined systolic and diastolic CHF (congestive heart failure)  Patient is identified as having Combined Systolic and Diastolic heart failure that is Chronic. CHF is currently controlled. Latest ECHO performed and demonstrates- Results for orders placed during the hospital encounter of 03/22/24    Echo    Interpretation Summary    Left Ventricle: The left ventricle is normal in size. Normal wall thickness. Normal wall motion. Septal motion is consistent with pacing. There is normal systolic function with a visually estimated ejection fraction of 60 - 65%. There is normal diastolic function.    Right Ventricle: Normal right ventricular cavity size. Wall thickness is normal. Right ventricle wall motion  is normal. Systolic function is normal. Pacemaker lead present in the ventricle.    Right Atrium: Multiple leads present in the right atrium.    Mitral Valve: There is severe bileaflet sclerosis. There is severe anterior mitral annular calcification present. There is normal leaflet mobility. There is mild to moderate regurgitation.    Tricuspid Valve: There is moderate regurgitation with a centrally directed jet.    Pulmonary Artery: There is moderate pulmonary hypertension. The estimated pulmonary artery systolic pressure is 63  mmHg.    IVC/SVC: Elevated venous pressure at 15 mmHg.  . Continue Beta Blocker and monitor clinical status closely. Monitor on telemetry. Patient is off CHF pathway.  Monitor strict Is&Os and daily weights.  Place on fluid restriction of 2 L. Cardiology has been consulted. Continue to stress to patient importance of self efficacy and  on diet for CHF. Last BNP reviewed- and noted below   Recent Labs   Lab 03/25/24  0533   *         T2DM (type 2 diabetes mellitus)  Patient's FSGs are controlled on current medication regimen.  Last A1c reviewed-   Lab Results   Component Value Date    HGBA1C 7.9 (H) 03/22/2024     Most recent fingerstick glucose reviewed-   Recent Labs   Lab 03/26/24  0833   POCTGLUCOSE 160*     Current correctional scale  Low  Maintain anti-hyperglycemic dose as follows-   Antihyperglycemics (From admission, onward)      Start     Stop Route Frequency Ordered    03/26/24 0809  insulin aspart U-100 pen 0-5 Units         -- SubQ Before meals & nightly PRN 03/26/24 0710          Hold Oral hypoglycemics while patient is in the hospital.    HTN (hypertension)  Chronic, controlled. Latest blood pressure and vitals reviewed-     Temp:  [97.5 °F (36.4 °C)-99.1 °F (37.3 °C)]   Pulse:  [73-95]   Resp:  [16-18]   BP: (141-167)/(65-72)   SpO2:  [92 %-98 %] .   Home meds for hypertension were reviewed and noted below.   Hypertension Medications               amLODIPine (NORVASC) 10 MG tablet TAKE 1 TABLET BY MOUTH EVERY DAY FOR SYSTOLIC BLOOD PRESSURE GREATER THAN 120    metoprolol succinate (TOPROL-XL) 50 MG 24 hr tablet Take 1 tablet (50 mg total) by mouth once daily.    sotaloL (BETAPACE) 80 MG tablet Take 80 mg by mouth 2 (two) times daily.    torsemide (DEMADEX) 20 MG Tab Take 20 mg by mouth.            While in the hospital, will manage blood pressure as follows; Continue home antihypertensive regimen continue amlodipine, hold metoprolol.     Will utilize p.r.n. blood pressure medication only  if patient's blood pressure greater than 180/110 and she develops symptoms such as worsening chest pain or shortness of breath.    NSTEMI (non-ST elevated myocardial infarction)        CAD (coronary artery disease)  Patient with known CAD s/p CABG, which is uncontrolled Will continue  heparin gtt and Statin and monitor for S/Sx of angina/ACS. Continue to monitor on telemetry.   Troponins trending down.      VTE Risk Mitigation (From admission, onward)           Ordered     enoxaparin injection 40 mg  Every 24 hours         03/27/24 1715     IP VTE HIGH RISK PATIENT  Once         03/26/24 0616     Place sequential compression device  Until discontinued         03/26/24 0616                    Discharge Planning   FAVIOLA: 4/1/2024     Code Status: Full Code   Is the patient medically ready for discharge?: No    Reason for patient still in hospital (select all that apply): Patient trending condition  Discharge Plan A: Skilled Nursing Facility                  Rhianna Calixto MD  Department of Hospital Medicine   [facility] - Cardiology Stepdown

## 2024-03-28 NOTE — PLAN OF CARE
Pt slept well this shift. A&Ox1. VSS. No reports of pain. 3 L NC in place. Purewick in place. T&RQ2. No skin issues noted. Safety precautions in place. Call light in reach. No further concerns noted at this time.    Problem: Adult Inpatient Plan of Care  Goal: Plan of Care Review  Outcome: Ongoing, Progressing  Goal: Patient-Specific Goal (Individualized)  Outcome: Ongoing, Progressing  Goal: Absence of Hospital-Acquired Illness or Injury  Outcome: Ongoing, Progressing  Goal: Optimal Comfort and Wellbeing  Outcome: Ongoing, Progressing  Goal: Readiness for Transition of Care  Outcome: Ongoing, Progressing     Problem: Diabetes Comorbidity  Goal: Blood Glucose Level Within Targeted Range  Outcome: Ongoing, Progressing     Problem: Fluid and Electrolyte Imbalance (Acute Kidney Injury/Impairment)  Goal: Fluid and Electrolyte Balance  Outcome: Ongoing, Progressing     Problem: Oral Intake Inadequate (Acute Kidney Injury/Impairment)  Goal: Optimal Nutrition Intake  Outcome: Ongoing, Progressing     Problem: Renal Function Impairment (Acute Kidney Injury/Impairment)  Goal: Effective Renal Function  Outcome: Ongoing, Progressing     Problem: Skin Injury Risk Increased  Goal: Skin Health and Integrity  Outcome: Ongoing, Progressing     Problem: Coping Ineffective  Goal: Effective Coping  Outcome: Ongoing, Progressing

## 2024-03-28 NOTE — ASSESSMENT & PLAN NOTE
Patient is not a candidate for removal of pacemaker due to her severe pulmonary hypertension being a severe risk for decompensation during intubation/anesthesia.   Will have 6 weeks of IV antibiotic therapy

## 2024-03-28 NOTE — PLAN OF CARE
Outpatient Antibiotic Therapy Plan:    Please send referral to [facility].    1) Infection:  E faecalis endocarditis    2) Discharge Antibiotics:    Intravenous antibiotics:  Ampicillin 2g IV q 6 hours   Ceftriaxone 2g IV q 12 hours       3) Therapy Duration:  6 weeks    Estimated end date of IV antibiotics: 05/08/24    4) Outpatient Weekly Labs:    Order the following labs to be drawn on Mondays:   CBC  CMP   CRP    5) Fax Lab Results to Infectious Diseases Provider: Dr. Bonner    [facility] Fax Number: [phone]    6) Outpatient Infectious Diseases Follow-up    Follow-up appointment will be arranged by the ID clinic and will be found in the patient's appointments tab.    Prior to discharge, please ensure the patient's follow-up has been scheduled.    If there is still no follow-up scheduled prior to discharge, please send an EPIC message to Carrol Mccray in Infectious Diseases.

## 2024-03-29 ENCOUNTER — HOSPITAL ENCOUNTER (INPATIENT)
Facility: HOSPITAL | Age: 74
LOS: 7 days | Discharge: NURSING, REHAB OR LONG-TERM CARE | DRG: 280 | End: 2024-04-05
Attending: INTERNAL MEDICINE | Admitting: INTERNAL MEDICINE
Payer: MEDICARE

## 2024-03-29 VITALS
TEMPERATURE: 98 F | HEIGHT: 62 IN | SYSTOLIC BLOOD PRESSURE: 133 MMHG | BODY MASS INDEX: 35.3 KG/M2 | HEART RATE: 85 BPM | DIASTOLIC BLOOD PRESSURE: 63 MMHG | WEIGHT: 191.81 LBS | OXYGEN SATURATION: 98 % | RESPIRATION RATE: 20 BRPM

## 2024-03-29 DIAGNOSIS — B95.2 BACTEREMIA DUE TO ENTEROCOCCUS: ICD-10-CM

## 2024-03-29 DIAGNOSIS — R53.81 DEBILITY: Primary | ICD-10-CM

## 2024-03-29 DIAGNOSIS — R78.81 BACTEREMIA DUE TO ENTEROCOCCUS: ICD-10-CM

## 2024-03-29 DIAGNOSIS — R07.9 CHEST PAIN: ICD-10-CM

## 2024-03-29 DIAGNOSIS — I38 ENDOCARDITIS: ICD-10-CM

## 2024-03-29 LAB
ALBUMIN SERPL BCP-MCNC: 1.9 G/DL (ref 3.5–5.2)
ALP SERPL-CCNC: 80 U/L (ref 55–135)
ALT SERPL W/O P-5'-P-CCNC: 22 U/L (ref 10–44)
ANION GAP SERPL CALC-SCNC: 12 MMOL/L (ref 8–16)
AST SERPL-CCNC: 37 U/L (ref 10–40)
BASOPHILS # BLD AUTO: 0.07 K/UL (ref 0–0.2)
BASOPHILS NFR BLD: 0.5 % (ref 0–1.9)
BILIRUB SERPL-MCNC: 0.4 MG/DL (ref 0.1–1)
BUN SERPL-MCNC: 24 MG/DL (ref 8–23)
CALCIUM SERPL-MCNC: 9.5 MG/DL (ref 8.7–10.5)
CHLORIDE SERPL-SCNC: 90 MMOL/L (ref 95–110)
CO2 SERPL-SCNC: 39 MMOL/L (ref 23–29)
CREAT SERPL-MCNC: 1 MG/DL (ref 0.5–1.4)
DIFFERENTIAL METHOD BLD: ABNORMAL
EOSINOPHIL # BLD AUTO: 0.1 K/UL (ref 0–0.5)
EOSINOPHIL NFR BLD: 0.8 % (ref 0–8)
ERYTHROCYTE [DISTWIDTH] IN BLOOD BY AUTOMATED COUNT: 13.2 % (ref 11.5–14.5)
EST. GFR  (NO RACE VARIABLE): 59.5 ML/MIN/1.73 M^2
GLUCOSE SERPL-MCNC: 159 MG/DL (ref 70–110)
GLUCOSE SERPL-MCNC: 285 MG/DL (ref 70–110)
HCT VFR BLD AUTO: 37 % (ref 37–48.5)
HGB BLD-MCNC: 11.9 G/DL (ref 12–16)
IMM GRANULOCYTES # BLD AUTO: 0.22 K/UL (ref 0–0.04)
IMM GRANULOCYTES NFR BLD AUTO: 1.7 % (ref 0–0.5)
LYMPHOCYTES # BLD AUTO: 1.4 K/UL (ref 1–4.8)
LYMPHOCYTES NFR BLD: 10.4 % (ref 18–48)
MAGNESIUM SERPL-MCNC: 1.8 MG/DL (ref 1.6–2.6)
MCH RBC QN AUTO: 30.4 PG (ref 27–31)
MCHC RBC AUTO-ENTMCNC: 32.2 G/DL (ref 32–36)
MCV RBC AUTO: 95 FL (ref 82–98)
MONOCYTES # BLD AUTO: 1.6 K/UL (ref 0.3–1)
MONOCYTES NFR BLD: 11.8 % (ref 4–15)
NEUTROPHILS # BLD AUTO: 9.8 K/UL (ref 1.8–7.7)
NEUTROPHILS NFR BLD: 74.8 % (ref 38–73)
NRBC BLD-RTO: 0 /100 WBC
PHOSPHATE SERPL-MCNC: 2.8 MG/DL (ref 2.7–4.5)
PLATELET # BLD AUTO: 321 K/UL (ref 150–450)
PMV BLD AUTO: 10 FL (ref 9.2–12.9)
POCT GLUCOSE: 144 MG/DL (ref 70–110)
POCT GLUCOSE: 273 MG/DL (ref 70–110)
POCT GLUCOSE: 287 MG/DL (ref 70–110)
POTASSIUM SERPL-SCNC: 3.4 MMOL/L (ref 3.5–5.1)
PROT SERPL-MCNC: 6.4 G/DL (ref 6–8.4)
RBC # BLD AUTO: 3.91 M/UL (ref 4–5.4)
SODIUM SERPL-SCNC: 141 MMOL/L (ref 136–145)
WBC # BLD AUTO: 13.11 K/UL (ref 3.9–12.7)

## 2024-03-29 PROCEDURE — 76937 US GUIDE VASCULAR ACCESS: CPT

## 2024-03-29 PROCEDURE — 63600175 PHARM REV CODE 636 W HCPCS: Mod: UD | Performed by: STUDENT IN AN ORGANIZED HEALTH CARE EDUCATION/TRAINING PROGRAM

## 2024-03-29 PROCEDURE — 21400001 HC TELEMETRY ROOM

## 2024-03-29 PROCEDURE — 97110 THERAPEUTIC EXERCISES: CPT

## 2024-03-29 PROCEDURE — 97530 THERAPEUTIC ACTIVITIES: CPT

## 2024-03-29 PROCEDURE — 83735 ASSAY OF MAGNESIUM: CPT

## 2024-03-29 PROCEDURE — 25000003 PHARM REV CODE 250: Performed by: INTERNAL MEDICINE

## 2024-03-29 PROCEDURE — 25000003 PHARM REV CODE 250: Performed by: STUDENT IN AN ORGANIZED HEALTH CARE EDUCATION/TRAINING PROGRAM

## 2024-03-29 PROCEDURE — 84100 ASSAY OF PHOSPHORUS: CPT

## 2024-03-29 PROCEDURE — 85025 COMPLETE CBC W/AUTO DIFF WBC: CPT

## 2024-03-29 PROCEDURE — 97112 NEUROMUSCULAR REEDUCATION: CPT

## 2024-03-29 PROCEDURE — 36573 INSJ PICC RS&I 5 YR+: CPT

## 2024-03-29 PROCEDURE — C1751 CATH, INF, PER/CENT/MIDLINE: HCPCS

## 2024-03-29 PROCEDURE — 36415 COLL VENOUS BLD VENIPUNCTURE: CPT

## 2024-03-29 PROCEDURE — 02HV33Z INSERTION OF INFUSION DEVICE INTO SUPERIOR VENA CAVA, PERCUTANEOUS APPROACH: ICD-10-PCS | Performed by: STUDENT IN AN ORGANIZED HEALTH CARE EDUCATION/TRAINING PROGRAM

## 2024-03-29 PROCEDURE — 25000003 PHARM REV CODE 250

## 2024-03-29 PROCEDURE — 63600175 PHARM REV CODE 636 W HCPCS: Mod: UD

## 2024-03-29 PROCEDURE — A4216 STERILE WATER/SALINE, 10 ML: HCPCS | Performed by: STUDENT IN AN ORGANIZED HEALTH CARE EDUCATION/TRAINING PROGRAM

## 2024-03-29 PROCEDURE — 80053 COMPREHEN METABOLIC PANEL: CPT

## 2024-03-29 RX ORDER — COLCHICINE 0.6 MG/1
0.6 TABLET, FILM COATED ORAL 2 TIMES DAILY
Status: DISCONTINUED | OUTPATIENT
Start: 2024-03-30 | End: 2024-03-29 | Stop reason: HOSPADM

## 2024-03-29 RX ORDER — SODIUM CHLORIDE 0.9 % (FLUSH) 0.9 %
10 SYRINGE (ML) INJECTION
Status: DISCONTINUED | OUTPATIENT
Start: 2024-03-29 | End: 2024-03-29 | Stop reason: HOSPADM

## 2024-03-29 RX ORDER — LANOLIN ALCOHOL/MO/W.PET/CERES
800 CREAM (GRAM) TOPICAL
Status: DISCONTINUED | OUTPATIENT
Start: 2024-03-30 | End: 2024-04-06 | Stop reason: HOSPADM

## 2024-03-29 RX ORDER — ALUMINUM HYDROXIDE, MAGNESIUM HYDROXIDE, AND SIMETHICONE 1200; 120; 1200 MG/30ML; MG/30ML; MG/30ML
30 SUSPENSION ORAL 4 TIMES DAILY PRN
Status: DISCONTINUED | OUTPATIENT
Start: 2024-03-30 | End: 2024-04-06 | Stop reason: HOSPADM

## 2024-03-29 RX ORDER — SODIUM,POTASSIUM PHOSPHATES 280-250MG
2 POWDER IN PACKET (EA) ORAL
Status: DISCONTINUED | OUTPATIENT
Start: 2024-03-30 | End: 2024-04-06 | Stop reason: HOSPADM

## 2024-03-29 RX ORDER — COLCHICINE 0.6 MG/1
1.2 TABLET, FILM COATED ORAL ONCE
Status: COMPLETED | OUTPATIENT
Start: 2024-03-29 | End: 2024-03-29

## 2024-03-29 RX ORDER — TALC
6 POWDER (GRAM) TOPICAL NIGHTLY PRN
Status: DISCONTINUED | OUTPATIENT
Start: 2024-03-30 | End: 2024-04-06 | Stop reason: HOSPADM

## 2024-03-29 RX ORDER — ONDANSETRON HYDROCHLORIDE 2 MG/ML
4 INJECTION, SOLUTION INTRAVENOUS EVERY 6 HOURS PRN
Status: DISCONTINUED | OUTPATIENT
Start: 2024-03-30 | End: 2024-04-06 | Stop reason: HOSPADM

## 2024-03-29 RX ORDER — INSULIN ASPART 100 [IU]/ML
0-10 INJECTION, SOLUTION INTRAVENOUS; SUBCUTANEOUS
Status: DISCONTINUED | OUTPATIENT
Start: 2024-03-30 | End: 2024-03-30

## 2024-03-29 RX ORDER — ACETAMINOPHEN 325 MG/1
650 TABLET ORAL EVERY 4 HOURS PRN
Status: DISCONTINUED | OUTPATIENT
Start: 2024-03-30 | End: 2024-04-06 | Stop reason: HOSPADM

## 2024-03-29 RX ORDER — ENOXAPARIN SODIUM 100 MG/ML
40 INJECTION SUBCUTANEOUS EVERY 24 HOURS
Status: DISCONTINUED | OUTPATIENT
Start: 2024-03-30 | End: 2024-04-06 | Stop reason: HOSPADM

## 2024-03-29 RX ORDER — SODIUM CHLORIDE 0.9 % (FLUSH) 0.9 %
10 SYRINGE (ML) INJECTION EVERY 12 HOURS PRN
Status: DISCONTINUED | OUTPATIENT
Start: 2024-03-30 | End: 2024-04-06 | Stop reason: HOSPADM

## 2024-03-29 RX ORDER — TORSEMIDE 10 MG/1
10 TABLET ORAL DAILY
Status: DISCONTINUED | OUTPATIENT
Start: 2024-03-30 | End: 2024-03-29 | Stop reason: HOSPADM

## 2024-03-29 RX ORDER — IBUPROFEN 200 MG
16 TABLET ORAL
Status: DISCONTINUED | OUTPATIENT
Start: 2024-03-30 | End: 2024-03-30

## 2024-03-29 RX ORDER — COLCHICINE 0.6 MG/1
0.6 TABLET, FILM COATED ORAL ONCE
Status: COMPLETED | OUTPATIENT
Start: 2024-03-29 | End: 2024-03-29

## 2024-03-29 RX ORDER — SODIUM CHLORIDE 0.9 % (FLUSH) 0.9 %
10 SYRINGE (ML) INJECTION EVERY 6 HOURS
Status: DISCONTINUED | OUTPATIENT
Start: 2024-03-29 | End: 2024-03-29 | Stop reason: HOSPADM

## 2024-03-29 RX ORDER — IBUPROFEN 200 MG
24 TABLET ORAL
Status: DISCONTINUED | OUTPATIENT
Start: 2024-03-30 | End: 2024-03-30

## 2024-03-29 RX ORDER — ACETAMINOPHEN 325 MG/1
650 TABLET ORAL EVERY 8 HOURS PRN
Status: DISCONTINUED | OUTPATIENT
Start: 2024-03-30 | End: 2024-04-06 | Stop reason: HOSPADM

## 2024-03-29 RX ORDER — ACETAMINOPHEN 325 MG/1
650 TABLET ORAL EVERY 6 HOURS PRN
Status: DISCONTINUED | OUTPATIENT
Start: 2024-03-29 | End: 2024-03-29 | Stop reason: HOSPADM

## 2024-03-29 RX ORDER — MUPIROCIN 20 MG/G
OINTMENT TOPICAL 2 TIMES DAILY
Status: COMPLETED | OUTPATIENT
Start: 2024-03-29 | End: 2024-04-03

## 2024-03-29 RX ORDER — LOSARTAN POTASSIUM 25 MG/1
25 TABLET ORAL DAILY
Status: DISCONTINUED | OUTPATIENT
Start: 2024-03-29 | End: 2024-03-29 | Stop reason: HOSPADM

## 2024-03-29 RX ORDER — ACETAMINOPHEN 500 MG
1000 TABLET ORAL ONCE
Status: COMPLETED | OUTPATIENT
Start: 2024-03-29 | End: 2024-03-29

## 2024-03-29 RX ORDER — AMOXICILLIN 250 MG
1 CAPSULE ORAL DAILY
Status: DISCONTINUED | OUTPATIENT
Start: 2024-03-30 | End: 2024-04-06 | Stop reason: HOSPADM

## 2024-03-29 RX ORDER — NALOXONE HCL 0.4 MG/ML
0.02 VIAL (ML) INJECTION
Status: DISCONTINUED | OUTPATIENT
Start: 2024-03-30 | End: 2024-04-06 | Stop reason: HOSPADM

## 2024-03-29 RX ORDER — POTASSIUM CHLORIDE 20 MEQ/1
40 TABLET, EXTENDED RELEASE ORAL
Status: COMPLETED | OUTPATIENT
Start: 2024-03-29 | End: 2024-03-29

## 2024-03-29 RX ORDER — HYDROCODONE BITARTRATE AND ACETAMINOPHEN 5; 325 MG/1; MG/1
2 TABLET ORAL EVERY 6 HOURS PRN
Status: DISCONTINUED | OUTPATIENT
Start: 2024-03-30 | End: 2024-04-06 | Stop reason: HOSPADM

## 2024-03-29 RX ORDER — GLUCAGON 1 MG
1 KIT INJECTION
Status: DISCONTINUED | OUTPATIENT
Start: 2024-03-30 | End: 2024-03-30

## 2024-03-29 RX ADMIN — POTASSIUM CHLORIDE 40 MEQ: 1500 TABLET, EXTENDED RELEASE ORAL at 10:03

## 2024-03-29 RX ADMIN — ACETAMINOPHEN 650 MG: 325 TABLET ORAL at 08:03

## 2024-03-29 RX ADMIN — AMPICILLIN 2 G: 2 INJECTION, POWDER, FOR SOLUTION INTRAMUSCULAR; INTRAVENOUS at 06:03

## 2024-03-29 RX ADMIN — INSULIN DETEMIR 6 UNITS: 100 INJECTION, SOLUTION SUBCUTANEOUS at 09:03

## 2024-03-29 RX ADMIN — LOSARTAN POTASSIUM 25 MG: 25 TABLET, FILM COATED ORAL at 10:03

## 2024-03-29 RX ADMIN — CEFTRIAXONE 2 G: 2 INJECTION, POWDER, FOR SOLUTION INTRAMUSCULAR; INTRAVENOUS at 09:03

## 2024-03-29 RX ADMIN — AMLODIPINE BESYLATE 10 MG: 10 TABLET ORAL at 08:03

## 2024-03-29 RX ADMIN — ALLOPURINOL 50 MG: 300 TABLET ORAL at 08:03

## 2024-03-29 RX ADMIN — LEVOTHYROXINE SODIUM 150 MCG: 150 TABLET ORAL at 05:03

## 2024-03-29 RX ADMIN — COLCHICINE 1.2 MG: 0.6 TABLET, FILM COATED ORAL at 03:03

## 2024-03-29 RX ADMIN — AMPICILLIN 2 G: 2 INJECTION, POWDER, FOR SOLUTION INTRAMUSCULAR; INTRAVENOUS at 01:03

## 2024-03-29 RX ADMIN — POTASSIUM CHLORIDE 40 MEQ: 1500 TABLET, EXTENDED RELEASE ORAL at 08:03

## 2024-03-29 RX ADMIN — COLCHICINE 0.6 MG: 0.6 TABLET, FILM COATED ORAL at 05:03

## 2024-03-29 RX ADMIN — INSULIN ASPART 3 UNITS: 100 INJECTION, SOLUTION INTRAVENOUS; SUBCUTANEOUS at 05:03

## 2024-03-29 RX ADMIN — Medication 10 ML: at 05:03

## 2024-03-29 RX ADMIN — ACETAMINOPHEN 1000 MG: 500 TABLET ORAL at 11:03

## 2024-03-29 RX ADMIN — ENOXAPARIN SODIUM 40 MG: 40 INJECTION SUBCUTANEOUS at 05:03

## 2024-03-29 RX ADMIN — TORSEMIDE 20 MG: 20 TABLET ORAL at 08:03

## 2024-03-29 RX ADMIN — MUPIROCIN 1 G: 20 OINTMENT TOPICAL at 11:03

## 2024-03-29 NOTE — SUBJECTIVE & OBJECTIVE
Interval History: PICC line placed. Pending transfer to [facility]. Pending bed placement.     Review of Systems   Constitutional:  Negative for appetite change, chills, diaphoresis, fatigue and fever.   HENT:  Negative for congestion.    Respiratory:  Negative for cough, shortness of breath and wheezing.    Cardiovascular:  Negative for chest pain, palpitations and leg swelling.   Gastrointestinal:  Negative for abdominal pain, blood in stool, constipation, diarrhea, nausea and vomiting.   Genitourinary:  Negative for dysuria, flank pain and frequency.   Musculoskeletal:  Negative for arthralgias, joint swelling and myalgias.   Skin:  Negative for pallor and rash.   Neurological:  Negative for dizziness, light-headedness and headaches.   Psychiatric/Behavioral:  Negative for agitation. The patient is not nervous/anxious.      Objective:     Vital Signs (Most Recent):  Temp: 98.5 °F (36.9 °C) (03/29/24 1101)  Pulse: 103 (03/29/24 1135)  Resp: 20 (03/29/24 1135)  BP: 115/64 (03/29/24 1135)  SpO2: 95 % (03/29/24 1135) Vital Signs (24h Range):  Temp:  [97.5 °F (36.4 °C)-99.9 °F (37.7 °C)] 98.5 °F (36.9 °C)  Pulse:  [] 103  Resp:  [17-20] 20  SpO2:  [95 %-97 %] 95 %  BP: (110-172)/(55-78) 115/64     Weight: 87 kg (191 lb 12.8 oz)  Body mass index is 35.08 kg/m².    Intake/Output Summary (Last 24 hours) at 3/29/2024 1355  Last data filed at 3/29/2024 1321  Gross per 24 hour   Intake 924 ml   Output 950 ml   Net -26 ml         Physical Exam  Constitutional:       General: She is not in acute distress.  HENT:      Head: Normocephalic and atraumatic.   Eyes:      Extraocular Movements: Extraocular movements intact.      Pupils: Pupils are equal, round, and reactive to light.   Cardiovascular:      Rate and Rhythm: Normal rate and regular rhythm.      Heart sounds: No murmur heard.  Pulmonary:      Effort: Pulmonary effort is normal. No respiratory distress.      Breath sounds: No wheezing or rales.       Comments: On 3L NC   Abdominal:      General: Abdomen is flat. Bowel sounds are normal. There is no distension.      Palpations: Abdomen is soft.      Tenderness: There is no abdominal tenderness.   Musculoskeletal:         General: No swelling or tenderness. Normal range of motion.      Cervical back: Normal range of motion.      Right lower leg: No edema.      Left lower leg: No edema.   Lymphadenopathy:      Cervical: No cervical adenopathy.   Skin:     General: Skin is warm and dry.      Coloration: Skin is not jaundiced.      Findings: No bruising.   Neurological:      General: No focal deficit present.      Mental Status: She is alert and oriented to person, place, and time.             Significant Labs: All pertinent labs within the past 24 hours have been reviewed.    Significant Imaging: I have reviewed all pertinent imaging results/findings within the past 24 hours.

## 2024-03-29 NOTE — PROCEDURES
"Tereza Larose is a 73 y.o. female patient.    Temp: 98.5 °F (36.9 °C) (03/29/24 1101)  Pulse: 101 (03/29/24 1101)  Resp: 20 (03/29/24 1101)  BP: (!) 110/55 (03/29/24 1101)  SpO2: 95 % (03/29/24 1101)  Weight: 87 kg (191 lb 12.8 oz) (03/26/24 0900)  Height: 5' 2" (157.5 cm) (03/26/24 0900)    PICC  Date/Time: 3/29/2024 10:40 AM  Performed by: Mary Vázquez RN  Assisting provider: Radha Weiss RN  Consent Done: Yes  Time out: Immediately prior to procedure a time out was called to verify the correct patient, procedure, equipment, support staff and site/side marked as required  Indications: med administration and vascular access  Anesthesia: local infiltration  Local anesthetic: lidocaine 1% without epinephrine  Anesthetic Total (mL): 3  Description of findings: PICC line placement  Preparation: skin prepped with ChloraPrep  Skin prep agent dried: skin prep agent completely dried prior to procedure  Sterile barriers: all five maximum sterile barriers used - cap, mask, sterile gown, sterile gloves, and large sterile sheet  Hand hygiene: hand hygiene performed prior to central venous catheter insertion  Location details: right basilic  Catheter type: double lumen  Catheter size: 5 Fr  Catheter Length: 36cm    Ultrasound guidance: yes  Vessel Caliber: medium and patent, compressibility normal  Needle advanced into vessel with real time Ultrasound guidance.  Guidewire confirmed in vessel.  Image recorded and saved.  Sterile sheath used.  Number of attempts: 1  Post-procedure: blood return through all ports, chlorhexidine patch and sterile dressing applied  Technical procedures used: 3 CG  Specimens: No  Implants: No  Assessment: placement verified by x-ray  Complications: none          Name ELIF Weiss RN  3/29/2024    "

## 2024-03-29 NOTE — PLAN OF CARE
Problem: Fall Injury Risk  Goal: Absence of Fall and Fall-Related Injury  Outcome: Met     Problem: Dysrhythmia  Goal: Normalized Cardiac Rhythm  Outcome: Met     Problem: Infection  Goal: Absence of Infection Signs and Symptoms  Outcome: Met     Problem: Fluid and Electrolyte Imbalance (Acute Kidney Injury/Impairment)  Goal: Fluid and Electrolyte Balance  Outcome: Met     Problem: Oral Intake Inadequate (Acute Kidney Injury/Impairment)  Goal: Optimal Nutrition Intake  Outcome: Met     Problem: Renal Function Impairment (Acute Kidney Injury/Impairment)  Goal: Effective Renal Function  Outcome: Met     Problem: Diabetes Comorbidity  Goal: Blood Glucose Level Within Targeted Range  Outcome: Met

## 2024-03-29 NOTE — PROGRESS NOTES
[facility] - Cardiology [facility] Medicine  Progress Note    Patient Name: Tereza Larose  MRN: [id number]  Patient Class: IP- Inpatient   Admission Date: 3/26/2024  Length of Stay: 3 days  Attending Physician: Christopher Williamson DO  Primary Care Provider: Evan Sánchez MD        Subjective:     Principal Problem:Acute bacterial endocarditis        HPI:  Ms. Larose is a 72 yo F with CAD, HFpEF, pacemaker in place, HTN, HLD, DMII, COPD who originally presented to OSH after a fall, later was found to have NSTEMI and subsequently had VERONA and Enterococcus bacteremia. Cardiology and ID were consulted, she was started on IV CTX and ampicillin. DELORIS showed small mobile mass located on a right atrial lead. Proximal measurements are 1 cm x 0.5 cm. Case discussed with EP and Interventional Cardiology who will consult on the patient. Currently hemodynamically stable on 3L NC. Denies headaches, visual changes, SOB, cough, chest pain, palpitation, nausea, vomiting, abdominal pain, diarrhea, constipation, urinary frequency or any weakness. AT C the pt was restarted on CTX and ampicillin, ID consulted.       Overview/Hospital Course:  Pt admitted to hospital medicine for VERONA and enterococcus bacteremia. Pt started on ceftriaxone and ampicillin. ID consulted.Blood cultures growing enterococcus faecalis.  EP and interventional cardiology consulted for pacemaker removal, however patient is not a candidate due to her severe portal hypertension. Patient to have 6 weeks of IAmpicillin 2g IV q 6 hours Ceftriaxone 2g IV q 12 hours  followed by PO antibiotic suppression. Pt is set to be transferred to [facility] to continue treatment.     Interval History: PICC line placed. Pending transfer to [facility]. Pending bed placement.     Review of Systems   Constitutional:  Negative for appetite change, chills, diaphoresis, fatigue and fever.   HENT:  Negative for congestion.    Respiratory:  Negative for  cough, shortness of breath and wheezing.    Cardiovascular:  Negative for chest pain, palpitations and leg swelling.   Gastrointestinal:  Negative for abdominal pain, blood in stool, constipation, diarrhea, nausea and vomiting.   Genitourinary:  Negative for dysuria, flank pain and frequency.   Musculoskeletal:  Negative for arthralgias, joint swelling and myalgias.   Skin:  Negative for pallor and rash.   Neurological:  Negative for dizziness, light-headedness and headaches.   Psychiatric/Behavioral:  Negative for agitation. The patient is not nervous/anxious.      Objective:     Vital Signs (Most Recent):  Temp: 98.5 °F (36.9 °C) (03/29/24 1101)  Pulse: 103 (03/29/24 1135)  Resp: 20 (03/29/24 1135)  BP: 115/64 (03/29/24 1135)  SpO2: 95 % (03/29/24 1135) Vital Signs (24h Range):  Temp:  [97.5 °F (36.4 °C)-99.9 °F (37.7 °C)] 98.5 °F (36.9 °C)  Pulse:  [] 103  Resp:  [17-20] 20  SpO2:  [95 %-97 %] 95 %  BP: (110-172)/(55-78) 115/64     Weight: 87 kg (191 lb 12.8 oz)  Body mass index is 35.08 kg/m².    Intake/Output Summary (Last 24 hours) at 3/29/2024 1355  Last data filed at 3/29/2024 1321  Gross per 24 hour   Intake 924 ml   Output 950 ml   Net -26 ml         Physical Exam  Constitutional:       General: She is not in acute distress.  HENT:      Head: Normocephalic and atraumatic.   Eyes:      Extraocular Movements: Extraocular movements intact.      Pupils: Pupils are equal, round, and reactive to light.   Cardiovascular:      Rate and Rhythm: Normal rate and regular rhythm.      Heart sounds: No murmur heard.  Pulmonary:      Effort: Pulmonary effort is normal. No respiratory distress.      Breath sounds: No wheezing or rales.      Comments: On 3L NC   Abdominal:      General: Abdomen is flat. Bowel sounds are normal. There is no distension.      Palpations: Abdomen is soft.      Tenderness: There is no abdominal tenderness.   Musculoskeletal:         General: No swelling or tenderness. Normal range of  motion.      Cervical back: Normal range of motion.      Right lower leg: No edema.      Left lower leg: No edema.   Lymphadenopathy:      Cervical: No cervical adenopathy.   Skin:     General: Skin is warm and dry.      Coloration: Skin is not jaundiced.      Findings: No bruising.   Neurological:      General: No focal deficit present.      Mental Status: She is alert and oriented to person, place, and time.             Significant Labs: All pertinent labs within the past 24 hours have been reviewed.    Significant Imaging: I have reviewed all pertinent imaging results/findings within the past 24 hours.    Assessment/Plan:      * Acute bacterial endocarditis  Pt presenting from OSH for higher level of care after she was found to have enterococcus faecalis bacteremia. DELORIS remarkable for Right Atrium: Right atrium is dilated. Multiple leads viewed in the right atrium. There is a 1 x 0.6 cm mass attached to a lead in the right atrium that demonstrates independent motion. Concerning for possible vegetations.          Outpatient Antibiotic Therapy Plan:     Please send referral to [facility].     1) Infection:  E faecalis endocarditis     2) Discharge Antibiotics:     Intravenous antibiotics:  Ampicillin 2g IV q 6 hours   Ceftriaxone 2g IV q 12 hours         3) Therapy Duration:  6 weeks     Estimated end date of IV antibiotics: 05/08/24     4) Outpatient Weekly Labs:     Order the following labs to be drawn on Mondays:   CBC  CMP   CRP     5) Fax Lab Results to Infectious Diseases Provider: Dr. Bonner     [facility] Fax Number: [phone]     6) Outpatient Infectious Diseases Follow-up     Follow-up appointment will be arranged by the ID clinic and will be found in the patient's appointments tab.     Prior to discharge, please ensure the patient's follow-up has been scheduled.    If there is still no follow-up scheduled prior to discharge, please send an EPIC message to Carrol  Mahendra in Infectious Diseases.          Plan  - ID consulted, appreciate recs  -Will need 6 weeks of IV Ceftriaxone and Ampicillin from negative blood cultures  - Lifelong PO suppressive amoxicillin after 6 weeks of IV antibiotic therapy completed  - PICC consult  - Follow up CBC  - Follow up cultures    Pacemaker infection  Patient is not a candidate for removal of pacemaker due to her severe pulmonary hypertension being a severe risk for decompensation during intubation/anesthesia.   Will have 6 weeks of IV antibiotic therapy      Pacemaker    Please see Acute bacterial endocarditis     Hypothyroidism  TSH elevated to 40.97.     Will adjust levothyroxine       VERONA (acute kidney injury)  Patient with acute kidney injury/acute renal failure likely due to acute tubular necrosis caused by sepsis  VERONA is currently improving. Baseline creatinine unknown - Labs reviewed- Renal function/electrolytes with Estimated Creatinine Clearance: 51.3 mL/min (based on SCr of 1 mg/dL). according to latest data. Monitor urine output and serial BMP and adjust therapy as needed. Avoid nephrotoxins and renally dose meds for GFR listed above.    COPD (chronic obstructive pulmonary disease)  Patient's COPD is controlled currently. Patient is currently off COPD Pathway. Justice p.r.n. On 3 L NC on baseline with SpO2 96%. We will monitor respiratory status     Severe pulmonary hypertension  Pt presenting from OSH for enterococcus bacteremia with endocarditis. Echo showing Pulmonary Artery: There is moderate pulmonary hypertension. The estimated pulmonary artery systolic pressure is 63 mmHg. This makes the patient not a candidate for pacemaker removal as she is a very high risk for intubation.  Palliative care consulted for GOC discussion.     Acute on chronic combined systolic and diastolic CHF (congestive heart failure)  Patient is identified as having Combined Systolic and Diastolic heart failure that is Chronic. CHF is currently  controlled. Latest ECHO performed and demonstrates- Results for orders placed during the hospital encounter of 03/22/24    Echo    Interpretation Summary    Left Ventricle: The left ventricle is normal in size. Normal wall thickness. Normal wall motion. Septal motion is consistent with pacing. There is normal systolic function with a visually estimated ejection fraction of 60 - 65%. There is normal diastolic function.    Right Ventricle: Normal right ventricular cavity size. Wall thickness is normal. Right ventricle wall motion  is normal. Systolic function is normal. Pacemaker lead present in the ventricle.    Right Atrium: Multiple leads present in the right atrium.    Mitral Valve: There is severe bileaflet sclerosis. There is severe anterior mitral annular calcification present. There is normal leaflet mobility. There is mild to moderate regurgitation.    Tricuspid Valve: There is moderate regurgitation with a centrally directed jet.    Pulmonary Artery: There is moderate pulmonary hypertension. The estimated pulmonary artery systolic pressure is 63 mmHg.    IVC/SVC: Elevated venous pressure at 15 mmHg.  . Continue Beta Blocker and monitor clinical status closely. Monitor on telemetry. Patient is off CHF pathway.  Monitor strict Is&Os and daily weights.  Place on fluid restriction of 2 L. Cardiology has been consulted. Continue to stress to patient importance of self efficacy and  on diet for CHF. Last BNP reviewed- and noted below   Recent Labs   Lab 03/25/24  0533   *         T2DM (type 2 diabetes mellitus)  Patient's FSGs are controlled on current medication regimen.  Last A1c reviewed-   Lab Results   Component Value Date    HGBA1C 7.9 (H) 03/22/2024     Most recent fingerstick glucose reviewed-   Recent Labs   Lab 03/26/24  0833   POCTGLUCOSE 160*     Current correctional scale  Low  Maintain anti-hyperglycemic dose as follows-   Antihyperglycemics (From admission, onward)      Start     Stop  Route Frequency Ordered    03/26/24 0809  insulin aspart U-100 pen 0-5 Units         -- SubQ Before meals & nightly PRN 03/26/24 0710          Hold Oral hypoglycemics while patient is in the hospital.    HTN (hypertension)  Chronic, controlled. Latest blood pressure and vitals reviewed-     Temp:  [97.5 °F (36.4 °C)-99.1 °F (37.3 °C)]   Pulse:  [73-95]   Resp:  [16-18]   BP: (141-167)/(65-72)   SpO2:  [92 %-98 %] .   Home meds for hypertension were reviewed and noted below.   Hypertension Medications               amLODIPine (NORVASC) 10 MG tablet TAKE 1 TABLET BY MOUTH EVERY DAY FOR SYSTOLIC BLOOD PRESSURE GREATER THAN 120    metoprolol succinate (TOPROL-XL) 50 MG 24 hr tablet Take 1 tablet (50 mg total) by mouth once daily.    sotaloL (BETAPACE) 80 MG tablet Take 80 mg by mouth 2 (two) times daily.    torsemide (DEMADEX) 20 MG Tab Take 20 mg by mouth.            While in the hospital, will manage blood pressure as follows; Continue home antihypertensive regimen continue amlodipine, hold metoprolol.     Will utilize p.r.n. blood pressure medication only if patient's blood pressure greater than 180/110 and she develops symptoms such as worsening chest pain or shortness of breath.    NSTEMI (non-ST elevated myocardial infarction)        CAD (coronary artery disease)  Patient with known CAD s/p CABG, which is uncontrolled Will continue  heparin gtt and Statin and monitor for S/Sx of angina/ACS. Continue to monitor on telemetry.   Troponins trending down.      VTE Risk Mitigation (From admission, onward)           Ordered     enoxaparin injection 40 mg  Every 24 hours         03/27/24 1715     IP VTE HIGH RISK PATIENT  Once         03/26/24 0616     Place sequential compression device  Until discontinued         03/26/24 0616                    Discharge Planning   FAVIOLA: 4/1/2024     Code Status: DNR   Is the patient medically ready for discharge?: No    Reason for patient still in hospital (select all that apply):  Pending disposition  Discharge Plan A: Skilled Nursing Facility                  Willie Davey MD  Department of Hospital Medicine   [facility] - Cardiology Stepdown

## 2024-03-29 NOTE — NURSING
Transfer Center called and stated that pt has been accepted to transfer back to [facility] under Dr. Amador Freeman, but they are just waiting for a bed to be available.  ARJUN GIL

## 2024-03-29 NOTE — PLAN OF CARE
Problem: Fall Injury Risk  Goal: Absence of Fall and Fall-Related Injury  Outcome: Ongoing, Progressing     Problem: Dysrhythmia  Goal: Normalized Cardiac Rhythm  Outcome: Ongoing, Progressing     Problem: Infection  Goal: Absence of Infection Signs and Symptoms  Outcome: Ongoing, Progressing     Problem: Fluid and Electrolyte Imbalance (Acute Kidney Injury/Impairment)  Goal: Fluid and Electrolyte Balance  Outcome: Ongoing, Progressing     Problem: Oral Intake Inadequate (Acute Kidney Injury/Impairment)  Goal: Optimal Nutrition Intake  Outcome: Ongoing, Progressing     Problem: Renal Function Impairment (Acute Kidney Injury/Impairment)  Goal: Effective Renal Function  Outcome: Ongoing, Progressing     Problem: Diabetes Comorbidity  Goal: Blood Glucose Level Within Targeted Range  Outcome: Ongoing, Progressing

## 2024-03-29 NOTE — ASSESSMENT & PLAN NOTE
Pt presenting from OSH for higher level of care after she was found to have enterococcus faecalis bacteremia. DELORIS remarkable for Right Atrium: Right atrium is dilated. Multiple leads viewed in the right atrium. There is a 1 x 0.6 cm mass attached to a lead in the right atrium that demonstrates independent motion. Concerning for possible vegetations.          Outpatient Antibiotic Therapy Plan:     Please send referral to [facility].     1) Infection:  E faecalis endocarditis     2) Discharge Antibiotics:     Intravenous antibiotics:  Ampicillin 2g IV q 6 hours   Ceftriaxone 2g IV q 12 hours         3) Therapy Duration:  6 weeks     Estimated end date of IV antibiotics: 05/08/24     4) Outpatient Weekly Labs:     Order the following labs to be drawn on Mondays:   CBC  CMP   CRP     5) Fax Lab Results to Infectious Diseases Provider: Dr. Bonner     [facility] Fax Number: [phone]     6) Outpatient Infectious Diseases Follow-up     Follow-up appointment will be arranged by the ID clinic and will be found in the patient's appointments tab.     Prior to discharge, please ensure the patient's follow-up has been scheduled.    If there is still no follow-up scheduled prior to discharge, please send an EPIC message to Carrol Mccray in Infectious Diseases.          Plan  - ID consulted, appreciate recs  -Will need 6 weeks of IV Ceftriaxone and Ampicillin from negative blood cultures  - Lifelong PO suppressive amoxicillin after 6 weeks of IV antibiotic therapy completed  - PICC consult  - Follow up CBC  - Follow up cultures

## 2024-03-29 NOTE — CONSULTS
JAMIE consulted for PICC insertion in real time using ultrasound guidance.    Indication:  IV abx for 6 weeks--ampicillin and ceftriaxone, end date 5/8/24  Number of lumens: Double  Location: Right Basilic vein  Length (cm): 36  Exposed (cm): 0  Arm circumference (cm): 35  Lot #: [id number]    Image saved and uploaded to EMR.

## 2024-03-29 NOTE — PT/OT/SLP PROGRESS
Occupational Therapy   Co-Treatment  Co-treatment performed due to patient's multiple deficits requiring two skilled therapists to appropriately and safely assess patient's strength and endurance while facilitating functional tasks in addition to accommodating for patient's activity tolerance.      Name: Tereza Larose  MRN: [id number]  Admitting Diagnosis:  Acute bacterial endocarditis       Recommendations:     Discharge Recommendations: Moderate Intensity Therapy  Discharge Equipment Recommendations:  hospital bed, lift device, wheelchair, bedside commode  Barriers to discharge:  Other (Comment) (increased skilled assistance required for ADLs & functional mobility)    Assessment:     Tereza Larose is a 73 y.o. female with a medical diagnosis of Acute bacterial endocarditis.  She presents with the following performance deficits affecting function are weakness, impaired endurance, impaired self care skills, impaired functional mobility, gait instability, impaired balance, decreased upper extremity function, decreased lower extremity function, decreased safety awareness, pain, impaired cardiopulmonary response to activity.     Pt agreeable to therapy and tolerated fairly. However, pt remains significantly limited in ADLs, functional mobility, and functional transfers and is currently not performing tasks at [facility]. Pt would continue to benefit from skilled OT services to maximize functional independence with ADLs and functional mobility, reduce caregiver burden, and facilitate safe discharge in the least restrictive environment.     Rehab Prognosis:  Good; patient would benefit from acute skilled OT services to address these deficits and reach maximum level of function.       Plan:     Patient to be seen 3 x/week to address the above listed problems via self-care/home management, therapeutic exercises, therapeutic activities, neuromuscular re-education  Plan of Care Expires: 04/26/24  Plan of Care Reviewed with:  patient    Subjective     Chief Complaint: R foot & ankle pain 2/2 gout  Patient/Family Comments/goals: pt agreeable to participate in therapy  Pain/Comfort:  Pain Rating 1: 0/10  Location - Side 1: Right  Location - Orientation 1: generalized  Location 1: foot  Pain Addressed 1: Nurse notified, Reposition, Distraction, Cessation of Activity  Pain Rating Post-Intervention 1: other (see comments) (pt did not rate)    Objective:     Communicated with: RN prior to session.  Patient found HOB elevated with oxygen, peripheral IV, telemetry, PureWick & son present upon OT entry to room.    General Precautions: Standard, fall    Orthopedic Precautions:N/A  Braces: N/A  Respiratory Status: Room air     Occupational Performance:     Bed Mobility:    Patient completed Scooting to EOB & HOB with total assistance and 2 persons  Patient completed Supine to Sit with maximal assistance and 2 persons  Patient completed Sit to Supine with maximal assistance and 2 persons     Functional Mobility/Transfers:  Patient attempted 2  Sit <> Stand Transfers with total assistance and of 2 persons  1 STS using rolling walker  2nd STS with HHA, no AD  Zero hip clearance, poor UE activation, & minimal to no initiation of B LE  Pt reported significant pain through feet  Functional Mobility: pt unable to ambulate at this time    Therapeutic exercises: pt completed the following therapeutic exercises to maintain/improve UE & UB strength & muscular endurance required for participation/independence with ADLs & functional mobility/transfers:  - Core strengthening exercise which consisted of various trunk movements/directions (left, right, forward, & backward) and holding trunk position for 3-5 seconds each   - AROM shoulder flexion x10 reps each UE (targets utilized to achieve greater shoulder flexion)  - Static elbow extension against moderate resistance 2 sets of 10 second hold on each UE (purpose is to simulate/improve WB through rolling  walker)  - Rest breaks were required in between each exercise & set    Activities of Daily Living:  Lower Body Dressing: total assistance to don socks    AMPAC 6 Click ADL: 12    Treatment & Education:  -Pt educated on hand placement for transfers  -Pt educated on RW placement during functional transfers  -Education on energy conservation and task modification to maximize safety and (I) during ADLs and mobility  -Education on importance of OOB activity to improve overall activity tolerance and promote recovery  -Pt educated to call for assistance and to transfer with hospital staff only  Pt had no further questions & when asked whether there were any concerns pt reported none.     Patient left HOB elevated with all lines intact, call button in reach, and RN notified & present.    GOALS:   Multidisciplinary Problems       Occupational Therapy Goals          Problem: Occupational Therapy    Goal Priority Disciplines Outcome Interventions   Occupational Therapy Goal     OT, PT/OT Ongoing, Progressing    Description: Goals to be met by: 4/26/24     Patient will increase functional independence with ADLs by performing:    UE Dressing with Minimal Assistance.  LE Dressing with Moderate Assistance and AE.  Grooming while seated with Set-up Assistance.  Toileting from bedside commode with Moderate Assistance for hygiene and clothing management.   Supine to sit with Minimal Assistance.  Toilet transfer to bedside commode with Moderate Assistance.                         Time Tracking:     OT Date of Treatment: 03/29/24  OT Start Time: 1248  OT Stop Time: 1317  OT Total Time (min): 29 min    Billable Minutes:Therapeutic Exercise 29    OT/CASTRO: OT          3/29/2024

## 2024-03-29 NOTE — PLAN OF CARE
[facility] - [facility]iology Stepdown  Discharge Reassessment    Primary Care Provider: Evan Sánchez MD    Expected Discharge Date: 4/1/2024    Reassessment (most recent)       Discharge Reassessment - 03/29/24 1415          Discharge Reassessment    Assessment Type Discharge Planning Reassessment     Did the patient's condition or plan change since previous assessment? Yes     Discharge Plan discussed with: Patient;Spouse/sig other     Name(s) and Number(s) Pollo Larose () [phone]     Communicated FAVIOLA with patient/caregiver Date not available/Unable to determine     Discharge Plan A Hospital Transfer     Discharge Plan B Skilled Nursing Facility     Transition of Care Barriers None     Why the patient remains in the hospital Requires continued medical care        Post-Acute Status    Post-Acute Authorization Placement     Post-Acute Placement Status Pending medical clearance/testing                 Pt accepted to transfer back to [facility] once a bed becomes available.  Meanwhile ARJUN received call from Latosha at [facility] reminding ARJUN that auth expires tomorrow.  ARJUN sent updated clinicals to Latosha in [facility] so she can work on extended the auth.  ARJUN informed Latosha of plan for pt to transfer back to [facility].  ARJUN met with pt at bedside with  Pollo on speakerphone and updated them on the above.  Will continue to follow.      Latha Wang LMSW  [facility]  [phone]

## 2024-03-29 NOTE — PT/OT/SLP PROGRESS
Physical Therapy Co-Treatment    Patient Name:  Tereza Larose   MRN:  [id number]  Admitting Diagnosis:  Acute bacterial endocarditis   Recent Surgery: Procedure(s) (LRB):  EXTRACTION, ELECTRODE LEAD (Left)  ECHOCARDIOGRAM, TRANSESOPHAGEAL (N/A)    Admit Date: 3/26/2024  Length of Stay: 3 days    Recommendations:     Discharge Recommendations:    Moderate Intensity Therapy   Discharge Equipment Recommendations: hospital bed, lift device, wheelchair, bedside commode   Barriers to discharge: Inaccessible home, Decreased caregiver support, and increased need for assistance, increased fall risk      Plan:     During this hospitalization, patient to be seen 3 x/week to address the identified rehab impairments via gait training, therapeutic activities, therapeutic exercises, neuromuscular re-education and progress towards the established goals.  Plan of Care Expires:  04/27/24  Plan of Care Reviewed with: patient    Assessment:     Tereza Larose is a 73 y.o. female admitted with a medical diagnosis of Acute bacterial endocarditis. Pt found with HOB elevated agreeable to therapy session. Pt continues to require increased assistance with bed mobility but demo'd improved sitting balance this session as she required decreased levels of assistance. Pt attempted STS transfer x2 attempts but pt with reports of B foot pain during attempts so unable to fully clear hips from EOB with max A x2 persons. Patient would benefit from skilled therapy services to maximize safety and independence, increase activity tolerance, decrease fall risk, decrease caregiver burden, improve QOL, improve patient's functional mobility, and decrease risk of contractures and pressure sores. Patient continues to demonstrate the need for moderate intensity therapy on a daily basis post acute exhibited by decreased independence with functional mobility     Problem List: weakness, impaired endurance, impaired self care skills, impaired functional mobility,  "gait instability, impaired balance, decreased coordination, decreased upper extremity function, decreased lower extremity function, decreased safety awareness, pain, impaired cardiopulmonary response to activity, decreased ROM, impaired skin.  Rehab Prognosis: Good; patient would benefit from acute skilled PT services to address these deficits and reach maximum level of function.      Goals:   Multidisciplinary Problems       Physical Therapy Goals          Problem: Physical Therapy    Goal Priority Disciplines Outcome Goal Variances Interventions   Physical Therapy Goal     PT, PT/OT Ongoing, Progressing     Description: Goals to be met by: 24     Patient will increase functional independence with mobility by performin. Supine to sit with Moderate Assistance  2. Sit to stand transfer with Moderate Assistance  3. Bed to chair transfer with Moderate Assistance using LRAD  4. Gait  x 25 feet with Moderate Assistance using LRAD.   5. Ascend/descend 5 stair with left Handrails Moderate Assistance using LRAD.   5. Sitting at edge of bed x10 minutes with Contact Guard Assistance  6. Stand for 5 minutes with Moderate Assistance using LRAD  7. Lower extremity exercise program x15 reps per handout, with assistance as needed                         Subjective     RN notified prior to session. No one present upon PT entrance into room. Patient agreeable to PT treatment session.    Chief Complaint: "It feels good to sit up"; "They say if you ask your nurse for the gout pill it'll fix you"  Patient/Family Comments/goals: increase mobility and decrease foot pain  Pain/Comfort:  Pain Rating 1: 0/10  Location - Side 1: Bilateral  Location - Orientation 1: generalized  Location 1: foot  Pain Addressed 1: Reposition, Distraction, Nurse notified, Pre-medicate for activity  Pain Rating Post-Intervention 1: other (see comments) (unrated)      Objective:     Additional staff present: OT; OT for cotx due to pt's multiple " "medical comorbidities and functional deficits req'ing two skilled therapists to appropriately maximize functional potential by progressing pt's musculoskeletal strength and endurance, facilitating neuromuscular postural balance and control ,and accommodating for patient's impaired cardiopulmonary tolerance to activities.     Patient found HOB elevated with: oxygen, PureWick, telemetry, peripheral IV   Cognition:   Alert and Cooperative  Patient is oriented to Person, Place, Time, Situation  General Precautions: Standard, Cardiac fall   Orthopedic Precautions:N/A   Braces: N/A   Body mass index is 35.08 kg/m².  Oxygen Device: Nasal Cannula 3L  Vitals: /64 (BP Location: Right forearm, Patient Position: Lying)   Pulse 103   Temp 98.5 °F (36.9 °C) (Oral)   Resp 20   Ht 5' 2" (1.575 m)   Wt 87 kg (191 lb 12.8 oz)   SpO2 95%   BMI 35.08 kg/m²     Outcome Measures:  AM-PAC 6 CLICK MOBILITY  Turning over in bed (including adjusting bedclothes, sheets and blankets)?: 2  Sitting down on and standing up from a chair with arms (e.g., wheelchair, bedside commode, etc.): 2  Moving from lying on back to sitting on the side of the bed?: 2  Moving to and from a bed to a chair (including a wheelchair)?: 1  Need to walk in hospital room?: 1  Climbing 3-5 steps with a railing?: 1  Basic Mobility Total Score: 9     Functional Mobility:    Bed Mobility:   Scooting with moderate assistance  Supine > Sit with maximal assistance and 2 persons  Sit > Supine with maximal assistance and 2 persons    Transfers:   Sit <> Stand Transfer: maximal assistance and of 2 persons with rolling walker x1 trial, no AD x1 trial  Bed <> Chair Transfer: Not performed 2/2 pt safety    Balance:  Sitting Balance  Static: stand by assistance (with BUE support) to minimum assistance (with no or x1 RUE support)  Dynamic:  stand by assistance to minimum assistance  Pt demo'd R lateral lean at times during trial requiring tactile and verbal cueing for " orientation to midline. Pt completed UE and core exercises with OT while PT provided cueing for upright stance, scapular retraction, COG over JOHANNE and midline orientation.     Therapeutic Exercises:   Patient performed 1 set(s) of 10 repetitions of  the following seated exercises: ankle pumps, long arc quads, and marches for bilateral LE. Patient required skilled PT for instruction of exercises and appropriate cues to perform exercises safely and appropriately.    Education:  Time provided for education, counseling and discussion of health disposition in regards to patient's current status  All questions answered within PT scope of practice and to patient's satisfaction  PT role in POC to address current functional deficits  Pt educated on proper body mechanics, safety techniques, and energy conservation with PT facilitation and cueing throughout session  Importance of ankle pumps to prevent muscle atrophy    Patient left HOB elevated with all lines intact, call button in reach, and RN present.    Time Tracking:     PT Received On: 03/29/24  PT Start Time: 1248     PT Stop Time: 1317  PT Total Time (min): 29 min     Billable Minutes:   Therapeutic Exercise 12 minutes and Neuromuscular Re-education 12 minutes       PT/PTA: PT       3/29/2024

## 2024-03-29 NOTE — PROGRESS NOTES
Patient being transferred to [facility] to room 2507. VSS. Discharge instructions and patient belongings given to patient. 3L NC. PICC line to remain in place for long term IV antibiotics. Attempted to call report three times before patient departed at 1750, 1815, and 1834. Was placed on hold and never taken off. Transport arrived and departed with patient at 1835 for [facility]. Report given to JEFFERY Alan at 1848.

## 2024-03-30 LAB
ALBUMIN SERPL BCP-MCNC: 2.9 G/DL (ref 3.5–5.2)
ALP SERPL-CCNC: 77 U/L (ref 55–135)
ALT SERPL W/O P-5'-P-CCNC: 27 U/L (ref 10–44)
ANION GAP SERPL CALC-SCNC: 8 MMOL/L (ref 8–16)
AST SERPL-CCNC: 42 U/L (ref 10–40)
BACTERIA BLD CULT: NORMAL
BASOPHILS # BLD AUTO: 0.07 K/UL (ref 0–0.2)
BASOPHILS NFR BLD: 0.7 % (ref 0–1.9)
BILIRUB SERPL-MCNC: 0.4 MG/DL (ref 0.1–1)
BLD PROD TYP BPU: NORMAL
BLOOD UNIT EXPIRATION DATE: NORMAL
BLOOD UNIT TYPE CODE: 5100
BLOOD UNIT TYPE: NORMAL
BUN SERPL-MCNC: 29 MG/DL (ref 8–23)
CALCIUM SERPL-MCNC: 9.1 MG/DL (ref 8.7–10.5)
CHLORIDE SERPL-SCNC: 92 MMOL/L (ref 95–110)
CO2 SERPL-SCNC: 39 MMOL/L (ref 23–29)
CODING SYSTEM: NORMAL
CREAT SERPL-MCNC: 0.9 MG/DL (ref 0.5–1.4)
CROSSMATCH INTERPRETATION: NORMAL
DIFFERENTIAL METHOD BLD: ABNORMAL
DISPENSE STATUS: NORMAL
EOSINOPHIL # BLD AUTO: 0.4 K/UL (ref 0–0.5)
EOSINOPHIL NFR BLD: 3.8 % (ref 0–8)
ERYTHROCYTE [DISTWIDTH] IN BLOOD BY AUTOMATED COUNT: 13.4 % (ref 11.5–14.5)
ERYTHROCYTE [SEDIMENTATION RATE] IN BLOOD BY WESTERGREN METHOD: 66 MM/HR (ref 0–20)
EST. GFR  (NO RACE VARIABLE): >60 ML/MIN/1.73 M^2
GLUCOSE SERPL-MCNC: 168 MG/DL (ref 70–110)
GLUCOSE SERPL-MCNC: 169 MG/DL (ref 70–110)
GLUCOSE SERPL-MCNC: 202 MG/DL (ref 70–110)
HCT VFR BLD AUTO: 37.4 % (ref 37–48.5)
HGB BLD-MCNC: 11.8 G/DL (ref 12–16)
IMM GRANULOCYTES # BLD AUTO: 0.23 K/UL (ref 0–0.04)
IMM GRANULOCYTES NFR BLD AUTO: 2.2 % (ref 0–0.5)
LYMPHOCYTES # BLD AUTO: 1.2 K/UL (ref 1–4.8)
LYMPHOCYTES NFR BLD: 11.5 % (ref 18–48)
MAGNESIUM SERPL-MCNC: 1.6 MG/DL (ref 1.6–2.6)
MCH RBC QN AUTO: 30.2 PG (ref 27–31)
MCHC RBC AUTO-ENTMCNC: 31.6 G/DL (ref 32–36)
MCV RBC AUTO: 96 FL (ref 82–98)
MONOCYTES # BLD AUTO: 1.3 K/UL (ref 0.3–1)
MONOCYTES NFR BLD: 12.2 % (ref 4–15)
NEUTROPHILS # BLD AUTO: 7.4 K/UL (ref 1.8–7.7)
NEUTROPHILS NFR BLD: 69.6 % (ref 38–73)
NRBC BLD-RTO: 0 /100 WBC
NUM UNITS TRANS PACKED RBC: NORMAL
PLATELET # BLD AUTO: 361 K/UL (ref 150–450)
PMV BLD AUTO: 9.8 FL (ref 9.2–12.9)
POTASSIUM SERPL-SCNC: 3.9 MMOL/L (ref 3.5–5.1)
PROT SERPL-MCNC: 6.7 G/DL (ref 6–8.4)
RBC # BLD AUTO: 3.91 M/UL (ref 4–5.4)
SODIUM SERPL-SCNC: 139 MMOL/L (ref 136–145)
WBC # BLD AUTO: 10.65 K/UL (ref 3.9–12.7)

## 2024-03-30 PROCEDURE — 63600175 PHARM REV CODE 636 W HCPCS: Mod: UD | Performed by: INTERNAL MEDICINE

## 2024-03-30 PROCEDURE — 85025 COMPLETE CBC W/AUTO DIFF WBC: CPT | Performed by: INTERNAL MEDICINE

## 2024-03-30 PROCEDURE — 25000003 PHARM REV CODE 250: Performed by: INTERNAL MEDICINE

## 2024-03-30 PROCEDURE — 94761 N-INVAS EAR/PLS OXIMETRY MLT: CPT

## 2024-03-30 PROCEDURE — 86140 C-REACTIVE PROTEIN: CPT | Performed by: INTERNAL MEDICINE

## 2024-03-30 PROCEDURE — 83735 ASSAY OF MAGNESIUM: CPT | Performed by: INTERNAL MEDICINE

## 2024-03-30 PROCEDURE — 87040 BLOOD CULTURE FOR BACTERIA: CPT | Performed by: INTERNAL MEDICINE

## 2024-03-30 PROCEDURE — 82962 GLUCOSE BLOOD TEST: CPT

## 2024-03-30 PROCEDURE — 85651 RBC SED RATE NONAUTOMATED: CPT | Performed by: INTERNAL MEDICINE

## 2024-03-30 PROCEDURE — 21400001 HC TELEMETRY ROOM

## 2024-03-30 PROCEDURE — 36415 COLL VENOUS BLD VENIPUNCTURE: CPT | Performed by: INTERNAL MEDICINE

## 2024-03-30 PROCEDURE — 63600175 PHARM REV CODE 636 W HCPCS: Performed by: INTERNAL MEDICINE

## 2024-03-30 PROCEDURE — 80053 COMPREHEN METABOLIC PANEL: CPT | Performed by: INTERNAL MEDICINE

## 2024-03-30 RX ORDER — GLUCAGON 1 MG
1 KIT INJECTION
Status: DISCONTINUED | OUTPATIENT
Start: 2024-03-30 | End: 2024-03-30

## 2024-03-30 RX ORDER — IBUPROFEN 200 MG
16 TABLET ORAL
Status: DISCONTINUED | OUTPATIENT
Start: 2024-03-30 | End: 2024-04-06 | Stop reason: HOSPADM

## 2024-03-30 RX ORDER — IBUPROFEN 200 MG
24 TABLET ORAL
Status: DISCONTINUED | OUTPATIENT
Start: 2024-03-30 | End: 2024-04-06 | Stop reason: HOSPADM

## 2024-03-30 RX ORDER — INSULIN ASPART 100 [IU]/ML
0-10 INJECTION, SOLUTION INTRAVENOUS; SUBCUTANEOUS EVERY 6 HOURS PRN
Status: DISCONTINUED | OUTPATIENT
Start: 2024-03-30 | End: 2024-03-31

## 2024-03-30 RX ORDER — GLUCAGON 1 MG
1 KIT INJECTION
Status: DISCONTINUED | OUTPATIENT
Start: 2024-03-30 | End: 2024-03-31

## 2024-03-30 RX ADMIN — ACETAMINOPHEN 650 MG: 325 TABLET ORAL at 11:03

## 2024-03-30 RX ADMIN — INSULIN ASPART 4 UNITS: 100 INJECTION, SOLUTION INTRAVENOUS; SUBCUTANEOUS at 05:03

## 2024-03-30 RX ADMIN — MUPIROCIN 1 G: 20 OINTMENT TOPICAL at 09:03

## 2024-03-30 RX ADMIN — ACETAMINOPHEN 650 MG: 325 TABLET ORAL at 06:03

## 2024-03-30 RX ADMIN — INSULIN ASPART 4 UNITS: 100 INJECTION, SOLUTION INTRAVENOUS; SUBCUTANEOUS at 11:03

## 2024-03-30 RX ADMIN — Medication 800 MG: at 02:03

## 2024-03-30 RX ADMIN — ENOXAPARIN SODIUM 40 MG: 40 INJECTION SUBCUTANEOUS at 04:03

## 2024-03-30 RX ADMIN — ACETAMINOPHEN 650 MG: 325 TABLET ORAL at 04:03

## 2024-03-30 RX ADMIN — AMPICILLIN SODIUM 2 G: 2 INJECTION, POWDER, FOR SOLUTION INTRAMUSCULAR; INTRAVENOUS at 01:03

## 2024-03-30 RX ADMIN — CEFTRIAXONE SODIUM 2 G: 2 INJECTION, POWDER, FOR SOLUTION INTRAMUSCULAR; INTRAVENOUS at 11:03

## 2024-03-30 RX ADMIN — AMPICILLIN SODIUM 2 G: 2 INJECTION, POWDER, FOR SOLUTION INTRAMUSCULAR; INTRAVENOUS at 06:03

## 2024-03-30 RX ADMIN — MUPIROCIN 1 G: 20 OINTMENT TOPICAL at 08:03

## 2024-03-30 RX ADMIN — INSULIN DETEMIR 20 UNITS: 100 INJECTION, SOLUTION SUBCUTANEOUS at 09:03

## 2024-03-30 RX ADMIN — SENNOSIDES AND DOCUSATE SODIUM 1 TABLET: 8.6; 5 TABLET ORAL at 09:03

## 2024-03-30 RX ADMIN — CEFTRIAXONE SODIUM 2 G: 2 INJECTION, POWDER, FOR SOLUTION INTRAMUSCULAR; INTRAVENOUS at 01:03

## 2024-03-30 RX ADMIN — Medication 6 MG: at 10:03

## 2024-03-30 RX ADMIN — AMPICILLIN SODIUM 2 G: 2 INJECTION, POWDER, FOR SOLUTION INTRAMUSCULAR; INTRAVENOUS at 11:03

## 2024-03-30 RX ADMIN — Medication 800 MG: at 09:03

## 2024-03-30 NOTE — NURSING
.Nurses Note -- 4 Eyes      3/29/2024   9:29 PM      Skin assessed during: Transfer      [x] No Altered Skin Integrity Present    [x]Prevention Measures Documented      [] Yes- Altered Skin Integrity Present or Discovered   [] LDA Added if Not in Epic (Describe Wound)   [] New Altered Skin Integrity was Present on Admit and Documented in LDA   [] Wound Image Taken    Wound Care Consulted? No    Attending Nurse:  Dayanna Howard RN/Staff Member:  [id number]

## 2024-03-30 NOTE — DISCHARGE SUMMARY
[facility] - Cardiology [facility] Medicine  Discharge Summary      Patient Name: Tereza Larose  MRN: [id number]  SHAMIR: [id number]  Patient Class: IP- Inpatient  Admission Date: 3/26/2024  Hospital Length of Stay: 3 days  Discharge Date and Time: 3/29/2024  6:29 PM  Attending Physician: Yasmine att. providers found   Discharging Provider: Christopher Williamson DO  Primary Care Provider: Evan Sánchez MD  Hospital Medicine Team: [facility] Christopher Williamsno DO  Primary Care Team: [facility]    HPI:   Ms. Larose is a 74 yo F with CAD, HFpEF, pacemaker in place, HTN, HLD, DMII, COPD who originally presented to OSH after a fall, later was found to have NSTEMI and subsequently had VERONA and Enterococcus bacteremia. Cardiology and ID were consulted, she was started on IV CTX and ampicillin. DELORIS showed small mobile mass located on a right atrial lead. Proximal measurements are 1 cm x 0.5 cm. Case discussed with EP and Interventional Cardiology who will consult on the patient. Currently hemodynamically stable on 3L NC. Denies headaches, visual changes, SOB, cough, chest pain, palpitation, nausea, vomiting, abdominal pain, diarrhea, constipation, urinary frequency or any weakness. AT [facility] the pt was restarted on CTX and ampicillin, ID consulted.       Procedure(s) (LRB):  EXTRACTION, ELECTRODE LEAD (Left)  ECHOCARDIOGRAM, TRANSESOPHAGEAL (N/A)      Hospital Course:   Pt admitted to hospital medicine for VERONA and enterococcus bacteremia. Pt started on ceftriaxone and ampicillin. ID consulted.Blood cultures growing enterococcus faecalis.  EP and interventional cardiology consulted for pacemaker removal, however patient is not a candidate due to her severe pulmonary hypertension. PICC placed. Patient to have 6 weeks of Ampicillin 2g IV q 6 hours Ceftriaxone 2g IV q 12 hours  followed by PO antibiotic suppression. Concern for gout flare and will start treatment. On allopurinol at home. Patient transferred to [facility]  hospital to continue treatment.      Goals of Care Treatment Preferences:  Code Status: DNR          What is most important right now is to focus on improvement in condition but with limits to invasive therapies.  Accordingly, we have decided that the best plan to meet the patient's goals includes continuing with treatment.      Consults:   Consults (From admission, onward)          Status Ordering Provider     Inpatient consult to PICC team (NIAS)  Once        Provider:  (Not yet assigned)    Completed GABRIELLE HO     Inpatient consult to Palliative Care  Once        Provider:  (Not yet assigned)    Completed JAQUELINE MCINTOSH     Inpatient consult to Heart Transplant  Once        Provider:  (Not yet assigned)    Completed GILLIES, CONNOR M     Inpatient consult to Infectious Diseases  Once        Provider:  (Not yet assigned)    Completed JAQUELINE MCINTOSH     Inpatient consult to Electrophysiology  Once        Provider:  (Not yet assigned)    Completed JAQUELINE MCINTOSH     Inpatient consult to Interventional Cardiology  Once        Provider:  (Not yet assigned)    Completed JAQUELINE MCINTOSH            No new Assessment & Plan notes have been filed under this hospital service since the last note was generated.  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] 03/26/2024 Yes    AF (paroxysmal atrial fibrillation) [I48.0] 03/26/2024 Yes    VERONA (acute kidney injury) [N17.9] 03/24/2024 Yes    COPD (chronic obstructive pulmonary disease) [J44.9] 09/29/2022 Yes    Severe pulmonary hypertension [I27.20] 09/15/2022 Yes    Acute on chronic combined systolic and diastolic CHF (congestive  heart failure) [I50.43] 09/09/2022 Yes    T2DM (type 2 diabetes mellitus) [E11.9] 05/09/2014 Yes    HTN (hypertension) [I10] 06/15/2013 Yes    NSTEMI (non-ST elevated myocardial infarction) [I21.4] 06/15/2013 Yes    CAD (coronary artery disease) [I25.10] 10/03/2012 Yes      Problems Resolved During this Admission:       Discharged Condition: fair    Disposition: Home or Self Care    Follow Up:    Patient Instructions:   No discharge procedures on file.    Significant Diagnostic Studies: Labs: CMP   Recent Labs   Lab 03/28/24 0413 03/29/24 0217    141   K 4.0 3.4*   CL 91* 90*   CO2 36* 39*   * 159*   BUN 23 24*   CREATININE 0.9 1.0   CALCIUM 9.8 9.5   PROT 6.5 6.4   ALBUMIN 2.1* 1.9*   BILITOT 0.7 0.4   ALKPHOS 115 80   AST 42* 37   ALT 21 22   ANIONGAP 12 12   , CBC   Recent Labs   Lab 03/28/24 0413 03/29/24 0217   WBC 15.05* 13.11*   HGB 13.1 11.9*   HCT 40.1 37.0    321   , and All labs within the past 24 hours have been reviewed  Microbiology: Blood Culture   Lab Results   Component Value Date    LABBLOO No Growth to date 03/26/2024    LABBLOO No Growth to date 03/26/2024    LABBLOO No Growth to date 03/26/2024    LABBLOO No Growth to date 03/26/2024    LABBLOO No Growth to date 03/26/2024    LABBLOO No Growth to date 03/26/2024    LABBLOO No Growth to date 03/26/2024    LABBLOO No Growth to date 03/26/2024       Pending Diagnostic Studies:       None           Medications:  Transfer Medications (for Discharge Readmit only):   No current facility-administered medications for this encounter.     No current outpatient medications on file.     Facility-Administered Medications Ordered in Other Encounters   Medication Dose Route Frequency Provider Last Rate Last Admin    acetaminophen tablet 1,000 mg  1,000 mg Oral Once Gordo Maldonado MD        mupirocin 2 % ointment   Nasal BID Amador Freeman Jr., MD           Indwelling Lines/Drains at time of discharge:   Lines/Drains/Airways        Peripherally Inserted Central Catheter Line  Duration             PICC Double Lumen 03/29/24 1047 right basilic <1 day              Drain  Duration             Female External Urinary Catheter w/ Suction 03/26/24 1147 3 days                    Time spent on the discharge of patient: 30 minutes         Christopher Williamson DO  Department of Hospital Medicine  [facility] - Cardiology Stepdown

## 2024-03-30 NOTE — PLAN OF CARE
03/30/24 1126   Medicare Message   Important Message from Medicare regarding Discharge Appeal Rights Given to patient/caregiver;Signed/date by patient/caregiver;Explained to patient/caregiver   Date IMM was signed 03/30/24   Time IMM was signed 1129

## 2024-03-30 NOTE — NURSING
Child's Well Visit, 5 Years: Care Instructions  Your Care Instructions     Your child may like to play with friends more than doing things with you. He or she may like to tell stories and is interested in relationships between people. Most 11year-olds know the names of things in the house, such as appliances, and what they are used for. Your child may dress himself or herself without help and probably likes to play make-believe. Your child can now learn his or her address and phone number. He or she is likely to copy shapes like triangles and squares and count on fingers. Follow-up care is a key part of your child's treatment and safety. Be sure to make and go to all appointments, and call your doctor if your child is having problems. It's also a good idea to know your child's test results and keep a list of the medicines your child takes. How can you care for your child at home? Eating and a healthy weight  · Encourage healthy eating habits. Most children do well with three meals and two or three snacks a day. Offer fruits and vegetables at meals and snacks. · Let your child decide how much to eat. Give children foods they like but also give new foods to try. If your child is not hungry at one meal, it is okay for your child to wait until the next meal or snack to eat. · Check in with your child's school or day care to make sure that healthy meals and snacks are given. · Limit fast food. Help your child with healthier food choices when you eat out. · Offer water when your child is thirsty. Do not give your child more than 4 to 6 oz. of fruit juice per day. Juice does not have the valuable fiber that whole fruit has. Do not give your child soda pop. · Make meals a family time. Have nice conversations at mealtime and turn the TV off. · Do not use food as a reward or punishment for your child's behavior. Do not make your children \"clean their plates. \"  · Let all your children know that you love them Spoke to Pollo Larose () over the phone who requested to keep patient a full code.    whatever their size. Help your children feel good about their bodies. Remind your child that people come in different shapes and sizes. Do not tease or nag children about weight, and do not say your child is skinny, fat, or chubby. · Limit TV or video time to 1 hour or less per day. Research shows that the more TV children watch, the higher the chance that they will be overweight. Do not put a TV in your child's bedroom, and do not use TV and videos as a . Healthy habits  · Have your child play actively for at least 30 to 60 minutes every day. Plan family activities, such as trips to the park, walks, bike rides, swimming, and gardening. · Help children brush their teeth 2 times a day and floss one time a day. Take your child to the dentist 2 times a year. · Limit TV and video time to 1 hour or less per day. Check for TV programs that are good for 11year olds. · Put a broad-spectrum sunscreen (SPF 30 or higher) on your child before going outside. Use a broad-brimmed hat to shade your child's ears, nose, and lips. · Do not smoke or allow others to smoke around your child. Smoking around your child increases the child's risk for ear infections, asthma, colds, and pneumonia. If you need help quitting, talk to your doctor about stop-smoking programs and medicines. These can increase your chances of quitting for good. · Put your children to bed at a regular time so they get enough sleep. Safety  · Use a belt-positioning booster seat in the car if your child weighs more than 40 pounds. Be sure the car's lap and shoulder belt are positioned across the child in the back seat. Know your state's laws for child safety seats. · Make sure your child wears a helmet that fits properly when riding a bike or scooter. · Keep cleaning products and medicines in locked cabinets out of your child's reach. Keep the number for Poison Control (0-214.839.3683) in or near your phone.   · Put locks or guards on all windows above the first floor. Watch your child at all times near play equipment and stairs. · Watch your child at all times when your child is near water, including pools, hot tubs, and bathtubs. Knowing how to swim does not make your child safe from drowning. · Do not let your child play in or near the street. Children younger than age 6 should not cross the street alone. Immunizations  Flu immunization is recommended once a year for all children ages 7 months and older. Ask your doctor if your child needs any other last doses of vaccines, such as MMR and chickenpox. Parenting  · Read stories to your child every day. One way children learn to read is by hearing the same story over and over. · Play games, talk, and sing to your child every day. Give your child love and attention. · Give your child simple chores to do. Children usually like to help. · Teach your child your home address, phone number, and how to call 911. · Teach your children not to let anyone touch their private parts. · Teach your child not to take anything from strangers and not to go with strangers. · Praise good behavior. Do not yell or spank. Use time-out instead. Be fair with your rules and use them in the same way every time. Your child learns from watching and listening to you. Getting ready for   Most children start  between 3 and 10years old. It can be hard to know when your child is ready for school. Your local elementary school or  can help. Most children are ready for  if they can do these things:  · Your child can keep hands away from other children while in line; sit and pay attention for at least 5 minutes; sit quietly while listening to a story; help with clean-up activities, such as putting away toys; use words for frustration rather than acting out; work and play with other children in small groups; do what the teacher asks; get dressed; and use the bathroom without help.   · Your child can stand and hop on one foot; throw and catch balls; hold a pencil correctly; cut with scissors; and copy or trace a line and [place]. · Your child can spell and write their first name; do two-step directions, like \"do this and then do that\"; talk with other children and adults; sing songs with a group; count from 1 to 5; see the difference between two objects, such as one is large and one is small; and understand what \"first\" and \"last\" mean. When should you call for help? Watch closely for changes in your child's health, and be sure to contact your doctor if:    · You are concerned that your child is not growing or developing normally.     · You are worried about your child's behavior.     · You need more information about how to care for your child, or you have questions or concerns. Where can you learn more? Go to http://www.gray.com/  Enter U720 in the search box to learn more about \"Child's Well Visit, 5 Years: Care Instructions. \"  Current as of: May 27, 2020               Content Version: 12.8  © 0703-6698 [employer]. Care instructions adapted under license by [employer] (which disclaims liability or warranty for this information). If you have questions about a medical condition or this instruction, always ask your healthcare professional. [address] any warranty or liability for your use of this information. Polio Vaccine: What You Need to Know  Why get vaccinated? Polio vaccine can prevent polio. Polio (or poliomyelitis) is a disabling and life-threatening disease caused by poliovirus, which can infect a person's spinal cord, leading to paralysis. Most people infected with poliovirus have no symptoms, and many recover without complications. Some people will experience sore throat, fever, tiredness, nausea, headache, or stomach pain.   A smaller group of people will develop more serious symptoms that affect the brain and spinal cord:  · Paresthesia (feeling of pins and needles in the legs),  · Meningitis (infection of the covering of the spinal cord and/or brain), or  · Paralysis (can't move parts of the body) or weakness in the arms, legs, or both. Paralysis is the most severe symptom associated with polio because it can lead to permanent disability and death. Improvements in limb paralysis can occur, but in some people new muscle pain and weakness may develop 15 to 40 years later. This is called post-polio syndrome. Polio has been eliminated from the [place], but it still occurs in other parts of the world. The best way to protect yourself and keep the [address] [place] [address] Andre is to maintain high immunity (protection) in the population against polio through vaccination. Polio vaccine  Children should usually get 4 doses of polio vaccine, at 2 months, 4 months, 618 months, and 36 years of age. Most adults do not need polio vaccine because they were already vaccinated against polio as children. Some adults are at higher risk and should consider polio vaccination, including:  · people traveling to certain parts of the world,  · laboratory workers who might handle poliovirus, and  · health care workers treating patients who could have polio. Polio vaccine may be given as a stand-alone vaccine, or as part of a combination vaccine (a type of vaccine that combines more than one vaccine together into one shot). Polio vaccine may be given at the same time as other vaccines. Talk with your health care provider  Tell your vaccine provider if the person getting the vaccine:  · Has had an allergic reaction after a previous dose of polio vaccine, or has any severe, life-threatening allergies. In some cases, your health care provider may decide to postpone polio vaccination to a future visit. People with minor illnesses, such as a cold, may be vaccinated.  People who are moderately or severely ill should usually wait until they recover before getting polio vaccine. Your health care provider can give you more information. Risks of a vaccine reaction  · A sore spot with redness, swelling, or pain where the shot is given can happen after polio vaccine. People sometimes faint after medical procedures, including vaccination. Tell your provider if you feel dizzy or have vision changes or ringing in the ears. As with any medicine, there is a very remote chance of a vaccine causing a severe allergic reaction, other serious injury, or death. What if there is a serious problem? An allergic reaction could occur after the vaccinated person leaves the clinic. If you see signs of a severe allergic reaction (hives, swelling of the face and throat, difficulty breathing, a fast heartbeat, dizziness, or weakness), call 9-1-1 and get the person to the nearest hospital.  For other signs that concern you, call your health care provider. Adverse reactions should be reported to the Vaccine Adverse Event Reporting System (VAERS). Your health care provider will usually file this report, or you can do it yourself. Visit the VAERS website at www.vaers. hhs.gov or call 8-380.585.2871. VAERS is only for reporting reactions, and VAERS staff do not give medical advice. The [facility] Vaccine Injury Compensation Program  The National Vaccine Injury Compensation Program The National Vaccine Injury Compensation Program (VICP) is a federal program that was created to compensate people who may have been injured by certain vaccines. Visit the VICP website at www.hrsa.gov/vaccinecompensation or call 8-791.736.2832 to learn about the program and about filing a claim. There is a time limit to file a claim for compensation. How can I learn more? · Ask your healthcare provider. He or she can give you the vaccine package insert or suggest other sources of information. · Call your local or state health department.   · Contact the Centers for Disease Control and Prevention (CDC):  ? Call 6-502.687.4461 (1-800-CDC-INFO) or  ? Visit CDC's website at www.cdc.gov/vaccines  Vaccine Information Statement (Interim)  Polio Vaccine  10/30/2019  42 RICHARD Baker [id number]  Department of Health and Human Services  Centers for Disease Control and Prevention  Many Vaccine Information Statements are available in [place]i and other languages. See www.immunize.org/vis. Hojas de información Sobre Vacunas están disponibles en español y en muchos otros idiomas. Visite www.immunize.org/vis. Care instructions adapted under license by [employer] (which disclaims liability or warranty for this information). If you have questions about a medical condition or this instruction, always ask your healthcare professional. [address] any warranty or liability for your use of this information. MMR Vaccine (Measles, Mumps, and Rubella): What You Need to Know  Why get vaccinated? MMR vaccine can prevent measles, mumps, and rubella. · MEASLES (M) can cause fever, cough, runny nose, and red, watery eyes, commonly followed by a rash that covers the whole body. It can lead to seizures (often associated with fever), ear infections, diarrhea, and pneumonia. Rarely, measles can cause brain damage or death. · MUMPS (M) can cause fever, headache, muscle aches, tiredness, loss of appetite, and swollen and tender salivary glands under the ears. It can lead to deafness, swelling of the brain and/or spinal cord covering, painful swelling of the testicles or ovaries, and, very rarely, death. · RUBELLA (R) can cause fever, sore throat, rash, headache, and eye irritation. It can cause arthritis in up to half of teenage and adult women. If a woman gets rubella while she is pregnant, she could have a miscarriage or her baby could be born with serious birth defects. Most people who are vaccinated with MMR will be protected for life.  Vaccines and high rates of vaccination have made these diseases much less common in the [place]. MMR vaccine  Children need 2 doses of MMR vaccine, usually:  · First dose at 12 through 13months of age  · Second dose at 3 through 10years of age  Infants who will be traveling outside the [place] when they are between 10 and 8 months of age should get a dose of MMR vaccine before travel. The child should still get 2 doses at the recommended ages for long-lasting protection. Older children, adolescents, and adults also need 1 or 2 doses of MMR vaccine if they are not already immune to measles, mumps, and rubella. Your health care provider can help you determine how many doses you need. A third dose of MMR might be recommended in certain mumps outbreak situations. MMR vaccine may be given at the same time as other vaccines. Children 12 months through 15years of age might receive MMR vaccine together with varicella vaccine in a single shot, known as MMRV. Your health care provider can give you more information. Talk with your health care provider  Tell your vaccine provider if the person getting the vaccine:  · Has had an allergic reaction after a previous dose of MMR or MMRV vaccine, or has any severe, life-threatening allergies. · Is pregnant, or thinks she might be pregnant. · Has a weakened immune system, or has a parent, brother, or sister with a history of hereditary or congenital immune system problems. · Has ever had a condition that makes him or her bruise or bleed easily. · Has recently had a blood transfusion or received other blood products. · Has tuberculosis. · Has gotten any other vaccines in the past 4 weeks. In some cases, your health care provider may decide to postpone MMR vaccination to a future visit. People with minor illnesses, such as a cold, may be vaccinated. People who are moderately or severely ill should usually wait until they recover before getting MMR vaccine.   Your health care provider can give you more information. Risks of a vaccine reaction  · Soreness, redness, or rash where the shot is given and rash all over the body can happen after MMR vaccine. · Fever or swelling of the glands in the cheeks or neck sometimes occur after MMR vaccine. · More serious reactions happen rarely. These can include seizures (often associated with fever), temporary pain and stiffness in the joints (mostly in teenage or adult women), pneumonia, swelling of the brain and/or spinal cord covering, or temporary low platelet count which can cause unusual bleeding or bruising. · In people with serious immune system problems, this vaccine may cause an infection which may be life-threatening. People with serious immune system problems should not get MMR vaccine. People sometimes faint after medical procedures, including vaccination. Tell your provider if you feel dizzy or have vision changes or ringing in the ears. As with any medicine, there is a very remote chance of a vaccine causing a severe allergic reaction, other serious injury, or death. What if there is a serious problem? An allergic reaction could occur after the vaccinated person leaves the clinic. If you see signs of a severe allergic reaction (hives, swelling of the face and throat, difficulty breathing, a fast heartbeat, dizziness, or weakness), call 9-1-1 and get the person to the nearest hospital.  For other signs that concern you, call your health care provider. Adverse reactions should be reported to the Vaccine Adverse Event Reporting System (VAERS). Your health care provider will usually file this report, or you can do it yourself. Visit the VAERS website at www.vaers. hhs.gov or call 1-718.646.2210. VAERS is only for reporting reactions, and VAERS staff do not give medical advice.   The [employer] Vaccine Injury Compensation Program  The National Vaccine Injury Compensation Program (VICP) is a federal program that was created to compensate people who may have been injured by certain vaccines. Visit the VICP website at www.hrsa.gov/vaccinecompensation or call 9-186.642.9723 to learn about the program and about filing a claim. There is a time limit to file a claim for compensation. How can I learn more? · Ask your healthcare provider. · Call your local or state health department. · Contact the Centers for Disease Control and Prevention (CDC):  ? Call 1-560.174.4216 (1-800-CDC-INFO) or  ? Visit CDC's website at www.cdc.gov/vaccines  Vaccine Information Statement (Interim)  MMR Vaccine  8/15/2019  42 RICHARD [address]ia [id number]  Department of Health and Human Services  Centers for Disease Control and Prevention  Many Vaccine Information Statements are available in [place] and other languages. See www.immunize.org/vis  Hojas de información sobre vacunas están disponibles en español y en muchos otros idiomas. Visite www.immunize.org/vis  Care instructions adapted under license by [employer] (which disclaims liability or warranty for this information). If you have questions about a medical condition or this instruction, always ask your healthcare professional. [place] [address] any warranty or liability for your use of this information. Varicella (Chickenpox) Vaccine: What You Need to Know  Why get vaccinated? Varicella vaccine can prevent chickenpox. Chickenpox can cause an itchy rash that usually lasts about a week. It can also cause fever, tiredness, loss of appetite, and headache. It can lead to skin infections, pneumonia, inflammation of the blood vessels, and swelling of the brain and/or spinal cord covering, and infections of the bloodstream, bone, or joints. Some people who get chickenpox get a painful rash called shingles (also known as herpes zoster) years later. Chickenpox is usually mild but it can be serious in infants under 15months of age, adolescents, adults, pregnant women, and people with a weakened immune system.  Some people get so sick that they need to be hospitalized. It doesn't happen often, but people can die from chickenpox. Most people who are vaccinated with 2 doses of varicella vaccine will be protected for life. Varicella vaccine  Children need 2 doses of varicella vaccine, usually:  · First dose: 12 through 13months of age  · Second dose: 4 through 10years of age  Older children, adolescents, and adults also need 2 doses of varicella vaccine if they are not already immune to chickenpox. Varicella vaccine may be given at the same time as other vaccines. Also, a child between 13 months and 15years of age might receive varicella vaccine together with MMR (measles, mumps, and rubella) vaccine in a single shot, known as MMRV. Your health care provider can give you more information. Talk with your health care provider  Tell your vaccine provider if the person getting the vaccine:  · Has had an allergic reaction after a previous dose of varicella vaccine, or has any severe, life-threatening allergies. · Is pregnant, or thinks she might be pregnant. · Has a weakened immune system, or has a parent, brother, or sister with a history of hereditary or congenital immune system problems. · Is taking salicylates (such as aspirin). · Has recently had a blood transfusion or received other blood products. · Has tuberculosis. · Has gotten any other vaccines in the past 4 weeks. In some cases, your health care provider may decide to postpone varicella vaccination to a future visit. People with minor illnesses, such as a cold, may be vaccinated. People who are moderately or severely ill should usually wait until they recover before getting varicella vaccine. Your health care provider can give you more information. Risks of a vaccine reaction  · Sore arm from the injection, fever, or redness or rash where the shot is given can happen after varicella vaccine. · More serious reactions happen very rarely.  These can include pneumonia, infection of the brain and/or spinal cord covering, or seizures that are often associated with fever. · In people with serious immune system problems, this vaccine may cause an infection which may be life-threatening. People with serious immune system problems should not get varicella vaccine. It is possible for a vaccinated person to develop a rash. If this happens, the varicella vaccine virus could be spread to an unprotected person. Anyone who gets a rash should stay away from people with a weakened immune system and infants until the rash goes away. Talk with your health care provider to learn more. Some people who are vaccinated against chickenpox get shingles (herpes zoster) years later. This is much less common after vaccination than after chickenpox disease. People sometimes faint after medical procedures, including vaccination. Tell your provider if you feel dizzy or have vision changes or ringing in the ears. As with any medicine, there is a very remote chance of a vaccine causing a severe allergic reaction, other serious injury, or death. What if there is a serious problem? An allergic reaction could occur after the vaccinated person leaves the clinic. If you see signs of a severe allergic reaction (hives, swelling of the face and throat, difficulty breathing, a fast heartbeat, dizziness, or weakness), call 9-1-1 and get the person to the nearest hospital.  For other signs that concern you, call your health care provider. Adverse reactions should be reported to the Vaccine Adverse Event Reporting System (VAERS). Your health care provider will usually file this report, or you can do it yourself. Visit the VAERS website at www.vaers. hhs.gov or call 4-111.828.7004. VAERS is only for reporting reactions, and VAERS staff do not give medical advice.   The [employer] Vaccine Injury Compensation Program  The National Vaccine Injury Compensation Program (VICP) is a federal program that was created to compensate people who may have been injured by certain vaccines. Visit the VICP website at www.hrsa.gov/vaccinecompensation or call 3-844.774.1829 to learn about the program and about filing a claim. There is a time limit to file a claim for compensation. How can I learn more? · Ask your health care provider. · Call your local or state health department. · Contact the Centers for Disease Control and Prevention (CDC):  ? Call 9-653.935.6310 (0-729-ZDA-INFO) or  ? Visit CDC's www.cdc.gov/vaccines  Vaccine Information Statement (Interim)  Varicella Vaccine  08-  42 [employer] [id number]  Department of Health and Human Services  Centers for Disease Control and Prevention  Many Vaccine Information Statements are available in Chinese and other languages. See www.immunize.org/vis  Hojas de información sobre vacunas están disponibles en español y en muchos otros idiomas. Visite www.immunize.org/vis  Care instructions adapted under license by [employer] (which disclaims liability or warranty for this information). If you have questions about a medical condition or this instruction, always ask your healthcare professional. [place] [address] any warranty or liability for your use of this information. DTaP (Diphtheria, Tetanus, Pertussis) Vaccine: What You Need to Know  Why get vaccinated? DTaP vaccine can prevent diphtheria, tetanus, and pertussis. Diphtheria and pertussis spread from person to person. Tetanus enters the body through cuts or wounds. · DIPHTHERIA (D) can lead to difficulty breathing, heart failure, paralysis, or death. · TETANUS (T) causes painful stiffening of the muscles. Tetanus can lead to serious health problems, including being unable to open the mouth, having trouble swallowing and breathing, or death. · PERTUSSIS (aP), also known as \"whooping cough,\" can cause uncontrollable, violent coughing which makes it hard to breathe, eat, or drink.  Pertussis can be extremely serious in babies and young children, causing pneumonia, convulsions, brain damage, or death. In teens and adults, it can cause weight loss, loss of bladder control, passing out, and rib fractures from severe coughing. DTaP vaccine  DTaP is only for children younger than 9years old. Different vaccines against tetanus, diphtheria, and pertussis (Tdap and Td) are available for older children, adolescents, and adults. It is recommended that children receive 5 doses of DTaP, usually at the following ages:  · 2 months  · 4 months  · 6 months  · 1518 months  · 46 years  DTaP may be given as a stand-alone vaccine, or as part of a combination vaccine (a type of vaccine that combines more than one vaccine together into one shot). DTaP may be given at the same time as other vaccines. Talk with your health care provider  Tell your vaccine provider if the person getting the vaccine:  · Has had an allergic reaction after a previous dose of any vaccine that protects against tetanus, diphtheria, or pertussis, or has any severe, life threatening allergies. · Has had a coma, decreased level of consciousness, or prolonged seizures within 7 days after a previous dose of any pertussis vaccine (DTP or DTaP). · Has seizures or another nervous system problem. · Has ever had Guillain-Barré Syndrome (also called GBS). · Has had severe pain or swelling after a previous dose of any vaccine that protects against tetanus or diphtheria. In some cases, your child's health care provider may decide to postpone DTaP vaccination to a future visit. Children with minor illnesses, such as a cold, may be vaccinated. Children who are moderately or severely ill should usually wait until they recover before getting DTaP. Your child's health care provider can give you more information.   Risks of a vaccine reaction  · Soreness or swelling where the shot was given, fever, fussiness, feeling tired, loss of appetite, and vomiting sometimes happen after DTaP vaccination. · More serious reactions, such as seizures, non-stop crying for 3 hours or more, or high fever (over 105°F) after DTaP vaccination happen much less often. Rarely, the vaccine is followed by swelling of the entire arm or leg, especially in older children when they receive their fourth or fifth dose. · Very rarely, long-term seizures, coma, lowered consciousness, or permanent brain damage may happen after DTaP vaccination. As with any medicine, there is a very remote chance of a vaccine causing a severe allergic reaction, other serious injury, or death. What if there is a serious problem? An allergic reaction could occur after the vaccinated person leaves the clinic. If you see signs of a severe allergic reaction (hives, swelling of the face and throat, difficulty breathing, a fast heartbeat, dizziness, or weakness), call 9-1-1 and get the person to the nearest hospital.  For other signs that concern you, call your health care provider. Adverse reactions should be reported to the Vaccine Adverse Event Reporting System (VAERS). Your health care provider will usually file this report, or you can do it yourself. Visit the VAERS website at www.vaers. hhs.gov or call 0-967.341.7003. VAERS is only for reporting reactions, and VAERS staff do not give medical advice. The National Vaccine Injury Compensation Program  The National Vaccine Injury Compensation Program (VICP) is a federal program that was created to compensate people who may have been injured by certain vaccines. Visit the VICP website at www.hrsa.gov/vaccinecompensation or call 4-844.164.4959 to learn about the program and about filing a claim. There is a time limit to file a claim for compensation. How can I learn more? · Ask your health care provider. · Call your local or state health department. · Contact the Centers for Disease Control and Prevention (CDC):  ? Call 8-448.198.6136 (1-800-CDC-INFO) or  ?  Visit CDC's website at www.cdc.gov/vaccines  Vaccine Information Statement (Interim)  DTaP (Diphtheria, Tetanus, Pertussis) Vaccine  04/01/2020  [address] LES[address]rec[address] Hillman Bryan [id number]  Department of Health and Human Services  Centers for Disease Control and Prevention  Many Vaccine Information Statements are available in [place] and other languages. See www.immunize.org/vis. Muchas hojas de información sobre vacunas están disponibles en español y en otros idiomas. Visite www.immunize.org/vis. Care instructions adapted under license by [employer] (which disclaims liability or warranty for this information). If you have questions about a medical condition or this instruction, always ask your healthcare professional. [address] any warranty or liability for your use of this information.

## 2024-03-30 NOTE — SUBJECTIVE & OBJECTIVE
Interval History:     Patient was seen and examined, she has not in shortness of breath or no chest pain, afebrile, pacemaker was not removed because of the high-risk.  Patient is getting antibiotics.  Discussed with patient and  about code status and they want partial code status with no intubation or ventilator attachment      Review of Systems  Objective:     Vital Signs (Most Recent):  Temp: 97.5 °F (36.4 °C) (03/30/24 1140)  Pulse: 78 (03/30/24 1140)  Resp: 20 (03/30/24 1140)  BP: (!) 152/70 (03/30/24 1140)  SpO2: 97 % (03/30/24 1140) Vital Signs (24h Range):  Temp:  [97.4 °F (36.3 °C)-98.8 °F (37.1 °C)] 97.5 °F (36.4 °C)  Pulse:  [64-85] 78  Resp:  [18-20] 20  SpO2:  [96 %-99 %] 97 %  BP: (110-160)/(63-70) 152/70     Weight: 87 kg (191 lb 12.8 oz)  Body mass index is 35.08 kg/m².    Intake/Output Summary (Last 24 hours) at 3/30/2024 1237  Last data filed at 3/30/2024 1042  Gross per 24 hour   Intake 540 ml   Output 500 ml   Net 40 ml         Physical Exam  Vitals and nursing note reviewed.   HENT:      Head: Normocephalic and atraumatic.   Eyes:      Conjunctiva/sclera: Conjunctivae normal.   Neck:      Vascular: No JVD.   Cardiovascular:      Rate and Rhythm: Normal rate.      Heart sounds: Normal heart sounds.   Pulmonary:      Effort: Pulmonary effort is normal.      Breath sounds: Normal breath sounds.   Abdominal:      Palpations: Abdomen is soft.   Musculoskeletal:         General: Normal range of motion.   Skin:     General: Skin is warm.   Neurological:      Mental Status: She is alert and oriented to person, place, and time.   Psychiatric:         Mood and Affect: Mood normal.             Significant Labs: All pertinent labs within the past 24 hours have been reviewed.  CMP:   Recent Labs   Lab 03/29/24  0217 03/30/24  0418    139   K 3.4* 3.9   CL 90* 92*   CO2 39* 39*   * 169*   BUN 24* 29*   CREATININE 1.0 0.9   CALCIUM 9.5 9.1   PROT 6.4 6.7   ALBUMIN 1.9* 2.9*   BILITOT 0.4  "0.4   ALKPHOS 80 77   AST 37 42*   ALT 22 27   ANIONGAP 12 8     Cardiac Markers: No results for input(s): "CKMB", "MYOGLOBIN", "BNP", "TROPISTAT" in the last 48 hours.  Coagulation: No results for input(s): "PT", "INR", "APTT" in the last 48 hours.    Significant Imaging: I have reviewed all pertinent imaging results/findings within the past 24 hours.  "

## 2024-03-30 NOTE — HOSPITAL COURSE
Pt admitted to hospital medicine for VERONA and enterococcus bacteremia. Pt started on ceftriaxone and ampicillin. ID consulted. Blood cultures growing enterococcus faecalis.  EP and interventional cardiology consulted for pacemaker removal, however patient is not a candidate due to her severe pulmonary hypertension. PICC placed. Patient to have 6 weeks of Ampicillin 2g IV q 4 hours and Ceftriaxone 2g IV q 12 hours  followed by PO antibiotic suppression. Concern for gout flare and started treatment. On allopurinol at home. Patient transferred to [facility] to continue treatment from [facility] and later continued same antibiotics . ID [facility] reviewed again and agree with treatment and repeat blood culture sent . Patient had severe deconditioning. CM on board and patient got accepted at [facility].   Per ID at discharge:  -Ampicillin 2 g IV every 4 hours daily for 6 weeks till May 8, 2024   -Ceftriaxone 2 g IV every 12 hours for 6 weeks till May 8, 2024   -On completion of IV antibiotics, on May 9th start amoxicillin 500 mg oral twice daily for suppression for life  -Remove PICC line at completion of IV therapy  -Weekly Labs for duration of antibiotic therapy: following labs to be drawn on Mondays:  CBC with Diff, CMP and CRP  -Fax Lab Results to Infectious Diseases Provider if not done at [facility] facility: [facility] Fax Number: [phone], Attn: Dr Lourdes Walton

## 2024-03-30 NOTE — HPI
Ms. Larose is a 72 yo F with CAD, HFpEF, pacemaker in place, HTN, HLD, DMII, COPD who originally presented to OSH after a fall, later was found to have NSTEMI and subsequently had VERONA and Enterococcus bacteremia. Cardiology and ID were consulted, she was started on IV CTX and ampicillin. DELORIS showed small mobile mass located on a right atrial lead. Proximal measurements are 1 cm x 0.5 cm. Case discussed with EP and Interventional Cardiology who will consult on the patient. Currently hemodynamically stable on 3L NC. Denies headaches, visual changes, SOB, cough, chest pain, palpitation, nausea, vomiting, abdominal pain, diarrhea, constipation, urinary frequency or any weakness. AT C the pt was restarted on CTX and ampicillin, ID consulted.    Patient is stable and I saw her.  No new complaints    Hospital Course:   Pt admitted to hospital medicine for VERONA and enterococcus bacteremia. Pt started on ceftriaxone and ampicillin. ID consulted.Blood cultures growing enterococcus faecalis.  EP and interventional cardiology consulted for pacemaker removal, however patient is not a candidate due to her severe pulmonary hypertension. PICC placed. Patient to have 6 weeks of Ampicillin 2g IV q 6 hours Ceftriaxone 2g IV q 12 hours  followed by PO antibiotic suppression. Concern for gout flare and will start treatment. On allopurinol at home. Patient transferred to [facility] to continue treatment.

## 2024-03-30 NOTE — CONSULTS
Formerly [facility], [facility]   Department of Infectious Disease  Consult Note        PATIENT NAME: Tereza Larose  MRN: [id number]  TODAY'S DATE: 03/30/2024  ADMIT DATE: 3/29/2024    CHIEF COMPLAINT: No chief complaint on file.    PRINCIPLE PROBLEM: <principal problem not specified>    REASON FOR CONSULT:     ASSESSMENT and PLAN      1. E fecalis PPM lead Endocarditis. She is not a candidate for lead extraction due to severe PHTN. Repeat blood cultures. Check CRP and ESR. Continue Ceftriaxone + Ampicillin. Plan to treat for at least 8 weeks if all goes well.    2. Severe Pulmonary HTN. As per Cardiologist and pulmonary service.    3. NSTEMI. ? From #1. As per Cardiologist    4. VERONA. Improved.    5. HTN. On antihypertensives.     6. Gout involving right knee and right ankle.  Okay to give short course steroids for this indication.    7.  Debility.  States had not been moving well since hospitalization because of gout involving both feet.  Pain is better.  PT/OT.    Recommendations:  Repeat blood cultures. Check ESR and CRP.    Thank you for this consult. Please send [facility] secure chat with any questions.      HPI      Tereza Larose is a 73 y.o. female with HTN, PHTN, Gout, CAD and previous AVR with a bioprosthetic valve. Also has a PPM. Known to [facility] ID service. See consult note 3/24/24 for ful details. Admitted 3/2/24 with malaise and diagnosed with NSTEMI. Blood cultures grew Penicillin sensitive E fecalis. Had DELORIS 3/25/24 that showed an atrial lead vegetation. She was transferred to [facility] same day for possible lead extraction. Evaluated by multiple specialities including EP cardiology, Interventional cardiology and CTS and deemed to be very high risk for surgery due to severe PHTN. She has been transferred back to [facility] to continue conservative management with antibiotics.      Outdoor activities: Not contributory  Travel: None  Implants:  PPM, Bioprosthetic Aortic valve  Antibiotic history:   Reviewed    Past Medical History:   Diagnosis Date    Abnormal EKG 9/26/2012    Allergy     Arthritis     Asthma     Brain bleed     Colon polyps 11/6/2020    COPD (chronic obstructive pulmonary disease)     Depression     Diabetes mellitus type II     Diverticulitis     Fatty liver     GERD (gastroesophageal reflux disease)     Goiter     s/p thyroidectomy    Heart murmur     Hemiparesis affecting right side as late effect of cerebrovascular accident (CVA) 7/26/2022    Hernia of abdominal wall     History of intracranial hemorrhage 6/15/2013    History of non-ST elevation myocardial infarction (NSTEMI) 6/15/2013    Hyperlipidemia     Hypertension     Lactic acidosis 1/11/2020    Metabolic syndrome 6/14/2012    Myocardial infarction     On home oxygen therapy     states uses 2L at night or when sat < 90    Pacemaker     Positive FIT (fecal immunochemical test) 11/6/2020    Severe persistent asthma without complication 4/30/2020    Statin intolerance     Stroke 2012    left hand shaky, loss of balance       Past Surgical History:   Procedure Laterality Date    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/25/2017    Dr Griggs    ADRENALECTOMY      AORTIC VALVE REPLACEMENT  12/09/2016    ARTERIOGRAPHY OF AORTIC ROOT N/A 9/12/2022    Procedure: ARTERIOGRAM, AORTIC ROOT;  Surgeon: Marko Batres MD;  Location: [facility] CATH/EP LAB;  Service: Cardiology;  Laterality: N/A;    arthroscopy lt knee Right     BLADDER REPAIR      sling    BREAST BIOPSY Bilateral     benign    COLONOSCOPY  2004    10 year recheck    COLONOSCOPY N/A 11/6/2020    Procedure: COLONOSCOPY;  Surgeon: Yakelin Lowery MD;  Location: [facility];  Service: Endoscopy;  Laterality: N/A;    CORONARY ANGIOGRAPHY INCLUDING BYPASS GRAFTS WITH CATHETERIZATION OF LEFT HEART Left 9/12/2022    Procedure: Left heart cath including bypass graft, with left heart catheterization;  Surgeon: Marko Batres MD;  Location: [facility] CATH/EP LAB;  Service: Cardiology;  Laterality: Left;     CORONARY ARTERY BYPASS GRAFT  12/09/2016    X3    ECHOCARDIOGRAM,TRANSESOPHAGEAL N/A 3/25/2024    Procedure: Transesophageal echo (DELORIS) intra-procedure log documentation;  Surgeon: Paulino Resendiz MD;  Location: [facility] CATH/EP LAB;  Service: Cardiology;  Laterality: N/A;    EPIDURAL STEROID INJECTION INTO LUMBAR SPINE N/A 7/27/2021    Procedure: Injection-steroid-epidural-lumbar;  Surgeon: Valerio Jay MD;  Location: [facility] OR;  Service: Pain Management;  Laterality: N/A;  L5-S1     HYSTERECTOMY      INSERTION OF PACEMAKER Left 1/13/2020    Procedure: INSERTION, PACEMAKER;  Surgeon: Marko Batres MD;  Location: [facility] CATH/EP LAB;  Service: Cardiology;  Laterality: Left;    OOPHORECTOMY      RIGHT HEART CATHETERIZATION Right 9/12/2022    Procedure: INSERTION, CATHETER, RIGHT HEART;  Surgeon: Marko Batres MD;  Location: [facility] CATH/EP LAB;  Service: Cardiology;  Laterality: Right;    THYROIDECTOMY         Family History   Problem Relation Age of Onset    Arthritis Mother     Diabetes Mother     Heart disease Mother     Hypertension Mother     Hypertension Father     Heart disease Father     Mental illness Father     Asthma Sister     Diabetes Sister     Hypertension Sister     Asthma Son     COPD Son     Depression Son     Diabetes Son     Hypertension Son     Stroke Son     Diabetes Maternal Uncle     Heart disease Maternal Uncle     Mental illness Paternal Aunt     Cancer Paternal Aunt     Heart disease Paternal Aunt     Hypertension Paternal Aunt     Heart disease Paternal Uncle     Hypertension Maternal Grandmother     Mental illness Maternal Grandmother     Aneurysm Maternal Grandfather     Mental illness Paternal Grandmother     Heart disease Paternal Grandfather     Melanoma Neg Hx     Psoriasis Neg Hx     Lupus Neg Hx     Eczema Neg Hx        Social History     Socioeconomic History    Marital status:    Tobacco Use    Smoking status: Never    Smokeless tobacco: Never   Substance and Sexual Activity     Alcohol use: Not Currently     Comment: seldom    Drug use: No    Sexual activity: Never     Social Determinants of Health     Financial Resource Strain: Low Risk  (3/26/2024)    Overall Financial Resource Strain (CARDIA)     Difficulty of Paying Living Expenses: Not hard at all   Food Insecurity: No Food Insecurity (3/26/2024)    Hunger Vital Sign     Worried About Running Out of Food in the Last Year: Never true     Ran Out of Food in the Last Year: Never true   Transportation Needs: No Transportation Needs (3/26/2024)    PRAPARE - Transportation     Lack of Transportation (Medical): No     Lack of Transportation (Non-Medical): No   Physical Activity: Inactive (3/26/2024)    Exercise Vital Sign     Days of Exercise per Week: 0 days     Minutes of Exercise per Session: 0 min   Stress: No Stress Concern Present (3/26/2024)    [employer] [place] [employer] - Occupational Stress Questionnaire     Feeling of Stress : Not at all   Social Connections: Moderately Integrated (3/26/2024)    Social Connection and Isolation Panel [NHANES]     Frequency of Communication with Friends and Family: More than three times a week     Frequency of Social Gatherings with Friends and Family: More than three times a week     Attends Mu-ism Services: Never     Active Member of Clubs or Organizations: Yes     Marital Status:    Housing Stability: Low Risk  (3/26/2024)    Housing Stability Vital Sign     Unable to Pay for Housing in the Last Year: No     Number of Places Lived in the Last Year: 1     Unstable Housing in the Last Year: No       Review of patient's allergies indicates:   Allergen Reactions    Metformin Diarrhea     Diarrhea      Corn Itching     Other reaction(s): Sneezing  Other reaction(s): Rhinorrhea    Potato starch Hives     Other reaction(s): Sneezing  Other reaction(s): Rhinorrhea    Pravastatin Other (See Comments)     Muscle pain    Statins-hmg-coa reductase inhibitors Other (See Comments)     Hydrochlorothiazide Other (See Comments)     weakness    Welchol [colesevelam] Other (See Comments)     Weakness        Current Outpatient Medications   Medication Instructions    albuterol (PROVENTIL) 2.5 mg, Nebulization, Every 4 hours PRN    albuterol (PROVENTIL/VENTOLIN HFA) 90 mcg/actuation inhaler 1-2 puffs, Inhalation, Every 4 hours PRN, Inhale 2 puffs by mouth into lungs every 4 hours as needed    alcohol swabs (BD ALCOHOL SWABS) PadM 1 each, Topical (Top), As needed (PRN)    amLODIPine (NORVASC) 10 MG tablet TAKE 1 TABLET BY MOUTH EVERY DAY FOR SYSTOLIC BLOOD PRESSURE GREATER THAN 120    arformoteroL (BROVANA) 15 mcg, Nebulization, 2 times daily, Controller    aspirin (ECOTRIN) 81 mg, Oral, Daily    bempedoic acid (NEXLETOL) 180 mg Tab 1 tablet, Oral, Daily    blood glucose control, low (TRUE METRIX LEVEL 1) Soln Check glucometer accuracy at least once a week    blood sugar diagnostic Strp True Metrix Test Strips test once a day    blood-glucose meter (TRUE METRIX GLUCOSE METER) Misc True Metrix Glucose Meter    budesonide (PULMICORT) 0.5 mg, Nebulization, 2 times daily, Controller    cholecalciferol (vitamin D3) (VITAMIN D3) 1,000 Units, Oral, Daily    colchicine (COLCRYS) 0.6 mg, Oral, Daily, Day 1: 1.2 mg at the first sign of flare, followed by 0.6 mg after 1 hour; maximum total dose: 1.8 mg/day on day Day 2 and thereafter: 0.6 mg once or twice daily until flare resolves    cyanocobalamin (VITAMIN B-12) 100 mcg, Oral, Daily    evolocumab (REPATHA SYRINGE) 140 mg, Subcutaneous, Every 14 days    glyBURIDE (DIABETA) 2.5 mg, Oral, 2 times daily    lancets 33 gauge Misc 1 lancet , Misc.(Non-Drug; Combo Route), Daily    levocetirizine (XYZAL) 5 mg, Oral, Nightly    levothyroxine (SYNTHROID) 150 mcg, Oral, Daily    metoprolol succinate (TOPROL-XL) 50 mg, Oral, Daily    potassium chloride (MICRO-K) 8 mEq CpSR 8 mEq, Oral    predniSONE (DELTASONE) 10 mg tablet pack Oral, Daily, Take 3 tablets daily on day #1-2,  Take 2 tablets daily on days #3-4, Take 1 tablet daily on day #5-6, then stop taking  Dispense: 12 tablet; Refill: 0    predniSONE (DELTASONE) 20 MG tablet Take one daily for 3 days and may repeat for shortness of breath.    REPATHA SURECLICK 140 mg/mL PnIj SMARTSI pre-filled pen syringe SUB-Q Every 2 Weeks    revefenacin (YUPELRI) 175 mcg/3 mL Nebu 1 Dose, Inhalation, Daily    SITagliptin phosphate (JANUVIA) 50 mg, Oral, Daily    sotaloL (BETAPACE) 80 mg, Oral, 2 times daily    torsemide (DEMADEX) 20 mg, Oral    vitamin E 400 Units, Oral, Daily         Review of Systems    Constitutional:  Denies fevers, chills, night sweats, loss of appetite.  HEENT: Denies visual changes, ear pain, sinus congestion, mouth pain or trouble swallowing, sore throat or dental pain.  Neck: Denies neck pain or lumps.  Respiratory: Denies shortness of breath, coughing, wheezing or hemoptysis.  Cardiovascular:  Denies chest pain, palpitations or edema.  Gastrointestinal: Denies  nausea, vomiting, constipation or diarrhea.  Genitourinary:  Denies dysuria, frequency, urgency or hematuria   Musculoskeletal:  Pain right knee and ankle.  Also pain bilateral foot  Skin:  Denies rash or itching.  VAD:    Neurologic:  Denies motor sensory loss, headaches or dizziness.    Psychiatric:  Denies changes in mood or behavior.       OBJECTIVE     Temp:  [97.4 °F (36.3 °C)-98.8 °F (37.1 °C)] 97.6 °F (36.4 °C)  Pulse:  [] 64  Resp:  [18-20] 20  SpO2:  [95 %-99 %] 99 %  BP: (110-160)/(55-70) 160/67       Physical Exam    General:  Elderly woman lying quietly in bed.  In no acute distress   Eyes: Eyes with no icterus or injection. Vision grossly normal  Neck: Supple, no tenderness to palpation.  Cardiovascular: Regular rate and rhythm, no murmurs, no edema.    Respiratory:  Clear to auscultation bilaterally, no tachypnea or increased work of breathing.  Gastrointestinal:  Soft with active bowel sounds, no tenderness to palpation, no  distention.  Genitourinary:  No suprapubic tenderness.  Musculoskeletal:  Mild effusion right knee with warmth.  Range of motion good.  Right ankle swelling with tenderness and reduced range of motion.  Bilateral foot tenderness  Skin:  Warm and dry, no obvious rashes.    Neuro:   Oriented, conversant, follows commands.  Psych: Good mood, normal affect.     Wounds: None  VAD:  Right arm PICC  ISOLATION: None    CBC LAST 7  Recent Labs   Lab 03/24/24  0543 03/25/24  0533 03/26/24  0904 03/27/24  0529 03/28/24  0413 03/29/24  0217 03/30/24  0418   WBC 14.79*  14.79* 10.13  10.13  10.13  10.13 9.37 10.11 15.05* 13.11* 10.65   RBC 4.34  4.34 4.11  4.11  4.11  4.11 4.37 4.34 4.32 3.91* 3.91*   HGB 13.1  13.1 12.6  12.6  12.6  12.6 13.2 13.0 13.1 11.9* 11.8*   HCT 41.6  41.6 38.8  38.8  38.8  38.8 41.2 40.1 40.1 37.0 37.4   MCV 96  96 94  94  94  94 94 92 93 95 96   MCH 30.2  30.2 30.7  30.7  30.7  30.7 30.2 30.0 30.3 30.4 30.2   MCHC 31.5*  31.5* 32.5  32.5  32.5  32.5 32.0 32.4 32.7 32.2 31.6*   RDW 14.2  14.2 14.1  14.1  14.1  14.1 13.6 13.2 13.2 13.2 13.4   *  136* 142*  142*  142*  142* 192 239 274 321 361   MPV 10.0  10.0 10.6  10.6  10.6  10.6 11.1 10.6 10.2 10.0 9.8   GRAN 75.3*  75.3*  11.1*  11.1* 70.0  70.0  70.0  70.0  7.1  7.1  7.1  7.1 73.7*  6.9 76.9*  7.8* 79.7*  12.0* 74.8*  9.8* 69.6  7.4   LYMPH 11.4*  11.4*  1.7  1.7 13.7*  13.7*  13.7*  13.7*  1.4  1.4  1.4  1.4 11.0*  1.0 9.4*  1.0 7.9*  1.2 10.4*  1.4 11.5*  1.2   MONO 12.4  12.4  1.8*  1.8* 14.9  14.9  14.9  14.9  1.5*  1.5*  1.5*  1.5* 13.1  1.2* 11.1  1.1* 10.0  1.5* 11.8  1.6* 12.2  1.3*   BASO 0.05  0.05 0.03  0.03  0.03  0.03 0.03 0.04 0.11 0.07 0.07   NRBC 0  0 0  0  0  0 0 0 0 0 0       CHEMISTRY LAST 7  Recent Labs   Lab 03/24/24  0543 03/25/24  0954 03/26/24  0904 03/27/24  0529 03/28/24  0413 03/29/24  0217 03/30/24  0418   * 134*   "134* 138 140 139 141 139   K 4.0 5.0  5.0 4.1 3.6 4.0 3.4* 3.9   CL 89* 88*  88* 91* 94* 91* 90* 92*   CO2 38* 36*  36* 33* 38* 36* 39* 39*   ANIONGAP 8 10  10 14 8 12 12 8   BUN 47* 57*  57* 45* 35* 23 24* 29*   CREATININE 1.8* 1.6*  1.6* 1.0 0.9 0.9 1.0 0.9   * 128*  128* 154* 200* 182* 159* 169*   CALCIUM 9.0 9.2  9.2 9.1 9.6 9.8 9.5 9.1   MG 1.7 1.9 1.9 1.7 1.5* 1.8 1.6   ALBUMIN 3.2* 3.3*  3.3* 2.5* 2.2* 2.1* 1.9* 2.9*   PROT 6.1 6.7 6.1 6.2 6.5 6.4 6.7   ALKPHOS 55 58 68 70 115 80 77   ALT 6* 9* 17 18 21 22 27   AST 18 26 36 31 42* 37 42*   BILITOT 0.9 0.5 0.6 0.5 0.7 0.4 0.4       Estimated Creatinine Clearance: 57 mL/min (based on SCr of 0.9 mg/dL).    INFLAMMATORY/PROCAL  LAST 7  Recent Labs   Lab 03/24/24  0543   PROCAL 5.579*   .8*     No results found for: "ESR"  CRP   Date Value Ref Range Status   03/24/2024 278.8 (H) 0.0 - 8.2 mg/L Final   05/04/2010 4.9 0.1 - 8.2 mg/L Final       PRIOR  MICROBIOLOGY:  Reviewed  Susceptibility data from last 90 days.  Collected Specimen Info Organism Ampicillin Genatmicin Synergy Screen Vancomycin   03/25/24 Blood from Peripheral, Hand, Right Enterococcus faecalis      03/24/24 Blood Enterococcus faecalis      03/24/24 Blood Enterococcus faecalis      03/23/24 Blood Enterococcus faecalis  S  S  S   03/23/24 Blood from Peripheral, Antecubital, Left Enterococcus faecalis            LAST 7 DAYS MICROBIOLOGY   Microbiology Results (last 7 days)       ** No results found for the last 168 hours. **          CURRENT/PREVIOUS VISIT EKG  Results for orders placed or performed during the hospital encounter of 03/22/24   EKG 12-lead    Collection Time: 03/22/24  4:27 PM   Result Value Ref Range    QRS Duration 168 ms    [facility] QTC Calculation 528 ms    Narrative    Test Reason : R07.9,    Vent. Rate : 090 BPM     Atrial Rate : 090 BPM     P-R Int : 220 ms          QRS Dur : 168 ms      QT Int : 432 ms       P-R-T Axes : 015 -63 090 degrees     QTc Int : 528 " ms    Atrial-sensed ventricular-paced rhythm with prolonged AV conduction  Abnormal ECG  When compared with ECG of 10-SEP-2022 23:53,  Vent. rate has increased BY  30 BPM    Referred By: DANISH HENDERSON           Confirmed By:        Significant Labs: All pertinent labs within the past 24 hours have been reviewed.     Significant Imaging: I have reviewed all relevant and available imaging results/findings within the past 24 hours.      Olivier Espitia MD  Date of Service: 03/30/2024  9:04 AM

## 2024-03-30 NOTE — PLAN OF CARE
[facility]  Initial Discharge Assessment  CM presented to patient's room to complete initial d/c assessment. Patient and spouse Pollo confirmed all demographic information on face sheet to be correct and reports no living will or POA. Patient resides in the home with her spouse. Patient would like to return home upon discharge and reports no use of outpatient services. Patient actively uses DME as listed below. Patient does not attend dialysis or take coumadin medication. Patient is independent for the most part and requires minimal assistance with ADLs. Patient declined the need for any additional DME or services. CM will continue to monitor patient's needs accordingly.      Primary Care Provider: Evan Sánchez MD    Admission Diagnosis: Endocarditis [I38]    Admission Date: 3/29/2024  Expected Discharge Date:     Transition of Care Barriers: None    Payor: [employer] MANAGED MEDICARE / Plan: [employer] MEDICARE PPO / Product Type: Medicare Advantage /     Extended Emergency Contact Information  Primary Emergency Contact: Pollo Larose  Address: [address] [place] [address] [place] [address] [facility]  Mobile Phone: [phone]  Relation: Spouse  Preferred language: English   needed? No  Secondary Emergency Contact: Brayden Larose  Mobile Phone: [phone]  Relation: Son    Discharge Plan A: Home with family  Discharge Plan B: Home with family      [id number] - [place], [place] - [address] [place] [facility] [address]  [address]  [place] [address]  Phone: [phone] Fax: [phone]    [facility] - [place], [place] - [address]  [address]  [place] [address]  Phone: [phone] Fax: [phone]    [facility] - [place], [place] - [address]  [address]  [facility] [address]  Phone: [phone] Fax: [phone]      Initial Assessment (most recent)       Adult Discharge Assessment - 03/30/24 1116           Discharge Assessment    Assessment Type Discharge Planning Assessment     Confirmed/corrected address, phone number and insurance Yes     Confirmed Demographics Correct on Facesheet     Source of Information patient;family     When was your last doctors appointment? --   1 year    Does patient/caregiver understand observation status Yes     Reason For Admission No Active Principal problem- Weakness     People in Home spouse     Facility Arrived From: Home     Do you expect to return to your current living situation? Yes     Do you have help at home or someone to help you manage your care at home? Yes     Who are your caregiver(s) and their phone number(s)? Pollo Thuan, Spouse [phone]     Prior to hospitilization cognitive status: Alert/Oriented     Current cognitive status: Alert/Oriented     Walking or Climbing Stairs Difficulty yes     Mobility Management rolling walker and cane     Dressing/Bathing Difficulty yes     Dressing/Bathing bathing difficulty, requires equipment     Dressing/Bathing Management Shower bench     Home Accessibility wheelchair accessible     Home Layout Able to live on 1st floor     Equipment Currently Used at Home oxygen;nebulizer     Readmission within 30 days? No     Patient currently being followed by outpatient case management? No     Do you currently have service(s) that help you manage your care at home? No     Do you take prescription medications? Yes     Do you have prescription coverage? Yes     Coverage [employer] Man[employer] Medicare     Do you have any problems affording any of your prescribed medications? No     Is the patient taking medications as prescribed? yes     Who is going to help you get home at discharge? Pollo Larose     How do you get to doctors appointments? family or friend will provide     Are you on dialysis? No     Do you take coumadin? No     Discharge Plan A Home with family     Discharge Plan B Home with family     DME Needed Upon Discharge  none      Discharge Plan discussed with: Spouse/sig other     Name(s) and Number(s) Pollo Larose, [phone]     Transition of Care Barriers None

## 2024-03-30 NOTE — H&P
Critical access hospital Medicine  Progress Note    Patient Name: Tereza Larose  MRN: [id number]  Patient Class: IP- Inpatient   Admission Date: 3/29/2024  Length of Stay: 1 days  Attending Physician: Kenny Mathews MD  Primary Care Provider: Evan Sánchez MD        Subjective:     Principal Problem:<principal problem not specified>        HPI:  HPI:   Ms. Larose is a 74 yo F with CAD, HFpEF, pacemaker in place, HTN, HLD, DMII, COPD who originally presented to OSH after a fall, later was found to have NSTEMI and subsequently had VERONA and Enterococcus bacteremia. Cardiology and ID were consulted, she was started on IV CTX and ampicillin. DELORIS showed small mobile mass located on a right atrial lead. Proximal measurements are 1 cm x 0.5 cm. Case discussed with EP and Interventional Cardiology who will consult on the patient. Currently hemodynamically stable on 3L NC. Denies headaches, visual changes, SOB, cough, chest pain, palpitation, nausea, vomiting, abdominal pain, diarrhea, constipation, urinary frequency or any weakness. AT [facility] [place] the pt was restarted on CTX and ampicillin, ID consulted.        Overview/Hospital Course:    Pt admitted to hospital medicine for VERONA and enterococcus bacteremia. Pt started on ceftriaxone and ampicillin. ID consulted.Blood cultures growing enterococcus faecalis.  EP and interventional cardiology consulted for pacemaker removal, however patient is not a candidate due to her severe pulmonary hypertension. PICC placed. Patient to have 6 weeks of Ampicillin 2g IV q 6 hours Ceftriaxone 2g IV q 12 hours  followed by PO antibiotic suppression. Concern for gout flare and started treatment. On allopurinol at home. Patient transferred to [facility] to continue treatment from [facility] [place]    Interval History:     Patient was seen and examined, she has not in shortness of breath or no chest pain, afebrile, pacemaker was not removed because of the high-risk.  Patient is getting  "antibiotics.  Discussed with patient and  about code status and they want partial code status with no intubation or ventilator attachment      Review of Systems  Objective:     Vital Signs (Most Recent):  Temp: 97.5 °F (36.4 °C) (03/30/24 1140)  Pulse: 78 (03/30/24 1140)  Resp: 20 (03/30/24 1140)  BP: (!) 152/70 (03/30/24 1140)  SpO2: 97 % (03/30/24 1140) Vital Signs (24h Range):  Temp:  [97.4 °F (36.3 °C)-98.8 °F (37.1 °C)] 97.5 °F (36.4 °C)  Pulse:  [64-85] 78  Resp:  [18-20] 20  SpO2:  [96 %-99 %] 97 %  BP: (110-160)/(63-70) 152/70     Weight: 87 kg (191 lb 12.8 oz)  Body mass index is 35.08 kg/m².    Intake/Output Summary (Last 24 hours) at 3/30/2024 1237  Last data filed at 3/30/2024 1042  Gross per 24 hour   Intake 540 ml   Output 500 ml   Net 40 ml         Physical Exam  Vitals and nursing note reviewed.   HENT:      Head: Normocephalic and atraumatic.   Eyes:      Conjunctiva/sclera: Conjunctivae normal.   Neck:      Vascular: No JVD.   Cardiovascular:      Rate and Rhythm: Normal rate.      Heart sounds: Normal heart sounds.   Pulmonary:      Effort: Pulmonary effort is normal.      Breath sounds: Normal breath sounds.   Abdominal:      Palpations: Abdomen is soft.   Musculoskeletal:         General: Normal range of motion.   Skin:     General: Skin is warm.   Neurological:      Mental Status: She is alert and oriented to person, place, and time.   Psychiatric:         Mood and Affect: Mood normal.             Significant Labs: All pertinent labs within the past 24 hours have been reviewed.  CMP:   Recent Labs   Lab 03/29/24  0217 03/30/24  0418    139   K 3.4* 3.9   CL 90* 92*   CO2 39* 39*   * 169*   BUN 24* 29*   CREATININE 1.0 0.9   CALCIUM 9.5 9.1   PROT 6.4 6.7   ALBUMIN 1.9* 2.9*   BILITOT 0.4 0.4   ALKPHOS 80 77   AST 37 42*   ALT 22 27   ANIONGAP 12 8     Cardiac Markers: No results for input(s): "CKMB", "MYOGLOBIN", "BNP", "TROPISTAT" in the last 48 hours.  Coagulation: No " "results for input(s): "PT", "INR", "APTT" in the last 48 hours.    Significant Imaging: I have reviewed all pertinent imaging results/findings within the past 24 hours.    Assessment/Plan:     * Acute bacterial endocarditis  Treated for enterococcus faecalis bacteremia. DELORIS remarkable for Right Atrium: Right atrium is dilated. Multiple leads viewed in the right atrium. There is a 1 x 0.6 cm mass attached to a lead in the right atrium that demonstrates independent motion. Concerning for possible vegetations.      Plan  Continue current antibiotics determined by Infectious Disease already  -Will need 6 weeks of IV Ceftriaxone and Ampicillin from negative blood cultures  - Lifelong PO suppressive amoxicillin after 6 weeks of IV antibiotic therapy completed  - PICC placed in the other hospital       Pacemaker infection  Patient is deem not a candidate for removal of pacemaker due to her severe pulmonary hypertension being a severe risk for decompensation during intubation/anesthesia.   Will have 6 weeks of IV antibiotic therapy        Pacemaker     Please see Acute bacterial endocarditis      Hypothyroidism  TSH elevated to 40.97.    adjusted  levothyroxine   Repeat TSH in few days        VERONA (acute kidney injury)  Patient with acute kidney injury/acute renal failure likely due to acute tubular necrosis caused by sepsis  VERONA is currently improving. Baseline creatinine unknown - Labs reviewed- Renal function/electrolytes with Estimated Creatinine Clearance: 51.3 mL/min (based on SCr of 1 mg/dL). according to latest data. Monitor urine output and serial BMP and adjust therapy as needed. Avoid nephrotoxins and renally dose meds for GFR listed above.  Resolved      COPD (chronic obstructive pulmonary disease)  Patient's COPD is controlled currently. Patient is currently off COPD Pathway. Justice p.r.n. On 3 L NC on baseline with SpO2 96%. We will monitor respiratory status      Severe pulmonary hypertension  Pt presenting " from OSH for enterococcus bacteremia with endocarditis. Echo showing Pulmonary Artery: There is moderate pulmonary hypertension. The estimated pulmonary artery systolic pressure is 63 mmHg. This makes the patient not a candidate for pacemaker removal as she is a very high risk for intubation.    May need to see palliative care consult     Acute on chronic combined systolic and diastolic CHF (congestive heart failure)  Patient is identified as having Combined Systolic and Diastolic heart failure that is Chronic. CHF is currently controlled. Latest ECHO performed and demonstrates- Results for orders placed during the hospital encounter of 03/22/24     Echo     Interpretation Summary    Left Ventricle: The left ventricle is normal in size. Normal wall thickness. Normal wall motion. Septal motion is consistent with pacing. There is normal systolic function with a visually estimated ejection fraction of 60 - 65%. There is normal diastolic function.    Right Ventricle: Normal right ventricular cavity size. Wall thickness is normal. Right ventricle wall motion  is normal. Systolic function is normal. Pacemaker lead present in the ventricle.    Right Atrium: Multiple leads present in the right atrium.    Mitral Valve: There is severe bileaflet sclerosis. There is severe anterior mitral annular calcification present. There is normal leaflet mobility. There is mild to moderate regurgitation.    Tricuspid Valve: There is moderate regurgitation with a centrally directed jet.    Pulmonary Artery: There is moderate pulmonary hypertension. The estimated pulmonary artery systolic pressure is 63 mmHg.    IVC/SVC: Elevated venous pressure at 15 mmHg.  . Continue Beta Blocker and monitor clinical status closely. Monitor on telemetry. Patient is off CHF pathway.  Monitor strict Is&Os and daily weights.  Place on fluid restriction of 2 L. Cardiology has been consulted. Continue to stress to patient importance of self efficacy and   on diet for CHF. Last BNP reviewed- and noted below       Recent Labs   Lab 03/25/24  0533   *            T2DM (type 2 diabetes mellitus)  Patient's FSGs are controlled on current medication regimen.  Last A1c reviewed-         Lab Results   Component Value Date     HGBA1C 7.9 (H) 03/22/2024      Most recent fingerstick glucose reviewed-       Recent Labs   Lab 03/26/24  0833   POCTGLUCOSE 160*      Current correctional scale  Low  Maintain anti-hyperglycemic dose as follows-   Antihyperglycemics (From admission, onward)        Start     Stop Route Frequency Ordered     03/26/24 0809   insulin aspart U-100 pen 0-5 Units         -- SubQ Before meals & nightly PRN 03/26/24 0710             Hold Oral hypoglycemics while patient is in the hospital.     HTN (hypertension)  Chronic, controlled. Latest blood pressure and vitals reviewed-      Temp:  [97.5 °F (36.4 °C)-99.1 °F (37.3 °C)]   Pulse:  [73-95]   Resp:  [16-18]   BP: (141-167)/(65-72)   SpO2:  [92 %-98 %] .   Home meds for hypertension were reviewed and noted below.   Hypertension Medications                    amLODIPine (NORVASC) 10 MG tablet TAKE 1 TABLET BY MOUTH EVERY DAY FOR SYSTOLIC BLOOD PRESSURE GREATER THAN 120     metoprolol succinate (TOPROL-XL) 50 MG 24 hr tablet Take 1 tablet (50 mg total) by mouth once daily.     sotaloL (BETAPACE) 80 MG tablet Take 80 mg by mouth 2 (two) times daily.     torsemide (DEMADEX) 20 MG Tab Take 20 mg by mouth.                While in the hospital, will manage blood pressure as follows; Continue home antihypertensive regimen continue amlodipine, hold metoprolol.      Will utilize p.r.n. blood pressure medication only if patient's blood pressure greater than 180/110 and she develops symptoms such as worsening chest pain or shortness of breath.     NSTEMI (non-ST elevated myocardial infarction)      aware. No chest pain      CAD (coronary artery disease)  Patient with known CAD s/p CABG, which is  uncontrolled Will continue  heparin gtt and Statin and monitor for S/Sx of angina/ACS. Continue to monitor on telemetry.        VTE Risk Mitigation (From admission, onward)           Ordered     enoxaparin injection 40 mg  Daily         03/29/24 2328     IP VTE HIGH RISK PATIENT  Once         03/29/24 2328     Place sequential compression device  Until discontinued         03/29/24 2328                    Discharge Planning   FAVIOLA:      Code Status: Partial Code   Is the patient medically ready for discharge?:     Reason for patient still in hospital (select all that apply): Treatment  Discharge Plan A: Home with family                  Kenny Mathews MD  Department of Hospital Medicine   [facility]

## 2024-03-30 NOTE — NURSING
"Discussed code status with patient and her prior code status at [facility] was DNR. Current code status on fine is FULL Code. Explained each code status to patient. Patient stated "I would like to leave that up to my ." Will pass along to day shift nurse to clarify with  as soon as day. CN notified of situation. Plan of care ongoing.   "

## 2024-03-31 LAB
ALBUMIN SERPL BCP-MCNC: 2.9 G/DL (ref 3.5–5.2)
ALP SERPL-CCNC: 77 U/L (ref 55–135)
ALT SERPL W/O P-5'-P-CCNC: 20 U/L (ref 10–44)
ANION GAP SERPL CALC-SCNC: 6 MMOL/L (ref 8–16)
AST SERPL-CCNC: 27 U/L (ref 10–40)
BACTERIA BLD CULT: NORMAL
BACTERIA BLD CULT: NORMAL
BASOPHILS # BLD AUTO: 0.07 K/UL (ref 0–0.2)
BASOPHILS NFR BLD: 0.7 % (ref 0–1.9)
BILIRUB SERPL-MCNC: 0.3 MG/DL (ref 0.1–1)
BUN SERPL-MCNC: 27 MG/DL (ref 8–23)
CALCIUM SERPL-MCNC: 9.3 MG/DL (ref 8.7–10.5)
CHLORIDE SERPL-SCNC: 95 MMOL/L (ref 95–110)
CO2 SERPL-SCNC: 38 MMOL/L (ref 23–29)
CREAT SERPL-MCNC: 0.8 MG/DL (ref 0.5–1.4)
DIFFERENTIAL METHOD BLD: ABNORMAL
EOSINOPHIL # BLD AUTO: 0.5 K/UL (ref 0–0.5)
EOSINOPHIL NFR BLD: 5.1 % (ref 0–8)
ERYTHROCYTE [DISTWIDTH] IN BLOOD BY AUTOMATED COUNT: 13.2 % (ref 11.5–14.5)
EST. GFR  (NO RACE VARIABLE): >60 ML/MIN/1.73 M^2
GLUCOSE SERPL-MCNC: 140 MG/DL (ref 70–110)
GLUCOSE SERPL-MCNC: 155 MG/DL (ref 70–110)
GLUCOSE SERPL-MCNC: 171 MG/DL (ref 70–110)
GLUCOSE SERPL-MCNC: 222 MG/DL (ref 70–110)
GLUCOSE SERPL-MCNC: 238 MG/DL (ref 70–110)
HCT VFR BLD AUTO: 39.1 % (ref 37–48.5)
HGB BLD-MCNC: 12.1 G/DL (ref 12–16)
IMM GRANULOCYTES # BLD AUTO: 0.16 K/UL (ref 0–0.04)
IMM GRANULOCYTES NFR BLD AUTO: 1.6 % (ref 0–0.5)
LYMPHOCYTES # BLD AUTO: 1 K/UL (ref 1–4.8)
LYMPHOCYTES NFR BLD: 9.7 % (ref 18–48)
MAGNESIUM SERPL-MCNC: 1.7 MG/DL (ref 1.6–2.6)
MCH RBC QN AUTO: 29.7 PG (ref 27–31)
MCHC RBC AUTO-ENTMCNC: 30.9 G/DL (ref 32–36)
MCV RBC AUTO: 96 FL (ref 82–98)
MONOCYTES # BLD AUTO: 1 K/UL (ref 0.3–1)
MONOCYTES NFR BLD: 9.5 % (ref 4–15)
NEUTROPHILS # BLD AUTO: 7.4 K/UL (ref 1.8–7.7)
NEUTROPHILS NFR BLD: 73.4 % (ref 38–73)
NRBC BLD-RTO: 0 /100 WBC
PLATELET # BLD AUTO: 371 K/UL (ref 150–450)
PMV BLD AUTO: 9.6 FL (ref 9.2–12.9)
POTASSIUM SERPL-SCNC: 4.4 MMOL/L (ref 3.5–5.1)
PROT SERPL-MCNC: 6.7 G/DL (ref 6–8.4)
RBC # BLD AUTO: 4.07 M/UL (ref 4–5.4)
SODIUM SERPL-SCNC: 139 MMOL/L (ref 136–145)
WBC # BLD AUTO: 10.05 K/UL (ref 3.9–12.7)

## 2024-03-31 PROCEDURE — 25000003 PHARM REV CODE 250: Performed by: INTERNAL MEDICINE

## 2024-03-31 PROCEDURE — 36415 COLL VENOUS BLD VENIPUNCTURE: CPT | Performed by: INTERNAL MEDICINE

## 2024-03-31 PROCEDURE — 94761 N-INVAS EAR/PLS OXIMETRY MLT: CPT

## 2024-03-31 PROCEDURE — 82962 GLUCOSE BLOOD TEST: CPT

## 2024-03-31 PROCEDURE — 21400001 HC TELEMETRY ROOM

## 2024-03-31 PROCEDURE — 63600175 PHARM REV CODE 636 W HCPCS: Mod: UD | Performed by: INTERNAL MEDICINE

## 2024-03-31 PROCEDURE — 83735 ASSAY OF MAGNESIUM: CPT | Performed by: INTERNAL MEDICINE

## 2024-03-31 PROCEDURE — 27000221 HC OXYGEN, UP TO 24 HOURS

## 2024-03-31 PROCEDURE — 80053 COMPREHEN METABOLIC PANEL: CPT | Performed by: INTERNAL MEDICINE

## 2024-03-31 PROCEDURE — 99900031 HC PATIENT EDUCATION (STAT)

## 2024-03-31 PROCEDURE — 85025 COMPLETE CBC W/AUTO DIFF WBC: CPT | Performed by: INTERNAL MEDICINE

## 2024-03-31 RX ORDER — GLUCAGON 1 MG
1 KIT INJECTION
Status: DISCONTINUED | OUTPATIENT
Start: 2024-03-31 | End: 2024-04-06 | Stop reason: HOSPADM

## 2024-03-31 RX ORDER — INSULIN ASPART 100 [IU]/ML
0-10 INJECTION, SOLUTION INTRAVENOUS; SUBCUTANEOUS EVERY 6 HOURS PRN
Status: DISCONTINUED | OUTPATIENT
Start: 2024-03-31 | End: 2024-04-06 | Stop reason: HOSPADM

## 2024-03-31 RX ADMIN — INSULIN ASPART 1 UNITS: 100 INJECTION, SOLUTION INTRAVENOUS; SUBCUTANEOUS at 09:03

## 2024-03-31 RX ADMIN — Medication 6 MG: at 08:03

## 2024-03-31 RX ADMIN — INSULIN ASPART 4 UNITS: 100 INJECTION, SOLUTION INTRAVENOUS; SUBCUTANEOUS at 06:03

## 2024-03-31 RX ADMIN — CEFTRIAXONE SODIUM 2 G: 2 INJECTION, POWDER, FOR SOLUTION INTRAMUSCULAR; INTRAVENOUS at 12:03

## 2024-03-31 RX ADMIN — HYDROCODONE BITARTRATE AND ACETAMINOPHEN 2 TABLET: 5; 325 TABLET ORAL at 06:03

## 2024-03-31 RX ADMIN — AMPICILLIN SODIUM 2 G: 2 INJECTION, POWDER, FOR SOLUTION INTRAMUSCULAR; INTRAVENOUS at 05:03

## 2024-03-31 RX ADMIN — SENNOSIDES AND DOCUSATE SODIUM 1 TABLET: 8.6; 5 TABLET ORAL at 09:03

## 2024-03-31 RX ADMIN — INSULIN DETEMIR 20 UNITS: 100 INJECTION, SOLUTION SUBCUTANEOUS at 09:03

## 2024-03-31 RX ADMIN — ACETAMINOPHEN 650 MG: 325 TABLET ORAL at 08:03

## 2024-03-31 RX ADMIN — AMPICILLIN SODIUM 2 G: 2 INJECTION, POWDER, FOR SOLUTION INTRAMUSCULAR; INTRAVENOUS at 08:03

## 2024-03-31 RX ADMIN — MUPIROCIN 1 G: 20 OINTMENT TOPICAL at 08:03

## 2024-03-31 RX ADMIN — AMPICILLIN SODIUM 2 G: 2 INJECTION, POWDER, FOR SOLUTION INTRAMUSCULAR; INTRAVENOUS at 01:03

## 2024-03-31 RX ADMIN — AMPICILLIN SODIUM 2 G: 2 INJECTION, POWDER, FOR SOLUTION INTRAMUSCULAR; INTRAVENOUS at 12:03

## 2024-03-31 RX ADMIN — MUPIROCIN 1 G: 20 OINTMENT TOPICAL at 09:03

## 2024-03-31 RX ADMIN — ENOXAPARIN SODIUM 40 MG: 40 INJECTION SUBCUTANEOUS at 06:03

## 2024-03-31 NOTE — PLAN OF CARE
Problem: Adult Inpatient Plan of Care  Goal: Plan of Care Review  Outcome: Ongoing, Progressing  Goal: Patient-Specific Goal (Individualized)  Outcome: Ongoing, Progressing     Problem: Fluid and Electrolyte Imbalance (Acute Kidney Injury/Impairment)  Goal: Fluid and Electrolyte Balance  Outcome: Ongoing, Progressing     Problem: Oral Intake Inadequate (Acute Kidney Injury/Impairment)  Goal: Optimal Nutrition Intake  Outcome: Ongoing, Progressing     Problem: Infection  Goal: Absence of Infection Signs and Symptoms  Outcome: Ongoing, Progressing     Problem: Fall Injury Risk  Goal: Absence of Fall and Fall-Related Injury  Outcome: Ongoing, Progressing     Problem: Skin Injury Risk Increased  Goal: Skin Health and Integrity  Outcome: Ongoing, Progressing

## 2024-03-31 NOTE — PROGRESS NOTES
[facility]   Department of Infectious Disease  Consult Note        PATIENT NAME: Tereza Larose  MRN: [id number]  TODAY'S DATE: 03/31/2024  ADMIT DATE: 3/29/2024    CHIEF COMPLAINT: No chief complaint on file.    PRINCIPLE PROBLEM: <principal problem not specified>    REASON FOR CONSULT:     ASSESSMENT and PLAN      1. E fecalis PPM lead Endocarditis. She is not a candidate for lead extraction due to severe PHTN. Repeat blood cultures. Check CRP and ESR. Continue Ceftriaxone + Ampicillin. Plan to treat for at least 8 weeks through at least 05/19/2024 if all goes well.  May follow after that with oral amoxicillin for prolonged time.    2. Severe Pulmonary HTN. As per Cardiologist and pulmonary service.    3. NSTEMI. ? From #1. As per Cardiologist    4. VERONA. Improved.    5. HTN. On antihypertensives.     6. Gout involving right knee and right ankle.  Okay to give short course steroids for this indication.    7.  Debility.  States had not been moving well since hospitalization because of gout involving both feet.  Pain is better.  PT/OT.    Recommendations:  Continue antibiotics through at least 05/19/2024.  May follow after that with prolonged suppressive oral amoxicillin.    ID service will continue to follow with you.  Please call with questions.  Discussed with patient and her  at bedside.. Please send Epic secure chat with any questions.      HPI      Tereza Larose is a 73 y.o. female with HTN, PHTN, Gout, CAD and previous AVR with a bioprosthetic valve. Also has a PPM. Known to [facility] ID service. See consult note 3/24/24 for ful details. Admitted 3/2/24 with malaise and diagnosed with NSTEMI. Blood cultures grew Penicillin sensitive E fecalis. Had DELORIS 3/25/24 that showed an atrial lead vegetation. She was transferred to [facility] same day for possible lead extraction. Evaluated by multiple specialities including EP cardiology, Interventional cardiology and CTS and deemed to be very  high risk for surgery due to severe PHTN. She has been transferred back to [facility] to continue conservative management with antibiotics.    Antibiotics  Ampicillin:  03/24/2024-  Ceftriaxone:  03/24/2024-  Vancomycin:  03/24/2024 x1 dose    Microbiology  Blood culture 03/23/2024:  Ampicillin sensitive Enterococcus faecalis  Blood culture 03/24/2024, 03/25/2024:  E faecalis   Blood culture 03/26/2024, 03/30/2024: NGTD      Outdoor activities: Not contributory  Travel: None  Implants:  PPM, Bioprosthetic Aortic valve  Antibiotic history:  Reviewed    Past Medical History:   Diagnosis Date    Abnormal EKG 9/26/2012    Allergy     Arthritis     Asthma     Brain bleed     Colon polyps 11/6/2020    COPD (chronic obstructive pulmonary disease)     Depression     Diabetes mellitus type II     Diverticulitis     Fatty liver     GERD (gastroesophageal reflux disease)     Goiter     s/p thyroidectomy    Heart murmur     Hemiparesis affecting right side as late effect of cerebrovascular accident (CVA) 7/26/2022    Hernia of abdominal wall     History of intracranial hemorrhage 6/15/2013    History of non-ST elevation myocardial infarction (NSTEMI) 6/15/2013    Hyperlipidemia     Hypertension     Lactic acidosis 1/11/2020    Metabolic syndrome 6/14/2012    Myocardial infarction     On home oxygen therapy     states uses 2L at night or when sat < 90    Pacemaker     Positive FIT (fecal immunochemical test) 11/6/2020    Severe persistent asthma without complication 4/30/2020    Statin intolerance     Stroke 2012    left hand shaky, loss of balance       Past Surgical History:   Procedure Laterality Date    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/25/2017    Dr Griggs    ADRENALECTOMY      AORTIC VALVE REPLACEMENT  12/09/2016    ARTERIOGRAPHY OF AORTIC ROOT N/A 9/12/2022    Procedure: ARTERIOGRAM, AORTIC ROOT;  Surgeon: Marko Batres MD;  Location: [facility] CATH/EP LAB;  Service: Cardiology;  Laterality: N/A;    arthroscopy lt knee Right     BLADDER  REPAIR      sling    BREAST BIOPSY Bilateral     benign    COLONOSCOPY  2004    10 year recheck    COLONOSCOPY N/A 11/6/2020    Procedure: COLONOSCOPY;  Surgeon: Yakelin Lowery MD;  Location: [facility];  Service: Endoscopy;  Laterality: N/A;    CORONARY ANGIOGRAPHY INCLUDING BYPASS GRAFTS WITH CATHETERIZATION OF LEFT HEART Left 9/12/2022    Procedure: Left heart cath including bypass graft, with left heart catheterization;  Surgeon: Marko Batres MD;  Location: [facility] CATH/EP LAB;  Service: Cardiology;  Laterality: Left;    CORONARY ARTERY BYPASS GRAFT  12/09/2016    X3    ECHOCARDIOGRAM,TRANSESOPHAGEAL N/A 3/25/2024    Procedure: Transesophageal echo (DELORIS) intra-procedure log documentation;  Surgeon: Paulino Resendiz MD;  Location: [facility] CATH/EP LAB;  Service: Cardiology;  Laterality: N/A;    EPIDURAL STEROID INJECTION INTO LUMBAR SPINE N/A 7/27/2021    Procedure: Injection-steroid-epidural-lumbar;  Surgeon: Valerio Jay MD;  Location: [facility] OR;  Service: Pain Management;  Laterality: N/A;  L5-S1     HYSTERECTOMY      INSERTION OF PACEMAKER Left 1/13/2020    Procedure: INSERTION, PACEMAKER;  Surgeon: Marko Batres MD;  Location: [facility] CATH/EP LAB;  Service: Cardiology;  Laterality: Left;    OOPHORECTOMY      RIGHT HEART CATHETERIZATION Right 9/12/2022    Procedure: INSERTION, CATHETER, RIGHT HEART;  Surgeon: Marko Batres MD;  Location: [facility] CATH/EP LAB;  Service: Cardiology;  Laterality: Right;    THYROIDECTOMY         Family History   Problem Relation Age of Onset    Arthritis Mother     Diabetes Mother     Heart disease Mother     Hypertension Mother     Hypertension Father     Heart disease Father     Mental illness Father     Asthma Sister     Diabetes Sister     Hypertension Sister     Asthma Son     COPD Son     Depression Son     Diabetes Son     Hypertension Son     Stroke Son     Diabetes Maternal Uncle     Heart disease Maternal Uncle     Mental illness Paternal Aunt     Cancer Paternal Aunt     Heart  disease Paternal Aunt     Hypertension Paternal Aunt     Heart disease Paternal Uncle     Hypertension Maternal Grandmother     Mental illness Maternal Grandmother     Aneurysm Maternal Grandfather     Mental illness Paternal Grandmother     Heart disease Paternal Grandfather     Melanoma Neg Hx     Psoriasis Neg Hx     Lupus Neg Hx     Eczema Neg Hx        Social History     Socioeconomic History    Marital status:    Tobacco Use    Smoking status: Never    Smokeless tobacco: Never   Substance and Sexual Activity    Alcohol use: Not Currently     Comment: seldom    Drug use: No    Sexual activity: Never     Social Determinants of Health     Financial Resource Strain: Low Risk  (3/26/2024)    Overall Financial Resource Strain (CARDIA)     Difficulty of Paying Living Expenses: Not hard at all   Food Insecurity: No Food Insecurity (3/26/2024)    Hunger Vital Sign     Worried About Running Out of Food in the Last Year: Never true     Ran Out of Food in the Last Year: Never true   Transportation Needs: No Transportation Needs (3/26/2024)    PRAPARE - Transportation     Lack of Transportation (Medical): No     Lack of Transportation (Non-Medical): No   Physical Activity: Inactive (3/26/2024)    Exercise Vital Sign     Days of Exercise per Week: 0 days     Minutes of Exercise per Session: 0 min   Stress: No Stress Concern Present (3/26/2024)    [employer] [place] [employer] - Occupational Stress Questionnaire     Feeling of Stress : Not at all   Social Connections: Moderately Integrated (3/26/2024)    Social Connection and Isolation Panel [NHANES]     Frequency of Communication with Friends and Family: More than three times a week     Frequency of Social Gatherings with Friends and Family: More than three times a week     Attends Confucianism Services: Never     Active Member of Clubs or Organizations: Yes     Marital Status:    Housing Stability: Low Risk  (3/26/2024)    Housing Stability Vital Sign      Unable to Pay for Housing in the Last Year: No     Number of Places Lived in the Last Year: 1     Unstable Housing in the Last Year: No       Review of patient's allergies indicates:   Allergen Reactions    Metformin Diarrhea     Diarrhea      Corn Itching     Other reaction(s): Sneezing  Other reaction(s): Rhinorrhea    Potato starch Hives     Other reaction(s): Sneezing  Other reaction(s): Rhinorrhea    Pravastatin Other (See Comments)     Muscle pain    Statins-hmg-coa reductase inhibitors Other (See Comments)    Hydrochlorothiazide Other (See Comments)     weakness    Welchol [colesevelam] Other (See Comments)     Weakness        Current Outpatient Medications   Medication Instructions    albuterol (PROVENTIL) 2.5 mg, Nebulization, Every 4 hours PRN    albuterol (PROVENTIL/VENTOLIN HFA) 90 mcg/actuation inhaler 1-2 puffs, Inhalation, Every 4 hours PRN, Inhale 2 puffs by mouth into lungs every 4 hours as needed    alcohol swabs (BD ALCOHOL SWABS) PadM 1 each, Topical (Top), As needed (PRN)    amLODIPine (NORVASC) 10 MG tablet TAKE 1 TABLET BY MOUTH EVERY DAY FOR SYSTOLIC BLOOD PRESSURE GREATER THAN 120    arformoteroL (BROVANA) 15 mcg, Nebulization, 2 times daily, Controller    aspirin (ECOTRIN) 81 mg, Oral, Daily    bempedoic acid (NEXLETOL) 180 mg Tab 1 tablet, Oral, Daily    blood glucose control, low (TRUE METRIX LEVEL 1) Soln Check glucometer accuracy at least once a week    blood sugar diagnostic Strp True Metrix Test Strips test once a day    blood-glucose meter (TRUE METRIX GLUCOSE METER) Misc True Metrix Glucose Meter    budesonide (PULMICORT) 0.5 mg, Nebulization, 2 times daily, Controller    cholecalciferol (vitamin D3) (VITAMIN D3) 1,000 Units, Oral, Daily    colchicine (COLCRYS) 0.6 mg, Oral, Daily, Day 1: 1.2 mg at the first sign of flare, followed by 0.6 mg after 1 hour; maximum total dose: 1.8 mg/day on day Day 2 and thereafter: 0.6 mg once or twice daily until flare resolves    cyanocobalamin  (VITAMIN B-12) 100 mcg, Oral, Daily    evolocumab (REPATHA SYRINGE) 140 mg, Subcutaneous, Every 14 days    glyBURIDE (DIABETA) 2.5 mg, Oral, 2 times daily    lancets 33 gauge Misc 1 lancet , Misc.(Non-Drug; Combo Route), Daily    levocetirizine (XYZAL) 5 mg, Oral, Nightly    levothyroxine (SYNTHROID) 150 mcg, Oral, Daily    metoprolol succinate (TOPROL-XL) 50 mg, Oral, Daily    potassium chloride (MICRO-K) 8 mEq CpSR 8 mEq, Oral    predniSONE (DELTASONE) 10 mg tablet pack Oral, Daily, Take 3 tablets daily on day #1-2, Take 2 tablets daily on days #3-4, Take 1 tablet daily on day #5-6, then stop taking  Dispense: 12 tablet; Refill: 0    predniSONE (DELTASONE) 20 MG tablet Take one daily for 3 days and may repeat for shortness of breath.    REPATHA SURECLICK 140 mg/mL PnIj SMARTSI pre-filled pen syringe SUB-Q Every 2 Weeks    revefenacin (YUPELRI) 175 mcg/3 mL Nebu 1 Dose, Inhalation, Daily    SITagliptin phosphate (JANUVIA) 50 mg, Oral, Daily    sotaloL (BETAPACE) 80 mg, Oral, 2 times daily    torsemide (DEMADEX) 20 mg, Oral    vitamin E 400 Units, Oral, Daily         Review of Systems    Constitutional:  Denies fevers, chills, night sweats, loss of appetite.  HEENT: Denies visual changes, ear pain, sinus congestion, mouth pain or trouble swallowing, sore throat or dental pain.  Neck: Denies neck pain or lumps.  Respiratory: Denies shortness of breath, coughing, wheezing or hemoptysis.  Cardiovascular:  Denies chest pain, palpitations or edema.  Gastrointestinal: Denies  nausea, vomiting, constipation or diarrhea.  Genitourinary:  Denies dysuria, frequency, urgency or hematuria   Musculoskeletal:  Pain right knee and ankle.  Also pain bilateral foot  Skin:  Denies rash or itching.  VAD:    Neurologic:  Denies motor sensory loss, headaches or dizziness.    Psychiatric:  Denies changes in mood or behavior.       OBJECTIVE     Temp:  [97.5 °F (36.4 °C)-98 °F (36.7 °C)] 97.9 °F (36.6 °C)  Pulse:  [72-80] 74  Resp:   [16-20] 18  SpO2:  [94 %-97 %] 94 %  BP: (120-152)/(62-77) 150/65       Physical Exam    General:  Elderly woman lying quietly in bed.  In no acute distress   Eyes: Eyes with no icterus or injection. Vision grossly normal  Neck: Supple, no tenderness to palpation.  Cardiovascular: Regular rate and rhythm, no murmurs, no edema.    Respiratory:  Clear to auscultation bilaterally, no tachypnea or increased work of breathing.  Gastrointestinal:  Soft with active bowel sounds, no tenderness to palpation, no distention.  Genitourinary:  No suprapubic tenderness.  Musculoskeletal:  Mild effusion right knee with warmth.  Range of motion good.  Right ankle swelling with tenderness and reduced range of motion.  Bilateral foot tenderness  Skin:  Warm and dry, no obvious rashes.    Neuro:   Oriented, conversant, follows commands.  Psych: Good mood, normal affect.     Wounds: None  VAD:  Right arm PICC  ISOLATION: None    CBC LAST 7  Recent Labs   Lab 03/25/24  0533 03/26/24  0904 03/27/24  0529 03/28/24  0413 03/29/24  0217 03/30/24  0418 03/31/24  0506   WBC 10.13  10.13  10.13  10.13 9.37 10.11 15.05* 13.11* 10.65 10.05   RBC 4.11  4.11  4.11  4.11 4.37 4.34 4.32 3.91* 3.91* 4.07   HGB 12.6  12.6  12.6  12.6 13.2 13.0 13.1 11.9* 11.8* 12.1   HCT 38.8  38.8  38.8  38.8 41.2 40.1 40.1 37.0 37.4 39.1   MCV 94  94  94  94 94 92 93 95 96 96   MCH 30.7  30.7  30.7  30.7 30.2 30.0 30.3 30.4 30.2 29.7   MCHC 32.5  32.5  32.5  32.5 32.0 32.4 32.7 32.2 31.6* 30.9*   RDW 14.1  14.1  14.1  14.1 13.6 13.2 13.2 13.2 13.4 13.2   *  142*  142*  142* 192 239 274 321 361 371   MPV 10.6  10.6  10.6  10.6 11.1 10.6 10.2 10.0 9.8 9.6   GRAN 70.0  70.0  70.0  70.0  7.1  7.1  7.1  7.1 73.7*  6.9 76.9*  7.8* 79.7*  12.0* 74.8*  9.8* 69.6  7.4 73.4*  7.4   LYMPH 13.7*  13.7*  13.7*  13.7*  1.4  1.4  1.4  1.4 11.0*  1.0 9.4*  1.0 7.9*  1.2 10.4*  1.4 11.5*  1.2 9.7*  1.0   MONO 14.9  14.9  " 14.9  14.9  1.5*  1.5*  1.5*  1.5* 13.1  1.2* 11.1  1.1* 10.0  1.5* 11.8  1.6* 12.2  1.3* 9.5  1.0   BASO 0.03  0.03  0.03  0.03 0.03 0.04 0.11 0.07 0.07 0.07   NRBC 0  0  0  0 0 0 0 0 0 0         CHEMISTRY LAST 7  Recent Labs   Lab 03/25/24  0954 03/26/24  0904 03/27/24  0529 03/28/24  0413 03/29/24  0217 03/30/24  0418 03/31/24  0506   *  134* 138 140 139 141 139 139   K 5.0  5.0 4.1 3.6 4.0 3.4* 3.9 4.4   CL 88*  88* 91* 94* 91* 90* 92* 95   CO2 36*  36* 33* 38* 36* 39* 39* 38*   ANIONGAP 10  10 14 8 12 12 8 6*   BUN 57*  57* 45* 35* 23 24* 29* 27*   CREATININE 1.6*  1.6* 1.0 0.9 0.9 1.0 0.9 0.8   *  128* 154* 200* 182* 159* 169* 171*   CALCIUM 9.2  9.2 9.1 9.6 9.8 9.5 9.1 9.3   MG 1.9 1.9 1.7 1.5* 1.8 1.6 1.7   ALBUMIN 3.3*  3.3* 2.5* 2.2* 2.1* 1.9* 2.9* 2.9*   PROT 6.7 6.1 6.2 6.5 6.4 6.7 6.7   ALKPHOS 58 68 70 115 80 77 77   ALT 9* 17 18 21 22 27 20   AST 26 36 31 42* 37 42* 27   BILITOT 0.5 0.6 0.5 0.7 0.4 0.4 0.3         Estimated Creatinine Clearance: 64.2 mL/min (based on SCr of 0.8 mg/dL).    INFLAMMATORY/PROCAL  LAST 7  No results for input(s): "PROCAL", "ESR", "CRP" in the last 168 hours.    No results found for: "ESR"  CRP   Date Value Ref Range Status   03/24/2024 278.8 (H) 0.0 - 8.2 mg/L Final   05/04/2010 4.9 0.1 - 8.2 mg/L Final       PRIOR  MICROBIOLOGY:  Reviewed  Susceptibility data from last 90 days.  Collected Specimen Info Organism Ampicillin Genatmicin Synergy Screen Vancomycin   03/26/24 Blood No growth after 5 days.      03/26/24 Blood No growth after 5 days.      03/25/24 Blood from Peripheral, Hand, Left No growth after 5 days.      03/25/24 Blood from Peripheral, Hand, Right Enterococcus faecalis      03/24/24 Blood Enterococcus faecalis      03/24/24 Blood Enterococcus faecalis      03/23/24 Blood Enterococcus faecalis  S  S  S   03/23/24 Blood from Peripheral, Antecubital, Left Enterococcus faecalis              LAST 7 DAYS MICROBIOLOGY "   Microbiology Results (last 7 days)       Procedure Component Value Units Date/Time    Blood culture [[id number]] Collected: 03/30/24 0935    Order Status: Completed Specimen: Blood Updated: 03/31/24 1032     Blood Culture, Routine No Growth to date      No Growth to date          CURRENT/PREVIOUS VISIT EKG  Results for orders placed or performed during the hospital encounter of 03/22/24   EKG 12-lead    Collection Time: 03/22/24  4:27 PM   Result Value Ref Range    QRS Duration 168 ms    [facility] QTC Calculation 528 ms    Narrative    Test Reason : R07.9,    Vent. Rate : 090 BPM     Atrial Rate : 090 BPM     P-R Int : 220 ms          QRS Dur : 168 ms      QT Int : 432 ms       P-R-T Axes : 015 -63 090 degrees     QTc Int : 528 ms    Atrial-sensed ventricular-paced rhythm with prolonged AV conduction  Abnormal ECG  When compared with ECG of 10-SEP-2022 23:53,  Vent. rate has increased BY  30 BPM    Referred By: DANISH HENDERSON           Confirmed By:        Significant Labs: All pertinent labs within the past 24 hours have been reviewed.     Significant Imaging: I have reviewed all relevant and available imaging results/findings within the past 24 hours.      Olivier Espitia MD  Date of Service: 03/31/2024  9:04 AM

## 2024-03-31 NOTE — PLAN OF CARE
Problem: Adult Inpatient Plan of Care  Goal: Plan of Care Review  Outcome: Ongoing, Progressing  Flowsheets (Taken 3/31/2024 1533)  Plan of Care Reviewed With:   patient   spouse  Goal: Patient-Specific Goal (Individualized)  Outcome: Ongoing, Progressing  Goal: Absence of Hospital-Acquired Illness or Injury  Outcome: Ongoing, Progressing  Intervention: Identify and Manage Fall Risk  Flowsheets (Taken 3/31/2024 1533)  Safety Promotion/Fall Prevention:   assistive device/personal item within reach   bed alarm set   room near unit station   side rails raised x 2   supervised activity   instructed to call staff for mobility  Intervention: Prevent Skin Injury  Flowsheets (Taken 3/31/2024 1533)  Body Position: position changed independently  Skin Protection: incontinence pads utilized  Goal: Optimal Comfort and Wellbeing  Outcome: Ongoing, Progressing  Goal: Readiness for Transition of Care  Outcome: Ongoing, Progressing     Problem: Diabetes Comorbidity  Goal: Blood Glucose Level Within Targeted Range  Outcome: Ongoing, Progressing  Intervention: Monitor and Manage Glycemia  Flowsheets (Taken 3/31/2024 1533)  Glycemic Management: blood glucose monitored     Problem: Fluid and Electrolyte Imbalance (Acute Kidney Injury/Impairment)  Goal: Fluid and Electrolyte Balance  Outcome: Ongoing, Progressing  Intervention: Monitor and Manage Fluid and Electrolyte Balance  Flowsheets (Taken 3/31/2024 1533)  Fluid/Electrolyte Management: oral rehydration therapy initiated     Problem: Oral Intake Inadequate (Acute Kidney Injury/Impairment)  Goal: Optimal Nutrition Intake  Outcome: Ongoing, Progressing     Problem: Renal Function Impairment (Acute Kidney Injury/Impairment)  Goal: Effective Renal Function  Outcome: Ongoing, Progressing     Problem: Infection  Goal: Absence of Infection Signs and Symptoms  Outcome: Ongoing, Progressing  Intervention: Prevent or Manage Infection  Flowsheets (Taken 3/31/2024 1533)  Fever  Reduction/Comfort Measures:   lightweight clothing   fluid intake increased  Infection Management: aseptic technique maintained     Problem: Fall Injury Risk  Goal: Absence of Fall and Fall-Related Injury  Outcome: Ongoing, Progressing  Intervention: Promote Injury-Free Environment  Flowsheets (Taken 3/31/2024 1533)  Safety Promotion/Fall Prevention:   assistive device/personal item within reach   bed alarm set   room near unit station   side rails raised x 2   supervised activity   instructed to call staff for mobility     Problem: Skin Injury Risk Increased  Goal: Skin Health and Integrity  Outcome: Ongoing, Progressing  Intervention: Optimize Skin Protection  Flowsheets (Taken 3/31/2024 1533)  Pressure Reduction Techniques: heels elevated off bed  Pressure Reduction Devices: positioning supports utilized  Skin Protection: incontinence pads utilized  Head of Bed (HOB) Positioning: HOB at 30-45 degrees

## 2024-03-31 NOTE — SUBJECTIVE & OBJECTIVE
"Interval History:   No CP or SOB . Afebrile. No cardiac issues. Labs stable and patient may not need daily labs anymore .     Review of Systems  Objective:     Vital Signs (Most Recent):  Temp: 97.9 °F (36.6 °C) (03/31/24 0732)  Pulse: 74 (03/31/24 0827)  Resp: 18 (03/31/24 0827)  BP: (!) 150/65 (03/31/24 0732)  SpO2: (!) 94 % (03/31/24 0827) Vital Signs (24h Range):  Temp:  [97.5 °F (36.4 °C)-98 °F (36.7 °C)] 97.9 °F (36.6 °C)  Pulse:  [72-80] 74  Resp:  [16-20] 18  SpO2:  [94 %-97 %] 94 %  BP: (120-152)/(62-77) 150/65     Weight: 87 kg (191 lb 12.8 oz)  Body mass index is 35.08 kg/m².    Intake/Output Summary (Last 24 hours) at 3/31/2024 1050  Last data filed at 3/31/2024 0924  Gross per 24 hour   Intake 1400 ml   Output 2600 ml   Net -1200 ml         Physical Exam  Vitals and nursing note reviewed.   HENT:      Head: Normocephalic and atraumatic.   Eyes:      Conjunctiva/sclera: Conjunctivae normal.   Neck:      Vascular: No JVD.   Cardiovascular:      Heart sounds: Normal heart sounds.   Pulmonary:      Effort: Pulmonary effort is normal.      Breath sounds: Normal breath sounds.   Abdominal:      General: Bowel sounds are normal.      Palpations: Abdomen is soft.   Musculoskeletal:         General: Normal range of motion.   Skin:     General: Skin is warm.   Neurological:      Mental Status: She is alert and oriented to person, place, and time.             Significant Labs: All pertinent labs within the past 24 hours have been reviewed.  CMP:   Recent Labs   Lab 03/30/24  0418 03/31/24  0506    139   K 3.9 4.4   CL 92* 95   CO2 39* 38*   * 171*   BUN 29* 27*   CREATININE 0.9 0.8   CALCIUM 9.1 9.3   PROT 6.7 6.7   ALBUMIN 2.9* 2.9*   BILITOT 0.4 0.3   ALKPHOS 77 77   AST 42* 27   ALT 27 20   ANIONGAP 8 6*     Cardiac Markers: No results for input(s): "CKMB", "MYOGLOBIN", "BNP", "TROPISTAT" in the last 48 hours.  Coagulation: No results for input(s): "PT", "INR", "APTT" in the last 48 " hours.    Significant Imaging: I have reviewed all pertinent imaging results/findings within the past 24 hours.

## 2024-03-31 NOTE — PROGRESS NOTES
[facility]  Progress Note    Patient Name: Tereza Larose  MRN: [id number]  Patient Class: IP- Inpatient   Admission Date: 3/29/2024  Length of Stay: 2 days  Attending Physician: Kenny Mathews MD  Primary Care Provider: Evan Sánchez MD        Subjective:     Principal Problem:<principal problem not specified>        HPI:  HPI:   Ms. Larose is a 74 yo F with CAD, HFpEF, pacemaker in place, HTN, HLD, DMII, COPD who originally presented to OSH after a fall, later was found to have NSTEMI and subsequently had VERONA and Enterococcus bacteremia. Cardiology and ID were consulted, she was started on IV CTX and ampicillin. DELORIS showed small mobile mass located on a right atrial lead. Proximal measurements are 1 cm x 0.5 cm. Case discussed with EP and Interventional Cardiology who will consult on the patient. Currently hemodynamically stable on 3L NC. Denies headaches, visual changes, SOB, cough, chest pain, palpitation, nausea, vomiting, abdominal pain, diarrhea, constipation, urinary frequency or any weakness. AT [facility] the pt was restarted on CTX and ampicillin, ID consulted.        Overview/Hospital Course:    Pt admitted to hospital medicine for VERONA and enterococcus bacteremia. Pt started on ceftriaxone and ampicillin. ID consulted.Blood cultures growing enterococcus faecalis.  EP and interventional cardiology consulted for pacemaker removal, however patient is not a candidate due to her severe pulmonary hypertension. PICC placed. Patient to have 6 weeks of Ampicillin 2g IV q 6 hours Ceftriaxone 2g IV q 12 hours  followed by PO antibiotic suppression. Concern for gout flare and started treatment. On allopurinol at home. Patient transferred to [facility] to continue treatment from [facility] and later continued same antibiotics . ID [facility] reviewed again and agree with treatment and repeat blood culture sent . Patient had severe deconditioning and requested for SNF /LTAC placement  "and downgraded .    Interval History:   No CP or SOB . Afebrile. No cardiac issues. Labs stable and patient may not need daily labs anymore .     Review of Systems  Objective:     Vital Signs (Most Recent):  Temp: 97.9 °F (36.6 °C) (03/31/24 0732)  Pulse: 74 (03/31/24 0827)  Resp: 18 (03/31/24 0827)  BP: (!) 150/65 (03/31/24 0732)  SpO2: (!) 94 % (03/31/24 0827) Vital Signs (24h Range):  Temp:  [97.5 °F (36.4 °C)-98 °F (36.7 °C)] 97.9 °F (36.6 °C)  Pulse:  [72-80] 74  Resp:  [16-20] 18  SpO2:  [94 %-97 %] 94 %  BP: (120-152)/(62-77) 150/65     Weight: 87 kg (191 lb 12.8 oz)  Body mass index is 35.08 kg/m².    Intake/Output Summary (Last 24 hours) at 3/31/2024 1050  Last data filed at 3/31/2024 0924  Gross per 24 hour   Intake 1400 ml   Output 2600 ml   Net -1200 ml         Physical Exam  Vitals and nursing note reviewed.   HENT:      Head: Normocephalic and atraumatic.   Eyes:      Conjunctiva/sclera: Conjunctivae normal.   Neck:      Vascular: No JVD.   Cardiovascular:      Heart sounds: Normal heart sounds.   Pulmonary:      Effort: Pulmonary effort is normal.      Breath sounds: Normal breath sounds.   Abdominal:      General: Bowel sounds are normal.      Palpations: Abdomen is soft.   Musculoskeletal:         General: Normal range of motion.   Skin:     General: Skin is warm.   Neurological:      Mental Status: She is alert and oriented to person, place, and time.             Significant Labs: All pertinent labs within the past 24 hours have been reviewed.  CMP:   Recent Labs   Lab 03/30/24  0418 03/31/24  0506    139   K 3.9 4.4   CL 92* 95   CO2 39* 38*   * 171*   BUN 29* 27*   CREATININE 0.9 0.8   CALCIUM 9.1 9.3   PROT 6.7 6.7   ALBUMIN 2.9* 2.9*   BILITOT 0.4 0.3   ALKPHOS 77 77   AST 42* 27   ALT 27 20   ANIONGAP 8 6*     Cardiac Markers: No results for input(s): "CKMB", "MYOGLOBIN", "BNP", "TROPISTAT" in the last 48 hours.  Coagulation: No results for input(s): "PT", "INR", "APTT" in the " last 48 hours.    Significant Imaging: I have reviewed all pertinent imaging results/findings within the past 24 hours.    Assessment/Plan:      * Acute bacterial endocarditis  Treated for enterococcus faecalis bacteremia. DELORIS remarkable for Right Atrium: Right atrium is dilated. Multiple leads viewed in the right atrium. There is a 1 x 0.6 cm mass attached to a lead in the right atrium that demonstrates independent motion. Concerning for possible vegetations.      Plan  Continue current antibiotics determined by Infectious Disease already  -Will need 6 weeks of IV Ceftriaxone and Ampicillin from negative blood cultures  - Lifelong PO suppressive amoxicillin after 6 weeks of IV antibiotic therapy completed  - PICC placed in the other hospital   ID consulted and agree with plan   Follow repeat blood culture   Downgrade         Pacemaker infection  Patient is deem not a candidate for removal of pacemaker due to her severe pulmonary hypertension being a severe risk for decompensation during intubation/anesthesia.   Will have 6 weeks of IV antibiotic therapy        Pacemaker     Please see Acute bacterial endocarditis      Hypothyroidism  TSH elevated to 40.97.    adjusted  levothyroxine   Repeat TSH in few days        VERONA (acute kidney injury)  Patient with acute kidney injury/acute renal failure likely due to acute tubular necrosis caused by sepsis  VERONA is currently improving. Baseline creatinine unknown - Labs reviewed- Renal function/electrolytes with Estimated Creatinine Clearance: 51.3 mL/min (based on SCr of 1 mg/dL). according to latest data. Monitor urine output and serial BMP and adjust therapy as needed. Avoid nephrotoxins and renally dose meds for GFR listed above.  Resolved      COPD (chronic obstructive pulmonary disease)  Patient's COPD is controlled currently. Patient is currently off COPD Pathway. Justice p.r.n. On 3 L NC on baseline with SpO2 96%. We will monitor respiratory status      Severe  pulmonary hypertension  Pt presenting from OSH for enterococcus bacteremia with endocarditis. Echo showing Pulmonary Artery: There is moderate pulmonary hypertension. The estimated pulmonary artery systolic pressure is 63 mmHg. This makes the patient not a candidate for pacemaker removal as she is a very high risk for intubation.    May need to see palliative care consult     Acute on chronic combined systolic and diastolic CHF (congestive heart failure)  Patient is identified as having Combined Systolic and Diastolic heart failure that is Chronic. CHF is currently controlled. Latest ECHO performed and demonstrates- Results for orders placed during the hospital encounter of 03/22/24     Echo     Interpretation Summary    Left Ventricle: The left ventricle is normal in size. Normal wall thickness. Normal wall motion. Septal motion is consistent with pacing. There is normal systolic function with a visually estimated ejection fraction of 60 - 65%. There is normal diastolic function.    Right Ventricle: Normal right ventricular cavity size. Wall thickness is normal. Right ventricle wall motion  is normal. Systolic function is normal. Pacemaker lead present in the ventricle.    Right Atrium: Multiple leads present in the right atrium.    Mitral Valve: There is severe bileaflet sclerosis. There is severe anterior mitral annular calcification present. There is normal leaflet mobility. There is mild to moderate regurgitation.    Tricuspid Valve: There is moderate regurgitation with a centrally directed jet.    Pulmonary Artery: There is moderate pulmonary hypertension. The estimated pulmonary artery systolic pressure is 63 mmHg.    IVC/SVC: Elevated venous pressure at 15 mmHg.  . Continue Beta Blocker and monitor clinical status closely. Monitor on telemetry. Patient is off CHF pathway.  Monitor strict Is&Os and daily weights.  Place on fluid restriction of 2 L. Cardiology has been consulted. Continue to stress to patient  importance of self efficacy and  on diet for CHF. Last BNP reviewed- and noted below         Recent Labs   Lab 03/25/24  0533   *            T2DM (type 2 diabetes mellitus)  Patient's FSGs are controlled on current medication regimen.  Last A1c reviewed-             Lab Results   Component Value Date     HGBA1C 7.9 (H) 03/22/2024      Most recent fingerstick glucose reviewed-         Recent Labs   Lab 03/26/24  0833   POCTGLUCOSE 160*      Current correctional scale  Low  Maintain anti-hyperglycemic dose as follows-   Antihyperglycemics (From admission, onward)                   Start     Stop Route Frequency Ordered     03/26/24 0809   insulin aspart U-100 pen 0-5 Units         -- SubQ Before meals & nightly PRN 03/26/24 0710             Hold Oral hypoglycemics while patient is in the hospital.     HTN (hypertension)  Chronic, controlled. Latest blood pressure and vitals reviewed-      Temp:  [97.5 °F (36.4 °C)-99.1 °F (37.3 °C)]   Pulse:  [73-95]   Resp:  [16-18]   BP: (141-167)/(65-72)   SpO2:  [92 %-98 %] .   Home meds for hypertension were reviewed and noted below.   Hypertension Medications                    amLODIPine (NORVASC) 10 MG tablet TAKE 1 TABLET BY MOUTH EVERY DAY FOR SYSTOLIC BLOOD PRESSURE GREATER THAN 120     metoprolol succinate (TOPROL-XL) 50 MG 24 hr tablet Take 1 tablet (50 mg total) by mouth once daily.     sotaloL (BETAPACE) 80 MG tablet Take 80 mg by mouth 2 (two) times daily.     torsemide (DEMADEX) 20 MG Tab Take 20 mg by mouth.                While in the hospital, will manage blood pressure as follows; Continue home antihypertensive regimen continue amlodipine, hold metoprolol.      Will utilize p.r.n. blood pressure medication only if patient's blood pressure greater than 180/110 and she develops symptoms such as worsening chest pain or shortness of breath.     NSTEMI (non-ST elevated myocardial infarction)      aware. No chest pain      CAD (coronary artery  disease)  Patient with known CAD s/p CABG, which is uncontrolled Will continue  heparin gtt and Statin and monitor for S/Sx of angina/ACS. Continue to monitor on telemetry.         VTE Risk Mitigation (From admission, onward)           Ordered     enoxaparin injection 40 mg  Daily         03/29/24 2328     IP VTE HIGH RISK PATIENT  Once         03/29/24 2328     Place sequential compression device  Until discontinued         03/29/24 2328                    Discharge Planning   FAVIOLA:      Code Status: Partial Code   Is the patient medically ready for discharge?:     Reason for patient still in hospital (select all that apply): Treatment  Discharge Plan A: Home with family                  Kenny Mathews MD  Department of Hospital Medicine   [facility]

## 2024-04-01 LAB
ALBUMIN SERPL BCP-MCNC: 2.9 G/DL (ref 3.5–5.2)
ALP SERPL-CCNC: 71 U/L (ref 55–135)
ALT SERPL W/O P-5'-P-CCNC: 18 U/L (ref 10–44)
ANION GAP SERPL CALC-SCNC: 7 MMOL/L (ref 8–16)
AST SERPL-CCNC: 18 U/L (ref 10–40)
BASOPHILS # BLD AUTO: 0.03 K/UL (ref 0–0.2)
BASOPHILS NFR BLD: 0.3 % (ref 0–1.9)
BILIRUB SERPL-MCNC: 0.3 MG/DL (ref 0.1–1)
BUN SERPL-MCNC: 27 MG/DL (ref 8–23)
CALCIUM SERPL-MCNC: 9.4 MG/DL (ref 8.7–10.5)
CHLORIDE SERPL-SCNC: 96 MMOL/L (ref 95–110)
CO2 SERPL-SCNC: 38 MMOL/L (ref 23–29)
CREAT SERPL-MCNC: 0.7 MG/DL (ref 0.5–1.4)
DIFFERENTIAL METHOD BLD: ABNORMAL
EOSINOPHIL # BLD AUTO: 0.5 K/UL (ref 0–0.5)
EOSINOPHIL NFR BLD: 5.1 % (ref 0–8)
ERYTHROCYTE [DISTWIDTH] IN BLOOD BY AUTOMATED COUNT: 13.3 % (ref 11.5–14.5)
EST. GFR  (NO RACE VARIABLE): >60 ML/MIN/1.73 M^2
GLUCOSE SERPL-MCNC: 121 MG/DL (ref 70–110)
GLUCOSE SERPL-MCNC: 124 MG/DL (ref 70–110)
GLUCOSE SERPL-MCNC: 126 MG/DL (ref 70–110)
GLUCOSE SERPL-MCNC: 181 MG/DL (ref 70–110)
GLUCOSE SERPL-MCNC: 188 MG/DL (ref 70–110)
GLUCOSE SERPL-MCNC: 190 MG/DL (ref 70–110)
GLUCOSE SERPL-MCNC: 241 MG/DL (ref 70–110)
HCT VFR BLD AUTO: 38.1 % (ref 37–48.5)
HGB BLD-MCNC: 11.7 G/DL (ref 12–16)
IMM GRANULOCYTES # BLD AUTO: 0.14 K/UL (ref 0–0.04)
IMM GRANULOCYTES NFR BLD AUTO: 1.6 % (ref 0–0.5)
LYMPHOCYTES # BLD AUTO: 1.2 K/UL (ref 1–4.8)
LYMPHOCYTES NFR BLD: 12.8 % (ref 18–48)
MAGNESIUM SERPL-MCNC: 1.8 MG/DL (ref 1.6–2.6)
MCH RBC QN AUTO: 30 PG (ref 27–31)
MCHC RBC AUTO-ENTMCNC: 30.7 G/DL (ref 32–36)
MCV RBC AUTO: 98 FL (ref 82–98)
MONOCYTES # BLD AUTO: 0.7 K/UL (ref 0.3–1)
MONOCYTES NFR BLD: 7.7 % (ref 4–15)
NEUTROPHILS # BLD AUTO: 6.5 K/UL (ref 1.8–7.7)
NEUTROPHILS NFR BLD: 72.5 % (ref 38–73)
NRBC BLD-RTO: 0 /100 WBC
PLATELET # BLD AUTO: 384 K/UL (ref 150–450)
PMV BLD AUTO: 9.4 FL (ref 9.2–12.9)
POTASSIUM SERPL-SCNC: 4.4 MMOL/L (ref 3.5–5.1)
PROT SERPL-MCNC: 6.7 G/DL (ref 6–8.4)
RBC # BLD AUTO: 3.9 M/UL (ref 4–5.4)
SODIUM SERPL-SCNC: 141 MMOL/L (ref 136–145)
WBC # BLD AUTO: 8.95 K/UL (ref 3.9–12.7)

## 2024-04-01 PROCEDURE — 94760 N-INVAS EAR/PLS OXIMETRY 1: CPT

## 2024-04-01 PROCEDURE — 99233 SBSQ HOSP IP/OBS HIGH 50: CPT | Mod: FS,,, | Performed by: NURSE PRACTITIONER

## 2024-04-01 PROCEDURE — 94640 AIRWAY INHALATION TREATMENT: CPT

## 2024-04-01 PROCEDURE — 63600175 PHARM REV CODE 636 W HCPCS: Performed by: INTERNAL MEDICINE

## 2024-04-01 PROCEDURE — 99900031 HC PATIENT EDUCATION (STAT)

## 2024-04-01 PROCEDURE — 25000003 PHARM REV CODE 250: Performed by: INTERNAL MEDICINE

## 2024-04-01 PROCEDURE — 21400001 HC TELEMETRY ROOM

## 2024-04-01 PROCEDURE — 99900035 HC TECH TIME PER 15 MIN (STAT)

## 2024-04-01 PROCEDURE — 25000003 PHARM REV CODE 250: Performed by: NURSE PRACTITIONER

## 2024-04-01 PROCEDURE — 83735 ASSAY OF MAGNESIUM: CPT | Performed by: INTERNAL MEDICINE

## 2024-04-01 PROCEDURE — 80053 COMPREHEN METABOLIC PANEL: CPT | Performed by: INTERNAL MEDICINE

## 2024-04-01 PROCEDURE — 97110 THERAPEUTIC EXERCISES: CPT

## 2024-04-01 PROCEDURE — 97162 PT EVAL MOD COMPLEX 30 MIN: CPT

## 2024-04-01 PROCEDURE — 25000242 PHARM REV CODE 250 ALT 637 W/ HCPCS: Performed by: INTERNAL MEDICINE

## 2024-04-01 PROCEDURE — 85025 COMPLETE CBC W/AUTO DIFF WBC: CPT | Performed by: INTERNAL MEDICINE

## 2024-04-01 PROCEDURE — 36415 COLL VENOUS BLD VENIPUNCTURE: CPT | Performed by: INTERNAL MEDICINE

## 2024-04-01 PROCEDURE — 12000002 HC ACUTE/MED SURGE SEMI-PRIVATE ROOM

## 2024-04-01 PROCEDURE — 97166 OT EVAL MOD COMPLEX 45 MIN: CPT

## 2024-04-01 PROCEDURE — 63600175 PHARM REV CODE 636 W HCPCS: Performed by: NURSE PRACTITIONER

## 2024-04-01 PROCEDURE — 82962 GLUCOSE BLOOD TEST: CPT

## 2024-04-01 PROCEDURE — 97535 SELF CARE MNGMENT TRAINING: CPT

## 2024-04-01 PROCEDURE — 94761 N-INVAS EAR/PLS OXIMETRY MLT: CPT

## 2024-04-01 PROCEDURE — 27000221 HC OXYGEN, UP TO 24 HOURS

## 2024-04-01 RX ORDER — CHLORHEXIDINE GLUCONATE ORAL RINSE 1.2 MG/ML
15 SOLUTION DENTAL 2 TIMES DAILY
Status: DISCONTINUED | OUTPATIENT
Start: 2024-04-01 | End: 2024-04-06 | Stop reason: HOSPADM

## 2024-04-01 RX ORDER — ALBUTEROL SULFATE 0.83 MG/ML
2.5 SOLUTION RESPIRATORY (INHALATION) EVERY 4 HOURS PRN
Status: DISCONTINUED | OUTPATIENT
Start: 2024-04-01 | End: 2024-04-02

## 2024-04-01 RX ADMIN — ACETAMINOPHEN 650 MG: 325 TABLET ORAL at 02:04

## 2024-04-01 RX ADMIN — SENNOSIDES AND DOCUSATE SODIUM 1 TABLET: 8.6; 5 TABLET ORAL at 08:04

## 2024-04-01 RX ADMIN — AMPICILLIN 2 G: 2 INJECTION, POWDER, FOR SOLUTION INTRAVENOUS at 09:04

## 2024-04-01 RX ADMIN — CHLORHEXIDINE GLUCONATE 15 ML: 1.2 RINSE ORAL at 08:04

## 2024-04-01 RX ADMIN — MUPIROCIN 1 G: 20 OINTMENT TOPICAL at 08:04

## 2024-04-01 RX ADMIN — INSULIN DETEMIR 20 UNITS: 100 INJECTION, SOLUTION SUBCUTANEOUS at 08:04

## 2024-04-01 RX ADMIN — AMPICILLIN 2 G: 2 INJECTION, POWDER, FOR SOLUTION INTRAVENOUS at 05:04

## 2024-04-01 RX ADMIN — CEFTRIAXONE SODIUM 2 G: 2 INJECTION, POWDER, FOR SOLUTION INTRAMUSCULAR; INTRAVENOUS at 12:04

## 2024-04-01 RX ADMIN — Medication 6 MG: at 08:04

## 2024-04-01 RX ADMIN — ALBUTEROL SULFATE 2.5 MG: 2.5 SOLUTION RESPIRATORY (INHALATION) at 08:04

## 2024-04-01 RX ADMIN — AMPICILLIN SODIUM 2 G: 2 INJECTION, POWDER, FOR SOLUTION INTRAMUSCULAR; INTRAVENOUS at 06:04

## 2024-04-01 RX ADMIN — ENOXAPARIN SODIUM 40 MG: 40 INJECTION SUBCUTANEOUS at 05:04

## 2024-04-01 RX ADMIN — HYDROCODONE BITARTRATE AND ACETAMINOPHEN 2 TABLET: 5; 325 TABLET ORAL at 05:04

## 2024-04-01 RX ADMIN — AMPICILLIN SODIUM 2 G: 2 INJECTION, POWDER, FOR SOLUTION INTRAMUSCULAR; INTRAVENOUS at 01:04

## 2024-04-01 RX ADMIN — ACETAMINOPHEN 650 MG: 325 TABLET ORAL at 08:04

## 2024-04-01 NOTE — PROGRESS NOTES
[facility]  Progress Note    Patient Name: Tereza Larose  MRN: [id number]  Patient Class: IP- Inpatient   Admission Date: 3/29/2024  Length of Stay: 3 days  Attending Physician: Kenny Mathews MD  Primary Care Provider: Evan Sánchez MD        Subjective:     Principal Problem:<principal problem not specified>        HPI:  HPI:   Ms. Larose is a 72 yo F with CAD, HFpEF, pacemaker in place, HTN, HLD, DMII, COPD who originally presented to OSH after a fall, later was found to have NSTEMI and subsequently had VERONA and Enterococcus bacteremia. Cardiology and ID were consulted, she was started on IV CTX and ampicillin. DELORIS showed small mobile mass located on a right atrial lead. Proximal measurements are 1 cm x 0.5 cm. Case discussed with EP and Interventional Cardiology who will consult on the patient. Currently hemodynamically stable on 3L NC. Denies headaches, visual changes, SOB, cough, chest pain, palpitation, nausea, vomiting, abdominal pain, diarrhea, constipation, urinary frequency or any weakness. AT [facility] the pt was restarted on CTX and ampicillin, ID consulted.        Overview/Hospital Course:    Pt admitted to hospital medicine for VERONA and enterococcus bacteremia. Pt started on ceftriaxone and ampicillin. ID consulted.Blood cultures growing enterococcus faecalis.  EP and interventional cardiology consulted for pacemaker removal, however patient is not a candidate due to her severe pulmonary hypertension. PICC placed. Patient to have 6 weeks of Ampicillin 2g IV q 6 hours Ceftriaxone 2g IV q 12 hours  followed by PO antibiotic suppression. Concern for gout flare and started treatment. On allopurinol at home. Patient transferred to [facility] to continue treatment from [facility] and later continued same antibiotics . ID [facility] reviewed again and agree with treatment and repeat blood culture sent . Patient had severe deconditioning and requested for SNF /LTAC placement  and downgraded .    Interval History:   Mild wheeze. No CP or SOB. Very weak and need to get help to listen her lungs. She need lots of help     Review of Systems  Objective:     Vital Signs (Most Recent):  Temp: 97.5 °F (36.4 °C) (04/01/24 0724)  Pulse: 76 (04/01/24 0757)  Resp: 20 (04/01/24 0757)  BP: (!) 168/83 (04/01/24 0724)  SpO2: 97 % (04/01/24 0757) Vital Signs (24h Range):  Temp:  [97.5 °F (36.4 °C)-98.9 °F (37.2 °C)] 97.5 °F (36.4 °C)  Pulse:  [76-88] 76  Resp:  [16-20] 20  SpO2:  [91 %-98 %] 97 %  BP: (127-168)/(59-90) 168/83     Weight: 87 kg (191 lb 12.8 oz)  Body mass index is 35.08 kg/m².    Intake/Output Summary (Last 24 hours) at 4/1/2024 1104  Last data filed at 4/1/2024 0951  Gross per 24 hour   Intake 820 ml   Output 1020 ml   Net -200 ml         Physical Exam  Vitals and nursing note reviewed.   HENT:      Head: Normocephalic and atraumatic.   Eyes:      Conjunctiva/sclera: Conjunctivae normal.   Neck:      Vascular: No JVD.   Cardiovascular:      Rate and Rhythm: Normal rate.      Heart sounds: Normal heart sounds.   Pulmonary:      Effort: Pulmonary effort is normal.      Breath sounds: Normal breath sounds. No wheezing.   Abdominal:      General: Abdomen is flat.      Palpations: Abdomen is soft.   Musculoskeletal:         General: Normal range of motion.   Skin:     General: Skin is warm.   Neurological:      Mental Status: She is alert and oriented to person, place, and time.   Psychiatric:         Mood and Affect: Mood normal.             Significant Labs: All pertinent labs within the past 24 hours have been reviewed.  BMP:   Recent Labs   Lab 04/01/24 0438   *      K 4.4   CL 96   CO2 38*   BUN 27*   CREATININE 0.7   CALCIUM 9.4   MG 1.8     CBC:   Recent Labs   Lab 03/31/24  0506 04/01/24 0438   WBC 10.05 8.95   HGB 12.1 11.7*   HCT 39.1 38.1    384     CMP:   Recent Labs   Lab 03/31/24  0506 04/01/24  0438    141   K 4.4 4.4   CL 95 96   CO2 38* 38*   GLU  171* 126*   BUN 27* 27*   CREATININE 0.8 0.7   CALCIUM 9.3 9.4   PROT 6.7 6.7   ALBUMIN 2.9* 2.9*   BILITOT 0.3 0.3   ALKPHOS 77 71   AST 27 18   ALT 20 18   ANIONGAP 6* 7*       Significant Imaging: I have reviewed all pertinent imaging results/findings within the past 24 hours.    Assessment/Plan:      * Acute bacterial endocarditis  Treated for enterococcus faecalis bacteremia. DELORIS remarkable for Right Atrium: Right atrium is dilated. Multiple leads viewed in the right atrium. There is a 1 x 0.6 cm mass attached to a lead in the right atrium that demonstrates independent motion. Concerning for possible vegetations.      Plan  Continue current antibiotics determined by Infectious Disease already  -Will need 6 weeks of IV Ceftriaxone and Ampicillin from negative blood cultures  - Lifelong PO suppressive amoxicillin after 6 weeks of IV antibiotic therapy completed  - PICC placed in the other hospital   ID consulted and agree with plan   Follow repeat blood culture   Downgrade         Pacemaker infection  Patient is deem not a candidate for removal of pacemaker due to her severe pulmonary hypertension being a severe risk for decompensation during intubation/anesthesia.   Will have 6 weeks of IV antibiotic therapy and maintenance therapy        Pacemaker     Please see Acute bacterial endocarditis      Hypothyroidism  TSH elevated to 40.97.    adjusted  levothyroxine   Repeat TSH in few days        VERONA (acute kidney injury)  Patient with acute kidney injury/acute renal failure likely due to acute tubular necrosis caused by sepsis  VERONA is currently improving. Baseline creatinine unknown - Labs reviewed- Renal function/electrolytes with Estimated Creatinine Clearance: 51.3 mL/min (based on SCr of 1 mg/dL). according to latest data. Monitor urine output and serial BMP and adjust therapy as needed. Avoid nephrotoxins and renally dose meds for GFR listed above.  Resolved      COPD (chronic obstructive pulmonary  disease)  Patient's COPD is controlled currently. Patient is currently off COPD Pathway. Justice p.r.n. On 3 L NC on baseline with SpO2 96%. We will monitor respiratory status      Severe pulmonary hypertension  Pt presenting from OSH for enterococcus bacteremia with endocarditis. Echo showing Pulmonary Artery: There is moderate pulmonary hypertension. The estimated pulmonary artery systolic pressure is 63 mmHg. This makes the patient not a candidate for pacemaker removal as she is a very high risk for intubation.    May need to see palliative care consult     Acute on chronic combined systolic and diastolic CHF (congestive heart failure)  Patient is identified as having Combined Systolic and Diastolic heart failure that is Chronic. CHF is currently controlled. Latest ECHO performed and demonstrates- Results for orders placed during the hospital encounter of 03/22/24     Echo     Interpretation Summary    Left Ventricle: The left ventricle is normal in size. Normal wall thickness. Normal wall motion. Septal motion is consistent with pacing. There is normal systolic function with a visually estimated ejection fraction of 60 - 65%. There is normal diastolic function.    Right Ventricle: Normal right ventricular cavity size. Wall thickness is normal. Right ventricle wall motion  is normal. Systolic function is normal. Pacemaker lead present in the ventricle.    Right Atrium: Multiple leads present in the right atrium.    Mitral Valve: There is severe bileaflet sclerosis. There is severe anterior mitral annular calcification present. There is normal leaflet mobility. There is mild to moderate regurgitation.    Tricuspid Valve: There is moderate regurgitation with a centrally directed jet.    Pulmonary Artery: There is moderate pulmonary hypertension. The estimated pulmonary artery systolic pressure is 63 mmHg.    IVC/SVC: Elevated venous pressure at 15 mmHg.  . Continue Beta Blocker and monitor clinical status closely.  Monitor on telemetry. Patient is off CHF pathway.  Monitor strict Is&Os and daily weights.  Place on fluid restriction of 2 L. Cardiology has been consulted. Continue to stress to patient importance of self efficacy and  on diet for CHF. Last BNP reviewed- and noted below         Recent Labs   Lab 03/25/24  0533   *            T2DM (type 2 diabetes mellitus)  Patient's FSGs are controlled on current medication regimen.  Last A1c reviewed-             Lab Results   Component Value Date     HGBA1C 7.9 (H) 03/22/2024      Most recent fingerstick glucose reviewed-         Recent Labs   Lab 03/26/24  0833   POCTGLUCOSE 160*      Current correctional scale  Low  Maintain anti-hyperglycemic dose as follows-   Antihyperglycemics (From admission, onward)                            Start     Stop Route Frequency Ordered     03/26/24 0809   insulin aspart U-100 pen 0-5 Units         -- SubQ Before meals & nightly PRN 03/26/24 0710             Hold Oral hypoglycemics while patient is in the hospital.     HTN (hypertension)  Chronic, controlled. Latest blood pressure and vitals reviewed-      Temp:  [97.5 °F (36.4 °C)-99.1 °F (37.3 °C)]   Pulse:  [73-95]   Resp:  [16-18]   BP: (141-167)/(65-72)   SpO2:  [92 %-98 %] .   Home meds for hypertension were reviewed and noted below.   Hypertension Medications                    amLODIPine (NORVASC) 10 MG tablet TAKE 1 TABLET BY MOUTH EVERY DAY FOR SYSTOLIC BLOOD PRESSURE GREATER THAN 120     metoprolol succinate (TOPROL-XL) 50 MG 24 hr tablet Take 1 tablet (50 mg total) by mouth once daily.     sotaloL (BETAPACE) 80 MG tablet Take 80 mg by mouth 2 (two) times daily.     torsemide (DEMADEX) 20 MG Tab Take 20 mg by mouth.                While in the hospital, will manage blood pressure as follows; Continue home antihypertensive regimen continue amlodipine, hold metoprolol.      Will utilize p.r.n. blood pressure medication only if patient's blood pressure greater than  180/110 and she develops symptoms such as worsening chest pain or shortness of breath.     NSTEMI (non-ST elevated myocardial infarction)      aware. No chest pain      CAD (coronary artery disease)  Patient with known CAD s/p CABG, which is uncontrolled Will continue  heparin gtt and Statin and monitor for S/Sx of angina/ACS. Continue to monitor on telemetry.      VTE Risk Mitigation (From admission, onward)           Ordered     enoxaparin injection 40 mg  Daily         03/29/24 2328     IP VTE HIGH RISK PATIENT  Once         03/29/24 2328     Place sequential compression device  Until discontinued         03/29/24 2328                    Discharge Planning   FAVIOLA:      Code Status: Partial Code   Is the patient medically ready for discharge?:     Reason for patient still in hospital (select all that apply): Treatment  Discharge Plan A: Home with family                  Kenny Mathews MD  Department of Hospital Medicine   [facility]

## 2024-04-01 NOTE — PROGRESS NOTES
[facility]   Department of Infectious Disease  Consult Note        PATIENT NAME: Tereza Larose  MRN: [id number]  TODAY'S DATE: 04/01/2024  ADMIT DATE: 3/29/2024    CHIEF COMPLAINT: No chief complaint on file.    PRINCIPLE PROBLEM: <principal problem not specified>    REASON FOR CONSULT:  Enterococcus bacteremia  INTERVAL HISTORY     4/1/24@1042 (Denette):  Patient seen and examined alone in her room.  She was sitting up in chair awake and alert.  She said she is feeling better today though is still quite weak.  She was on nasal cannula O2 and denies shortness of breath or chest pain.  She denies fevers or chills, abdominal pain, dysuria in his voiding well with pure wick.  Last bowel movement was yesterday.  She states appetite is fair.  T-max 98.9° in the last 24 hours.  WBC 8.95, ESR 66, BUN/CR 27/0.7 much improved, ALT/AST 18/18, albumin 2.9, glucose 126.  Last .8 on 03/24.  Last procalcitonin 5.579.  She had blood cultures positive for Enterococcus faecalis on 03/23, 3/24 and 3/25.  3/26 blood cultures x2 sets are negative and 3/30 blood culture x1 set no growth to date.      ASSESSMENT and PLAN      1. 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   -Right atrium is dilated. Multiple leads viewed in the right atrium. There is a 1 x 0.6 cm mass attached to a lead in the right atrium that demonstrates independent motion. The differential diagnosis includes a vegetation verses fibrous stranding. Given the clinical presentation of speticemia, a vegetation is favored.  -sent to [facility] 3/26-3/29  - felt to be too high risk for lead extraction recommended antibiotic treatment  -[facility] recommends ampicillin and ceftriaxone for 6 weeks through 05/08/2024 then will need lifelong suppression  -blood cultures positive on 03/23, 3/24 and 3/25 with Enterococcus faecalis sensitive to vancomycin  and ampicillin, 3/26 blood cultures x2 sets negative    3. NSTEMI. ? From #1. As per Cardiologist    4. VERONA. Improved.    5. HTN. On antihypertensives.     6. Gout involving right knee and right ankle.  Okay to give short course steroids for this indication.    7.  Debility.  States had not been moving well since hospitalization because of gout involving both feet.  Pain is better.  PT/OT.    RECOMMENDATIONS:    Continue ampicillin 2 g IV but increase frequency to every 4 hours because of improved renal function  (for home therapy ampicillin can be on 2 g IV every 4 hours or 12 g via continuous infusion over 24 hours daily)  Continue ceftriaxone 2 g IV every 12 hours  Anticipating the above antibiotics for 6 weeks through May 8th  She will need to be oral amoxicillin suppression for life after completing IV antibiotic  Anticipate repeating DELORIS around end of treatment  Peridex mouthwash twice daily  Increase activity as tolerated  Monitor renal function  Follow infectious/inflammatory markers      D/W Dr Walton    Please send [employer] secure chat with any questions.      Review of patient's allergies indicates:   Allergen Reactions    Metformin Diarrhea     Diarrhea      Corn Itching     Other reaction(s): Sneezing  Other reaction(s): Rhinorrhea    Potato starch Hives     Other reaction(s): Sneezing  Other reaction(s): Rhinorrhea    Pravastatin Other (See Comments)     Muscle pain    Statins-hmg-coa reductase inhibitors Other (See Comments)    Hydrochlorothiazide Other (See Comments)     weakness    Welchol [colesevelam] Other (See Comments)     Weakness      Scheduled Meds:   ampicillin IV (PEDS and ADULTS)  2 g Intravenous Q4H    cefTRIAXone (Rocephin) IV (PEDS and ADULTS)  2 g Intravenous Q12H    chlorhexidine  15 mL Mouth/Throat BID    enoxparin  40 mg Subcutaneous Daily    insulin detemir U-100  20 Units Subcutaneous Daily    mupirocin   Nasal BID    senna-docusate 8.6-50 mg  1 tablet Oral Daily     Continuous  Infusions:  PRN Meds:.acetaminophen, acetaminophen, albuterol sulfate, aluminum-magnesium hydroxide-simethicone, dextrose 50%, dextrose 50%, glucagon (human recombinant), glucose, glucose, HYDROcodone-acetaminophen, insulin aspart U-100, magnesium oxide, magnesium oxide, melatonin, naloxone, ondansetron, potassium bicarbonate, potassium bicarbonate, potassium bicarbonate, potassium, sodium phosphates, potassium, sodium phosphates, potassium, sodium phosphates, sodium chloride 0.9%     Review of Systems    Negative unless stated above in interval history       OBJECTIVE     Temp:  [97.5 °F (36.4 °C)-98.9 °F (37.2 °C)] 98.4 °F (36.9 °C)  Pulse:  [76-88] 84  Resp:  [16-20] 20  SpO2:  [95 %-98 %] 96 %  BP: (127-168)/(59-90) 163/83       Physical Exam    General:  Sitting up in chair awake and alert, pleasant and conversant.  Eyes: Eyes with no icterus or injection. Vision grossly normal.  Neck: Supple, no tenderness to palpation.  Cardiovascular: Regular rate and rhythm, +murmur, + lower extremity edema.  Respiratory:  Clear to auscultation upper lobes, very decreased movement lower lobes.  Gastrointestinal:  Soft with active bowel sounds, no tenderness to palpation, no distention.  Musculoskeletal:  Mild swelling to bilateral lower extremities, no obvious erythema or joint swelling.  Improved tenderness to right ankle and knee.  Skin: Sallow, warm and dry, no obvious rashes.    Neuro: Oriented to person and place, confused about some details of illness, conversant, follows commands.  Psych: Good mood, normal affect.   VAD:  Right arm PICC  ISOLATION: None      Wounds: None    Significant Labs: All pertinent labs within the past 24 hours have been reviewed.    CBC LAST 7  Recent Labs   Lab 03/26/24  0904 03/27/24  0529 03/28/24  0413 03/29/24  0217 03/30/24  0418 03/31/24  0506 04/01/24  0438   WBC 9.37 10.11 15.05* 13.11* 10.65 10.05 8.95   RBC 4.37 4.34 4.32 3.91* 3.91* 4.07 3.90*   HGB 13.2 13.0 13.1 11.9* 11.8* 12.1  "11.7*   HCT 41.2 40.1 40.1 37.0 37.4 39.1 38.1   MCV 94 92 93 95 96 96 98   MCH 30.2 30.0 30.3 30.4 30.2 29.7 30.0   MCHC 32.0 32.4 32.7 32.2 31.6* 30.9* 30.7*   RDW 13.6 13.2 13.2 13.2 13.4 13.2 13.3    239 274 321 361 371 384   MPV 11.1 10.6 10.2 10.0 9.8 9.6 9.4   GRAN 73.7*  6.9 76.9*  7.8* 79.7*  12.0* 74.8*  9.8* 69.6  7.4 73.4*  7.4 72.5  6.5   LYMPH 11.0*  1.0 9.4*  1.0 7.9*  1.2 10.4*  1.4 11.5*  1.2 9.7*  1.0 12.8*  1.2   MONO 13.1  1.2* 11.1  1.1* 10.0  1.5* 11.8  1.6* 12.2  1.3* 9.5  1.0 7.7  0.7   BASO 0.03 0.04 0.11 0.07 0.07 0.07 0.03   NRBC 0 0 0 0 0 0 0         CHEMISTRY LAST 7  Recent Labs   Lab 03/26/24  0904 03/27/24  0529 03/28/24  0413 03/29/24  0217 03/30/24  0418 03/31/24  0506 04/01/24  0438    140 139 141 139 139 141   K 4.1 3.6 4.0 3.4* 3.9 4.4 4.4   CL 91* 94* 91* 90* 92* 95 96   CO2 33* 38* 36* 39* 39* 38* 38*   ANIONGAP 14 8 12 12 8 6* 7*   BUN 45* 35* 23 24* 29* 27* 27*   CREATININE 1.0 0.9 0.9 1.0 0.9 0.8 0.7   * 200* 182* 159* 169* 171* 126*   CALCIUM 9.1 9.6 9.8 9.5 9.1 9.3 9.4   MG 1.9 1.7 1.5* 1.8 1.6 1.7 1.8   ALBUMIN 2.5* 2.2* 2.1* 1.9* 2.9* 2.9* 2.9*   PROT 6.1 6.2 6.5 6.4 6.7 6.7 6.7   ALKPHOS 68 70 115 80 77 77 71   ALT 17 18 21 22 27 20 18   AST 36 31 42* 37 42* 27 18   BILITOT 0.6 0.5 0.7 0.4 0.4 0.3 0.3         Estimated Creatinine Clearance: 73.3 mL/min (based on SCr of 0.7 mg/dL).    INFLAMMATORY/PROCAL  LAST 7  No results for input(s): "PROCAL", "ESR", "CRP" in the last 168 hours.    No results found for: "ESR"  CRP   Date Value Ref Range Status   03/24/2024 278.8 (H) 0.0 - 8.2 mg/L Final   05/04/2010 4.9 0.1 - 8.2 mg/L Final       PRIOR  MICROBIOLOGY:  Reviewed  Susceptibility data from last 90 days.  Collected Specimen Info Organism Ampicillin Genatmicin Synergy Screen Vancomycin   03/26/24 Blood No growth after 5 days.      03/26/24 Blood No growth after 5 days.      03/25/24 Blood from Peripheral, Hand, Left No growth after 5 " days.      03/25/24 Blood from Peripheral, Hand, Right Enterococcus faecalis      03/24/24 Blood Enterococcus faecalis      03/24/24 Blood Enterococcus faecalis      03/23/24 Blood Enterococcus faecalis  S  S  S   03/23/24 Blood from Peripheral, Antecubital, Left Enterococcus faecalis              LAST 7 DAYS MICROBIOLOGY   Microbiology Results (last 7 days)       Procedure Component Value Units Date/Time    Blood culture [[id number]] Collected: 03/30/24 0935    Order Status: Completed Specimen: Blood Updated: 04/01/24 1032     Blood Culture, Routine No Growth to date      No Growth to date      No Growth to date          CURRENT/PREVIOUS VISIT EKG  Results for orders placed or performed during the hospital encounter of 03/22/24   EKG 12-lead    Collection Time: 03/22/24  4:27 PM   Result Value Ref Range    QRS Duration 168 ms    [facility] QTC Calculation 528 ms    Narrative    Test Reason : R07.9,    Vent. Rate : 090 BPM     Atrial Rate : 090 BPM     P-R Int : 220 ms          QRS Dur : 168 ms      QT Int : 432 ms       P-R-T Axes : 015 -63 090 degrees     QTc Int : 528 ms    Atrial-sensed ventricular-paced rhythm with prolonged AV conduction  Abnormal ECG  When compared with ECG of 10-SEP-2022 23:53,  Vent. rate has increased BY  30 BPM    Referred By: DANISH HENDERSON           Confirmed By:        Echocardiography Findings 3/25/24    Left Ventricle The left ventricle is normal in size. Mildly increased wall thickness. Normal wall motion. There is normal systolic function with a visually estimated ejection fraction of 60 - 65%.   Right Ventricle Right ventricular enlargement. Systolic function is normal.   Left Atrium Normal left atrial size. Agitated saline study of the atrial septum is negative, suggesting absence of intracardiac shunt at the atrial level. There is no thrombus in the left atrial appendage.   Right Atrium Right atrium is dilated. Multiple leads viewed in the right atrium. There is a 1 x 0.6 cm mass  attached to a lead in the right atrium that demonstrates independent motion. The differential diagnosis includes a vegetation verses fibrous stranding. Given the clinical presentation of speticemia, a vegetation is favored.   Aortic Valve There is a bioprosthetic valve in the aortic position that is well-seated. There is normal leaflet mobility. There is no mass/vegetation present. There is mild aortic regurgitation.   Mitral Valve Mildly thickened leaflets. There is moderate mitral annular calcification present. Mildly calcified subvalvular apparatus. There is normal leaflet mobility. There is no mass/vegetation present. The mean pressure gradient across the mitral valve is 5 mmHg at a heart rate of  bpm. There is mild to moderate regurgitation.   Tricuspid Valve The tricuspid valve is structurally normal. There is no mass/vegetation present. There is normal leaflet mobility. There is mild to moderate regurgitation.   Pulmonic Valve Not well visualized due to poor acoustic window. The pulmonic valve is structurally normal. There is trace regurgitation. There is no mass/vegetation present.   Ascending Aorta Ascending aorta is normal. Calcified atherosclerosis.   Pericardium and Other Findings There is no pericardial effusion.        Significant Imaging: I have reviewed all relevant and available imaging results/findings within the past 24 hours.    No recent diagnostics    Lolly Love NP  Date of Service: 04/01/2024

## 2024-04-01 NOTE — PT/OT/SLP EVAL
Occupational Therapy   Evaluation    Name: Tereza Larose  MRN: [id number]  Admitting Diagnosis: <principal problem not specified>  Recent Surgery: * No surgery found *      Recommendations:     Discharge Recommendations:    Discharge Equipment Recommendations:  to be determined by next level of care  Barriers to discharge:   (increased skilled assistance required with ADLs and functional mobility)    Assessment:     Tereza Larose is a 73 y.o. female with a medical diagnosis of <principal problem not specified>.  Performance deficits affecting function: weakness, impaired endurance, impaired self care skills, impaired functional mobility, gait instability, impaired balance, decreased lower extremity function, decreased coordination, impaired cardiopulmonary response to activity.      Rehab Prognosis: Fair; patient would benefit from acute skilled OT services to address these deficits and reach maximum level of function.       Plan:     Patient to be seen 5 x/week to address the above listed problems via self-care/home management, therapeutic activities, therapeutic exercises  Plan of Care Expires:    Plan of Care Reviewed with: patient    Subjective     Chief Complaint: BLE weakness  Patient/Family Comments/goals: none    Occupational Profile:  Living Environment: Patient lives with spouse in a trailer with 5 THAI and B HR's.   Previous level of function: Patient was Mod I with ADLs and ambulatory with RW.   Equipment Used at Home: oxygen, cane, straight, bath bench, bedside commode, walker, rolling, wheelchair  Assistance upon Discharge: Patient will receive assistance from spouse, but would benefit from SNF placement    Pain/Comfort:  Pain Rating 1: 0/10  Pain Rating Post-Intervention 1: 0/10    Patients cultural, spiritual, Anglican conflicts given the current situation: no    Objective:     Communicated with: nurse Peralta prior to session.  Patient found HOB elevated with bed alarm, telemetry, oxygen upon OT  entry to room.    General Precautions: Standard, fall  Orthopedic Precautions: N/A  Braces: N/A  Respiratory Status: Nasal cannula, flow 2.5 L/min    Occupational Performance:    Bed Mobility:    Patient completed Scooting/Bridging with moderate assistance  Patient completed Supine to Sit with moderate assistance    Functional Mobility/Transfers:  Patient completed Sit <> Stand Transfer with maximal assistance  with  rolling walker   Patient completed Bed <> Chair Transfer using Stand Pivot technique with maximal assistance with no assistive device  Functional Mobility: Patient noted with poor static standing tolerance with flexed trunk in standing. Patient unable to advance BLE in standing due to weakness.    Activities of Daily Living:  Grooming: stand by assistance with oral and facial hygiene seated in chair   Lower Body Dressing: maximal assistance to don/doff socks seated in chair  Toileting: dependence with Pure[place] in place    Cognitive/Visual Perceptual:  Cognitive/Psychosocial Skills:     -       Oriented to: Person, Place, and Time   -       Follows Commands/attention:Follows multistep  commands  -       Communication: clear/fluent  -       Safety awareness/insight to disability: impaired   -       Mood/Affect/Coping skills/emotional control: Appropriate to situation and Cooperative  Visual/Perceptual:      -Intact     Physical Exam:  Postural examination/scapula alignment:    -       Rounded shoulders  -       Forward head  Upper Extremity Range of Motion:     -       Right Upper Extremity: WFL  -       Left Upper Extremity: WFL  Upper Extremity Strength:    -       Right Upper Extremity: WFL  -       Left Upper Extremity: WFL   Strength:    -       Right Upper Extremity: WFL  -       Left Upper Extremity: WFL  Fine Motor Coordination:    -       Intact  Gross motor coordination:   WFL in BUE    AMPAC 6 Click ADL:  AMPAC Total Score: 16    Treatment & Education:  OT ed pt on OT role & POC as well as  discharge recommendations.  OT ed patient on safety with walker use for functional mobility with cues for hand placement & sequencing.   OT educated patient on energy conservation techniques to reduce demand on cardiovascular system with performance of ADL tasks.       Patient left up in chair with all lines intact, call button in reach, and chair alarm on    GOALS:   Multidisciplinary Problems       Occupational Therapy Goals          Problem: Occupational Therapy    Goal Priority Disciplines Outcome Interventions   Occupational Therapy Goal     OT, PT/OT Ongoing, Progressing    Description: Goals to be met by: 4/29/2024     Patient will increase functional independence with ADLs by performing:    LE Dressing with Contact Guard Assistance and Assistive Devices as needed.  Grooming while seated at sink with Supervision.  Toileting from bedside commode with Contact Guard Assistance for hygiene and clothing management.   Supine to sit with Contact Guard Assistance.  Toilet transfer to bedside commode with Minimal Assistance.  Upper extremity exercise program, with supervision.                         History:     Past Medical History:   Diagnosis Date    Abnormal EKG 9/26/2012    Allergy     Arthritis     Asthma     Brain bleed     Colon polyps 11/6/2020    COPD (chronic obstructive pulmonary disease)     Depression     Diabetes mellitus type II     Diverticulitis     Fatty liver     GERD (gastroesophageal reflux disease)     Goiter     s/p thyroidectomy    Heart murmur     Hemiparesis affecting right side as late effect of cerebrovascular accident (CVA) 7/26/2022    Hernia of abdominal wall     History of intracranial hemorrhage 6/15/2013    History of non-ST elevation myocardial infarction (NSTEMI) 6/15/2013    Hyperlipidemia     Hypertension     Lactic acidosis 1/11/2020    Metabolic syndrome 6/14/2012    Myocardial infarction     On home oxygen therapy     states uses 2L at night or when sat < 90    Pacemaker      Positive FIT (fecal immunochemical test) 11/6/2020    Severe persistent asthma without complication 4/30/2020    Statin intolerance     Stroke 2012    left hand shaky, loss of balance         Past Surgical History:   Procedure Laterality Date    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/25/2017    Dr Griggs    ADRENALECTOMY      AORTIC VALVE REPLACEMENT  12/09/2016    ARTERIOGRAPHY OF AORTIC ROOT N/A 9/12/2022    Procedure: ARTERIOGRAM, AORTIC ROOT;  Surgeon: Marko Batres MD;  Location: [facility] CATH/EP LAB;  Service: Cardiology;  Laterality: N/A;    arthroscopy lt knee Right     BLADDER REPAIR      sling    BREAST BIOPSY Bilateral     benign    COLONOSCOPY  2004    10 year recheck    COLONOSCOPY N/A 11/6/2020    Procedure: COLONOSCOPY;  Surgeon: Yakelin Lowery MD;  Location: [facility] ENDO;  Service: Endoscopy;  Laterality: N/A;    CORONARY ANGIOGRAPHY INCLUDING BYPASS GRAFTS WITH CATHETERIZATION OF LEFT HEART Left 9/12/2022    Procedure: Left heart cath including bypass graft, with left heart catheterization;  Surgeon: Marko Batres MD;  Location: [facility] CATH/EP LAB;  Service: Cardiology;  Laterality: Left;    CORONARY ARTERY BYPASS GRAFT  12/09/2016    X3    ECHOCARDIOGRAM,TRANSESOPHAGEAL N/A 3/25/2024    Procedure: Transesophageal echo (DELORIS) intra-procedure log documentation;  Surgeon: Paulino Resendiz MD;  Location: [facility] CATH/EP LAB;  Service: Cardiology;  Laterality: N/A;    EPIDURAL STEROID INJECTION INTO LUMBAR SPINE N/A 7/27/2021    Procedure: Injection-steroid-epidural-lumbar;  Surgeon: Valerio Jay MD;  Location: [facility];  Service: Pain Management;  Laterality: N/A;  L5-S1     HYSTERECTOMY      INSERTION OF PACEMAKER Left 1/13/2020    Procedure: INSERTION, PACEMAKER;  Surgeon: Marko Batres MD;  Location: [facility] CATH/EP LAB;  Service: Cardiology;  Laterality: Left;    OOPHORECTOMY      RIGHT HEART CATHETERIZATION Right 9/12/2022    Procedure: INSERTION, CATHETER, RIGHT HEART;  Surgeon: Marko Batres MD;  Location: [facility]  CATH/EP LAB;  Service: Cardiology;  Laterality: Right;    THYROIDECTOMY         Time Tracking:     OT Date of Treatment: 04/01/24  OT Start Time: 0919  OT Stop Time: 1000  OT Total Time (min): 41 min    Billable Minutes:Evaluation 10  Self Care/Home Management 31    4/1/2024

## 2024-04-01 NOTE — PLAN OF CARE
[facility] Stepdown  Discharge Final Note    Primary Care Provider: Evan Sánchez MD    Expected Discharge Date: 3/29/2024    Final Discharge Note (most recent)       Final Note - 04/01/24 0902          Final Note    Assessment Type Final Discharge Note     Anticipated Discharge Disposition Another Health Care Institution Not Defined                 Pt transferred back to [facility].      Latha Wang LMSW  [facility]  [id number]

## 2024-04-01 NOTE — PLAN OF CARE
Problem: Adult Inpatient Plan of Care  Goal: Plan of Care Review  Outcome: Ongoing, Progressing  Flowsheets (Taken 4/1/2024 1749)  Plan of Care Reviewed With: patient  Goal: Patient-Specific Goal (Individualized)  Outcome: Ongoing, Progressing  Goal: Absence of Hospital-Acquired Illness or Injury  Outcome: Ongoing, Progressing  Intervention: Prevent and Manage VTE (Venous Thromboembolism) Risk  Flowsheets (Taken 4/1/2024 1749)  Activity Management:   Sitting at edge of bed - L2   Up in chair - L3  VTE Prevention/Management: ambulation promoted  Intervention: Prevent Infection  Flowsheets (Taken 4/1/2024 1749)  Infection Prevention:   hand hygiene promoted   equipment surfaces disinfected  Goal: Optimal Comfort and Wellbeing  Outcome: Ongoing, Progressing  Intervention: Provide Person-Centered Care  Flowsheets (Taken 4/1/2024 1749)  Trust Relationship/Rapport:   emotional support provided   questions answered   thoughts/feelings acknowledged   choices provided   care explained  Goal: Readiness for Transition of Care  Outcome: Ongoing, Progressing     Problem: Diabetes Comorbidity  Goal: Blood Glucose Level Within Targeted Range  Outcome: Ongoing, Progressing     Problem: Fluid and Electrolyte Imbalance (Acute Kidney Injury/Impairment)  Goal: Fluid and Electrolyte Balance  Outcome: Ongoing, Progressing  Intervention: Monitor and Manage Fluid and Electrolyte Balance  Flowsheets (Taken 4/1/2024 1749)  Fluid/Electrolyte Management: oral rehydration therapy initiated     Problem: Oral Intake Inadequate (Acute Kidney Injury/Impairment)  Goal: Optimal Nutrition Intake  Outcome: Ongoing, Progressing  Intervention: Promote and Optimize Nutrition  Flowsheets (Taken 4/1/2024 1749)  Oral Nutrition Promotion: physical activity promoted     Problem: Renal Function Impairment (Acute Kidney Injury/Impairment)  Goal: Effective Renal Function  Outcome: Ongoing, Progressing     Problem: Infection  Goal: Absence of Infection Signs and  Symptoms  Outcome: Ongoing, Progressing  Intervention: Prevent or Manage Infection  Flowsheets (Taken 4/1/2024 1749)  Fever Reduction/Comfort Measures: fluid intake increased     Problem: Fall Injury Risk  Goal: Absence of Fall and Fall-Related Injury  Outcome: Ongoing, Progressing  Intervention: Promote Injury-Free Environment  Flowsheets (Taken 4/1/2024 1749)  Safety Promotion/Fall Prevention:   assistive device/personal item within reach   bed alarm set   side rails raised x 2   supervised activity   instructed to call staff for mobility     Problem: Skin Injury Risk Increased  Goal: Skin Health and Integrity  Outcome: Ongoing, Progressing  Intervention: Optimize Skin Protection  Flowsheets (Taken 4/1/2024 1749)  Pressure Reduction Techniques:   frequent weight shift encouraged   weight shift assistance provided  Pressure Reduction Devices: positioning supports utilized  Skin Protection: incontinence pads utilized  Head of Bed (HOB) Positioning: HOB at 20-30 degrees  Intervention: Promote and Optimize Oral Intake  Flowsheets (Taken 4/1/2024 1749)  Oral Nutrition Promotion: physical activity promoted

## 2024-04-01 NOTE — SUBJECTIVE & OBJECTIVE
Interval History:   Mild wheeze. No CP or SOB. Very weak and need to get help to listen her lungs. She need lots of help     Review of Systems  Objective:     Vital Signs (Most Recent):  Temp: 97.5 °F (36.4 °C) (04/01/24 0724)  Pulse: 76 (04/01/24 0757)  Resp: 20 (04/01/24 0757)  BP: (!) 168/83 (04/01/24 0724)  SpO2: 97 % (04/01/24 0757) Vital Signs (24h Range):  Temp:  [97.5 °F (36.4 °C)-98.9 °F (37.2 °C)] 97.5 °F (36.4 °C)  Pulse:  [76-88] 76  Resp:  [16-20] 20  SpO2:  [91 %-98 %] 97 %  BP: (127-168)/(59-90) 168/83     Weight: 87 kg (191 lb 12.8 oz)  Body mass index is 35.08 kg/m².    Intake/Output Summary (Last 24 hours) at 4/1/2024 1104  Last data filed at 4/1/2024 0951  Gross per 24 hour   Intake 820 ml   Output 1020 ml   Net -200 ml         Physical Exam  Vitals and nursing note reviewed.   HENT:      Head: Normocephalic and atraumatic.   Eyes:      Conjunctiva/sclera: Conjunctivae normal.   Neck:      Vascular: No JVD.   Cardiovascular:      Rate and Rhythm: Normal rate.      Heart sounds: Normal heart sounds.   Pulmonary:      Effort: Pulmonary effort is normal.      Breath sounds: Normal breath sounds. No wheezing.   Abdominal:      General: Abdomen is flat.      Palpations: Abdomen is soft.   Musculoskeletal:         General: Normal range of motion.   Skin:     General: Skin is warm.   Neurological:      Mental Status: She is alert and oriented to person, place, and time.   Psychiatric:         Mood and Affect: Mood normal.             Significant Labs: All pertinent labs within the past 24 hours have been reviewed.  BMP:   Recent Labs   Lab 04/01/24  0438   *      K 4.4   CL 96   CO2 38*   BUN 27*   CREATININE 0.7   CALCIUM 9.4   MG 1.8     CBC:   Recent Labs   Lab 03/31/24  0506 04/01/24  0438   WBC 10.05 8.95   HGB 12.1 11.7*   HCT 39.1 38.1    384     CMP:   Recent Labs   Lab 03/31/24  0506 04/01/24  0438    141   K 4.4 4.4   CL 95 96   CO2 38* 38*   * 126*   BUN 27*  27*   CREATININE 0.8 0.7   CALCIUM 9.3 9.4   PROT 6.7 6.7   ALBUMIN 2.9* 2.9*   BILITOT 0.3 0.3   ALKPHOS 77 71   AST 27 18   ALT 20 18   ANIONGAP 6* 7*       Significant Imaging: I have reviewed all pertinent imaging results/findings within the past 24 hours.

## 2024-04-01 NOTE — PLAN OF CARE
Problem: Occupational Therapy  Goal: Occupational Therapy Goal  Description: Goals to be met by: 4/29/2024     Patient will increase functional independence with ADLs by performing:    LE Dressing with Contact Guard Assistance and Assistive Devices as needed.  Grooming while seated at sink with Supervision.  Toileting from bedside commode with Contact Guard Assistance for hygiene and clothing management.   Supine to sit with Contact Guard Assistance.  Toilet transfer to bedside commode with Minimal Assistance.  Upper extremity exercise program, with supervision.    Outcome: Ongoing, Progressing

## 2024-04-01 NOTE — PT/OT/SLP EVAL
Physical Therapy Evaluation    Patient Name:  Tereza Larose   MRN:  [id number]    Recommendations:     Discharge Recommendations:   Moderate intensity  Discharge Equipment Recommendations: to be determined by next level of care   Barriers to discharge:  Increased caregiver burden of care    Assessment:     Tereza Larose is a 73 y.o. female admitted with a medical diagnosis of <principal problem not specified>.  She presents with the following impairments/functional limitations: weakness, impaired endurance, impaired self care skills, impaired functional mobility, gait instability, impaired balance, decreased upper extremity function, decreased lower extremity function, decreased safety awareness, edema, decreased ROM .  Pt presented  seated in chair . She has edema, erythema , and pain to ANSELMO LE. Pt  had difficulty  weight shift of hips  for scooting closer to edge of chair. She t/f'ed to stand with Max A x2 with 2 attempts without assuming complete upright posture. Pt was alert and oriented x4 but was a bit confused.    Rehab Prognosis: Fair; patient would benefit from acute skilled PT services to address these deficits and reach maximum level of function.    Recent Surgery: * No surgery found *      Plan:     During this hospitalization, patient to be seen 5 x/week to address the identified rehab impairments via gait training, therapeutic activities, therapeutic exercises and progress toward the following goals:    Plan of Care Expires:  05/01/24    Subjective     Chief Complaint: weakness   Patient/Family Comments/goals: To get stronger  Pain/Comfort:  Pain Rating 1: 0/10    Patients cultural, spiritual, Faith conflicts given the current situation:      Living Environment:  Pt lives at home  with her  in a mobile home with 5 step entry B railing  Prior to admission, patients level of function was ambulatory with SPC .  Equipment used at home: oxygen, nebulizer, walker, rolling, cane, straight, cane,  quad.  DME owned (not currently used): none.  Upon discharge, patient will have assistance from facility/.    Objective:     Communicated with nurse  prior to session.  Patient found up in chair with oxygen, PureWick, telemetry, peripheral IV  upon PT entry to room.    General Precautions: Standard,    Orthopedic Precautions:    Braces:    Respiratory Status: Nasal cannula, flow 3 L/min    Exams:  Cognitive Exam:  Patient is oriented to Person, Place, Time, and Situation  RLE ROM: WFL  RLE Strength: WFL  LLE ROM: WFL  LLE Strength: WFL    Functional Mobility:   Transfers:     Sit to Stand:  maximal assistance and of 2 persons with rolling walker  Balance: Max A  for standing briefly with RW      AM-PAC 6 CLICK MOBILITY  Total Score:        Treatment & Education:  Pt was educated on safety, use of call light , PT POC, Pt participated  in B LE therex  to include LAQ's Gs's,  AP's    Patient left up in chair with all lines intact, call button in reach, and chair alarm on.    GOALS:   Multidisciplinary Problems       Physical Therapy Goals          Problem: Physical Therapy    Goal Priority Disciplines Outcome Goal Variances Interventions   Physical Therapy Goal     PT, PT/OT      Description: Goals to be met by: 2024     Patient will increase functional independence with mobility by performin. Supine to sit with MInimal Assistance  2. Sit to stand transfer with Minimal Assistance  3. Gait  x 50  feet with Minimal Assistance using Rolling Walker.                          History:     Past Medical History:   Diagnosis Date    Abnormal EKG 2012    Allergy     Arthritis     Asthma     Brain bleed     Colon polyps 2020    COPD (chronic obstructive pulmonary disease)     Depression     Diabetes mellitus type II     Diverticulitis     Fatty liver     GERD (gastroesophageal reflux disease)     Goiter     s/p thyroidectomy    Heart murmur     Hemiparesis affecting right side as late effect of  cerebrovascular accident (CVA) 7/26/2022    Hernia of abdominal wall     History of intracranial hemorrhage 6/15/2013    History of non-ST elevation myocardial infarction (NSTEMI) 6/15/2013    Hyperlipidemia     Hypertension     Lactic acidosis 1/11/2020    Metabolic syndrome 6/14/2012    Myocardial infarction     On home oxygen therapy     states uses 2L at night or when sat < 90    Pacemaker     Positive FIT (fecal immunochemical test) 11/6/2020    Severe persistent asthma without complication 4/30/2020    Statin intolerance     Stroke 2012    left hand shaky, loss of balance       Past Surgical History:   Procedure Laterality Date    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/25/2017    Dr Griggs    ADRENALECTOMY      AORTIC VALVE REPLACEMENT  12/09/2016    ARTERIOGRAPHY OF AORTIC ROOT N/A 9/12/2022    Procedure: ARTERIOGRAM, AORTIC ROOT;  Surgeon: Marko Batres MD;  Location: [facility] CATH/EP LAB;  Service: Cardiology;  Laterality: N/A;    arthroscopy lt knee Right     BLADDER REPAIR      sling    BREAST BIOPSY Bilateral     benign    COLONOSCOPY  2004    10 year recheck    COLONOSCOPY N/A 11/6/2020    Procedure: COLONOSCOPY;  Surgeon: Yakelin Lowery MD;  Location: [facility];  Service: Endoscopy;  Laterality: N/A;    CORONARY ANGIOGRAPHY INCLUDING BYPASS GRAFTS WITH CATHETERIZATION OF LEFT HEART Left 9/12/2022    Procedure: Left heart cath including bypass graft, with left heart catheterization;  Surgeon: Marko Batres MD;  Location: [facility] CATH/EP LAB;  Service: Cardiology;  Laterality: Left;    CORONARY ARTERY BYPASS GRAFT  12/09/2016    X3    ECHOCARDIOGRAM,TRANSESOPHAGEAL N/A 3/25/2024    Procedure: Transesophageal echo (DELORIS) intra-procedure log documentation;  Surgeon: Paulino Resendiz MD;  Location: [facility] CATH/EP LAB;  Service: Cardiology;  Laterality: N/A;    EPIDURAL STEROID INJECTION INTO LUMBAR SPINE N/A 7/27/2021    Procedure: Injection-steroid-epidural-lumbar;  Surgeon: Valerio Jay MD;  Location: [facility] OR;  Service:  Pain Management;  Laterality: N/A;  L5-S1     HYSTERECTOMY      INSERTION OF PACEMAKER Left 1/13/2020    Procedure: INSERTION, PACEMAKER;  Surgeon: Marko Batres MD;  Location: [facility] CATH/EP LAB;  Service: Cardiology;  Laterality: Left;    OOPHORECTOMY      RIGHT HEART CATHETERIZATION Right 9/12/2022    Procedure: INSERTION, CATHETER, RIGHT HEART;  Surgeon: Marko Batres MD;  Location: [facility] CATH/EP LAB;  Service: Cardiology;  Laterality: Right;    THYROIDECTOMY         Time Tracking:     PT Received On: 04/01/24  PT Start Time: 1012     PT Stop Time: 1030  PT Total Time (min): 18 min     Billable Minutes: Evaluation 9 minutes  and Therapeutic Exercise 9 minutes      04/01/2024

## 2024-04-01 NOTE — PLAN OF CARE
Patient in need of placement at discharge.  ARJUN spoke to Tiffany with [facility] who stated they could accept the patient if antibiotics only have to be given once or twice per day.  Per chart review, ampicillin is scheduled q6h.  Secure chat sent to attending and ID to see if it can be changed to twice a day.  CM awaiting response at this time.         04/01/24 1225   Post-Acute Status   Post-Acute Authorization Placement   Discharge Plan   Discharge Plan A Skilled Nursing Facility   Discharge Plan B Long-term acute care facility (LTAC)

## 2024-04-02 PROBLEM — I10 ESSENTIAL HYPERTENSION: Status: RESOLVED | Noted: 2022-09-29 | Resolved: 2024-04-02

## 2024-04-02 PROBLEM — J82.83 EOSINOPHILIC ASTHMA: Status: RESOLVED | Noted: 2022-07-26 | Resolved: 2024-04-02

## 2024-04-02 PROBLEM — J44.9 CHRONIC OBSTRUCTIVE PULMONARY DISEASE: Status: RESOLVED | Noted: 2023-01-17 | Resolved: 2024-04-02

## 2024-04-02 PROBLEM — R09.89 CHRONIC SINUS COMPLAINTS: Status: RESOLVED | Noted: 2022-09-29 | Resolved: 2024-04-02

## 2024-04-02 PROBLEM — E89.0 POSTOPERATIVE HYPOTHYROIDISM: Status: RESOLVED | Noted: 2019-03-08 | Resolved: 2024-04-02

## 2024-04-02 PROBLEM — Z78.9 STATIN INTOLERANCE: Status: RESOLVED | Noted: 2017-03-29 | Resolved: 2024-04-02

## 2024-04-02 PROBLEM — I65.29 CAROTID ATHEROSCLEROSIS: Status: RESOLVED | Noted: 2022-07-26 | Resolved: 2024-04-02

## 2024-04-02 PROBLEM — M54.16 LUMBAR RADICULITIS: Status: RESOLVED | Noted: 2021-07-27 | Resolved: 2024-04-02

## 2024-04-02 PROBLEM — F51.01 PRIMARY INSOMNIA: Status: RESOLVED | Noted: 2019-03-08 | Resolved: 2024-04-02

## 2024-04-02 PROBLEM — Z95.5 CORONARY STENT PATENT: Status: RESOLVED | Noted: 2022-09-29 | Resolved: 2024-04-02

## 2024-04-02 PROBLEM — Z95.0 PACEMAKER: Status: RESOLVED | Noted: 2024-03-26 | Resolved: 2024-04-02

## 2024-04-02 PROBLEM — E87.1 HYPONATREMIA: Status: RESOLVED | Noted: 2024-03-26 | Resolved: 2024-04-02

## 2024-04-02 PROBLEM — M54.50 RIGHT-SIDED LOW BACK PAIN WITHOUT SCIATICA: Status: RESOLVED | Noted: 2020-04-30 | Resolved: 2024-04-02

## 2024-04-02 PROBLEM — I50.9 CONGESTIVE HEART FAILURE, UNSPECIFIED HF CHRONICITY, UNSPECIFIED HEART FAILURE TYPE: Status: RESOLVED | Noted: 2022-07-26 | Resolved: 2024-04-02

## 2024-04-02 PROBLEM — J45.50 SEVERE PERSISTENT ASTHMA WITHOUT COMPLICATION: Status: RESOLVED | Noted: 2020-04-30 | Resolved: 2024-04-02

## 2024-04-02 PROBLEM — J82.89 PULMONARY EOSINOPHILIA: Status: RESOLVED | Noted: 2022-07-26 | Resolved: 2024-04-02

## 2024-04-02 PROBLEM — K63.5 COLON POLYPS: Status: RESOLVED | Noted: 2020-11-06 | Resolved: 2024-04-02

## 2024-04-02 PROBLEM — I70.0 ABDOMINAL AORTIC ATHEROSCLEROSIS: Status: RESOLVED | Noted: 2022-07-26 | Resolved: 2024-04-02

## 2024-04-02 PROBLEM — R78.81 BACTEREMIA: Status: RESOLVED | Noted: 2024-03-26 | Resolved: 2024-04-02

## 2024-04-02 PROBLEM — R79.89 ELEVATED SERUM CREATININE: Status: RESOLVED | Noted: 2022-09-15 | Resolved: 2024-04-02

## 2024-04-02 LAB
CRP SERPL-MCNC: 4.6 MG/DL
GLUCOSE SERPL-MCNC: 134 MG/DL (ref 70–110)
GLUCOSE SERPL-MCNC: 167 MG/DL (ref 70–110)
GLUCOSE SERPL-MCNC: 170 MG/DL (ref 70–110)
GLUCOSE SERPL-MCNC: 220 MG/DL (ref 70–110)
PROCALCITONIN SERPL IA-MCNC: 0.17 NG/ML (ref 0–0.5)

## 2024-04-02 PROCEDURE — 99900031 HC PATIENT EDUCATION (STAT)

## 2024-04-02 PROCEDURE — 97530 THERAPEUTIC ACTIVITIES: CPT | Mod: CQ

## 2024-04-02 PROCEDURE — 99900035 HC TECH TIME PER 15 MIN (STAT)

## 2024-04-02 PROCEDURE — 99233 SBSQ HOSP IP/OBS HIGH 50: CPT | Mod: FS,,, | Performed by: NURSE PRACTITIONER

## 2024-04-02 PROCEDURE — 25000242 PHARM REV CODE 250 ALT 637 W/ HCPCS: Performed by: INTERNAL MEDICINE

## 2024-04-02 PROCEDURE — 25000003 PHARM REV CODE 250: Performed by: INTERNAL MEDICINE

## 2024-04-02 PROCEDURE — 63600175 PHARM REV CODE 636 W HCPCS: Performed by: NURSE PRACTITIONER

## 2024-04-02 PROCEDURE — 12000002 HC ACUTE/MED SURGE SEMI-PRIVATE ROOM

## 2024-04-02 PROCEDURE — 97535 SELF CARE MNGMENT TRAINING: CPT

## 2024-04-02 PROCEDURE — 84145 PROCALCITONIN (PCT): CPT | Performed by: NURSE PRACTITIONER

## 2024-04-02 PROCEDURE — 86140 C-REACTIVE PROTEIN: CPT | Performed by: NURSE PRACTITIONER

## 2024-04-02 PROCEDURE — 94640 AIRWAY INHALATION TREATMENT: CPT

## 2024-04-02 PROCEDURE — 27000221 HC OXYGEN, UP TO 24 HOURS

## 2024-04-02 PROCEDURE — 36415 COLL VENOUS BLD VENIPUNCTURE: CPT | Performed by: NURSE PRACTITIONER

## 2024-04-02 PROCEDURE — 63600175 PHARM REV CODE 636 W HCPCS: Mod: UD | Performed by: INTERNAL MEDICINE

## 2024-04-02 PROCEDURE — 94761 N-INVAS EAR/PLS OXIMETRY MLT: CPT

## 2024-04-02 PROCEDURE — 25000003 PHARM REV CODE 250: Performed by: NURSE PRACTITIONER

## 2024-04-02 RX ORDER — AMLODIPINE BESYLATE 5 MG/1
10 TABLET ORAL DAILY
Status: DISCONTINUED | OUTPATIENT
Start: 2024-04-03 | End: 2024-04-06 | Stop reason: HOSPADM

## 2024-04-02 RX ORDER — ALBUTEROL SULFATE 0.83 MG/ML
2.5 SOLUTION RESPIRATORY (INHALATION) EVERY 6 HOURS
Status: DISCONTINUED | OUTPATIENT
Start: 2024-04-03 | End: 2024-04-06 | Stop reason: HOSPADM

## 2024-04-02 RX ORDER — TORSEMIDE 20 MG/1
20 TABLET ORAL DAILY
Status: DISCONTINUED | OUTPATIENT
Start: 2024-04-03 | End: 2024-04-06 | Stop reason: HOSPADM

## 2024-04-02 RX ADMIN — INSULIN ASPART 1 UNITS: 100 INJECTION, SOLUTION INTRAVENOUS; SUBCUTANEOUS at 08:04

## 2024-04-02 RX ADMIN — AMPICILLIN 2 G: 2 INJECTION, POWDER, FOR SOLUTION INTRAVENOUS at 06:04

## 2024-04-02 RX ADMIN — AMPICILLIN 2 G: 2 INJECTION, POWDER, FOR SOLUTION INTRAVENOUS at 01:04

## 2024-04-02 RX ADMIN — INSULIN DETEMIR 20 UNITS: 100 INJECTION, SOLUTION SUBCUTANEOUS at 08:04

## 2024-04-02 RX ADMIN — AMPICILLIN 2 G: 2 INJECTION, POWDER, FOR SOLUTION INTRAVENOUS at 02:04

## 2024-04-02 RX ADMIN — SENNOSIDES AND DOCUSATE SODIUM 1 TABLET: 8.6; 5 TABLET ORAL at 08:04

## 2024-04-02 RX ADMIN — ACETAMINOPHEN 650 MG: 325 TABLET ORAL at 11:04

## 2024-04-02 RX ADMIN — AMPICILLIN 2 G: 2 INJECTION, POWDER, FOR SOLUTION INTRAVENOUS at 09:04

## 2024-04-02 RX ADMIN — MUPIROCIN 1 G: 20 OINTMENT TOPICAL at 08:04

## 2024-04-02 RX ADMIN — CEFTRIAXONE SODIUM 2 G: 2 INJECTION, POWDER, FOR SOLUTION INTRAMUSCULAR; INTRAVENOUS at 12:04

## 2024-04-02 RX ADMIN — Medication 6 MG: at 08:04

## 2024-04-02 RX ADMIN — AMPICILLIN 2 G: 2 INJECTION, POWDER, FOR SOLUTION INTRAVENOUS at 10:04

## 2024-04-02 RX ADMIN — CHLORHEXIDINE GLUCONATE 15 ML: 1.2 RINSE ORAL at 08:04

## 2024-04-02 RX ADMIN — ENOXAPARIN SODIUM 40 MG: 40 INJECTION SUBCUTANEOUS at 05:04

## 2024-04-02 RX ADMIN — AMPICILLIN 2 G: 2 INJECTION, POWDER, FOR SOLUTION INTRAVENOUS at 05:04

## 2024-04-02 RX ADMIN — ALBUTEROL SULFATE 2.5 MG: 2.5 SOLUTION RESPIRATORY (INHALATION) at 07:04

## 2024-04-02 RX ADMIN — HYDROCODONE BITARTRATE AND ACETAMINOPHEN 2 TABLET: 5; 325 TABLET ORAL at 08:04

## 2024-04-02 NOTE — PT/OT/SLP PROGRESS
Physical Therapy Treatment    Patient Name:  Tereza Larose   MRN:  [id number]    Recommendations:     Discharge Recommendations:    Discharge Equipment Recommendations: to be determined by next level of care  Barriers to discharge:  Increased caregiver burden    Assessment:     Tereza Larose is a 73 y.o. female admitted with a medical diagnosis of <principal problem not specified>.  She presents with the following impairments/functional limitations: weakness, impaired endurance, impaired self care skills, impaired functional mobility, gait instability, impaired balance, decreased lower extremity function, decreased safety awareness, edema, impaired cardiopulmonary response to activity.     Rehab Prognosis: Fair; patient would benefit from acute skilled PT services to address these deficits and reach maximum level of function.    Recent Surgery: * No surgery found *      Plan:     During this hospitalization, patient to be seen 5 x/week to address the identified rehab impairments via gait training, therapeutic activities, therapeutic exercises and progress toward the following goals:    Plan of Care Expires:  05/01/24    Subjective     Chief Complaint: B foot edema  Patient/Family Comments/goals: Pt wishes to receive lasix d/t B foot edema  Pain/Comfort:  Pain Rating 1: 0/10  Pain Rating Post-Intervention 1: 0/10      Objective:     Communicated with RN prior to session.  Patient found HOB elevated with bed alarm, peripheral IV, telemetry, oxygen upon PT entry to room.     General Precautions: Standard,    Orthopedic Precautions:    Braces:    Respiratory Status: Nasal cannula, flow 3 L/min     Functional Mobility:  Bed Mobility:     Supine to Sit: moderate assistance  Transfers:     Sit to Stand:  maximal assistance and of 2 persons with rolling walker  Bed to Chair: maximal assistance and of 2 persons with  no AD  using  Squat Pivot      AM-PAC 6 CLICK MOBILITY  Turning over in bed (including adjusting bedclothes,  sheets and blankets)?: 2  Sitting down on and standing up from a chair with arms (e.g., wheelchair, bedside commode, etc.): 2  Moving from lying on back to sitting on the side of the bed?: 2  Moving to and from a bed to a chair (including a wheelchair)?: 2  Need to walk in hospital room?: 1  Climbing 3-5 steps with a railing?: 1  Basic Mobility Total Score: 10       Treatment & Education:  Pt was educated on the following: call light use, importance of OOB activity and functional mobility to negate the negative effects of prolonged bed rest during this hospitalization, safe transfers/ambulation and discharge planning recommendations/options.     Patient left up in chair with all lines intact, call button in reach, and RN present..    GOALS:   Multidisciplinary Problems       Physical Therapy Goals          Problem: Physical Therapy    Goal Priority Disciplines Outcome Goal Variances Interventions   Physical Therapy Goal     PT, PT/OT      Description: Goals to be met by: 2024     Patient will increase functional independence with mobility by performin. Supine to sit with MInimal Assistance  2. Sit to stand transfer with Minimal Assistance  3. Gait  x 50  feet with Minimal Assistance using Rolling Walker.                          Time Tracking:     PT Received On: 24  PT Start Time: 1120     PT Stop Time: 1135  PT Total Time (min): 15 min     Billable Minutes: Therapeutic Activity 15    Treatment Type: Treatment  PT/PTA: PTA     Number of PTA visits since last PT visit: 2024

## 2024-04-02 NOTE — PLAN OF CARE
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 - to high-risk for pacemaker removal     2) Discharge Antibiotics:    Intravenous Antibiotics:  Ampicillin 2 g IV every 4 hours or 12 g IV via continuous infusion over 24 hours daily for 6 weeks        AND            Ceftriaxone 2 g IV every 12 hours FOR 6 WEEKS    Oral Antibiotics:  On completion of IV antibiotics, on May 9th start amoxicillin 500 mg oral twice daily for suppression for life     May take probiotic of choice while on antibiotics     3) Intravenous Access:  PICC line placed on 03/29/24 right upper extremity  Remove PICC line at completion of therapy    4) Therapy Duration:    Estimated end date of IV antibiotics:  MAY 8th     5) Outpatient Weekly Labs for duration of antibiotic therapy:  Order the following labs to be drawn on Mondays:  CBC with Diff, CMP and CRP      6) Fax Lab Results to Infectious Diseases Provider if not done at [facility] facility:   [facility] Fax Number: [phone], Attn: Dr Lourdes Walton     7) Outpatient Infectious Diseases Follow-up:  Follow-up appointment will be arranged by the ID clinic and will be found in the patient's appointments tab.  Prior to discharge, please ensure the patient's follow-up has been scheduled.    If there is still no follow-up scheduled prior to discharge, please send an EPIC message to Iwona Higuera in [facility].      8) Miscellaneous Orders:   DELORIS we will need to be scheduled around May 8th, antibiotic completion date to evaluate mobile mass on right  atrial pacer lead - we will be ordered when she returns to Infectious Disease for follow-up        Lolly Love 04/02/2024  12:57 PM  [facility]   weakness

## 2024-04-02 NOTE — CARE UPDATE
04/01/24 2008   Patient Assessment/Suction   Level of Consciousness (AVPU) alert   Respiratory Effort Normal;Unlabored   Expansion/Accessory Muscles/Retractions no use of accessory muscles;no retractions   All Lung Fields Breath Sounds diminished   Rhythm/Pattern, Respiratory no shortness of breath reported;pattern regular   Cough Frequency no cough   PRE-TX-O2   Device (Oxygen Therapy) high flow nasal cannula   $ Is the patient on Low Flow Oxygen? Yes   Flow (L/min) 3   SpO2 95 %   Pulse Oximetry Type Intermittent   $ Pulse Oximetry - Single Charge Pulse Oximetry - Single   Pulse 83   Resp 18   Positioning HOB elevated 45 degrees   Positioning   Head of Bed (HOB) Positioning HOB at 45 degrees   Positioning/Transfer Devices pillows;in use   Aerosol Therapy   $ Aerosol Therapy Charges Aerosol Treatment   Daily Review of Necessity (SVN) completed   Respiratory Treatment Status (SVN) given   Treatment Route (SVN) mouthpiece;oxygen   Patient Position (SVN) HOB elevated   Post Treatment Assessment (SVN) breath sounds improved;increased aeration   Signs of Intolerance (SVN) none   Education   $ Education Bronchodilator;15 min

## 2024-04-02 NOTE — NURSING
Patient transferred to room 1223. Patient's belongings and chart sent with patient. Report given to JEFFERY Batres. Patient notified spouse of transfer. No acute distress noted

## 2024-04-02 NOTE — SUBJECTIVE & OBJECTIVE
Interval History: No complains of SOB or chest pain. No fever. Discussed with ID.     Review of Systems  Objective:     Vital Signs (Most Recent):  Temp: 97.3 °F (36.3 °C) (04/02/24 1141)  Pulse: 77 (04/02/24 0810)  Resp: 15 (04/02/24 0810)  BP: (!) 165/82 (04/01/24 1925)  SpO2: 98 % (04/02/24 0810) Vital Signs (24h Range):  Temp:  [97.3 °F (36.3 °C)-98.6 °F (37 °C)] 97.3 °F (36.3 °C)  Pulse:  [76-87] 77  Resp:  [15-20] 15  SpO2:  [95 %-99 %] 98 %  BP: (165-168)/(82-88) 165/82     Weight: 87 kg (191 lb 12.8 oz)  Body mass index is 35.08 kg/m².    Intake/Output Summary (Last 24 hours) at 4/2/2024 1509  Last data filed at 4/2/2024 1503  Gross per 24 hour   Intake 360 ml   Output 2050 ml   Net -1690 ml         Physical Exam  Vitals and nursing note reviewed.   HENT:      Head: Normocephalic and atraumatic.   Eyes:      Conjunctiva/sclera: Conjunctivae normal.   Neck:      Vascular: No JVD.   Cardiovascular:      Rate and Rhythm: Normal rate.      Heart sounds: Normal heart sounds. Bilateral lower extremity swelling  Pulmonary:      Effort: Pulmonary effort is normal.      Breath sounds: Normal breath sounds. No wheezing.   Abdominal:      General: Abdomen is flat.      Palpations: Abdomen is soft.   Musculoskeletal:         General: Normal range of motion.   Skin:     General: Skin is warm.   Neurological:      Mental Status: She is alert and oriented to person, place, and time.   Psychiatric:         Mood and Affect: Mood normal.         Significant Labs: All pertinent labs within the past 24 hours have been reviewed.  CBC:   Recent Labs   Lab 04/01/24  0438   WBC 8.95   HGB 11.7*   HCT 38.1        CMP:   Recent Labs   Lab 04/01/24  0438      K 4.4   CL 96   CO2 38*   *   BUN 27*   CREATININE 0.7   CALCIUM 9.4   PROT 6.7   ALBUMIN 2.9*   BILITOT 0.3   ALKPHOS 71   AST 18   ALT 18   ANIONGAP 7*       Significant Imaging: I have reviewed all pertinent imaging results/findings within the past 24  hours.

## 2024-04-02 NOTE — ASSESSMENT & PLAN NOTE
Last A1c reviewed-   Lab Results   Component Value Date    HGBA1C 7.9 (H) 03/22/2024       Antihyperglycemics (From admission, onward)      Start     Stop Route Frequency Ordered    03/31/24 2145  insulin aspart U-100 pen 0-10 Units         -- SubQ Every 6 hours PRN 03/31/24 2045 03/30/24 0900  insulin detemir U-100 (Levemir) pen 20 Units         -- SubQ Daily 03/29/24 2328          Hold Oral hypoglycemics while patient is in the hospital.

## 2024-04-02 NOTE — ASSESSMENT & PLAN NOTE
Results for orders placed during the hospital encounter of 03/22/24    Echo    Interpretation Summary    Left Ventricle: The left ventricle is normal in size. Normal wall thickness. Normal wall motion. Septal motion is consistent with pacing. There is normal systolic function with a visually estimated ejection fraction of 60 - 65%. There is normal diastolic function.    Right Ventricle: Normal right ventricular cavity size. Wall thickness is normal. Right ventricle wall motion  is normal. Systolic function is normal. Pacemaker lead present in the ventricle.    Right Atrium: Multiple leads present in the right atrium.    Mitral Valve: There is severe bileaflet sclerosis. There is severe anterior mitral annular calcification present. There is normal leaflet mobility. There is mild to moderate regurgitation.    Tricuspid Valve: There is moderate regurgitation with a centrally directed jet.    Pulmonary Artery: There is moderate pulmonary hypertension. The estimated pulmonary artery systolic pressure is 63 mmHg.    IVC/SVC: Elevated venous pressure at 15 mmHg.    Resume home torsemide

## 2024-04-02 NOTE — PLAN OF CARE
Patient is a two person assist.Patient need more education on diet. Patient was up in chair part of the shift. With no complaints of pain noted. Patient in bed with call bell in reach.

## 2024-04-02 NOTE — PROGRESS NOTES
Formerly [facility], [facility]–1983 Medicine  Progress Note    Patient Name: Tereza Larose  MRN: [id number]  Patient Class: IP- Inpatient   Admission Date: 3/29/2024  Length of Stay: 4 days  Attending Physician: Ramiro Green MD  Primary Care Provider: Evan Sánchez MD        Subjective:     Principal Problem:infective endocarditis       HPI:   Ms. Larose is a 72 yo F with CAD, HFpEF, pacemaker in place, HTN, HLD, DMII, COPD who originally presented to OSH after a fall, later was found to have NSTEMI and subsequently had VERONA and Enterococcus bacteremia. Cardiology and ID were consulted, she was started on IV CTX and ampicillin. DELORIS showed small mobile mass located on a right atrial lead. Proximal measurements are 1 cm x 0.5 cm. Case discussed with EP and Interventional Cardiology who will consult on the patient. Currently hemodynamically stable on 3L NC. Denies headaches, visual changes, SOB, cough, chest pain, palpitation, nausea, vomiting, abdominal pain, diarrhea, constipation, urinary frequency or any weakness. AT [facility] the pt was restarted on CTX and ampicillin, ID consulted.        Overview/Hospital Course:    Pt admitted to hospital medicine for VERONA and enterococcus bacteremia. Pt started on ceftriaxone and ampicillin. ID consulted.Blood cultures growing enterococcus faecalis.  EP and interventional cardiology consulted for pacemaker removal, however patient is not a candidate due to her severe pulmonary hypertension. PICC placed. Patient to have 6 weeks of Ampicillin 2g IV q 6 hours Ceftriaxone 2g IV q 12 hours  followed by PO antibiotic suppression. Concern for gout flare and started treatment. On allopurinol at home. Patient transferred to [facility] to continue treatment from [facility] and later continued same antibiotics . ID [facility] reviewed again and agree with treatment and repeat blood culture sent . Patient had severe deconditioning and requested for SNF /LTAC placement and  downgraded .    Interval History: No complains of SOB or chest pain. No fever. Discussed with ID.     Review of Systems  Objective:     Vital Signs (Most Recent):  Temp: 97.3 °F (36.3 °C) (04/02/24 1141)  Pulse: 77 (04/02/24 0810)  Resp: 15 (04/02/24 0810)  BP: (!) 165/82 (04/01/24 1925)  SpO2: 98 % (04/02/24 0810) Vital Signs (24h Range):  Temp:  [97.3 °F (36.3 °C)-98.6 °F (37 °C)] 97.3 °F (36.3 °C)  Pulse:  [76-87] 77  Resp:  [15-20] 15  SpO2:  [95 %-99 %] 98 %  BP: (165-168)/(82-88) 165/82     Weight: 87 kg (191 lb 12.8 oz)  Body mass index is 35.08 kg/m².    Intake/Output Summary (Last 24 hours) at 4/2/2024 1509  Last data filed at 4/2/2024 1503  Gross per 24 hour   Intake 360 ml   Output 2050 ml   Net -1690 ml         Physical Exam  Vitals and nursing note reviewed.   HENT:      Head: Normocephalic and atraumatic.   Eyes:      Conjunctiva/sclera: Conjunctivae normal.   Neck:      Vascular: No JVD.   Cardiovascular:      Rate and Rhythm: Normal rate.      Heart sounds: Normal heart sounds. Bilateral lower extremity swelling  Pulmonary:      Effort: Pulmonary effort is normal.      Breath sounds: Normal breath sounds. No wheezing.   Abdominal:      General: Abdomen is flat.      Palpations: Abdomen is soft.   Musculoskeletal:         General: Normal range of motion.   Skin:     General: Skin is warm.   Neurological:      Mental Status: She is alert and oriented to person, place, and time.   Psychiatric:         Mood and Affect: Mood normal.         Significant Labs: All pertinent labs within the past 24 hours have been reviewed.  CBC:   Recent Labs   Lab 04/01/24  0438   WBC 8.95   HGB 11.7*   HCT 38.1        CMP:   Recent Labs   Lab 04/01/24  0438      K 4.4   CL 96   CO2 38*   *   BUN 27*   CREATININE 0.7   CALCIUM 9.4   PROT 6.7   ALBUMIN 2.9*   BILITOT 0.3   ALKPHOS 71   AST 18   ALT 18   ANIONGAP 7*       Significant Imaging: I have reviewed all pertinent imaging results/findings within  the past 24 hours.    Assessment/Plan:      Bacteremia due to Enterococcus  See below       Pacemaker infection  Unable to remove   - antibiotics as above      Acute bacterial endocarditis  Treated for enterococcus faecalis bacteremia. DELORIS remarkable for Right Atrium: Right atrium is dilated. Multiple leads viewed in the right atrium. There is a 1 x 0.6 cm mass attached to a lead in the right atrium that demonstrates independent motion. Concerning for possible vegetations.      Plan  Continue current antibiotics determined by Infectious Disease already  -Will need 6 weeks of IV Ceftriaxone and Ampicillin from negative blood cultures  - Lifelong PO suppressive amoxicillin after 6 weeks of IV antibiotic therapy completed  - PICC placed in the other hospital      Acute on chronic combined systolic and diastolic CHF (congestive heart failure)   Results for orders placed during the hospital encounter of 03/22/24    Echo    Interpretation Summary    Left Ventricle: The left ventricle is normal in size. Normal wall thickness. Normal wall motion. Septal motion is consistent with pacing. There is normal systolic function with a visually estimated ejection fraction of 60 - 65%. There is normal diastolic function.    Right Ventricle: Normal right ventricular cavity size. Wall thickness is normal. Right ventricle wall motion  is normal. Systolic function is normal. Pacemaker lead present in the ventricle.    Right Atrium: Multiple leads present in the right atrium.    Mitral Valve: There is severe bileaflet sclerosis. There is severe anterior mitral annular calcification present. There is normal leaflet mobility. There is mild to moderate regurgitation.    Tricuspid Valve: There is moderate regurgitation with a centrally directed jet.    Pulmonary Artery: There is moderate pulmonary hypertension. The estimated pulmonary artery systolic pressure is 63 mmHg.    IVC/SVC: Elevated venous pressure at 15 mmHg.    Resume home  torsemide     T2DM (type 2 diabetes mellitus)    Last A1c reviewed-   Lab Results   Component Value Date    HGBA1C 7.9 (H) 03/22/2024       Antihyperglycemics (From admission, onward)      Start     Stop Route Frequency Ordered    03/31/24 2145  insulin aspart U-100 pen 0-10 Units         -- SubQ Every 6 hours PRN 03/31/24 2045 03/30/24 0900  insulin detemir U-100 (Levemir) pen 20 Units         -- SubQ Daily 03/29/24 2328          Hold Oral hypoglycemics while patient is in the hospital.    HTN (hypertension)  BP uncontrolled  Resume Norvasc and torsemide     BRIJESH (obstructive sleep apnea)  CPAP per home settings       Hyperlipidemia associated with type 2 diabetes mellitus  Resume home meds        VTE Risk Mitigation (From admission, onward)           Ordered     enoxaparin injection 40 mg  Daily         03/29/24 2328     IP VTE HIGH RISK PATIENT  Once         03/29/24 2328     Place sequential compression device  Until discontinued         03/29/24 2328                    Discharge Planning   FAVIOLA: 4/3/2024     Code Status: Partial Code   Is the patient medically ready for discharge?:     Reason for patient still in hospital (select all that apply): Pending disposition  Discharge Plan A: Skilled Nursing Facility                  Ramiro Green MD  Department of Hospital Medicine   [facility]

## 2024-04-02 NOTE — PT/OT/SLP PROGRESS
Occupational Therapy   Treatment    Name: Tereza Larose  MRN: [id number]  Admitting Diagnosis:  <principal problem not specified>    Recommendations:     Discharge Recommendations: Moderate intensity therapy  Discharge Equipment Recommendations:  to be determined by next level of care  Barriers to discharge:      Assessment:     Tereza Larose is a 73 y.o. female with a medical diagnosis of <principal problem not specified>.  She presents with performance deficits affecting function are weakness, impaired endurance, impaired self care skills, impaired functional mobility, gait instability, impaired balance, decreased lower extremity function, decreased coordination and impaired cardiopulmonary response to activity.     Rehab Prognosis:  Fair; patient would benefit from acute skilled OT services to address these deficits and reach maximum level of function.       Plan:     Patient to be seen 5 x/week to address the above listed problems via self-care/home management, therapeutic activities, therapeutic exercises  Plan of Care Expires: 04/29/24  Plan of Care Reviewed with: patient    Subjective     Chief Complaint: None stated  Patient/Family Comments/goals: Patient agreed to participate in OT.  Pain/Comfort:  Pain Rating 1: 6/10  Location - Side 1: Right  Location 1: knee    Objective:     Communicated with: nurse prior to session.  Patient found up in chair upon OT entry to room.    General Precautions: Standard, fall    Orthopedic Precautions: N/A  Braces: N/A  Respiratory Status: Room air     Occupational Performance:     Functional Mobility/Transfers:  Patient completed Sit <> Stand Transfer x 3 with maximal assistance with rolling walker     Activities of Daily Living:  Lower Body Dressing: total assistance to don her socks      Treatment & Education:  Pt was given instruction and demonstration of sit <> stand transfers including positioning of her feet, hand placement and scooting forward prior to standing.      Patient left up in chair with all lines intact and call button in reach.    GOALS:   Multidisciplinary Problems       Occupational Therapy Goals          Problem: Occupational Therapy    Goal Priority Disciplines Outcome Interventions   Occupational Therapy Goal     OT, PT/OT Ongoing, Progressing    Description: Goals to be met by: 4/29/2024     Patient will increase functional independence with ADLs by performing:    LE Dressing with Contact Guard Assistance and Assistive Devices as needed.  Grooming while seated at sink with Supervision.  Toileting from bedside commode with Contact Guard Assistance for hygiene and clothing management.   Supine to sit with Contact Guard Assistance.  Toilet transfer to bedside commode with Minimal Assistance.  Upper extremity exercise program, with supervision.                         Time Tracking:     OT Date of Treatment: 04/02/24  OT Start Time: 1348  OT Stop Time: 1411  OT Total Time (min): 23 min    Billable Minutes:Self Care/Home Management 23               4/2/2024   Detail Level: Detailed Biopsy Type: H and E Biopsy Method: 10 blade Bill As?: Malignant Destruction Slow feeding in  Size Of Lesion In Cm (Optional): 0.8 Anesthesia Type: 1% lidocaine with epinephrine Anesthesia Volume In Cc: 2 Hemostasis: TCA 35% Destruction Type: curettage Wound Care: Petrolatum Lab: -93 Lab Facility: 0 [facility] Notes In Note?: No Consent: Written consent was obtained and risks were reviewed including but not limited to scarring, infection, bleeding, scabbing, incomplete removal,  and allergy to anesthesia. They were told if you have more than one procedure on the same visit this consent applies to all of them. Post-Care Instructions: Post op care was discussed and includes cleaning the area with soap and water and then rinsing the area with a tablespoon of table vinegar in a cup of tap water. Pat dry and then apply polysporin ointment if available and petrolatum if not. Cover with bandage if the area is likely to get dirty or rubbed. Do this until healed or 10 days. Call if not seeming to heal after 10 days or the area looks infected.  Should the patient develop any fevers, chills, bleeding, severe pain patient will contact the office immediately or go to your regular doctor, urgent care or the ER Notification Instructions: Patient will be notified of biopsy results. However, patient instructed to call the office if not contacted within 2 weeks. Billing Type: [employer]

## 2024-04-02 NOTE — PLAN OF CARE
Reviewed chart and met with pt and spouse to discuss discharge planning.  Discussed ID recommendations of 6 weeks IV abx q4 hour or continuous over 24 hours and that this may be difficult to find SNF that would accept pt with this dosing schedule. Discussed attempting as pt and spouse prefer pt to go to [facility]. Should this not be successful pt would be appropriate for LTAC placement and discussed options for this as well.  CM sent referral to [facility], [facility], [facility], [facility], and [facility].  Pt spouse prefers [facility] in [place] if unable to place in SNF       04/02/24 6146   Post-Acute Status   Post-Acute Authorization Placement   Post-Acute Placement Status Referrals Sent   Discharge Delays None known at this time   Discharge Plan   Discharge Plan A Skilled Nursing Facility   Discharge Plan B Long-term acute care facility (LTAC)

## 2024-04-02 NOTE — PROGRESS NOTES
[facility]   Department of Infectious Disease  Progress Note        PATIENT NAME: Tereza Larose  MRN: [id number]  TODAY'S DATE: 04/02/2024  ADMIT DATE: 3/29/2024    CHIEF COMPLAINT: No chief complaint on file.    PRINCIPLE PROBLEM: <principal problem not specified>    REASON FOR CONSULT:  Enterococcus bacteremia  INTERVAL HISTORY     4/2/24@1104 (Jonnykaila):  Patient seen and examined alone in her room.  She had a poor night's sleep because of noise and light.  She feels very edematous today and still has mild bilateral ankle and right knee pain.  She said it is much better but she still is not able to walk because of the pain in the swelling.  She denies fevers or chills, dysuria or chest pain.  She said they kept telling her she was having chest pain last night but she does not feel like she was.  She states she was wheezing last night and had to have a breathing treatment.  She continues on nasal cannula O2 which she uses at home as well.  T-max 98.6° in last 24 hours.  WBC 8.95, platelets 384. CRP 4.60, (normal reference range <1) decreased from 278.8 (normal reference range 278.8).  Procalcitonin 0.173 has normalized.  3/26 blood cultures x2 negative.    4/1/24@1042 (Ivan):  Patient seen and examined alone in her room.  She was sitting up in chair awake and alert.  She said she is feeling better today though is still quite weak.  She was on nasal cannula O2 and denies shortness of breath or chest pain.  She denies fevers or chills, abdominal pain, dysuria in his voiding well with pure wick.  Last bowel movement was yesterday.  She states appetite is fair.  T-max 98.9° in the last 24 hours.  WBC 8.95, ESR 66, BUN/CR 27/0.7 much improved, ALT/AST 18/18, albumin 2.9, glucose 126.  Last .8 on 03/24.  Last procalcitonin 5.579.  She had blood cultures positive for Enterococcus faecalis on 03/23, 3/24 and 3/25.  3/26 blood cultures x2 sets are negative and 3/30 blood culture x1 set no growth to  date.      ASSESSMENT and PLAN      1. 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   -Right atrium is dilated. Multiple leads viewed in the right atrium. There is a 1 x 0.6 cm mass attached to a lead in the right atrium that demonstrates independent motion. The differential diagnosis includes a vegetation verses fibrous stranding. Given the clinical presentation of speticemia, a vegetation is favored.  -sent to [facility] 3/26-3/29  - felt to be too high risk for lead extraction recommended antibiotic treatment  -[facility] OPAT recommends ampicillin and ceftriaxone for 6 weeks through 05/08/2024 then will need lifelong suppression  -blood cultures positive on 03/23, 3/24 and 3/25 with Enterococcus faecalis sensitive to vancomycin and ampicillin, 3/26 blood cultures x2 sets negative; 3/30 blood culture x1 set no growth to date, pending final    2. NSTEMI    3. VERONA. Improved.    -Current creatinine clearance 73.3    4. HTN. On antihypertensives.     5. Gout involving right knee and right ankle.    -Completed 3 days of prednisone    6.  Debility.    -States had not been moving well since hospitalization because of gout involving both feet.  Pain is better.  PT/OT.    RECOMMENDATIONS:    Continue ampicillin 2 g IV but increase frequency to every 4 hours because of improved renal function  (for home therapy ampicillin can be on 2 g IV every 4 hours or 12 g via continuous infusion over 24 hours daily)  Continue ceftriaxone 2 g IV every 12 hours  Anticipating the above antibiotics for 6 weeks through May 8th - See OPAT plan  Follow-up with Infectious Disease in 4-5 weeks  She will need to be oral amoxicillin suppression for life after completing IV antibiotic - start on May 9th  Anticipate repeating DELORIS around end of treatment  Peridex mouthwash twice daily  Increase activity as tolerated  Monitor renal function  Follow  infectious/inflammatory markers      D/W Dr Walton    Please send [employer] secure chat with any questions.      Review of patient's allergies indicates:   Allergen Reactions    Metformin Diarrhea     Diarrhea      Corn Itching     Other reaction(s): Sneezing  Other reaction(s): Rhinorrhea    Potato starch Hives     Other reaction(s): Sneezing  Other reaction(s): Rhinorrhea    Pravastatin Other (See Comments)     Muscle pain    Statins-hmg-coa reductase inhibitors Other (See Comments)    Hydrochlorothiazide Other (See Comments)     weakness    Welchol [colesevelam] Other (See Comments)     Weakness      Scheduled Meds:   ampicillin IV (PEDS and ADULTS)  2 g Intravenous Q4H    cefTRIAXone (Rocephin) IV (PEDS and ADULTS)  2 g Intravenous Q12H    chlorhexidine  15 mL Mouth/Throat BID    enoxparin  40 mg Subcutaneous Daily    insulin detemir U-100  20 Units Subcutaneous Daily    mupirocin   Nasal BID    senna-docusate 8.6-50 mg  1 tablet Oral Daily     Continuous Infusions:  PRN Meds:.acetaminophen, acetaminophen, albuterol sulfate, aluminum-magnesium hydroxide-simethicone, dextrose 50%, dextrose 50%, glucagon (human recombinant), glucose, glucose, HYDROcodone-acetaminophen, insulin aspart U-100, magnesium oxide, magnesium oxide, melatonin, naloxone, ondansetron, potassium bicarbonate, potassium bicarbonate, potassium bicarbonate, potassium, sodium phosphates, potassium, sodium phosphates, potassium, sodium phosphates, sodium chloride 0.9%     Review of Systems    Negative unless stated above in interval history       OBJECTIVE     Temp:  [97.3 °F (36.3 °C)-98.6 °F (37 °C)] 97.3 °F (36.3 °C)  Pulse:  [76-87] 76  Resp:  [16-20] 16  SpO2:  [95 %-99 %] 99 %  BP: (165-168)/(82-88) 165/82       Physical Exam    General:  Propped up in bed awake and alert, no distress.  Eyes: Eyes with no icterus or injection. Vision grossly normal.  Neck: Supple, no tenderness to palpation.  Cardiovascular: Regular rate and rhythm, +murmur, +  lower extremity edema.  Respiratory:  Clear to auscultation upper lobes, no wheezing, no tachypnea, on nasal cannula O2, diminished lateral and bases.  Gastrointestinal:  Soft with active bowel sounds, no tenderness to palpation, no distention.  Musculoskeletal:  Moves all extremities.  2+ lower extremity edema, mild erythema to both feet, Mild tenderness to bilateral ankles and right knee  Skin: Pale, warm and dry, no obvious rashes.    Neuro: Oriented to person and place,  conversant, follows commands.  Psych: Good mood, normal affect.   VAD:  Right arm PICC  ISOLATION: None      Wounds: None    Significant Labs: All pertinent labs within the past 24 hours have been reviewed.    CBC LAST 7  Recent Labs   Lab 03/27/24  0529 03/28/24  0413 03/29/24  0217 03/30/24  0418 03/31/24  0506 04/01/24  0438   WBC 10.11 15.05* 13.11* 10.65 10.05 8.95   RBC 4.34 4.32 3.91* 3.91* 4.07 3.90*   HGB 13.0 13.1 11.9* 11.8* 12.1 11.7*   HCT 40.1 40.1 37.0 37.4 39.1 38.1   MCV 92 93 95 96 96 98   MCH 30.0 30.3 30.4 30.2 29.7 30.0   MCHC 32.4 32.7 32.2 31.6* 30.9* 30.7*   RDW 13.2 13.2 13.2 13.4 13.2 13.3    274 321 361 371 384   MPV 10.6 10.2 10.0 9.8 9.6 9.4   GRAN 76.9*  7.8* 79.7*  12.0* 74.8*  9.8* 69.6  7.4 73.4*  7.4 72.5  6.5   LYMPH 9.4*  1.0 7.9*  1.2 10.4*  1.4 11.5*  1.2 9.7*  1.0 12.8*  1.2   MONO 11.1  1.1* 10.0  1.5* 11.8  1.6* 12.2  1.3* 9.5  1.0 7.7  0.7   BASO 0.04 0.11 0.07 0.07 0.07 0.03   NRBC 0 0 0 0 0 0         CHEMISTRY LAST 7  Recent Labs   Lab 03/27/24  0529 03/28/24  0413 03/29/24  0217 03/30/24  0418 03/31/24  0506 04/01/24  0438    139 141 139 139 141   K 3.6 4.0 3.4* 3.9 4.4 4.4   CL 94* 91* 90* 92* 95 96   CO2 38* 36* 39* 39* 38* 38*   ANIONGAP 8 12 12 8 6* 7*   BUN 35* 23 24* 29* 27* 27*   CREATININE 0.9 0.9 1.0 0.9 0.8 0.7   * 182* 159* 169* 171* 126*   CALCIUM 9.6 9.8 9.5 9.1 9.3 9.4   MG 1.7 1.5* 1.8 1.6 1.7 1.8   ALBUMIN 2.2* 2.1* 1.9* 2.9* 2.9* 2.9*   PROT 6.2  "6.5 6.4 6.7 6.7 6.7   ALKPHOS 70 115 80 77 77 71   ALT 18 21 22 27 20 18   AST 31 42* 37 42* 27 18   BILITOT 0.5 0.7 0.4 0.4 0.3 0.3         Estimated Creatinine Clearance: 73.3 mL/min (based on SCr of 0.7 mg/dL).    INFLAMMATORY/PROCAL  LAST 7  Recent Labs   Lab 04/02/24  0504   PROCAL 0.173   CRP 4.60*       No results found for: "ESR"  CRP   Date Value Ref Range Status   04/02/2024 4.60 (H) <1.00 mg/dL Final     Comment:     CRP-Normal Application expected values:   <1.0        mg/dL   Normal Range  1.0 - 5.0  mg/dL   Indicates mild inflammation  5.0 - 10.0 mg/dL   Indicates severe inflammation  >10.0        mg/dL   Represents serious processes and   frequently         indicates the presence of a bacterial   infection.      03/24/2024 278.8 (H) 0.0 - 8.2 mg/L Final   05/04/2010 4.9 0.1 - 8.2 mg/L Final       PRIOR  MICROBIOLOGY:  Reviewed  Susceptibility data from last 90 days.  Collected Specimen Info Organism Ampicillin Genatmicin Synergy Screen Vancomycin   03/26/24 Blood No growth after 5 days.      03/26/24 Blood No growth after 5 days.      03/25/24 Blood from Peripheral, Hand, Left No growth after 5 days.      03/25/24 Blood from Peripheral, Hand, Right Enterococcus faecalis      03/24/24 Blood Enterococcus faecalis      03/24/24 Blood Enterococcus faecalis      03/23/24 Blood Enterococcus faecalis  S  S  S   03/23/24 Blood from Peripheral, Antecubital, Left Enterococcus faecalis              LAST 7 DAYS MICROBIOLOGY   Microbiology Results (last 7 days)       Procedure Component Value Units Date/Time    Blood culture [[id number]] Collected: 03/30/24 0935    Order Status: Completed Specimen: Blood Updated: 04/02/24 1032     Blood Culture, Routine No Growth to date      No Growth to date      No Growth to date      No Growth to date          CURRENT/PREVIOUS VISIT EKG  Results for orders placed or performed during the hospital encounter of 03/22/24   EKG 12-lead    Collection Time: 03/22/24  4:27 PM   Result " Value Ref Range    QRS Duration 168 ms    [facility] QTC Calculation 528 ms    Narrative    Test Reason : R07.9,    Vent. Rate : 090 BPM     Atrial Rate : 090 BPM     P-R Int : 220 ms          QRS Dur : 168 ms      QT Int : 432 ms       P-R-T Axes : 015 -63 090 degrees     QTc Int : 528 ms    Atrial-sensed ventricular-paced rhythm with prolonged AV conduction  Abnormal ECG  When compared with ECG of 10-SEP-2022 23:53,  Vent. rate has increased BY  30 BPM    Referred By: DANISH HENDERSON           Confirmed By:        Echocardiography Findings 3/25/24    Left Ventricle The left ventricle is normal in size. Mildly increased wall thickness. Normal wall motion. There is normal systolic function with a visually estimated ejection fraction of 60 - 65%.   Right Ventricle Right ventricular enlargement. Systolic function is normal.   Left Atrium Normal left atrial size. Agitated saline study of the atrial septum is negative, suggesting absence of intracardiac shunt at the atrial level. There is no thrombus in the left atrial appendage.   Right Atrium Right atrium is dilated. Multiple leads viewed in the right atrium. There is a 1 x 0.6 cm mass attached to a lead in the right atrium that demonstrates independent motion. The differential diagnosis includes a vegetation verses fibrous stranding. Given the clinical presentation of speticemia, a vegetation is favored.   Aortic Valve There is a bioprosthetic valve in the aortic position that is well-seated. There is normal leaflet mobility. There is no mass/vegetation present. There is mild aortic regurgitation.   Mitral Valve Mildly thickened leaflets. There is moderate mitral annular calcification present. Mildly calcified subvalvular apparatus. There is normal leaflet mobility. There is no mass/vegetation present. The mean pressure gradient across the mitral valve is 5 mmHg at a heart rate of  bpm. There is mild to moderate regurgitation.   Tricuspid Valve The tricuspid valve is  structurally normal. There is no mass/vegetation present. There is normal leaflet mobility. There is mild to moderate regurgitation.   Pulmonic Valve Not well visualized due to poor acoustic window. The pulmonic valve is structurally normal. There is trace regurgitation. There is no mass/vegetation present.   Ascending Aorta Ascending aorta is normal. Calcified atherosclerosis.   Pericardium and Other Findings There is no pericardial effusion.        Significant Imaging: I have reviewed all relevant and available imaging results/findings within the past 24 hours.    No recent diagnostics    Lolly Love NP  Date of Service: 04/02/2024

## 2024-04-03 ENCOUNTER — PATIENT OUTREACH (OUTPATIENT)
Dept: ADMINISTRATIVE | Facility: CLINIC | Age: 74
End: 2024-04-03
Payer: MEDICARE

## 2024-04-03 LAB
GLUCOSE SERPL-MCNC: 127 MG/DL (ref 70–110)
GLUCOSE SERPL-MCNC: 159 MG/DL (ref 70–110)
GLUCOSE SERPL-MCNC: 187 MG/DL (ref 70–110)
GLUCOSE SERPL-MCNC: 221 MG/DL (ref 70–110)

## 2024-04-03 PROCEDURE — 25000003 PHARM REV CODE 250: Performed by: INTERNAL MEDICINE

## 2024-04-03 PROCEDURE — 63600175 PHARM REV CODE 636 W HCPCS: Mod: UD | Performed by: INTERNAL MEDICINE

## 2024-04-03 PROCEDURE — 12000002 HC ACUTE/MED SURGE SEMI-PRIVATE ROOM

## 2024-04-03 PROCEDURE — 25000003 PHARM REV CODE 250: Performed by: NURSE PRACTITIONER

## 2024-04-03 PROCEDURE — 27000221 HC OXYGEN, UP TO 24 HOURS

## 2024-04-03 PROCEDURE — 97530 THERAPEUTIC ACTIVITIES: CPT | Mod: CQ

## 2024-04-03 PROCEDURE — 94640 AIRWAY INHALATION TREATMENT: CPT

## 2024-04-03 PROCEDURE — 63600175 PHARM REV CODE 636 W HCPCS: Performed by: NURSE PRACTITIONER

## 2024-04-03 PROCEDURE — 99900031 HC PATIENT EDUCATION (STAT)

## 2024-04-03 PROCEDURE — 97535 SELF CARE MNGMENT TRAINING: CPT

## 2024-04-03 PROCEDURE — 99232 SBSQ HOSP IP/OBS MODERATE 35: CPT | Mod: FS,,, | Performed by: NURSE PRACTITIONER

## 2024-04-03 PROCEDURE — 94760 N-INVAS EAR/PLS OXIMETRY 1: CPT

## 2024-04-03 PROCEDURE — 25000242 PHARM REV CODE 250 ALT 637 W/ HCPCS: Performed by: INTERNAL MEDICINE

## 2024-04-03 PROCEDURE — 94761 N-INVAS EAR/PLS OXIMETRY MLT: CPT

## 2024-04-03 RX ADMIN — AMPICILLIN 2 G: 2 INJECTION, POWDER, FOR SOLUTION INTRAVENOUS at 06:04

## 2024-04-03 RX ADMIN — CEFTRIAXONE SODIUM 2 G: 2 INJECTION, POWDER, FOR SOLUTION INTRAMUSCULAR; INTRAVENOUS at 12:04

## 2024-04-03 RX ADMIN — AMLODIPINE BESYLATE 10 MG: 5 TABLET ORAL at 09:04

## 2024-04-03 RX ADMIN — LEVOTHYROXINE SODIUM 150 MCG: 0.1 TABLET ORAL at 06:04

## 2024-04-03 RX ADMIN — ALBUTEROL SULFATE 2.5 MG: 2.5 SOLUTION RESPIRATORY (INHALATION) at 12:04

## 2024-04-03 RX ADMIN — ALBUTEROL SULFATE 2.5 MG: 2.5 SOLUTION RESPIRATORY (INHALATION) at 08:04

## 2024-04-03 RX ADMIN — MUPIROCIN 1 G: 20 OINTMENT TOPICAL at 09:04

## 2024-04-03 RX ADMIN — INSULIN DETEMIR 20 UNITS: 100 INJECTION, SOLUTION SUBCUTANEOUS at 09:04

## 2024-04-03 RX ADMIN — SENNOSIDES AND DOCUSATE SODIUM 1 TABLET: 8.6; 5 TABLET ORAL at 09:04

## 2024-04-03 RX ADMIN — TORSEMIDE 20 MG: 20 TABLET ORAL at 09:04

## 2024-04-03 RX ADMIN — Medication 6 MG: at 09:04

## 2024-04-03 RX ADMIN — ENOXAPARIN SODIUM 40 MG: 40 INJECTION SUBCUTANEOUS at 04:04

## 2024-04-03 RX ADMIN — HYDROCODONE BITARTRATE AND ACETAMINOPHEN 2 TABLET: 5; 325 TABLET ORAL at 09:04

## 2024-04-03 RX ADMIN — AMPICILLIN 2 G: 2 INJECTION, POWDER, FOR SOLUTION INTRAVENOUS at 02:04

## 2024-04-03 RX ADMIN — AMPICILLIN 2 G: 2 INJECTION, POWDER, FOR SOLUTION INTRAVENOUS at 09:04

## 2024-04-03 RX ADMIN — AMPICILLIN 2 G: 2 INJECTION, POWDER, FOR SOLUTION INTRAVENOUS at 11:04

## 2024-04-03 RX ADMIN — ALBUTEROL SULFATE 2.5 MG: 2.5 SOLUTION RESPIRATORY (INHALATION) at 07:04

## 2024-04-03 RX ADMIN — CHLORHEXIDINE GLUCONATE 15 ML: 1.2 RINSE ORAL at 09:04

## 2024-04-03 NOTE — CARE UPDATE
04/03/24 0732   Patient Assessment/Suction   Level of Consciousness (AVPU) alert   Respiratory Effort Normal;Unlabored   Expansion/Accessory Muscles/Retractions no use of accessory muscles;no retractions;expansion symmetric   All Lung Fields Breath Sounds Anterior:   FABRICIO Breath Sounds diminished   RUL Breath Sounds diminished   Rhythm/Pattern, Respiratory unlabored;depth regular;pattern regular   Cough Frequency no cough   PRE-TX-O2   Device (Oxygen Therapy) high flow nasal cannula w/device   $ Is the patient on Low Flow Oxygen? Yes   Flow (L/min) 2   SpO2 97 %   Pulse Oximetry Type Intermittent   $ Pulse Oximetry - Multiple Charge Pulse Oximetry - Multiple   Pulse 78   Resp 19   Aerosol Therapy   $ Aerosol Therapy Charges Aerosol Treatment   Daily Review of Necessity (SVN) completed   Respiratory Treatment Status (SVN) given   Treatment Route (SVN) mask;oxygen   Patient Position (SVN) HOB elevated   Post Treatment Assessment (SVN) breath sounds unchanged   Signs of Intolerance (SVN) none   Breath Sounds Post-Respiratory Treatment   Throughout All Fields Post-Treatment All Fields   Throughout All Fields Post-Treatment no change   Post-treatment Heart Rate (beats/min) 80   Post-treatment Resp Rate (breaths/min) 18   Education   $ Education Bronchodilator;15 min

## 2024-04-03 NOTE — SUBJECTIVE & OBJECTIVE
Interval History:   Patient denied any problem or concern.  On IV antibitoics per ID.  Patient is waiting for placement, case management on board.    Review of Systems   Constitutional:  Negative for fever.   HENT:  Negative for congestion.    Respiratory:  Negative for shortness of breath.    Cardiovascular:  Negative for chest pain.   Gastrointestinal:  Negative for abdominal pain.   Genitourinary:  Negative for dysuria.   Neurological:  Negative for syncope.   Psychiatric/Behavioral:  Negative for agitation.      Objective:     Vital Signs (Most Recent):  Temp: 97.6 °F (36.4 °C) (04/03/24 1100)  Pulse: 93 (04/03/24 1242)  Resp: 18 (04/03/24 1242)  BP: (!) 155/71 (notified nurse) (04/03/24 1100)  SpO2: (!) 91 % (04/03/24 1242) Vital Signs (24h Range):  Temp:  [97.6 °F (36.4 °C)-98 °F (36.7 °C)] 97.6 °F (36.4 °C)  Pulse:  [77-97] 93  Resp:  [16-20] 18  SpO2:  [91 %-99 %] 91 %  BP: (130-175)/(71-89) 155/71     Weight: 87 kg (191 lb 12.8 oz)  Body mass index is 35.08 kg/m².    Intake/Output Summary (Last 24 hours) at 4/3/2024 1504  Last data filed at 4/3/2024 1324  Gross per 24 hour   Intake 900 ml   Output 1200 ml   Net -300 ml           Physical Exam  Vitals and nursing note reviewed.   HENT:      Head: Normocephalic and atraumatic.   Eyes:      Conjunctiva/sclera: Conjunctivae normal.   Neck:      Vascular: No JVD.   Cardiovascular:      Rate and Rhythm: Normal rate.      Heart sounds: Normal heart sounds. Bilateral lower extremity swelling  Pulmonary:      Effort: Pulmonary effort is normal.      Breath sounds: Normal breath sounds. No wheezing.   Abdominal:      General: Abdomen is flat.      Palpations: Abdomen is soft.   Musculoskeletal:         General: Normal range of motion.   Skin:     General: Skin is warm.   Neurological:      Mental Status: She is alert and oriented to person, place, and time.   Psychiatric:         Mood and Affect: Mood normal.         Significant Labs: All pertinent labs within the  past 24 hours have been reviewed.        Significant Imaging: I have reviewed all pertinent imaging results/findings within the past 24 hours.

## 2024-04-03 NOTE — PT/OT/SLP PROGRESS
"Occupational Therapy   Treatment    Name: Tereza Larose  MRN: [id number]  Admitting Diagnosis:  <principal problem not specified>       Recommendations:     Discharge Recommendations: Moderate Intensity Therapy  Discharge Equipment Recommendations:  to be determined by next level of care  Barriers to discharge:       Assessment:     Tereza Larose is a 73 y.o. female with a medical diagnosis of <principal problem not specified>.  Performance deficits affecting function are weakness, impaired endurance, impaired self care skills, impaired functional mobility, gait instability, decreased lower extremity function, impaired cardiopulmonary response to activity, decreased safety awareness. Patient reports she needs to get stronger before she can return home.     Rehab Prognosis:  Fair; patient would benefit from acute skilled OT services to address these deficits and reach maximum level of function.       Plan:     Patient to be seen 5 x/week to address the above listed problems via self-care/home management, therapeutic activities, therapeutic exercises  Plan of Care Expires: 04/29/24  Plan of Care Reviewed with: patient    Subjective     Chief Complaint: "legs are weak and ankles are stiff"  Patient/Family Comments/goals: return home at some point   Pain/Comfort:  Location - Side 1: Right  Location 1: knee  Pain Addressed 1: Cessation of Activity, Distraction, Pre-medicate for activity  Pain Rating Post-Intervention 1: 0/10    Objective:     Communicated with: nurse prior to session.  Patient found HOB elevated with bed alarm, peripheral IV, telemetry, oxygen upon OT entry to room.    General Precautions: Standard, fall    Orthopedic Precautions:N/A  Braces: N/A  Respiratory Status: Room air     Occupational Performance:     Bed Mobility:    Patient completed Rolling/Turning to Left with  maximal assistance  Patient completed Rolling/Turning to Right with maximal assistance     Activities of Daily Living:  Grooming: " stand by assistance/setup for washing face and oral care with head of bed elevated  Lower Body Dressing: Total A for donning socks     Treatment & Education:  Patient was educated on the importance of activity each day, positioning, equipment needs and discharge planning and reviewed use of call bell for assistance.     Patient left HOB elevated with all lines intact, call button in reach, and bed alarm on    GOALS:   Multidisciplinary Problems       Occupational Therapy Goals          Problem: Occupational Therapy    Goal Priority Disciplines Outcome Interventions   Occupational Therapy Goal     OT, PT/OT Ongoing, Progressing    Description: Goals to be met by: 4/29/2024     Patient will increase functional independence with ADLs by performing:    LE Dressing with Contact Guard Assistance and Assistive Devices as needed.  Grooming while seated at sink with Supervision.  Toileting from bedside commode with Contact Guard Assistance for hygiene and clothing management.   Supine to sit with Contact Guard Assistance.  Toilet transfer to bedside commode with Minimal Assistance.  Upper extremity exercise program, with supervision.                         Time Tracking:     OT Date of Treatment: 04/03/24  OT Start Time: 0942  OT Stop Time: 0951  OT Total Time (min): 9 min    Billable Minutes:Self Care/Home Management 9    OT/CASTRO: OT          4/3/2024

## 2024-04-03 NOTE — PT/OT/SLP PROGRESS
"Physical Therapy Treatment    Patient Name:  Tereza Larose   MRN:  [id number]    Recommendations:     Discharge Recommendations:    Discharge Equipment Recommendations: to be determined by next level of care  Barriers to discharge: Decreased caregiver support    Assessment:     Tereza Larose is a 73 y.o. female admitted with a medical diagnosis of Bacteremia due to Enterococcus.  She presents with the following impairments/functional limitations: weakness, impaired endurance, impaired self care skills, impaired functional mobility, gait instability, impaired balance, decreased lower extremity function, impaired cardiopulmonary response to activity, edema.    Requested no transfer to chair today. Lunch tray arriving.     Pt performed static and dynamic sit balance with varying UE support at EOB x 16 minutes for cutting of food and PO intake of lunch tray. Assist ranged from SBA to modA with increasing fatigue.     Performed supine scooting toward HOB with VC's and SBA. Good participation and tolerance.     Rehab Prognosis: Good; patient would benefit from acute skilled PT services to address these deficits and reach maximum level of function.    Recent Surgery: * No surgery found *      Plan:     During this hospitalization, patient to be seen 5 x/week to address the identified rehab impairments via gait training, therapeutic activities, therapeutic exercises and progress toward the following goals:    Plan of Care Expires:  05/01/24    Subjective     Chief Complaint: "I don't want to get back in the chair today"  Patient/Family Comments/goals: eat lunch  Pain/Comfort:  Pain Rating 1: 0/10  Pain Rating Post-Intervention 1: 0/10      Objective:     Communicated with nurse prior to session.  Patient found HOB elevated with bed alarm, peripheral IV, telemetry, oxygen upon PT entry to room.     General Precautions: Standard, fall  Orthopedic Precautions: N/A  Braces: N/A  Respiratory Status: Nasal cannula, flow 3 " L/min     Functional Mobility:  Bed Mobility:     Scooting: supine scoot toward HOB: SBA, VC, legs in bridging and BUE assist at bedrails   Supine to Sit: moderate assistance  Sit to Supine: minimum assistance  Sitting Balance:           SBA with 1-2 UE support on tray table          Min-modA (increasing with fatigue) for 0UE support while using BUEs to cut food and eat      AM-PAC 6 CLICK MOBILITY          Treatment & Education:  -Pt educ: benefits of participation with PT, benefits of upright sit for PO intake, call light, pressure relief, fall prevention  -Sitting balance and endurance training    Patient left HOB elevated with all lines intact, call button in reach, bed alarm on, and nurse notified..    GOALS:   Multidisciplinary Problems       Physical Therapy Goals          Problem: Physical Therapy    Goal Priority Disciplines Outcome Goal Variances Interventions   Physical Therapy Goal     PT, PT/OT      Description: Goals to be met by: 2024     Patient will increase functional independence with mobility by performin. Supine to sit with MInimal Assistance  2. Sit to stand transfer with Minimal Assistance  3. Gait  x 50  feet with Minimal Assistance using Rolling Walker.                          Time Tracking:     PT Received On: 24  PT Start Time: 1210     PT Stop Time: 1237  PT Total Time (min): 27 min     Billable Minutes: Therapeutic Activity 27    Treatment Type: Treatment  PT/PTA: PTA     Number of PTA visits since last PT visit: 2     2024

## 2024-04-03 NOTE — ASSESSMENT & PLAN NOTE
Results for orders placed during the hospital encounter of 03/22/24    Echo    Interpretation Summary    Left Ventricle: The left ventricle is normal in size. Normal wall thickness. Normal wall motion. Septal motion is consistent with pacing. There is normal systolic function with a visually estimated ejection fraction of 60 - 65%. There is normal diastolic function.    Right Ventricle: Normal right ventricular cavity size. Wall thickness is normal. Right ventricle wall motion  is normal. Systolic function is normal. Pacemaker lead present in the ventricle.    Right Atrium: Multiple leads present in the right atrium.    Mitral Valve: There is severe bileaflet sclerosis. There is severe anterior mitral annular calcification present. There is normal leaflet mobility. There is mild to moderate regurgitation.    Tricuspid Valve: There is moderate regurgitation with a centrally directed jet.    Pulmonary Artery: There is moderate pulmonary hypertension. The estimated pulmonary artery systolic pressure is 63 mmHg.    IVC/SVC: Elevated venous pressure at 15 mmHg.    Resume home torsemide

## 2024-04-03 NOTE — PROGRESS NOTES
[facility] Medicine  Progress Note    Patient Name: Tereza Larose  MRN: [id number]  Patient Class: IP- Inpatient   Admission Date: 3/29/2024  Length of Stay: 5 days  Attending Physician: Chiki Baker MD  Primary Care Provider: Evan Sánchez MD        Subjective:     Principal Problem:Bacteremia due to Enterococcus        HPI:  HPI:   Ms. Larose is a 72 yo F with CAD, HFpEF, pacemaker in place, HTN, HLD, DMII, COPD who originally presented to OSH after a fall, later was found to have NSTEMI and subsequently had VERONA and Enterococcus bacteremia. Cardiology and ID were consulted, she was started on IV CTX and ampicillin. DELORIS showed small mobile mass located on a right atrial lead. Proximal measurements are 1 cm x 0.5 cm. Case discussed with EP and Interventional Cardiology who will consult on the patient. Currently hemodynamically stable on 3L NC. Denies headaches, visual changes, SOB, cough, chest pain, palpitation, nausea, vomiting, abdominal pain, diarrhea, constipation, urinary frequency or any weakness. AT [facility] the pt was restarted on CTX and ampicillin, ID consulted.        Overview/Hospital Course:    Pt admitted to hospital medicine for VERONA and enterococcus bacteremia. Pt started on ceftriaxone and ampicillin. ID consulted.Blood cultures growing enterococcus faecalis.  EP and interventional cardiology consulted for pacemaker removal, however patient is not a candidate due to her severe pulmonary hypertension. PICC placed. Patient to have 6 weeks of Ampicillin 2g IV q 6 hours Ceftriaxone 2g IV q 12 hours  followed by PO antibiotic suppression. Concern for gout flare and started treatment. On allopurinol at home. Patient transferred to [facility] to continue treatment from [facility] and later continued same antibiotics . ID [facility] reviewed again and agree with treatment and repeat blood culture sent . Patient had severe deconditioning and requested for SNF /LTAC placement,  CM on board.     Interval History:   Patient denied any problem or concern.  On IV antibitoics per ID.  Patient is waiting for placement, case management on board.    Review of Systems   Constitutional:  Negative for fever.   HENT:  Negative for congestion.    Respiratory:  Negative for shortness of breath.    Cardiovascular:  Negative for chest pain.   Gastrointestinal:  Negative for abdominal pain.   Genitourinary:  Negative for dysuria.   Neurological:  Negative for syncope.   Psychiatric/Behavioral:  Negative for agitation.      Objective:     Vital Signs (Most Recent):  Temp: 97.6 °F (36.4 °C) (04/03/24 1100)  Pulse: 93 (04/03/24 1242)  Resp: 18 (04/03/24 1242)  BP: (!) 155/71 (notified nurse) (04/03/24 1100)  SpO2: (!) 91 % (04/03/24 1242) Vital Signs (24h Range):  Temp:  [97.6 °F (36.4 °C)-98 °F (36.7 °C)] 97.6 °F (36.4 °C)  Pulse:  [77-97] 93  Resp:  [16-20] 18  SpO2:  [91 %-99 %] 91 %  BP: (130-175)/(71-89) 155/71     Weight: 87 kg (191 lb 12.8 oz)  Body mass index is 35.08 kg/m².    Intake/Output Summary (Last 24 hours) at 4/3/2024 1504  Last data filed at 4/3/2024 1324  Gross per 24 hour   Intake 900 ml   Output 1200 ml   Net -300 ml           Physical Exam  Vitals and nursing note reviewed.   HENT:      Head: Normocephalic and atraumatic.   Eyes:      Conjunctiva/sclera: Conjunctivae normal.   Neck:      Vascular: No JVD.   Cardiovascular:      Rate and Rhythm: Normal rate.      Heart sounds: Normal heart sounds. Bilateral lower extremity swelling  Pulmonary:      Effort: Pulmonary effort is normal.      Breath sounds: Normal breath sounds. No wheezing.   Abdominal:      General: Abdomen is flat.      Palpations: Abdomen is soft.   Musculoskeletal:         General: Normal range of motion.   Skin:     General: Skin is warm.   Neurological:      Mental Status: She is alert and oriented to person, place, and time.   Psychiatric:         Mood and Affect: Mood normal.         Significant Labs: All pertinent  labs within the past 24 hours have been reviewed.        Significant Imaging: I have reviewed all pertinent imaging results/findings within the past 24 hours.          Assessment/Plan:      * Bacteremia due to Enterococcus  On IV antibitoics per ID.    Patient is waiting for placement, case management on board.      Pacemaker infection  Unable to remove   - antibiotics as above      Acute bacterial endocarditis  - ID following for antibiotics: Rocephin and Ampicillin       Acute on chronic combined systolic and diastolic CHF (congestive heart failure)   Results for orders placed during the hospital encounter of 03/22/24    Echo    Interpretation Summary    Left Ventricle: The left ventricle is normal in size. Normal wall thickness. Normal wall motion. Septal motion is consistent with pacing. There is normal systolic function with a visually estimated ejection fraction of 60 - 65%. There is normal diastolic function.    Right Ventricle: Normal right ventricular cavity size. Wall thickness is normal. Right ventricle wall motion  is normal. Systolic function is normal. Pacemaker lead present in the ventricle.    Right Atrium: Multiple leads present in the right atrium.    Mitral Valve: There is severe bileaflet sclerosis. There is severe anterior mitral annular calcification present. There is normal leaflet mobility. There is mild to moderate regurgitation.    Tricuspid Valve: There is moderate regurgitation with a centrally directed jet.    Pulmonary Artery: There is moderate pulmonary hypertension. The estimated pulmonary artery systolic pressure is 63 mmHg.    IVC/SVC: Elevated venous pressure at 15 mmHg.    Resume home torsemide     T2DM (type 2 diabetes mellitus)    Last A1c reviewed-   Lab Results   Component Value Date    HGBA1C 7.9 (H) 03/22/2024       Antihyperglycemics (From admission, onward)      Start     Stop Route Frequency Ordered    03/31/24 0998  insulin aspart U-100 pen 0-10 Units         -- SubQ  Every 6 hours PRN 03/31/24 2045 03/30/24 0900  insulin detemir U-100 (Levemir) pen 20 Units         -- SubQ Daily 03/29/24 2328          Hold Oral hypoglycemics while patient is in the hospital.    HTN (hypertension)  Resume Norvasc and torsemide   Bp stable     BRIJESH (obstructive sleep apnea)  CPAP per home settings       Hyperlipidemia associated with type 2 diabetes mellitus  Resume home meds        VTE Risk Mitigation (From admission, onward)           Ordered     enoxaparin injection 40 mg  Daily         03/29/24 2328     IP VTE HIGH RISK PATIENT  Once         03/29/24 2328     Place sequential compression device  Until discontinued         03/29/24 2328                    Discharge Planning   FAVIOLA: 4/4/2024     Code Status: Partial Code   Is the patient medically ready for discharge?:     Reason for patient still in hospital (select all that apply): Pending disposition  Discharge Plan A: Long-term acute care facility (LTAC)   Discharge Delays: None known at this time              Chiki Baker MD  Department of Hospital Medicine   [facility]

## 2024-04-03 NOTE — PLAN OF CARE
Per note, ID recommendations of 6 weeks IV abx q4 hour or continuous over 24 hours. SNFs unable to accommodate. LTAC referral sent to [facility] in [place] per Pt choice. SW called Rosalia ([facility], [phone]) to discuss referral. Rosalia to come to hospital to meet with Pt at bedside. ARJUN to continue to follow        04/03/24 0930   Post-Acute Status   Post-Acute Authorization Placement   Post-Acute Placement Status Referrals Sent   Coverage [facility] PPO   Discharge Plan   Discharge Plan A Long-term acute care facility (LTAC)   Discharge Plan B Skilled Nursing Facility

## 2024-04-03 NOTE — PROGRESS NOTES
[facility]   Department of Infectious Disease  Progress Note        PATIENT NAME: Tereza Larose  MRN: [id number]  TODAY'S DATE: 04/03/2024  ADMIT DATE: 3/29/2024    CHIEF COMPLAINT: No chief complaint on file.    PRINCIPLE PROBLEM: <principal problem not specified>    REASON FOR CONSULT:  Enterococcus bacteremia  INTERVAL HISTORY     4/3/24@1044 (Ivan):  Patient seen and examined alone.  She is very tired today and states she has not been sleeping well at night.  She was given torsemide this morning and swelling is slightly improved from yesterday though she said she still has not able to walk because of pain in her lower extremities.  She said her legs hurt from her knees down bilaterally when she tries to walk.  She gets short of breath with exertion, denies coughing, denies fevers or chills.  T-max 98° in last 24 hours.  No lab work today.    4/2/24@1104 (Ivan):  Patient seen and examined alone in her room.  She had a poor night's sleep because of noise and light.  She feels very edematous today and still has mild bilateral ankle and right knee pain.  She said it is much better but she still is not able to walk because of the pain in the swelling.  She denies fevers or chills, dysuria or chest pain.  She said they kept telling her she was having chest pain last night but she does not feel like she was.  She states she was wheezing last night and had to have a breathing treatment.  She continues on nasal cannula O2 which she uses at home as well.  T-max 98.6° in last 24 hours.  WBC 8.95, platelets 384. CRP 4.60, (normal reference range <1) decreased from 278.8 (normal reference range 278.8).  Procalcitonin 0.173 has normalized.  3/26 blood cultures x2 negative.    4/1/24@1042 (Ivan):  Patient seen and examined alone in her room.  She was sitting up in chair awake and alert.  She said she is feeling better today though is still quite weak.  She was on nasal cannula O2 and denies shortness of  breath or chest pain.  She denies fevers or chills, abdominal pain, dysuria in his voiding well with pure wick.  Last bowel movement was yesterday.  She states appetite is fair.  T-max 98.9° in the last 24 hours.  WBC 8.95, ESR 66, BUN/CR 27/0.7 much improved, ALT/AST 18/18, albumin 2.9, glucose 126.  Last .8 on 03/24.  Last procalcitonin 5.579.  She had blood cultures positive for Enterococcus faecalis on 03/23, 3/24 and 3/25.  3/26 blood cultures x2 sets are negative and 3/30 blood culture x1 set no growth to date.      ASSESSMENT and PLAN      1. 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   -Right atrium is dilated. Multiple leads viewed in the right atrium. There is a 1 x 0.6 cm mass attached to a lead in the right atrium that demonstrates independent motion. The differential diagnosis includes a vegetation verses fibrous stranding. Given the clinical presentation of speticemia, a vegetation is favored.  -sent to [facility] 3/26-3/29  - felt to be too high risk for lead extraction recommended antibiotic treatment  -[facility] OPAT recommends ampicillin and ceftriaxone for 6 weeks through 05/08/2024 then will need lifelong suppression  -blood cultures positive on 03/23, 3/24 and 3/25 with Enterococcus faecalis sensitive to vancomycin and ampicillin, 3/26 blood cultures x2 sets negative; 3/30 blood culture x1 set no growth to date, pending final    2. NSTEMI    3. VERONA. Improved.    -Current creatinine clearance 73.3    4. HTN. On antihypertensives.     5. Gout involving right knee and right ankle.    -Completed 3 days of prednisone    6.  Debility.    -States had not been moving well since hospitalization because of gout involving both feet.  Pain is better.  PT/OT.    RECOMMENDATIONS:    Continue ampicillin 2 g IV but increase frequency to every 4 hours because of improved renal function  (for home therapy  ampicillin can be on 2 g IV every 4 hours or 12 g via continuous infusion over 24 hours daily)  AND  Continue ceftriaxone 2 g IV every 12 hours    Anticipating the above antibiotics for 6 weeks through May 8th - See OPAT plan placed on 4/2  Follow-up with Infectious Disease in 4-5 weeks  She will need to be oral amoxicillin suppression for life after completing IV antibiotic - start on May 9th  Anticipate repeating DELORIS around end of treatment  Peridex mouthwash twice daily  Increase activity as tolerated  Monitor renal function  Follow infectious/inflammatory markers      D/W Dr Walton    Infectious Disease will follow peripherally.  Please send Epic secure chat with any questions.      Review of patient's allergies indicates:   Allergen Reactions    Metformin Diarrhea     Diarrhea      Corn Itching     Other reaction(s): Sneezing  Other reaction(s): Rhinorrhea    Potato starch Hives     Other reaction(s): Sneezing  Other reaction(s): Rhinorrhea    Pravastatin Other (See Comments)     Muscle pain    Statins-hmg-coa reductase inhibitors Other (See Comments)    Hydrochlorothiazide Other (See Comments)     weakness    Welchol [colesevelam] Other (See Comments)     Weakness      Scheduled Meds:   albuterol sulfate  2.5 mg Nebulization Q6H    amLODIPine  10 mg Oral Daily    ampicillin IV (PEDS and ADULTS)  2 g Intravenous Q4H    cefTRIAXone (Rocephin) IV (PEDS and ADULTS)  2 g Intravenous Q12H    chlorhexidine  15 mL Mouth/Throat BID    enoxparin  40 mg Subcutaneous Daily    insulin detemir U-100  20 Units Subcutaneous Daily    levothyroxine  150 mcg Oral Daily    senna-docusate 8.6-50 mg  1 tablet Oral Daily    torsemide  20 mg Oral Daily     Continuous Infusions:  PRN Meds:.acetaminophen, acetaminophen, aluminum-magnesium hydroxide-simethicone, dextrose 50%, dextrose 50%, glucagon (human recombinant), glucose, glucose, HYDROcodone-acetaminophen, insulin aspart U-100, magnesium oxide, magnesium oxide, melatonin,  naloxone, ondansetron, potassium bicarbonate, potassium bicarbonate, potassium bicarbonate, potassium, sodium phosphates, potassium, sodium phosphates, potassium, sodium phosphates, sodium chloride 0.9%     Review of Systems    Negative unless stated above in interval history       OBJECTIVE     Temp:  [97.6 °F (36.4 °C)-98 °F (36.7 °C)] 97.6 °F (36.4 °C)  Pulse:  [77-97] 88  Resp:  [16-20] 18  SpO2:  [93 %-99 %] 93 %  BP: (130-175)/(71-89) 155/71       Physical Exam    General:  Lying in bed, awakens easily to voice, no distress noted.  Eyes: Eyes with no icterus or injection. Vision grossly normal.  Neck: Supple, no tenderness to palpation.  Cardiovascular: Regular rate and rhythm, +murmur, improving lower extremity edema.  Respiratory:  Clear to auscultation upper lobes, no wheezing, no tachypnea, on nasal cannula O2, diminished lateral and bases.  Gastrointestinal:  Soft with active bowel sounds, no tenderness to palpation, no distention.  Musculoskeletal:  Moves all extremities.  2+ lower extremity edema, improved erythema but both feet are very swollen, improved tenderness to ankles and knee.  Pain is mostly with standing up.    Skin: Pale, warm and dry, no obvious rashes.    Neuro: Oriented to person and place,  conversant, follows commands.  Psych: Good mood, normal affect.   VAD:  Right arm PICC  ISOLATION: None      Wounds: None    Significant Labs: All pertinent labs within the past 24 hours have been reviewed.    CBC LAST 7  Recent Labs   Lab 03/28/24  0413 03/29/24  0217 03/30/24  0418 03/31/24  0506 04/01/24  0438   WBC 15.05* 13.11* 10.65 10.05 8.95   RBC 4.32 3.91* 3.91* 4.07 3.90*   HGB 13.1 11.9* 11.8* 12.1 11.7*   HCT 40.1 37.0 37.4 39.1 38.1   MCV 93 95 96 96 98   MCH 30.3 30.4 30.2 29.7 30.0   MCHC 32.7 32.2 31.6* 30.9* 30.7*   RDW 13.2 13.2 13.4 13.2 13.3    321 361 371 384   MPV 10.2 10.0 9.8 9.6 9.4   GRAN 79.7*  12.0* 74.8*  9.8* 69.6  7.4 73.4*  7.4 72.5  6.5   LYMPH 7.9*  1.2  "10.4*  1.4 11.5*  1.2 9.7*  1.0 12.8*  1.2   MONO 10.0  1.5* 11.8  1.6* 12.2  1.3* 9.5  1.0 7.7  0.7   BASO 0.11 0.07 0.07 0.07 0.03   NRBC 0 0 0 0 0         CHEMISTRY LAST 7  Recent Labs   Lab 03/28/24  0413 03/29/24  0217 03/30/24  0418 03/31/24  0506 04/01/24  0438    141 139 139 141   K 4.0 3.4* 3.9 4.4 4.4   CL 91* 90* 92* 95 96   CO2 36* 39* 39* 38* 38*   ANIONGAP 12 12 8 6* 7*   BUN 23 24* 29* 27* 27*   CREATININE 0.9 1.0 0.9 0.8 0.7   * 159* 169* 171* 126*   CALCIUM 9.8 9.5 9.1 9.3 9.4   MG 1.5* 1.8 1.6 1.7 1.8   ALBUMIN 2.1* 1.9* 2.9* 2.9* 2.9*   PROT 6.5 6.4 6.7 6.7 6.7   ALKPHOS 115 80 77 77 71   ALT 21 22 27 20 18   AST 42* 37 42* 27 18   BILITOT 0.7 0.4 0.4 0.3 0.3         Estimated Creatinine Clearance: 73.3 mL/min (based on SCr of 0.7 mg/dL).    INFLAMMATORY/PROCAL  LAST 7  Recent Labs   Lab 04/02/24  0504   PROCAL 0.173   CRP 4.60*       No results found for: "ESR"  CRP   Date Value Ref Range Status   04/02/2024 4.60 (H) <1.00 mg/dL Final     Comment:     CRP-Normal Application expected values:   <1.0        mg/dL   Normal Range  1.0 - 5.0  mg/dL   Indicates mild inflammation  5.0 - 10.0 mg/dL   Indicates severe inflammation  >10.0        mg/dL   Represents serious processes and   frequently         indicates the presence of a bacterial   infection.      03/24/2024 278.8 (H) 0.0 - 8.2 mg/L Final   05/04/2010 4.9 0.1 - 8.2 mg/L Final       PRIOR  MICROBIOLOGY:  Reviewed  Susceptibility data from last 90 days.  Collected Specimen Info Organism Ampicillin Genatmicin Synergy Screen Vancomycin   03/26/24 Blood No growth after 5 days.      03/26/24 Blood No growth after 5 days.      03/25/24 Blood from Peripheral, Hand, Left No growth after 5 days.      03/25/24 Blood from Peripheral, Hand, Right Enterococcus faecalis      03/24/24 Blood Enterococcus faecalis      03/24/24 Blood Enterococcus faecalis      03/23/24 Blood Enterococcus faecalis  S  S  S   03/23/24 Blood from Peripheral, " Antecubital, Left Enterococcus faecalis              LAST 7 DAYS MICROBIOLOGY   Microbiology Results (last 7 days)       Procedure Component Value Units Date/Time    Blood culture [[id number]] Collected: 03/30/24 0935    Order Status: Completed Specimen: Blood Updated: 04/03/24 1032     Blood Culture, Routine No Growth to date      No Growth to date      No Growth to date      No Growth to date      No Growth to date          CURRENT/PREVIOUS VISIT EKG  Results for orders placed or performed during the hospital encounter of 03/22/24   EKG 12-lead    Collection Time: 03/22/24  4:27 PM   Result Value Ref Range    QRS Duration 168 ms    [facility] QTC Calculation 528 ms    Narrative    Test Reason : R07.9,    Vent. Rate : 090 BPM     Atrial Rate : 090 BPM     P-R Int : 220 ms          QRS Dur : 168 ms      QT Int : 432 ms       P-R-T Axes : 015 -63 090 degrees     QTc Int : 528 ms    Atrial-sensed ventricular-paced rhythm with prolonged AV conduction  Abnormal ECG  When compared with ECG of 10-SEP-2022 23:53,  Vent. rate has increased BY  30 BPM    Referred By: DANISH HENDERSON           Confirmed By:        Echocardiography Findings 3/25/24    Left Ventricle The left ventricle is normal in size. Mildly increased wall thickness. Normal wall motion. There is normal systolic function with a visually estimated ejection fraction of 60 - 65%.   Right Ventricle Right ventricular enlargement. Systolic function is normal.   Left Atrium Normal left atrial size. Agitated saline study of the atrial septum is negative, suggesting absence of intracardiac shunt at the atrial level. There is no thrombus in the left atrial appendage.   Right Atrium Right atrium is dilated. Multiple leads viewed in the right atrium. There is a 1 x 0.6 cm mass attached to a lead in the right atrium that demonstrates independent motion. The differential diagnosis includes a vegetation verses fibrous stranding. Given the clinical presentation of speticemia, a  vegetation is favored.   Aortic Valve There is a bioprosthetic valve in the aortic position that is well-seated. There is normal leaflet mobility. There is no mass/vegetation present. There is mild aortic regurgitation.   Mitral Valve Mildly thickened leaflets. There is moderate mitral annular calcification present. Mildly calcified subvalvular apparatus. There is normal leaflet mobility. There is no mass/vegetation present. The mean pressure gradient across the mitral valve is 5 mmHg at a heart rate of  bpm. There is mild to moderate regurgitation.   Tricuspid Valve The tricuspid valve is structurally normal. There is no mass/vegetation present. There is normal leaflet mobility. There is mild to moderate regurgitation.   Pulmonic Valve Not well visualized due to poor acoustic window. The pulmonic valve is structurally normal. There is trace regurgitation. There is no mass/vegetation present.   Ascending Aorta Ascending aorta is normal. Calcified atherosclerosis.   Pericardium and Other Findings There is no pericardial effusion.        Significant Imaging: I have reviewed all relevant and available imaging results/findings within the past 24 hours.    No recent diagnostics    Lolly Love NP  Date of Service: 04/03/2024

## 2024-04-04 LAB
ANION GAP SERPL CALC-SCNC: 2 MMOL/L (ref 8–16)
BACTERIA BLD CULT: NORMAL
BASOPHILS # BLD AUTO: 0.04 K/UL (ref 0–0.2)
BASOPHILS NFR BLD: 0.5 % (ref 0–1.9)
BUN SERPL-MCNC: 19 MG/DL (ref 8–23)
CALCIUM SERPL-MCNC: 9.2 MG/DL (ref 8.7–10.5)
CHLORIDE SERPL-SCNC: 95 MMOL/L (ref 95–110)
CO2 SERPL-SCNC: 43 MMOL/L (ref 23–29)
CREAT SERPL-MCNC: 0.9 MG/DL (ref 0.5–1.4)
DIFFERENTIAL METHOD BLD: ABNORMAL
EOSINOPHIL # BLD AUTO: 0.3 K/UL (ref 0–0.5)
EOSINOPHIL NFR BLD: 3.8 % (ref 0–8)
ERYTHROCYTE [DISTWIDTH] IN BLOOD BY AUTOMATED COUNT: 13.3 % (ref 11.5–14.5)
EST. GFR  (NO RACE VARIABLE): >60 ML/MIN/1.73 M^2
GLUCOSE SERPL-MCNC: 101 MG/DL (ref 70–110)
GLUCOSE SERPL-MCNC: 130 MG/DL (ref 70–110)
GLUCOSE SERPL-MCNC: 147 MG/DL (ref 70–110)
GLUCOSE SERPL-MCNC: 156 MG/DL (ref 70–110)
GLUCOSE SERPL-MCNC: 170 MG/DL (ref 70–110)
HCT VFR BLD AUTO: 33 % (ref 37–48.5)
HGB BLD-MCNC: 10.4 G/DL (ref 12–16)
IMM GRANULOCYTES # BLD AUTO: 0.07 K/UL (ref 0–0.04)
IMM GRANULOCYTES NFR BLD AUTO: 0.8 % (ref 0–0.5)
LYMPHOCYTES # BLD AUTO: 1.1 K/UL (ref 1–4.8)
LYMPHOCYTES NFR BLD: 12.7 % (ref 18–48)
MAGNESIUM SERPL-MCNC: 1.6 MG/DL (ref 1.6–2.6)
MCH RBC QN AUTO: 30.8 PG (ref 27–31)
MCHC RBC AUTO-ENTMCNC: 31.5 G/DL (ref 32–36)
MCV RBC AUTO: 98 FL (ref 82–98)
MONOCYTES # BLD AUTO: 0.5 K/UL (ref 0.3–1)
MONOCYTES NFR BLD: 6.4 % (ref 4–15)
NEUTROPHILS # BLD AUTO: 6.4 K/UL (ref 1.8–7.7)
NEUTROPHILS NFR BLD: 75.8 % (ref 38–73)
NRBC BLD-RTO: 0 /100 WBC
PLATELET # BLD AUTO: 289 K/UL (ref 150–450)
PMV BLD AUTO: 9 FL (ref 9.2–12.9)
POTASSIUM SERPL-SCNC: 4.5 MMOL/L (ref 3.5–5.1)
RBC # BLD AUTO: 3.38 M/UL (ref 4–5.4)
SODIUM SERPL-SCNC: 140 MMOL/L (ref 136–145)
WBC # BLD AUTO: 8.4 K/UL (ref 3.9–12.7)

## 2024-04-04 PROCEDURE — 63600175 PHARM REV CODE 636 W HCPCS: Mod: UD | Performed by: INTERNAL MEDICINE

## 2024-04-04 PROCEDURE — 97110 THERAPEUTIC EXERCISES: CPT

## 2024-04-04 PROCEDURE — 94761 N-INVAS EAR/PLS OXIMETRY MLT: CPT

## 2024-04-04 PROCEDURE — 80048 BASIC METABOLIC PNL TOTAL CA: CPT | Performed by: INTERNAL MEDICINE

## 2024-04-04 PROCEDURE — 94640 AIRWAY INHALATION TREATMENT: CPT

## 2024-04-04 PROCEDURE — 25000003 PHARM REV CODE 250: Performed by: NURSE PRACTITIONER

## 2024-04-04 PROCEDURE — 25000242 PHARM REV CODE 250 ALT 637 W/ HCPCS: Performed by: INTERNAL MEDICINE

## 2024-04-04 PROCEDURE — 12000002 HC ACUTE/MED SURGE SEMI-PRIVATE ROOM

## 2024-04-04 PROCEDURE — 83735 ASSAY OF MAGNESIUM: CPT | Performed by: INTERNAL MEDICINE

## 2024-04-04 PROCEDURE — 97530 THERAPEUTIC ACTIVITIES: CPT | Mod: CQ

## 2024-04-04 PROCEDURE — 99900035 HC TECH TIME PER 15 MIN (STAT)

## 2024-04-04 PROCEDURE — 85025 COMPLETE CBC W/AUTO DIFF WBC: CPT | Performed by: INTERNAL MEDICINE

## 2024-04-04 PROCEDURE — 94760 N-INVAS EAR/PLS OXIMETRY 1: CPT

## 2024-04-04 PROCEDURE — 25000003 PHARM REV CODE 250: Performed by: INTERNAL MEDICINE

## 2024-04-04 PROCEDURE — 94799 UNLISTED PULMONARY SVC/PX: CPT

## 2024-04-04 PROCEDURE — 99900031 HC PATIENT EDUCATION (STAT)

## 2024-04-04 PROCEDURE — 63600175 PHARM REV CODE 636 W HCPCS: Performed by: NURSE PRACTITIONER

## 2024-04-04 PROCEDURE — 27000221 HC OXYGEN, UP TO 24 HOURS

## 2024-04-04 RX ORDER — MAGNESIUM SULFATE HEPTAHYDRATE 40 MG/ML
2 INJECTION, SOLUTION INTRAVENOUS ONCE
Status: COMPLETED | OUTPATIENT
Start: 2024-04-04 | End: 2024-04-04

## 2024-04-04 RX ADMIN — ALBUTEROL SULFATE 2.5 MG: 2.5 SOLUTION RESPIRATORY (INHALATION) at 01:04

## 2024-04-04 RX ADMIN — INSULIN DETEMIR 20 UNITS: 100 INJECTION, SOLUTION SUBCUTANEOUS at 09:04

## 2024-04-04 RX ADMIN — AMPICILLIN 2 G: 2 INJECTION, POWDER, FOR SOLUTION INTRAVENOUS at 05:04

## 2024-04-04 RX ADMIN — LEVOTHYROXINE SODIUM 150 MCG: 0.1 TABLET ORAL at 06:04

## 2024-04-04 RX ADMIN — CEFTRIAXONE SODIUM 2 G: 2 INJECTION, POWDER, FOR SOLUTION INTRAMUSCULAR; INTRAVENOUS at 12:04

## 2024-04-04 RX ADMIN — MAGNESIUM SULFATE HEPTAHYDRATE 2 G: 40 INJECTION, SOLUTION INTRAVENOUS at 09:04

## 2024-04-04 RX ADMIN — INSULIN ASPART 2 UNITS: 100 INJECTION, SOLUTION INTRAVENOUS; SUBCUTANEOUS at 12:04

## 2024-04-04 RX ADMIN — AMLODIPINE BESYLATE 10 MG: 5 TABLET ORAL at 09:04

## 2024-04-04 RX ADMIN — AMPICILLIN 2 G: 2 INJECTION, POWDER, FOR SOLUTION INTRAVENOUS at 11:04

## 2024-04-04 RX ADMIN — CHLORHEXIDINE GLUCONATE 15 ML: 1.2 RINSE ORAL at 09:04

## 2024-04-04 RX ADMIN — ALBUTEROL SULFATE 2.5 MG: 2.5 SOLUTION RESPIRATORY (INHALATION) at 07:04

## 2024-04-04 RX ADMIN — ENOXAPARIN SODIUM 40 MG: 40 INJECTION SUBCUTANEOUS at 05:04

## 2024-04-04 RX ADMIN — SENNOSIDES AND DOCUSATE SODIUM 1 TABLET: 8.6; 5 TABLET ORAL at 09:04

## 2024-04-04 RX ADMIN — HYDROCODONE BITARTRATE AND ACETAMINOPHEN 2 TABLET: 5; 325 TABLET ORAL at 09:04

## 2024-04-04 RX ADMIN — AMPICILLIN 2 G: 2 INJECTION, POWDER, FOR SOLUTION INTRAVENOUS at 03:04

## 2024-04-04 RX ADMIN — INSULIN ASPART 1 UNITS: 100 INJECTION, SOLUTION INTRAVENOUS; SUBCUTANEOUS at 09:04

## 2024-04-04 RX ADMIN — AMPICILLIN 2 G: 2 INJECTION, POWDER, FOR SOLUTION INTRAVENOUS at 09:04

## 2024-04-04 RX ADMIN — AMPICILLIN 2 G: 2 INJECTION, POWDER, FOR SOLUTION INTRAVENOUS at 02:04

## 2024-04-04 RX ADMIN — TORSEMIDE 20 MG: 20 TABLET ORAL at 09:04

## 2024-04-04 RX ADMIN — AMPICILLIN 2 G: 2 INJECTION, POWDER, FOR SOLUTION INTRAVENOUS at 06:04

## 2024-04-04 NOTE — PROGRESS NOTES
Formerly [facility] Medicine  Progress Note    Patient Name: Tereza Larose  MRN: [id number]  Patient Class: IP- Inpatient   Admission Date: 3/29/2024  Length of Stay: 6 days  Attending Physician: Chiki Baker MD  Primary Care Provider: Evan Sánchez MD        Subjective:     Principal Problem:Bacteremia due to Enterococcus        HPI:  HPI:   Ms. Larose is a 74 yo F with CAD, HFpEF, pacemaker in place, HTN, HLD, DMII, COPD who originally presented to OSH after a fall, later was found to have NSTEMI and subsequently had VERONA and Enterococcus bacteremia. Cardiology and ID were consulted, she was started on IV CTX and ampicillin. DELORIS showed small mobile mass located on a right atrial lead. Proximal measurements are 1 cm x 0.5 cm. Case discussed with EP and Interventional Cardiology who will consult on the patient. Currently hemodynamically stable on 3L NC. Denies headaches, visual changes, SOB, cough, chest pain, palpitation, nausea, vomiting, abdominal pain, diarrhea, constipation, urinary frequency or any weakness. AT [facility] the pt was restarted on CTX and ampicillin, ID consulted.        Overview/Hospital Course:    Pt admitted to hospital medicine for VERONA and enterococcus bacteremia. Pt started on ceftriaxone and ampicillin. ID consulted.Blood cultures growing enterococcus faecalis.  EP and interventional cardiology consulted for pacemaker removal, however patient is not a candidate due to her severe pulmonary hypertension. PICC placed. Patient to have 6 weeks of Ampicillin 2g IV q 6 hours Ceftriaxone 2g IV q 12 hours  followed by PO antibiotic suppression. Concern for gout flare and started treatment. On allopurinol at home. Patient transferred to [facility] to continue treatment from [facility] and later continued same antibiotics . ID [facility] reviewed again and agree with treatment and repeat blood culture sent . Patient had severe deconditioning and requested for SNF /LTAC placement,  CM on board.     Interval History:   Patient denied chest pain, SOB or abdominal pain, denied any problem or concern.  On IV antibitoics per ID.  Patient is waiting for placement, case management on board.    Review of Systems   Constitutional:  Negative for fever.   HENT:  Negative for congestion.    Respiratory:  Negative for shortness of breath.    Cardiovascular:  Negative for chest pain.   Gastrointestinal:  Negative for abdominal pain.   Genitourinary:  Negative for dysuria.   Neurological:  Negative for syncope.   Psychiatric/Behavioral:  Negative for agitation.      Objective:     Vital Signs (Most Recent):  Temp: 98.7 °F (37.1 °C) (04/04/24 0725)  Pulse: 79 (04/04/24 0756)  Resp: 20 (04/04/24 0941)  BP: (!) 147/67 (04/04/24 0943)  SpO2: 95 % (04/04/24 0756) Vital Signs (24h Range):  Temp:  [97.4 °F (36.3 °C)-99.8 °F (37.7 °C)] 98.7 °F (37.1 °C)  Pulse:  [75-89] 79  Resp:  [16-20] 20  SpO2:  [95 %-98 %] 95 %  BP: (139-178)/(65-76) 147/67     Weight: 87 kg (191 lb 12.8 oz)  Body mass index is 35.08 kg/m².    Intake/Output Summary (Last 24 hours) at 4/4/2024 1333  Last data filed at 4/4/2024 1200  Gross per 24 hour   Intake --   Output 2500 ml   Net -2500 ml           Physical Exam  Vitals and nursing note reviewed.   HENT:      Head: Normocephalic and atraumatic.   Eyes:      Conjunctiva/sclera: Conjunctivae normal.   Neck:      Vascular: No JVD.   Cardiovascular:      Rate and Rhythm: Normal rate.      Heart sounds: Normal heart sounds. Bilateral lower extremity swelling  Pulmonary:      Effort: Pulmonary effort is normal.      Breath sounds: Normal breath sounds. No wheezing.   Abdominal:      General: Abdomen is flat.      Palpations: Abdomen is soft.   Musculoskeletal:         General: Normal range of motion.   Skin:     General: Skin is warm.   Neurological:      Mental Status: She is alert and oriented to person, place, and time.   Psychiatric:         Mood and Affect: Mood normal.         Significant  Labs: All pertinent labs within the past 24 hours have been reviewed.  Recent Labs   Lab 04/04/24  0454      K 4.5   CL 95   CO2 43*   *   BUN 19   CREATININE 0.9   CALCIUM 9.2   ANIONGAP 2*         Significant Imaging: I have reviewed all pertinent imaging results/findings within the past 24 hours.        Assessment/Plan:      * Bacteremia due to Enterococcus  On IV antibitoics per ID.    Patient is waiting for placement, case management on board.      Pacemaker infection  Unable to remove   - antibiotics as above      Acute bacterial endocarditis  - ID following for antibiotics: Rocephin and Ampicillin       Acute on chronic combined systolic and diastolic CHF (congestive heart failure)   Results for orders placed during the hospital encounter of 03/22/24    Echo    Interpretation Summary    Left Ventricle: The left ventricle is normal in size. Normal wall thickness. Normal wall motion. Septal motion is consistent with pacing. There is normal systolic function with a visually estimated ejection fraction of 60 - 65%. There is normal diastolic function.    Right Ventricle: Normal right ventricular cavity size. Wall thickness is normal. Right ventricle wall motion  is normal. Systolic function is normal. Pacemaker lead present in the ventricle.    Right Atrium: Multiple leads present in the right atrium.    Mitral Valve: There is severe bileaflet sclerosis. There is severe anterior mitral annular calcification present. There is normal leaflet mobility. There is mild to moderate regurgitation.    Tricuspid Valve: There is moderate regurgitation with a centrally directed jet.    Pulmonary Artery: There is moderate pulmonary hypertension. The estimated pulmonary artery systolic pressure is 63 mmHg.    IVC/SVC: Elevated venous pressure at 15 mmHg.    Resume home torsemide     T2DM (type 2 diabetes mellitus)    Last A1c reviewed-   Lab Results   Component Value Date    HGBA1C 7.9 (H) 03/22/2024        Antihyperglycemics (From admission, onward)      Start     Stop Route Frequency Ordered    03/31/24 2145  insulin aspart U-100 pen 0-10 Units         -- SubQ Every 6 hours PRN 03/31/24 2045 03/30/24 0900  insulin detemir U-100 (Levemir) pen 20 Units         -- SubQ Daily 03/29/24 2328          Hold Oral hypoglycemics while patient is in the hospital.    HTN (hypertension)  Resume Norvasc and torsemide   Bp stable     BRIJESH (obstructive sleep apnea)  CPAP per home settings       Hyperlipidemia associated with type 2 diabetes mellitus  Resume home meds        VTE Risk Mitigation (From admission, onward)           Ordered     enoxaparin injection 40 mg  Daily         03/29/24 2328     IP VTE HIGH RISK PATIENT  Once         03/29/24 2328     Place sequential compression device  Until discontinued         03/29/24 2328                    Discharge Planning   FAVIOLA: 4/5/2024     Code Status: Partial Code   Is the patient medically ready for discharge?:     Reason for patient still in hospital (select all that apply): Pending disposition  Discharge Plan A: Long-term acute care facility (LTAC)   Discharge Delays: None known at this time              Chiki Baker MD  Department of Hospital Medicine   [facility]

## 2024-04-04 NOTE — SUBJECTIVE & OBJECTIVE
Interval History:   Patient denied chest pain, SOB or abdominal pain, denied any problem or concern.  On IV antibitoics per ID.  Patient is waiting for placement, case management on board.    Review of Systems   Constitutional:  Negative for fever.   HENT:  Negative for congestion.    Respiratory:  Negative for shortness of breath.    Cardiovascular:  Negative for chest pain.   Gastrointestinal:  Negative for abdominal pain.   Genitourinary:  Negative for dysuria.   Neurological:  Negative for syncope.   Psychiatric/Behavioral:  Negative for agitation.      Objective:     Vital Signs (Most Recent):  Temp: 98.7 °F (37.1 °C) (04/04/24 0725)  Pulse: 79 (04/04/24 0756)  Resp: 20 (04/04/24 0941)  BP: (!) 147/67 (04/04/24 0943)  SpO2: 95 % (04/04/24 0756) Vital Signs (24h Range):  Temp:  [97.4 °F (36.3 °C)-99.8 °F (37.7 °C)] 98.7 °F (37.1 °C)  Pulse:  [75-89] 79  Resp:  [16-20] 20  SpO2:  [95 %-98 %] 95 %  BP: (139-178)/(65-76) 147/67     Weight: 87 kg (191 lb 12.8 oz)  Body mass index is 35.08 kg/m².    Intake/Output Summary (Last 24 hours) at 4/4/2024 1333  Last data filed at 4/4/2024 1200  Gross per 24 hour   Intake --   Output 2500 ml   Net -2500 ml           Physical Exam  Vitals and nursing note reviewed.   HENT:      Head: Normocephalic and atraumatic.   Eyes:      Conjunctiva/sclera: Conjunctivae normal.   Neck:      Vascular: No JVD.   Cardiovascular:      Rate and Rhythm: Normal rate.      Heart sounds: Normal heart sounds. Bilateral lower extremity swelling  Pulmonary:      Effort: Pulmonary effort is normal.      Breath sounds: Normal breath sounds. No wheezing.   Abdominal:      General: Abdomen is flat.      Palpations: Abdomen is soft.   Musculoskeletal:         General: Normal range of motion.   Skin:     General: Skin is warm.   Neurological:      Mental Status: She is alert and oriented to person, place, and time.   Psychiatric:         Mood and Affect: Mood normal.         Significant Labs: All  pertinent labs within the past 24 hours have been reviewed.  Recent Labs   Lab 04/04/24  0454      K 4.5   CL 95   CO2 43*   *   BUN 19   CREATININE 0.9   CALCIUM 9.2   ANIONGAP 2*         Significant Imaging: I have reviewed all pertinent imaging results/findings within the past 24 hours.

## 2024-04-04 NOTE — PT/OT/SLP PROGRESS
Physical Therapy Treatment    Patient Name:  Tereza Larose   MRN:  [id number]    Recommendations:     Discharge Recommendations: Moderate Intensity Therapy  Discharge Equipment Recommendations: to be determined by next level of care  Barriers to discharge: Decreased caregiver support    Assessment:     Tereza Larose is a 73 y.o. female admitted with a medical diagnosis of Bacteremia due to Enterococcus.  She presents with the following impairments/functional limitations: weakness, impaired endurance, impaired self care skills, impaired functional mobility, gait instability, decreased lower extremity function, edema, impaired cardiopulmonary response to activity, decreased safety awareness.    Pt performed six stands to RW at EOB with modA. Requires varying min-modA for upright posture and standing balance. Standing endurance ~30-60 seconds with seated rest break needed for recovery. With two standing trials, performed scoot steps toward HOB with modA, great effort, and dyspnea.     Pt displays significant endurance and mobility deficits that will benefit from continued therapy during acute care stay and Moderate Intensity Therapy post discharge.     Rehab Prognosis: Good; patient would benefit from acute skilled PT services to address these deficits and reach maximum level of function.    Recent Surgery: * No surgery found *      Plan:     During this hospitalization, patient to be seen 5 x/week to address the identified rehab impairments via gait training, therapeutic activities, therapeutic exercises and progress toward the following goals:    Plan of Care Expires:  05/01/24    Subjective     Chief Complaint: I just rolled all over for them to change my bedding  Patient/Family Comments/goals: improved mobility   Pain/Comfort:  Pain Rating 1: 0/10      Objective:     Communicated with nurse prior to session.  Patient found HOB elevated with bed alarm, PureWick, peripheral IV, telemetry, oxygen upon PT entry to  room.     General Precautions: Standard, fall  Orthopedic Precautions: N/A  Braces: N/A  Respiratory Status: Nasal cannula, flow 3 L/min     Functional Mobility:  Bed Mobility:     Supine to Sit: moderate assistance  Sit to Supine: moderate assistance  Transfers:     Sit to Stand:  moderate assistance with rolling walker  Gait: modA and RW for scoot steps at EOB x ~1-foot distance before requiring return to sit. Two trials.  Balance: sit balance EOB closeSBA-CGA       AM-PAC 6 CLICK MOBILITY          Treatment & Education:  -pt educ: benefits of participation with therapy, fall prevention, call light, orientation to deficits  -Seated EOB: LAQs 2x10, SBA for balance  -Transfer training  -Endurance & postural  training    Patient left HOB elevated with all lines intact, call button in reach, bed alarm on, nurse notified, and  present..    GOALS:   Multidisciplinary Problems       Physical Therapy Goals          Problem: Physical Therapy    Goal Priority Disciplines Outcome Goal Variances Interventions   Physical Therapy Goal     PT, PT/OT      Description: Goals to be met by: 2024     Patient will increase functional independence with mobility by performin. Supine to sit with MInimal Assistance  2. Sit to stand transfer with Minimal Assistance  3. Gait  x 50  feet with Minimal Assistance using Rolling Walker.                          Time Tracking:     PT Received On: 24  PT Start Time: 1340     PT Stop Time: 1408  PT Total Time (min): 28 min     Billable Minutes: Therapeutic Activity 28    Treatment Type: Treatment  PT/PTA: PTA     Number of PTA visits since last PT visit: 3     2024

## 2024-04-04 NOTE — PLAN OF CARE
Problem: Adult Inpatient Plan of Care  Goal: Plan of Care Review  4/4/2024 1805 by Jane Chen LPN  Outcome: Ongoing, Progressing  4/4/2024 1705 by Jane Chen LPN  Outcome: Ongoing, Progressing  Goal: Absence of Hospital-Acquired Illness or Injury  Outcome: Ongoing, Progressing  Goal: Optimal Comfort and Wellbeing  4/4/2024 1805 by Jane Chen LPN  Outcome: Ongoing, Progressing  4/4/2024 1705 by Jane Chen LPN  Outcome: Ongoing, Progressing  Goal: Readiness for Transition of Care  4/4/2024 1805 by Jane Chen LPN  Outcome: Ongoing, Progressing  4/4/2024 1705 by Jane Chen LPN  Outcome: Ongoing, Progressing     Problem: Diabetes Comorbidity  Goal: Blood Glucose Level Within Targeted Range  Outcome: Ongoing, Progressing     Problem: Oral Intake Inadequate (Acute Kidney Injury/Impairment)  Goal: Optimal Nutrition Intake  Outcome: Ongoing, Progressing     Problem: Renal Function Impairment (Acute Kidney Injury/Impairment)  Goal: Effective Renal Function  Outcome: Ongoing, Progressing

## 2024-04-04 NOTE — CARE UPDATE
04/04/24 0336   Tobacco Cessation Intervention   Do you use any type of tobacco product? No   Respiratory Evaluation   $ Care Plan Tech Time 15 min   $ Eval/Re-eval Charges Evaluation   Evaluation For New Orders   Admitting Diagnosis NSTEMI   Pulmonary Diagnosis Asthma, COPD   Home Oxygen   Has Home Oxygen? Yes   Liter Flow 2   Route nasal cannula   Mode continuous   Device home concentrator   Home Aerosol, MDI, DPI, and Other Treatments/Therapies   Home Respiratory Therapy Per Patient/Review of Chart Yes   Aerosol Home Meds/Freq Brovan and Pulmicort BID   MDI Home Meds/Freq Albuterol MDI prn   Oxygen Care Plan   Oxygen Care Plan Per Protocol   SPO2 Goal (%) 95% cardiac   Rationale SpO2 is <95% (Cardiac Pt.)   Bronchodilator Care Plan   Rationale Maintain home respiratory medicine   Atelectasis Care Plan   Rationale No Rational Found   Airway Clearance Care Plan   Rationale No rationale found

## 2024-04-04 NOTE — PT/OT/SLP PROGRESS
Occupational Therapy   Treatment    Name: Tereza Larose  MRN: [id number]  Admitting Diagnosis:  Bacteremia due to Enterococcus       Recommendations:     Discharge Recommendations: Moderate Intensity Therapy  Discharge Equipment Recommendations:  to be determined by next level of care  Barriers to discharge: Increased assistance for self care and functional transfers      Assessment:     Tereza Larose is a 73 y.o. female with a medical diagnosis of Bacteremia due to Enterococcus. Performance deficits affecting function are weakness, impaired endurance, impaired self care skills, impaired functional mobility, gait instability, impaired balance, decreased lower extremity function, impaired cardiopulmonary response to activity, decreased safety awareness.     Rehab Prognosis:  Fair; patient would benefit from acute skilled OT services to address these deficits and reach maximum level of function.       Plan:     Patient to be seen 5 x/week to address the above listed problems via self-care/home management, therapeutic activities, therapeutic exercises  Plan of Care Expires: 04/29/24  Plan of Care Reviewed with: patient    Subjective     Chief Complaint: no new complaints today   Patient/Family Comments/goals: return home at some point; walk again   Pain/Comfort:  Pain Rating 1: 0/10    Objective:     Communicated with: nurse prior to session.  Patient found HOB elevated with bed alarm, peripheral IV, telemetry, oxygen upon OT entry to room.    General Precautions: Standard, fall    Orthopedic Precautions:N/A  Braces: N/A  Respiratory Status: Nasal cannula, flow 3 L/min     Occupational Performance:     Bed Mobility:    Patient completed Rolling/Turning to Left with  maximal assistance for repositioning   Patient completed Rolling/Turning to Right with maximal assistance     Therapeutic Exercises:  Patient worked on upper body strengthening exercises with head of bed elevated and attempted long sitting    Treatment &  Education:  Patient was encouraged to work on exercises later today, discharge planning, equipment needs and reviewed use of call bel for assistance.     Patient left HOB elevated with all lines intact, call button in reach, and bed alarm on    GOALS:   Multidisciplinary Problems       Occupational Therapy Goals          Problem: Occupational Therapy    Goal Priority Disciplines Outcome Interventions   Occupational Therapy Goal     OT, PT/OT Ongoing, Progressing    Description: Goals to be met by: 4/29/2024     Patient will increase functional independence with ADLs by performing:    LE Dressing with Contact Guard Assistance and Assistive Devices as needed.  Grooming while seated at sink with Supervision.  Toileting from bedside commode with Contact Guard Assistance for hygiene and clothing management.   Supine to sit with Contact Guard Assistance.  Toilet transfer to bedside commode with Minimal Assistance.  Upper extremity exercise program, with supervision.                         Time Tracking:     OT Date of Treatment: 04/04/24  OT Start Time: 1005  OT Stop Time: 1015  OT Total Time (min): 10 min    Billable Minutes:Therapeutic Exercise 10    OT/CASTRO: OT          4/4/2024

## 2024-04-04 NOTE — CARE UPDATE
04/03/24 2019   Patient Assessment/Suction   Level of Consciousness (AVPU) alert   Respiratory Effort Normal;Unlabored   Expansion/Accessory Muscles/Retractions no retractions;no use of accessory muscles   FABRICIO Breath Sounds clear   LLL Breath Sounds diminished   RUL Breath Sounds clear;diminished   RML Breath Sounds diminished   RLL Breath Sounds diminished   Rhythm/Pattern, Respiratory pattern regular;unlabored   Cough Frequency no cough   PRE-TX-O2   Device (Oxygen Therapy) nasal cannula with humidification   $ Is the patient on Low Flow Oxygen? Yes   Flow (L/min) 3   SpO2 98 %   Pulse Oximetry Type Intermittent   $ Pulse Oximetry - Single Charge Pulse Oximetry - Single   Pulse 86   Resp 18   Aerosol Therapy   $ Aerosol Therapy Charges Aerosol Treatment   Daily Review of Necessity (SVN) completed   Respiratory Treatment Status (SVN) given   Treatment Route (SVN) mask;oxygen   Patient Position (SVN) HOB elevated   Post Treatment Assessment (SVN) increased aeration   Signs of Intolerance (SVN) none   Education   $ Education Bronchodilator;15 min

## 2024-04-04 NOTE — CARE UPDATE
04/04/24 0756   Patient Assessment/Suction   Level of Consciousness (AVPU) alert   Respiratory Effort Normal;Unlabored   Expansion/Accessory Muscles/Retractions no use of accessory muscles;no retractions;expansion symmetric   All Lung Fields Breath Sounds Anterior:   FABRICIO Breath Sounds diminished   LLL Breath Sounds diminished   RUL Breath Sounds wheezes, expiratory   RML Breath Sounds diminished   Rhythm/Pattern, Respiratory unlabored;depth regular   Cough Frequency no cough   PRE-TX-O2   Device (Oxygen Therapy) nasal cannula   $ Is the patient on Low Flow Oxygen? Yes   Flow (L/min) 3   SpO2 95 %   Pulse Oximetry Type Intermittent   $ Pulse Oximetry - Multiple Charge Pulse Oximetry - Multiple   Pulse 79   Resp 18   Aerosol Therapy   $ Aerosol Therapy Charges Aerosol Treatment   Daily Review of Necessity (SVN) completed   Respiratory Treatment Status (SVN) given   Treatment Route (SVN) mask;oxygen   Patient Position (SVN) HOB elevated   Post Treatment Assessment (SVN) breath sounds unchanged   Signs of Intolerance (SVN) none   Breath Sounds Post-Respiratory Treatment   Throughout All Fields Post-Treatment All Fields   Throughout All Fields Post-Treatment no change   Post-treatment Heart Rate (beats/min) 78   Post-treatment Resp Rate (breaths/min) 18   Education   $ Education Bronchodilator;15 min

## 2024-04-04 NOTE — PLAN OF CARE
Problem: Adult Inpatient Plan of Care  Goal: Plan of Care Review  Outcome: Ongoing, Progressing  Goal: Optimal Comfort and Wellbeing  Outcome: Ongoing, Progressing  Goal: Readiness for Transition of Care  Outcome: Ongoing, Progressing     Problem: Diabetes Comorbidity  Goal: Blood Glucose Level Within Targeted Range  Outcome: Ongoing, Progressing     Problem: Oral Intake Inadequate (Acute Kidney Injury/Impairment)  Goal: Optimal Nutrition Intake  Outcome: Ongoing, Progressing     Problem: Renal Function Impairment (Acute Kidney Injury/Impairment)  Goal: Effective Renal Function  Outcome: Ongoing, Progressing

## 2024-04-05 VITALS
WEIGHT: 191.81 LBS | HEART RATE: 82 BPM | OXYGEN SATURATION: 98 % | TEMPERATURE: 98 F | SYSTOLIC BLOOD PRESSURE: 143 MMHG | BODY MASS INDEX: 35.3 KG/M2 | RESPIRATION RATE: 18 BRPM | DIASTOLIC BLOOD PRESSURE: 63 MMHG | HEIGHT: 62 IN

## 2024-04-05 LAB
GLUCOSE SERPL-MCNC: 216 MG/DL (ref 70–110)
GLUCOSE SERPL-MCNC: 58 MG/DL (ref 70–110)
GLUCOSE SERPL-MCNC: 89 MG/DL (ref 70–110)

## 2024-04-05 PROCEDURE — 99900035 HC TECH TIME PER 15 MIN (STAT)

## 2024-04-05 PROCEDURE — 27000221 HC OXYGEN, UP TO 24 HOURS

## 2024-04-05 PROCEDURE — 97535 SELF CARE MNGMENT TRAINING: CPT

## 2024-04-05 PROCEDURE — 99900031 HC PATIENT EDUCATION (STAT)

## 2024-04-05 PROCEDURE — 25000242 PHARM REV CODE 250 ALT 637 W/ HCPCS: Performed by: INTERNAL MEDICINE

## 2024-04-05 PROCEDURE — 63600175 PHARM REV CODE 636 W HCPCS: Performed by: NURSE PRACTITIONER

## 2024-04-05 PROCEDURE — 25000003 PHARM REV CODE 250: Performed by: INTERNAL MEDICINE

## 2024-04-05 PROCEDURE — 94760 N-INVAS EAR/PLS OXIMETRY 1: CPT

## 2024-04-05 PROCEDURE — 94640 AIRWAY INHALATION TREATMENT: CPT

## 2024-04-05 PROCEDURE — 94761 N-INVAS EAR/PLS OXIMETRY MLT: CPT

## 2024-04-05 PROCEDURE — 63600175 PHARM REV CODE 636 W HCPCS: Mod: UD | Performed by: INTERNAL MEDICINE

## 2024-04-05 PROCEDURE — 25000003 PHARM REV CODE 250: Performed by: NURSE PRACTITIONER

## 2024-04-05 RX ORDER — AMOXICILLIN 500 MG/1
500 TABLET, FILM COATED ORAL EVERY 12 HOURS
Start: 2024-05-09

## 2024-04-05 RX ADMIN — CHLORHEXIDINE GLUCONATE 15 ML: 1.2 RINSE ORAL at 09:04

## 2024-04-05 RX ADMIN — AMPICILLIN 2 G: 2 INJECTION, POWDER, FOR SOLUTION INTRAVENOUS at 05:04

## 2024-04-05 RX ADMIN — SENNOSIDES AND DOCUSATE SODIUM 1 TABLET: 8.6; 5 TABLET ORAL at 09:04

## 2024-04-05 RX ADMIN — ALBUTEROL SULFATE 2.5 MG: 2.5 SOLUTION RESPIRATORY (INHALATION) at 08:04

## 2024-04-05 RX ADMIN — INSULIN ASPART 4 UNITS: 100 INJECTION, SOLUTION INTRAVENOUS; SUBCUTANEOUS at 12:04

## 2024-04-05 RX ADMIN — AMLODIPINE BESYLATE 10 MG: 5 TABLET ORAL at 09:04

## 2024-04-05 RX ADMIN — ENOXAPARIN SODIUM 40 MG: 40 INJECTION SUBCUTANEOUS at 05:04

## 2024-04-05 RX ADMIN — LEVOTHYROXINE SODIUM 150 MCG: 0.1 TABLET ORAL at 05:04

## 2024-04-05 RX ADMIN — AMPICILLIN 2 G: 2 INJECTION, POWDER, FOR SOLUTION INTRAVENOUS at 02:04

## 2024-04-05 RX ADMIN — AMPICILLIN 2 G: 2 INJECTION, POWDER, FOR SOLUTION INTRAVENOUS at 01:04

## 2024-04-05 RX ADMIN — ACETAMINOPHEN 650 MG: 325 TABLET ORAL at 12:04

## 2024-04-05 RX ADMIN — ALBUTEROL SULFATE 2.5 MG: 2.5 SOLUTION RESPIRATORY (INHALATION) at 01:04

## 2024-04-05 RX ADMIN — CEFTRIAXONE SODIUM 2 G: 2 INJECTION, POWDER, FOR SOLUTION INTRAMUSCULAR; INTRAVENOUS at 12:04

## 2024-04-05 RX ADMIN — HYDROCODONE BITARTRATE AND ACETAMINOPHEN 2 TABLET: 5; 325 TABLET ORAL at 05:04

## 2024-04-05 RX ADMIN — TORSEMIDE 20 MG: 20 TABLET ORAL at 09:04

## 2024-04-05 RX ADMIN — ALBUTEROL SULFATE 2.5 MG: 2.5 SOLUTION RESPIRATORY (INHALATION) at 12:04

## 2024-04-05 NOTE — PLAN OF CARE
04/05/24 1309   Post-Acute Status   Post-Acute Authorization Placement   Post-Acute Placement Status Pending Bed Availability   Coverage [employer] MANAGED MEDICARE - [employer] MEDICARE PPO   Hospital Resources/Appts/Education Provided Provided patient/caregiver with written discharge plan information;Provided education on problems/symptoms using teachback;Appointments scheduled and added to AVS   Discharge Delays   (Bed Availabity at [facility])   Discharge Plan   Discharge Plan A Long-term acute care facility (LTAC)   Discharge Plan B Long-term acute care facility (LTAC)     ARJUN contacted Rosalia with [facility] ([phone]), and she stated that she would have a bed available between 6 and 6:30PM. Per Rosalia she stated she already talked to the family and explained this to them. She also states she does not provide transportation. She asked that all updated clinicals be faxed to ([phone]). ARJUN sent requested clinicals via right fax.

## 2024-04-05 NOTE — PLAN OF CARE
Problem: Adult Inpatient Plan of Care  Goal: Plan of Care Review  Outcome: Ongoing, Progressing     Problem: Diabetes Comorbidity  Goal: Blood Glucose Level Within Targeted Range  Outcome: Ongoing, Progressing     Problem: Fluid and Electrolyte Imbalance (Acute Kidney Injury/Impairment)  Goal: Fluid and Electrolyte Balance  Outcome: Ongoing, Progressing     Problem: Oral Intake Inadequate (Acute Kidney Injury/Impairment)  Goal: Optimal Nutrition Intake  Outcome: Ongoing, Progressing     Problem: Renal Function Impairment (Acute Kidney Injury/Impairment)  Goal: Effective Renal Function  Outcome: Ongoing, Progressing     Problem: Infection  Goal: Absence of Infection Signs and Symptoms  Outcome: Ongoing, Progressing

## 2024-04-05 NOTE — PT/OT/SLP PROGRESS
Physical Therapy      Patient Name:  Tereza Larose   MRN:  [id number]    Patient not seen today secondary to Pt unavailable with attempt, and anticipates discharge.

## 2024-04-05 NOTE — PLAN OF CARE
ARJUN spoke with Rosalia. Due to staffing Pt has to discharge tonight. SW to fax clinicals to [phone].    ARJUN received message from Rosalia ([facility]). Per Rosalia, auth received and bed is available for today 4/5. Discharge pending medial clearance.    04/05/24 0839   Post-Acute Status   Post-Acute Authorization Placement   Post-Acute Placement Status Pending medical clearance/testing   Coverage Payor:   [employer] MANAGED MEDICARE - [employer] MEDICARE PPO -   Discharge Plan   Discharge Plan A Long-term acute care facility (LTAC)   Discharge Plan B Long-term acute care facility (LTAC)

## 2024-04-05 NOTE — DISCHARGE INSTRUCTIONS
-Ampicillin 2 g IV every 4 hours daily for 6 weeks till May 8, 2024   -Ceftriaxone 2 g IV every 12 hours for 6 weeks till May 8, 2024   -On completion of IV antibiotics, on May 9th start amoxicillin 500 mg oral twice daily for suppression for life  -Remove PICC line at completion of IV therapy  -Weekly Labs for duration of antibiotic therapy: following labs to be drawn on Mondays:  CBC with Diff, CMP and CRP  -Fax Lab Results to Infectious Diseases Provider if not done at [facility] facility: [facility] Fax Number: [phone], Attn: Dr Lourdes Walton

## 2024-04-05 NOTE — DISCHARGE SUMMARY
[facility] Medicine  Discharge Summary      Patient Name: Tereza Larose  MRN: [id number]  SHAMIR: [id number]  Patient Class: IP- Inpatient  Admission Date: 3/29/2024  Hospital Length of Stay: 7 days  Discharge Date and Time:  04/05/2024 10:51 AM  Attending Physician: Chiki Baker MD   Discharging Provider: Chiki Baker MD  Primary Care Provider: Evan Sánchez MD    Primary Care Team: Networked reference to record PCT     HPI:   HPI:   Ms. Larose is a 72 yo F with CAD, HFpEF, pacemaker in place, HTN, HLD, DMII, COPD who originally presented to OSH after a fall, later was found to have NSTEMI and subsequently had VERONA and Enterococcus bacteremia. Cardiology and ID were consulted, she was started on IV CTX and ampicillin. DELORIS showed small mobile mass located on a right atrial lead. Proximal measurements are 1 cm x 0.5 cm. Case discussed with EP and Interventional Cardiology who will consult on the patient. Currently hemodynamically stable on 3L NC. Denies headaches, visual changes, SOB, cough, chest pain, palpitation, nausea, vomiting, abdominal pain, diarrhea, constipation, urinary frequency or any weakness. AT [facility] the pt was restarted on CTX and ampicillin, ID consulted.        * No surgery found *      Hospital Course:     Pt admitted to hospital medicine for VERONA and enterococcus bacteremia. Pt started on ceftriaxone and ampicillin. ID consulted. Blood cultures growing enterococcus faecalis.  EP and interventional cardiology consulted for pacemaker removal, however patient is not a candidate due to her severe pulmonary hypertension. PICC placed. Patient to have 6 weeks of Ampicillin 2g IV q 4 hours and Ceftriaxone 2g IV q 12 hours  followed by PO antibiotic suppression. Concern for gout flare and started treatment. On allopurinol at home. Patient transferred to [facility] to continue treatment from [facility] and later continued same antibiotics . ID [facility] reviewed again  and agree with treatment and repeat blood culture sent . Patient had severe deconditioning. CM on board and patient got accepted at LTAC.   Per ID at discharge:  -Ampicillin 2 g IV every 4 hours daily for 6 weeks till May 8, 2024   -Ceftriaxone 2 g IV every 12 hours for 6 weeks till May 8, 2024   -On completion of IV antibiotics, on May 9th start amoxicillin 500 mg oral twice daily for suppression for life  -Remove PICC line at completion of IV therapy  -Weekly Labs for duration of antibiotic therapy: following labs to be drawn on Mondays:  CBC with Diff, CMP and CRP  -Fax Lab Results to Infectious Diseases Provider if not done at [facility] facility: [facility] Fax Number: [phone], Attn: Dr Lourdes Walton         Goals of Care Treatment Preferences:  Code Status: Partial Code          What is most important right now is to focus on improvement in condition but with limits to invasive therapies.  Accordingly, we have decided that the best plan to meet the patient's goals includes continuing with treatment.      Consults:   Consults (From admission, onward)          Status Ordering Provider     Inpatient consult to Social Work/Case Management  Once        Provider:  (Not yet assigned)    Acknowledged JELENA OVIEDO     Inpatient consult to   Once        Provider:  (Not yet assigned)    Acknowledged SANA HUNG     Inpatient consult to Infectious Diseases  Once        Provider:  Lourdes Conteh MD    Completed SANA HUNG            No new Assessment & Plan notes have been filed under this hospital service since the last note was generated.  Service: Hospital Medicine    Final Active Diagnoses:    Diagnosis Date Noted POA    PRINCIPAL PROBLEM:  Bacteremia due to Enterococcus [R78.81, B95.2] 03/24/2024 Yes    Acute bacterial endocarditis [I33.0] 03/26/2024 Yes    Pacemaker infection [T82.7XXA] 03/26/2024 Yes    Acute on chronic combined systolic and diastolic  CHF (congestive heart failure) [I50.43] 09/09/2022 Yes    T2DM (type 2 diabetes mellitus) [E11.9] 05/09/2014 Yes    HTN (hypertension) [I10] 06/15/2013 Yes    BRIJESH (obstructive sleep apnea) [G47.33] 06/14/2012 Yes    Hyperlipidemia associated with type 2 diabetes mellitus [E11.69, E78.5] 06/14/2012 Yes      Problems Resolved During this Admission:       Discharged Condition: stable    Disposition: LTAC    Follow Up:   Follow-up Information       [facility]. Go on 4/9/2024.    Specialty: Primary Care  Why: Hospital followup scheduled at 9:00 a.m. on 4/9.  Contact information:  [address], [facility] [address]  [facility] [address] [phone]  Additional information:  [address]                         Patient Instructions:      CBC auto differential   Standing Status: Future Standing Exp. Date: 05/08/24     Comprehensive metabolic panel   Standing Status: Future Standing Exp. Date: 05/08/24     C-reactive protein   Standing Status: Future Standing Exp. Date: 05/08/24     Activity as tolerated       Significant Diagnostic Studies: Labs: All labs within the past 24 hours have been reviewed    Pending Diagnostic Studies:       Procedure Component Value Units Date/Time    C-reactive protein [[id number]] Collected: 03/30/24 0935    Order Status: Sent Lab Status: In process Updated: 03/30/24 0940    Specimen: Blood            Medications:  Reconciled Home Medications:      Medication List        START taking these medications      amoxicillin 500 MG Tab  Commonly known as: AMOXIL  Take 1 tablet (500 mg total) by mouth every 12 (twelve) hours.  Start taking on: May 9, 2024     AMPICILLIN 2 G/100 ML NS (READY TO MIX)  Inject 100 mLs (2 g total) into the vein every 4 (four) hours.     ceftriaxone 2 g in 100 mL D5W 2 g/100 mL  IVPB  Inject 100 mLs (2 g total) into the vein every 12 (twelve) hours.            CHANGE how you take these medications      albuterol 90 mcg/actuation inhaler  Commonly known as:  PROVENTIL/VENTOLIN HFA  Inhale 1-2 puffs into the lungs every 4 (four) hours as needed for Wheezing. Inhale 2 puffs by mouth into lungs every 4 hours as needed  What changed: Another medication with the same name was removed. Continue taking this medication, and follow the directions you see here.            CONTINUE taking these medications      amLODIPine 10 MG tablet  Commonly known as: NORVASC  TAKE 1 TABLET BY MOUTH EVERY DAY FOR SYSTOLIC BLOOD PRESSURE GREATER THAN 120     arformoteroL 15 mcg/2 mL Nebu  Commonly known as: BROVANA  Take 2 mLs (15 mcg total) by nebulization 2 (two) times a day. Controller     aspirin 81 MG EC tablet  Commonly known as: ECOTRIN  Take 81 mg by mouth once daily.     blood sugar diagnostic Strp  True Metrix Test Strips test once a day     blood-glucose meter Misc  Commonly known as: TRUE METRIX GLUCOSE METER  True Metrix Glucose Meter     budesonide 0.5 mg/2 mL nebulizer solution  Commonly known as: PULMICORT  Take 2 mLs (0.5 mg total) by nebulization 2 (two) times daily. Controller     cholecalciferol (vitamin D3) 25 mcg (1,000 unit) capsule  Commonly known as: VITAMIN D3  Take 1,000 Units by mouth once daily.     colchicine 0.6 mg tablet  Commonly known as: COLCRYS  Take 1 tablet (0.6 mg total) by mouth once daily. Day 1: 1.2 mg at the first sign of flare, followed by 0.6 mg after 1 hour; maximum total dose: 1.8 mg/day on day Day 2 and thereafter: 0.6 mg once or twice daily until flare resolves     cyanocobalamin 1000 MCG tablet  Commonly known as: VITAMIN B-12  Take 100 mcg by mouth once daily.     * evolocumab 140 mg/mL Syrg  Commonly known as: REPATHA SYRINGE  Inject 140 mg into the skin every 14 (fourteen) days.     * REPATHA SURECLICK 140 mg/mL Pnij  Generic drug: evolocumab  SMARTSI pre-filled pen syringe SUB-Q Every 2 Weeks     glyBURIDE 2.5 MG tablet  Commonly known as: DIABETA  Take 2.5 mg by mouth 2 (two) times daily.     lancets 33 gauge Misc  1 lancet by  Misc.(Non-Drug; Combo Route) route once daily.     levothyroxine 150 MCG tablet  Commonly known as: SYNTHROID  Take 150 mcg by mouth once daily.     metoprolol succinate 50 MG 24 hr tablet  Commonly known as: TOPROL-XL  Take 1 tablet (50 mg total) by mouth once daily.     NEXLETOL 180 mg Tab  Generic drug: bempedoic acid  Take 1 tablet by mouth once daily.     potassium chloride 8 mEq Cpsr  Commonly known as: MICRO-K  Take 8 mEq by mouth.     SITagliptin phosphate 50 MG Tab  Commonly known as: JANUVIA  Take 1 tablet (50 mg total) by mouth once daily.     sotaloL 80 MG tablet  Commonly known as: BETAPACE  Take 80 mg by mouth 2 (two) times daily.     torsemide 20 MG Tab  Commonly known as: DEMADEX  Take 20 mg by mouth.     TRUE METRIX LEVEL 1 Soln  Generic drug: blood glucose control, low  Check glucometer accuracy at least once a week     vitamin E 400 UNIT capsule  Take 400 Units by mouth once daily.     YUPELRI 175 mcg/3 mL Nebu  Generic drug: revefenacin  Inhale 1 Dose into the lungs Daily.           * This list has 2 medication(s) that are the same as other medications prescribed for you. Read the directions carefully, and ask your doctor or other care provider to review them with you.                STOP taking these medications      alcohol swabs Padm  Commonly known as: BD ALCOHOL SWABS     levocetirizine 5 MG tablet  Commonly known as: XYZAL     predniSONE 10 mg tablet pack  Commonly known as: DELTASONE     predniSONE 20 MG tablet  Commonly known as: DELTASONE              Indwelling Lines/Drains at time of discharge:   Lines/Drains/Airways       Peripherally Inserted Central Catheter Line  Duration             PICC Double Lumen 03/29/24 1047 right basilic 7 days              Drain  Duration             Female External Urinary Catheter w/ Suction 03/26/24 1147 9 days                    Time spent on the discharge of patient: 33 minutes         Chiki Baker MD  Department of Hospital Medicine  [place]  [facility]

## 2024-04-05 NOTE — PLAN OF CARE
Pt discharging to [facility]. Orders faxed to RN at facility. SW to arrange transport pickup for 7pm due to facility staffing. RN to call report to charge nurse Wendy facility to call nurses station to get report.. Pt has no other needs to be addressed at this time. Pt cleared to discharge from case management standpoint.       04/05/24 1500   Final Note   Assessment Type Final Discharge Note   Anticipated Discharge Disposition Long Term   What phone number can be called within the next 1-3 days to see how you are doing after discharge? [phone]   Hospital Resources/Appts/Education Provided Post-Acute resouces added to AVS;Provided patient/caregiver with written discharge plan information   Post-Acute Status   Post-Acute Authorization Placement   Post-Acute Placement Status Set-up Complete/Auth obtained   Coverage Payor: HU[employer] MANAGED MEDICARE - [employer] MEDICARE PPO -   Patient choice form signed by patient/caregiver List from CMS Compare   Discharge Delays (!) Ambulance Transport/Facility Transport

## 2024-04-05 NOTE — PLAN OF CARE
04/05/24 0827   Patient Assessment/Suction   Level of Consciousness (AVPU) alert   Respiratory Effort Normal;Unlabored   Expansion/Accessory Muscles/Retractions no use of accessory muscles   All Lung Fields Breath Sounds diminished   Rhythm/Pattern, Respiratory pattern regular   Cough Frequency infrequent   Cough Type nonproductive   PRE-TX-O2   Device (Oxygen Therapy) nasal cannula   $ Is the patient on Low Flow Oxygen? Yes   Flow (L/min) 3   Oxygen Concentration (%) 32   SpO2 97 %   Pulse Oximetry Type Intermittent   $ Pulse Oximetry - Single Charge Pulse Oximetry - Single   Pulse 86   Resp 19   Aerosol Therapy   $ Aerosol Therapy Charges Aerosol Treatment   Daily Review of Necessity (SVN) completed   Respiratory Treatment Status (SVN) given   Treatment Route (SVN) mask;oxygen   Patient Position (SVN) HOB elevated   Post Treatment Assessment (SVN) breath sounds improved   Signs of Intolerance (SVN) none   Breath Sounds Post-Respiratory Treatment   Throughout All Fields Post-Treatment All Fields   Throughout All Fields Post-Treatment aeration increased   Post-treatment Heart Rate (beats/min) 88   Post-treatment Resp Rate (breaths/min) 19   General Safety Checklist   Safety Promotion/Fall Prevention side rails raised   Respiratory Interventions   Cough And Deep Breathing done with encouragement   Ready to Wean/Extubation Screen   FIO2<=50 (chart decimal) 0.32   Education   $ Education Bronchodilator;15 min

## 2024-04-05 NOTE — PT/OT/SLP PROGRESS
Occupational Therapy   Treatment    Name: Tereza Larose  MRN: [id number]  Admitting Diagnosis:  Bacteremia due to Enterococcus       Recommendations:     Discharge Recommendations: Moderate Intensity Therapy  Discharge Equipment Recommendations:  to be determined by next level of care  Barriers to discharge:   (increased skilled assistance required with ADLs and functional mobility)    Assessment:     Tereza Larose is a 73 y.o. female with a medical diagnosis of Bacteremia due to Enterococcus.  She presents with fair static sitting at EOB. Noted gradual L leaning posture during BUE exercises requiring VC's to re-adjust trunk posture to neutral. Patient able to re-adjust posture without assistance. Performance deficits affecting function are weakness, impaired endurance, impaired self care skills, impaired functional mobility, gait instability, impaired balance, decreased lower extremity function, edema, impaired cardiopulmonary response to activity.     Rehab Prognosis:  Fair; patient would benefit from acute skilled OT services to address these deficits and reach maximum level of function.       Plan:     Patient to be seen 5 x/week to address the above listed problems via self-care/home management, therapeutic activities, therapeutic exercises  Plan of Care Expires: 04/29/24  Plan of Care Reviewed with: patient    Subjective     Chief Complaint: none  Patient/Family Comments/goals: none  Pain/Comfort:  Pain Rating 1: 0/10  Pain Rating Post-Intervention 1: 0/10    Objective:     Communicated with: nurse Lara prior to session.  Patient found HOB elevated with bed alarm, PureWick, peripheral IV, telemetry upon OT entry to room.    General Precautions: Standard, fall    Orthopedic Precautions:N/A  Braces: N/A  Respiratory Status: Room air     Occupational Performance:     Bed Mobility:    Patient completed Scooting/Bridging with moderate assistance  Patient completed Supine to Sit with moderate assistance  Patient  completed Sit to Supine with contact guard assistance     Activities of Daily Living:  Toileting: dependence with Purewick in place      [facility] 6 Click ADL:      Treatment & Education:  Pt performed B UE resistive therapeutic exercise using red t-band 3 x 10 reps each horizontal ad/abduction and elbow flexion/extension with rest breaks taken between sets. Pt able to perform all exercises with Min A & cues after demonstration provided.      Patient left HOB elevated with all lines intact, call button in reach, and bed alarm on    GOALS:   Multidisciplinary Problems       Occupational Therapy Goals          Problem: Occupational Therapy    Goal Priority Disciplines Outcome Interventions   Occupational Therapy Goal     OT, PT/OT Ongoing, Progressing    Description: Goals to be met by: 4/29/2024     Patient will increase functional independence with ADLs by performing:    LE Dressing with Contact Guard Assistance and Assistive Devices as needed.  Grooming while seated at sink with Supervision.  Toileting from bedside commode with Contact Guard Assistance for hygiene and clothing management.   Supine to sit with Contact Guard Assistance.  Toilet transfer to bedside commode with Minimal Assistance.  Upper extremity exercise program, with supervision.                         Time Tracking:     OT Date of Treatment: 04/05/24  OT Start Time: 1052  OT Stop Time: 1110  OT Total Time (min): 18 min    Billable Minutes:Therapeutic Exercise 18    OT/CASTRO: OT          4/5/2024

## 2024-04-06 NOTE — CARE UPDATE
04/05/24 2003   Patient Assessment/Suction   Level of Consciousness (AVPU) alert   Respiratory Effort Normal;Unlabored   Expansion/Accessory Muscles/Retractions no use of accessory muscles   All Lung Fields Breath Sounds equal bilaterally;diminished;clear   Rhythm/Pattern, Respiratory unlabored   Cough Frequency infrequent   Skin Integrity   $ Wound Care Tech Time 15 min   Area Observed Right;Behind ear   Skin Appearance without discoloration   PRE-TX-O2   Device (Oxygen Therapy) nasal cannula   Flow (L/min) 3   SpO2 98 %   Pulse Oximetry Type Intermittent   $ Pulse Oximetry - Multiple Charge Pulse Oximetry - Multiple   Pulse 82   Resp 18   Aerosol Therapy   $ Aerosol Therapy Charges Aerosol Treatment   Daily Review of Necessity (SVN) completed   Respiratory Treatment Status (SVN) given   Treatment Route (SVN) mask   Patient Position (SVN) HOB elevated   Post Treatment Assessment (SVN) breath sounds unchanged   Signs of Intolerance (SVN) none   Breath Sounds Post-Respiratory Treatment   Throughout All Fields Post-Treatment All Fields   Throughout All Fields Post-Treatment no change   Post-treatment Heart Rate (beats/min) 88   Post-treatment Resp Rate (breaths/min) 18   Education   $ Education Bronchodilator;15 min

## 2024-04-06 NOTE — NURSING
Patient off floor via EMS by stretcher. Patient being transferred to [facility] for long term IV ABT. PICC line remains in place. Patient on 2LNC. NAD noted. Tele box removed and sent to Cardio B.

## 2024-04-11 LAB
OHS QRS DURATION: 168 MS
OHS QTC CALCULATION: 528 MS

## 2024-05-29 NOTE — SUBJECTIVE & OBJECTIVE
Past Medical History:   Diagnosis Date    Abnormal EKG 9/26/2012    Allergy     Arthritis     Asthma     Brain bleed     Colon polyps 11/6/2020    COPD (chronic obstructive pulmonary disease)     Depression     Diabetes mellitus type II     Diverticulitis     Fatty liver     GERD (gastroesophageal reflux disease)     Goiter     s/p thyroidectomy    Heart murmur     Hemiparesis affecting right side as late effect of cerebrovascular accident (CVA) 7/26/2022    Hernia of abdominal wall     History of intracranial hemorrhage 6/15/2013    History of non-ST elevation myocardial infarction (NSTEMI) 6/15/2013    Hyperlipidemia     Hypertension     Lactic acidosis 1/11/2020    Metabolic syndrome 6/14/2012    Myocardial infarction     On home oxygen therapy     states uses 2L at night or when sat < 90    Pacemaker     Positive FIT (fecal immunochemical test) 11/6/2020    Severe persistent asthma without complication 4/30/2020    Statin intolerance     Stroke 2012    left hand shaky, loss of balance       Past Surgical History:   Procedure Laterality Date    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/25/2017    Dr Griggs    ADRENALECTOMY      AORTIC VALVE REPLACEMENT  12/09/2016    ARTERIOGRAPHY OF AORTIC ROOT N/A 9/12/2022    Procedure: ARTERIOGRAM, AORTIC ROOT;  Surgeon: Marko Batres MD;  Location: [facility] CATH/EP LAB;  Service: Cardiology;  Laterality: N/A;    arthroscopy lt knee Right     BLADDER REPAIR      sling    BREAST BIOPSY Bilateral     benign    COLONOSCOPY  2004    10 year recheck    COLONOSCOPY N/A 11/6/2020    Procedure: COLONOSCOPY;  Surgeon: Yakelin Lowery MD;  Location: [facility];  Service: Endoscopy;  Laterality: N/A;    CORONARY ANGIOGRAPHY INCLUDING BYPASS GRAFTS WITH CATHETERIZATION OF LEFT HEART Left 9/12/2022    Procedure: Left heart cath including bypass graft, with left heart catheterization;  Surgeon: Marko Batres MD;  Location: [facility] CATH/EP LAB;  Service: Cardiology;  Laterality: Left;    CORONARY ARTERY  BYPASS GRAFT  12/09/2016    X3    ECHOCARDIOGRAM,TRANSESOPHAGEAL N/A 3/25/2024    Procedure: Transesophageal echo (DELORIS) intra-procedure log documentation;  Surgeon: Paulino Resendiz MD;  Location: [facility] CATH[facility]EP LAB;  Service: Cardiology;  Laterality: N/A;    EPIDURAL STEROID INJECTION INTO LUMBAR SPINE N/A 7/27/2021    Procedure: Injection-steroid-epidural-lumbar;  Surgeon: Valerio Jay MD;  Location: [facility] OR;  Service: Pain Management;  Laterality: N/A;  L5-S1     HYSTERECTOMY      INSERTION OF PACEMAKER Left 1/13/2020    Procedure: INSERTION, PACEMAKER;  Surgeon: Marko Batres MD;  Location: [facility] CATH[facility]EP LAB;  Service: Cardiology;  Laterality: Left;    OOPHORECTOMY      RIGHT HEART CATHETERIZATION Right 9/12/2022    Procedure: INSERTION, CATHETER, RIGHT HEART;  Surgeon: Marko Batres MD;  Location: [facility] CATH[facility]EP LAB;  Service: Cardiology;  Laterality: Right;    THYROIDECTOMY         Review of patient's allergies indicates:   Allergen Reactions    Metformin Diarrhea     Diarrhea      Corn Itching     Other reaction(s): Sneezing  Other reaction(s): Rhinorrhea    Potato starch Hives     Other reaction(s): Sneezing  Other reaction(s): Rhinorrhea    Pravastatin Other (See Comments)     Muscle pain    Statins-hmg-coa reductase inhibitors Other (See Comments)    Hydrochlorothiazide Other (See Comments)     weakness    Welchol [colesevelam] Other (See Comments)     Weakness        No current facility-administered medications on file prior to encounter.     Current Outpatient Medications on File Prior to Encounter   Medication Sig    albuterol (PROVENTIL/VENTOLIN HFA) 90 mcg/actuation inhaler Inhale 1-2 puffs into the lungs every 4 (four) hours as needed for Wheezing. Inhale 2 puffs by mouth into lungs every 4 hours as needed    amLODIPine (NORVASC) 10 MG tablet TAKE 1 TABLET BY MOUTH EVERY DAY FOR SYSTOLIC BLOOD PRESSURE GREATER THAN 120    arformoteroL (BROVANA) 15 mcg/2 mL Nebu Take 2 mLs (15 mcg total) by nebulization  2 (two) times a day. Controller    aspirin (ECOTRIN) 81 MG EC tablet Take 81 mg by mouth once daily.    bempedoic acid (NEXLETOL) 180 mg Tab Take 1 tablet by mouth once daily.    blood glucose control, low (TRUE METRIX LEVEL 1) Soln Check glucometer accuracy at least once a week    blood sugar diagnostic Strp True Metrix Test Strips test once a day    blood-glucose meter (TRUE METRIX GLUCOSE METER) Misc True Metrix Glucose Meter    budesonide (PULMICORT) 0.5 mg/2 mL nebulizer solution Take 2 mLs (0.5 mg total) by nebulization 2 (two) times daily. Controller    cholecalciferol, vitamin D3, (VITAMIN D3) 25 mcg (1,000 unit) capsule Take 1,000 Units by mouth once daily.    colchicine, gout, (COLCRYS) 0.6 mg tablet Take 1 tablet (0.6 mg total) by mouth once daily. Day 1: 1.2 mg at the first sign of flare, followed by 0.6 mg after 1 hour; maximum total dose: 1.8 mg/day on day Day 2 and thereafter: 0.6 mg once or twice daily until flare resolves    cyanocobalamin (VITAMIN B-12) 1000 MCG tablet Take 100 mcg by mouth once daily.    evolocumab (REPATHA SYRINGE) 140 mg/mL Syrg Inject 140 mg into the skin every 14 (fourteen) days.     glyBURIDE (DIABETA) 2.5 MG tablet Take 2.5 mg by mouth 2 (two) times daily.    lancets 33 gauge Misc 1 lancet by Misc.(Non-Drug; Combo Route) route once daily.    levothyroxine (SYNTHROID) 150 MCG tablet Take 150 mcg by mouth once daily.    metoprolol succinate (TOPROL-XL) 50 MG 24 hr tablet Take 1 tablet (50 mg total) by mouth once daily.    potassium chloride (MICRO-K) 8 mEq CpSR Take 8 mEq by mouth.    REPATHA SURECLICK 140 mg/mL PnIj SMARTSI pre-filled pen syringe SUB-Q Every 2 Weeks    revefenacin (YUPELRI) 175 mcg/3 mL Nebu Inhale 1 Dose into the lungs Daily.    SITagliptin (JANUVIA) 50 MG Tab Take 1 tablet (50 mg total) by mouth once daily.    sotaloL (BETAPACE) 80 MG tablet Take 80 mg by mouth 2 (two) times daily.    torsemide (DEMADEX) 20 MG Tab Take 20 mg by mouth.    vitamin E 400  "UNIT capsule Take 400 Units by mouth once daily.     Family History       Problem Relation (Age of Onset)    Aneurysm Maternal Grandfather    Arthritis Mother    Asthma Sister, Son    COPD Son    Cancer Paternal Aunt    Depression Son    Diabetes Mother, Sister, Son, Maternal Uncle    Heart disease Mother, Father, Maternal Uncle, Paternal Aunt, Paternal Uncle, Paternal Grandfather    Hypertension Mother, Father, Sister, Son, Paternal Aunt, Maternal Grandmother    Mental illness Father, Paternal Aunt, Maternal Grandmother, Paternal Grandmother    Stroke Son          Tobacco Use    Smoking status: Never    Smokeless tobacco: Never   Substance and Sexual Activity    Alcohol use: Not Currently     Comment: seldom    Drug use: No    Sexual activity: Never     Review of Systems  Objective:     Vital Signs (Most Recent):  Temp: 98.3 °F (36.8 °C) (04/05/24 1955)  Pulse: 82 (04/05/24 2003)  Resp: 18 (04/05/24 2003)  BP: (!) 143/63 (04/05/24 1955)  SpO2: 98 % (04/05/24 2003) Vital Signs (24h Range):        Weight: 87 kg (191 lb 12.8 oz)  Body mass index is 35.08 kg/m².     Physical Exam           Significant Labs: {Results:46718::"All pertinent labs within the past 24 hours have been reviewed."}    Significant Imaging: {Imaging Review:64673::"I have reviewed all pertinent imaging results/findings within the past 24 hours."}  "

## (undated) DEVICE — CLIP HEMO-LOK ML

## (undated) DEVICE — SYR GLASS 5CC LUER LOK

## (undated) DEVICE — RETRACTOR ENDO RETRACT 10MM

## (undated) DEVICE — CATHETER BIPOLAR PACING 5FR J-TIP

## (undated) DEVICE — BAG TISS RETRV MONARCH 10MM

## (undated) DEVICE — SUT VICRYL+ 27 UR-6 VIOL

## (undated) DEVICE — Device

## (undated) DEVICE — GUIDEWIRE DOUBLE ENDED .035 DIA. 150CML

## (undated) DEVICE — SEE MEDLINE ITEM 152622

## (undated) DEVICE — TUBING MINIBORE EXTENSION

## (undated) DEVICE — NDL HYPO REG 25G X 1 1/2

## (undated) DEVICE — BLLN SAPPHIRE SC 2.0 X 15

## (undated) DEVICE — CLIPPER BLADE MOD 4406 (CAREF)

## (undated) DEVICE — NDL HYPODERMIC BLUNT 18G 1.5IN

## (undated) DEVICE — DRAPE ABDOMINAL TIBURON 14X11

## (undated) DEVICE — SEE MEDLINE ITEM 157116

## (undated) DEVICE — TUBE SUMP NASOGASTRIC 16FR

## (undated) DEVICE — ADAPTER PIN INDEXED SWAN GANZ

## (undated) DEVICE — SHEATH PINNACLE 6FRX10CM W/GUIDEWIRE

## (undated) DEVICE — CABLE PACING FAS-LOC SECURITY

## (undated) DEVICE — TROCAR ENDOPATH EXCEL

## (undated) DEVICE — SHEATH INTRODUCER PRECISION ACCESS 6FX10

## (undated) DEVICE — TUBING HF INSUFFLATION W/ FLTR

## (undated) DEVICE — CATHETER DIAGNOSTIC DXTERITY 6FR JR 4.0

## (undated) DEVICE — GUIDEWIRE FIXED CORE 3MM JTIP 25X260

## (undated) DEVICE — NDL SAFETY 25G X 1.5 ECLIPSE

## (undated) DEVICE — LUBRICANT SURGILUBE 2 OZ

## (undated) DEVICE — BAG SPEC RETRV ENDO 4X6IN DISP

## (undated) DEVICE — CATHETER DIAGNOSTIC DXTERITY 6FR JL 4

## (undated) DEVICE — GUIDEWIRE PTCA FIELDER XT STR TIP 190CM

## (undated) DEVICE — BLADE SURG CARBON STEEL #10

## (undated) DEVICE — GOWN SURGICAL X-LARGE

## (undated) DEVICE — NDL INSUF ULTRA VERESS 120MM

## (undated) DEVICE — HEMOSTAT FLOWABLE DRY 4000

## (undated) DEVICE — SEE MEDLINE ITEM 146420

## (undated) DEVICE — GUIDEWIRE J TIP BMW .014X190

## (undated) DEVICE — APPLICATOR CHLORAPREP ORN 26ML

## (undated) DEVICE — KIT FIBRIN SEALANT EVICEL 5 ML

## (undated) DEVICE — KIT INFLATION MERIT (IN8152, HP9200E)

## (undated) DEVICE — CATHETER 2LUMEN PIGTAIL 6FR 5540

## (undated) DEVICE — TRAY CATH UM FOLEY SIL W 16FR

## (undated) DEVICE — SYS LABEL CORRECT MED

## (undated) DEVICE — DISSECTOR 5MM ENDOPATH

## (undated) DEVICE — IRRIGATOR ENDOSCOPY DISP.

## (undated) DEVICE — ADHESIVE DERMABOND ADVANCED

## (undated) DEVICE — GUIDEWIRE J TIP EXCHANGE FIXED CORE 260

## (undated) DEVICE — KIT SEAL HEMSTAT EVICEL 35CM

## (undated) DEVICE — ELECTRODE REM PLYHSV RETURN 9

## (undated) DEVICE — DISH PETRI MED 3.5IN

## (undated) DEVICE — CATHETER DIAGNOSTIC DXTERITY 6FR IMA

## (undated) DEVICE — CATHETER DIAGNOSTIC DXTERITY 6FR AR1.0

## (undated) DEVICE — SOL NS 1000CC

## (undated) DEVICE — SHEATH INTRODUCER PEELABLE OPTISEAL 6FR

## (undated) DEVICE — TROCAR ENDOPATH XCEL 12MM 10CM

## (undated) DEVICE — CHLORAPREP 3ML APPLICATOR TINT

## (undated) DEVICE — SYR DISP LL 5CC

## (undated) DEVICE — CANNULA ENDOPATH XCEL 5X100MM

## (undated) DEVICE — SUT MONOCRYL 4-0 PS-2

## (undated) DEVICE — BLADE SURG CARBON STEEL SZ11

## (undated) DEVICE — GLOVE SURG ULTRA TOUCH 7.5

## (undated) DEVICE — CATHETER GUIDE LAUNCHER AR1 6FX110

## (undated) DEVICE — SHEARS HARMONIC 5CM 36CM

## (undated) DEVICE — NDL TUOHY EPIDURAL 20G X 3.5

## (undated) DEVICE — DRESSING TELFA STRL 4X3 LF

## (undated) DEVICE — BAG TISSUE RETRIEVAL 225ML